# Patient Record
Sex: MALE | Race: BLACK OR AFRICAN AMERICAN | NOT HISPANIC OR LATINO | Employment: OTHER | URBAN - METROPOLITAN AREA
[De-identification: names, ages, dates, MRNs, and addresses within clinical notes are randomized per-mention and may not be internally consistent; named-entity substitution may affect disease eponyms.]

---

## 2017-01-11 ENCOUNTER — APPOINTMENT (OUTPATIENT)
Dept: GENERAL RADIOLOGY | Facility: CLINIC | Age: 82
DRG: 243 | End: 2017-01-11
Attending: EMERGENCY MEDICINE
Payer: MEDICARE

## 2017-01-11 ENCOUNTER — ANESTHESIA (OUTPATIENT)
Dept: EMERGENCY MEDICINE | Facility: CLINIC | Age: 82
DRG: 243 | End: 2017-01-11
Payer: MEDICARE

## 2017-01-11 ENCOUNTER — HOSPITAL ENCOUNTER (INPATIENT)
Facility: CLINIC | Age: 82
LOS: 3 days | Discharge: HOME | DRG: 243 | End: 2017-01-14
Attending: EMERGENCY MEDICINE | Admitting: INTERNAL MEDICINE
Payer: MEDICARE

## 2017-01-11 ENCOUNTER — ANESTHESIA EVENT (OUTPATIENT)
Dept: EMERGENCY MEDICINE | Facility: CLINIC | Age: 82
DRG: 243 | End: 2017-01-11
Payer: MEDICARE

## 2017-01-11 DIAGNOSIS — N40.0 BENIGN NODULAR PROSTATIC HYPERPLASIA WITHOUT LOWER URINARY TRACT SYMPTOMS: ICD-10-CM

## 2017-01-11 DIAGNOSIS — K21.9 GASTROESOPHAGEAL REFLUX DISEASE, ESOPHAGITIS PRESENCE NOT SPECIFIED: ICD-10-CM

## 2017-01-11 DIAGNOSIS — R00.1 BRADYCARDIA: ICD-10-CM

## 2017-01-11 DIAGNOSIS — I44.1 SECOND DEGREE AV BLOCK: ICD-10-CM

## 2017-01-11 DIAGNOSIS — I44.2 ATRIOVENTRICULAR BLOCK, COMPLETE (H): ICD-10-CM

## 2017-01-11 DIAGNOSIS — J44.1 COPD EXACERBATION (H): ICD-10-CM

## 2017-01-11 DIAGNOSIS — I10 ESSENTIAL HYPERTENSION: ICD-10-CM

## 2017-01-11 DIAGNOSIS — G89.18 ACUTE POST-OPERATIVE PAIN: Primary | ICD-10-CM

## 2017-01-11 PROBLEM — I16.0 HYPERTENSIVE URGENCY: Status: ACTIVE | Noted: 2017-01-11

## 2017-01-11 LAB
ALBUMIN SERPL-MCNC: 2.4 G/DL (ref 3.4–5)
ALBUMIN UR-MCNC: 30 MG/DL
ALP SERPL-CCNC: 86 U/L (ref 40–150)
ALT SERPL W P-5'-P-CCNC: 14 U/L (ref 0–70)
ANION GAP SERPL CALCULATED.3IONS-SCNC: 7 MMOL/L (ref 3–14)
APPEARANCE UR: CLEAR
AST SERPL W P-5'-P-CCNC: 29 U/L (ref 0–45)
BASOPHILS # BLD AUTO: 0 10E9/L (ref 0–0.2)
BASOPHILS NFR BLD AUTO: 0.6 %
BILIRUB SERPL-MCNC: 0.4 MG/DL (ref 0.2–1.3)
BILIRUB UR QL STRIP: NEGATIVE
BUN SERPL-MCNC: 11 MG/DL (ref 7–30)
CA-I BLD-SCNC: 4.7 MG/DL (ref 4.4–5.2)
CALCIUM SERPL-MCNC: 8.3 MG/DL (ref 8.5–10.1)
CHLORIDE SERPL-SCNC: 105 MMOL/L (ref 94–109)
CO2 BLDCOV-SCNC: 32 MMOL/L (ref 21–28)
CO2 SERPL-SCNC: 26 MMOL/L (ref 20–32)
COLOR UR AUTO: YELLOW
CREAT SERPL-MCNC: 0.96 MG/DL (ref 0.66–1.25)
DIFFERENTIAL METHOD BLD: NORMAL
EOSINOPHIL # BLD AUTO: 0.2 10E9/L (ref 0–0.7)
EOSINOPHIL NFR BLD AUTO: 2.8 %
ERYTHROCYTE [DISTWIDTH] IN BLOOD BY AUTOMATED COUNT: 14.1 % (ref 10–15)
GFR SERPL CREATININE-BSD FRML MDRD: 75 ML/MIN/1.7M2
GLUCOSE BLD-MCNC: 94 MG/DL (ref 70–99)
GLUCOSE SERPL-MCNC: 95 MG/DL (ref 70–99)
GLUCOSE UR STRIP-MCNC: NEGATIVE MG/DL
HCT VFR BLD AUTO: 45.3 % (ref 40–53)
HCT VFR BLD CALC: 46 %PCV (ref 40–53)
HGB BLD CALC-MCNC: 15.6 G/DL (ref 13.3–17.7)
HGB BLD-MCNC: 14.4 G/DL (ref 13.3–17.7)
HGB UR QL STRIP: NEGATIVE
IMM GRANULOCYTES # BLD: 0 10E9/L (ref 0–0.4)
IMM GRANULOCYTES NFR BLD: 0.2 %
INR PPP: 1.14 (ref 0.86–1.14)
INTERPRETATION ECG - MUSE: NORMAL
KETONES UR STRIP-MCNC: NEGATIVE MG/DL
LACTATE BLD-SCNC: 1.4 MMOL/L (ref 0.7–2.1)
LEUKOCYTE ESTERASE UR QL STRIP: ABNORMAL
LYMPHOCYTES # BLD AUTO: 1.9 10E9/L (ref 0.8–5.3)
LYMPHOCYTES NFR BLD AUTO: 35.5 %
MCH RBC QN AUTO: 28.7 PG (ref 26.5–33)
MCHC RBC AUTO-ENTMCNC: 31.8 G/DL (ref 31.5–36.5)
MCV RBC AUTO: 90 FL (ref 78–100)
MONOCYTES # BLD AUTO: 0.7 10E9/L (ref 0–1.3)
MONOCYTES NFR BLD AUTO: 12.9 %
NEUTROPHILS # BLD AUTO: 2.6 10E9/L (ref 1.6–8.3)
NEUTROPHILS NFR BLD AUTO: 48 %
NITRATE UR QL: NEGATIVE
NRBC # BLD AUTO: 0 10*3/UL
NRBC BLD AUTO-RTO: 0 /100
PCO2 BLDV: 57 MM HG (ref 40–50)
PH BLDV: 7.35 PH (ref 7.32–7.43)
PH UR STRIP: 7.5 PH (ref 5–7)
PLATELET # BLD AUTO: 258 10E9/L (ref 150–450)
PO2 BLDV: 26 MM HG (ref 25–47)
POTASSIUM BLD-SCNC: 4.5 MMOL/L (ref 3.4–5.3)
POTASSIUM SERPL-SCNC: 5.1 MMOL/L (ref 3.4–5.3)
PROT SERPL-MCNC: 7.9 G/DL (ref 6.8–8.8)
RBC # BLD AUTO: 5.02 10E12/L (ref 4.4–5.9)
SAO2 % BLDV FROM PO2: 44 %
SODIUM BLD-SCNC: 141 MMOL/L (ref 133–144)
SODIUM SERPL-SCNC: 138 MMOL/L (ref 133–144)
SP GR UR STRIP: 1.02 (ref 1–1.03)
TROPONIN I SERPL-MCNC: NORMAL UG/L (ref 0–0.04)
URN SPEC COLLECT METH UR: ABNORMAL
UROBILINOGEN UR STRIP-MCNC: 2 MG/DL (ref 0–2)
WBC # BLD AUTO: 5.4 10E9/L (ref 4–11)

## 2017-01-11 PROCEDURE — 40000671 ZZH STATISTIC ANESTHESIA CASE

## 2017-01-11 PROCEDURE — 71010 XR CHEST PORT 1 VW: CPT

## 2017-01-11 PROCEDURE — 85610 PROTHROMBIN TIME: CPT | Performed by: EMERGENCY MEDICINE

## 2017-01-11 PROCEDURE — 25000125 ZZHC RX 250

## 2017-01-11 PROCEDURE — 40000497 ZZHCL STATISTIC SODIUM ED POCT

## 2017-01-11 PROCEDURE — 81003 URINALYSIS AUTO W/O SCOPE: CPT | Performed by: EMERGENCY MEDICINE

## 2017-01-11 PROCEDURE — 93005 ELECTROCARDIOGRAM TRACING: CPT

## 2017-01-11 PROCEDURE — 96374 THER/PROPH/DIAG INJ IV PUSH: CPT | Performed by: EMERGENCY MEDICINE

## 2017-01-11 PROCEDURE — 93010 ELECTROCARDIOGRAM REPORT: CPT | Mod: Z6 | Performed by: EMERGENCY MEDICINE

## 2017-01-11 PROCEDURE — 25000128 H RX IP 250 OP 636

## 2017-01-11 PROCEDURE — 99291 CRITICAL CARE FIRST HOUR: CPT | Mod: 25 | Performed by: EMERGENCY MEDICINE

## 2017-01-11 PROCEDURE — 93005 ELECTROCARDIOGRAM TRACING: CPT | Mod: 76 | Performed by: EMERGENCY MEDICINE

## 2017-01-11 PROCEDURE — A9270 NON-COVERED ITEM OR SERVICE: HCPCS | Mod: GY | Performed by: INTERNAL MEDICINE

## 2017-01-11 PROCEDURE — 96375 TX/PRO/DX INJ NEW DRUG ADDON: CPT | Performed by: EMERGENCY MEDICINE

## 2017-01-11 PROCEDURE — 40000501 ZZHCL STATISTIC HEMATOCRIT ED POCT

## 2017-01-11 PROCEDURE — 25000125 ZZHC RX 250: Performed by: EMERGENCY MEDICINE

## 2017-01-11 PROCEDURE — 84484 ASSAY OF TROPONIN QUANT: CPT | Performed by: EMERGENCY MEDICINE

## 2017-01-11 PROCEDURE — 82803 BLOOD GASES ANY COMBINATION: CPT

## 2017-01-11 PROCEDURE — 80053 COMPREHEN METABOLIC PANEL: CPT | Performed by: EMERGENCY MEDICINE

## 2017-01-11 PROCEDURE — 21400006 ZZH R&B CCU INTERMEDIATE UMMC

## 2017-01-11 PROCEDURE — 40000502 ZZHCL STATISTIC GLUCOSE ED POCT

## 2017-01-11 PROCEDURE — 99291 CRITICAL CARE FIRST HOUR: CPT | Performed by: EMERGENCY MEDICINE

## 2017-01-11 PROCEDURE — 87086 URINE CULTURE/COLONY COUNT: CPT | Performed by: EMERGENCY MEDICINE

## 2017-01-11 PROCEDURE — 99292 CRITICAL CARE ADDL 30 MIN: CPT | Performed by: EMERGENCY MEDICINE

## 2017-01-11 PROCEDURE — 25000132 ZZH RX MED GY IP 250 OP 250 PS 637: Mod: GY | Performed by: INTERNAL MEDICINE

## 2017-01-11 PROCEDURE — 82330 ASSAY OF CALCIUM: CPT

## 2017-01-11 PROCEDURE — 83605 ASSAY OF LACTIC ACID: CPT | Performed by: EMERGENCY MEDICINE

## 2017-01-11 PROCEDURE — 85025 COMPLETE CBC W/AUTO DIFF WBC: CPT | Performed by: EMERGENCY MEDICINE

## 2017-01-11 PROCEDURE — 96361 HYDRATE IV INFUSION ADD-ON: CPT | Performed by: EMERGENCY MEDICINE

## 2017-01-11 PROCEDURE — 40000498 ZZHCL STATISTIC POTASSIUM ED POCT

## 2017-01-11 RX ORDER — SODIUM CHLORIDE 9 MG/ML
INJECTION, SOLUTION INTRAVENOUS
Status: COMPLETED
Start: 2017-01-11 | End: 2017-01-11

## 2017-01-11 RX ORDER — HYDRALAZINE HYDROCHLORIDE 25 MG/1
25 TABLET, FILM COATED ORAL ONCE
Status: COMPLETED | OUTPATIENT
Start: 2017-01-11 | End: 2017-01-11

## 2017-01-11 RX ORDER — SODIUM CHLORIDE 9 MG/ML
INJECTION, SOLUTION INTRAVENOUS
Status: DISCONTINUED
Start: 2017-01-11 | End: 2017-01-11 | Stop reason: HOSPADM

## 2017-01-11 RX ORDER — NALOXONE HYDROCHLORIDE 0.4 MG/ML
.1-.4 INJECTION, SOLUTION INTRAMUSCULAR; INTRAVENOUS; SUBCUTANEOUS
Status: DISCONTINUED | OUTPATIENT
Start: 2017-01-11 | End: 2017-01-14 | Stop reason: HOSPADM

## 2017-01-11 RX ORDER — HYDRALAZINE HYDROCHLORIDE 20 MG/ML
5 INJECTION INTRAMUSCULAR; INTRAVENOUS ONCE
Status: COMPLETED | OUTPATIENT
Start: 2017-01-11 | End: 2017-01-11

## 2017-01-11 RX ORDER — ENALAPRIL MALEATE 10 MG/1
10 TABLET ORAL DAILY
Status: DISCONTINUED | OUTPATIENT
Start: 2017-01-12 | End: 2017-01-13

## 2017-01-11 RX ADMIN — SODIUM CHLORIDE 1000 ML: 9 INJECTION, SOLUTION INTRAVENOUS at 18:17

## 2017-01-11 RX ADMIN — ATROPINE SULFATE 0.5 MG: 0.1 INJECTION, SOLUTION ENDOTRACHEAL; INTRAMUSCULAR; INTRAVENOUS; SUBCUTANEOUS at 18:04

## 2017-01-11 RX ADMIN — HYDRALAZINE HYDROCHLORIDE 5 MG: 20 INJECTION INTRAMUSCULAR; INTRAVENOUS at 18:14

## 2017-01-11 RX ADMIN — HYDRALAZINE HYDROCHLORIDE 25 MG: 25 TABLET ORAL at 22:56

## 2017-01-11 RX ADMIN — HYDRALAZINE HYDROCHLORIDE 5 MG: 20 INJECTION INTRAMUSCULAR; INTRAVENOUS at 18:04

## 2017-01-11 RX ADMIN — SODIUM CHLORIDE 1000 ML: 9 INJECTION, SOLUTION INTRAVENOUS at 17:30

## 2017-01-11 NOTE — IP AVS SNAPSHOT
[address] [address]    Phone:  [phone]                                       After Visit Summary   1/11/2017    Gallito Farley    MRN: [id number]           After Visit Summary Signature Page     I have received my discharge instructions, and my questions have been answered. I have discussed any challenges I see with this plan with the nurse or doctor.    ..........................................................................................................................................  Patient/Patient Representative Signature      ..........................................................................................................................................  Patient Representative Print Name and Relationship to Patient    ..................................................               ................................................  Date                                            Time    ..........................................................................................................................................  Reviewed by Signature/Title    ...................................................              ..............................................  Date                                                            Time

## 2017-01-11 NOTE — IP AVS SNAPSHOT
` `     UNIT 4C [facility] BANK: [phone]                 INTERAGENCY TRANSFER FORM - NOTES (H&P, Discharge Summary, Consults, Procedures, Therapies)   2017                    Hospital Admission Date: 2017  GALLITO FARLEY   : 1934  Sex: Male        Patient PCP Information     Provider PCP Type    Moiz Richard MD, MD General         History & Physicals      H&P by Klarissa Garsia MD at 2017  1:52 AM     Author:  Klarissa Garsia MD Service:  Cardiology Author Type:  Physician    Filed:  2017 10:05 PM Note Time:  2017  1:52 AM Status:  Signed    :  Klarissa Garsia MD (Physician)      Related Notes: Original Note by Haley Peterson MD (Resident) filed at 2017  5:31 AM                                                      CARDS 1 Admission History and Physical  Gallito Farley MRN: [id number]  1934  Date of Admission:2017  Primary care provider: Moiz Richard  ___________________________________          Assessment and Plan:   Gallito Farley is a 83 year old male with PMH of COPD, HTN, chronic leg wound, prior TB with right upper lobectomy who presented with cough after 6 month period in [place] off all medications and found to have bradycardia with complete heart block and hypertensive urgency.    # Complete Heart Block  Likely conduction disorder. Previously on metoprolol but minimal recent use of medications and unlikely accidental overdose.  - Monitor on telemetry overnight, have discussed with telemetry techs  - External pacing pads on overnight and pacer in room if needed  - TTE ordered for am  - Likely pacemaker placement in am, NPO at [facility] with check of CBC, INR in am    # Hypertensive Urgency  In setting of known hypertension with 6 month period off medications. Maximum blood pressure in ED of 257/101. Symptomatic with chronic chest pressure. No evidence of end organ damage, normal troponin.  - S/p two doses of 5 mg IV hydralazine in ED  -  Additional 25 mg PO hydralazine following arrival to floor  - Restart of home enalapril 10 mg daily in am  - Goal SBP overnight of 160-180  - Telemetry as above    # COPD  Reportedly on Flovent prior to time in [place] per PCP note. Presented with dry cough of admission but with stable respiratory status, clear chest on exam and XR.   - Will hold Flovent, also with Spiriva and Flonase listed in meds, unclear if using and will hold    Chronic Conditions  Leg Ulcer: Wound cares, pain relief with Tylenol, will also try menthol cream  GERD: Hold omeprazole  BPH: Hold tamsulosin    FEN: Cardiac, NPO at midnight  Prophylaxis: Mechanical overnight in anticipation of potential PPM placement in am  Code Status: FULL  Disposition: Inpatient    Patient to be staffed in the am    Haley Peterson MD PhD  Internal Medicine PGY-2  [phone]    I have seen, interviewed, and examined patient. I have reviewed the laboratory tests, imaging, and other investigations. I have reviewed the management plan with the patient. I discussed with the team and agree with the findings and plan in this resident/fellow/nurse practitioner's note. In addition, changes in the physical examination, assessment and plan have been incorporated into the note by myself, as to make it a single cohesive document.       Klarissa Garsia MD, MS  Cardiology/Cardiac EP Attending Staff              Chief Complaint:   Cough         History of Present Illness:   History obtained from patient and his two nephews one of whom served as .    Patient reports not feeling well for the past couple of months. States has a weight in his chest that never goes away. Does not do any form of exertional activity and cannot say if chest pressure ever gets worse. Denies dyspnea, orthopnea, dizziness or lightheadedness. Chronic leg ulcer of the right leg with edema of right leg greater than left. Has been in [place] for the past 6 months and returned yesterday. States took  some medications during that time but does not know the names.     Chart Review shows two admissions over the summer for COPD exacerbation and PNA.    ROS:  Complete 10 point ROS was negative unless noted in the HPI.         Past Cardiac History:   Recent Cardiac Admissions: None    Last ECHO: 4/16/2013  Interpretation Summary  Technically difficult study. Poor acoustic windows. Global and regional left   ventricular function is normal with an EF of 60-65%. Global right ventricular   function is normal. Pulmonary artery systolic pressure cannot be assessed.   The inferior vena cava  is normal. No pericardial effusion is present.    Last Angiogram: None          Past Medical History:     Past Medical History   Diagnosis Date     Osteoarthritis      Diastolic dysfunction, left ventricle 4/16/2013     Hypertension      Asthma      COPD (chronic obstructive pulmonary disease) (H)      Leg wound, right      chronic, s/p grafting     BPH (benign prostatic hyperplasia)      H/O tuberculosis      s/p partial pneumonectomy in [place] in the 1990[place]     LBBB (left bundle branch block)              Past Surgical History:      Past Surgical History   Procedure Laterality Date     Cholecystectomy       Ent surgery       Skin grafting - leg wound  6/2016     Irrigation and debridement hip, combined  4/18/2013     Procedure: COMBINED IRRIGATION AND DEBRIDEMENT HIP;  Irrigation and debridement of Right Hip with cultures;  Surgeon: Cristal Inman MD;  Location: [facility] OR     Partial pneumonectomy       done in [facility] in the 1990's             Social History:     Social History   Substance Use Topics     Smoking status: Former Smoker     Smokeless tobacco: Not on file     Alcohol Use: No             Family History:     Family History   Problem Relation Age of Onset     Family History Negative Mother              Allergies:   No Known Allergies          Medications:     No current facility-administered medications on file prior  "to encounter.  Current Outpatient Prescriptions on File Prior to Encounter:  albuterol (2.5 MG/3ML) 0.083% nebulizer solution Take 1 vial (2.5 mg) by nebulization 4 times daily (Patient taking differently: Take 2.5 mg by nebulization every 6 hours as needed )   omeprazole (PRILOSEC) 40 MG capsule Take 1 capsule (40 mg) by mouth every morning (before breakfast)   tamsulosin (FLOMAX) 0.4 MG 24 hr capsule Take 1 capsule (0.4 mg) by mouth every 24 hours   enalapril (VASOTEC) 10 MG tablet Take 1 tablet (10 mg) by mouth daily   metoprolol (LOPRESSOR) 25 MG tablet Take 2 tablets (50 mg) by mouth 2 times daily   fluticasone (FLONASE) 50 MCG/ACT nasal spray Spray 1-2 sprays into both nostrils daily   fluticasone (FLOVENT DISKUS) 250 MCG/BLIST AEPB Inhale 1 puff (250 mcg) into the lungs every 12 hours   tiotropium (SPIRIVA) 18 MCG inhalation capsule Inhale 1 capsule (18 mcg) into the lungs daily            Physical Exam:   Blood pressure 185/65, pulse 40, temperature 98.3  F (36.8  C), temperature source Oral, resp. rate 22, height 1.854 m (6' 1\"), weight 79.89 kg (176 lb 2 oz), SpO2 95 %.    General: NAD, pleasant  HEENT: NCAT, PERRL, EOMI, OP clear and non-erythematous  Neck: No LAD, no JVD  CV: Bradycardic, no murmurs or rubs  Resp: CTAB on anterior exam, no rales, rhonchi, wheezes  Abd: Mild tenderness LUQ, soft, normoactive bowel sounds  Extremities: WWP, trace pitting edema of left leg, 2+ pitting edema of right leg, radial and DP pulses intact  Skin: Large ulcer approximately 4 inches in height that wraps from lateral into posterior right leg above the ankle, no active drainage  Neuro/Psych: AAOx4, CN 2-12 grossly intact, moving all four extremities         Data:   I/O last 3 completed shifts:  In: -   Out: 190 [Urine:190]    CMP  Recent Labs  Lab 01/11/17  1735 01/11/17  1734    138   POTASSIUM 4.5 5.1   CHLORIDE  --  105   CO2  --  26   ANIONGAP  --  7   GLC 94 95   BUN  --  11   CR  --  0.96   GFRESTIMATED  -- "  75   PAMELA  --  8.3*   PROTTOTAL  --  7.9   ALBUMIN  --  2.4*   BILITOTAL  --  0.4   ALKPHOS  --  86   AST  --  29   ALT  --  14     CBC  Recent Labs  Lab 01/11/17  1735 01/11/17  1734   WBC  --  5.4   RBC  --  5.02   HGB 15.6 14.4   HCT  --  45.3   MCV  --  90   MCH  --  28.7   MCHC  --  31.8   RDW  --  14.1   PLT  --  258     INR  Recent Labs  Lab 01/11/17  1734   INR 1.14       Troponin Lab Results   Component Value Date    TROPI  01/11/2017     <0.015  The 99th percentile for upper reference range is 0.045 ug/L.  Troponin values in   the range of 0.045 - 0.120 ug/L may be associated with risks of adverse   clinical events.      TROPI 0.016 07/21/2016    TROPI  07/14/2016     <0.015  The 99th percentile for upper reference range is 0.045 ug/L.  Troponin values in   the range of 0.045 - 0.120 ug/L may be associated with risks of adverse   clinical events.      TROPONIN 0.02 04/15/2013    TROPONIN <0.04 11/04/2006    TROPONIN <0.04 09/08/2006     EKG: Complete Heart Block    IMAGING:   CXR: IMPRESSION: Interval placement of an external pacer which partially  obscures the left mid and lower lung zones. Postoperative volume loss  in the right upper hemithorax is again noted. Probable scarring in  right lung base is unchanged. Heart size is difficult to evaluate due  to rotation of the patient. No definite confluent infiltrates or  significant changes since the prior exam.                   Discharge Summaries     No notes of this type exist for this encounter.      Consult Notes     No notes of this type exist for this encounter.         Progress Notes - Physician (Notes from 01/11/17 through 01/14/17)      Progress Notes by Hai Huang MD at 1/13/2017  7:19 AM     Author:  Hai Huang MD Service:  Electrophysiology Author Type:  Physician    Filed:  1/14/2017 12:16 PM Note Time:  1/13/2017  7:19 AM Status:  Signed    :  Hai Huang MD (Physician)      Related Notes: Original Note by Trudy Bowman  "KIRSTEN Coker CNP (Nurse Practitioner) filed at 1/13/2017 11:37 AM           Electrophysiology Post Procedure Follow Up Note  Date of Procedure: 1/12/17  DOS: 1/13/2017   Pre-operative Diagnosis:  Complete Heart Block.  Device Indication: Complete Heart block  Post-operative diagnosis:   Successful implantation of PPM.  Hospital Course:  Mr. Farley is an 83 year old male who has a past medical history significant for COPD, HTN, chronic leg wound, prior TB with right upper lobectomy who presented with cough after 6 month period in [place] off all medications and found to have bradycardia with complete heart block and hypertensive urgency.He was referred for PPM implant.     Patient underwent a successful dual chamber implant yesterday. He was noted to be very hypertensive (240's/100's) when he arrived in [facility] for PPM implant. He was given 10mg IV hydralazine with little effect. He was subsequently started on nitro gtt which helped control blood pressures to 140-160's/60-80's throughout most of the case. Given need for ongoing nitro gtt for BP management, he was transferred to ICU. He had an uneventful overnight stay. He continues to be hypertensive. Device interrogation shows normal device function and stable lead parameters. Chest x-ray demonstrates no evidence of pneumothorax. Left subclavian site is CDI, no bleeding, and no hematoma. Patient is tolerating oral intake, ambulating at baseline, and voiding without difficulties. Patient remains hemodynamically stable.     ROS:   10 point ROS neg other than the symptoms noted above.   Exam:  /81 mmHg  Pulse 40  Temp(Src) 98.1  F (36.7  C) (Oral)  Resp 26  Ht 1.854 m (6' 1\")  Wt 79 kg (174 lb 2.6 oz)  BMI 22.98 kg/m2  SpO2 98%    Constitutional: alert and no distress  Head: Normocephalic. No masses, lesions, tenderness or abnormalities  Neck: Neck supple. No adenopathy. Thyroid symmetric, normal size,, Carotids without bruits.  ENT: ENT exam normal, no neck " nodes or sinus tenderness  Cardiovascular: negative, PMI normal. No lifts, heaves, or thrills. RRR. No murmurs, clicks gallops or rub  Respiratory: negative, Good diaphragmatic excursion. Lungs clear  Gastrointestinal: Abdomen soft, non-tender. BS normal. No masses, organomegaly  : Deferred  Skin: right leg ulcer dressed, no suspicious lesions or rashes  Neurologic: A&OX4.  Psychiatric: mentation appears normal and affect normal/bright  Discharge Medications:   Gallito Farley   Home Medication Instructions DIMAS:[id number]    Printed on:01/13/17 6564   Medication Information                      albuterol (2.5 MG/3ML) 0.083% nebulizer solution  Take 1 vial (2.5 mg) by nebulization 4 times daily             enalapril (VASOTEC) 10 MG tablet  Take 1 tablet (10 mg) by mouth daily             fluticasone (FLONASE) 50 MCG/ACT nasal spray  Spray 1-2 sprays into both nostrils daily             fluticasone (FLOVENT DISKUS) 250 MCG/BLIST AEPB  Inhale 1 puff (250 mcg) into the lungs every 12 hours             metoprolol (LOPRESSOR) 25 MG tablet  Take 2 tablets (50 mg) by mouth 2 times daily             omeprazole (PRILOSEC) 40 MG capsule  Take 1 capsule (40 mg) by mouth every morning (before breakfast)             tamsulosin (FLOMAX) 0.4 MG 24 hr capsule  Take 1 capsule (0.4 mg) by mouth every 24 hours             tiotropium (SPIRIVA) 18 MCG inhalation capsule  Inhale 1 capsule (18 mcg) into the lungs daily                 Plan & Follow up:    Continued cares for hypertensive urgency per primary team    Follow up with device clinic in 7-10 days    Oral antibiotics x 5 days    Keep incision clean and dry for 72 hours post procedure    No lifting > 10lbs or over shoulder level with left arm for 4 weeks    Notify device clinic/EP immediately for any redness, swelling, bleeding, discharge, fevers or chills.      This patient was seen and examined with Dr. Huang. The above note reflects our joint assessment and plan.   Trudy Roach  KIRSTEN Mistry CNP  Electrophysiology Consult Service  Pager: [phone]      EP STAFF NOTE  Patient seen and examined and management plan personally reviewed with the patient. I agree with the note above by Turdy Angeles, EP Nurse. Changes in the physical examination, assessment and plan have been incorporated into the note by myself, as to make it a single cohesive document. Patient seen 1/13/17.  Hai Huang MD [facility]  Cardiology - Electrophysiology             Progress Notes by Klarissa Garsia MD at 1/13/2017  6:46 AM     Author:  Klarissa Garsia MD Service:  Cardiology Author Type:  Physician    Filed:  1/13/2017 10:05 PM Note Time:  1/13/2017  6:46 AM Status:  Addendum    :  Klarissa Garsia MD (Physician)      Related Notes: Original Note by Renay Beltrán MD (Resident) filed at 1/13/2017 12:19 PM                 Cardiology Daily Note   Date of Service: 1/12/2017  Patient: Gallito Farley  MRN: [id number]  Admission Date: 1/11/2017  Hospital Day # 2      Assessment & Plan:   Gallito Farley is a 83 year old male with PMH of COPD, HTN, chronic leg wound, prior TB with right upper lobectomy who presented with cough after 6 month period in [place] off all medications and found to have bradycardia with complete heart block and hypertensive urgency.     Changes today:  -- PPM interrogation  -- Post-op CXR  -- Wean nitro gtt as able  -- start HCTz  -- Continue cephalexin     # Complete Heart Block  Likely conduction disorder. Previously on metoprolol but minimal recent use of medications and unlikely accidental overdose. Patient underwent a successful dual chamber implant on 01/13/17.   -- Device interrogation this AM  -- Post-procedure CXR  -- Cephalexin PO for prophylaxis for 5 days (end date 01/17/17)  -- No lifting > 10lbs or over shoulder level with left arm for 4 weeks  -- Follow up with device clinic in 7-10 days    # Hypertensive Urgency  In setting of known hypertension with 6 month period off medications.  Maximum blood pressure in ED of 257/101. Symptomatic with chronic chest pressure. No evidence of end organ damage, normal troponin. He was noted to be very hypertensive (240's/100's) when he arrived in [place] for PPM implant. He was given 10mg IV hydralazine with little effect. He was subsequently started on nitro gtt which helped control blood pressures to 140-160's/60-80's. Still requiring blood pressure management. Given need for ongoing nitro gtt for BP management, he was transferred to ICU.   -- Enalapril 10 mg daily   -- Start HCTz 25mg QD  -- Goal -160s  -- Wean nitro gtt as able  -- Consider work-up for secondary causes of HTN if no improvement     # COPD  Pt reported not taking any medication for at least the past 6 months. Presented with dry cough of admission but with stable respiratory status, clear chest on exam and XR.  No evidence of acute copd exacerbation  - Re-start flovent BID  and ipratropium daily    Chronic Conditions  Leg Ulcer: Wound cares, pain relief with Tylenol, will also try menthol cream   - Wound Nurse consulted   GERD: switched from omeprazole to ranitidin daily.    BPH: Continue pta tamsulosin    FEN: Cardiac  Prophylaxis: Mechanical   Code Status: FULL  Disposition: pending improvement in blood pressure    This patient was discussed with Dr. Garsia who agrees with the assessment and plan.    Renay Beltrán, PGY2  Internal Medicine/Pediatrics  P: [phone]    I have seen, interviewed, and examined patient. I have reviewed the laboratory tests, imaging, and other investigations. I have reviewed the management plan with the patient. I discussed with the team and agree with the findings and plan in this resident/fellow/nurse practitioner's note. In addition, changes in the physical examination, assessment and plan have been incorporated into the note by myself, as to make it a single cohesive document.       Klarissa Garsia MD, MS  Cardiology/Cardiac EP Attending  "Staff          Subjective & Interval Hx:    Nursing notes reviewed. He had an uneventful overnight stay. He continues to be hypertensive. This AM, c/o mild headache and some chest soreness over incision site. Otherwise denies sob, vision changes, dizziness.     Last 24 hr care team notes reviewed.   ROS: 4 point ROS including Respiratory, CV, GI and , other than that noted in the HPI, is negative    Telemetry reviewed: Yes     Physical Exam:  Blood pressure 170/86, pulse 40, temperature 98.8  F (37.1  C), temperature source Tympanic, resp. rate 24, height 1.854 m (6' 1\"), weight 79 kg (174 lb 2.6 oz), SpO2 99 %.  General: NAD, pleasant  HEENT: NCAT, PERRL, EOMI, OP clear and non-erythematous  Neck: No LAD, no JVD  CV: Bradycardic, no murmurs or rubs  Resp: CTAB on anterior exam, no rales, rhonchi, wheezes  Abd: LUQ, soft, normoactive bowel sounds  Extremities: WWP, trace pitting edema of left leg, 1+ pitting edema of right leg, radial and DP pulses intact  Skin: Large leg ulcer on right leg wrapped by wound nurse, no active drainage    Neuro/Psych: AAOx4      Labs & Studies of Note:  Labs reviewed in [facility].     EKG reviewed.   Meds reviewed.  Imaging reviewed.    Medications list for Reference  Current Facility-Administered Medications   Medication     lidocaine 1 % 1 mL     lidocaine (LMX4) kit     sodium chloride (PF) 0.9% PF flush 3 mL     sodium chloride (PF) 0.9% PF flush 3 mL     acetaminophen-codeine (TYLENOL #3) 300-30 MG per tablet 1-2 tablet     HOLD: metformin and metformin containing medications on day of procedure and 48 hours after IV contrast given     HOLD: enoxaparin (LOVENOX) Post Procedure     HOLD: heparin (IV or Subcutaneous) Post Procedure     ceFAZolin (ANCEF) 1 g vial to attach to  ml bag for ADULT or 50 ml bag for PEDS     [START ON 1/14/2017] cephalexin (KEFLEX) capsule 250 mg     fluticasone (FLOVENT DISKUS) 250 mcg/puff inhaler 1 puff     umeclidinium (INCRUSE ELLIPTA) oral inhaler " 1 puff     albuterol (PROAIR HFA/PROVENTIL HFA/VENTOLIN HFA) Inhaler 2 puff     furosemide (LASIX) injection 20 mg     hydrochlorothiazide (MICROZIDE) capsule 25 mg     acetaminophen (TYLENOL) tablet 650 mg     methyl salicylate-menthol (STEVEN-TRIMBLE) ointment     nitroglycerin 50 mg in D5W 250 mL (adult std) infusion     enalapril (VASOTEC) tablet 10 mg     naloxone (NARCAN) injection 0.1-0.4 mg                Progress Notes by Kierra Pabon RN at 2017 11:29 AM     Author:  Kierra Pabon RN Service:  (none) Author Type:  Registered Nurse    Filed:  2017 11:42 AM Note Time:  2017 11:29 AM Status:  Signed    :  Kierra Pabon RN (Registered Nurse)           Pt seen on 4C for evaluation and programming of a [employer] Dual lead pacemaker s/p implant on 2017, per MD orders. Today His intrinsic rhythm is 80 BPM @ 30 BPM (VS). Normal pacemaker function. 0 arrhythmias have been recorded. AP= 9.5% and = 99.9%. Pt reports he is feeling well.  Battery estimates Initalizing years to BE. Teaching provided in written and verbal forms with interpertor. Plan for pt to RTC on 2017 @ 10:30 AM  for a device and incision check. Changed upper rate from 100 to 120 BPM per Dr Huang.     RA lead:  Impedance: 572 ohms  Sensin.0 mV  Threshold: 0.50 V @ 0.40 ms    RV lead:  Impedance: 959 ohms  Sensin.0 mV  Threshold: 0.50 V @ 0.40 ms           Progress Notes by Lorraine Cline RN at 2017 10:18 AM     Author:  Lorraine Cline RN Service:  WO Nurse Author Type:  Registered Nurse    Filed:  2017 10:30 AM Note Time:  2017 10:18 AM Status:  Signed    :  Lorraine Cline RN (Registered Nurse)           [facility] Nurse Inpatient Wound Assessment     Initial Assessment of wound(s) on pt's:   Right posterior lower leg wound        Data:   Patient History:      per MD note(s): Gallito Farley is a 83 year old male with PMH of COPD, HTN,  chronic leg wound, prior TB with right upper lobectomy who presented with cough after 6 month period in [place] off all medications and found to have bradycardia with complete heart block and hypertensive urgency.    Moisture Management:  NA    Current Diet / Nutrition:           Active Diet Order  Advance Diet as Tolerated: Clear Liquid Diet                Dimitri Assessment and sub scores:   Dimitri Score  Av  Min: 15  Max: 17     Labs:         Recent Labs   Lab Test  17   0815   17   1734   16   0530   16   0700   ALBUMIN   --    --   2.4*   --   2.6*   < >   --    HGB  12.7*   < >  14.4   < >   --    < >  12.3*   RBC  4.55   --   5.02   < >   --    < >  4.14*   WBC  6.0   --   5.4   < >   --    < >  10.1   PLT  265   --   258   < >   --    < >  441   INR  1.24*   --   1.14   --    --    --    --    A1C   --    --    --    --   5.7   --    --    CRP   --    --    --    --    --    --   91.6*    < > = values in this interval not displayed.          Wound Assessment (location #1): Right posterior lower leg wound    Wound History: Pt not able to provide history, very limited english. Chart reviewed, per previous record, son reported the wound is from a lizard bite that was operated on then got infected, as a child.   He was seen by Dr. Davis in 2016 for these wound and apligraf was applied. Wound appears to be be healing compared to the pictures that's on the file from Dr. Davis but chronic and slow. Unsure if he is still following Dr. Davis. Venous insufficiency is also a contributing factor for this wound.             Wound Base: 100% pink smooth tissue    Specific Dimensions (length x width x depth, in cm) :   6x9x0.2cm    Tunneling:  N/A    Undermining: N/A    Palpation of the wound bed:  normal    Slough appearance:  none    Eschar appearance:  none    Periwound Skin: intact, surrounded by thick firm scabby type of tissue, dark exfoliating epithelium    Color: dry and  consistent with surrounding tissue    Temperature  normal     Drainage:  Amount: moderate . Color: cloudy and creamy drainage on old dressing    Odor: none    Pain:  absent ,           Intervention:     Patient's chart evaluated.      Wound(s) was assessed    Wound Care: was done:  Per POC mentioned below    Orders  Written    Supplies  Reviewed    Discussed plan of care with Patient and Nurse          Assessment:       Chronic full thickness venous stasis ulcer on right lower leg.          Plan:     Nursing to notify the Provider(s) and re-consult the [facility] Nurse if wound(s) deteriorate(s).  Plan of care for wound located on Right lower leg daily:   Cleanse entire lower leg with soap and water.  Apply sween moisturizing cream on intact skin around the wound with thick scab tissue.  Cleanse wound with NS and pat dry.  Cut Xerofoam gauze into half and apply on open wound. Followed by gauze and ABD pad.  Wrap with kerlix.  Apply  ace bandages on both leg. Use 2 bandages on each leg.   Small ace bandage, starting from foot to ankle and larger one from ankle to below knee.  Remove ace wraps at night and elevate BLLE while sleeping.    May remove ace bandages for skin inspection as needed.    [facility] Nurse will return: weekly     Face to face time: 25 mins            Progress Notes by Stefany Whiteside RN at 1/13/2017 12:30 AM     Author:  Stefany Whiteside RN Service:  Cardiology Author Type:  Registered Nurse    Filed:  1/13/2017 12:41 AM Note Time:  1/13/2017 12:30 AM Status:  Signed    :  Stefany Whiteside RN (Registered Nurse)             Pt transferred to  after PPM implantation in cath lab. Belongings sent with family down to  at 0030.        Progress Notes by Stefany Whiteside RN at 1/12/2017  8:30 PM     Author:  Stefany Whiteside RN Service:  Cardiology Author Type:  Registered Nurse    Filed:  1/13/2017 12:40 AM Note Time:  1/12/2017  8:30 PM Status:  Signed    :  Stefany Whiteside RN  (Registered Nurse)             Transfer:    Pt was transferred to Cath Lab at 2030 via cart. On RA, pacer pads on and connected to Zoll.   Belongings in room. Family accompanied pt down to cath lab.        Progress Notes by Klarissa Garsia MD at 1/12/2017  2:38 PM     Author:  Klarissa Garsia MD Service:  Cardiology Author Type:  Physician    Filed:  1/12/2017 10:05 PM Note Time:  1/12/2017  2:38 PM Status:  Signed    :  Klarissa Garsia MD (Physician)      Related Notes: Original Note by Renay Beltrán MD (Resident) filed at 1/12/2017  2:47 PM                 Cardiology Daily Note   Date of Service: 1/12/2017  Patient: Gallito Farley  MRN: [id number]  Admission Date: 1/11/2017  Hospital Day # 1      Assessment & Plan:   Gallito Farley is a 83 year old male with PMH of COPD, HTN, chronic leg wound, prior TB with right upper lobectomy who presented with cough after 6 month period in [place] off all medications and found to have bradycardia with complete heart block and hypertensive urgency.     Changes today:  - Hydralazine 5mg once  - PPM today    # Complete Heart Block  Likely conduction disorder. Previously on metoprolol but minimal recent use of medications and unlikely accidental overdose.  - TTE this AM  - Pacemaker placement today  - NPO since MN with check of CBC, INR in am    # Hypertensive Urgency  In setting of known hypertension with 6 month period off medications. Maximum blood pressure in ED of 257/101. Symptomatic with chronic chest pressure. No evidence of end organ damage, normal troponin. S/p two doses of 5 mg IV hydralazine in ED. Additional 25 mg PO hydralazine following arrival to floor.   - Enalapril 10 mg daily   - Goal SBP overnight of 160-180  - PRN hydralazine 5mg IV if SBP >180  - Telemetry as above    # COPD  Reportedly on Flovent prior to time in [place] per PCP note. Presented with dry cough of admission but with stable respiratory status, clear chest on exam and XR.    - Will re-start  "flovent at time of discharge    Chronic Conditions  Leg Ulcer: Wound cares, pain relief with Tylenol, will also try menthol cream  GERD: Hold omeprazole  BPH: Hold tamsulosin    FEN: NPO, resume diet post-procedure   Prophylaxis: Mechanical overnight in anticipation of potential PPM placement in am  Code Status: FULL  Disposition: Inpatient    This patient was discussed with Dr. Garsia who agrees with the assessment and plan.    Renay Beltrán, PGY2  Internal Medicine/Pediatrics  P: [phone]    I have seen, interviewed, and examined patient. I have reviewed the laboratory tests, imaging, and other investigations. I have reviewed the management plan with the patient. I discussed with the team and agree with the findings and plan in this resident/fellow/nurse practitioner's note. In addition, changes in the physical examination, assessment and plan have been incorporated into the note by myself, as to make it a single cohesive document.       Klarissa Garsia MD, MS  Cardiology/Cardiac EP Attending Staff      ___________________________________________________________________      Subjective & Interval Hx:    No acute events since admission. Remains Billy -- assymptomatic. Patient denies HA, sob, chest pain, dizziness. No complaints this morning. NO questions regarding PPM.     Last 24 hr care team notes reviewed.   ROS: 4 point ROS including Respiratory, CV, GI and , other than that noted in the HPI, is negative    Telemetry reviewed: Yes     Physical Exam:  Blood pressure 183/62, pulse 40, temperature 98.7  F (37.1  C), temperature source Oral, resp. rate 22, height 1.854 m (6' 1\"), weight 79.89 kg (176 lb 2 oz), SpO2 94 %.  General: NAD, pleasant  HEENT: NCAT, PERRL, EOMI, OP clear and non-erythematous  Neck: No LAD, no JVD  CV: Bradycardic, no murmurs or rubs  Resp: CTAB on anterior exam, no rales, rhonchi, wheezes  Abd: LUQ, soft, normoactive bowel sounds  Extremities: WWP, trace pitting edema of left leg, 1+ " pitting edema of right leg, radial and DP pulses intact  Skin: Large ulcer approximately 4 inches in height that wraps from lateral into posterior right leg above the ankle, no active drainage  Neuro/Psych: AAOx4    Lines/Tubes:   Peripheral IV 01/11/17 Right Hand (Active)   Site Assessment [facility] 1/12/2017 12:41 AM   Line Status Saline locked 1/12/2017 12:41 AM   Phlebitis Scale 0-->no symptoms 1/12/2017 12:41 AM   Infiltration Scale 0 1/12/2017 12:41 AM   Extravasation? No 1/12/2017 12:41 AM   Number of days:1       Peripheral IV 01/11/17 Left Lower forearm (Active)   Site Assessment [facility] 1/12/2017 12:41 AM   Line Status Saline locked 1/12/2017 12:41 AM   Phlebitis Scale 0-->no symptoms 1/12/2017 12:41 AM   Infiltration Scale 0 1/12/2017 12:41 AM   Extravasation? No 1/12/2017 12:41 AM   Number of days:1       Labs & Studies of Note:  Labs reviewed in EPIC.     EKG reviewed.   Meds reviewed.  Imaging reviewed.    Medications list for Reference  Current Facility-Administered Medications   Medication     acetaminophen (TYLENOL) tablet 650 mg     methyl salicylate-menthol (STEVEN-TRIMBLE) ointment     enalapril (VASOTEC) tablet 10 mg     naloxone (NARCAN) injection 0.1-0.4 mg                Progress Notes by Dimitri Alexis RN at 1/12/2017  5:27 PM     Author:  Dimitri Alexis RN Service:  Cardiology Author Type:  Registered Nurse    Filed:  1/12/2017  5:30 PM Note Time:  1/12/2017  5:27 PM Status:  Signed    :  Dimitri Alexis RN (Registered Nurse)           -Pt high blood pressure and low heart rate are being closely monitored prior to pacer placement early this evening.  -Pt has been attached to an external pacer throughout the day as a precaution to critical condition change.  -Pt had his chronic wound above his left heel dissinfected and redressed. A consult with wound care nursing has been ordered.       Progress Notes by Stefany Whiteside RN at 1/11/2017 10:47 PM     Author:  Stefany Whiteside  ABELARDO, RN Service:  Cardiology Author Type:  Registered Nurse    Filed:  1/12/2017  1:30 AM Note Time:  1/11/2017 10:47 PM Status:  Addendum    :  Stefany Whiteside RN (Registered Nurse)      Related Notes: Original Note by Stefany Whiteside RN (Registered Nurse) filed at 1/11/2017 10:53 PM         Admission:    Pt admitted to 6C via stretcher from [facility]. Pt is very hypertensive 200s/60s, bradycardic with rates stable in lower 40s, other VSS, on RA. Pacer pads on pt upon arrival, zoll in room. Pressure ulcer on R posterior ankle redressed. Pt's nephew is with pt and translating, waiver signed. Oriented to room, call light, etc. Admission profile complete, care plan updated.       ED Provider Notes by Kay Salazar MD at 1/11/2017  7:33 PM     Author:  Kay Salazar MD Service:  Emergency Medicine Author Type:  Physician    Filed:  1/12/2017  1:14 AM Note Time:  1/11/2017  7:33 PM Status:  Signed    :  Kay Salazar MD (Physician)             History     Chief Complaint   Patient presents with     Cough     Pastient had pnuemonia a couple months ago.  Family is concerned he may have this again.      Chest Pain     chest pain for a long time per family report.  Patient feels it is worse now.      The history is provided by a relative.     Gallito Farley is  a 83-year-old male with a history of hypertension and COPD who was brought to the ER by his nephew due to worsening shortness of breath.  Per family members who were functioning with a  patient was in [place] and has been off of his Metoprolol and Lisinopril for the past several months.  Patient returned from [place] yesterday and was saying he was feeling unwell and short of breath so they brought him into the ER.  He says he s been intermittently feeling short of breath but has had no chest pain.  Patient noted intermittent lower belly tenderness but none currently.    Per family he has been acting normally.  No  nausea no vomiting.  Mildly decreased appetite.  Patient has urinary frequency which has been a stable issue for him.  He denies any fevers.  Denies blood in his stools or urine.  He states he s been having regular bowel movements every day.  Denies any other complaints.  He states that he has not been taking his metoprolol or Lisinopril for the past several months.     I have reviewed the Medications, Allergies, Past Medical and Surgical History, and Social History in the Epic system.    Review of Systems   Constitutional: Negative for fever, chills and diaphoresis.   Respiratory: Positive for shortness of breath. Negative for cough and chest tightness.    Cardiovascular: Positive for chest pain.   Gastrointestinal: Positive for abdominal pain. Negative for nausea, vomiting and diarrhea.       Physical Exam   BP: (!) 229/68 mmHg  Pulse: (!) 40  Heart Rate: 56  Temp: 96.8  F (36  C)  Resp: 18  Weight: 79.89 kg (176 lb 2 oz)  SpO2: 94 %  Physical Exam   Constitutional: He is oriented to person, place, and time. He appears well-developed and well-nourished.   HENT:   Head: Normocephalic and atraumatic.   Eyes: EOM are normal. Pupils are equal, round, and reactive to light.   Neck: Normal range of motion. Neck supple.   Cardiovascular: Regular rhythm and normal heart sounds.  Bradycardia present.    Pulmonary/Chest: Effort normal. No respiratory distress. He has no wheezes.   Abdominal: Soft. He exhibits no distension. There is no tenderness. There is no rebound.   Musculoskeletal:   Right leg with swelling of lower extremity; large ulcer on to of ankle that circulates bottom of leg; mildly tender; no drainage; chronic   Neurological: He is alert and oriented to person, place, and time.   Skin: Skin is warm.   Psychiatric: He has a normal mood and affect. His behavior is normal. Thought content normal.       ED Course   Procedures             EKG Interpretation:      Interpreted by Kay Salazar  Time reviewed:  1705  Symptoms at time of EKG: None   Rhythm: 2nd degree AV block with 2:1 conducton  Rate: 30-40  Axis: Left Axis Deviation  Ectopy: none  Conduction: right bundle branch block (complete)  ST Segments/ T Waves: inverted T waves latearlly  Q Waves: nonspecific  Comparison to prior: changed from prior    Clinical Impression: 2nd degree AV block-mobitz type II           EKG Interpretation:      Interpreted by Kay Salazar  Time reviewed: 1756  Symptoms at time of EKG: None   Rhythm: 2nd degree AV block with 2:1 conducton  Rate: 30-40  Axis: Left Axis Deviation  Ectopy: none  Conduction: right bundle branch block (complete)  ST Segments/ T Waves: inverted T waves lateral leads  Q Waves: nonspecific  Comparison to prior: changed from prior    Clinical Impression: 2nd degree AV block-mobitz type I-Winkeboch         EKG Interpretation:      Interpreted by Kay Salazar  Time reviewed: 1903  Symptoms at time of EKG: None   Rhythm: 2nd degree AV block with 2:1 conducton  Rate: 30-40  Axis: Left Axis Deviation  Ectopy: none  Conduction: right bundle branch block (complete)  ST Segments/ T Waves: inverted T waves lateral leads  Q Waves: nonspecific  Comparison to prior: changed from prior    Clinical Impression: 2nd degree AV block-mobitz type I-Winkeboch      Critical Care time:  was 60 minutes for this patient excluding procedures.         Results for orders placed or performed during the hospital encounter of 01/11/17   XR Chest Port 1 View    Narrative    CHEST ONE VIEW PORTABLE  1/11/2017 6:12 PM     HISTORY: Cough. Shortness of breath.     COMPARISON: 7/21/2016.      Impression    IMPRESSION: Interval placement of an external pacer which partially  obscures the left mid and lower lung zones. Postoperative volume loss  in the right upper hemithorax is again noted. Probable scarring in  right lung base is unchanged. Heart size is difficult to evaluate due  to rotation of the patient. No definite confluent  infiltrates or  significant changes since the prior exam.    RUFUS JENSEN MD   CBC with platelets differential   Result Value Ref Range    WBC 5.4 4.0 - 11.0 10e9/L    RBC Count 5.02 4.4 - 5.9 10e12/L    Hemoglobin 14.4 13.3 - 17.7 g/dL    Hematocrit 45.3 40.0 - 53.0 %    MCV 90 78 - 100 fl    MCH 28.7 26.5 - 33.0 pg    MCHC 31.8 31.5 - 36.5 g/dL    RDW 14.1 10.0 - 15.0 %    Platelet Count 258 150 - 450 10e9/L    Diff Method Automated Method     % Neutrophils 48.0 %    % Lymphocytes 35.5 %    % Monocytes 12.9 %    % Eosinophils 2.8 %    % Basophils 0.6 %    % Immature Granulocytes 0.2 %    Nucleated RBCs 0 0 /100    Absolute Neutrophil 2.6 1.6 - 8.3 10e9/L    Absolute Lymphocytes 1.9 0.8 - 5.3 10e9/L    Absolute Monocytes 0.7 0.0 - 1.3 10e9/L    Absolute Eosinophils 0.2 0.0 - 0.7 10e9/L    Absolute Basophils 0.0 0.0 - 0.2 10e9/L    Abs Immature Granulocytes 0.0 0 - 0.4 10e9/L    Absolute Nucleated RBC 0.0    Comprehensive metabolic panel   Result Value Ref Range    Sodium 138 133 - 144 mmol/L    Potassium 5.1 3.4 - 5.3 mmol/L    Chloride 105 94 - 109 mmol/L    Carbon Dioxide 26 20 - 32 mmol/L    Anion Gap 7 3 - 14 mmol/L    Glucose 95 70 - 99 mg/dL    Urea Nitrogen 11 7 - 30 mg/dL    Creatinine 0.96 0.66 - 1.25 mg/dL    GFR Estimate 75 >60 mL/min/1.7m2    GFR Estimate If Black >90   GFR Calc   >60 mL/min/1.7m2    Calcium 8.3 (L) 8.5 - 10.1 mg/dL    Bilirubin Total 0.4 0.2 - 1.3 mg/dL    Albumin 2.4 (L) 3.4 - 5.0 g/dL    Protein Total 7.9 6.8 - 8.8 g/dL    Alkaline Phosphatase 86 40 - 150 U/L    ALT 14 0 - 70 U/L    AST 29 0 - 45 U/L   Troponin I   Result Value Ref Range    Troponin I ES  0.000 - 0.045 ug/L     <0.015  The 99th percentile for upper reference range is 0.045 ug/L.  Troponin values in   the range of 0.045 - 0.120 ug/L may be associated with risks of adverse   clinical events.     INR   Result Value Ref Range    INR 1.14 0.86 - 1.14   Lactic acid   Result Value Ref Range    Lactic Acid  1.4 0.7 - 2.1 mmol/L   UA without Microscopic   Result Value Ref Range    Color Urine Yellow     Appearance Urine Clear     Glucose Urine Negative NEG mg/dL    Bilirubin Urine Negative NEG    Ketones Urine Negative NEG mg/dL    Specific Gravity Urine 1.016 1.003 - 1.035    Blood Urine Negative NEG    pH Urine 7.5 (H) 5.0 - 7.0 pH    Protein Albumin Urine 30 (A) NEG mg/dL    Urobilinogen mg/dL 2.0 0.0 - 2.0 mg/dL    Nitrite Urine Negative NEG    Leukocyte Esterase Urine Trace (A) NEG    Source Unspecified Urine    EKG 12 lead   Result Value Ref Range    Interpretation ECG Click View Image link to view waveform and result    EKG 12 lead   Result Value Ref Range    Interpretation ECG Click View Image link to view waveform and result    EKG 12-lead, tracing only   Result Value Ref Range    Interpretation ECG Click View Image link to view waveform and result    EKG 12-lead, tracing only   Result Value Ref Range    Interpretation ECG Click View Image link to view waveform and result    ISTAT gases elec ica gluc delon POCT   Result Value Ref Range    Ph Venous 7.35 7.32 - 7.43 pH    PCO2 Venous 57 (H) 40 - 50 mm Hg    PO2 Venous 26 25 - 47 mm Hg    Bicarbonate Venous 32 (H) 21 - 28 mmol/L    O2 Sat Venous 44 %    Sodium 141 133 - 144 mmol/L    Potassium 4.5 3.4 - 5.3 mmol/L    Glucose 94 70 - 99 mg/dL    Calcium Ionized 4.7 4.4 - 5.2 mg/dL    Hemoglobin 15.6 13.3 - 17.7 g/dL    Hematocrit - POCT 46 40.0 - 53.0 %PCV   Urine Culture   Result Value Ref Range    Specimen Description Unspecified Urine     Special Requests Specimen received in preservative     Culture Micro Pending     Micro Report Status Pending      Medications   enalapril (VASOTEC) tablet 10 mg (not administered)   naloxone (NARCAN) injection 0.1-0.4 mg (not administered)   NaCl 0.9 % infusion (  Stopped 1/11/17 1817)   NaCl 0.9 % infusion (1,000 mLs  New Bag 1/11/17 1817)   hydrALAZINE (APRESOLINE) injection 5 mg (5 mg Intravenous Given 1/11/17 1804)    atropine injection 0.5 mg (0.5 mg Intravenous Given 1/11/17 1804)   hydrALAZINE (APRESOLINE) injection 5 mg (5 mg Intravenous Given 1/11/17 1814)   hydrALAZINE (APRESOLINE) tablet 25 mg (25 mg Oral Given 1/11/17 2256)              Labs Ordered and Resulted from Time of ED Arrival Up to the Time of Departure from the ED   COMPREHENSIVE METABOLIC PANEL - Abnormal; Notable for the following:     Calcium 8.3 (*)     Albumin 2.4 (*)     All other components within normal limits   ISTAT GASES ELEC ICA GLUC CLAUDIA POCT - Abnormal; Notable for the following:     PCO2 Venous 57 (*)     Bicarbonate Venous 32 (*)     All other components within normal limits   CBC WITH PLATELETS DIFFERENTIAL   TROPONIN I   INR   LACTIC ACID WHOLE BLOOD       Assessments & Plan (with Medical Decision Making)  83-year-old male with a history of COPD prior right upper lobe lobectomy due to a TB and hypertension who presented to the ER due to feeling unwell and having shortness of breath. Patient was found to have bradycardia with a heart rate in the 40s. Patient was complaining of some shortness of breath. Per family he has not taken any of his medication in 6 months due to being off of his medications while he was traveling in [place].  Patient had 2 IVs that were placed after some difficulty in getting access.  Patient s heart rate dropped to the high 30s so he was given 0.5 atropine.  Patient s heart rate increased from  the high 30 to the low 40s.  Patient s blood pressure was also elevated to 257 systolic so he was given 1 initial dose of  5mg of hydralazine .  His blood pressure improved from 257 to 200 systolic.  Patient had minimal improvement of his heart rate.  Patient was given a 2nd dose of 5 mg IV hydralazine at this time his heart rate decreased to the 80s.  Patient said that he was feeling better.  Throughout this time in the ER patient was mentating  well and had no signs of acute decompensation.  Patient did have pacer pads placed  on him in case his heart rate dropped further. Patient s initial EKG showed a 2-1 AV block.  Patient was conducting every other beat.  His second EKG showed patient was having a type 2 wenckebach.  On patient s repeat EKG again showed 2-1 AV block with intermittent missed beats that were consistent.  I discussed these findings with Dr. Yen.  Dr. Yen recommended a 6C admission. Throughout his stay in the ER patient has been mentating well.  Patient has had no acute symptoms.  Patient felt like his breathing was better. Patient s labs are stable.  Patient s chest x-ray showed right lobe lobectomy with no acute infiltrate visible.   Critical care for this patient was 45 minutes.  Patient will be transferred to cardiology service for further care. I did review patient s medical chart and he has no history of previous blocks.      I have reviewed the nursing notes.    I have reviewed the findings, diagnosis, plan and need for follow up with the patient.    New Prescriptions    No medications on file       Final diagnoses:   Second degree AV block   Bradycardia   Essential hypertension       1/11/2017   [facility], [place], EMERGENCY DEPARTMENT      Kay Salazar MD  01/12/17 0114       ED Notes by Aylin Romero RN at 1/11/2017  7:00 PM     Author:  Aylin Romero RN Service:  (none) Author Type:  Registered Nurse    Filed:  1/11/2017  9:03 PM Note Time:  1/11/2017  7:00 PM Status:  Signed    :  Aylin Romero RN (Registered Nurse)           Critical Care Time (RN) Mins: 120       ED Notes by Vivian Delgado RN at 1/11/2017  4:58 PM     Author:  Vivian Delgado RN Service:  (none) Author Type:  Registered Nurse    Filed:  1/11/2017  5:00 PM Note Time:  1/11/2017  4:58 PM Status:  Signed    :  Vivian Delgado RN (Registered Nurse)           Pt relative states that pt immunization and medication status is unknown. Pt states that sine he has been in [place]  with wife, did not take any medications! According to relatives, has been there for more than 6 months.       ED Notes by Vivian Delgado RN at 1/11/2017  4:49 PM     Author:  Vivian Delgado RN Service:  (none) Author Type:  Registered Nurse    Filed:  1/11/2017  4:55 PM Note Time:  1/11/2017  4:49 PM Status:  Signed    :  Vivian Delgado RN (Registered Nurse)           Cough X 2 weeks- dry cough. No treatment. Was in [place], return home yesterday. 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 Pt also has bilat LE edema with right leg worse than left. Has a pressure ulcer on back of right leg- has been there for a while per pt.              Procedure Notes     No notes of this type exist for this encounter.      Progress Notes - Therapies (Notes from 01/11/17 through 01/14/17)     No notes of this type exist for this encounter.

## 2017-01-11 NOTE — IP AVS SNAPSHOT
"` `           UNIT [address] [facility]: [phone]                                              INTERAGENCY TRANSFER FORM - NURSING   2017                    Hospital Admission Date: 2017  LUIS FARLEY   : 1934  Sex: Male        Attending Provider: Klarissa Garsia MD     Allergies:  No Known Allergies    Infection:  None   Service:  CARDIOLOGY    Ht:  1.854 m (6' 1\")   Wt:  79.6 kg (175 lb 7.8 oz)   Admission Wt:  79.89 kg (176 lb 2 oz)    BMI:  23.16 kg/m 2   BSA:  2.02 m 2            Patient PCP Information     Provider PCP Type    Moiz Richard MD, MD General      Current Code Status     Date Active Code Status Order ID Comments User Context       Prior      Code Status History     Date Active Date Inactive Code Status Order ID Comments User Context    2017  8:26 AM  Full Code [id number]  Renay Beltrán MD Outpatient    2017 10:35 PM 2017  8:26 AM Full Code [id number]  Haley Peterson MD Inpatient    2016 11:45 AM 2017 10:35 PM Full Code [id number]  Héctor Jean MD Outpatient    2016  3:01 AM 2016 11:45 AM Full Code [id number]  Talita Brunson MD Inpatient    2016 10:57 PM 2016  8:16 PM Full Code [id number]  Blue Gann MD Inpatient    2013  9:35 AM 2016 10:57 PM Full Code [id number]  Briana Hampton Outpatient    2013  3:40 PM 2013  9:35 AM Full Code [id number]  Lenin Farley MD Outpatient    2013  9:57 PM 2013  3:40 PM Full Code [id number]  Jessica Graves, SHWETA Inpatient    4/15/2013  6:52 PM 2013  9:57 PM Full Code [id number]  Pierre Leal MD Inpatient      Advance Directives        Does patient have a scanned Advance Directive/ACP document in EPIC?           No        Hospital Problems as of 2017              Priority Class Noted POA    Hypertensive urgency Medium  2017 Yes    Atrioventricular block, complete (H) Medium  2017 Unknown      Non-Hospital Problems as of " 1/14/2017              Priority Class Noted    Septic arthritis (H)   4/20/2013    Chronic constipation   4/20/2013    HTN (hypertension)   4/20/2013    BPH (benign prostatic hypertrophy)   4/20/2013    Insomnia   4/20/2013    Moderate persistent asthma   4/27/2013    GERD (gastroesophageal reflux disease)   4/27/2013    COPD exacerbation (H) Medium  7/23/2014    Pneumonia Medium  6/13/2016    Hypercapnia Medium  7/22/2016    Acute on chronic respiratory failure with hypoxia and hypercapnia (H) Medium  7/24/2016    Chronic cutaneous venous stasis ulcer (H) Medium  8/4/2016      Immunizations     Name Date      Influenza (High Dose) 3 valent vaccine 01/14/17     Pneumococcal 23 valent 08/03/16     TDAP (ADACEL AGES 11-64) 08/03/16          END      ASSESSMENT     Discharge Profile Flowsheet     DISCHARGE NEEDS ASSESSMENT     Patient's primary language  [place] 01/11/17 2123    Equipment Currently Used at Home  none 07/24/16 0948    services offered to the patient? (Install  services phone or TTY, if applicable)  yes 01/11/17 1628    Transportation Available  family or friend will provide 07/24/16 0948   Did the patient decline [facility]  and sign paper waiver form? (Place signed waiver form on chart)  no 01/11/17 2123    # of Referrals Placed by CTS  Financial Services 06/14/16 0801   SKIN      Equipment Used at Home  walker, standard 04/26/13 1224   Inspection  Full 01/14/17 0858    GASTROINTESTINAL (ADULT,PEDIATRIC,OB)     Skin areas NOT inspected  Spine;Coccyx;Sacrum;Buttock, right;Buttock, left;Hip, right;Hip, left;Scapula, left;Scapula, right 01/13/17 0340    GI WDL  WDL 01/14/17 0858   Skin WDL  ex 01/14/17 0858    All Quadrants Bowel Sounds  audible and normoactive 01/14/17 0403   Skin Moisture  dry 01/14/17 0858    All Quadrants Palpation  soft/nontender 01/14/17 0403   Skin Integrity  incision(s) 01/14/17 0858    Last Bowel Movement  01/09/17 01/14/17 0403   SAFETY       "COMMUNICATION ASSESSMENT     Safety WDL  WDL 01/14/17 0858    Patient's communication style  spoken language (non-English) 01/11/17 1613   Safety Equipment  suction regulator;suction equipment;oxygen flowmeter 01/14/17 0858                 Assessment WDL (Within Defined Limits) Definitions           Safety WDL     Effective: 09/28/15    Row Information: <b>WDL Definition:</b> Bed in low position, wheels locked; call light in reach; upper side rails up x 2; ID band on<br> <font color=\"gray\"><i>Item=AS safety wdl>>List=AS safety wdl>>Version=F14</i></font>      Skin WDL     Effective: 09/28/15    Row Information: <b>WDL Definition:</b> Warm; dry; intact; elastic; without discoloration; pressure points without redness<br> <font color=\"gray\"><i>Item=AS skin wdl>>List=AS skin wdl>>Version=F14</i></font>      Vitals     Vital Signs Flowsheet     VITAL SIGNS     Pain Control  partially effective 01/14/17 0836    Temp  97.8  F (36.6  C) 01/14/17 1205   Functioning  can do most things, but pain gets in the way of some 01/14/17 0836    Temp src  Oral 01/14/17 1205   Sleep  normal sleep 01/12/17 0906    Resp  15 01/14/17 1500   ANALGESIA SIDE EFFECTS MONITORING      Pulse  (!) 40 01/12/17 0902   Side Effects Monitoring: Respiratory Quality  R 01/14/17 0359    Heart Rate  76 01/14/17 1500   Side Effects Monitoring: Respiratory Depth  N 01/14/17 0359    BP  118/67 mmHg 01/14/17 1500   Side Effects Monitoring: Sedation Level  1 01/14/17 0359    BP Location  Right arm 01/14/17 0836   HEIGHT AND WEIGHT      OXYGEN THERAPY     Height  1.854 m (6' 1\") 01/11/17 2300    SpO2  94 % 01/14/17 1128   Weight  79.6 kg (175 lb 7.8 oz) 01/14/17 0015    O2 Device  None (Room air) 01/14/17 0836   POSITIONING      Oxygen Delivery  3 LPM 01/13/17 0132   Body Position  independently positioning 01/14/17 1205    PAIN/COMFORT     Head of Bed (HOB)  HOB at 30 degrees 01/14/17 0858    Patient Currently in Pain  yes 01/14/17 0836   Positioning/Transfer " Devices  pillows 01/14/17 0858    Preferred Pain Scale  CAPA (Clinically Aligned Pain Assessment) ([facility] Adults Only) 01/14/17 0836   DAILY CARE      Pain Location  Incision 01/14/17 0836   Activity Type  activity adjusted per tolerance 01/14/17 0858    Pain Orientation  Left;Right 01/12/17 0221   Activity Level of Assistance  assistance, 2 people 01/14/17 0858    Pain Descriptors  Aching 01/14/17 0836   Activity Assistive Device  gait belt 01/14/17 0858    Pain Management Interventions  analgesia administered 01/14/17 0836   ECG      Pain Intervention(s)  Medication (See eMAR) 01/14/17 0836   ECG Rhythm  Paced rhythm 01/14/17 0851    Response to Interventions  Decrease in pain 01/13/17 2045   Ectopy  None 01/14/17 0359    CLINICALLY ALIGNED PAIN ASSESSMENT (CAPA) ([facility] ADULTS ONLY)     Ectopy Frequency  Occasional 01/12/17 0902    Comfort  negligible pain 01/14/17 1205   Lead Monitored  Lead II;V 1 01/14/17 0359    Change in Pain  about the same 01/14/17 0836   Equipment  electrodes changed 01/12/17 0902            Patient Lines/Drains/Airways Status    Active LINES/DRAINS/AIRWAYS     Name: Placement date: Placement time: Site: Days: Last dressing change:    Pressure Injury Right Foot           Wound 06/13/16 Right Leg Ulcer to RLE: necrotic with slough/ eschar 06/13/16  2130  Leg  214     Incision/Surgical Site Right;Lateral Hip                   Patient Lines/Drains/Airways Status    Active PICC/CVC     **None**            Intake/Output Detail Report     Date Intake     Output Net    Shift P.O. I.V. IV Piggyback Total Urine Total       Regina 01/13/17 0700 - 01/13/17 1459 -- 194.48 -- 194.48 235 235 -40.52    Noc 01/13/17 1500 - 01/13/17 2359 100 325.84 -- 425.84 800 800 -374.16    Day 01/14/17 0000 - 01/14/17 0659 100 324.17 -- 424.17 625 625 -200.83    Regina 01/14/17 0700 - 01/14/17 1459 -- 0 -- -- 125 125 -125    Noc 01/14/17 1500 - 01/14/17 2359 -- -- -- -- -- -- 0       Last Void/BM       Most Recent Value    Urine Occurrence 1 at 01/13/2017 2100    Stool Occurrence       Case Management/Discharge Planning     Case Management/Discharge Planning Flowsheet     REFERRAL INFORMATION     FINAL RESOURCES      # of Referrals Placed by [facility]  Financial Services 06/14/16 0801   Equipment Currently Used at Home  none 07/24/16 0948    LIVING ENVIRONMENT     MH/[facility] CAREGIVER      Lives With  other (see comments) (Lives at assisted living) 01/11/17 2134   Filed Complexity Screen Score  7 01/12/17 0831    Living Arrangements  house 07/24/16 0948   ABUSE RISK SCREEN      COPING/STRESS     QUESTION TO PATIENT:  Has a member of your family or a partner(now or in the past) intimidated, hurt, manipulated, or controlled you in any way?  patient declined to answer or is unable to answer 01/11/17 2303    Major Change/Loss/Stressor  none 01/11/17 2134   QUESTION TO PATIENT: Do you feel safe going back to the place where you are living?  yes 01/11/17 2303    DISCHARGE PLANNING     OBSERVATION: Is there reason to believe there has been maltreatment of a vulnerable adult (ie. Physical/Sexual/Emotional abuse, self neglect, lack of adequate food, shelter, medical care, or financial exploitation)?  no 01/11/17 2303    Transportation Available  family or friend will provide 07/24/16 0948   (R) MENTAL HEALTH SUICIDE RISK      Equipment Used at Home  walker, standard 04/26/13 1224   Are you depressed or being treated for depression?  No 01/11/17 2303

## 2017-01-11 NOTE — IP AVS SNAPSHOT
"    [address] [facility]: [phone]                                              INTERAGENCY TRANSFER FORM - PHYSICIAN ORDERS   2017                    Hospital Admission Date: 2017  LUIS ALBRIGHT   : 1934  Sex: Male        Attending Provider: Klarissa Garsia MD     Allergies:  No Known Allergies    Infection:  None   Service:  CARDIOLOGY    Ht:  1.854 m (6' 1\")   Wt:  79.6 kg (175 lb 7.8 oz)   Admission Wt:  79.89 kg (176 lb 2 oz)    BMI:  23.16 kg/m 2   BSA:  2.02 m 2            Patient PCP Information     Provider PCP Type    Moiz Richard MD, MD General      ED Clinical Impression     Diagnosis Description Comment Added By Time Added    Second degree AV block [I44.1] Second degree AV block [I44.1]  Kay Salazar MD 2017  7:09 PM    Bradycardia [R00.1] Bradycardia [R00.1]  Kay Salazar MD 2017  7:10 PM    Essential hypertension [I10] Essential hypertension [I10]  Kay Salazar MD 2017  7:11 PM      Hospital Problems as of 2017              Priority Class Noted POA    Hypertensive urgency Medium  2017 Yes    Atrioventricular block, complete (H) Medium  2017 Unknown      Non-Hospital Problems as of 2017              Priority Class Noted    Septic arthritis (H)   2013    Chronic constipation   2013    HTN (hypertension)   2013    BPH (benign prostatic hypertrophy)   2013    Insomnia   2013    Moderate persistent asthma   2013    GERD (gastroesophageal reflux disease)   2013    COPD exacerbation (H) Medium  2014    Pneumonia Medium  2016    Hypercapnia Medium  2016    Acute on chronic respiratory failure with hypoxia and hypercapnia (H) Medium  2016    Chronic cutaneous venous stasis ulcer (H) Medium  2016      Code Status History     Date Active Date Inactive Code Status Order ID Comments User Context    2017  8:26 AM  Full Code [id number]  Renay Beltrán MD " Outpatient    1/11/2017 10:35 PM 1/14/2017  8:26 AM Full Code [id number]  Haley Peterson MD Inpatient    7/24/2016 11:45 AM 1/11/2017 10:35 PM Full Code [id number]  Héctor Jean MD Outpatient    7/22/2016  3:01 AM 7/24/2016 11:45 AM Full Code [id number]  Talita Brunson MD Inpatient    6/13/2016 10:57 PM 6/18/2016  8:16 PM Full Code [id number]  Blue Gann MD Inpatient    5/25/2013  9:35 AM 6/13/2016 10:57 PM Full Code [id number]  Briana Hampton Outpatient    4/25/2013  3:40 PM 5/25/2013  9:35 AM Full Code [id number]  Lenin Farley MD Outpatient    4/18/2013  9:57 PM 4/25/2013  3:40 PM Full Code [id number]  Jessica Graves RN Inpatient    4/15/2013  6:52 PM 4/18/2013  9:57 PM Full Code [id number]  Pierre Leal MD Inpatient         Medication Review      START taking        Dose / Directions Comments    acetaminophen 325 MG tablet   Commonly known as:  TYLENOL   Used for:  Acute post-operative pain        Dose:  650 mg   Take 2 tablets (650 mg) by mouth every 4 hours as needed for mild pain   Quantity:  100 tablet   Refills:  0        amLODIPine 10 MG tablet   Commonly known as:  NORVASC   Used for:  Essential hypertension        Dose:  10 mg   Take 1 tablet (10 mg) by mouth daily   Quantity:  30 tablet   Refills:  1        cephalexin 250 MG capsule   Commonly known as:  KEFLEX   Indication:  Surgical Prophylaxis   Used for:  Atrioventricular block, complete (H)        Dose:  250 mg   Take 1 capsule (250 mg) by mouth 4 times daily for 3 days   Quantity:  12 capsule   Refills:  0        hydrochlorothiazide 12.5 MG capsule   Commonly known as:  MICROZIDE   Used for:  Essential hypertension        Dose:  25 mg   Take 2 capsules (25 mg) by mouth daily   Quantity:  30 capsule   Refills:  1        ranitidine 150 MG tablet   Commonly known as:  ZANTAC   Used for:  Gastroesophageal reflux disease, esophagitis presence not specified        Dose:  150 mg   Start taking on:  1/15/2017   Take 1 tablet  (150 mg) by mouth 2 times daily   Quantity:  60 tablet   Refills:  0          CONTINUE these medications which may have CHANGED, or have new prescriptions. If we are uncertain of the size of tablets/capsules you have at home, strength may be listed as something that might have changed.        Dose / Directions Comments    fluticasone 250 MCG/BLIST Aepb Inhaler   Commonly known as:  FLOVENT DISKUS   This may have changed:  how much to take   Used for:  COPD exacerbation (H)        Dose:  1 puff   Inhale 1 puff into the lungs every 12 hours   Quantity:  1 Inhaler   Refills:  1          CONTINUE these medications which have NOT CHANGED        Dose / Directions Comments    albuterol (2.5 MG/3ML) 0.083% neb solution   Used for:  COPD exacerbation (H)        Dose:  2.5 mg   Take 1 vial (2.5 mg) by nebulization every 6 hours as needed   Quantity:  360 mL   Refills:  1        enalapril 10 MG tablet   Commonly known as:  VASOTEC   Used for:  Essential hypertension        Dose:  10 mg   Take 1 tablet (10 mg) by mouth daily   Quantity:  30 tablet   Refills:  1        tamsulosin 0.4 MG capsule   Commonly known as:  FLOMAX   Indication:  Enlarged Prostate with Urination Problems   Used for:  Benign nodular prostatic hyperplasia without lower urinary tract symptoms        Dose:  0.4 mg   Take 1 capsule (0.4 mg) by mouth every 24 hours   Quantity:  60 capsule   Refills:  1        tiotropium 18 MCG capsule   Commonly known as:  SPIRIVA   Used for:  COPD exacerbation (H)        Dose:  18 mcg   Inhale 1 capsule (18 mcg) into the lungs daily   Quantity:  30 capsule   Refills:  1          STOP taking     fluticasone 50 MCG/ACT spray   Commonly known as:  FLONASE           metoprolol 25 MG tablet   Commonly known as:  LOPRESSOR           omeprazole 40 MG capsule   Commonly known as:  priLOSEC                     Further instructions from your care team         Home Care after a Pacemaker Implant    Wound care:  Keep your incision  (surgery wound) dry for 3 days.  After 3 days, you may remove the outer bandage.  Keep the strips of tape on.  They will be removed at your clinic visit.  Check for signs of infection each day.  These include increased redness, swelling, drainage, bleeding or a fever over 101 F (38.3 C).  Call us immediately if you see any of these signs.  If there are no signs of infection, you may shower in 3 days.  Do not submerge the incision (in a bath tub, hot tub, or swimming pool) until fully healed.  If Dermabond has been applied to your incision.  Do not apply powder or lotion to the incision for 14 days. You may shower in the morning.    Pain:   You may have mild to moderate pain for 3 to 5 days.  Take acetaminophen (Tylenol) or ibuprofen (Advil) for the pain.  Call us if the pain is severe or lasts more than 5 days.    Activity:  After 24 hours, slowly return to your normal activities.   Healing will take 4 to 6 weeks.  No driving for 3 days  Avoid climbing a ladder alone.  It is best to stay within 4 feet of the ground.  Avoid anything that may cause rough contact or a hard hit to your chest.  This includes football, hockey, and other contact sports.  For at least 4 weeks:  Do not raise your affected arm above your shoulder.  Do not use your affected arm to push, pull, or lift anything over 10 pounds.  Avoid repetitive upper body activities for 6 weeks (ie: golf, swimming, and weight lifting)    Follow Up Visits:  Return to the clinic in 7 to 10 days to have your device and wound checked.      Telling others about your device:  Before you have any medical tests or treatments, tell the doctors, dentists, and other care providers about your device.  There are a few tests and treatments that may interfere with your device.  (These include MRI, radiation therapy, electrocautery, and others.)  Your care team may need to take special steps to keep you safe.  Before you leave the hospital, you will receive a temporary ID card.   A permanent card will be mailed to you about 6 to 8 weeks later.  Always carry the ID card with you.  It has important details about your device.  You should also get a MedicAlert ID.  Please ask us for a MedicAlert brochure, or go to [email or web address].    Safety near electrical equipment:  All of these are safe to use when in good repair:    Microwaves    Radios    Cordless phone    Remote controls    Small electrical tools  Cell phones: Keep cell phones at least 6 inches from your device.  Do not carry the phone in a pocket near your device.  Security valentine: It is okay to walk through security valentine at the airports and department stores.  Tell airport security that you have a pacemaker.  They should keep the screening wand at least 6 inches from your device.  Full-body scanners are safe.  Avoid the following:    MRI tests in the hospital unless you have a MRI safe pacemaker.           Arc welding, chain saws and high-powered industrial or commercial tools.    Power lines, power plants and large power generators.    Electric body fat scales.    Magnetic mattress pads or pillow.    Questions?  Please call [facility].    Device Nurse:          Business Hours:  [phone]                       After Hours:  [phone]   Choose option 4, then ask for the                                                  on-call device nurse at job code [id number].    Your next device clinic appointment is scheduled on:    Friday January 20th @ 10:30 AM            [facility]  [facility] - [facility] [address]  [address] [place] [address]  [id number]      Summary of Visit     Reason for your hospital stay       You were hospitalized for complete heart block requiring a pacemaker and hypertension.             After Care     Activity       Your activity upon discharge:       No lifting > 10lbs or over shoulder level with left arm for 4 weeks       Notify device clinic/EP immediately  for any redness, swelling, bleeding, discharge, fevers or chills.       Diet       Follow this diet upon discharge: 2 Gram Sodium Diet             Your next 10 appointments already scheduled     Jan 20, 2017 10:30 AM   (Arrive by 10:15 AM)   Pacemaker Check with [facility] Cv Device 1   [facility] ([facility])    [address]  3rd Floor  [facility] [address]   [phone]              Follow-Up Appointment Instructions     Future Labs/Procedures    Adult [facility] Follow-up and recommended labs and tests     Comments:    Follow up with primary care provider, Moiz Richard MD, within 3-5 days to evaluate medication change, to evaluate treatment change, for hospital follow- up and regarding new diagnosis.  The following labs/tests are recommended: BMP, magnesium, blood pressure monitoring.      Appointments on Center and/or [facility] (with [facility] or [facility] provider or service). Call [phone] if you haven't heard regarding these appointments within 7 days of discharge.    Follow Up and recommended labs and tests     Comments:    Follow up with the Device Clinic in 7-10 days      Follow-Up Appointment Instructions     Adult [facility] Follow-up and recommended labs and tests       Follow up with primary care provider, Moiz Ricahrd MD, within 3-5 days to evaluate medication change, to evaluate treatment change, for hospital follow- up and regarding new diagnosis.  The following labs/tests are recommended: BMP, magnesium, blood pressure monitoring.      Appointments on Center and/or [facility] (with [facility] or [facility] provider or service). Call [phone] if you haven't heard regarding these appointments within 7 days of discharge.       Follow Up and recommended labs and tests       Follow up with the Device Clinic in 7-10 days             Statement of Approval     Ordered          01/14/17 1136  I have reviewed and agree with all the recommendations and  orders detailed in this document.   EFFECTIVE NOW     Approved and electronically signed by:  Klarissa Garsia MD

## 2017-01-11 NOTE — IP AVS SNAPSHOT
` ` Patient Information     Patient Name Sex     Gallito Farley ([id number]) Male 1934       Room Bed    [facility]5 4415-01      Patient Demographics     Address Phone    [address] [id number] [phone] (Home)  None (Work)  [phone] (Mobile) *Preferred*      Patient Ethnicity & Race     Ethnic Group Patient Race    American       Emergency Contact(s)     Name Relation Home Work Mobile    JOSE RAMON SOLIS Relative [phone]      IRMA HOLLOWAY Son [phone]        Documents on File        Status Date Received Description       Documents for the Patient    Privacy Notice - [place] Received 04/15/13     External Medication Information Consent       Patient ID Received 04/15/13     Consent for Services - Hospital/Clinic Received 04/15/13     Consent for EHR Access Received 04/15/13     [facility] Specified Other       Consent for Services - [facility] Received 13     HIM CAS Authorization - File Only  06/10/13 AUTHORIZATION TO RELEASE INFORMATION - GENERAL [facility], 13    HIM CAS Authorization  13 [employer] 13    Business/Insurance/Care Coordination/Health Form - Patient  13 [facility] Administrative Paperwork    Consent for EHR Access Received 16     Insurance Card Received 17 [employer]    External Medication Information Consent Patient Refused 16     Patient ID Received 16     [facility] Specified Other       Consent for Services/Privacy Notice - Hospital/Clinic Received 16     Privacy Notice - [place] Received 16     Consent for Services/Privacy Notice - Hospital/Clinic-Esign       HIM CAS Authorization - File Only   Printed for patient 16    Patient ID Received 17 [employer]       Documents for the Encounter    CMS IM for Patient Signature Received 17     Photograph   RLE      Admission Information     Attending Provider Admitting Provider Admission Type Admission Date/Time    Klarissa Garsia MD Chen, Lin Yee, MD  Emergency 01/11/17  1636    Discharge Date Hospital Service Auth/Cert Status Service Area     Cardiology Incomplete [facility]    Unit Room/Bed Admission Status    [facility] 4415/4415-01 Admission (Confirmed)            Admission     Complaint    Hypertensive urgency, Hypertensive urgency      Hospital Account     Name Acct ID Class Status Primary Coverage    Gallito Farley [id number] Inpatient Open UCARE - [employer] FOR [employer]ORS            Guarantor Account (for Hospital Account #[id number])     Name Relation to Pt Service Area Active? Acct Type    Gallito Farley Self FCS Yes Personal/Family    Address Phone          [address] [place] [address] [phone](H)  None(O)              Coverage Information (for Hospital Account #[id number])     F/O Payor/Plan Precert #    UCARE/UCARE FOR SENIORS     Subscriber Subscriber #    Gallito Farley [id number]    Address Phone    [address]  [place] [address] [phone]

## 2017-01-11 NOTE — IP AVS SNAPSHOT
` `     [address] [facility] BANK: [phone]            Medication Administration Report for Gallito Farley as of 01/14/17 1518   Legend:    Given Hold Not Given Due Canceled Entry Other Actions    Time Time (Time) Time  Time-Action       Inactive    Active    Linked        Medications 01/08/17 01/09/17 01/10/17 01/11/17 01/12/17 01/13/17 01/14/17    acetaminophen (TYLENOL) tablet 650 mg  Dose: 650 mg Freq: EVERY 4 HOURS PRN Route: PO  PRN Reason: mild pain  Start: 01/12/17 0210   Admin Instructions: Maximum acetaminophen dose from all sources = 75 mg/kg/day not to exceed 4 grams/day.         0217 (650 mg)-Given        0603 (650 mg)-Given            acetaminophen-codeine (TYLENOL #3) 300-30 MG per tablet 1-2 tablet  Dose: 1-2 tablet Freq: EVERY 4 HOURS PRN Route: PO  PRN Reason: other  PRN Comment: mild or moderate pain  Start: 01/13/17 0837   Admin Instructions: Post-procedurally for CAR electrophysiology studies.  Maximum acetaminophen dose from all sources= 75 mg/kg/day not to exceed 4 grams          0924 (1 tablet)-Given       1004 (1 tablet)-Given       1941 (1 tablet)-Given        0831 (2 tablet)-Given           albuterol (PROAIR HFA/PROVENTIL HFA/VENTOLIN HFA) Inhaler 2 puff  Dose: 2 puff Freq: EVERY 6 HOURS PRN Route: IN  PRN Reason: other  PRN Comment: dyspnea  Start: 01/13/17 0905              cephalexin (KEFLEX) capsule 250 mg  Dose: 250 mg Freq: 4 TIMES DAILY Route: PO  Indications of Use: SURGICAL PROPHYLAXIS  Start: 01/14/17 0800   End: 01/19/17 0759          0831 (250 mg)-Given       1203 (250 mg)-Given       [ ] 1600       [ ] 2000           enalapril (VASOTEC) tablet 10 mg  Dose: 10 mg Freq: DAILY Route: PO  Start: 01/14/17 0800          0831 (10 mg)-Given           fluticasone (FLOVENT DISKUS) 250 mcg/puff inhaler 1 puff  Dose: 1 puff Freq: EVERY 12 HOURS Route: IN  Start: 01/13/17 2000   Admin Instructions: Rinse mouth after use.          1942 (1 puff)-Given        0833 (1 puff)-Given       [ ]  "2000           HOLD: enoxaparin (LOVENOX) Post Procedure  Freq: HOLD Route: XX  Start: 01/13/17 0837   Admin Instructions: HOLD enoxaparin (LOVENOX) Post Procedure until 3 days post procedure.               HOLD: heparin (IV or Subcutaneous) Post Procedure  Freq: HOLD Route: XX  Start: 01/13/17 0837   Admin Instructions: HOLD Heparin (IV or Subcutaneous) Post Procedure until  3 days post procedure.               HOLD: metformin and metformin containing medications on day of procedure and 48 hours after IV contrast given  Freq: HOLD Route: XX  Start: 01/13/17 0837   Admin Instructions: Hold metformin (GLUCOPHAGE, GLUMETZA, FORTAMET, RIOMET) and metformin containing medications: alogliptin/metformin (KAZANO), glipizide/metformin (METAGLIP), glyburide/metformin (GLUCOVANCE), rosiglitazone/metformin (AVANDAMET), dapagliflozin/metformin (XIGDUO XR), sitagliptin/metformin (JANUMET, JANUMET XR), linagliptin/metformin (JENTADUETO), repaglinide/metformin (PRANDIMET), saxagliption/metformin (KOMBIGLYZE XR), canagliflozin/metformin (INVOKAMET), pioglitazone/metformin (ACTOPLUS MET, ACTOPLUS MET XR).  If patient was on one of these medications pre-procedure, continue to HOLD for 48 hours post-procedure if patient received IV contrast.               hydrochlorothiazide (MICROZIDE) capsule 25 mg  Dose: 25 mg Freq: DAILY Route: PO  Start: 01/13/17 1000         1231 (25 mg)-Given        0833 (25 mg)-Given           lidocaine (LMX4) kit  Freq: EVERY 1 HOUR PRN Route: Top  PRN Reason: pain  PRN Comment: with VAD insertion or accessing implanted port.  Start: 01/13/17 0837   Admin Instructions: Do NOT give if patient has a history of allergy to any local anesthetic or any \"ellen\" product.   Apply 30 minutes prior to VAD insertion or port access. MAX Dose: 2.5 g (  of 5 g tube)               lidocaine 1 % 1 mL  Dose: 1 mL Freq: EVERY 1 HOUR PRN Route: OTHER  PRN Comment: mild pain with VAD insertion or accessing implanted port  Start: " "01/13/17 0837   Admin Instructions: Do NOT give if patient has a history of allergy to any local anesthetic or any \"ellen\" product. MAX dose 1 mL subcutaneous OR intradermal in divided doses.               methyl salicylate-menthol (STEVEN-TRIMBLE) ointment  Freq: EVERY 6 HOURS PRN Route: Top  PRN Comment: leg pain  Start: 01/12/17 0434   Admin Instructions: Apply to leg         0539 ( )-Given               Start: 01/14/17 0234   End: 01/14/17 1444   Admin Instructions: ASHLY CLAROS: cabinet override                  1444-Med Discontinued       naloxone (NARCAN) injection 0.1-0.4 mg  Dose: 0.1-0.4 mg Freq: EVERY 2 MIN PRN Route: IV  PRN Reason: opioid reversal  Start: 01/11/17 2227   Admin Instructions: For respiratory rate LESS than or EQUAL to 8.  Partial reversal dose:  0.1 mg titrated q 2 minutes for Analgesia Side Effects Monitoring Sedation Level of 3 (frequently drowsy, arousable, drifts to sleep during conversation).Full reversal dose:  0.4 mg bolus for Analgesia Side Effects Monitoring Sedation Level of 4 (somnolent, minimal or no response to stimulation).               sodium chloride (PF) 0.9% PF flush 3 mL  Dose: 3 mL Freq: EVERY 8 HOURS Route: IK  Start: 01/13/17 0837   Admin Instructions: And Q1H PRN, to lock peripheral IV dormant line.          (0918)-Not Given       (1641)-Not Given        (0021)-Not Given       0832 (3 mL)-Given       [ ] 1645           sodium chloride (PF) 0.9% PF flush 3 mL  Dose: 3 mL Freq: EVERY 1 HOUR PRN Route: IK  PRN Reason: line flush  Start: 01/13/17 0837   Admin Instructions: for peripheral IV flush post IV meds               traMADol (ULTRAM) tablet 50 mg  Dose: 50 mg Freq: EVERY 6 HOURS PRN Route: PO  PRN Reason: moderate to severe pain  Start: 01/14/17 1239          1310 (50 mg)-Given           umeclidinium (INCRUSE ELLIPTA) oral inhaler 1 puff  Dose: 1 puff Freq: EVERY 24 HOURS Route: IN  Start: 01/13/17 2000   Admin Instructions: Use only ONE capsule. To ensure the full " dose is administered, TWO inhalations should be performed at a rate sufficient to hear or feel the capsule vibrate.    Therapeutic interchange for Spiriva 18 mcg inhalation daily (pta med)          1943 (1 puff)-Given        [ ] 2000          Future Medications  Medications 01/08/17 01/09/17 01/10/17 01/11/17 01/12/17 01/13/17 01/14/17       ranitidine (ZANTAC) tablet 150 mg  Dose: 150 mg Freq: 2 TIMES DAILY Route: PO  Start: 01/15/17 0800             Completed Medications  Medications 01/08/17 01/09/17 01/10/17 01/11/17 01/12/17 01/13/17 01/14/17         Dose: 5 mg Freq: ONCE Route: PO  Start: 01/14/17 1000   End: 01/14/17 1007          1007 (5 mg)-Given             Dose: 0.5 mg Freq: ONCE Route: IV  Start: 01/11/17 1751   End: 01/11/17 1804       1804 (0.5 mg)-Given                Dose: 1 g Freq: EVERY 8 HOURS Route: IV  Indications of Use: SURGICAL PROPHYLAXIS  Last Dose: 1 g (01/14/17 0038)  Start: 01/13/17 0837   End: 01/14/17 0058   Admin Instructions: First dose 8 hrs after last dose in cath lab.  Stop within 24hrs after cath lab end-time.  Post-procedurally for CAR electrophysiology studies.          0917 (1 g)-New Bag       1639 (1 g)-New Bag        0038 (1 g)-New Bag             Dose: 2 g Freq: PRE-OP/PRE-PROCEDURE Route: IV  Indications of Use: SURGICAL PROPHYLAXIS  Start: 01/12/17 1802   End: 01/12/17 2116   Admin Instructions: Give dose within 1 hour PRIOR to procedure in pre-procedure area (Outpatients), or, on Patient Care Unit (Inpatients) PRIOR to arrival in EP lab/incision/procedure.  Call  EP lab to coordinate start time. If patient weight is greater than or equal to 120 kg change dose to 3 g.         2046 (2 g)-Given               Dose: 10 mg Freq: ONCE Route: IV  Start: 01/12/17 2130   End: 01/12/17 2120 2120 (10 mg)-Given               Dose: 5 mg Freq: ONCE Route: IV  Start: 01/12/17 1245   End: 01/12/17 1338        1338 (5 mg)-Given               Dose: 5 mg Freq: ONCE Route: IV  Start:  01/11/17 1812   End: 01/11/17 1814       1814 (5 mg)-Given                Dose: 5 mg Freq: ONCE Route: IV  Start: 01/11/17 1750   End: 01/11/17 1804       1804 (5 mg)-Given                Dose: 25 mg Freq: ONCE Route: PO  Start: 01/11/17 2245   End: 01/11/17 2256       2256 (25 mg)-Given                Dose: 0.5 mL Freq: PRIOR TO DISCHARGE Route: IM  Start: 01/15/17 1000   End: 01/14/17 1503   Admin Instructions: Administer when afebrile (less than 100.4F) x 24 hours, or Prior to Discharge.  If not administering when scheduled , change the due time by following the instructions in the reference link below.  If patient refuses vaccine, chart as Vaccine Refused.           1503 (0.5 mL)-Given             Dose: 10 mL Freq: ONCE Route: IV  Start: 01/12/17 2345   End: 01/12/17 2345        2345 (10 mL)-Given               Last Dose: 1,000 mL (01/11/17 1817)  Start: 01/11/17 1752   End: 01/11/17 1817   Admin Instructions: MARIAA MALAVE: cabinet override        1817 (1,000 mL)-New Bag                Last Dose: Stopped (01/11/17 1817)  Start: 01/11/17 1717   End: 01/11/17 1817   Admin Instructions: RICARDO MARTELL: cabinet override        1730 (1,000 mL)-New Bag       1817-Stopped                Dose: 40 mEq Freq: ONCE Route: PO  Start: 01/14/17 0645   End: 01/14/17 0644   Admin Instructions: DO NOT CRUSH.           0644 (40 mEq)-Given             Dose: 150 mg Freq: ONCE Route: PO  Start: 01/14/17 1300   End: 01/14/17 1310          1310 (150 mg)-Given          Discontinued Medications  Medications 01/08/17 01/09/17 01/10/17 01/11/17 01/12/17 01/13/17 01/14/17         Rate: 30 mL/hr Freq: CONTINUOUS Route: IV  Start: 01/12/17 1815   End: 01/12/17 1901   Admin Instructions: Start 2 hours pre-procedure and then per provider direction intra-procedure.  Pre-procedurally for CAR electrophysiology studies.                1901-Med Discontinued           Dose: 6-12 mg Freq: EVERY 5 MIN PRN Route: IV  PRN Comment: when verbally ordered by  provider during procedure.  Start: 01/12/17 2131   End: 01/13/17 0024   Admin Instructions: Rapid IVP.  Each dose should immediately be followed by a 10 mL NS flush rapid IVP.          0024-Med Discontinued          Dose: 6-12 mg Freq: EVERY 5 MIN PRN Route: IV  PRN Comment: when verbally ordered by provider during procedure.  Start: 01/12/17 2035   End: 01/13/17 0024   Admin Instructions: Rapid IVP.  Each dose should immediately be followed by a 10 mL NS flush rapid IVP.          0024-Med Discontinued          Dose: 6-12 mg Freq: EVERY 5 MIN PRN Route: IV  PRN Comment: when verbally ordered by provider during procedure.  Start: 01/12/17 1541   End: 01/12/17 1901   Admin Instructions: Rapid IVP.  Each dose should immediately be followed by a 10 mL NS flush rapid IVP.         1901-Med Discontinued           Dose: 10 mg Freq: DAILY Route: PO  Start: 01/15/17 0800   End: 01/14/17 1004          1004-Med Discontinued         Dose: 5 mg Freq: DAILY Route: PO  Start: 01/13/17 1330   End: 01/14/17 0900         1350 (5 mg)-Given        0831 (5 mg)-Given       0900-Med Discontinued         Dose: 5 mg Freq: DAILY Route: PO  Start: 01/13/17 0930   End: 01/13/17 0956         0956-Med Discontinued  (1042)-Not Given              Start: 01/11/17 1810   End: 01/11/17 2115   Admin Instructions: VESTA PORRAS: cabinet override               2115-Med Discontinued            Dose: 0.5-1 mg Freq: EVERY 1 MIN PRN Route: IV  PRN Reason: other  PRN Comment: when verbally ordered by provider during procedure  Start: 01/12/17 2131   End: 01/13/17 0024         0024-Med Discontinued          Dose: 0.5-1 mg Freq: EVERY 1 MIN PRN Route: IV  PRN Reason: other  PRN Comment: when verbally ordered by provider during procedure  Start: 01/12/17 2035   End: 01/13/17 0024         0024-Med Discontinued          Dose: 0.5-1 mg Freq: EVERY 1 MIN PRN Route: IV  PRN Reason: other  PRN Comment: when verbally ordered by provider during procedure  Start:  01/12/17 1541   End: 01/12/17 1901        1901-Med Discontinued           Dose: 25-50 mg Freq: ONCE PRN Route: IV  PRN Reason: itching  PRN Comment: sedation, when verbally ordered by the provider during the procedure  Start: 01/12/17 2131   End: 01/13/17 0024         0024-Med Discontinued          Dose: 25-50 mg Freq: ONCE PRN Route: IV  PRN Reason: itching  PRN Comment: sedation, when verbally ordered by the provider during the procedure  Start: 01/12/17 2035   End: 01/13/17 0024         0024-Med Discontinued          Dose: 25-50 mg Freq: ONCE PRN Route: IV  PRN Reason: itching  PRN Comment: sedation, when verbally ordered by the provider during the procedure  Start: 01/12/17 1541   End: 01/12/17 1901        1901-Med Discontinued           Rate: 12-95.9 mL/hr Freq: CONTINUOUS PRN Route: IV  PRN Comment: Titrate as verbally ordered by the provider during the procedure.  Start: 01/12/17 2131   End: 01/13/17 0024   Admin Instructions: For range orders: start at lowest dose ordered          0024-Med Discontinued          Rate: 12-95.9 mL/hr Freq: CONTINUOUS PRN Route: IV  PRN Comment: Titrate as verbally ordered by the provider during the procedure.  Start: 01/12/17 2035   End: 01/13/17 0024   Admin Instructions: For range orders: start at lowest dose ordered          0024-Med Discontinued          Rate: 12-95.9 mL/hr Freq: CONTINUOUS PRN Route: IV  PRN Comment: Titrate as verbally ordered by the provider during the procedure.  Start: 01/12/17 1541   End: 01/12/17 1901   Admin Instructions: For range orders: start at lowest dose ordered         1901-Med Discontinued           Dose: 10 mg Freq: DAILY Route: PO  Start: 01/13/17 1330   End: 01/13/17 1328         1328-Med Discontinued          Dose: 10 mg Freq: DAILY Route: PO  Start: 01/12/17 0800   End: 01/13/17 1239        0921 (10 mg)-Given        0917 (10 mg)-Given       1239-Med Discontinued          Dose: 20 mg Freq: DAILY Route: PO  Start: 01/14/17 0800   End:  01/13/17 1328         1328-Med Discontinued          Rate: 20-40 mL/hr Freq: CONTINUOUS PRN Route: IV  PRN Comment: severe pain, when verbally ordered by the provider during the procedure  Start: 01/12/17 2131   End: 01/13/17 0024         0024-Med Discontinued          Rate: 20-40 mL/hr Freq: CONTINUOUS PRN Route: IV  PRN Comment: severe pain, when verbally ordered by the provider during the procedure  Last Dose: Stopped (01/12/17 2343)  Start: 01/12/17 2035   End: 01/13/17 0024 2127 (25 mcg/hr)-New Bag       2343-Stopped        0024-Med Discontinued          Rate: 20-40 mL/hr Freq: CONTINUOUS PRN Route: IV  PRN Comment: severe pain, when verbally ordered by the provider during the procedure  Start: 01/12/17 1541   End: 01/12/17 1901        1901-Med Discontinued           Dose: 25-50 mcg Freq: EVERY 2 MIN PRN Route: IV  PRN Reason: severe pain  PRN Comment: when verbally ordered by the provider during the procedure  Start: 01/12/17 2131   End: 01/13/17 0024   Admin Instructions: Doses can be exceeded under direct oversight of patient by physician.          0024-Med Discontinued          Dose: 25-50 mcg Freq: EVERY 2 MIN PRN Route: IV  PRN Reason: severe pain  PRN Comment: when verbally ordered by the provider during the procedure  Start: 01/12/17 2035   End: 01/13/17 0024   Admin Instructions: Doses can be exceeded under direct oversight of patient by physician.         2127 (25 mcg)-Given [C]       2138 (25 mcg)-Given [C]       2153 (25 mcg)-Given [C]        0024-Med Discontinued          Dose: 25-50 mcg Freq: EVERY 2 MIN PRN Route: IV  PRN Reason: severe pain  PRN Comment: when verbally ordered by the provider during the procedure  Start: 01/12/17 1541   End: 01/12/17 1901   Admin Instructions: Doses can be exceeded under direct oversight of patient by physician.         1901-Med Discontinued           Dose: 0.2 mg Freq: EVERY 1 MIN PRN Route: IV  PRN Reason: other  PRN Comment: sedation reversal when  verbally ordered by provider during the procedure.  Start: 01/12/17 2131   End: 01/13/17 0024   Admin Instructions: MAX cumulative dose of 1 mg          0024-Med Discontinued          Dose: 0.2 mg Freq: EVERY 1 MIN PRN Route: IV  PRN Reason: other  PRN Comment: sedation reversal when verbally ordered by provider during the procedure.  Start: 01/12/17 2035   End: 01/13/17 0024   Admin Instructions: MAX cumulative dose of 1 mg          0024-Med Discontinued          Dose: 0.2 mg Freq: EVERY 1 MIN PRN Route: IV  PRN Reason: other  PRN Comment: sedation reversal when verbally ordered by provider during the procedure.  Start: 01/12/17 1541   End: 01/12/17 1901   Admin Instructions: MAX cumulative dose of 1 mg         1901-Med Discontinued           Dose: 20 mg Freq: ONCE Route: IV  Start: 01/13/17 0930   End: 01/13/17 1238         (1229)-Not Given       1238-Med Discontinued          Dose:  mg Freq: ONCE PRN Route: IV  PRN Comment: for diuresis, when verbally ordered by provider during the procedure.  Start: 01/12/17 2131   End: 01/13/17 0024         0024-Med Discontinued          Dose:  mg Freq: ONCE PRN Route: IV  PRN Comment: for diuresis, when verbally ordered by provider during the procedure.  Start: 01/12/17 2035   End: 01/13/17 0024         0024-Med Discontinued          Dose:  mg Freq: ONCE PRN Route: IV  PRN Comment: for diuresis, when verbally ordered by provider during the procedure.  Start: 01/12/17 1541   End: 01/12/17 1901        1901-Med Discontinued           Dose: 1,000-10,000 Units Freq: EVERY 5 MIN PRN Route: IV  PRN Reason: other  PRN Comment: bolus dose Up to a max of 25,000 units, when verbally order by provider during procedure.  Start: 01/12/17 2131   End: 01/13/17 0024   Admin Instructions: Provider may request an additional bolus.          0024-Med Discontinued          Dose: 1,000-10,000 Units Freq: EVERY 5 MIN PRN Route: IV  PRN Reason: other  PRN Comment: bolus dose Up to a  max of 25,000 units, when verbally order by provider during procedure.  Start: 01/12/17 2035   End: 01/13/17 0024   Admin Instructions: Provider may request an additional bolus.          0024-Med Discontinued          Dose: 1,000-10,000 Units Freq: EVERY 5 MIN PRN Route: IV  PRN Reason: other  PRN Comment: bolus dose Up to a max of 25,000 units, when verbally order by provider during procedure.  Start: 01/12/17 1541   End: 01/12/17 1901   Admin Instructions: Provider may request an additional bolus.         1901-Med Discontinued           Rate: 5-25 mL/hr Freq: CONTINUOUS Route: IV  Start: 01/12/17 2145   End: 01/13/17 0024   Admin Instructions: Titrate continuous for anticoagulation as verbally ordered by provider during the procedure.                 0024-Med Discontinued          Rate: 5-25 mL/hr Freq: CONTINUOUS Route: IV  Start: 01/12/17 2045   End: 01/13/17 0024   Admin Instructions: Titrate continuous for anticoagulation as verbally ordered by provider during the procedure.                 0024-Med Discontinued          Rate: 5-25 mL/hr Freq: CONTINUOUS Route: IV  Start: 01/12/17 1545   End: 01/12/17 1901   Admin Instructions: Titrate continuous for anticoagulation as verbally ordered by provider during the procedure.                1901-Med Discontinued           Rate: 7.5-300 mL/hr Freq: CONTINUOUS PRN Route: IV  PRN Comment: when verbally ordered by the provider during the procedure.  Start: 01/12/17 2131   End: 01/13/17 0024   Admin Instructions: Titrate up as directed by the provider.  Protect from light.          0024-Med Discontinued          Rate: 7.5-300 mL/hr Freq: CONTINUOUS PRN Route: IV  PRN Comment: when verbally ordered by the provider during the procedure.  Start: 01/12/17 2035   End: 01/13/17 0024   Admin Instructions: Titrate up as directed by the provider.  Protect from light.          0024-Med Discontinued          Rate: 7.5-300 mL/hr Freq: CONTINUOUS PRN Route: IV  PRN Comment: when  verbally ordered by the provider during the procedure.  Start: 01/12/17 1541   End: 01/12/17 1901   Admin Instructions: Titrate up as directed by the provider.  Protect from light.         1901-Med Discontinued           Dose: 15 mg Freq: ONCE PRN Route: IV  PRN Reason: moderate to severe pain  PRN Comment: when verbally ordered by prescriber during the procedure for patients greater or equal to 65 years in age.  Start: 01/12/17 2131   End: 01/13/17 0024   Admin Instructions: 15 mg dose recommended for patients with creatinine clearance greater than 30 mL/min  Contraindicated in advanced renal impairment, for patients with creatinine clearance CrCL less than  30 mL/min          0024-Med Discontinued          Dose: 15 mg Freq: ONCE PRN Route: IV  PRN Reason: moderate to severe pain  PRN Comment: when verbally ordered by prescriber during the procedure for patients greater or equal to 65 years in age.  Start: 01/12/17 2035   End: 01/13/17 0024   Admin Instructions: 15 mg dose recommended for patients with creatinine clearance greater than 30 mL/min  Contraindicated in advanced renal impairment, for patients with creatinine clearance CrCL less than  30 mL/min          0024-Med Discontinued          Dose: 15 mg Freq: ONCE PRN Route: IV  PRN Reason: moderate to severe pain  PRN Comment: when verbally ordered by prescriber during the procedure for patients greater or equal to 65 years in age.  Start: 01/12/17 1541   End: 01/12/17 1901   Admin Instructions: 15 mg dose recommended for patients with creatinine clearance greater than 30 mL/min  Contraindicated in advanced renal impairment, for patients with creatinine clearance CrCL less than  30 mL/min         1901-Med Discontinued           Freq: EVERY 1 HOUR PRN Route: Top  PRN Reason: pain  PRN Comment: with VAD insertion or accessing implanted port.  Start: 01/12/17 1802   End: 01/12/17 1901   Admin Instructions: Do NOT give if patient has a history of allergy to any  "local anesthetic or any \"ellen\" product.   Apply 30 minutes prior to VAD insertion or port access. MAX Dose: 2.5 g (  of 5 g tube)         1901-Med Discontinued           Dose: 1 mL Freq: EVERY 1 HOUR PRN Route: OTHER  PRN Comment: mild pain with VAD insertion or accessing implanted port  Start: 01/12/17 1802   End: 01/12/17 1901   Admin Instructions: Do NOT give if patient has a history of allergy to any local anesthetic or any \"ellen\" product. MAX dose 1 mL subcutaneous OR intradermal in divided doses.         1901-Med Discontinued           Dose: 0.5-2 mg Freq: EVERY 10 MIN PRN Route: IV  PRN Reason: anxiety  PRN Comment: when verbally ordered by the provider during the procedure  Start: 01/12/17 2131   End: 01/13/17 0024   Admin Instructions: For IV PUSH: Dilute with equal volume of NS.          0024-Med Discontinued          Dose: 0.5-2 mg Freq: EVERY 10 MIN PRN Route: IV  PRN Reason: anxiety  PRN Comment: when verbally ordered by the provider during the procedure  Start: 01/12/17 2035   End: 01/13/17 0024   Admin Instructions: For IV PUSH: Dilute with equal volume of NS.          0024-Med Discontinued          Dose: 0.5-2 mg Freq: EVERY 10 MIN PRN Route: IV  PRN Reason: anxiety  PRN Comment: when verbally ordered by the provider during the procedure  Start: 01/12/17 1541   End: 01/12/17 1901   Admin Instructions: For IV PUSH: Dilute with equal volume of NS.         1901-Med Discontinued           Rate: 2.5-30 mL/hr Freq: CONTINUOUS PRN Route: IV  PRN Comment: sedation, when verbally ordered by the provider during the procedure  Start: 01/12/17 2131   End: 01/13/17 0024         0024-Med Discontinued          Rate: 2.5-30 mL/hr Freq: CONTINUOUS PRN Route: IV  PRN Comment: sedation, when verbally ordered by the provider during the procedure  Last Dose: Stopped (01/12/17 2343)  Start: 01/12/17 2035   End: 01/13/17 0024        2128 (0.5 mg/hr)-New Bag       2208 (1 mg/hr)-Rate/Dose Change       2343-Stopped        " 0024-Med Discontinued          Rate: 2.5-30 mL/hr Freq: CONTINUOUS PRN Route: IV  PRN Comment: sedation, when verbally ordered by the provider during the procedure  Start: 01/12/17 1541   End: 01/12/17 1901        1901-Med Discontinued           Dose: 0.5-2 mg Freq: EVERY 2 MIN PRN Route: IV  PRN Reason: sedation  PRN Comment: when verbally ordered by the provider during the procedure  Start: 01/12/17 2131   End: 01/13/17 0024   Admin Instructions: Doses can be exceeded under direct oversight of patient by physician.          0024-Med Discontinued          Dose: 0.5-2 mg Freq: EVERY 2 MIN PRN Route: IV  PRN Reason: sedation  PRN Comment: when verbally ordered by the provider during the procedure  Start: 01/12/17 2035   End: 01/13/17 0024   Admin Instructions: Doses can be exceeded under direct oversight of patient by physician.         2127 (0.5 mg)-Given       2137 (0.5 mg)-Given [C]       2142 (0.5 mg)-Given [C]       2156 (0.5 mg)-Given [C]        0024-Med Discontinued          Dose: 0.5-2 mg Freq: EVERY 2 MIN PRN Route: IV  PRN Reason: sedation  PRN Comment: when verbally ordered by the provider during the procedure  Start: 01/12/17 1541   End: 01/12/17 1901   Admin Instructions: Doses can be exceeded under direct oversight of patient by physician.         1901-Med Discontinued           Start: 01/11/17 1759   End: 01/11/17 2046   Admin Instructions: VESTA PORRAS: jennifer override               2046-Med Discontinued            Dose: 0.1-0.4 mg Freq: EVERY 2 MIN PRN Route: IV  PRN Reason: opioid reversal  Start: 01/13/17 0837   End: 01/13/17 0841   Admin Instructions: For respiratory rate LESS than or EQUAL to 8.  Partial reversal dose:  0.1 mg titrated q 2 minutes for Analgesia Side Effects Monitoring Sedation Level of 3 (frequently drowsy, arousable, drifts to sleep during conversation).Full reversal dose:  0.4 mg bolus for Analgesia Side Effects Monitoring Sedation Level of 4 (somnolent, minimal or no  response to stimulation).          0841-Med Discontinued          Dose: 0.4 mg Freq: EVERY 5 MIN PRN Route: IV  PRN Reason: other  PRN Comment: when verbally ordered by provider during the procedure.  Start: 01/12/17 2131   End: 01/13/17 0024   Admin Instructions: For respiratory rate LESS than or EQUAL to 8.  Partial reversal dose:  0.1 mg titrated q 2 minutes for Analgesia Side Effects Monitoring Sedation Level of 3 (frequently drowsy, arousable, drifts to sleep during conversation).Full reversal dose:  0.4 mg bolus for Analgesia Side Effects Monitoring Sedation Level of 4 (somnolent, minimal or no response to stimulation).          0024-Med Discontinued          Dose: 0.4 mg Freq: EVERY 5 MIN PRN Route: IV  PRN Reason: other  PRN Comment: when verbally ordered by provider during the procedure.  Start: 01/12/17 2035   End: 01/13/17 0024   Admin Instructions: For respiratory rate LESS than or EQUAL to 8.  Partial reversal dose:  0.1 mg titrated q 2 minutes for Analgesia Side Effects Monitoring Sedation Level of 3 (frequently drowsy, arousable, drifts to sleep during conversation).Full reversal dose:  0.4 mg bolus for Analgesia Side Effects Monitoring Sedation Level of 4 (somnolent, minimal or no response to stimulation).          0024-Med Discontinued          Dose: 0.4 mg Freq: EVERY 5 MIN PRN Route: IV  PRN Reason: other  PRN Comment: when verbally ordered by provider during the procedure.  Start: 01/12/17 1541   End: 01/12/17 1901   Admin Instructions: For respiratory rate LESS than or EQUAL to 8.  Partial reversal dose:  0.1 mg titrated q 2 minutes for Analgesia Side Effects Monitoring Sedation Level of 3 (frequently drowsy, arousable, drifts to sleep during conversation).Full reversal dose:  0.4 mg bolus for Analgesia Side Effects Monitoring Sedation Level of 4 (somnolent, minimal or no response to stimulation).         1901-Med Discontinued           Rate: 6.3-37.5 mL/hr Freq: CONTINUOUS Route: IV  Start:  01/13/17 1600   End: 01/13/17 1610   Admin Instructions: Max dose= 15 mg/hr (notify MD if dose exceeds 15 mg/hr and SBP not within range) Titrate by 2.5mg/hr q5-15min to keep SBP< 160mmHg. Protect from light.  USE Caution with prolonged use through a peripheral line. To minimize the risk of peripheral venous irritation, it is recommended that the site of infusion be changed every 12 hours.                 1610-Med Discontinued          Rate: 12.5-75 mL/hr Freq: CONTINUOUS Route: IV  Last Dose: Stopped (01/14/17 0634)  Start: 01/13/17 1615   End: 01/14/17 0829   Admin Instructions: Max dose= 15 mg/hr (notify MD if dose exceeds 15 mg/hr and SBP not within range) Titrate by 2.5mg/hr q5-15min to keep SBP< 160mmHg. Protect from light.  To minimize risk of peripheral venous irritation, it is recommended that the site of infusion be changed every 12 hours.          1630 (2.5 mg/hr)-New Bag       1648 (5 mg/hr)-Rate/Dose Change       1700 (7.5 mg/hr)-Rate/Dose Change       1800 (7.5 mg/hr)-Rate/Dose Verify       1900 (7.5 mg/hr)-Rate/Dose Verify       1937 (5 mg/hr)-Rate/Dose Change       2000 (5 mg/hr)-Rate/Dose Verify       2015 (7.5 mg/hr)-Rate/Dose Change       2100 (7.5 mg/hr)-Rate/Dose Verify       2200 (7.5 mg/hr)-Rate/Dose Verify       2248 (7.5 mg/hr)-New Bag       2300 (7.5 mg/hr)-Rate/Dose Verify        0000 (7.5 mg/hr)-Rate/Dose Verify       0100 (5 mg/hr)-Rate/Dose Change       0200 (5 mg/hr)-Rate/Dose Verify       0300 (5 mg/hr)-Rate/Dose Verify       0400 (5 mg/hr)-Rate/Dose Verify       0500 (5 mg/hr)-Rate/Dose Verify       0600 (5 mg/hr)-Rate/Dose Verify       0606 (5 mg/hr)-New Bag       0634-Stopped              0829-Med Discontinued         Rate: 1.7-47.9 mL/hr Freq: CONTINUOUS Route: IV  Last Dose: Stopped (01/13/17 1536)  Start: 01/12/17 2145   End: 01/13/17 1709   Admin Instructions: For range orders: start at lowest dose ordered. Titrate by 0.1 mcg/kg/min every 5 minutes to keep -190. Notify  prescriber if higher doses are required to achieve blood pressure goals.         2136 (0.8 mcg/kg/min)-New Bag       2217 (0.3 mcg/kg/min)-Rate/Dose Change       2324 (0.4 mcg/kg/min)-Rate/Dose Change       2335 (0.5 mcg/kg/min)-Rate/Dose Change        0000 (0.5 mcg/kg/min)-Rate/Dose Verify       0100 (0.5 mcg/kg/min)-Rate/Dose Verify       0116 (0.4 mcg/kg/min)-Rate/Dose Change       0145 (0.3 mcg/kg/min)-Rate/Dose Change       0200 (0.2 mcg/kg/min)-Rate/Dose Change       0230-Stopped       0515 (0.2 mcg/kg/min)-Restarted       0530 (0.3 mcg/kg/min)-Rate/Dose Change       0600 (0.4 mcg/kg/min)-Rate/Dose Change       0615 (0.5 mcg/kg/min)-Rate/Dose Change       0719 (0.6 mcg/kg/min)-Rate/Dose Change       0800 (0.6 mcg/kg/min)-Rate/Dose Verify       0831 (0.7 mcg/kg/min)-Rate/Dose Change       0900 (0.7 mcg/kg/min)-Rate/Dose Verify       1033 (0.8 mcg/kg/min)-Rate/Dose Change       1100 (0.8 mcg/kg/min)-Rate/Dose Verify       1200 (0.8 mcg/kg/min)-Rate/Dose Verify       1300 (0.8 mcg/kg/min)-Rate/Dose Verify       1400 (0.8 mcg/kg/min)-Rate/Dose Verify       1536-Stopped       1709-Med Discontinued          Dose: 4 mg Freq: EVERY 4 HOURS PRN Route: IV  PRN Reason: nausea  PRN Comment: when verbally ordered by provider during procedure  Start: 01/12/17 2131   End: 01/13/17 0024         0024-Med Discontinued          Dose: 4 mg Freq: EVERY 4 HOURS PRN Route: IV  PRN Reason: nausea  PRN Comment: when verbally ordered by provider during procedure  Start: 01/12/17 2035   End: 01/13/17 0024         0024-Med Discontinued          Dose: 4 mg Freq: EVERY 4 HOURS PRN Route: IV  PRN Reason: nausea  PRN Comment: when verbally ordered by provider during procedure  Start: 01/12/17 1541   End: 01/12/17 1901        1901-Med Discontinued           Dose: 6.25-25 mg Freq: EVERY 4 HOURS PRN Route: IV  PRN Reason: nausea  PRN Comment: when verbally ordered by the provider during the procedure  Start: 01/12/17 2131   End: 01/13/17 0024    Admin Instructions: Dilute 25 mg with 10 mL of normal saline in a syringe. Resulting concentration = 2.5 mg/ mL. Administer over 3-5 minutes. High risk of tissue necrosis with extravasation.          0024-Med Discontinued          Dose: 6.25-25 mg Freq: EVERY 4 HOURS PRN Route: IV  PRN Reason: nausea  PRN Comment: when verbally ordered by the provider during the procedure  Start: 01/12/17 2035   End: 01/13/17 0024   Admin Instructions: Dilute 25 mg with 10 mL of normal saline in a syringe. Resulting concentration = 2.5 mg/ mL. Administer over 3-5 minutes. High risk of tissue necrosis with extravasation.          0024-Med Discontinued          Dose: 6.25-25 mg Freq: EVERY 4 HOURS PRN Route: IV  PRN Reason: nausea  PRN Comment: when verbally ordered by the provider during the procedure  Start: 01/12/17 1541   End: 01/12/17 1901   Admin Instructions: Dilute 25 mg with 10 mL of normal saline in a syringe. Resulting concentration = 2.5 mg/ mL. Administer over 3-5 minutes. High risk of tissue necrosis with extravasation.         1901-Med Discontinued           Dose: 1-5 mg Freq: ONCE PRN Route: IV  PRN Comment: when verbally ordered by provider during procedure.  Start: 01/12/17 2131   End: 01/13/17 0024   Admin Instructions: TEST DOSE  Give slow IV push          0024-Med Discontinued          Dose: 1-5 mg Freq: ONCE PRN Route: IV  PRN Comment: when verbally ordered by provider during procedure.  Start: 01/12/17 2035   End: 01/13/17 0024   Admin Instructions: TEST DOSE  Give slow IV push          0024-Med Discontinued          Dose: 1-5 mg Freq: ONCE PRN Route: IV  PRN Comment: when verbally ordered by provider during procedure.  Start: 01/12/17 1541   End: 01/12/17 1901   Admin Instructions: TEST DOSE  Give slow IV push         1901-Med Discontinued           Dose: 5-40 mg Freq: EVERY 10 MIN PRN Route: IV  PRN Comment: when verbally ordered by provider during procedure.  Start: 01/12/17 2131   End: 01/13/17 0024    Admin Instructions: (MAX: 100 mg) Can be started 5 minutes after test dose.  Give slow IV push          0024-Med Discontinued          Dose: 5-40 mg Freq: EVERY 10 MIN PRN Route: IV  PRN Comment: when verbally ordered by provider during procedure.  Start: 01/12/17 2035   End: 01/13/17 0024   Admin Instructions: (MAX: 100 mg) Can be started 5 minutes after test dose.  Give slow IV push          0024-Med Discontinued          Dose: 5-40 mg Freq: EVERY 10 MIN PRN Route: IV  PRN Comment: when verbally ordered by provider during procedure.  Start: 01/12/17 1541   End: 01/12/17 1901   Admin Instructions: (MAX: 100 mg) Can be started 5 minutes after test dose.  Give slow IV push         1901-Med Discontinued           Dose: 3 mL Freq: EVERY 8 HOURS Route: IK  Start: 01/12/17 1815   End: 01/12/17 1901   Admin Instructions: And Q1H PRN, to lock peripheral IV dormant line.                1901-Med Discontinued           Dose: 3 mL Freq: EVERY 1 HOUR PRN Route: IK  PRN Reason: line flush  Start: 01/12/17 1802   End: 01/12/17 1901   Admin Instructions: for peripheral IV flush post IV meds         1901-Med Discontinued           Dose: 1 puff Freq: DAILY Route: IN  Start: 01/13/17 1600   End: 01/13/17 0905   Admin Instructions: Use only ONE capsule. To ensure the full dose is administered, TWO inhalations should be performed at a rate sufficient to hear or feel the capsule vibrate.    Therapeutic interchange for Spiriva 18 mcg inhalation daily (pta med)            0905-Med Discontinued     Medications 01/08/17 01/09/17 01/10/17 01/11/17 01/12/17 01/13/17 01/14/17

## 2017-01-11 NOTE — IP AVS SNAPSHOT
Gallito Farley A #[id number] (CSN: [id number])  (83 year old M)  (Adm: 17)     [id number]               [address] [place] [address]: [phone]            Patient Demographics     Patient Name Sex          Age SSN Address Phone    Gallito Farley Male 1934 (83 year old) [id number] [facility] [address] [phone] (Home)  None (Work)  [phone] (Mobile) *Preferred*      Emergency Contact(s)     Name Relation Home Work Mobile    JOSE RAMON SOLIS Relative [phone]      IRMA HOLLOWAY Son [phone]        Admission Information     Attending Provider Admitting Provider Admission Type Admission Date/Time    Klarissa Garsia MD Chen, Lin Yee, MD Emergency 17  1636    Discharge Date Hospital Service Auth/Cert Status Service Area     Cardiology [facility]    Unit Room/Bed Admission Status    [facility] MEDICAL ICU /4415-01 Admission (Confirmed)            Admission     Complaint    Hypertensive urgency, Hypertensive urgency      Hospital Account     Name Acct ID Class Status Primary Coverage    Gallito Farley [id number] Inpatient Open UCARE - [employer]ARE FOR S[employer]IORS            Guarantor Account (for Hospital Account #[id number])     Name Relation to Pt Service Area Active? Acct Type    Gallito Farley Self FCS Yes Personal/Family    Address Phone          [address] [place] [address] [phone](H)  None(O)              Coverage Information (for Hospital Account #[id number])     F/O Payor/Plan Precert #    UCARE/UCARE FOR SENIORS     Subscriber Subscriber #    Gallito Farley [id number]    Address Phone    [address]  [place] [id number] [phone]                                                INTERAGENCY TRANSFER FORM - PHYSICIAN ORDERS   2017                       [address] [place] [address]: [phone]            Attending Provider: Klarissa Garsia MD     Allergies:  No Known Allergies    Infection:  None   Service:   "CARDIOLOGY    Ht:  1.854 m (6' 1\")   Wt:  79.6 kg (175 lb 7.8 oz)   Admission Wt:  79.89 kg (176 lb 2 oz)    BMI:  23.16 kg/m 2   BSA:  2.02 m 2            ED Clinical Impression     Diagnosis Description Comment Added By Time Added    Second degree AV block [I44.1] Second degree AV block [I44.1]  Kay Salazar MD 1/11/2017  7:09 PM    Bradycardia [R00.1] Bradycardia [R00.1]  Kay Salazar MD 1/11/2017  7:10 PM    Essential hypertension [I10] Essential hypertension [I10]  Kay Salazar MD 1/11/2017  7:11 PM      Hospital Problems as of 1/14/2017              Priority Class Noted POA    Hypertensive urgency Medium  1/11/2017 Yes    Atrioventricular block, complete (H) Medium  1/12/2017 Unknown      Non-Hospital Problems as of 1/14/2017              Priority Class Noted    Septic arthritis (H)   4/20/2013    Chronic constipation   4/20/2013    HTN (hypertension)   4/20/2013    BPH (benign prostatic hypertrophy)   4/20/2013    Insomnia   4/20/2013    Moderate persistent asthma   4/27/2013    GERD (gastroesophageal reflux disease)   4/27/2013    COPD exacerbation (H) Medium  7/23/2014    Pneumonia Medium  6/13/2016    Hypercapnia Medium  7/22/2016    Acute on chronic respiratory failure with hypoxia and hypercapnia (H) Medium  7/24/2016    Chronic cutaneous venous stasis ulcer (H) Medium  8/4/2016      Code Status History     Date Active Date Inactive Code Status Order ID Comments User Context    1/14/2017  8:26 AM  Full Code [id number]  Renay Beltrán MD Outpatient    1/11/2017 10:35 PM 1/14/2017  8:26 AM Full Code [id number]  Haley Peterson MD Inpatient    7/24/2016 11:45 AM 1/11/2017 10:35 PM Full Code [id number]  Héctor Jean MD Outpatient    7/22/2016  3:01 AM 7/24/2016 11:45 AM Full Code [id number]  Talita Brunson MD Inpatient    6/13/2016 10:57 PM 6/18/2016  8:16 PM Full Code [id number]  Blue Gann MD Inpatient    5/25/2013  9:35 AM 6/13/2016 10:57 PM Full Code " [id number]  Briana Hampton Outpatient    4/25/2013  3:40 PM 5/25/2013  9:35 AM Full Code [id number]  Lenin Farley MD Outpatient    4/18/2013  9:57 PM 4/25/2013  3:40 PM Full Code [id number]  Jessica Graves RN Inpatient    4/15/2013  6:52 PM 4/18/2013  9:57 PM Full Code [id number]  Pierre Leal MD Inpatient      Current Code Status     Date Active Code Status Order ID Comments User Context       Prior      Summary of Visit     Reason for your hospital stay       You were hospitalized for complete heart block requiring a pacemaker and hypertension.                Medication Review      START taking        Dose / Directions Comments    acetaminophen 325 MG tablet   Commonly known as:  TYLENOL   Used for:  Acute post-operative pain        Dose:  650 mg   Take 2 tablets (650 mg) by mouth every 4 hours as needed for mild pain   Quantity:  100 tablet   Refills:  0        amLODIPine 10 MG tablet   Commonly known as:  NORVASC   Used for:  Essential hypertension        Dose:  10 mg   Take 1 tablet (10 mg) by mouth daily   Quantity:  30 tablet   Refills:  1        cephalexin 250 MG capsule   Commonly known as:  KEFLEX   Indication:  Surgical Prophylaxis   Used for:  Atrioventricular block, complete (H)        Dose:  250 mg   Take 1 capsule (250 mg) by mouth 4 times daily for 3 days   Quantity:  12 capsule   Refills:  0        hydrochlorothiazide 12.5 MG capsule   Commonly known as:  MICROZIDE   Used for:  Essential hypertension        Dose:  25 mg   Take 2 capsules (25 mg) by mouth daily   Quantity:  30 capsule   Refills:  1        ranitidine 150 MG tablet   Commonly known as:  ZANTAC   Used for:  Gastroesophageal reflux disease, esophagitis presence not specified        Dose:  150 mg   Start taking on:  1/15/2017   Take 1 tablet (150 mg) by mouth 2 times daily   Quantity:  60 tablet   Refills:  0          CONTINUE these medications which may have CHANGED, or have new prescriptions. If we are uncertain of the size of  tablets/capsules you have at home, strength may be listed as something that might have changed.        Dose / Directions Comments    fluticasone 250 MCG/BLIST Aepb Inhaler   Commonly known as:  FLOVENT DISKUS   This may have changed:  how much to take   Used for:  COPD exacerbation (H)        Dose:  1 puff   Inhale 1 puff into the lungs every 12 hours   Quantity:  1 Inhaler   Refills:  1          CONTINUE these medications which have NOT CHANGED        Dose / Directions Comments    albuterol (2.5 MG/3ML) 0.083% neb solution   Used for:  COPD exacerbation (H)        Dose:  2.5 mg   Take 1 vial (2.5 mg) by nebulization every 6 hours as needed   Quantity:  360 mL   Refills:  1        enalapril 10 MG tablet   Commonly known as:  VASOTEC   Used for:  Essential hypertension        Dose:  10 mg   Take 1 tablet (10 mg) by mouth daily   Quantity:  30 tablet   Refills:  1        tamsulosin 0.4 MG capsule   Commonly known as:  FLOMAX   Indication:  Enlarged Prostate with Urination Problems   Used for:  Benign nodular prostatic hyperplasia without lower urinary tract symptoms        Dose:  0.4 mg   Take 1 capsule (0.4 mg) by mouth every 24 hours   Quantity:  60 capsule   Refills:  1        tiotropium 18 MCG capsule   Commonly known as:  SPIRIVA   Used for:  COPD exacerbation (H)        Dose:  18 mcg   Inhale 1 capsule (18 mcg) into the lungs daily   Quantity:  30 capsule   Refills:  1          STOP taking     fluticasone 50 MCG/ACT spray   Commonly known as:  FLONASE           metoprolol 25 MG tablet   Commonly known as:  LOPRESSOR           omeprazole 40 MG capsule   Commonly known as:  priLOSEC                   After Care     Activity       Your activity upon discharge:       No lifting > 10lbs or over shoulder level with left arm for 4 weeks       Notify device clinic/EP immediately for any redness, swelling, bleeding, discharge, fevers or chills.       Diet       Follow this diet upon discharge: 2 Gram Sodium Diet                Further instructions from your care team         Home Care after a Pacemaker Implant    Wound care:  Keep your incision (surgery wound) dry for 3 days.  After 3 days, you may remove the outer bandage.  Keep the strips of tape on.  They will be removed at your clinic visit.  Check for signs of infection each day.  These include increased redness, swelling, drainage, bleeding or a fever over 101 F (38.3 C).  Call us immediately if you see any of these signs.  If there are no signs of infection, you may shower in 3 days.  Do not submerge the incision (in a bath tub, hot tub, or swimming pool) until fully healed.  If Dermabond has been applied to your incision.  Do not apply powder or lotion to the incision for 14 days. You may shower in the morning.    Pain:   You may have mild to moderate pain for 3 to 5 days.  Take acetaminophen (Tylenol) or ibuprofen (Advil) for the pain.  Call us if the pain is severe or lasts more than 5 days.    Activity:  After 24 hours, slowly return to your normal activities.   Healing will take 4 to 6 weeks.  No driving for 3 days  Avoid climbing a ladder alone.  It is best to stay within 4 feet of the ground.  Avoid anything that may cause rough contact or a hard hit to your chest.  This includes football, hockey, and other contact sports.  For at least 4 weeks:  Do not raise your affected arm above your shoulder.  Do not use your affected arm to push, pull, or lift anything over 10 pounds.  Avoid repetitive upper body activities for 6 weeks (ie: golf, swimming, and weight lifting)    Follow Up Visits:  Return to the clinic in 7 to 10 days to have your device and wound checked.      Telling others about your device:  Before you have any medical tests or treatments, tell the doctors, dentists, and other care providers about your device.  There are a few tests and treatments that may interfere with your device.  (These include MRI, radiation therapy, electrocautery, and others.)  Your  care team may need to take special steps to keep you safe.  Before you leave the hospital, you will receive a temporary ID card.  A permanent card will be mailed to you about 6 to 8 weeks later.  Always carry the ID card with you.  It has important details about your device.  You should also get a MedicAlert ID.  Please ask us for a MedicAlert brochure, or go to [email or web address].    Safety near electrical equipment:  All of these are safe to use when in good repair:    Microwaves    Radios    Cordless phone    Remote controls    Small electrical tools  Cell phones: Keep cell phones at least 6 inches from your device.  Do not carry the phone in a pocket near your device.  Security valentine: It is okay to walk through security valentine at the airports and department stores.  Tell airport security that you have a pacemaker.  They should keep the screening wand at least 6 inches from your device.  Full-body scanners are safe.  Avoid the following:    MRI tests in the hospital unless you have a MRI safe pacemaker.           Arc welding, chain saws and high-powered industrial or commercial tools.    Power lines, power plants and large power generators.    Electric body fat scales.    Magnetic mattress pads or pillow.    Questions?  Please call [facility].    Device Nurse:          Business Hours:  [phone]                       After Hours:  [phone]   Choose option 4, then ask for the                                                  on-call device nurse at job code [id number].    Your next device clinic appointment is scheduled on:    Friday January 20th @ 10:30 AM            [facility]  [facility] - Clinic [address]  [address] [place] [address]  [id number]      Follow-Up Appointment Instructions     Adult [facility] Follow-up and recommended labs and tests       Follow up with primary care provider, Moiz Richard MD, within 3-5 days to evaluate medication  "change, to evaluate treatment change, for hospital follow- up and regarding new diagnosis.  The following labs/tests are recommended: BMP, magnesium, blood pressure monitoring.      Appointments on [place] and/or [facility] (with [facility] or [facility] provider or service). Call [phone] if you haven't heard regarding these appointments within 7 days of discharge.       Follow Up and recommended labs and tests       Follow up with the Device Clinic in 7-10 days             Your next 10 appointments already scheduled     Jan 20, 2017 10:30 AM   (Arrive by 10:15 AM)   Pacemaker Check with [facility] [address] [place] [address] ([facility] [place])    [address] [facility]  3rd Floor  [facility] [address] [phone]              Statement of Approval     Ordered          01/14/17 1136  I have reviewed and agree with all the recommendations and orders detailed in this document.   EFFECTIVE NOW     Approved and electronically signed by:  Klarissa Garsia MD                                                 INTERAGENCY TRANSFER FORM - NURSING   1/11/2017                       [address] [place] [address] [facility]: [phone]            Attending Provider: Klarissa Garsia MD     Allergies:  No Known Allergies    Infection:  None   Service:  CARDIOLOGY    Ht:  1.854 m (6' 1\")   Wt:  79.6 kg (175 lb 7.8 oz)   Admission Wt:  79.89 kg (176 lb 2 oz)    BMI:  23.16 kg/m 2   BSA:  2.02 m 2            Advance Directives        Does patient have a scanned Advance Directive/ACP document in EPIC?           No        Immunizations     Name Date      Influenza (High Dose) 3 valent vaccine 01/14/17     Pneumococcal 23 valent 08/03/16     TDAP (ADACEL AGES 11-64) 08/03/16       ASSESSMENT     Discharge Profile Flowsheet     DISCHARGE NEEDS ASSESSMENT     Patient's primary language  [place] 01/11/17 4650    Equipment Currently Used at Home  none 07/24/16 0948    services offered to the patient? (Install  " "services phone or TTY, if applicable)  yes 01/11/17 1628    Transportation Available  family or friend will provide 07/24/16 0948   Did the patient decline [place]  and sign paper waiver form? (Place signed waiver form on chart)  no 01/11/17 2123    # of Referrals Placed by CTS  Financial Services 06/14/16 0801   SKIN      Equipment Used at Home  walker, standard 04/26/13 1224   Inspection  Full 01/14/17 0858    GASTROINTESTINAL (ADULT,PEDIATRIC,OB)     Skin areas NOT inspected  Spine;Coccyx;Sacrum;Buttock, right;Buttock, left;Hip, right;Hip, left;Scapula, left;Scapula, right 01/13/17 0340    GI WDL  WDL 01/14/17 0858   Skin WDL  ex 01/14/17 0858    All Quadrants Bowel Sounds  audible and normoactive 01/14/17 0403   Skin Moisture  dry 01/14/17 0858    All Quadrants Palpation  soft/nontender 01/14/17 0403   Skin Integrity  incision(s) 01/14/17 0858    Last Bowel Movement  01/09/17 01/14/17 0403   SAFETY      COMMUNICATION ASSESSMENT     Safety WDL  WDL 01/14/17 0858    Patient's communication style  spoken language (non-English) 01/11/17 2123   Safety Equipment  suction regulator;suction equipment;oxygen flowmeter 01/14/17 0858                 Assessment WDL (Within Defined Limits) Definitions           Safety WDL     Effective: 09/28/15    Row Information: <b>WDL Definition:</b> Bed in low position, wheels locked; call light in reach; upper side rails up x 2; ID band on<br> <font color=\"gray\"><i>Item=AS safety wdl>>List=AS safety wdl>>Version=F14</i></font>      Skin WDL     Effective: 09/28/15    Row Information: <b>WDL Definition:</b> Warm; dry; intact; elastic; without discoloration; pressure points without redness<br> <font color=\"gray\"><i>Item=AS skin wdl>>List=AS skin wdl>>Version=F14</i></font>      Vitals     Vital Signs Flowsheet     VITAL SIGNS     Pain Control  partially effective 01/14/17 0836    Temp  97.8  F (36.6  C) 01/14/17 1205   Functioning  can do most things, but pain gets in the way " "of some 01/14/17 0836    Temp src  Oral 01/14/17 1205   Sleep  normal sleep 01/12/17 0906    Resp  15 01/14/17 1500   ANALGESIA SIDE EFFECTS MONITORING      Pulse  (!) 40 01/12/17 0902   Side Effects Monitoring: Respiratory Quality  R 01/14/17 0359    Heart Rate  76 01/14/17 1500   Side Effects Monitoring: Respiratory Depth  N 01/14/17 0359    BP  118/67 mmHg 01/14/17 1500   Side Effects Monitoring: Sedation Level  1 01/14/17 0359    BP Location  Right arm 01/14/17 0836   HEIGHT AND WEIGHT      OXYGEN THERAPY     Height  1.854 m (6' 1\") 01/11/17 2300    SpO2  94 % 01/14/17 1128   Weight  79.6 kg (175 lb 7.8 oz) 01/14/17 0015    O2 Device  None (Room air) 01/14/17 0836   POSITIONING      Oxygen Delivery  3 LPM 01/13/17 0132   Body Position  independently positioning 01/14/17 1205    PAIN/COMFORT     Head of Bed (HOB)  HOB at 30 degrees 01/14/17 0858    Patient Currently in Pain  yes 01/14/17 0836   Positioning/Transfer Devices  pillows 01/14/17 0858    Preferred Pain Scale  CAPA (Clinically Aligned Pain Assessment) ([facility] Adults Only) 01/14/17 0836   DAILY CARE      Pain Location  Incision 01/14/17 0836   Activity Type  activity adjusted per tolerance 01/14/17 0858    Pain Orientation  Left;Right 01/12/17 0221   Activity Level of Assistance  assistance, 2 people 01/14/17 0858    Pain Descriptors  Aching 01/14/17 0836   Activity Assistive Device  gait belt 01/14/17 0858    Pain Management Interventions  analgesia administered 01/14/17 0836   ECG      Pain Intervention(s)  Medication (See eMAR) 01/14/17 0836   ECG Rhythm  Paced rhythm 01/14/17 0851    Response to Interventions  Decrease in pain 01/13/17 2045   Ectopy  None 01/14/17 0359    CLINICALLY ALIGNED PAIN ASSESSMENT (CAPA) ([facility] ADULTS ONLY)     Ectopy Frequency  Occasional 01/12/17 0902    Comfort  negligible pain 01/14/17 1205   Lead Monitored  Lead II;V 1 01/14/17 0359    Change in Pain  about the same 01/14/17 0836   " Equipment  electrodes changed 01/12/17 0902            Patient Lines/Drains/Airways Status    Active LINES/DRAINS/AIRWAYS     Name: Placement date: Placement time: Site: Days: Last dressing change:    Pressure Injury Right Foot           Wound 06/13/16 Right Leg Ulcer to RLE: necrotic with slough/ eschar 06/13/16 2130  Leg  214     Incision/Surgical Site Right;Lateral Hip                   Patient Lines/Drains/Airways Status    Active PICC/CVC     **None**            Intake/Output Detail Report     Date Intake     Output Net    Shift P.O. I.V. IV Piggyback Total Urine Total       Regina 01/13/17 0700 - 01/13/17 1459 -- 194.48 -- 194.48 235 235 -40.52    Noc 01/13/17 1500 - 01/13/17 2359 100 325.84 -- 425.84 800 800 -374.16    Day 01/14/17 0000 - 01/14/17 0659 100 324.17 -- 424.17 625 625 -200.83    Regina 01/14/17 0700 - 01/14/17 1459 -- 0 -- -- 125 125 -125    Noc 01/14/17 1500 - 01/14/17 2359 -- -- -- -- -- -- 0      Last Void/BM       Most Recent Value    Urine Occurrence 1 at 01/13/2017 2100    Stool Occurrence       Case Management/Discharge Planning     Case Management/Discharge Planning Flowsheet     REFERRAL INFORMATION     FINAL RESOURCES      # of Referrals Placed by [facility]  Financial Services 06/14/16 0801   Equipment Currently Used at Home  none 07/24/16 0948    LIVING ENVIRONMENT     / CAREGIVER      Lives With  other (see comments) (Lives at assisted living) 01/11/17 2134   Filed Complexity Screen Score  7 01/12/17 0831    Living Arrangements  house 07/24/16 0948   ABUSE RISK SCREEN      COPING/STRESS     QUESTION TO PATIENT:  Has a member of your family or a partner(now or in the past) intimidated, hurt, manipulated, or controlled you in any way?  patient declined to answer or is unable to answer 01/11/17 2303    Major Change/Loss/Stressor  none 01/11/17 2134   QUESTION TO PATIENT: Do you feel safe going back to the place where you are living?  yes 01/11/17 2303    DISCHARGE PLANNING     OBSERVATION: Is  there reason to believe there has been maltreatment of a vulnerable adult (ie. Physical/Sexual/Emotional abuse, self neglect, lack of adequate food, shelter, medical care, or financial exploitation)?  no 01/11/17 2303    Transportation Available  family or friend will provide 07/24/16 0948   (R) MENTAL HEALTH SUICIDE RISK      Equipment Used at Home  walker, standard 04/26/13 1224   Are you depressed or being treated for depression?  No 01/11/17 2303                  [address] [facility] BANK: [phone]            Medication Administration Report for Gallito Farley as of 01/14/17 1518   Legend:    Given Hold Not Given Due Canceled Entry Other Actions    Time Time (Time) Time  Time-Action       Inactive    Active    Linked        Medications 01/08/17 01/09/17 01/10/17 01/11/17 01/12/17 01/13/17 01/14/17    acetaminophen (TYLENOL) tablet 650 mg  Dose: 650 mg Freq: EVERY 4 HOURS PRN Route: PO  PRN Reason: mild pain  Start: 01/12/17 0210   Admin Instructions: Maximum acetaminophen dose from all sources = 75 mg/kg/day not to exceed 4 grams/day.         0217 (650 mg)-Given        0603 (650 mg)-Given            acetaminophen-codeine (TYLENOL #3) 300-30 MG per tablet 1-2 tablet  Dose: 1-2 tablet Freq: EVERY 4 HOURS PRN Route: PO  PRN Reason: other  PRN Comment: mild or moderate pain  Start: 01/13/17 0837   Admin Instructions: Post-procedurally for CAR electrophysiology studies.  Maximum acetaminophen dose from all sources= 75 mg/kg/day not to exceed 4 grams          0924 (1 tablet)-Given       1004 (1 tablet)-Given       1941 (1 tablet)-Given        0831 (2 tablet)-Given           albuterol (PROAIR HFA/PROVENTIL HFA/VENTOLIN HFA) Inhaler 2 puff  Dose: 2 puff Freq: EVERY 6 HOURS PRN Route: IN  PRN Reason: other  PRN Comment: dyspnea  Start: 01/13/17 0905              cephalexin (KEFLEX) capsule 250 mg  Dose: 250 mg Freq: 4 TIMES DAILY Route: PO  Indications of Use: SURGICAL PROPHYLAXIS  Start: 01/14/17 0800   End: 01/19/17  0759          0831 (250 mg)-Given       1203 (250 mg)-Given       [ ] 1600       [ ] 2000           enalapril (VASOTEC) tablet 10 mg  Dose: 10 mg Freq: DAILY Route: PO  Start: 01/14/17 0800          0831 (10 mg)-Given           fluticasone (FLOVENT DISKUS) 250 mcg/puff inhaler 1 puff  Dose: 1 puff Freq: EVERY 12 HOURS Route: IN  Start: 01/13/17 2000   Admin Instructions: Rinse mouth after use.          1942 (1 puff)-Given        0833 (1 puff)-Given       [ ] 2000           HOLD: enoxaparin (LOVENOX) Post Procedure  Freq: HOLD Route: XX  Start: 01/13/17 0837   Admin Instructions: HOLD enoxaparin (LOVENOX) Post Procedure until 3 days post procedure.               HOLD: heparin (IV or Subcutaneous) Post Procedure  Freq: HOLD Route: XX  Start: 01/13/17 0837   Admin Instructions: HOLD Heparin (IV or Subcutaneous) Post Procedure until  3 days post procedure.               HOLD: metformin and metformin containing medications on day of procedure and 48 hours after IV contrast given  Freq: HOLD Route: XX  Start: 01/13/17 0837   Admin Instructions: Hold metformin (GLUCOPHAGE, GLUMETZA, FORTAMET, RIOMET) and metformin containing medications: alogliptin/metformin (KAZANO), glipizide/metformin (METAGLIP), glyburide/metformin (GLUCOVANCE), rosiglitazone/metformin (AVANDAMET), dapagliflozin/metformin (XIGDUO XR), sitagliptin/metformin (JANUMET, JANUMET XR), linagliptin/metformin (JENTADUETO), repaglinide/metformin (PRANDIMET), saxagliption/metformin (KOMBIGLYZE XR), canagliflozin/metformin (INVOKAMET), pioglitazone/metformin (ACTOPLUS MET, ACTOPLUS MET XR).  If patient was on one of these medications pre-procedure, continue to HOLD for 48 hours post-procedure if patient received IV contrast.               hydrochlorothiazide (MICROZIDE) capsule 25 mg  Dose: 25 mg Freq: DAILY Route: PO  Start: 01/13/17 1000         1231 (25 mg)-Given        0833 (25 mg)-Given           lidocaine (LMX4) kit  Freq: EVERY 1 HOUR PRN Route: Top  PRN  "Reason: pain  PRN Comment: with VAD insertion or accessing implanted port.  Start: 01/13/17 0837   Admin Instructions: Do NOT give if patient has a history of allergy to any local anesthetic or any \"ellen\" product.   Apply 30 minutes prior to VAD insertion or port access. MAX Dose: 2.5 g (  of 5 g tube)               lidocaine 1 % 1 mL  Dose: 1 mL Freq: EVERY 1 HOUR PRN Route: OTHER  PRN Comment: mild pain with VAD insertion or accessing implanted port  Start: 01/13/17 0837   Admin Instructions: Do NOT give if patient has a history of allergy to any local anesthetic or any \"ellen\" product. MAX dose 1 mL subcutaneous OR intradermal in divided doses.               methyl salicylate-menthol (STEVEN-TRIMBLE) ointment  Freq: EVERY 6 HOURS PRN Route: Top  PRN Comment: leg pain  Start: 01/12/17 0434   Admin Instructions: Apply to leg         0539 ( )-Given               Start: 01/14/17 0234   End: 01/14/17 1444   Admin Instructions: ASHLY CLAROS: cabinet override                  1444-Med Discontinued       naloxone (NARCAN) injection 0.1-0.4 mg  Dose: 0.1-0.4 mg Freq: EVERY 2 MIN PRN Route: IV  PRN Reason: opioid reversal  Start: 01/11/17 2227   Admin Instructions: For respiratory rate LESS than or EQUAL to 8.  Partial reversal dose:  0.1 mg titrated q 2 minutes for Analgesia Side Effects Monitoring Sedation Level of 3 (frequently drowsy, arousable, drifts to sleep during conversation).Full reversal dose:  0.4 mg bolus for Analgesia Side Effects Monitoring Sedation Level of 4 (somnolent, minimal or no response to stimulation).               sodium chloride (PF) 0.9% PF flush 3 mL  Dose: 3 mL Freq: EVERY 8 HOURS Route: IK  Start: 01/13/17 0837   Admin Instructions: And Q1H PRN, to lock peripheral IV dormant line.          (0918)-Not Given       (1641)-Not Given        (0021)-Not Given       0832 (3 mL)-Given       [ ] 1645           sodium chloride (PF) 0.9% PF flush 3 mL  Dose: 3 mL Freq: EVERY 1 HOUR PRN Route: IK  PRN " Reason: line flush  Start: 01/13/17 0837   Admin Instructions: for peripheral IV flush post IV meds               traMADol (ULTRAM) tablet 50 mg  Dose: 50 mg Freq: EVERY 6 HOURS PRN Route: PO  PRN Reason: moderate to severe pain  Start: 01/14/17 1239          1310 (50 mg)-Given           umeclidinium (INCRUSE ELLIPTA) oral inhaler 1 puff  Dose: 1 puff Freq: EVERY 24 HOURS Route: IN  Start: 01/13/17 2000   Admin Instructions: Use only ONE capsule. To ensure the full dose is administered, TWO inhalations should be performed at a rate sufficient to hear or feel the capsule vibrate.    Therapeutic interchange for Spiriva 18 mcg inhalation daily (pta med)          1943 (1 puff)-Given        [ ] 2000          Future Medications  Medications 01/08/17 01/09/17 01/10/17 01/11/17 01/12/17 01/13/17 01/14/17       ranitidine (ZANTAC) tablet 150 mg  Dose: 150 mg Freq: 2 TIMES DAILY Route: PO  Start: 01/15/17 0800             Completed Medications  Medications 01/08/17 01/09/17 01/10/17 01/11/17 01/12/17 01/13/17 01/14/17         Dose: 5 mg Freq: ONCE Route: PO  Start: 01/14/17 1000   End: 01/14/17 1007          1007 (5 mg)-Given             Dose: 0.5 mg Freq: ONCE Route: IV  Start: 01/11/17 1751   End: 01/11/17 1804       1804 (0.5 mg)-Given                Dose: 1 g Freq: EVERY 8 HOURS Route: IV  Indications of Use: SURGICAL PROPHYLAXIS  Last Dose: 1 g (01/14/17 0038)  Start: 01/13/17 0837   End: 01/14/17 0058   Admin Instructions: First dose 8 hrs after last dose in cath lab.  Stop within 24hrs after cath lab end-time.  Post-procedurally for CAR electrophysiology studies.          0917 (1 g)-New Bag       1639 (1 g)-New Bag        0038 (1 g)-New Bag             Dose: 2 g Freq: PRE-OP/PRE-PROCEDURE Route: IV  Indications of Use: SURGICAL PROPHYLAXIS  Start: 01/12/17 1802   End: 01/12/17 1561   Admin Instructions: Give dose within 1 hour PRIOR to procedure in pre-procedure area (Outpatients), or, on Patient Care Unit (Inpatients)  PRIOR to arrival in EP lab/incision/procedure.  Call  EP lab to coordinate start time. If patient weight is greater than or equal to 120 kg change dose to 3 g.         2046 (2 g)-Given               Dose: 10 mg Freq: ONCE Route: IV  Start: 01/12/17 2130   End: 01/12/17 2120        2120 (10 mg)-Given               Dose: 5 mg Freq: ONCE Route: IV  Start: 01/12/17 1245   End: 01/12/17 1338        1338 (5 mg)-Given               Dose: 5 mg Freq: ONCE Route: IV  Start: 01/11/17 1812   End: 01/11/17 1814       1814 (5 mg)-Given                Dose: 5 mg Freq: ONCE Route: IV  Start: 01/11/17 1750   End: 01/11/17 1804       1804 (5 mg)-Given                Dose: 25 mg Freq: ONCE Route: PO  Start: 01/11/17 2245   End: 01/11/17 2256       2256 (25 mg)-Given                Dose: 0.5 mL Freq: PRIOR TO DISCHARGE Route: IM  Start: 01/15/17 1000   End: 01/14/17 1503   Admin Instructions: Administer when afebrile (less than 100.4F) x 24 hours, or Prior to Discharge.  If not administering when scheduled , change the due time by following the instructions in the reference link below.  If patient refuses vaccine, chart as Vaccine Refused.           1503 (0.5 mL)-Given             Dose: 10 mL Freq: ONCE Route: IV  Start: 01/12/17 2345   End: 01/12/17 2345        2345 (10 mL)-Given               Last Dose: 1,000 mL (01/11/17 1817)  Start: 01/11/17 1752   End: 01/11/17 1817   Admin Instructions: MARIAA MALAVE: cabinet override        1817 (1,000 mL)-New Bag                Last Dose: Stopped (01/11/17 1817)  Start: 01/11/17 1717   End: 01/11/17 1817   Admin Instructions: RICARDO MARTELL: cabinet override        1730 (1,000 mL)-New Bag       1817-Stopped                Dose: 40 mEq Freq: ONCE Route: PO  Start: 01/14/17 0645   End: 01/14/17 0644   Admin Instructions: DO NOT CRUSH.           0644 (40 mEq)-Given             Dose: 150 mg Freq: ONCE Route: PO  Start: 01/14/17 1300   End: 01/14/17 1310          1310 (150 mg)-Given           Discontinued Medications  Medications 01/08/17 01/09/17 01/10/17 01/11/17 01/12/17 01/13/17 01/14/17         Rate: 30 mL/hr Freq: CONTINUOUS Route: IV  Start: 01/12/17 1815   End: 01/12/17 1901   Admin Instructions: Start 2 hours pre-procedure and then per provider direction intra-procedure.  Pre-procedurally for CAR electrophysiology studies.                1901-Med Discontinued           Dose: 6-12 mg Freq: EVERY 5 MIN PRN Route: IV  PRN Comment: when verbally ordered by provider during procedure.  Start: 01/12/17 2131   End: 01/13/17 0024   Admin Instructions: Rapid IVP.  Each dose should immediately be followed by a 10 mL NS flush rapid IVP.          0024-Med Discontinued          Dose: 6-12 mg Freq: EVERY 5 MIN PRN Route: IV  PRN Comment: when verbally ordered by provider during procedure.  Start: 01/12/17 2035   End: 01/13/17 0024   Admin Instructions: Rapid IVP.  Each dose should immediately be followed by a 10 mL NS flush rapid IVP.          0024-Med Discontinued          Dose: 6-12 mg Freq: EVERY 5 MIN PRN Route: IV  PRN Comment: when verbally ordered by provider during procedure.  Start: 01/12/17 1541   End: 01/12/17 1901   Admin Instructions: Rapid IVP.  Each dose should immediately be followed by a 10 mL NS flush rapid IVP.         1901-Med Discontinued           Dose: 10 mg Freq: DAILY Route: PO  Start: 01/15/17 0800   End: 01/14/17 1004          1004-Med Discontinued         Dose: 5 mg Freq: DAILY Route: PO  Start: 01/13/17 1330   End: 01/14/17 0900         1350 (5 mg)-Given        0831 (5 mg)-Given       0900-Med Discontinued         Dose: 5 mg Freq: DAILY Route: PO  Start: 01/13/17 0930   End: 01/13/17 0956         0956-Med Discontinued  (1042)-Not Given              Start: 01/11/17 1810   End: 01/11/17 2115   Admin Instructions: VESTA PORRAS: cynthiainet override               2115-Med Discontinued            Dose: 0.5-1 mg Freq: EVERY 1 MIN PRN Route: IV  PRN Reason: other  PRN Comment: when  verbally ordered by provider during procedure  Start: 01/12/17 2131   End: 01/13/17 0024         0024-Med Discontinued          Dose: 0.5-1 mg Freq: EVERY 1 MIN PRN Route: IV  PRN Reason: other  PRN Comment: when verbally ordered by provider during procedure  Start: 01/12/17 2035   End: 01/13/17 0024         0024-Med Discontinued          Dose: 0.5-1 mg Freq: EVERY 1 MIN PRN Route: IV  PRN Reason: other  PRN Comment: when verbally ordered by provider during procedure  Start: 01/12/17 1541   End: 01/12/17 1901        1901-Med Discontinued           Dose: 25-50 mg Freq: ONCE PRN Route: IV  PRN Reason: itching  PRN Comment: sedation, when verbally ordered by the provider during the procedure  Start: 01/12/17 2131   End: 01/13/17 0024         0024-Med Discontinued          Dose: 25-50 mg Freq: ONCE PRN Route: IV  PRN Reason: itching  PRN Comment: sedation, when verbally ordered by the provider during the procedure  Start: 01/12/17 2035   End: 01/13/17 0024         0024-Med Discontinued          Dose: 25-50 mg Freq: ONCE PRN Route: IV  PRN Reason: itching  PRN Comment: sedation, when verbally ordered by the provider during the procedure  Start: 01/12/17 1541   End: 01/12/17 1901        1901-Med Discontinued           Rate: 12-95.9 mL/hr Freq: CONTINUOUS PRN Route: IV  PRN Comment: Titrate as verbally ordered by the provider during the procedure.  Start: 01/12/17 2131   End: 01/13/17 0024   Admin Instructions: For range orders: start at lowest dose ordered          0024-Med Discontinued          Rate: 12-95.9 mL/hr Freq: CONTINUOUS PRN Route: IV  PRN Comment: Titrate as verbally ordered by the provider during the procedure.  Start: 01/12/17 2035   End: 01/13/17 0024   Admin Instructions: For range orders: start at lowest dose ordered          0024-Med Discontinued          Rate: 12-95.9 mL/hr Freq: CONTINUOUS PRN Route: IV  PRN Comment: Titrate as verbally ordered by the provider during the procedure.  Start: 01/12/17  1541   End: 01/12/17 1901   Admin Instructions: For range orders: start at lowest dose ordered         1901-Med Discontinued           Dose: 10 mg Freq: DAILY Route: PO  Start: 01/13/17 1330   End: 01/13/17 1328         1328-Med Discontinued          Dose: 10 mg Freq: DAILY Route: PO  Start: 01/12/17 0800   End: 01/13/17 1239        0921 (10 mg)-Given        0917 (10 mg)-Given       1239-Med Discontinued          Dose: 20 mg Freq: DAILY Route: PO  Start: 01/14/17 0800   End: 01/13/17 1328         1328-Med Discontinued          Rate: 20-40 mL/hr Freq: CONTINUOUS PRN Route: IV  PRN Comment: severe pain, when verbally ordered by the provider during the procedure  Start: 01/12/17 2131   End: 01/13/17 0024         0024-Med Discontinued          Rate: 20-40 mL/hr Freq: CONTINUOUS PRN Route: IV  PRN Comment: severe pain, when verbally ordered by the provider during the procedure  Last Dose: Stopped (01/12/17 2343)  Start: 01/12/17 2035   End: 01/13/17 0024        2127 (25 mcg/hr)-New Bag       2343-Stopped        0024-Med Discontinued          Rate: 20-40 mL/hr Freq: CONTINUOUS PRN Route: IV  PRN Comment: severe pain, when verbally ordered by the provider during the procedure  Start: 01/12/17 1541   End: 01/12/17 1901        1901-Med Discontinued           Dose: 25-50 mcg Freq: EVERY 2 MIN PRN Route: IV  PRN Reason: severe pain  PRN Comment: when verbally ordered by the provider during the procedure  Start: 01/12/17 2131   End: 01/13/17 0024   Admin Instructions: Doses can be exceeded under direct oversight of patient by physician.          0024-Med Discontinued          Dose: 25-50 mcg Freq: EVERY 2 MIN PRN Route: IV  PRN Reason: severe pain  PRN Comment: when verbally ordered by the provider during the procedure  Start: 01/12/17 2035   End: 01/13/17 0024   Admin Instructions: Doses can be exceeded under direct oversight of patient by physician.         2127 (25 mcg)-Given [C]       2138 (25 mcg)-Given [C]       4550 (25  mcg)-Given [C]        0024-Med Discontinued          Dose: 25-50 mcg Freq: EVERY 2 MIN PRN Route: IV  PRN Reason: severe pain  PRN Comment: when verbally ordered by the provider during the procedure  Start: 01/12/17 1541   End: 01/12/17 1901   Admin Instructions: Doses can be exceeded under direct oversight of patient by physician.         1901-Med Discontinued           Dose: 0.2 mg Freq: EVERY 1 MIN PRN Route: IV  PRN Reason: other  PRN Comment: sedation reversal when verbally ordered by provider during the procedure.  Start: 01/12/17 2131   End: 01/13/17 0024   Admin Instructions: MAX cumulative dose of 1 mg          0024-Med Discontinued          Dose: 0.2 mg Freq: EVERY 1 MIN PRN Route: IV  PRN Reason: other  PRN Comment: sedation reversal when verbally ordered by provider during the procedure.  Start: 01/12/17 2035   End: 01/13/17 0024   Admin Instructions: MAX cumulative dose of 1 mg          0024-Med Discontinued          Dose: 0.2 mg Freq: EVERY 1 MIN PRN Route: IV  PRN Reason: other  PRN Comment: sedation reversal when verbally ordered by provider during the procedure.  Start: 01/12/17 1541   End: 01/12/17 1901   Admin Instructions: MAX cumulative dose of 1 mg         1901-Med Discontinued           Dose: 20 mg Freq: ONCE Route: IV  Start: 01/13/17 0930   End: 01/13/17 1238         (1229)-Not Given       1238-Med Discontinued          Dose:  mg Freq: ONCE PRN Route: IV  PRN Comment: for diuresis, when verbally ordered by provider during the procedure.  Start: 01/12/17 2131   End: 01/13/17 0024         0024-Med Discontinued          Dose:  mg Freq: ONCE PRN Route: IV  PRN Comment: for diuresis, when verbally ordered by provider during the procedure.  Start: 01/12/17 2035   End: 01/13/17 0024         0024-Med Discontinued          Dose:  mg Freq: ONCE PRN Route: IV  PRN Comment: for diuresis, when verbally ordered by provider during the procedure.  Start: 01/12/17 1541   End: 01/12/17 1901         1901-Med Discontinued           Dose: 1,000-10,000 Units Freq: EVERY 5 MIN PRN Route: IV  PRN Reason: other  PRN Comment: bolus dose Up to a max of 25,000 units, when verbally order by provider during procedure.  Start: 01/12/17 2131   End: 01/13/17 0024   Admin Instructions: Provider may request an additional bolus.          0024-Med Discontinued          Dose: 1,000-10,000 Units Freq: EVERY 5 MIN PRN Route: IV  PRN Reason: other  PRN Comment: bolus dose Up to a max of 25,000 units, when verbally order by provider during procedure.  Start: 01/12/17 2035   End: 01/13/17 0024   Admin Instructions: Provider may request an additional bolus.          0024-Med Discontinued          Dose: 1,000-10,000 Units Freq: EVERY 5 MIN PRN Route: IV  PRN Reason: other  PRN Comment: bolus dose Up to a max of 25,000 units, when verbally order by provider during procedure.  Start: 01/12/17 1541   End: 01/12/17 1901   Admin Instructions: Provider may request an additional bolus.         1901-Med Discontinued           Rate: 5-25 mL/hr Freq: CONTINUOUS Route: IV  Start: 01/12/17 2145   End: 01/13/17 0024   Admin Instructions: Titrate continuous for anticoagulation as verbally ordered by provider during the procedure.                 0024-Med Discontinued          Rate: 5-25 mL/hr Freq: CONTINUOUS Route: IV  Start: 01/12/17 2045   End: 01/13/17 0024   Admin Instructions: Titrate continuous for anticoagulation as verbally ordered by provider during the procedure.                 0024-Med Discontinued          Rate: 5-25 mL/hr Freq: CONTINUOUS Route: IV  Start: 01/12/17 1545   End: 01/12/17 1901   Admin Instructions: Titrate continuous for anticoagulation as verbally ordered by provider during the procedure.                1901-Med Discontinued           Rate: 7.5-300 mL/hr Freq: CONTINUOUS PRN Route: IV  PRN Comment: when verbally ordered by the provider during the procedure.  Start: 01/12/17 2131   End: 01/13/17 0024   Admin  Instructions: Titrate up as directed by the provider.  Protect from light.          0024-Med Discontinued          Rate: 7.5-300 mL/hr Freq: CONTINUOUS PRN Route: IV  PRN Comment: when verbally ordered by the provider during the procedure.  Start: 01/12/17 2035   End: 01/13/17 0024   Admin Instructions: Titrate up as directed by the provider.  Protect from light.          0024-Med Discontinued          Rate: 7.5-300 mL/hr Freq: CONTINUOUS PRN Route: IV  PRN Comment: when verbally ordered by the provider during the procedure.  Start: 01/12/17 1541   End: 01/12/17 1901   Admin Instructions: Titrate up as directed by the provider.  Protect from light.         1901-Med Discontinued           Dose: 15 mg Freq: ONCE PRN Route: IV  PRN Reason: moderate to severe pain  PRN Comment: when verbally ordered by prescriber during the procedure for patients greater or equal to 65 years in age.  Start: 01/12/17 2131   End: 01/13/17 0024   Admin Instructions: 15 mg dose recommended for patients with creatinine clearance greater than 30 mL/min  Contraindicated in advanced renal impairment, for patients with creatinine clearance CrCL less than  30 mL/min          0024-Med Discontinued          Dose: 15 mg Freq: ONCE PRN Route: IV  PRN Reason: moderate to severe pain  PRN Comment: when verbally ordered by prescriber during the procedure for patients greater or equal to 65 years in age.  Start: 01/12/17 2035   End: 01/13/17 0024   Admin Instructions: 15 mg dose recommended for patients with creatinine clearance greater than 30 mL/min  Contraindicated in advanced renal impairment, for patients with creatinine clearance CrCL less than  30 mL/min          0024-Med Discontinued          Dose: 15 mg Freq: ONCE PRN Route: IV  PRN Reason: moderate to severe pain  PRN Comment: when verbally ordered by prescriber during the procedure for patients greater or equal to 65 years in age.  Start: 01/12/17 1541   End: 01/12/17 1901   Admin  "Instructions: 15 mg dose recommended for patients with creatinine clearance greater than 30 mL/min  Contraindicated in advanced renal impairment, for patients with creatinine clearance CrCL less than  30 mL/min         1901-Med Discontinued           Freq: EVERY 1 HOUR PRN Route: Top  PRN Reason: pain  PRN Comment: with VAD insertion or accessing implanted port.  Start: 01/12/17 1802   End: 01/12/17 1901   Admin Instructions: Do NOT give if patient has a history of allergy to any local anesthetic or any \"ellen\" product.   Apply 30 minutes prior to VAD insertion or port access. MAX Dose: 2.5 g (  of 5 g tube)         1901-Med Discontinued           Dose: 1 mL Freq: EVERY 1 HOUR PRN Route: OTHER  PRN Comment: mild pain with VAD insertion or accessing implanted port  Start: 01/12/17 1802   End: 01/12/17 1901   Admin Instructions: Do NOT give if patient has a history of allergy to any local anesthetic or any \"ellen\" product. MAX dose 1 mL subcutaneous OR intradermal in divided doses.         1901-Med Discontinued           Dose: 0.5-2 mg Freq: EVERY 10 MIN PRN Route: IV  PRN Reason: anxiety  PRN Comment: when verbally ordered by the provider during the procedure  Start: 01/12/17 2131   End: 01/13/17 0024   Admin Instructions: For IV PUSH: Dilute with equal volume of NS.          0024-Med Discontinued          Dose: 0.5-2 mg Freq: EVERY 10 MIN PRN Route: IV  PRN Reason: anxiety  PRN Comment: when verbally ordered by the provider during the procedure  Start: 01/12/17 2035   End: 01/13/17 0024   Admin Instructions: For IV PUSH: Dilute with equal volume of NS.          0024-Med Discontinued          Dose: 0.5-2 mg Freq: EVERY 10 MIN PRN Route: IV  PRN Reason: anxiety  PRN Comment: when verbally ordered by the provider during the procedure  Start: 01/12/17 1541   End: 01/12/17 1901   Admin Instructions: For IV PUSH: Dilute with equal volume of NS.         1901-Med Discontinued           Rate: 2.5-30 mL/hr Freq: CONTINUOUS " PRN Route: IV  PRN Comment: sedation, when verbally ordered by the provider during the procedure  Start: 01/12/17 2131   End: 01/13/17 0024         0024-Med Discontinued          Rate: 2.5-30 mL/hr Freq: CONTINUOUS PRN Route: IV  PRN Comment: sedation, when verbally ordered by the provider during the procedure  Last Dose: Stopped (01/12/17 2343)  Start: 01/12/17 2035   End: 01/13/17 0024        2128 (0.5 mg/hr)-New Bag       2208 (1 mg/hr)-Rate/Dose Change       2343-Stopped        0024-Med Discontinued          Rate: 2.5-30 mL/hr Freq: CONTINUOUS PRN Route: IV  PRN Comment: sedation, when verbally ordered by the provider during the procedure  Start: 01/12/17 1541   End: 01/12/17 1901        1901-Med Discontinued           Dose: 0.5-2 mg Freq: EVERY 2 MIN PRN Route: IV  PRN Reason: sedation  PRN Comment: when verbally ordered by the provider during the procedure  Start: 01/12/17 2131   End: 01/13/17 0024   Admin Instructions: Doses can be exceeded under direct oversight of patient by physician.          0024-Med Discontinued          Dose: 0.5-2 mg Freq: EVERY 2 MIN PRN Route: IV  PRN Reason: sedation  PRN Comment: when verbally ordered by the provider during the procedure  Start: 01/12/17 2035   End: 01/13/17 0024   Admin Instructions: Doses can be exceeded under direct oversight of patient by physician.         2127 (0.5 mg)-Given       2137 (0.5 mg)-Given [C]       2142 (0.5 mg)-Given [C]       2156 (0.5 mg)-Given [C]        0024-Med Discontinued          Dose: 0.5-2 mg Freq: EVERY 2 MIN PRN Route: IV  PRN Reason: sedation  PRN Comment: when verbally ordered by the provider during the procedure  Start: 01/12/17 1541   End: 01/12/17 1901   Admin Instructions: Doses can be exceeded under direct oversight of patient by physician.         1901-Med Discontinued           Start: 01/11/17 1759   End: 01/11/17 2046   Admin Instructions: VESTA PORRAS: cabinet override               2046-Med Discontinued             Dose: 0.1-0.4 mg Freq: EVERY 2 MIN PRN Route: IV  PRN Reason: opioid reversal  Start: 01/13/17 0837   End: 01/13/17 0841   Admin Instructions: For respiratory rate LESS than or EQUAL to 8.  Partial reversal dose:  0.1 mg titrated q 2 minutes for Analgesia Side Effects Monitoring Sedation Level of 3 (frequently drowsy, arousable, drifts to sleep during conversation).Full reversal dose:  0.4 mg bolus for Analgesia Side Effects Monitoring Sedation Level of 4 (somnolent, minimal or no response to stimulation).          0841-Med Discontinued          Dose: 0.4 mg Freq: EVERY 5 MIN PRN Route: IV  PRN Reason: other  PRN Comment: when verbally ordered by provider during the procedure.  Start: 01/12/17 2131   End: 01/13/17 0024   Admin Instructions: For respiratory rate LESS than or EQUAL to 8.  Partial reversal dose:  0.1 mg titrated q 2 minutes for Analgesia Side Effects Monitoring Sedation Level of 3 (frequently drowsy, arousable, drifts to sleep during conversation).Full reversal dose:  0.4 mg bolus for Analgesia Side Effects Monitoring Sedation Level of 4 (somnolent, minimal or no response to stimulation).          0024-Med Discontinued          Dose: 0.4 mg Freq: EVERY 5 MIN PRN Route: IV  PRN Reason: other  PRN Comment: when verbally ordered by provider during the procedure.  Start: 01/12/17 2035   End: 01/13/17 0024   Admin Instructions: For respiratory rate LESS than or EQUAL to 8.  Partial reversal dose:  0.1 mg titrated q 2 minutes for Analgesia Side Effects Monitoring Sedation Level of 3 (frequently drowsy, arousable, drifts to sleep during conversation).Full reversal dose:  0.4 mg bolus for Analgesia Side Effects Monitoring Sedation Level of 4 (somnolent, minimal or no response to stimulation).          0024-Med Discontinued          Dose: 0.4 mg Freq: EVERY 5 MIN PRN Route: IV  PRN Reason: other  PRN Comment: when verbally ordered by provider during the procedure.  Start: 01/12/17 1541   End: 01/12/17 1901    Admin Instructions: For respiratory rate LESS than or EQUAL to 8.  Partial reversal dose:  0.1 mg titrated q 2 minutes for Analgesia Side Effects Monitoring Sedation Level of 3 (frequently drowsy, arousable, drifts to sleep during conversation).Full reversal dose:  0.4 mg bolus for Analgesia Side Effects Monitoring Sedation Level of 4 (somnolent, minimal or no response to stimulation).         1901-Med Discontinued           Rate: 6.3-37.5 mL/hr Freq: CONTINUOUS Route: IV  Start: 01/13/17 1600   End: 01/13/17 1610   Admin Instructions: Max dose= 15 mg/hr (notify MD if dose exceeds 15 mg/hr and SBP not within range) Titrate by 2.5mg/hr q5-15min to keep SBP< 160mmHg. Protect from light.  USE Caution with prolonged use through a peripheral line. To minimize the risk of peripheral venous irritation, it is recommended that the site of infusion be changed every 12 hours.                 1610-Med Discontinued          Rate: 12.5-75 mL/hr Freq: CONTINUOUS Route: IV  Last Dose: Stopped (01/14/17 0634)  Start: 01/13/17 1615   End: 01/14/17 0829   Admin Instructions: Max dose= 15 mg/hr (notify MD if dose exceeds 15 mg/hr and SBP not within range) Titrate by 2.5mg/hr q5-15min to keep SBP< 160mmHg. Protect from light.  To minimize risk of peripheral venous irritation, it is recommended that the site of infusion be changed every 12 hours.          1630 (2.5 mg/hr)-New Bag       1648 (5 mg/hr)-Rate/Dose Change       1700 (7.5 mg/hr)-Rate/Dose Change       1800 (7.5 mg/hr)-Rate/Dose Verify       1900 (7.5 mg/hr)-Rate/Dose Verify       1937 (5 mg/hr)-Rate/Dose Change       2000 (5 mg/hr)-Rate/Dose Verify       2015 (7.5 mg/hr)-Rate/Dose Change       2100 (7.5 mg/hr)-Rate/Dose Verify       2200 (7.5 mg/hr)-Rate/Dose Verify       2248 (7.5 mg/hr)-New Bag       2300 (7.5 mg/hr)-Rate/Dose Verify        0000 (7.5 mg/hr)-Rate/Dose Verify       0100 (5 mg/hr)-Rate/Dose Change       0200 (5 mg/hr)-Rate/Dose Verify       0300 (5  mg/hr)-Rate/Dose Verify       0400 (5 mg/hr)-Rate/Dose Verify       0500 (5 mg/hr)-Rate/Dose Verify       0600 (5 mg/hr)-Rate/Dose Verify       0606 (5 mg/hr)-New Bag       0634-Stopped              0829-Med Discontinued         Rate: 1.7-47.9 mL/hr Freq: CONTINUOUS Route: IV  Last Dose: Stopped (01/13/17 1536)  Start: 01/12/17 2145   End: 01/13/17 1709   Admin Instructions: For range orders: start at lowest dose ordered. Titrate by 0.1 mcg/kg/min every 5 minutes to keep -190. Notify prescriber if higher doses are required to achieve blood pressure goals.         2136 (0.8 mcg/kg/min)-New Bag       2217 (0.3 mcg/kg/min)-Rate/Dose Change       2324 (0.4 mcg/kg/min)-Rate/Dose Change       2335 (0.5 mcg/kg/min)-Rate/Dose Change        0000 (0.5 mcg/kg/min)-Rate/Dose Verify       0100 (0.5 mcg/kg/min)-Rate/Dose Verify       0116 (0.4 mcg/kg/min)-Rate/Dose Change       0145 (0.3 mcg/kg/min)-Rate/Dose Change       0200 (0.2 mcg/kg/min)-Rate/Dose Change       0230-Stopped       0515 (0.2 mcg/kg/min)-Restarted       0530 (0.3 mcg/kg/min)-Rate/Dose Change       0600 (0.4 mcg/kg/min)-Rate/Dose Change       0615 (0.5 mcg/kg/min)-Rate/Dose Change       0719 (0.6 mcg/kg/min)-Rate/Dose Change       0800 (0.6 mcg/kg/min)-Rate/Dose Verify       0831 (0.7 mcg/kg/min)-Rate/Dose Change       0900 (0.7 mcg/kg/min)-Rate/Dose Verify       1033 (0.8 mcg/kg/min)-Rate/Dose Change       1100 (0.8 mcg/kg/min)-Rate/Dose Verify       1200 (0.8 mcg/kg/min)-Rate/Dose Verify       1300 (0.8 mcg/kg/min)-Rate/Dose Verify       1400 (0.8 mcg/kg/min)-Rate/Dose Verify       1536-Stopped       1709-Med Discontinued          Dose: 4 mg Freq: EVERY 4 HOURS PRN Route: IV  PRN Reason: nausea  PRN Comment: when verbally ordered by provider during procedure  Start: 01/12/17 2131   End: 01/13/17 0024         0024-Med Discontinued          Dose: 4 mg Freq: EVERY 4 HOURS PRN Route: IV  PRN Reason: nausea  PRN Comment: when verbally ordered by provider  during procedure  Start: 01/12/17 2035   End: 01/13/17 0024         0024-Med Discontinued          Dose: 4 mg Freq: EVERY 4 HOURS PRN Route: IV  PRN Reason: nausea  PRN Comment: when verbally ordered by provider during procedure  Start: 01/12/17 1541   End: 01/12/17 1901        1901-Med Discontinued           Dose: 6.25-25 mg Freq: EVERY 4 HOURS PRN Route: IV  PRN Reason: nausea  PRN Comment: when verbally ordered by the provider during the procedure  Start: 01/12/17 2131   End: 01/13/17 0024   Admin Instructions: Dilute 25 mg with 10 mL of normal saline in a syringe. Resulting concentration = 2.5 mg/ mL. Administer over 3-5 minutes. High risk of tissue necrosis with extravasation.          0024-Med Discontinued          Dose: 6.25-25 mg Freq: EVERY 4 HOURS PRN Route: IV  PRN Reason: nausea  PRN Comment: when verbally ordered by the provider during the procedure  Start: 01/12/17 2035   End: 01/13/17 0024   Admin Instructions: Dilute 25 mg with 10 mL of normal saline in a syringe. Resulting concentration = 2.5 mg/ mL. Administer over 3-5 minutes. High risk of tissue necrosis with extravasation.          0024-Med Discontinued          Dose: 6.25-25 mg Freq: EVERY 4 HOURS PRN Route: IV  PRN Reason: nausea  PRN Comment: when verbally ordered by the provider during the procedure  Start: 01/12/17 1541   End: 01/12/17 1901   Admin Instructions: Dilute 25 mg with 10 mL of normal saline in a syringe. Resulting concentration = 2.5 mg/ mL. Administer over 3-5 minutes. High risk of tissue necrosis with extravasation.         1901-Med Discontinued           Dose: 1-5 mg Freq: ONCE PRN Route: IV  PRN Comment: when verbally ordered by provider during procedure.  Start: 01/12/17 2131   End: 01/13/17 0024   Admin Instructions: TEST DOSE  Give slow IV push          0024-Med Discontinued          Dose: 1-5 mg Freq: ONCE PRN Route: IV  PRN Comment: when verbally ordered by provider during procedure.  Start: 01/12/17 2035   End:  01/13/17 0024   Admin Instructions: TEST DOSE  Give slow IV push          0024-Med Discontinued          Dose: 1-5 mg Freq: ONCE PRN Route: IV  PRN Comment: when verbally ordered by provider during procedure.  Start: 01/12/17 1541   End: 01/12/17 1901   Admin Instructions: TEST DOSE  Give slow IV push         1901-Med Discontinued           Dose: 5-40 mg Freq: EVERY 10 MIN PRN Route: IV  PRN Comment: when verbally ordered by provider during procedure.  Start: 01/12/17 2131   End: 01/13/17 0024   Admin Instructions: (MAX: 100 mg) Can be started 5 minutes after test dose.  Give slow IV push          0024-Med Discontinued          Dose: 5-40 mg Freq: EVERY 10 MIN PRN Route: IV  PRN Comment: when verbally ordered by provider during procedure.  Start: 01/12/17 2035   End: 01/13/17 0024   Admin Instructions: (MAX: 100 mg) Can be started 5 minutes after test dose.  Give slow IV push          0024-Med Discontinued          Dose: 5-40 mg Freq: EVERY 10 MIN PRN Route: IV  PRN Comment: when verbally ordered by provider during procedure.  Start: 01/12/17 1541   End: 01/12/17 1901   Admin Instructions: (MAX: 100 mg) Can be started 5 minutes after test dose.  Give slow IV push         1901-Med Discontinued           Dose: 3 mL Freq: EVERY 8 HOURS Route: IK  Start: 01/12/17 1815   End: 01/12/17 1901   Admin Instructions: And Q1H PRN, to lock peripheral IV dormant line.                1901-Med Discontinued           Dose: 3 mL Freq: EVERY 1 HOUR PRN Route: IK  PRN Reason: line flush  Start: 01/12/17 1802   End: 01/12/17 1901   Admin Instructions: for peripheral IV flush post IV meds         1901-Med Discontinued           Dose: 1 puff Freq: DAILY Route: IN  Start: 01/13/17 1600   End: 01/13/17 0905   Admin Instructions: Use only ONE capsule. To ensure the full dose is administered, TWO inhalations should be performed at a rate sufficient to hear or feel the capsule vibrate.    Therapeutic interchange for Spiriva 18 mcg inhalation  daily (pta med)            0905-Med Discontinued     Medications 01/08/17 01/09/17 01/10/17 01/11/17 01/12/17 01/13/17 01/14/17               INTERAGENCY TRANSFER FORM - NOTES (H&P, Discharge Summary, Consults, Procedures, Therapies)   1/11/2017                       [facility] [address] [facility] BANK: [phone]               History & Physicals      H&P by Klarissa aGrsia MD at 1/12/2017  1:52 AM     Author:  Klarissa Garsia MD Service:  Cardiology Author Type:  Physician    Filed:  1/12/2017 10:05 PM Note Time:  1/12/2017  1:52 AM Status:  Signed    :  Klarissa Garsia MD (Physician)      Related Notes: Original Note by Haley Peterson MD (Resident) filed at 1/12/2017  5:31 AM                                                      CARDS 1 Admission History and Physical  Gallito Farley MRN: [id number]  1/1/1934  Date of Admission:1/11/2017  Primary care provider: Moiz Richard  ___________________________________          Assessment and Plan:   Gallito Farley is a 83 year old male with PMH of COPD, HTN, chronic leg wound, prior TB with right upper lobectomy who presented with cough after 6 month period in [place] off all medications and found to have bradycardia with complete heart block and hypertensive urgency.    # Complete Heart Block  Likely conduction disorder. Previously on metoprolol but minimal recent use of medications and unlikely accidental overdose.  - Monitor on telemetry overnight, have discussed with telemetry techs  - External pacing pads on overnight and pacer in room if needed  - TTE ordered for am  - Likely pacemaker placement in am, NPO at MN with check of CBC, INR in am    # Hypertensive Urgency  In setting of known hypertension with 6 month period off medications. Maximum blood pressure in ED of 257/101. Symptomatic with chronic chest pressure. No evidence of end organ damage, normal troponin.  - S/p two doses of 5 mg IV hydralazine in ED  - Additional 25 mg PO hydralazine following  arrival to floor  - Restart of home enalapril 10 mg daily in am  - Goal SBP overnight of 160-180  - Telemetry as above    # COPD  Reportedly on Flovent prior to time in [place] per PCP note. Presented with dry cough of admission but with stable respiratory status, clear chest on exam and XR.   - Will hold Flovent, also with Spiriva and Flonase listed in meds, unclear if using and will hold    Chronic Conditions  Leg Ulcer: Wound cares, pain relief with Tylenol, will also try menthol cream  GERD: Hold omeprazole  BPH: Hold tamsulosin    FEN: Cardiac, NPO at midnight  Prophylaxis: Mechanical overnight in anticipation of potential PPM placement in am  Code Status: FULL  Disposition: Inpatient    Patient to be staffed in the am    Haley Peterson MD PhD  Internal Medicine PGY-2  [phone]    I have seen, interviewed, and examined patient. I have reviewed the laboratory tests, imaging, and other investigations. I have reviewed the management plan with the patient. I discussed with the team and agree with the findings and plan in this resident/fellow/nurse practitioner's note. In addition, changes in the physical examination, assessment and plan have been incorporated into the note by myself, as to make it a single cohesive document.       Klarissa Garsia MD, MS  Cardiology/Cardiac EP Attending Staff              Chief Complaint:   Cough         History of Present Illness:   History obtained from patient and his two nephews one of whom served as .    Patient reports not feeling well for the past couple of months. States has a weight in his chest that never goes away. Does not do any form of exertional activity and cannot say if chest pressure ever gets worse. Denies dyspnea, orthopnea, dizziness or lightheadedness. Chronic leg ulcer of the right leg with edema of right leg greater than left. Has been in [place] for the past 6 months and returned yesterday.  took some medications during that time but  does not know the names.     Chart Review shows two admissions over the summer for COPD exacerbation and PNA.    ROS:  Complete 10 point ROS was negative unless noted in the HPI.         Past Cardiac History:   Recent Cardiac Admissions: None    Last ECHO: 4/16/2013  Interpretation Summary  Technically difficult study. Poor acoustic windows. Global and regional left   ventricular function is normal with an EF of 60-65%. Global right ventricular   function is normal. Pulmonary artery systolic pressure cannot be assessed.   The inferior vena cava  is normal. No pericardial effusion is present.    Last Angiogram: None          Past Medical History:     Past Medical History   Diagnosis Date     Osteoarthritis      Diastolic dysfunction, left ventricle 4/16/2013     Hypertension      Asthma      COPD (chronic obstructive pulmonary disease) (H)      Leg wound, right      chronic, s/p grafting     BPH (benign prostatic hyperplasia)      H/O tuberculosis      s/p partial pneumonectomy in [place] in the 1990's     LBBB (left bundle branch block)              Past Surgical History:      Past Surgical History   Procedure Laterality Date     Cholecystectomy       Ent surgery       Skin grafting - leg wound  6/2016     Irrigation and debridement hip, combined  4/18/2013     Procedure: COMBINED IRRIGATION AND DEBRIDEMENT HIP;  Irrigation and debridement of Right Hip with cultures;  Surgeon: Cristal Inman MD;  Location: [facility] OR     Partial pneumonectomy       done in [facility] in the 1990's             Social History:     Social History   Substance Use Topics     Smoking status: Former Smoker     Smokeless tobacco: Not on file     Alcohol Use: No             Family History:     Family History   Problem Relation Age of Onset     Family History Negative Mother              Allergies:   No Known Allergies          Medications:     No current facility-administered medications on file prior to encounter.  Current Outpatient  "Prescriptions on File Prior to Encounter:  albuterol (2.5 MG/3ML) 0.083% nebulizer solution Take 1 vial (2.5 mg) by nebulization 4 times daily (Patient taking differently: Take 2.5 mg by nebulization every 6 hours as needed )   omeprazole (PRILOSEC) 40 MG capsule Take 1 capsule (40 mg) by mouth every morning (before breakfast)   tamsulosin (FLOMAX) 0.4 MG 24 hr capsule Take 1 capsule (0.4 mg) by mouth every 24 hours   enalapril (VASOTEC) 10 MG tablet Take 1 tablet (10 mg) by mouth daily   metoprolol (LOPRESSOR) 25 MG tablet Take 2 tablets (50 mg) by mouth 2 times daily   fluticasone (FLONASE) 50 MCG/ACT nasal spray Spray 1-2 sprays into both nostrils daily   fluticasone (FLOVENT DISKUS) 250 MCG/BLIST AEPB Inhale 1 puff (250 mcg) into the lungs every 12 hours   tiotropium (SPIRIVA) 18 MCG inhalation capsule Inhale 1 capsule (18 mcg) into the lungs daily            Physical Exam:   Blood pressure 185/65, pulse 40, temperature 98.3  F (36.8  C), temperature source Oral, resp. rate 22, height 1.854 m (6' 1\"), weight 79.89 kg (176 lb 2 oz), SpO2 95 %.    General: NAD, pleasant  HEENT: NCAT, PERRL, EOMI, OP clear and non-erythematous  Neck: No LAD, no JVD  CV: Bradycardic, no murmurs or rubs  Resp: CTAB on anterior exam, no rales, rhonchi, wheezes  Abd: Mild tenderness LUQ, soft, normoactive bowel sounds  Extremities: WWP, trace pitting edema of left leg, 2+ pitting edema of right leg, radial and DP pulses intact  Skin: Large ulcer approximately 4 inches in height that wraps from lateral into posterior right leg above the ankle, no active drainage  Neuro/Psych: AAOx4, CN 2-12 grossly intact, moving all four extremities         Data:   I/O last 3 completed shifts:  In: -   Out: 190 [Urine:190]    CMP  Recent Labs  Lab 01/11/17  1735 01/11/17  1734    138   POTASSIUM 4.5 5.1   CHLORIDE  --  105   CO2  --  26   ANIONGAP  --  7   GLC 94 95   BUN  --  11   CR  --  0.96   GFRESTIMATED  --  75   PAMELA  --  8.3*   PROTTOTAL  " --  7.9   ALBUMIN  --  2.4*   BILITOTAL  --  0.4   ALKPHOS  --  86   AST  --  29   ALT  --  14     CBC  Recent Labs  Lab 01/11/17  1735 01/11/17  1734   WBC  --  5.4   RBC  --  5.02   HGB 15.6 14.4   HCT  --  45.3   MCV  --  90   MCH  --  28.7   MCHC  --  31.8   RDW  --  14.1   PLT  --  258     INR  Recent Labs  Lab 01/11/17  1734   INR 1.14       Troponin Lab Results   Component Value Date    TROPI  01/11/2017     <0.015  The 99th percentile for upper reference range is 0.045 ug/L.  Troponin values in   the range of 0.045 - 0.120 ug/L may be associated with risks of adverse   clinical events.      TROPI 0.016 07/21/2016    TROPI  07/14/2016     <0.015  The 99th percentile for upper reference range is 0.045 ug/L.  Troponin values in   the range of 0.045 - 0.120 ug/L may be associated with risks of adverse   clinical events.      TROPONIN 0.02 04/15/2013    TROPONIN <0.04 11/04/2006    TROPONIN <0.04 09/08/2006     EKG: Complete Heart Block    IMAGING:   CXR: IMPRESSION: Interval placement of an external pacer which partially  obscures the left mid and lower lung zones. Postoperative volume loss  in the right upper hemithorax is again noted. Probable scarring in  right lung base is unchanged. Heart size is difficult to evaluate due  to rotation of the patient. No definite confluent infiltrates or  significant changes since the prior exam.                   Discharge Summaries     No notes of this type exist for this encounter.      Consult Notes     No notes of this type exist for this encounter.         Progress Notes - Physician (Notes for yesterday and today)      Progress Notes by Hai Huang MD at 1/13/2017  7:19 AM     Author:  Hai Huang MD Service:  Electrophysiology Author Type:  Physician    Filed:  1/14/2017 12:16 PM Note Time:  1/13/2017  7:19 AM Status:  Signed    :  Hai Huang MD (Physician)      Related Notes: Original Note by Trudy Bowman APRN CNP (Nurse Practitioner) filed  "at 1/13/2017 11:37 AM           Electrophysiology Post Procedure Follow Up Note  Date of Procedure: 1/12/17  DOS: 1/13/2017   Pre-operative Diagnosis:  Complete Heart Block.  Device Indication: Complete Heart block  Post-operative diagnosis:   Successful implantation of PPM.  Hospital Course:  Mr. Farley is an 83 year old male who has a past medical history significant for COPD, HTN, chronic leg wound, prior TB with right upper lobectomy who presented with cough after 6 month period in [place] off all medications and found to have bradycardia with complete heart block and hypertensive urgency.He was referred for PPM implant.     Patient underwent a successful dual chamber implant yesterday. He was noted to be very hypertensive (240's/100's) when he arrived in [facility] for PPM implant. He was given 10mg IV hydralazine with little effect. He was subsequently started on nitro gtt which helped control blood pressures to 140-160's/60-80's throughout most of the case. Given need for ongoing nitro gtt for BP management, he was transferred to ICU. He had an uneventful overnight stay. He continues to be hypertensive. Device interrogation shows normal device function and stable lead parameters. Chest x-ray demonstrates no evidence of pneumothorax. Left subclavian site is CDI, no bleeding, and no hematoma. Patient is tolerating oral intake, ambulating at baseline, and voiding without difficulties. Patient remains hemodynamically stable.     ROS:   10 point ROS neg other than the symptoms noted above.   Exam:  /81 mmHg  Pulse 40  Temp(Src) 98.1  F (36.7  C) (Oral)  Resp 26  Ht 1.854 m (6' 1\")  Wt 79 kg (174 lb 2.6 oz)  BMI 22.98 kg/m2  SpO2 98%    Constitutional: alert and no distress  Head: Normocephalic. No masses, lesions, tenderness or abnormalities  Neck: Neck supple. No adenopathy. Thyroid symmetric, normal size,, Carotids without bruits.  ENT: ENT exam normal, no neck nodes or sinus tenderness  Cardiovascular: " negative, PMI normal. No lifts, heaves, or thrills. RRR. No murmurs, clicks gallops or rub  Respiratory: negative, Good diaphragmatic excursion. Lungs clear  Gastrointestinal: Abdomen soft, non-tender. BS normal. No masses, organomegaly  : Deferred  Skin: right leg ulcer dressed, no suspicious lesions or rashes  Neurologic: A&OX4.  Psychiatric: mentation appears normal and affect normal/bright  Discharge Medications:   Gallito Farley   Home Medication Instructions DIMAS:[id number]    Printed on:01/13/17 9662   Medication Information                      albuterol (2.5 MG/3ML) 0.083% nebulizer solution  Take 1 vial (2.5 mg) by nebulization 4 times daily             enalapril (VASOTEC) 10 MG tablet  Take 1 tablet (10 mg) by mouth daily             fluticasone (FLONASE) 50 MCG/ACT nasal spray  Spray 1-2 sprays into both nostrils daily             fluticasone (FLOVENT DISKUS) 250 MCG/BLIST AEPB  Inhale 1 puff (250 mcg) into the lungs every 12 hours             metoprolol (LOPRESSOR) 25 MG tablet  Take 2 tablets (50 mg) by mouth 2 times daily             omeprazole (PRILOSEC) 40 MG capsule  Take 1 capsule (40 mg) by mouth every morning (before breakfast)             tamsulosin (FLOMAX) 0.4 MG 24 hr capsule  Take 1 capsule (0.4 mg) by mouth every 24 hours             tiotropium (SPIRIVA) 18 MCG inhalation capsule  Inhale 1 capsule (18 mcg) into the lungs daily                 Plan & Follow up:    Continued cares for hypertensive urgency per primary team    Follow up with device clinic in 7-10 days    Oral antibiotics x 5 days    Keep incision clean and dry for 72 hours post procedure    No lifting > 10lbs or over shoulder level with left arm for 4 weeks    Notify device clinic/EP immediately for any redness, swelling, bleeding, discharge, fevers or chills.      This patient was seen and examined with Dr. Huang. The above note reflects our joint assessment and plan.   KIRSTEN Sims CNP  Electrophysiology Consult  Service  Pager: [phone]      EP STAFF NOTE  Patient seen and examined and management plan personally reviewed with the patient. I agree with the note above by Trudy Angeles, EP Nurse. Changes in the physical examination, assessment and plan have been incorporated into the note by myself, as to make it a single cohesive document. Patient seen 1/13/17.  Hai Huang MD [facility]  Cardiology - Electrophysiology             Progress Notes by Klarissa Garsia MD at 1/13/2017  6:46 AM     Author:  Klarissa Garsia MD Service:  Cardiology Author Type:  Physician    Filed:  1/13/2017 10:05 PM Note Time:  1/13/2017  6:46 AM Status:  Addendum    :  Klarissa Garsia MD (Physician)      Related Notes: Original Note by Renay Beltrán MD (Resident) filed at 1/13/2017 12:19 PM                 Cardiology Daily Note   Date of Service: 1/12/2017  Patient: Gallito Farley  MRN: [id number]  Admission Date: 1/11/2017  Hospital Day # 2      Assessment & Plan:   Gallito Farley is a 83 year old male with PMH of COPD, HTN, chronic leg wound, prior TB with right upper lobectomy who presented with cough after 6 month period in [place] off all medications and found to have bradycardia with complete heart block and hypertensive urgency.     Changes today:  -- PPM interrogation  -- Post-op CXR  -- Wean nitro gtt as able  -- start HCTz  -- Continue cephalexin     # Complete Heart Block  Likely conduction disorder. Previously on metoprolol but minimal recent use of medications and unlikely accidental overdose. Patient underwent a successful dual chamber implant on 01/13/17.   -- Device interrogation this AM  -- Post-procedure CXR  -- Cephalexin PO for prophylaxis for 5 days (end date 01/17/17)  -- No lifting > 10lbs or over shoulder level with left arm for 4 weeks  -- Follow up with device clinic in 7-10 days    # Hypertensive Urgency  In setting of known hypertension with 6 month period off medications. Maximum blood pressure in ED of 257/101.  Symptomatic with chronic chest pressure. No evidence of end organ damage, normal troponin. He was noted to be very hypertensive (240's/100's) when he arrived in [place] for PPM implant. He was given 10mg IV hydralazine with little effect. He was subsequently started on nitro gtt which helped control blood pressures to 140-160's/60-80's. Still requiring blood pressure management. Given need for ongoing nitro gtt for BP management, he was transferred to ICU.   -- Enalapril 10 mg daily   -- Start HCTz 25mg QD  -- Goal -160s  -- Wean nitro gtt as able  -- Consider work-up for secondary causes of HTN if no improvement     # COPD  Pt reported not taking any medication for at least the past 6 months. Presented with dry cough of admission but with stable respiratory status, clear chest on exam and XR.  No evidence of acute copd exacerbation  - Re-start flovent BID  and ipratropium daily    Chronic Conditions  Leg Ulcer: Wound cares, pain relief with Tylenol, will also try menthol cream   - Wound Nurse consulted   GERD: switched from omeprazole to ranitidin daily.    BPH: Continue pta tamsulosin    FEN: Cardiac  Prophylaxis: Mechanical   Code Status: FULL  Disposition: pending improvement in blood pressure    This patient was discussed with Dr. Garsia who agrees with the assessment and plan.    Renay Beltrán, PGY2  Internal Medicine/Pediatrics  P: [phone]    I have seen, interviewed, and examined patient. I have reviewed the laboratory tests, imaging, and other investigations. I have reviewed the management plan with the patient. I discussed with the team and agree with the findings and plan in this resident/fellow/nurse practitioner's note. In addition, changes in the physical examination, assessment and plan have been incorporated into the note by myself, as to make it a single cohesive document.       Klarissa Garsia MD, MS  Cardiology/Cardiac EP Attending Staff          Subjective & Interval Hx:    Nursing notes  "reviewed. He had an uneventful overnight stay. He continues to be hypertensive. This AM, c/o mild headache and some chest soreness over incision site. Otherwise denies sob, vision changes, dizziness.     Last 24 hr care team notes reviewed.   ROS: 4 point ROS including Respiratory, CV, GI and , other than that noted in the HPI, is negative    Telemetry reviewed: Yes     Physical Exam:  Blood pressure 170/86, pulse 40, temperature 98.8  F (37.1  C), temperature source Tympanic, resp. rate 24, height 1.854 m (6' 1\"), weight 79 kg (174 lb 2.6 oz), SpO2 99 %.  General: NAD, pleasant  HEENT: NCAT, PERRL, EOMI, OP clear and non-erythematous  Neck: No LAD, no JVD  CV: Bradycardic, no murmurs or rubs  Resp: CTAB on anterior exam, no rales, rhonchi, wheezes  Abd: LUQ, soft, normoactive bowel sounds  Extremities: WWP, trace pitting edema of left leg, 1+ pitting edema of right leg, radial and DP pulses intact  Skin: Large leg ulcer on right leg wrapped by wound nurse, no active drainage    Neuro/Psych: AAOx4      Labs & Studies of Note:  Labs reviewed in EPIC.     EKG reviewed.   Meds reviewed.  Imaging reviewed.    Medications list for Reference  Current Facility-Administered Medications   Medication     lidocaine 1 % 1 mL     lidocaine (LMX4) kit     sodium chloride (PF) 0.9% PF flush 3 mL     sodium chloride (PF) 0.9% PF flush 3 mL     acetaminophen-codeine (TYLENOL #3) 300-30 MG per tablet 1-2 tablet     HOLD: metformin and metformin containing medications on day of procedure and 48 hours after IV contrast given     HOLD: enoxaparin (LOVENOX) Post Procedure     HOLD: heparin (IV or Subcutaneous) Post Procedure     ceFAZolin (ANCEF) 1 g vial to attach to  ml bag for ADULT or 50 ml bag for PEDS     [START ON 1/14/2017] cephalexin (KEFLEX) capsule 250 mg     fluticasone (FLOVENT DISKUS) 250 mcg/puff inhaler 1 puff     umeclidinium (INCRUSE ELLIPTA) oral inhaler 1 puff     albuterol (PROAIR HFA/PROVENTIL HFA/VENTOLIN " HFA) Inhaler 2 puff     furosemide (LASIX) injection 20 mg     hydrochlorothiazide (MICROZIDE) capsule 25 mg     acetaminophen (TYLENOL) tablet 650 mg     methyl salicylate-menthol (STEVEN-TRIMBLE) ointment     nitroglycerin 50 mg in D5W 250 mL (adult std) infusion     enalapril (VASOTEC) tablet 10 mg     naloxone (NARCAN) injection 0.1-0.4 mg                      Procedure Notes     No notes of this type exist for this encounter.      Progress Notes - Therapies (Notes from 01/11/17 through 01/14/17)     No notes of this type exist for this encounter.                                          INTERAGENCY TRANSFER FORM - LAB / IMAGING / EKG / EMG RESULTS   1/11/2017                       UNIT [address] [facility] BANK: [phone]            Unresulted Labs (24h ago through future)    Start       Ordered    01/15/17 0600  Creatinine -  (Resume Metformin and Creatinine - UU,UR)   ROUTINE,   Routine     Comments:  Last Lab Result: CREATININE (mg/dL)       Date                     Value                 01/12/2017               0.91             ----------    01/13/17 0837         Lab Results - 3 Days (01/14/17 - 01/11/17)      Basic metabolic panel [[id number]] (Abnormal)  Resulted: 01/14/17 0618, Result status: Final result    Ordering provider: Renay Beltrán MD  01/13/17 2200 Resulting lab: [facility]    Specimen Information    Type Source Collected On   Blood  01/14/17 0530          Components       Value Reference Range Flag Lab   Sodium 137 133 - 144 mmol/L  51   Potassium 3.4 3.4 - 5.3 mmol/L  51   Chloride 100 94 - 109 mmol/L  51   Carbon Dioxide 28 20 - 32 mmol/L  51   Anion Gap 8 3 - 14 mmol/L  51   Glucose 94 70 - 99 mg/dL  51   Urea Nitrogen 7 7 - 30 mg/dL  51   Creatinine 0.82 0.66 - 1.25 mg/dL  51   GFR Estimate -- >60 mL/min/1.7m2  51   Result:         >90  Non  GFR Calc     GFR Estimate If Black -- >60 mL/min/1.7m2  51   Result:         >90   GFR Calc      Calcium 8.1 8.5 - 10.1 mg/dL L 51   Result:              Magnesium [[id number]]  Resulted: 01/14/17 0618, Result status: Final result    Ordering provider: Renay Beltrán MD  01/13/17 2200 Resulting lab: [facility]    Specimen Information    Type Source Collected On   Blood  01/14/17 0530          Components       Value Reference Range Flag Lab   Magnesium 2.0 1.6 - 2.3 mg/dL  51            Basic metabolic panel [[id number]] (Abnormal)  Resulted: 01/13/17 1031, Result status: Final result    Ordering provider: Renay Beltrán MD  01/13/17 0000 Resulting lab: [facility]    Specimen Information    Type Source Collected On   Blood  01/13/17 0943          Components       Value Reference Range Flag Lab   Sodium 139 133 - 144 mmol/L  51   Potassium 4.1 3.4 - 5.3 mmol/L  51   Comment:  Specimen slightly hemolyzed, potassium may be falsely elevated   Chloride 106 94 - 109 mmol/L  51   Carbon Dioxide 26 20 - 32 mmol/L  51   Anion Gap 7 3 - 14 mmol/L  51   Glucose 100 70 - 99 mg/dL H 51   Urea Nitrogen 11 7 - 30 mg/dL  51   Creatinine 0.93 0.66 - 1.25 mg/dL  51   GFR Estimate 77 >60 mL/min/1.7m2  51   Comment:  Non  GFR Calc   GFR Estimate If Black -- >60 mL/min/1.7m2  51   Result:         >90   GFR Calc     Calcium 8.2 8.5 - 10.1 mg/dL L 51   Result:              Magnesium [[id number]]  Resulted: 01/13/17 1031, Result status: Final result    Ordering provider: Renay Beltrán MD  01/13/17 0000 Resulting lab: [facility]    Specimen Information    Type Source Collected On   Blood  01/13/17 0943          Components       Value Reference Range Flag Lab   Magnesium 2.2 1.6 - 2.3 mg/dL  51            Urine Culture [[id number]]  Resulted: 01/12/17 2130, Result status: Final result    Ordering provider: Kay Salazar MD  01/11/17 1934 Resulting lab: [facility]     Specimen Information    Type Source Collected On   Urine Urine clean catch 01/11/17 1919          Components       Value Reference Range Flag Lab   Specimen Description Unspecified Urine   13   Special Requests Specimen received in preservative   75   Culture Micro --   225   Result:         <10,000 colonies/mL mixed urogenital lawrence Susceptibility testing not routinely   done     Micro Report Status FINAL 01/12/2017   225   Result:              Basic metabolic panel [[id number]] (Abnormal)  Resulted: 01/12/17 0858, Result status: Final result    Ordering provider: Klarissa Garsia MD  01/12/17 0000 Resulting lab: [facility]    Specimen Information    Type Source Collected On   Blood  01/12/17 0815          Components       Value Reference Range Flag Lab   Sodium 138 133 - 144 mmol/L  51   Potassium 4.5 3.4 - 5.3 mmol/L  51   Comment:  Specimen slightly hemolyzed, potassium may be falsely elevated   Chloride 106 94 - 109 mmol/L  51   Carbon Dioxide 24 20 - 32 mmol/L  51   Anion Gap 7 3 - 14 mmol/L  51   Glucose 78 70 - 99 mg/dL  51   Urea Nitrogen 10 7 - 30 mg/dL  51   Creatinine 0.91 0.66 - 1.25 mg/dL  51   GFR Estimate 80 >60 mL/min/1.7m2  51   Comment:  Non  GFR Calc   GFR Estimate If Black -- >60 mL/min/1.7m2  51   Result:         >90   GFR Calc     Calcium 8.3 8.5 - 10.1 mg/dL L 51   Result:              INR [[id number]] (Abnormal)  Resulted: 01/12/17 0855, Result status: Final result    Ordering provider: Klarissa Garsia MD  01/12/17 0000 Resulting lab: [facility]    Specimen Information    Type Source Collected On   Blood  01/12/17 0815          Components       Value Reference Range Flag Lab   INR 1.24 0.86 - 1.14  H 51            CBC with platelets [[id number]] (Abnormal)  Resulted: 01/12/17 0843, Result status: Final result    Ordering provider: Klarissa Garsia MD  01/12/17 0000 Resulting lab: [facility]  [facility]    Specimen Information    Type Source Collected On   Blood  01/12/17 0815          Components       Value Reference Range Flag Lab   WBC 6.0 4.0 - 11.0 10e9/L  51   RBC Count 4.55 4.4 - 5.9 10e12/L  51   Hemoglobin 12.7 13.3 - 17.7 g/dL L 51   Hematocrit 40.2 40.0 - 53.0 %  51   MCV 88 78 - 100 fl  51   MCH 27.9 26.5 - 33.0 pg  51   MCHC 31.6 31.5 - 36.5 g/dL  51   RDW 14.7 10.0 - 15.0 %  51   Platelet Count 265 150 - 450 10e9/L  51            UA without Microscopic [[id number]] (Abnormal)  Resulted: 01/11/17 2025, Result status: Final result    Ordering provider: Kay Salazar MD  01/11/17 1934 Resulting lab: [facility]    Specimen Information    Type Source Collected On   Urine Urine clean catch 01/11/17 1919          Components       Value Reference Range Flag Lab   Color Urine Yellow   13   Appearance Urine Clear   13   Glucose Urine Negative NEG mg/dL  13   Bilirubin Urine Negative NEG   13   Ketones Urine Negative NEG mg/dL  13   Specific Gravity Urine 1.016 1.003 - 1.035   13   Blood Urine Negative NEG   13   pH Urine 7.5 5.0 - 7.0 pH H 13   Protein Albumin Urine 30 NEG mg/dL A 13   Urobilinogen mg/dL 2.0 0.0 - 2.0 mg/dL  13   Nitrite Urine Negative NEG   13   Leukocyte Esterase Urine Trace NEG  A 13   Source Unspecified Urine   13            Comprehensive metabolic panel [[id number]] (Abnormal)  Resulted: 01/11/17 1811, Result status: Final result    Ordering provider: Kay Salazar MD  01/11/17 1738 Resulting lab: [facility]    Specimen Information    Type Source Collected On   Blood  01/11/17 1734          Components       Value Reference Range Flag Lab   Sodium 138 133 - 144 mmol/L  13   Potassium 5.1 3.4 - 5.3 mmol/L  13   Comment:  Specimen slightly hemolyzed, potassium may be falsely elevated   Chloride 105 94 - 109 mmol/L  13   Carbon Dioxide 26 20 - 32 mmol/L  13   Anion Gap 7 3 - 14 mmol/L  13    Glucose 95 70 - 99 mg/dL  13   Urea Nitrogen 11 7 - 30 mg/dL  13   Creatinine 0.96 0.66 - 1.25 mg/dL  13   GFR Estimate 75 >60 mL/min/1.7m2  13   Comment:  Non  GFR Calc   GFR Estimate If Black -- >60 mL/min/1.7m2  13   Result:         >90   GFR Calc     Calcium 8.3 8.5 - 10.1 mg/dL L 13   Result:     Bilirubin Total 0.4 0.2 - 1.3 mg/dL  13   Albumin 2.4 3.4 - 5.0 g/dL L 13   Protein Total 7.9 6.8 - 8.8 g/dL  13   Alkaline Phosphatase 86 40 - 150 U/L  13   ALT 14 0 - 70 U/L  13   AST 29 0 - 45 U/L  13   Comment:         Specimen is hemolyzed which can falsely elevate AST. Analysis of a   non-hemolyzed   specimen may result in a lower value.              Troponin I [[id number]]  Resulted: 01/11/17 1811, Result status: Final result    Ordering provider: Kay Salazar MD  01/11/17 1738 Resulting lab: [facility]    Specimen Information    Type Source Collected On   Blood  01/11/17 1734          Components       Value Reference Range Flag Lab   Troponin I ES -- 0.000 - 0.045 ug/L  13   Result:         <0.015  The 99th percentile for upper reference range is 0.045 ug/L.  Troponin values in   the range of 0.045 - 0.120 ug/L may be associated with risks of adverse   clinical events.              INR [[id number]]  Resulted: 01/11/17 1757, Result status: Final result    Ordering provider: Kay Salazar MD  01/11/17 1738 Resulting lab: [facility]    Specimen Information    Type Source Collected On   Blood  01/11/17 1734          Components       Value Reference Range Flag Lab   INR 1.14 0.86 - 1.14   13            Lactic acid [[id number]]  Resulted: 01/11/17 1749, Result status: Final result    Ordering provider: Kay Salazar MD  01/11/17 1738 Resulting lab: [facility] BANK    Specimen Information    Type Source Collected On   Blood  01/11/17 1734          Components       Value  Reference Range Flag Lab   Lactic Acid 1.4 0.7 - 2.1 mmol/L  13            CBC with platelets differential [[id number]]  Resulted: 01/11/17 1748, Result status: Final result    Ordering provider: Kay Salazar MD  01/11/17 1738 Resulting lab: [facility]    Specimen Information    Type Source Collected On   Blood  01/11/17 1734          Components       Value Reference Range Flag Lab   WBC 5.4 4.0 - 11.0 10e9/L  13   RBC Count 5.02 4.4 - 5.9 10e12/L  13   Hemoglobin 14.4 13.3 - 17.7 g/dL  13   Hematocrit 45.3 40.0 - 53.0 %  13   MCV 90 78 - 100 fl  13   MCH 28.7 26.5 - 33.0 pg  13   MCHC 31.8 31.5 - 36.5 g/dL  13   RDW 14.1 10.0 - 15.0 %  13   Platelet Count 258 150 - 450 10e9/L  13   Diff Method Automated Method   13   % Neutrophils 48.0 %  13   % Lymphocytes 35.5 %  13   % Monocytes 12.9 %  13   % Eosinophils 2.8 %  13   % Basophils 0.6 %  13   % Immature Granulocytes 0.2 %  13   Nucleated RBCs 0 0 /100  13   Absolute Neutrophil 2.6 1.6 - 8.3 10e9/L  13   Absolute Lymphocytes 1.9 0.8 - 5.3 10e9/L  13   Absolute Monocytes 0.7 0.0 - 1.3 10e9/L  13   Absolute Eosinophils 0.2 0.0 - 0.7 10e9/L  13   Absolute Basophils 0.0 0.0 - 0.2 10e9/L  13   Abs Immature Granulocytes 0.0 0 - 0.4 10e9/L  13   Absolute Nucleated RBC 0.0   13            Testing Performed By     Lab - Abbreviation Name Director Address Valid Date Range    13 - Unknown [facility] Unknown [address] [address] [address] 01/15/15 0916 - Present    51 - Unknown [facility] Unknown [address] [facility] [address] 12/31/14 1010 - Present    75 - Unknown [facility] Unknown [address] [facility] [address] 01/15/15 1019 - Present    225 - Unknown [facility] Unknown [address] [facility] [address] 12/19/14 0954 - Present               Imaging Results - 3 Days (01/13/17 -  01/11/17)      X-ray Chest 1 vw port [[id number]]  Resulted: 01/13/17 2200, Result status: Final result    Ordering provider: Trudy Bowman APRN CNP  01/13/17 2042 Resulted by: Jarrell Thomson MD Martin, Douglas J, MD    Performed: 01/13/17 2101 - 01/13/17 2142 Resulting lab: RADIOLOGY RESULTS    Narrative:       EXAMINATION: XR CHEST PORT 1 VW, 1/13/2017 9:42 PM    COMPARISON: 1/13/2017    HISTORY: Check for Pneumothorax and Lead Position post Cardiac Implant    FINDINGS: Cardiac silhouette is within normal limits. Continued volume  loss in the right lung. Appearance of the upper mediastinum is  unchanged with tortuous aorta. Scattered basilar opacities are similar  to previous. No pneumothorax. Left chest wall pacemaker leads overlie  the right atrium and ventricle.      Impression:       IMPRESSION:   1. No pneumothorax.  2. Continued basilar opacities and not significantly changed. Favor  atelectasis.      I have personally reviewed the examination and initial interpretation  and I agree with the findings.    JARRELL THOMSON MD    Specimen Information    Type Source Collected On                  XR Chest Port 1 View [[id number]]  Resulted: 01/13/17 1435, Result status: Final result    Ordering provider: Gabe De Guzman MD  01/13/17 0957 Resulted by: Karina Causey MD Baca, Vanessa, MD    Performed: 01/13/17 1025 - 01/13/17 1049 Resulting lab: RADIOLOGY RESULTS    Narrative:       Exam:  XR CHEST PORT 1 VW, 1/13/2017 10:59 AM    History: s/p ppm    Comparison:  Chest radiograph 1/11/2017 and CT chest 7/14/2016    Findings:  Single AP view of the chest was obtained. Interval  placement of left chest wall cardiac device with leads projecting over  the right atrium and ventricle.     The cardiomediastinal silhouette is stable. Pulmonary vasculature is  obscured. Postsurgical changes of thoracoplasty including stable  volume loss and scarring in the right upper and mid lung as well  as  right chest wall deformity. Scarring in the right lung base is  unchanged. No significant pleural effusion or appreciable  pneumothorax. Patchy opacities in the left lung base.      Impression:       Impression:    1. Interval placement of left chest wall cardiac device with leads  projecting over the right atrium and ventricle.  2. Patchy opacities in the left lung base which could represent  atelectasis or infection.  3. Stable volume loss and scarring in the right lung with associated  chest wall deformity due to prior thoracoplasty.    I have personally reviewed the examination and initial interpretation  and I agree with the findings.    CATHIE SORIA MD    Specimen Information    Type Source Collected On                  XR Chest Port 1 View [[id number]]  Resulted: 01/11/17 2308, Result status: Final result    Ordering provider: Kay Salazar MD  01/11/17 1749 Resulted by: Colton Valentin MD    Performed: 01/11/17 1750 - 01/11/17 1812 Resulting lab: RADIOLOGY RESULTS    Narrative:       CHEST ONE VIEW PORTABLE  1/11/2017 6:12 PM     HISTORY: Cough. Shortness of breath.     COMPARISON: 7/21/2016.      Impression:       IMPRESSION: Interval placement of an external pacer which partially  obscures the left mid and lower lung zones. Postoperative volume loss  in the right upper hemithorax is again noted. Probable scarring in  right lung base is unchanged. Heart size is difficult to evaluate due  to rotation of the patient. No definite confluent infiltrates or  significant changes since the prior exam.    COLTON VALENTIN MD    Specimen Information    Type Source Collected On                  Testing Performed By     Lab - Abbreviation Name Director Address Valid Date Range    104 - Rad Rslts RADIOLOGY RESULTS Unknown Unknown 02/16/05 1553 - Present               ECG/EMG Results (01/12/17 - 01/12/17)      Echocardiogram Complete [[id number]]  Resulted: 01/12/17 1550, Result status: Final result     Ordering provider: Klarissa Das MD  17 0315 Resulted by: PABLO Walker MD    Performed: 17 1418 - 17 1419 Resulting lab: RADIOLOGY RESULTS    Narrative:       Interpretation Summary                       [facility]  Echocardiography Laboratory  [address] [place] [address]     Name: LUIS ALBRIGHT  MRN: [id number]  : 1934  Study Date: 2017 01:43 PM  Age: 83 yrs  Gender: Male  [place] [facility] [place]: Complete AV block  Ordering Physician: KLARISSA DAS  Performed By: Tejas Bright RDCS     BSA: 2.0 m2  Height: 73 in  Weight: 176 lb  BP: 212/69 mmHg  ______________________________________________________________________________        Procedure  Complete Portable Echo Adult.  ______________________________________________________________________________     Interpretation Summary  Global and regional left ventricular function is normal with an EF of 55-60%.  Mild diffuse hypokinesis is present.  Right ventricular function, chamber size, wall motion, and thickness are  normal.  Pulmonary artery systolic pressure is normal.  Dilation of the inferior vena cava is present with normal respiratory  variation in diameter. Estimated mean right atrial pressure is 3-8 mmHg.  No pericardial effusion is present.     ______________________________________________________________________________           Left Ventricle  Global and regional left ventricular function is normal with an EF of 55-60%.  Left ventricular wall thickness is normal. Left ventricular size is normal.  Diastolic function not assessed due to frequent ectopy. Mild diffuse  hypokinesis is present.     Right Ventricle  Right ventricular function, chamber size, wall motion, and thickness are  normal.  Atria  Both atria appear normal.     Mitral Valve  The mitral valve is normal.     Aortic Valve  AoV is seen in limited views but appears to be normal in  structure.     Tricuspid Valve  Trace tricuspid insufficiency is present. The right ventricular systolic  pressure is approximated at 28.1 mmHg plus the right atrial pressure.  Pulmonary artery systolic pressure is normal.     Vessels  Dilation of the inferior vena cava is present with normal respiratory  variation in diameter. Estimated mean right atrial pressure is 3-8 mmHg.     Pericardium  No pericardial effusion is present.  Compared to Previous Study  This study was compared with the study from 2013 . LVEF is slightly  worse. The patient is now in complete heart block.     ______________________________________________________________________________  MMode/2D Measurements & Calculations  LA Volume (BP): 64.3 ml           Doppler Measurements & Calculations  MV E max timur: 117.1 cm/sec  MV A max timur: 93.1 cm/sec  MV E/A: 1.3  MV dec slope: 447.2 cm/sec2  MV dec time: 0.26 sec  TR max timur: 265.0 cm/sec  TR max P.1 mmHg  Lateral E/e': 15.8  Medial E/e': 24.7              ______________________________________________________________________________        Report approved by: Bozena Bernardo 2017 03:50 PM       1    Type Source Collected On     17 [id number]                  Encounter-Level Documents:     There are no encounter-level documents.      Order-Level Documents:     There are no order-level documents.

## 2017-01-11 NOTE — IP AVS SNAPSHOT
"    UNIT 4C [facility]: [phone]                                              INTERAGENCY TRANSFER FORM - LAB / IMAGING / EKG / EMG RESULTS   2017                    Hospital Admission Date: 2017  LUIS ALBRIGHT   : 1934  Sex: Male        Attending Provider: Klarissa Garsia MD     Allergies:  No Known Allergies    Infection:  None   Service:  CARDIOLOGY    Ht:  1.854 m (6' 1\")   Wt:  79.6 kg (175 lb 7.8 oz)   Admission Wt:  79.89 kg (176 lb 2 oz)    BMI:  23.16 kg/m 2   BSA:  2.02 m 2            Patient PCP Information     Provider PCP Type    Moiz Richard MD, MD General         Lab Results - 3 Days (17 - 17)      Basic metabolic panel [[id number]] (Abnormal)  Resulted: 17, Result status: Final result    Ordering provider: Renay Beltrán MD  17 Resulting lab: [facility]    Specimen Information    Type Source Collected On   Blood  17 0530          Components       Value Reference Range Flag Lab   Sodium 137 133 - 144 mmol/L  51   Potassium 3.4 3.4 - 5.3 mmol/L  51   Chloride 100 94 - 109 mmol/L  51   Carbon Dioxide 28 20 - 32 mmol/L  51   Anion Gap 8 3 - 14 mmol/L  51   Glucose 94 70 - 99 mg/dL  51   Urea Nitrogen 7 7 - 30 mg/dL  51   Creatinine 0.82 0.66 - 1.25 mg/dL  51   GFR Estimate -- >60 mL/min/1.7m2  51   Result:         >90  Non  GFR Calc     GFR Estimate If Black -- >60 mL/min/1.7m2  51   Result:         >90   GFR Calc     Calcium 8.1 8.5 - 10.1 mg/dL L 51   Result:              Magnesium [[id number]]  Resulted: 17, Result status: Final result    Ordering provider: Renay Beltrán MD  17 Resulting lab: [facility]    Specimen Information    Type Source Collected On   Blood  17 0530          Components       Value Reference Range Flag Lab   Magnesium 2.0 1.6 - 2.3 mg/dL  51            Basic metabolic panel [[id number]] " (Abnormal)  Resulted: 01/13/17 1031, Result status: Final result    Ordering provider: Renay Beltrán MD  01/13/17 0000 Resulting lab: [facility]    Specimen Information    Type Source Collected On   Blood  01/13/17 0943          Components       Value Reference Range Flag Lab   Sodium 139 133 - 144 mmol/L  51   Potassium 4.1 3.4 - 5.3 mmol/L  51   Comment:  Specimen slightly hemolyzed, potassium may be falsely elevated   Chloride 106 94 - 109 mmol/L  51   Carbon Dioxide 26 20 - 32 mmol/L  51   Anion Gap 7 3 - 14 mmol/L  51   Glucose 100 70 - 99 mg/dL H 51   Urea Nitrogen 11 7 - 30 mg/dL  51   Creatinine 0.93 0.66 - 1.25 mg/dL  51   GFR Estimate 77 >60 mL/min/1.7m2  51   Comment:  Non  GFR Calc   GFR Estimate If Black -- >60 mL/min/1.7m2  51   Result:         >90   GFR Calc     Calcium 8.2 8.5 - 10.1 mg/dL L 51   Result:              Magnesium [[id number]]  Resulted: 01/13/17 1031, Result status: Final result    Ordering provider: Renay Beltrán MD  01/13/17 0000 Resulting lab: [facility]    Specimen Information    Type Source Collected On   Blood  01/13/17 0943          Components       Value Reference Range Flag Lab   Magnesium 2.2 1.6 - 2.3 mg/dL  51            Urine Culture [[id number]]  Resulted: 01/12/17 2130, Result status: Final result    Ordering provider: Kay Salazar MD  01/11/17 1934 Resulting lab: [facility]    Specimen Information    Type Source Collected On   Urine Urine clean catch 01/11/17 1919          Components       Value Reference Range Flag Lab   Specimen Description Unspecified Urine   13   Special Requests Specimen received in preservative   75   Culture Micro --   225   Result:         <10,000 colonies/mL mixed urogenital lawrence Susceptibility testing not routinely   done     Micro Report Status FINAL 01/12/2017   225   Result:              Basic metabolic  panel [[id number]] (Abnormal)  Resulted: 01/12/17 0858, Result status: Final result    Ordering provider: Klarissa Garsia MD  01/12/17 0000 Resulting lab: [facility]    Specimen Information    Type Source Collected On   Blood  01/12/17 0815          Components       Value Reference Range Flag Lab   Sodium 138 133 - 144 mmol/L  51   Potassium 4.5 3.4 - 5.3 mmol/L  51   Comment:  Specimen slightly hemolyzed, potassium may be falsely elevated   Chloride 106 94 - 109 mmol/L  51   Carbon Dioxide 24 20 - 32 mmol/L  51   Anion Gap 7 3 - 14 mmol/L  51   Glucose 78 70 - 99 mg/dL  51   Urea Nitrogen 10 7 - 30 mg/dL  51   Creatinine 0.91 0.66 - 1.25 mg/dL  51   GFR Estimate 80 >60 mL/min/1.7m2  51   Comment:  Non  GFR Calc   GFR Estimate If Black -- >60 mL/min/1.7m2  51   Result:         >90   GFR Calc     Calcium 8.3 8.5 - 10.1 mg/dL L 51   Result:              INR [[id number]] (Abnormal)  Resulted: 01/12/17 0855, Result status: Final result    Ordering provider: Klarissa Garsia MD  01/12/17 0000 Resulting lab: [facility]    Specimen Information    Type Source Collected On   Blood  01/12/17 0815          Components       Value Reference Range Flag Lab   INR 1.24 0.86 - 1.14  H 51            CBC with platelets [[id number]] (Abnormal)  Resulted: 01/12/17 0843, Result status: Final result    Ordering provider: Klarissa Garsia MD  01/12/17 0000 Resulting lab: [facility]    Specimen Information    Type Source Collected On   Blood  01/12/17 0815          Components       Value Reference Range Flag Lab   WBC 6.0 4.0 - 11.0 10e9/L  51   RBC Count 4.55 4.4 - 5.9 10e12/L  51   Hemoglobin 12.7 13.3 - 17.7 g/dL L 51   Hematocrit 40.2 40.0 - 53.0 %  51   MCV 88 78 - 100 fl  51   MCH 27.9 26.5 - 33.0 pg  51   MCHC 31.6 31.5 - 36.5 g/dL  51   RDW 14.7 10.0 - 15.0 %  51   Platelet Count 265 150 - 450 10e9/L  51             UA without Microscopic [[id number]] (Abnormal)  Resulted: 01/11/17 2025, Result status: Final result    Ordering provider: Kay Salazar MD  01/11/17 1934 Resulting lab: [facility]    Specimen Information    Type Source Collected On   Urine Urine clean catch 01/11/17 1919          Components       Value Reference Range Flag Lab   Color Urine Yellow   13   Appearance Urine Clear   13   Glucose Urine Negative NEG mg/dL  13   Bilirubin Urine Negative NEG   13   Ketones Urine Negative NEG mg/dL  13   Specific Gravity Urine 1.016 1.003 - 1.035   13   Blood Urine Negative NEG   13   pH Urine 7.5 5.0 - 7.0 pH H 13   Protein Albumin Urine 30 NEG mg/dL A 13   Urobilinogen mg/dL 2.0 0.0 - 2.0 mg/dL  13   Nitrite Urine Negative NEG   13   Leukocyte Esterase Urine Trace NEG  A 13   Source Unspecified Urine   13            Comprehensive metabolic panel [[id number]] (Abnormal)  Resulted: 01/11/17 1811, Result status: Final result    Ordering provider: Kay Salazar MD  01/11/17 1738 Resulting lab: [facility]    Specimen Information    Type Source Collected On   Blood  01/11/17 1734          Components       Value Reference Range Flag Lab   Sodium 138 133 - 144 mmol/L  13   Potassium 5.1 3.4 - 5.3 mmol/L  13   Comment:  Specimen slightly hemolyzed, potassium may be falsely elevated   Chloride 105 94 - 109 mmol/L  13   Carbon Dioxide 26 20 - 32 mmol/L  13   Anion Gap 7 3 - 14 mmol/L  13   Glucose 95 70 - 99 mg/dL  13   Urea Nitrogen 11 7 - 30 mg/dL  13   Creatinine 0.96 0.66 - 1.25 mg/dL  13   GFR Estimate 75 >60 mL/min/1.7m2  13   Comment:  Non  GFR Calc   GFR Estimate If Black -- >60 mL/min/1.7m2  13   Result:         >90   GFR Calc     Calcium 8.3 8.5 - 10.1 mg/dL L 13   Result:     Bilirubin Total 0.4 0.2 - 1.3 mg/dL  13   Albumin 2.4 3.4 - 5.0 g/dL L 13   Protein Total 7.9 6.8 - 8.8 g/dL  13   Alkaline  Phosphatase 86 40 - 150 U/L  13   ALT 14 0 - 70 U/L  13   AST 29 0 - 45 U/L  13   Comment:         Specimen is hemolyzed which can falsely elevate AST. Analysis of a   non-hemolyzed   specimen may result in a lower value.              Troponin I [[id number]]  Resulted: 01/11/17 1811, Result status: Final result    Ordering provider: Kay Salazar MD  01/11/17 1738 Resulting lab: [facility]    Specimen Information    Type Source Collected On   Blood  01/11/17 1734          Components       Value Reference Range Flag Lab   Troponin I ES -- 0.000 - 0.045 ug/L  13   Result:         <0.015  The 99th percentile for upper reference range is 0.045 ug/L.  Troponin values in   the range of 0.045 - 0.120 ug/L may be associated with risks of adverse   clinical events.              INR [[id number]]  Resulted: 01/11/17 1757, Result status: Final result    Ordering provider: Kay Salazar MD  01/11/17 1738 Resulting lab: [facility]    Specimen Information    Type Source Collected On   Blood  01/11/17 1734          Components       Value Reference Range Flag Lab   INR 1.14 0.86 - 1.14   13            Lactic acid [[id number]]  Resulted: 01/11/17 1749, Result status: Final result    Ordering provider: Kay Salazar MD  01/11/17 1738 Resulting lab: [facility]    Specimen Information    Type Source Collected On   Blood  01/11/17 1734          Components       Value Reference Range Flag Lab   Lactic Acid 1.4 0.7 - 2.1 mmol/L  13            CBC with platelets differential [[id number]]  Resulted: 01/11/17 1748, Result status: Final result    Ordering provider: Kay Salazar MD  01/11/17 1738 Resulting lab: [facility]    Specimen Information    Type Source Collected On   Blood  01/11/17 1734          Components       Value Reference Range Flag Lab   WBC 5.4 4.0 - 11.0 10e9/L  13    RBC Count 5.02 4.4 - 5.9 10e12/L  13   Hemoglobin 14.4 13.3 - 17.7 g/dL  13   Hematocrit 45.3 40.0 - 53.0 %  13   MCV 90 78 - 100 fl  13   MCH 28.7 26.5 - 33.0 pg  13   MCHC 31.8 31.5 - 36.5 g/dL  13   RDW 14.1 10.0 - 15.0 %  13   Platelet Count 258 150 - 450 10e9/L  13   Diff Method Automated Method   13   % Neutrophils 48.0 %  13   % Lymphocytes 35.5 %  13   % Monocytes 12.9 %  13   % Eosinophils 2.8 %  13   % Basophils 0.6 %  13   % Immature Granulocytes 0.2 %  13   Nucleated RBCs 0 0 /100  13   Absolute Neutrophil 2.6 1.6 - 8.3 10e9/L  13   Absolute Lymphocytes 1.9 0.8 - 5.3 10e9/L  13   Absolute Monocytes 0.7 0.0 - 1.3 10e9/L  13   Absolute Eosinophils 0.2 0.0 - 0.7 10e9/L  13   Absolute Basophils 0.0 0.0 - 0.2 10e9/L  13   Abs Immature Granulocytes 0.0 0 - 0.4 10e9/L  13   Absolute Nucleated RBC 0.0   13            Testing Performed By     Lab - Abbreviation Name Director Address Valid Date Range    13 - Unknown [facility] Unknown [address] [facility] [address] 01/15/15 0916 - Present    51 - Unknown [facility] Unknown [address] [facility] [address] 12/31/14 1010 - Present    75 - Unknown [facility] Unknown [address] [facility] [address] 01/15/15 1019 - Present    225 - Unknown [facility] Unknown [address] [facility] [address] 12/19/14 0954 - Present            Unresulted Labs (24h ago through future)    Start       Ordered    01/15/17 0600  Creatinine -  (Resume Metformin and Creatinine - UU,UR)   ROUTINE,   Routine     Comments:  Last Lab Result: CREATININE (mg/dL)       Date                     Value                 01/12/2017               0.91             ----------    01/13/17 0837         Imaging Results - 3 Days (01/13/17 - 01/11/17)      X-ray Chest 1 [facility] [[id number]]  Resulted: 01/13/17 2200, Result status: Final result    Ordering provider:  Trudy Bowman, KIRSTEN CNP  01/13/17 2042 Resulted by: Jarrell Thomson MD Martin, Douglas J, MD    Performed: 01/13/17 2101 - 01/13/17 2142 Resulting lab: RADIOLOGY RESULTS    Narrative:       EXAMINATION: XR CHEST PORT 1 VW, 1/13/2017 9:42 PM    COMPARISON: 1/13/2017    HISTORY: Check for Pneumothorax and Lead Position post Cardiac Implant    FINDINGS: Cardiac silhouette is within normal limits. Continued volume  loss in the right lung. Appearance of the upper mediastinum is  unchanged with tortuous aorta. Scattered basilar opacities are similar  to previous. No pneumothorax. Left chest wall pacemaker leads overlie  the right atrium and ventricle.      Impression:       IMPRESSION:   1. No pneumothorax.  2. Continued basilar opacities and not significantly changed. Favor  atelectasis.      I have personally reviewed the examination and initial interpretation  and I agree with the findings.    JARRELL THOMSON MD    Specimen Information    Type Source Collected On                  XR Chest Port 1 View [[id number]]  Resulted: 01/13/17 1435, Result status: Final result    Ordering provider: Gabe De Guzman MD  01/13/17 0957 Resulted by: Karina Causey MD Baca, Vanessa, MD    Performed: 01/13/17 1025 - 01/13/17 1049 Resulting lab: RADIOLOGY RESULTS    Narrative:       Exam:  XR CHEST PORT 1 VW, 1/13/2017 10:59 AM    History: s/p ppm    Comparison:  Chest radiograph 1/11/2017 and CT chest 7/14/2016    Findings:  Single AP view of the chest was obtained. Interval  placement of left chest wall cardiac device with leads projecting over  the right atrium and ventricle.     The cardiomediastinal silhouette is stable. Pulmonary vasculature is  obscured. Postsurgical changes of thoracoplasty including stable  volume loss and scarring in the right upper and mid lung as well as  right chest wall deformity. Scarring in the right lung base is  unchanged. No significant pleural effusion or  appreciable  pneumothorax. Patchy opacities in the left lung base.      Impression:       Impression:    1. Interval placement of left chest wall cardiac device with leads  projecting over the right atrium and ventricle.  2. Patchy opacities in the left lung base which could represent  atelectasis or infection.  3. Stable volume loss and scarring in the right lung with associated  chest wall deformity due to prior thoracoplasty.    I have personally reviewed the examination and initial interpretation  and I agree with the findings.    CATHIE SORIA MD    Specimen Information    Type Source Collected On                  XR Chest Port 1 View [[id number]]  Resulted: 01/11/17 2308, Result status: Final result    Ordering provider: Kay Salazar MD  01/11/17 1749 Resulted by: Colton Jensen MD    Performed: 01/11/17 1750 - 01/11/17 1812 Resulting lab: RADIOLOGY RESULTS    Narrative:       CHEST ONE VIEW PORTABLE  1/11/2017 6:12 PM     HISTORY: Cough. Shortness of breath.     COMPARISON: 7/21/2016.      Impression:       IMPRESSION: Interval placement of an external pacer which partially  obscures the left mid and lower lung zones. Postoperative volume loss  in the right upper hemithorax is again noted. Probable scarring in  right lung base is unchanged. Heart size is difficult to evaluate due  to rotation of the patient. No definite confluent infiltrates or  significant changes since the prior exam.    COLTON JENSEN MD    Specimen Information    Type Source Collected On                  Testing Performed By     Lab - Abbreviation Name Director Address Valid Date Range    104 - Rad Rslts RADIOLOGY RESULTS Unknown Unknown 02/16/05 1553 - Present               ECG/EMG Results (01/12/17 - 01/12/17)      Echocardiogram Complete [[id number]]  Resulted: 01/12/17 1550, Result status: Final result    Ordering provider: Klarissa Garsia MD  01/12/17 9062 Resulted by: PABLO Walker MD    Performed: 01/12/17  1418 - 17 1419 Resulting lab: RADIOLOGY RESULTS    Narrative:       Interpretation Summary                       [facility]  Echocardiography Laboratory  [address] [place] [address]     Name: LUIS ALBRIGHT  MRN: [id number]  : 1934  Study Date: 2017 01:43 PM  Age: 83 yrs  Gender: Male  [place] [facility] [place]  Reason For Study: Complete AV block  Ordering Physician: NETTA DAS  Performed By: Tejas Bright RDCS     BSA: 2.0 m2  Height: 73 in  Weight: 176 lb  BP: 212/69 mmHg  ______________________________________________________________________________        Procedure  Complete Portable Echo Adult.  ______________________________________________________________________________     Interpretation Summary  Global and regional left ventricular function is normal with an EF of 55-60%.  Mild diffuse hypokinesis is present.  Right ventricular function, chamber size, wall motion, and thickness are  normal.  Pulmonary artery systolic pressure is normal.  Dilation of the inferior vena cava is present with normal respiratory  variation in diameter. Estimated mean right atrial pressure is 3-8 mmHg.  No pericardial effusion is present.     ______________________________________________________________________________           Left Ventricle  Global and regional left ventricular function is normal with an EF of 55-60%.  Left ventricular wall thickness is normal. Left ventricular size is normal.  Diastolic function not assessed due to frequent ectopy. Mild diffuse  hypokinesis is present.     Right Ventricle  Right ventricular function, chamber size, wall motion, and thickness are  normal.  Atria  Both atria appear normal.     Mitral Valve  The mitral valve is normal.     Aortic Valve  AoV is seen in limited views but appears to be normal in structure.     Tricuspid Valve  Trace tricuspid insufficiency is present. The right ventricular systolic  pressure is  approximated at 28.1 mmHg plus the right atrial pressure.  Pulmonary artery systolic pressure is normal.     Vessels  Dilation of the inferior vena cava is present with normal respiratory  variation in diameter. Estimated mean right atrial pressure is 3-8 mmHg.     Pericardium  No pericardial effusion is present.  Compared to Previous Study  This study was compared with the study from 2013 . LVEF is slightly  worse. The patient is now in complete heart block.     ______________________________________________________________________________  MMode/2D Measurements & Calculations  LA Volume (BP): 64.3 ml           Doppler Measurements & Calculations  MV E max timur: 117.1 cm/sec  MV A max timur: 93.1 cm/sec  MV E/A: 1.3  MV dec slope: 447.2 cm/sec2  MV dec time: 0.26 sec  TR max timur: 265.0 cm/sec  TR max P.1 mmHg  Lateral E/e': 15.8  Medial E/e': 24.7              ______________________________________________________________________________        Report approved by: Bozena Bernardo 2017 03:50 PM       1    Type Source Collected On     17 1343                  Encounter-Level Documents:     There are no encounter-level documents.      Order-Level Documents:     There are no order-level documents.

## 2017-01-11 NOTE — ED NOTES
Cough X 2 weeks- dry cough. No treatment. Was in [place], return home yesterday. 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 Pt also has bilat LE edema with right leg worse than left. Has a pressure ulcer on back of right leg- has been there for a while per pt.

## 2017-01-11 NOTE — ED NOTES
Pt relative states that pt immunization and medication status is unknown. Pt states that sine he has been in [place] with wife, did not take any medications! According to relatives, has been there for more than 6 months.

## 2017-01-11 NOTE — IP AVS SNAPSHOT
MRN:[id number]                      After Visit Summary   1/11/2017    Gallito Farley    MRN: [id number]           Thank you!     Thank you for choosing [facility] for your care. Our goal is always to provide you with excellent care. Hearing back from our patients is one way we can continue to improve our services. Please take a few minutes to complete the written survey that you may receive in the mail after you visit with us. Thank you!        Patient Information     Date Of Birth          1/1/1934        About your hospital stay     You were admitted on:  January 11, 2017 You last received care in the:  [address] [place] [address]    You were discharged on:  January 14, 2017        Reason for your hospital stay       You were hospitalized for complete heart block requiring a pacemaker and hypertension.                  Who to Call     For medical emergencies, please call 911.  For non-urgent questions about your medical care, please call your primary care provider or clinic, [phone]          Attending Provider     Provider    Kay Salazar MD Pritzker, MD Sun Soni, Klarissa Haley MD       Primary Care Provider Office Phone # Fax #    Moiz Richard [phone]       [facility] [place] [address]        After Care Instructions     Activity       Your activity upon discharge:       No lifting > 10lbs or over shoulder level with left arm for 4 weeks       Notify device clinic/EP immediately for any redness, swelling, bleeding, discharge, fevers or chills.            Diet       Follow this diet upon discharge: 2 Gram Sodium Diet                  Follow-up Appointments     Adult [facility] Follow-up and recommended labs and tests       Follow up with primary care provider, Moiz Richard MD, within 3-5 days to evaluate medication change, to evaluate treatment change, for hospital follow- up and regarding new diagnosis.  The following  labs/tests are recommended: BMP, magnesium, blood pressure monitoring.      Appointments on [place] and/or [facility] (with [facility] or [facility] provider or service). Call [phone] if you haven't heard regarding these appointments within 7 days of discharge.            Follow Up and recommended labs and tests       Follow up with the Device Clinic in 7-10 days                  Your next 10 appointments already scheduled     Jan 20, 2017 10:30 AM   (Arrive by 10:15 AM)   Pacemaker Check with [facility] [address] [place] [address] ([facility])    [address] [place] [address]  3rd Floor  [facility] [address] [phone]              Further instructions from your care team         Home Care after a Pacemaker Implant    Wound care:  Keep your incision (surgery wound) dry for 3 days.  After 3 days, you may remove the outer bandage.  Keep the strips of tape on.  They will be removed at your clinic visit.  Check for signs of infection each day.  These include increased redness, swelling, drainage, bleeding or a fever over 101 F (38.3 C).  Call us immediately if you see any of these signs.  If there are no signs of infection, you may shower in 3 days.  Do not submerge the incision (in a bath tub, hot tub, or swimming pool) until fully healed.  If Dermabond has been applied to your incision.  Do not apply powder or lotion to the incision for 14 days. You may shower in the morning.    Pain:   You may have mild to moderate pain for 3 to 5 days.  Take acetaminophen (Tylenol) or ibuprofen (Advil) for the pain.  Call us if the pain is severe or lasts more than 5 days.    Activity:  After 24 hours, slowly return to your normal activities.   Healing will take 4 to 6 weeks.  No driving for 3 days  Avoid climbing a ladder alone.  It is best to stay within 4 feet of the ground.  Avoid anything that may cause rough contact or a hard hit to your chest.  This includes football, hockey, and other contact  sports.  For at least 4 weeks:  Do not raise your affected arm above your shoulder.  Do not use your affected arm to push, pull, or lift anything over 10 pounds.  Avoid repetitive upper body activities for 6 weeks (ie: golf, swimming, and weight lifting)    Follow Up Visits:  Return to the clinic in 7 to 10 days to have your device and wound checked.      Telling others about your device:  Before you have any medical tests or treatments, tell the doctors, dentists, and other care providers about your device.  There are a few tests and treatments that may interfere with your device.  (These include MRI, radiation therapy, electrocautery, and others.)  Your care team may need to take special steps to keep you safe.  Before you leave the hospital, you will receive a temporary ID card.  A permanent card will be mailed to you about 6 to 8 weeks later.  Always carry the ID card with you.  It has important details about your device.  You should also get a [employer] ID.  Please ask us for a [employer] brochure, or go to [email or web address].    Safety near electrical equipment:  All of these are safe to use when in good repair:    Microwaves    Radios    Cordless phone    Remote controls    Small electrical tools  Cell phones: Keep cell phones at least 6 inches from your device.  Do not carry the phone in a pocket near your device.  Security valentine: It is okay to walk through security valentine at the airports and department stores.  Tell airport security that you have a pacemaker.  They should keep the screening wand at least 6 inches from your device.  Full-body scanners are safe.  Avoid the following:    MRI tests in the hospital unless you have a MRI safe pacemaker.           Arc welding, chain saws and high-powered industrial or commercial tools.    Power lines, power plants and large power generators.    Electric body fat scales.    Magnetic mattress pads or pillow.    Questions?  Please call [facility]  "Care.    Device Nurse:          Business Hours:  [phone]                       After Hours:  [phone]   Choose option 4, then ask for the                                                  on-call device nurse at job code [id number].    Your next device clinic appointment is scheduled on:     @ 10:30 AM            [facility] - Clinic [address]  [address] [place] [address]  [id number]      Pending Results     Date and Time Order Name Status Description    2017 0837 EKG 12-lead, tracing only Preliminary             Statement of Approval     Ordered          17 1136  I have reviewed and agree with all the recommendations and orders detailed in this document.   EFFECTIVE NOW     Approved and electronically signed by:  Klarissa Garsia MD             Admission Information        Provider Department Dept Phone    2017 Klarissa Garsia MD Replaced by [facility] [phone]      Your Vitals Were     Blood Pressure Pulse Temperature    118/67 mmHg 40 97.8  F (36.6  C) (Oral)    Respirations Height Weight    15 1.854 m (6' 1\") 79.6 kg (175 lb 7.8 oz)    BMI (Body Mass Index) Pulse Oximetry       23.16 kg/m2 94%       MyChart Information     [facility] lets you send messages to your doctor, view your test results, renew your prescriptions, schedule appointments and more. To sign up, go to [email or web address] . Click on \"Log in\" on the left side of the screen, which will take you to the Welcome page. Then click on \"Sign up Now\" on the right side of the page.     You will be asked to enter the access code listed below, as well as some personal information. Please follow the directions to create your username and password.     Your access code is: [email or web address]  Expires: 2017  7:43 PM     Your access code will  in 90 days. If you need help or a new code, please call your [facility] or [phone].        Care EveryWhere ID     This is " your Care EveryWhere ID. This could be used by other organizations to access your [facility] medical records  [id number]           Review of your medicines      START taking        Dose / Directions    acetaminophen 325 MG tablet   Commonly known as:  TYLENOL   Used for:  Acute post-operative pain        Dose:  650 mg   Take 2 tablets (650 mg) by mouth every 4 hours as needed for mild pain   Quantity:  100 tablet   Refills:  0       amLODIPine 10 MG tablet   Commonly known as:  NORVASC   Used for:  Essential hypertension        Dose:  10 mg   Take 1 tablet (10 mg) by mouth daily   Quantity:  30 tablet   Refills:  1       cephalexin 250 MG capsule   Commonly known as:  KEFLEX   Indication:  Surgical Prophylaxis   Used for:  Atrioventricular block, complete (H)        Dose:  250 mg   Take 1 capsule (250 mg) by mouth 4 times daily for 3 days   Quantity:  12 capsule   Refills:  0       hydrochlorothiazide 12.5 MG capsule   Commonly known as:  MICROZIDE   Used for:  Essential hypertension        Dose:  25 mg   Take 2 capsules (25 mg) by mouth daily   Quantity:  30 capsule   Refills:  1       ranitidine 150 MG tablet   Commonly known as:  ZANTAC   Used for:  Gastroesophageal reflux disease, esophagitis presence not specified        Dose:  150 mg   Start taking on:  1/15/2017   Take 1 tablet (150 mg) by mouth 2 times daily   Quantity:  60 tablet   Refills:  0         CONTINUE these medicines which may have CHANGED, or have new prescriptions. If we are uncertain of the size of tablets/capsules you have at home, strength may be listed as something that might have changed.        Dose / Directions    fluticasone 250 MCG/BLIST Aepb Inhaler   Commonly known as:  FLOVENT DISKUS   This may have changed:  how much to take   Used for:  COPD exacerbation (H)        Dose:  1 puff   Inhale 1 puff into the lungs every 12 hours   Quantity:  1 Inhaler   Refills:  1         CONTINUE these medicines which have NOT CHANGED        Dose /  Directions    albuterol (2.5 MG/3ML) 0.083% neb solution   Used for:  COPD exacerbation (H)        Dose:  2.5 mg   Take 1 vial (2.5 mg) by nebulization every 6 hours as needed   Quantity:  360 mL   Refills:  1       enalapril 10 MG tablet   Commonly known as:  VASOTEC   Used for:  Essential hypertension        Dose:  10 mg   Take 1 tablet (10 mg) by mouth daily   Quantity:  30 tablet   Refills:  1       tamsulosin 0.4 MG capsule   Commonly known as:  FLOMAX   Indication:  Enlarged Prostate with Urination Problems   Used for:  Benign nodular prostatic hyperplasia without lower urinary tract symptoms        Dose:  0.4 mg   Take 1 capsule (0.4 mg) by mouth every 24 hours   Quantity:  60 capsule   Refills:  1       tiotropium 18 MCG capsule   Commonly known as:  SPIRIVA   Used for:  COPD exacerbation (H)        Dose:  18 mcg   Inhale 1 capsule (18 mcg) into the lungs daily   Quantity:  30 capsule   Refills:  1         STOP taking     fluticasone 50 MCG/ACT spray   Commonly known as:  FLONASE           metoprolol 25 MG tablet   Commonly known as:  LOPRESSOR           omeprazole 40 MG capsule   Commonly known as:  priLOSEC                Where to get your medicines      These medications were sent to [facility] [place] [place] - [address] [facility]  [address] [facility] [address]     Phone:  [phone]    - acetaminophen 325 MG tablet  - albuterol (2.5 MG/3ML) 0.083% neb solution  - amLODIPine 10 MG tablet  - cephalexin 250 MG capsule  - enalapril 10 MG tablet  - fluticasone 250 MCG/BLIST Aepb Inhaler  - hydrochlorothiazide 12.5 MG capsule  - ranitidine 150 MG tablet  - tamsulosin 0.4 MG capsule  - tiotropium 18 MCG capsule             Protect others around you: Learn how to safely use, store and throw away your medicines at www.disposemymeds.org.             Medication List: This is a list of all your medications and when to take them. Check marks below indicate your daily home schedule.  Keep this list as a reference.      Medications           Morning Afternoon Evening Bedtime As Needed    acetaminophen 325 MG tablet   Commonly known as:  TYLENOL   Take 2 tablets (650 mg) by mouth every 4 hours as needed for mild pain   Last time this was given:  650 mg on 1/13/2017  6:03 AM                                albuterol (2.5 MG/3ML) 0.083% neb solution   Take 1 vial (2.5 mg) by nebulization every 6 hours as needed                                amLODIPine 10 MG tablet   Commonly known as:  NORVASC   Take 1 tablet (10 mg) by mouth daily   Last time this was given:  5 mg on 1/14/2017 10:07 AM                                cephalexin 250 MG capsule   Commonly known as:  KEFLEX   Take 1 capsule (250 mg) by mouth 4 times daily for 3 days   Last time this was given:  250 mg on 1/14/2017 12:03 PM                                enalapril 10 MG tablet   Commonly known as:  VASOTEC   Take 1 tablet (10 mg) by mouth daily   Last time this was given:  10 mg on 1/14/2017  8:31 AM                                fluticasone 250 MCG/BLIST Aepb Inhaler   Commonly known as:  FLOVENT DISKUS   Inhale 1 puff into the lungs every 12 hours   Last time this was given:  1 puff on 1/14/2017  8:33 AM                                hydrochlorothiazide 12.5 MG capsule   Commonly known as:  MICROZIDE   Take 2 capsules (25 mg) by mouth daily   Last time this was given:  25 mg on 1/14/2017  8:33 AM                                ranitidine 150 MG tablet   Commonly known as:  ZANTAC   Take 1 tablet (150 mg) by mouth 2 times daily   Start taking on:  1/15/2017   Last time this was given:  150 mg on 1/14/2017  1:10 PM                                tamsulosin 0.4 MG capsule   Commonly known as:  FLOMAX   Take 1 capsule (0.4 mg) by mouth every 24 hours                                tiotropium 18 MCG capsule   Commonly known as:  SPIRIVA   Inhale 1 capsule (18 mcg) into the lungs daily

## 2017-01-12 ENCOUNTER — APPOINTMENT (OUTPATIENT)
Dept: CARDIOLOGY | Facility: CLINIC | Age: 82
DRG: 243 | End: 2017-01-12
Attending: INTERNAL MEDICINE
Payer: MEDICARE

## 2017-01-12 ENCOUNTER — TRANSFERRED RECORDS (OUTPATIENT)
Dept: HEALTH INFORMATION MANAGEMENT | Facility: CLINIC | Age: 82
End: 2017-01-12

## 2017-01-12 ENCOUNTER — APPOINTMENT (OUTPATIENT)
Dept: CARDIOLOGY | Facility: CLINIC | Age: 82
DRG: 243 | End: 2017-01-12
Attending: NURSE PRACTITIONER
Payer: MEDICARE

## 2017-01-12 PROBLEM — I44.2 ATRIOVENTRICULAR BLOCK, COMPLETE (H): Status: ACTIVE | Noted: 2017-01-12

## 2017-01-12 LAB
ANION GAP SERPL CALCULATED.3IONS-SCNC: 7 MMOL/L (ref 3–14)
BACTERIA SPEC CULT: NORMAL
BUN SERPL-MCNC: 10 MG/DL (ref 7–30)
CALCIUM SERPL-MCNC: 8.3 MG/DL (ref 8.5–10.1)
CHLORIDE SERPL-SCNC: 106 MMOL/L (ref 94–109)
CO2 SERPL-SCNC: 24 MMOL/L (ref 20–32)
CREAT SERPL-MCNC: 0.91 MG/DL (ref 0.66–1.25)
ERYTHROCYTE [DISTWIDTH] IN BLOOD BY AUTOMATED COUNT: 14.7 % (ref 10–15)
GFR SERPL CREATININE-BSD FRML MDRD: 80 ML/MIN/1.7M2
GLUCOSE SERPL-MCNC: 78 MG/DL (ref 70–99)
HCT VFR BLD AUTO: 40.2 % (ref 40–53)
HGB BLD-MCNC: 12.7 G/DL (ref 13.3–17.7)
INR PPP: 1.24 (ref 0.86–1.14)
INTERPRETATION ECG - MUSE: NORMAL
Lab: NORMAL
MCH RBC QN AUTO: 27.9 PG (ref 26.5–33)
MCHC RBC AUTO-ENTMCNC: 31.6 G/DL (ref 31.5–36.5)
MCV RBC AUTO: 88 FL (ref 78–100)
MICRO REPORT STATUS: NORMAL
PLATELET # BLD AUTO: 265 10E9/L (ref 150–450)
POTASSIUM SERPL-SCNC: 4.5 MMOL/L (ref 3.4–5.3)
RBC # BLD AUTO: 4.55 10E12/L (ref 4.4–5.9)
SODIUM SERPL-SCNC: 138 MMOL/L (ref 133–144)
SPECIMEN SOURCE: NORMAL
WBC # BLD AUTO: 6 10E9/L (ref 4–11)

## 2017-01-12 PROCEDURE — 21400006 ZZH R&B CCU INTERMEDIATE UMMC

## 2017-01-12 PROCEDURE — C1892 INTRO/SHEATH,FIXED,PEEL-AWAY: HCPCS

## 2017-01-12 PROCEDURE — 80048 BASIC METABOLIC PNL TOTAL CA: CPT | Performed by: INTERNAL MEDICINE

## 2017-01-12 PROCEDURE — 25000125 ZZHC RX 250: Performed by: NURSE PRACTITIONER

## 2017-01-12 PROCEDURE — A9270 NON-COVERED ITEM OR SERVICE: HCPCS | Mod: GY | Performed by: INTERNAL MEDICINE

## 2017-01-12 PROCEDURE — C1898 LEAD, PMKR, OTHER THAN TRANS: HCPCS

## 2017-01-12 PROCEDURE — 25000125 ZZHC RX 250: Performed by: INTERNAL MEDICINE

## 2017-01-12 PROCEDURE — 25000132 ZZH RX MED GY IP 250 OP 250 PS 637: Mod: GY | Performed by: INTERNAL MEDICINE

## 2017-01-12 PROCEDURE — 99153 MOD SED SAME PHYS/QHP EA: CPT

## 2017-01-12 PROCEDURE — 33208 INSRT HEART PM ATRIAL & VENT: CPT | Mod: KX | Performed by: INTERNAL MEDICINE

## 2017-01-12 PROCEDURE — 93306 TTE W/DOPPLER COMPLETE: CPT | Mod: 26 | Performed by: INTERNAL MEDICINE

## 2017-01-12 PROCEDURE — 99152 MOD SED SAME PHYS/QHP 5/>YRS: CPT

## 2017-01-12 PROCEDURE — 40000065 ZZH STATISTIC EKG NON-CHARGEABLE

## 2017-01-12 PROCEDURE — 27210795 ZZH PAD DEFIB QUICK CR4

## 2017-01-12 PROCEDURE — 99223 1ST HOSP IP/OBS HIGH 75: CPT | Mod: 25 | Performed by: INTERNAL MEDICINE

## 2017-01-12 PROCEDURE — 33208 INSRT HEART PM ATRIAL & VENT: CPT | Mod: KX

## 2017-01-12 PROCEDURE — 93306 TTE W/DOPPLER COMPLETE: CPT

## 2017-01-12 PROCEDURE — 85027 COMPLETE CBC AUTOMATED: CPT | Performed by: INTERNAL MEDICINE

## 2017-01-12 PROCEDURE — C1785 PMKR, DUAL, RATE-RESP: HCPCS

## 2017-01-12 PROCEDURE — 27210784 ZZH KIT PACEMAKER CR8

## 2017-01-12 PROCEDURE — 85610 PROTHROMBIN TIME: CPT | Performed by: INTERNAL MEDICINE

## 2017-01-12 PROCEDURE — 27210807 ZZH SHEATH CR6

## 2017-01-12 RX ORDER — LORAZEPAM 2 MG/ML
.5-2 INJECTION INTRAMUSCULAR EVERY 10 MIN PRN
Status: DISCONTINUED | OUTPATIENT
Start: 2017-01-12 | End: 2017-01-13 | Stop reason: HOSPADM

## 2017-01-12 RX ORDER — DIPHENHYDRAMINE HYDROCHLORIDE 50 MG/ML
25-50 INJECTION INTRAMUSCULAR; INTRAVENOUS
Status: DISCONTINUED | OUTPATIENT
Start: 2017-01-12 | End: 2017-01-13 | Stop reason: HOSPADM

## 2017-01-12 RX ORDER — FENTANYL CITRATE 50 UG/ML
25-50 INJECTION, SOLUTION INTRAMUSCULAR; INTRAVENOUS
Status: DISCONTINUED | OUTPATIENT
Start: 2017-01-12 | End: 2017-01-13 | Stop reason: HOSPADM

## 2017-01-12 RX ORDER — DOBUTAMINE HYDROCHLORIDE 200 MG/100ML
5-40 INJECTION INTRAVENOUS CONTINUOUS PRN
Status: DISCONTINUED | OUTPATIENT
Start: 2017-01-12 | End: 2017-01-12

## 2017-01-12 RX ORDER — IOPAMIDOL 755 MG/ML
10 INJECTION, SOLUTION INTRAVASCULAR ONCE
Status: COMPLETED | OUTPATIENT
Start: 2017-01-12 | End: 2017-01-12

## 2017-01-12 RX ORDER — ONDANSETRON 2 MG/ML
4 INJECTION INTRAMUSCULAR; INTRAVENOUS EVERY 4 HOURS PRN
Status: DISCONTINUED | OUTPATIENT
Start: 2017-01-12 | End: 2017-01-13 | Stop reason: HOSPADM

## 2017-01-12 RX ORDER — ACETAMINOPHEN 325 MG/1
650 TABLET ORAL EVERY 4 HOURS PRN
Status: DISCONTINUED | OUTPATIENT
Start: 2017-01-12 | End: 2017-01-14 | Stop reason: HOSPADM

## 2017-01-12 RX ORDER — DOBUTAMINE HYDROCHLORIDE 200 MG/100ML
5-40 INJECTION INTRAVENOUS CONTINUOUS PRN
Status: DISCONTINUED | OUTPATIENT
Start: 2017-01-12 | End: 2017-01-13 | Stop reason: HOSPADM

## 2017-01-12 RX ORDER — KETOROLAC TROMETHAMINE 15 MG/ML
15 INJECTION, SOLUTION INTRAMUSCULAR; INTRAVENOUS
Status: DISCONTINUED | OUTPATIENT
Start: 2017-01-12 | End: 2017-01-13 | Stop reason: HOSPADM

## 2017-01-12 RX ORDER — ADENOSINE 3 MG/ML
6-12 INJECTION, SOLUTION INTRAVENOUS EVERY 5 MIN PRN
Status: DISCONTINUED | OUTPATIENT
Start: 2017-01-12 | End: 2017-01-13 | Stop reason: HOSPADM

## 2017-01-12 RX ORDER — FENTANYL CITRATE 50 UG/ML
25-50 INJECTION, SOLUTION INTRAMUSCULAR; INTRAVENOUS
Status: DISCONTINUED | OUTPATIENT
Start: 2017-01-12 | End: 2017-01-12

## 2017-01-12 RX ORDER — ADENOSINE 3 MG/ML
6-12 INJECTION, SOLUTION INTRAVENOUS EVERY 5 MIN PRN
Status: DISCONTINUED | OUTPATIENT
Start: 2017-01-12 | End: 2017-01-12

## 2017-01-12 RX ORDER — PROMETHAZINE HYDROCHLORIDE 25 MG/ML
6.25-25 INJECTION, SOLUTION INTRAMUSCULAR; INTRAVENOUS EVERY 4 HOURS PRN
Status: DISCONTINUED | OUTPATIENT
Start: 2017-01-12 | End: 2017-01-13 | Stop reason: HOSPADM

## 2017-01-12 RX ORDER — NITROGLYCERIN 20 MG/100ML
0.07-2 INJECTION INTRAVENOUS CONTINUOUS
Status: DISCONTINUED | OUTPATIENT
Start: 2017-01-12 | End: 2017-01-13

## 2017-01-12 RX ORDER — FUROSEMIDE 10 MG/ML
20-100 INJECTION INTRAMUSCULAR; INTRAVENOUS
Status: DISCONTINUED | OUTPATIENT
Start: 2017-01-12 | End: 2017-01-13 | Stop reason: HOSPADM

## 2017-01-12 RX ORDER — PROTAMINE SULFATE 10 MG/ML
1-5 INJECTION, SOLUTION INTRAVENOUS
Status: DISCONTINUED | OUTPATIENT
Start: 2017-01-12 | End: 2017-01-13 | Stop reason: HOSPADM

## 2017-01-12 RX ORDER — FLUMAZENIL 0.1 MG/ML
0.2 INJECTION, SOLUTION INTRAVENOUS
Status: DISCONTINUED | OUTPATIENT
Start: 2017-01-12 | End: 2017-01-13 | Stop reason: HOSPADM

## 2017-01-12 RX ORDER — ANALGESIC BALM 1.74; 4.06 G/29G; G/29G
OINTMENT TOPICAL EVERY 6 HOURS PRN
Status: DISCONTINUED | OUTPATIENT
Start: 2017-01-12 | End: 2017-01-14 | Stop reason: HOSPADM

## 2017-01-12 RX ORDER — NALOXONE HYDROCHLORIDE 0.4 MG/ML
0.4 INJECTION, SOLUTION INTRAMUSCULAR; INTRAVENOUS; SUBCUTANEOUS EVERY 5 MIN PRN
Status: DISCONTINUED | OUTPATIENT
Start: 2017-01-12 | End: 2017-01-13 | Stop reason: HOSPADM

## 2017-01-12 RX ORDER — ONDANSETRON 2 MG/ML
4 INJECTION INTRAMUSCULAR; INTRAVENOUS EVERY 4 HOURS PRN
Status: DISCONTINUED | OUTPATIENT
Start: 2017-01-12 | End: 2017-01-12

## 2017-01-12 RX ORDER — HYDRALAZINE HYDROCHLORIDE 20 MG/ML
5 INJECTION INTRAMUSCULAR; INTRAVENOUS ONCE
Status: COMPLETED | OUTPATIENT
Start: 2017-01-12 | End: 2017-01-12

## 2017-01-12 RX ORDER — FUROSEMIDE 10 MG/ML
20-100 INJECTION INTRAMUSCULAR; INTRAVENOUS
Status: DISCONTINUED | OUTPATIENT
Start: 2017-01-12 | End: 2017-01-12

## 2017-01-12 RX ORDER — PROTAMINE SULFATE 10 MG/ML
5-40 INJECTION, SOLUTION INTRAVENOUS EVERY 10 MIN PRN
Status: DISCONTINUED | OUTPATIENT
Start: 2017-01-12 | End: 2017-01-13 | Stop reason: HOSPADM

## 2017-01-12 RX ORDER — LIDOCAINE 40 MG/G
CREAM TOPICAL
Status: DISCONTINUED | OUTPATIENT
Start: 2017-01-12 | End: 2017-01-12

## 2017-01-12 RX ORDER — HEPARIN SODIUM 1000 [USP'U]/ML
1000-10000 INJECTION, SOLUTION INTRAVENOUS; SUBCUTANEOUS EVERY 5 MIN PRN
Status: DISCONTINUED | OUTPATIENT
Start: 2017-01-12 | End: 2017-01-12

## 2017-01-12 RX ORDER — HEPARIN SODIUM 1000 [USP'U]/ML
1000-10000 INJECTION, SOLUTION INTRAVENOUS; SUBCUTANEOUS EVERY 5 MIN PRN
Status: DISCONTINUED | OUTPATIENT
Start: 2017-01-12 | End: 2017-01-13 | Stop reason: HOSPADM

## 2017-01-12 RX ORDER — NALOXONE HYDROCHLORIDE 0.4 MG/ML
0.4 INJECTION, SOLUTION INTRAMUSCULAR; INTRAVENOUS; SUBCUTANEOUS EVERY 5 MIN PRN
Status: DISCONTINUED | OUTPATIENT
Start: 2017-01-12 | End: 2017-01-12

## 2017-01-12 RX ORDER — LORAZEPAM 2 MG/ML
.5-2 INJECTION INTRAMUSCULAR EVERY 10 MIN PRN
Status: DISCONTINUED | OUTPATIENT
Start: 2017-01-12 | End: 2017-01-12

## 2017-01-12 RX ORDER — HYDRALAZINE HYDROCHLORIDE 20 MG/ML
10 INJECTION INTRAMUSCULAR; INTRAVENOUS ONCE
Status: COMPLETED | OUTPATIENT
Start: 2017-01-12 | End: 2017-01-12

## 2017-01-12 RX ORDER — SODIUM CHLORIDE 9 MG/ML
INJECTION, SOLUTION INTRAVENOUS CONTINUOUS
Status: DISCONTINUED | OUTPATIENT
Start: 2017-01-12 | End: 2017-01-12

## 2017-01-12 RX ORDER — CEFAZOLIN SODIUM 2 G/100ML
2 INJECTION, SOLUTION INTRAVENOUS
Status: DISCONTINUED | OUTPATIENT
Start: 2017-01-12 | End: 2017-01-12

## 2017-01-12 RX ORDER — DIPHENHYDRAMINE HYDROCHLORIDE 50 MG/ML
25-50 INJECTION INTRAMUSCULAR; INTRAVENOUS
Status: DISCONTINUED | OUTPATIENT
Start: 2017-01-12 | End: 2017-01-12

## 2017-01-12 RX ORDER — PROTAMINE SULFATE 10 MG/ML
1-5 INJECTION, SOLUTION INTRAVENOUS
Status: DISCONTINUED | OUTPATIENT
Start: 2017-01-12 | End: 2017-01-12

## 2017-01-12 RX ORDER — FLUMAZENIL 0.1 MG/ML
0.2 INJECTION, SOLUTION INTRAVENOUS
Status: DISCONTINUED | OUTPATIENT
Start: 2017-01-12 | End: 2017-01-12

## 2017-01-12 RX ORDER — PROMETHAZINE HYDROCHLORIDE 25 MG/ML
6.25-25 INJECTION, SOLUTION INTRAMUSCULAR; INTRAVENOUS EVERY 4 HOURS PRN
Status: DISCONTINUED | OUTPATIENT
Start: 2017-01-12 | End: 2017-01-12

## 2017-01-12 RX ORDER — PROTAMINE SULFATE 10 MG/ML
5-40 INJECTION, SOLUTION INTRAVENOUS EVERY 10 MIN PRN
Status: DISCONTINUED | OUTPATIENT
Start: 2017-01-12 | End: 2017-01-12

## 2017-01-12 RX ORDER — KETOROLAC TROMETHAMINE 15 MG/ML
15 INJECTION, SOLUTION INTRAMUSCULAR; INTRAVENOUS
Status: DISCONTINUED | OUTPATIENT
Start: 2017-01-12 | End: 2017-01-12

## 2017-01-12 RX ADMIN — ACETAMINOPHEN 650 MG: 325 TABLET, FILM COATED ORAL at 02:17

## 2017-01-12 RX ADMIN — NITROGLYCERIN 0.8 MCG/KG/MIN: 20 INJECTION INTRAVENOUS at 21:36

## 2017-01-12 RX ADMIN — HYDRALAZINE HYDROCHLORIDE 10 MG: 20 INJECTION INTRAMUSCULAR; INTRAVENOUS at 21:20

## 2017-01-12 RX ADMIN — FENTANYL CITRATE 25 MCG/HR: 50 INJECTION INTRAVENOUS at 21:27

## 2017-01-12 RX ADMIN — FENTANYL CITRATE 25 MCG: 50 INJECTION, SOLUTION INTRAMUSCULAR; INTRAVENOUS at 21:38

## 2017-01-12 RX ADMIN — MIDAZOLAM 0.5 MG/HR: 5 INJECTION INTRAMUSCULAR; INTRAVENOUS at 21:28

## 2017-01-12 RX ADMIN — ANALGESIC BALM: 1.74; 4.06 OINTMENT TOPICAL at 05:39

## 2017-01-12 RX ADMIN — MIDAZOLAM HYDROCHLORIDE 0.5 MG: 1 INJECTION, SOLUTION INTRAMUSCULAR; INTRAVENOUS at 21:56

## 2017-01-12 RX ADMIN — MIDAZOLAM HYDROCHLORIDE 0.5 MG: 1 INJECTION, SOLUTION INTRAMUSCULAR; INTRAVENOUS at 21:27

## 2017-01-12 RX ADMIN — MIDAZOLAM HYDROCHLORIDE 0.5 MG: 1 INJECTION, SOLUTION INTRAMUSCULAR; INTRAVENOUS at 21:37

## 2017-01-12 RX ADMIN — FENTANYL CITRATE 25 MCG: 50 INJECTION, SOLUTION INTRAMUSCULAR; INTRAVENOUS at 21:27

## 2017-01-12 RX ADMIN — CEFAZOLIN SODIUM 2 G: 2 INJECTION, SOLUTION INTRAVENOUS at 20:46

## 2017-01-12 RX ADMIN — ENALAPRIL MALEATE 10 MG: 10 TABLET ORAL at 09:21

## 2017-01-12 RX ADMIN — FENTANYL CITRATE 25 MCG: 50 INJECTION, SOLUTION INTRAMUSCULAR; INTRAVENOUS at 21:53

## 2017-01-12 RX ADMIN — IOPAMIDOL 10 ML: 755 INJECTION, SOLUTION INTRAVASCULAR at 23:45

## 2017-01-12 RX ADMIN — MIDAZOLAM HYDROCHLORIDE 0.5 MG: 1 INJECTION, SOLUTION INTRAMUSCULAR; INTRAVENOUS at 21:42

## 2017-01-12 RX ADMIN — HYDRALAZINE HYDROCHLORIDE 5 MG: 20 INJECTION INTRAMUSCULAR; INTRAVENOUS at 13:38

## 2017-01-12 ASSESSMENT — ENCOUNTER SYMPTOMS
CHILLS: 0
CHEST TIGHTNESS: 0
ABDOMINAL PAIN: 1
DIARRHEA: 0
COUGH: 0
NAUSEA: 0
VOMITING: 0
FEVER: 0
DIAPHORESIS: 0
SHORTNESS OF BREATH: 1

## 2017-01-12 ASSESSMENT — PAIN DESCRIPTION - DESCRIPTORS: DESCRIPTORS: ACHING

## 2017-01-12 NOTE — H&P
CARDS 1 Admission History and Physical  Gallito Farley MRN: [id number]  1/1/1934  Date of Admission:1/11/2017  Primary care provider: Moiz Richard  ___________________________________          Assessment and Plan:   Gallito Farley is a 83 year old male with PMH of COPD, HTN, chronic leg wound, prior TB with right upper lobectomy who presented with cough after 6 month period in [place] off all medications and found to have bradycardia with complete heart block and hypertensive urgency.    # Complete Heart Block  Likely conduction disorder. Previously on metoprolol but minimal recent use of medications and unlikely accidental overdose.  - Monitor on telemetry overnight, have discussed with telemetry techs  - External pacing pads on overnight and pacer in room if needed  - TTE ordered for am  - Likely pacemaker placement in am, [place]PO at [place] with check of CBC, INR in am    # Hypertensive Urgency  In setting of known hypertension with 6 month period off medications. Maximum blood pressure in ED of 257/101. Symptomatic with chronic chest pressure. No evidence of end organ damage, normal troponin.  - S/p two doses of 5 mg IV hydralazine in ED  - Additional 25 mg PO hydralazine following arrival to floor  - Restart of home enalapril 10 mg daily in am  - Goal SBP overnight of 160-180  - Telemetry as above    # COPD  Reportedly on Flovent prior to time in [place] per PCP note. Presented with dry cough of admission but with stable respiratory status, clear chest on exam and XR.   - Will hold Flovent, also with Spiriva and Flonase listed in meds, unclear if using and will hold    Chronic Conditions  Leg Ulcer: Wound cares, pain relief with Tylenol, will also try menthol cream  GERD: Hold omeprazole  BPH: Hold tamsulosin    FEN: Cardiac, NPO at midnight  Prophylaxis: Mechanical overnight in anticipation of potential PPM placement in am  Code Status: FULL  Disposition:  Inpatient    Patient to be staffed in the     Haley Peterson MD PhD  Internal Medicine PGY-2  [phone]    I have seen, interviewed, and examined patient. I have reviewed the laboratory tests, imaging, and other investigations. I have reviewed the management plan with the patient. I discussed with the team and agree with the findings and plan in this resident/fellow/nurse practitioner's note. In addition, changes in the physical examination, assessment and plan have been incorporated into the note by myself, as to make it a single cohesive document.       Klarissa Garsia MD, MS  Cardiology/Cardiac EP Attending Staff              Chief Complaint:   Cough         History of Present Illness:   History obtained from patient and his two nephews one of whom served as .    Patient reports not feeling well for the past couple of months. States has a weight in his chest that never goes away. Does not do any form of exertional activity and cannot say if chest pressure ever gets worse. Denies dyspnea, orthopnea, dizziness or lightheadedness. Chronic leg ulcer of the right leg with edema of right leg greater than left. Has been in [place] for the past 6 months and returned yesterday.  took some medications during that time but does not know the names.     Chart Review shows two admissions over the summer for COPD exacerbation and PNA.    ROS:  Complete 10 point ROS was negative unless noted in the HPI.         Past Cardiac History:   Recent Cardiac Admissions: None    Last ECHO: 4/16/2013  Interpretation Summary  Technically difficult study. Poor acoustic windows. Global and regional left   ventricular function is normal with an EF of 60-65%. Global right ventricular   function is normal. Pulmonary artery systolic pressure cannot be assessed.   The inferior vena cava  is normal. No pericardial effusion is present.    Last Angiogram: None          Past Medical History:     Past Medical History   Diagnosis  Date     Osteoarthritis      Diastolic dysfunction, left ventricle 4/16/2013     Hypertension      Asthma      COPD (chronic obstructive pulmonary disease) (H)      Leg wound, right      chronic, s/p grafting     BPH (benign prostatic hyperplasia)      H/O tuberculosis      s/p partial pneumonectomy in [place] in the 1990's     LBBB (left bundle branch block)              Past Surgical History:      Past Surgical History   Procedure Laterality Date     Cholecystectomy       Ent surgery       Skin grafting - leg wound  6/2016     Irrigation and debridement hip, combined  4/18/2013     Procedure: COMBINED IRRIGATION AND DEBRIDEMENT HIP;  Irrigation and debridement of Right Hip with cultures;  Surgeon: Cristal Inman MD;  Location: [facility] OR     Partial pneumonectomy       done in [facility] in the 1990's             Social History:     Social History   Substance Use Topics     Smoking status: Former Smoker     Smokeless tobacco: Not on file     Alcohol Use: No             Family History:     Family History   Problem Relation Age of Onset     Family History Negative Mother              Allergies:   No Known Allergies          Medications:     No current facility-administered medications on file prior to encounter.  Current Outpatient Prescriptions on File Prior to Encounter:  albuterol (2.5 MG/3ML) 0.083% nebulizer solution Take 1 vial (2.5 mg) by nebulization 4 times daily (Patient taking differently: Take 2.5 mg by nebulization every 6 hours as needed )   omeprazole (PRILOSEC) 40 MG capsule Take 1 capsule (40 mg) by mouth every morning (before breakfast)   tamsulosin (FLOMAX) 0.4 MG 24 hr capsule Take 1 capsule (0.4 mg) by mouth every 24 hours   enalapril (VASOTEC) 10 MG tablet Take 1 tablet (10 mg) by mouth daily   metoprolol (LOPRESSOR) 25 MG tablet Take 2 tablets (50 mg) by mouth 2 times daily   fluticasone (FLONASE) 50 MCG/ACT nasal spray Spray 1-2 sprays into both nostrils daily   fluticasone (FLOVENT DISKUS)  "250 MCG/BLIST AEPB Inhale 1 puff (250 mcg) into the lungs every 12 hours   tiotropium (SPIRIVA) 18 MCG inhalation capsule Inhale 1 capsule (18 mcg) into the lungs daily            Physical Exam:   Blood pressure 185/65, pulse 40, temperature 98.3  F (36.8  C), temperature source Oral, resp. rate 22, height 1.854 m (6' 1\"), weight 79.89 kg (176 lb 2 oz), SpO2 95 %.    General: NAD, pleasant  HEENT: NCAT, PERRL, EOMI, OP clear and non-erythematous  Neck: No LAD, no JVD  CV: Bradycardic, no murmurs or rubs  Resp: CTAB on anterior exam, no rales, rhonchi, wheezes  Abd: Mild tenderness LUQ, soft, normoactive bowel sounds  Extremities: WWP, trace pitting edema of left leg, 2+ pitting edema of right leg, radial and DP pulses intact  Skin: Large ulcer approximately 4 inches in height that wraps from lateral into posterior right leg above the ankle, no active drainage  Neuro/Psych: AAOx4, CN 2-12 grossly intact, moving all four extremities         Data:   I/O last 3 completed shifts:  In: -   Out: 190 [Urine:190]    CMP  Recent Labs  Lab 01/11/17  1735 01/11/17  1734    138   POTASSIUM 4.5 5.1   CHLORIDE  --  105   CO2  --  26   ANIONGAP  --  7   GLC 94 95   BUN  --  11   CR  --  0.96   GFRESTIMATED  --  75   PAMELA  --  8.3*   PROTTOTAL  --  7.9   ALBUMIN  --  2.4*   BILITOTAL  --  0.4   ALKPHOS  --  86   AST  --  29   ALT  --  14     CBC  Recent Labs  Lab 01/11/17  1735 01/11/17  1734   WBC  --  5.4   RBC  --  5.02   HGB 15.6 14.4   HCT  --  45.3   MCV  --  90   MCH  --  28.7   MCHC  --  31.8   RDW  --  14.1   PLT  --  258     INR  Recent Labs  Lab 01/11/17  1734   INR 1.14       Troponin Lab Results   Component Value Date    TROPI  01/11/2017     <0.015  The 99th percentile for upper reference range is 0.045 ug/L.  Troponin values in   the range of 0.045 - 0.120 ug/L may be associated with risks of adverse   clinical events.      TROPI 0.016 07/21/2016    TROPI  07/14/2016     <0.015  The 99th percentile for upper " reference range is 0.045 ug/L.  Troponin values in   the range of 0.045 - 0.120 ug/L may be associated with risks of adverse   clinical events.      TROPONIN 0.02 04/15/2013    TROPONIN <0.04 11/04/2006    TROPONIN <0.04 09/08/2006     EKG: Complete Heart Block    IMAGING:   CXR: IMPRESSION: Interval placement of an external pacer which partially  obscures the left mid and lower lung zones. Postoperative volume loss  in the right upper hemithorax is again noted. Probable scarring in  right lung base is unchanged. Heart size is difficult to evaluate due  to rotation of the patient. No definite confluent infiltrates or  significant changes since the prior exam.

## 2017-01-12 NOTE — PROGRESS NOTES
Admission:    Pt admitted to 6C via stretcher from [facility]. Pt is very hypertensive 200s/60s, bradycardic with rates stable in lower 40s, other VSS, on RA. Pacer pads on pt upon arrival, zoll in room. Pressure ulcer on R posterior ankle redressed. Pt's nephew is with pt and translating, waiver signed. Oriented to room, call light, etc. Admission profile complete, care plan updated.

## 2017-01-12 NOTE — PHARMACY-ADMISSION MEDICATION HISTORY
Admission Medication History status for the 1/11/2017 admission is complete.  See EPIC admission navigator for Prior to Admission medications.    Medication history sources:  Patient     Medication history source reliability: Poor. Patient reportedly stopped all medications for >6 months.  The medications below appear to be chronic medications that he should be taking, but it is unclear if they are all still necessary since he's been nonadherent for so long.    Medication adherence:  Poor. Patient reports not taking medications for the past 6 months.    Changes made to PTA medication list (reason)  Added: None  Deleted: Benzonatate (appears to be short term), Clonazepam (appears to be short term), Oxycodone (appears to be short term), Prednisone (appears to be short term), Tamsulosin (duplicate entry)  Changed: None    Additional medication history information (including reliability of information, actions taken by pharmacist):   -Patient reports not taking any medications in the last 6 months (he reportedly was in [place] for this amount of time)  -Flagged medications in PTA list for MD review    Time spent in this activity: 15min    Medication history completed by: Ramos Pate, Pharmacy Intern    Prior to Admission medications    Medication Sig Last Dose Taking? Auth Provider   albuterol (2.5 MG/3ML) 0.083% nebulizer solution Take 1 vial (2.5 mg) by nebulization 4 times daily  Patient taking differently: Take 2.5 mg by nebulization every 6 hours as needed  Unknown at Unknown time  Moiz Richard MD   omeprazole (PRILOSEC) 40 MG capsule Take 1 capsule (40 mg) by mouth every morning (before breakfast) Unknown at Unknown time  Moiz Richard MD   tamsulosin (FLOMAX) 0.4 MG 24 hr capsule Take 1 capsule (0.4 mg) by mouth every 24 hours Unknown at Unknown time  Moiz Richard MD   enalapril (VASOTEC) 10 MG tablet Take 1 tablet (10 mg) by mouth daily Unknown at Unknown time  Moiz Richard MD    metoprolol (LOPRESSOR) 25 MG tablet Take 2 tablets (50 mg) by mouth 2 times daily Unknown at Unknown time  Moiz Richard MD   fluticasone (FLONASE) 50 MCG/ACT nasal spray Spray 1-2 sprays into both nostrils daily Unknown at Unknown time  Moiz Richard MD   fluticasone (FLOVENT DISKUS) 250 MCG/BLIST AEPB Inhale 1 puff (250 mcg) into the lungs every 12 hours Unknown at Unknown time  Moiz Richard MD   tiotropium (SPIRIVA) 18 MCG inhalation capsule Inhale 1 capsule (18 mcg) into the lungs daily Unknown at Unknown time  Héctor Jean MD       I have reviewed the medication list with the student, and it is accurate and up to date to the best of my knowledge.    Jeff Zimmer, PharmD, BCPP

## 2017-01-12 NOTE — DISCHARGE INSTRUCTIONS
Home Care after a Pacemaker Implant    Wound care:  Keep your incision (surgery wound) dry for 3 days.  After 3 days, you may remove the outer bandage.  Keep the strips of tape on.  They will be removed at your clinic visit.  Check for signs of infection each day.  These include increased redness, swelling, drainage, bleeding or a fever over 101 F (38.3 C).  Call us immediately if you see any of these signs.  If there are no signs of infection, you may shower in 3 days.  Do not submerge the incision (in a bath tub, hot tub, or swimming pool) until fully healed.  If Dermabond has been applied to your incision.  Do not apply powder or lotion to the incision for 14 days. You may shower in the morning.    Pain:   You may have mild to moderate pain for 3 to 5 days.  Take acetaminophen (Tylenol) or ibuprofen (Advil) for the pain.  Call us if the pain is severe or lasts more than 5 days.    Activity:  After 24 hours, slowly return to your normal activities.   Healing will take 4 to 6 weeks.  No driving for 3 days  Avoid climbing a ladder alone.  It is best to stay within 4 feet of the ground.  Avoid anything that may cause rough contact or a hard hit to your chest.  This includes football, hockey, and other contact sports.  For at least 4 weeks:  Do not raise your affected arm above your shoulder.  Do not use your affected arm to push, pull, or lift anything over 10 pounds.  Avoid repetitive upper body activities for 6 weeks (ie: golf, swimming, and weight lifting)    Follow Up Visits:  Return to the clinic in 7 to 10 days to have your device and wound checked.      Telling others about your device:  Before you have any medical tests or treatments, tell the doctors, dentists, and other care providers about your device.  There are a few tests and treatments that may interfere with your device.  (These include MRI, radiation therapy, electrocautery, and others.)  Your care team may need to take special steps to keep you  safe.  Before you leave the hospital, you will receive a temporary ID card.  A permanent card will be mailed to you about 6 to 8 weeks later.  Always carry the ID card with you.  It has important details about your device.  You should also get a MedicAlert ID.  Please ask us for a MedicAlert brochure, or go to [email or web address].    Safety near electrical equipment:  All of these are safe to use when in good repair:    Microwaves    Radios    Cordless phone    Remote controls    Small electrical tools  Cell phones: Keep cell phones at least 6 inches from your device.  Do not carry the phone in a pocket near your device.  Security valentine: It is okay to walk through security valentine at the airports and department stores.  Tell airport security that you have a pacemaker.  They should keep the screening wand at least 6 inches from your device.  Full-body scanners are safe.  Avoid the following:    MRI tests in the hospital unless you have a MRI safe pacemaker.           Arc welding, chain saws and high-powered industrial or commercial tools.    Power lines, power plants and large power generators.    Electric body fat scales.    Magnetic mattress pads or pillow.    Questions?  Please call [facility].    Device Nurse:          Business Hours:  [phone]                       After Hours:  [phone]   Choose option 4, then ask for the                                                  on-call device nurse at job code [id number].    Your next device clinic appointment is scheduled on:    Friday January 20th @ 10:30 AM            [facility]  [facility] [address]  [address] [place] [address]  [id number]

## 2017-01-12 NOTE — PROGRESS NOTES
Cardiology Daily Note   Date of Service: 1/12/2017  Patient: Gallito Farley  MRN: [id number]  Admission Date: 1/11/2017  Hospital Day # 1      Assessment & Plan:   Gallito Farley is a 83 year old male with PMH of COPD, HTN, chronic leg wound, prior TB with right upper lobectomy who presented with cough after 6 month period in [place] off all medications and found to have bradycardia with complete heart block and hypertensive urgency.     Changes today:  - Hydralazine 5mg once  - PPM today    # Complete Heart Block  Likely conduction disorder. Previously on metoprolol but minimal recent use of medications and unlikely accidental overdose.  - TTE this AM  - Pacemaker placement today  - NPO since MN with check of CBC, INR in am    # Hypertensive Urgency  In setting of known hypertension with 6 month period off medications. Maximum blood pressure in ED of 257/101. Symptomatic with chronic chest pressure. No evidence of end organ damage, normal troponin. S/p two doses of 5 mg IV hydralazine in ED. Additional 25 mg PO hydralazine following arrival to floor.   - Enalapril 10 mg daily   - Goal SBP overnight of 160-180  - PRN hydralazine 5mg IV if SBP >180  - Telemetry as above    # COPD  Reportedly on Flovent prior to time in [place] per PCP note. Presented with dry cough of admission but with stable respiratory status, clear chest on exam and XR.    - Will re-start flovent at time of discharge    Chronic Conditions  Leg Ulcer: Wound cares, pain relief with Tylenol, will also try menthol cream  GERD: Hold omeprazole  BPH: Hold tamsulosin    FEN: NPO, resume diet post-procedure   Prophylaxis: Mechanical overnight in anticipation of potential PPM placement in am  Code Status: FULL  Disposition: Inpatient    This patient was discussed with Dr. Garsia who agrees with the assessment and plan.    Renay Beltrán, PGY2  Internal Medicine/Pediatrics  P: [phone]    I have seen, interviewed, and examined patient. I have  "reviewed the laboratory tests, imaging, and other investigations. I have reviewed the management plan with the patient. I discussed with the team and agree with the findings and plan in this resident/fellow/nurse practitioner's note. In addition, changes in the physical examination, assessment and plan have been incorporated into the note by myself, as to make it a single cohesive document.       Klarissa Garsia MD, MS  Cardiology/Cardiac EP Attending Staff      ___________________________________________________________________      Subjective & Interval Hx:    No acute events since admission. Remains Billy -- assymptomatic. Patient denies HA, sob, chest pain, dizziness. No complaints this morning. NO questions regarding PPM.     Last 24 hr care team notes reviewed.   ROS: 4 point ROS including Respiratory, CV, GI and , other than that noted in the HPI, is negative    Telemetry reviewed: Yes     Physical Exam:  Blood pressure 183/62, pulse 40, temperature 98.7  F (37.1  C), temperature source Oral, resp. rate 22, height 1.854 m (6' 1\"), weight 79.89 kg (176 lb 2 oz), SpO2 94 %.  General: NAD, pleasant  HEENT: NCAT, PERRL, EOMI, OP clear and non-erythematous  Neck: No LAD, no JVD  CV: Bradycardic, no murmurs or rubs  Resp: CTAB on anterior exam, no rales, rhonchi, wheezes  Abd: LUQ, soft, normoactive bowel sounds  Extremities: WWP, trace pitting edema of left leg, 1+ pitting edema of right leg, radial and DP pulses intact  Skin: Large ulcer approximately 4 inches in height that wraps from lateral into posterior right leg above the ankle, no active drainage  Neuro/Psych: AAOx4    Lines/Tubes:   Peripheral IV 01/11/17 Right Hand (Active)   Site Assessment WDL 1/12/2017 12:41 AM   Line Status Saline locked 1/12/2017 12:41 AM   Phlebitis Scale 0-->no symptoms 1/12/2017 12:41 AM   Infiltration Scale 0 1/12/2017 12:41 AM   Extravasation? No 1/12/2017 12:41 AM   Number of days:1       Peripheral IV 01/11/17 Left Lower " forearm (Active)   Site Assessment WDL 1/12/2017 12:41 AM   Line Status Saline locked 1/12/2017 12:41 AM   Phlebitis Scale 0-->no symptoms 1/12/2017 12:41 AM   Infiltration Scale 0 1/12/2017 12:41 AM   Extravasation? No 1/12/2017 12:41 AM   Number of days:1       Labs & Studies of Note:  Labs reviewed in EPIC.     EKG reviewed.   Meds reviewed.  Imaging reviewed.    Medications list for Reference  Current Facility-Administered Medications   Medication     acetaminophen (TYLENOL) tablet 650 mg     methyl salicylate-menthol (STEVEN-TRIMBLE) ointment     enalapril (VASOTEC) tablet 10 mg     naloxone (NARCAN) injection 0.1-0.4 mg

## 2017-01-12 NOTE — ED PROVIDER NOTES
History     Chief Complaint   Patient presents with     Cough     Pastient had pnuemonia a couple months ago.  Family is concerned he may have this again.      Chest Pain     chest pain for a long time per family report.  Patient feels it is worse now.      The history is provided by a relative.     Gallito Farley is  a 83-year-old male with a history of hypertension and COPD who was brought to the ER by his nephew due to worsening shortness of breath.  Per family members who were functioning with a  patient was in [place] and has been off of his Metoprolol and Lisinopril for the past several months.  Patient returned from [place] yesterday and was saying he was feeling unwell and short of breath so they brought him into the ER.  He says he s been intermittently feeling short of breath but has had no chest pain.  Patient noted intermittent lower belly tenderness but none currently.    Per family he has been acting normally.  No nausea no vomiting.  Mildly decreased appetite.  Patient has urinary frequency which has been a stable issue for him.  He denies any fevers.  Denies blood in his stools or urine.  He states he s been having regular bowel movements every day.  Denies any other complaints.  He states that he has not been taking his metoprolol or Lisinopril for the past several months.     I have reviewed the Medications, Allergies, Past Medical and Surgical History, and Social History in the Epic system.    Review of Systems   Constitutional: Negative for fever, chills and diaphoresis.   Respiratory: Positive for shortness of breath. Negative for cough and chest tightness.    Cardiovascular: Positive for chest pain.   Gastrointestinal: Positive for abdominal pain. Negative for nausea, vomiting and diarrhea.       Physical Exam   BP: (!) 229/68 mmHg  Pulse: (!) 40  Heart Rate: 56  Temp: 96.8  F (36  C)  Resp: 18  Weight: 79.89 kg (176 lb 2 oz)  SpO2: 94 %  Physical Exam   Constitutional: He is oriented  to person, place, and time. He appears well-developed and well-nourished.   HENT:   Head: Normocephalic and atraumatic.   Eyes: EOM are normal. Pupils are equal, round, and reactive to light.   Neck: Normal range of motion. Neck supple.   Cardiovascular: Regular rhythm and normal heart sounds.  Bradycardia present.    Pulmonary/Chest: Effort normal. No respiratory distress. He has no wheezes.   Abdominal: Soft. He exhibits no distension. There is no tenderness. There is no rebound.   Musculoskeletal:   Right leg with swelling of lower extremity; large ulcer on to of ankle that circulates bottom of leg; mildly tender; no drainage; chronic   Neurological: He is alert and oriented to person, place, and time.   Skin: Skin is warm.   Psychiatric: He has a normal mood and affect. His behavior is normal. Thought content normal.       ED Course   Procedures             EKG Interpretation:      Interpreted by Kay Salazar  Time reviewed: 1705  Symptoms at time of EKG: None   Rhythm: 2nd degree AV block with 2:1 conducton  Rate: 30-40  Axis: Left Axis Deviation  Ectopy: none  Conduction: right bundle branch block (complete)  ST Segments/ T Waves: inverted T waves latearlly  Q Waves: nonspecific  Comparison to prior: changed from prior    Clinical Impression: 2nd degree AV block-mobitz type II           EKG Interpretation:      Interpreted by Kay Salazar  Time reviewed: 1756  Symptoms at time of EKG: None   Rhythm: 2nd degree AV block with 2:1 conducton  Rate: 30-40  Axis: Left Axis Deviation  Ectopy: none  Conduction: right bundle branch block (complete)  ST Segments/ T Waves: inverted T waves lateral leads  Q Waves: nonspecific  Comparison to prior: changed from prior    Clinical Impression: 2nd degree AV block-mobitz type I-[facility]         EKG Interpretation:      Interpreted by Kay Salazar  Time reviewed: 1903  Symptoms at time of EKG: None   Rhythm: 2nd degree AV block with 2:1  conducton  Rate: 30-40  Axis: Left Axis Deviation  Ectopy: none  Conduction: right bundle branch block (complete)  ST Segments/ T Waves: inverted T waves lateral leads  Q Waves: nonspecific  Comparison to prior: changed from prior    Clinical Impression: 2nd degree AV block-mobitz type I-[facility]      Critical Care time:  was 60 minutes for this patient excluding procedures.         Results for orders placed or performed during the hospital encounter of 01/11/17   XR Chest Port 1 View    Narrative    CHEST ONE VIEW PORTABLE  1/11/2017 6:12 PM     HISTORY: Cough. Shortness of breath.     COMPARISON: 7/21/2016.      Impression    IMPRESSION: Interval placement of an external pacer which partially  obscures the left mid and lower lung zones. Postoperative volume loss  in the right upper hemithorax is again noted. Probable scarring in  right lung base is unchanged. Heart size is difficult to evaluate due  to rotation of the patient. No definite confluent infiltrates or  significant changes since the prior exam.    RUFUS JENSEN MD   CBC with platelets differential   Result Value Ref Range    WBC 5.4 4.0 - 11.0 10e9/L    RBC Count 5.02 4.4 - 5.9 10e12/L    Hemoglobin 14.4 13.3 - 17.7 g/dL    Hematocrit 45.3 40.0 - 53.0 %    MCV 90 78 - 100 fl    MCH 28.7 26.5 - 33.0 pg    MCHC 31.8 31.5 - 36.5 g/dL    RDW 14.1 10.0 - 15.0 %    Platelet Count 258 150 - 450 10e9/L    Diff Method Automated Method     % Neutrophils 48.0 %    % Lymphocytes 35.5 %    % Monocytes 12.9 %    % Eosinophils 2.8 %    % Basophils 0.6 %    % Immature Granulocytes 0.2 %    Nucleated RBCs 0 0 /100    Absolute Neutrophil 2.6 1.6 - 8.3 10e9/L    Absolute Lymphocytes 1.9 0.8 - 5.3 10e9/L    Absolute Monocytes 0.7 0.0 - 1.3 10e9/L    Absolute Eosinophils 0.2 0.0 - 0.7 10e9/L    Absolute Basophils 0.0 0.0 - 0.2 10e9/L    Abs Immature Granulocytes 0.0 0 - 0.4 10e9/L    Absolute Nucleated RBC 0.0    Comprehensive metabolic panel   Result Value Ref Range     Sodium 138 133 - 144 mmol/L    Potassium 5.1 3.4 - 5.3 mmol/L    Chloride 105 94 - 109 mmol/L    Carbon Dioxide 26 20 - 32 mmol/L    Anion Gap 7 3 - 14 mmol/L    Glucose 95 70 - 99 mg/dL    Urea Nitrogen 11 7 - 30 mg/dL    Creatinine 0.96 0.66 - 1.25 mg/dL    GFR Estimate 75 >60 mL/min/1.7m2    GFR Estimate If Black >90   GFR Calc   >60 mL/min/1.7m2    Calcium 8.3 (L) 8.5 - 10.1 mg/dL    Bilirubin Total 0.4 0.2 - 1.3 mg/dL    Albumin 2.4 (L) 3.4 - 5.0 g/dL    Protein Total 7.9 6.8 - 8.8 g/dL    Alkaline Phosphatase 86 40 - 150 U/L    ALT 14 0 - 70 U/L    AST 29 0 - 45 U/L   Troponin I   Result Value Ref Range    Troponin I ES  0.000 - 0.045 ug/L     <0.015  The 99th percentile for upper reference range is 0.045 ug/L.  Troponin values in   the range of 0.045 - 0.120 ug/L may be associated with risks of adverse   clinical events.     INR   Result Value Ref Range    INR 1.14 0.86 - 1.14   Lactic acid   Result Value Ref Range    Lactic Acid 1.4 0.7 - 2.1 mmol/L   UA without Microscopic   Result Value Ref Range    Color Urine Yellow     Appearance Urine Clear     Glucose Urine Negative NEG mg/dL    Bilirubin Urine Negative NEG    Ketones Urine Negative NEG mg/dL    Specific Gravity Urine 1.016 1.003 - 1.035    Blood Urine Negative NEG    pH Urine 7.5 (H) 5.0 - 7.0 pH    Protein Albumin Urine 30 (A) NEG mg/dL    Urobilinogen mg/dL 2.0 0.0 - 2.0 mg/dL    Nitrite Urine Negative NEG    Leukocyte Esterase Urine Trace (A) NEG    Source Unspecified Urine    EKG 12 lead   Result Value Ref Range    Interpretation ECG Click View Image link to view waveform and result    EKG 12 lead   Result Value Ref Range    Interpretation ECG Click View Image link to view waveform and result    EKG 12-lead, tracing only   Result Value Ref Range    Interpretation ECG Click View Image link to view waveform and result    EKG 12-lead, tracing only   Result Value Ref Range    Interpretation ECG Click View Image link to view waveform  and result    ISTAT gases elec ica gluc delon POCT   Result Value Ref Range    Ph Venous 7.35 7.32 - 7.43 pH    PCO2 Venous 57 (H) 40 - 50 mm Hg    PO2 Venous 26 25 - 47 mm Hg    Bicarbonate Venous 32 (H) 21 - 28 mmol/L    O2 Sat Venous 44 %    Sodium 141 133 - 144 mmol/L    Potassium 4.5 3.4 - 5.3 mmol/L    Glucose 94 70 - 99 mg/dL    Calcium Ionized 4.7 4.4 - 5.2 mg/dL    Hemoglobin 15.6 13.3 - 17.7 g/dL    Hematocrit - POCT 46 40.0 - 53.0 %PCV   Urine Culture   Result Value Ref Range    Specimen Description Unspecified Urine     Special Requests Specimen received in preservative     Culture Micro Pending     Micro Report Status Pending      Medications   enalapril (VASOTEC) tablet 10 mg (not administered)   naloxone (NARCAN) injection 0.1-0.4 mg (not administered)   NaCl 0.9 % infusion (  Stopped 1/11/17 1817)   NaCl 0.9 % infusion (1,000 mLs  New Bag 1/11/17 1817)   hydrALAZINE (APRESOLINE) injection 5 mg (5 mg Intravenous Given 1/11/17 1804)   atropine injection 0.5 mg (0.5 mg Intravenous Given 1/11/17 1804)   hydrALAZINE (APRESOLINE) injection 5 mg (5 mg Intravenous Given 1/11/17 1814)   hydrALAZINE (APRESOLINE) tablet 25 mg (25 mg Oral Given 1/11/17 2256)              Labs Ordered and Resulted from Time of ED Arrival Up to the Time of Departure from the ED   COMPREHENSIVE METABOLIC PANEL - Abnormal; Notable for the following:     Calcium 8.3 (*)     Albumin 2.4 (*)     All other components within normal limits   ISTAT GASES ELEC ICA GLUC DELON POCT - Abnormal; Notable for the following:     PCO2 Venous 57 (*)     Bicarbonate Venous 32 (*)     All other components within normal limits   CBC WITH PLATELETS DIFFERENTIAL   TROPONIN I   INR   LACTIC ACID WHOLE BLOOD       Assessments & Plan (with Medical Decision Making)  83-year-old male with a history of COPD prior right upper lobe lobectomy due to a TB and hypertension who presented to the ER due to feeling unwell and having shortness of breath. Patient was found  to have bradycardia with a heart rate in the 40s. Patient was complaining of some shortness of breath. Per family he has not taken any of his medication in 6 months due to being off of his medications while he was traveling in [place].  Patient had 2 IVs that were placed after some difficulty in getting access.  Patient s heart rate dropped to the high 30s so he was given 0.5 atropine.  Patient s heart rate increased from  the high 30 to the low 40s.  Patient s blood pressure was also elevated to 257 systolic so he was given 1 initial dose of  5mg of hydralazine .  His blood pressure improved from 257 to 200 systolic.  Patient had minimal improvement of his heart rate.  Patient was given a 2nd dose of 5 mg IV hydralazine at this time his heart rate decreased to the 80s.  Patient said that he was feeling better.  Throughout this time in the ER patient was mentating  well and had no signs of acute decompensation.  Patient did have pacer pads placed on him in case his heart rate dropped further. Patient s initial EKG showed a 2-1 AV block.  Patient was conducting every other beat.  His second EKG showed patient was having a type 2 wenckebach.  On patient s repeat EKG again showed 2-1 AV block with intermittent missed beats that were consistent.  I discussed these findings with Dr. Yen.  Dr. Yen recommended a 6C admission. Throughout his stay in the ER patient has been mentating well.  Patient has had no acute symptoms.  Patient felt like his breathing was better. Patient s labs are stable.  Patient s chest x-ray showed right lobe lobectomy with no acute infiltrate visible.   Critical care for this patient was 45 minutes.  Patient will be transferred to cardiology service for further care. I did review patient s medical chart and he has no history of previous blocks.      I have reviewed the nursing notes.    I have reviewed the findings, diagnosis, plan and need for follow up with the patient.    New  Prescriptions    No medications on file       Final diagnoses:   Second degree AV block   Bradycardia   Essential hypertension       1/11/2017   [facility], [place], EMERGENCY DEPARTMENT      Kay Salazar MD  01/12/17 0114

## 2017-01-12 NOTE — PLAN OF CARE
Problem: Goal Outcome Summary  Goal: Goal Outcome Summary  Outcome: No Change    Pt admitted for worsening SOB, found to be in 3rd degree heart block. HR stable in low 40s with occasional dips to upper 30s. Hypertensive, 180-200s/60-70s, other VSS, on RA. One time 25mg PO hydralazine given, BPs decreased slightly. Pt states he had a headache and leg pain. PRN tylenol given with little relief. Warm packs applied with little relief. MDs notified, PRN bengay cream ordered and applied. Pacer pads on pt and connected to [facility]. NPO since midnight. Pressure ulcer/injury on R posterior ankle, abd and kerlix wrapped. A&Ox4, [place] speaking. Nephew translated admission, interpretor phone at bedside. Plan is to insert PPM. Continue to monitor and notify Cards 1 with pertinent changes.

## 2017-01-12 NOTE — PROGRESS NOTES
-Pt high blood pressure and low heart rate are being closely monitored prior to pacer placement early this evening.  -Pt has been attached to an external pacer throughout the day as a precaution to critical condition change.  -Pt had his chronic wound above his left heel dissinfected and redressed. A consult with wound care nursing has been ordered.

## 2017-01-13 ENCOUNTER — ALLIED HEALTH/NURSE VISIT (OUTPATIENT)
Dept: CARDIOLOGY | Facility: CLINIC | Age: 82
DRG: 243 | End: 2017-01-13
Attending: INTERNAL MEDICINE
Payer: MEDICARE

## 2017-01-13 ENCOUNTER — APPOINTMENT (OUTPATIENT)
Dept: GENERAL RADIOLOGY | Facility: CLINIC | Age: 82
DRG: 243 | End: 2017-01-13
Attending: INTERNAL MEDICINE
Payer: MEDICARE

## 2017-01-13 ENCOUNTER — APPOINTMENT (OUTPATIENT)
Dept: GENERAL RADIOLOGY | Facility: CLINIC | Age: 82
DRG: 243 | End: 2017-01-13
Attending: NURSE PRACTITIONER
Payer: MEDICARE

## 2017-01-13 ENCOUNTER — OFFICE VISIT (OUTPATIENT)
Dept: INTERPRETER SERVICES | Facility: CLINIC | Age: 82
End: 2017-01-13

## 2017-01-13 DIAGNOSIS — I44.2 ATRIOVENTRICULAR BLOCK, COMPLETE (H): Primary | ICD-10-CM

## 2017-01-13 LAB
ANION GAP SERPL CALCULATED.3IONS-SCNC: 7 MMOL/L (ref 3–14)
BUN SERPL-MCNC: 11 MG/DL (ref 7–30)
CALCIUM SERPL-MCNC: 8.2 MG/DL (ref 8.5–10.1)
CHLORIDE SERPL-SCNC: 106 MMOL/L (ref 94–109)
CO2 SERPL-SCNC: 26 MMOL/L (ref 20–32)
CREAT SERPL-MCNC: 0.93 MG/DL (ref 0.66–1.25)
GFR SERPL CREATININE-BSD FRML MDRD: 77 ML/MIN/1.7M2
GLUCOSE SERPL-MCNC: 100 MG/DL (ref 70–99)
INTERPRETATION ECG - MUSE: NORMAL
MAGNESIUM SERPL-MCNC: 2.2 MG/DL (ref 1.6–2.3)
POTASSIUM SERPL-SCNC: 4.1 MMOL/L (ref 3.4–5.3)
SODIUM SERPL-SCNC: 139 MMOL/L (ref 133–144)

## 2017-01-13 PROCEDURE — 93288 INTERROG EVL PM/LDLS PM IP: CPT | Mod: 26 | Performed by: INTERNAL MEDICINE

## 2017-01-13 PROCEDURE — 99212 OFFICE O/P EST SF 10 MIN: CPT

## 2017-01-13 PROCEDURE — 93010 ELECTROCARDIOGRAM REPORT: CPT | Performed by: INTERNAL MEDICINE

## 2017-01-13 PROCEDURE — 40000141 ZZH STATISTIC PERIPHERAL IV START W/O US GUIDANCE

## 2017-01-13 PROCEDURE — 20000004 ZZH R&B ICU UMMC

## 2017-01-13 PROCEDURE — T1013 SIGN LANG/ORAL INTERPRETER: HCPCS | Mod: U3

## 2017-01-13 PROCEDURE — 83735 ASSAY OF MAGNESIUM: CPT | Performed by: INTERNAL MEDICINE

## 2017-01-13 PROCEDURE — 99024 POSTOP FOLLOW-UP VISIT: CPT | Mod: 24 | Performed by: INTERNAL MEDICINE

## 2017-01-13 PROCEDURE — 71010 XR CHEST PORT 1 VW: CPT

## 2017-01-13 PROCEDURE — 93005 ELECTROCARDIOGRAM TRACING: CPT

## 2017-01-13 PROCEDURE — 25000132 ZZH RX MED GY IP 250 OP 250 PS 637: Performed by: INTERNAL MEDICINE

## 2017-01-13 PROCEDURE — 25000132 ZZH RX MED GY IP 250 OP 250 PS 637: Performed by: NURSE PRACTITIONER

## 2017-01-13 PROCEDURE — A9270 NON-COVERED ITEM OR SERVICE: HCPCS | Mod: GY | Performed by: INTERNAL MEDICINE

## 2017-01-13 PROCEDURE — 25000125 ZZHC RX 250: Performed by: INTERNAL MEDICINE

## 2017-01-13 PROCEDURE — 25000132 ZZH RX MED GY IP 250 OP 250 PS 637: Mod: GY | Performed by: INTERNAL MEDICINE

## 2017-01-13 PROCEDURE — 71010 XR CHEST PORT 1 VW: CPT | Mod: 77

## 2017-01-13 PROCEDURE — 80048 BASIC METABOLIC PNL TOTAL CA: CPT | Performed by: INTERNAL MEDICINE

## 2017-01-13 PROCEDURE — 93280 PM DEVICE PROGR EVAL DUAL: CPT | Mod: ZF

## 2017-01-13 PROCEDURE — 25000125 ZZHC RX 250: Performed by: NURSE PRACTITIONER

## 2017-01-13 PROCEDURE — 99232 SBSQ HOSP IP/OBS MODERATE 35: CPT | Mod: 24 | Performed by: INTERNAL MEDICINE

## 2017-01-13 RX ORDER — HYDROCHLOROTHIAZIDE 12.5 MG/1
25 CAPSULE ORAL DAILY
Status: DISCONTINUED | OUTPATIENT
Start: 2017-01-13 | End: 2017-01-14 | Stop reason: HOSPADM

## 2017-01-13 RX ORDER — ENALAPRIL MALEATE 2.5 MG/1
10 TABLET ORAL DAILY
Status: DISCONTINUED | OUTPATIENT
Start: 2017-01-13 | End: 2017-01-13

## 2017-01-13 RX ORDER — CEFAZOLIN SODIUM 1 G/3ML
1 INJECTION, POWDER, FOR SOLUTION INTRAMUSCULAR; INTRAVENOUS EVERY 8 HOURS
Status: COMPLETED | OUTPATIENT
Start: 2017-01-13 | End: 2017-01-14

## 2017-01-13 RX ORDER — NALOXONE HYDROCHLORIDE 0.4 MG/ML
.1-.4 INJECTION, SOLUTION INTRAMUSCULAR; INTRAVENOUS; SUBCUTANEOUS
Status: DISCONTINUED | OUTPATIENT
Start: 2017-01-13 | End: 2017-01-13

## 2017-01-13 RX ORDER — ENALAPRIL MALEATE 20 MG/1
20 TABLET ORAL DAILY
Status: DISCONTINUED | OUTPATIENT
Start: 2017-01-14 | End: 2017-01-13

## 2017-01-13 RX ORDER — LIDOCAINE 40 MG/G
CREAM TOPICAL
Status: DISCONTINUED | OUTPATIENT
Start: 2017-01-13 | End: 2017-01-14 | Stop reason: HOSPADM

## 2017-01-13 RX ORDER — AMLODIPINE BESYLATE 5 MG/1
5 TABLET ORAL DAILY
Status: DISCONTINUED | OUTPATIENT
Start: 2017-01-13 | End: 2017-01-13

## 2017-01-13 RX ORDER — AMLODIPINE BESYLATE 5 MG/1
5 TABLET ORAL DAILY
Status: DISCONTINUED | OUTPATIENT
Start: 2017-01-13 | End: 2017-01-14

## 2017-01-13 RX ORDER — FUROSEMIDE 10 MG/ML
20 INJECTION INTRAMUSCULAR; INTRAVENOUS ONCE
Status: DISCONTINUED | OUTPATIENT
Start: 2017-01-13 | End: 2017-01-13

## 2017-01-13 RX ORDER — ALBUTEROL SULFATE 90 UG/1
2 AEROSOL, METERED RESPIRATORY (INHALATION) EVERY 6 HOURS PRN
Status: DISCONTINUED | OUTPATIENT
Start: 2017-01-13 | End: 2017-01-14 | Stop reason: HOSPADM

## 2017-01-13 RX ORDER — ENALAPRIL MALEATE 2.5 MG/1
10 TABLET ORAL DAILY
Status: DISCONTINUED | OUTPATIENT
Start: 2017-01-14 | End: 2017-01-14 | Stop reason: HOSPADM

## 2017-01-13 RX ADMIN — UMECLIDINIUM 1 PUFF: 62.5 AEROSOL, POWDER ORAL at 19:43

## 2017-01-13 RX ADMIN — ENALAPRIL MALEATE 10 MG: 10 TABLET ORAL at 09:17

## 2017-01-13 RX ADMIN — ACETAMINOPHEN AND CODEINE PHOSPHATE 1 TABLET: 300; 30 TABLET ORAL at 09:24

## 2017-01-13 RX ADMIN — ACETAMINOPHEN 650 MG: 325 TABLET, FILM COATED ORAL at 06:03

## 2017-01-13 RX ADMIN — CEFAZOLIN 1 G: 1 INJECTION, POWDER, FOR SOLUTION INTRAMUSCULAR; INTRAVENOUS at 16:39

## 2017-01-13 RX ADMIN — NICARDIPINE HYDROCHLORIDE 7.5 MG/HR: 0.2 INJECTION, SOLUTION INTRAVENOUS at 22:48

## 2017-01-13 RX ADMIN — ACETAMINOPHEN AND CODEINE PHOSPHATE 1 TABLET: 300; 30 TABLET ORAL at 10:04

## 2017-01-13 RX ADMIN — NICARDIPINE HYDROCHLORIDE 2.5 MG/HR: 0.2 INJECTION, SOLUTION INTRAVENOUS at 16:30

## 2017-01-13 RX ADMIN — HYDROCHLOROTHIAZIDE 25 MG: 12.5 CAPSULE ORAL at 12:31

## 2017-01-13 RX ADMIN — ACETAMINOPHEN AND CODEINE PHOSPHATE 1 TABLET: 300; 30 TABLET ORAL at 19:41

## 2017-01-13 RX ADMIN — AMLODIPINE BESYLATE 5 MG: 5 TABLET ORAL at 13:50

## 2017-01-13 RX ADMIN — CEFAZOLIN 1 G: 1 INJECTION, POWDER, FOR SOLUTION INTRAMUSCULAR; INTRAVENOUS at 09:17

## 2017-01-13 RX ADMIN — FLUTICASONE PROPIONATE 1 PUFF: 250 POWDER, METERED RESPIRATORY (INHALATION) at 19:42

## 2017-01-13 ASSESSMENT — PAIN DESCRIPTION - DESCRIPTORS: DESCRIPTORS: DISCOMFORT

## 2017-01-13 NOTE — PROGRESS NOTES
Pt transferred to 4C after PPM implantation in cath lab. Belongings sent with family down to 4C at 0030.

## 2017-01-13 NOTE — PROGRESS NOTES
Transfer:    Pt was transferred to Cath Lab at 2030 via cart. On RA, pacer pads on and connected to [employer].   Belongings in room. Family accompanied pt down to cath lab.

## 2017-01-13 NOTE — PROGRESS NOTES
Cardiology Daily Note   Date of Service: 1/12/2017  Patient: Gallito Farley  MRN: [id number]  Admission Date: 1/11/2017  Hospital Day # 2      Assessment & Plan:   Gallito Farley is a 83 year old male with PMH of COPD, HTN, chronic leg wound, prior TB with right upper lobectomy who presented with cough after 6 month period in [place] off all medications and found to have bradycardia with complete heart block and hypertensive urgency.     Changes today:  -- PPM interrogation  -- Post-op CXR  -- Wean nitro gtt as able  -- start HCTz  -- Continue cephalexin     # Complete Heart Block  Likely conduction disorder. Previously on metoprolol but minimal recent use of medications and unlikely accidental overdose. Patient underwent a successful dual chamber implant on 01/13/17.   -- Device interrogation this AM  -- Post-procedure CXR  -- Cephalexin PO for prophylaxis for 5 days (end date 01/17/17)  -- No lifting > 10lbs or over shoulder level with left arm for 4 weeks  -- Follow up with device clinic in 7-10 days    # Hypertensive Urgency  In setting of known hypertension with 6 month period off medications. Maximum blood pressure in ED of 257/101. Symptomatic with chronic chest pressure. No evidence of end organ damage, normal troponin. He was noted to be very hypertensive (240's/100's) when he arrived in [place] for PPM implant. He was given 10mg IV hydralazine with little effect. He was subsequently started on nitro gtt which helped control blood pressures to 140-160's/60-80's. Still requiring blood pressure management. Given need for ongoing nitro gtt for BP management, he was transferred to ICU.   -- Enalapril 10 mg daily   -- Start HCTz 25mg QD  -- Goal -160s  -- Wean nitro gtt as able  -- Consider work-up for secondary causes of HTN if no improvement     # COPD  Pt reported not taking any medication for at least the past 6 months. Presented with dry cough of admission but with stable respiratory status, clear  "chest on exam and XR.  No evidence of acute copd exacerbation  - Re-start flovent BID  and ipratropium daily    Chronic Conditions  Leg Ulcer: Wound cares, pain relief with Tylenol, will also try menthol cream   - Wound Nurse consulted   GERD: switched from omeprazole to ranitidin daily.    BPH: Continue pta tamsulosin    FEN: Cardiac  Prophylaxis: Mechanical   Code Status: FULL  Disposition: pending improvement in blood pressure    This patient was discussed with Dr. Garsia who agrees with the assessment and plan.    Renay Beltrán, PGY2  Internal Medicine/Pediatrics  P: [phone]    I have seen, interviewed, and examined patient. I have reviewed the laboratory tests, imaging, and other investigations. I have reviewed the management plan with the patient. I discussed with the team and agree with the findings and plan in this resident/fellow/nurse practitioner's note. In addition, changes in the physical examination, assessment and plan have been incorporated into the note by myself, as to make it a single cohesive document.       Klarissa Garsia MD, MS  Cardiology/Cardiac EP Attending Staff          Subjective & Interval Hx:    Nursing notes reviewed. He had an uneventful overnight stay. He continues to be hypertensive. This AM, c/o mild headache and some chest soreness over incision site. Otherwise denies sob, vision changes, dizziness.     Last 24 hr care team notes reviewed.   ROS: 4 point ROS including Respiratory, CV, GI and , other than that noted in the HPI, is negative    Telemetry reviewed: Yes     Physical Exam:  Blood pressure 170/86, pulse 40, temperature 98.8  F (37.1  C), temperature source Tympanic, resp. rate 24, height 1.854 m (6' 1\"), weight 79 kg (174 lb 2.6 oz), SpO2 99 %.  General: NAD, pleasant  HEENT: NCAT, PERRL, EOMI, OP clear and non-erythematous  Neck: No LAD, no JVD  CV: Bradycardic, no murmurs or rubs  Resp: CTAB on anterior exam, no rales, rhonchi, wheezes  Abd: LUQ, soft, normoactive " bowel sounds  Extremities: WWP, trace pitting edema of left leg, 1+ pitting edema of right leg, radial and DP pulses intact  Skin: Large leg ulcer on right leg wrapped by wound nurse, no active drainage    Neuro/Psych: AAOx4      Labs & Studies of Note:  Labs reviewed in EPIC.     EKG reviewed.   Meds reviewed.  Imaging reviewed.    Medications list for Reference  Current Facility-Administered Medications   Medication     lidocaine 1 % 1 mL     lidocaine (LMX4) kit     sodium chloride (PF) 0.9% PF flush 3 mL     sodium chloride (PF) 0.9% PF flush 3 mL     acetaminophen-codeine (TYLENOL #3) 300-30 MG per tablet 1-2 tablet     HOLD: metformin and metformin containing medications on day of procedure and 48 hours after IV contrast given     HOLD: enoxaparin (LOVENOX) Post Procedure     HOLD: heparin (IV or Subcutaneous) Post Procedure     ceFAZolin (ANCEF) 1 g vial to attach to  ml bag for ADULT or 50 ml bag for PEDS     [START ON 1/14/2017] cephalexin (KEFLEX) capsule 250 mg     fluticasone (FLOVENT DISKUS) 250 mcg/puff inhaler 1 puff     umeclidinium (INCRUSE ELLIPTA) oral inhaler 1 puff     albuterol (PROAIR HFA/PROVENTIL HFA/VENTOLIN HFA) Inhaler 2 puff     furosemide (LASIX) injection 20 mg     hydrochlorothiazide (MICROZIDE) capsule 25 mg     acetaminophen (TYLENOL) tablet 650 mg     methyl salicylate-menthol (STEVEN-TRIMBLE) ointment     nitroglycerin 50 mg in D5W 250 mL (adult std) infusion     enalapril (VASOTEC) tablet 10 mg     naloxone (NARCAN) injection 0.1-0.4 mg

## 2017-01-13 NOTE — OP NOTE
EP PROCEDURE NOTE    Procedures:  1. Dual chamber PPM device implantation.  2. Left subclavian venogram.    Attending: Dr. Huang  EP Fellow: Dr. Saldivar  Procedure Date: 1/12/2017    Pre-operative Diagnosis:  Complete Heart Block.  Device Indication: Complete Heart block  Post-operative diagnosis:   Successful implantation of PPM.  Complications: None.     Fluoroscopy time/dose: 12.8 minutes, 315 cGycm2.  Medications/Sedation: Patient received 75 mcg Fentanyl and 2 mg Versed; also received infusions of both Fentanyl and Versed. Total sedation time 133 minutes. Patient was monitored by the department nursing staff under the supervision of Dr. Huang.      Clinical Profile:  Gallito Farley is a 83 year old male with PMH of COPD, HTN, chronic leg wound, prior TB with right upper lobectomy who presented with cough after 6 month period in [place] off all medications and found to have bradycardia with complete heart block and hypertensive urgency.    PROCEDURE  The risks and benefits of the procedure were explained to the patient in full.  The risks of the procedure include, but are not limited to: pain, bleeding, blood transfusion reactions, infection, pneumothorax, perforation of vessels or heart, pericardial effusion, and death. Informed Consent was obtained. The patient was brought to the EP lab in a fasting and hemodynamically stable condition. The patient was prepped and draped in a sterile fashion. This procedure was done under moderate sedation.  The left chest wall was vigorously washed, prepped, and sterilely draped. The chest wall was anesthetized with 2% lidocaine. A subclavian venogram was performed.    A 5 cm incision was made approximately 2 fingerbreadths from the clavicle. The subcutaneous tissue was carefully dissected down to the pectoral fascia. The dissection was carried inferiorly to form a subcutaneous pocket with adequate hemostasis provided by electrocautery. The subclavian vein was accessed via the  pocket and over the first rib under fluoroscopic guidance, and two guidewires were inserted down into the level of the IVC.    A peel away sheath was inserted through the lateral guidewire. The guidewire and dilator was removed. A right ventricular lead was inserted through the sheath down to the RV septum, apical/septum and was screwed into the myocardium. There was very good sensing and pacing threshold at this position. Ten volt Pacing was performed without diaphragmatic stimulation. The sheath was then peeled away, and the sleeve adapter was advanced over the lead. The lead was then secured to the muscle using 0-Ethibond suture.     A peel away sheath was inserted through the medial guidewire. The guidewire and dilator was removed. A right atrial lead was inserted through the sheath and positioned the lead at the RA lateral wall. The lead was screwed into the myocardium. There was very good sensing and pacing threshold at this position. Ten volt pacing did not produce diaphragmatic stimulation. The sheath was then peeled away. The right atrial lead was sutured down into the underlying muscle using 0-ethibond.      The pocket was then vigorously washed with antibiotic solution. The right atrial and right ventricular leads were inserted into the pulse generator. The pulse generator and the lead were inserted into the subcutaneous pocket and secured with a 0-Ethibond suture.    The pocket was then closed in 3 layers using 2-0 Vicryl for the deep layers and 4-0 Vicryl for the subcuticular layer. Steri-Strips and a dressing were then applied over the incision site, and the patient was transported to a monitored bed.    Dr. Huang was present throughout the entire procedure.    EQUIPMENT  1.The Pulse Generator is a  [employer] A2DRO1, Serial [id number].   2.The RA Lead is a  5076-52, Serial [id number].   3.The RV Lead is a 5076-58, Serial [id number].    MEASUREMENTS  The RA is sensing P waves of 3.0 mV and a pacing  capture threshold of 0.75 V @ .04 msec with an impedance of 399 ohms.  The RV is sensing R waves of 11.3 mV and a pacing capture threshold of 0.5 V @ 0.4 msec with an impedance of 646 ohms.     FINAL PROGRAMMING  Billy Settings:  -Pacing mode: DDD.  -Lower Rate Limit: 60 ppm.  -Upper Rate Limit: 100 ppm. (since his sinus rate is above 100 beats per minute at this time, please expect some pacemaker wenckebach (patient hypertensive). Will increase to 120 tomorrow.    PLAN  1. Wound care.  2. Antibiotics: 5 Days of clindamycin.    3. CXR and Device interrogation in a.m.  4. Patient is on a nitro gtt for blood pressure control, BP of 240/100 upon arrival to cath lab.  Goal SBP of 190.    Sahil Saldivar M.D.  Cardiac Electrophysiology Fellow    EP STAFF NOTE  I was present throughout the above procedure. I agree with the not above by Dr Goldstein, EP fellow.  Hai Huang MD [facility]  Cardiology - Electrophysiology

## 2017-01-13 NOTE — PROGRESS NOTES
"  Electrophysiology Post Procedure Follow Up Note  Date of Procedure: 1/12/17  DOS: 1/13/2017   Pre-operative Diagnosis:  Complete Heart Block.  Device Indication: Complete Heart block  Post-operative diagnosis:   Successful implantation of PPM.  Hospital Course:  Mr. Farley is an 83 year old male who has a past medical history significant for COPD, HTN, chronic leg wound, prior TB with right upper lobectomy who presented with cough after 6 month period in [place] off all medications and found to have bradycardia with complete heart block and hypertensive urgency.He was referred for PPM implant.     Patient underwent a successful dual chamber implant yesterday. He was noted to be very hypertensive (240's/100's) when he arrived in [facility] for PPM implant. He was given 10mg IV hydralazine with little effect. He was subsequently started on nitro gtt which helped control blood pressures to 140-160's/60-80's throughout most of the case. Given need for ongoing nitro gtt for BP management, he was transferred to ICU. He had an uneventful overnight stay. He continues to be hypertensive. Device interrogation shows normal device function and stable lead parameters. Chest x-ray demonstrates no evidence of pneumothorax. Left subclavian site is CDI, no bleeding, and no hematoma. Patient is tolerating oral intake, ambulating at baseline, and voiding without difficulties. Patient remains hemodynamically stable.     ROS:   10 point ROS neg other than the symptoms noted above.   Exam:  /81 mmHg  Pulse 40  Temp(Src) 98.1  F (36.7  C) (Oral)  Resp 26  Ht 1.854 m (6' 1\")  Wt 79 kg (174 lb 2.6 oz)  BMI 22.98 kg/m2  SpO2 98%    Constitutional: alert and no distress  Head: Normocephalic. No masses, lesions, tenderness or abnormalities  Neck: Neck supple. No adenopathy. Thyroid symmetric, normal size,, Carotids without bruits.  ENT: ENT exam normal, no neck nodes or sinus tenderness  Cardiovascular: negative, PMI normal. No lifts, " heaves, or thrills. RRR. No murmurs, clicks gallops or rub  Respiratory: negative, Good diaphragmatic excursion. Lungs clear  Gastrointestinal: Abdomen soft, non-tender. BS normal. No masses, organomegaly  : Deferred  Skin: right leg ulcer dressed, no suspicious lesions or rashes  Neurologic: A&OX4.  Psychiatric: mentation appears normal and affect normal/bright  Discharge Medications:   Miguelito Gallito CARRILLO   Home Medication Instructions DIMAS:[id number]    Printed on:01/13/17 0308   Medication Information                      albuterol (2.5 MG/3ML) 0.083% nebulizer solution  Take 1 vial (2.5 mg) by nebulization 4 times daily             enalapril (VASOTEC) 10 MG tablet  Take 1 tablet (10 mg) by mouth daily             fluticasone (FLONASE) 50 MCG/ACT nasal spray  Spray 1-2 sprays into both nostrils daily             fluticasone (FLOVENT DISKUS) 250 MCG/BLIST AEPB  Inhale 1 puff (250 mcg) into the lungs every 12 hours             metoprolol (LOPRESSOR) 25 MG tablet  Take 2 tablets (50 mg) by mouth 2 times daily             omeprazole (PRILOSEC) 40 MG capsule  Take 1 capsule (40 mg) by mouth every morning (before breakfast)             tamsulosin (FLOMAX) 0.4 MG 24 hr capsule  Take 1 capsule (0.4 mg) by mouth every 24 hours             tiotropium (SPIRIVA) 18 MCG inhalation capsule  Inhale 1 capsule (18 mcg) into the lungs daily                 Plan & Follow up:    Continued cares for hypertensive urgency per primary team    Follow up with device clinic in 7-10 days    Oral antibiotics x 5 days    Keep incision clean and dry for 72 hours post procedure    No lifting > 10lbs or over shoulder level with left arm for 4 weeks    Notify device clinic/EP immediately for any redness, swelling, bleeding, discharge, fevers or chills.      This patient was seen and examined with Dr. Huang. The above note reflects our joint assessment and plan.   KIRSTEN Sims CNP  Electrophysiology Consult Service  Pager: [phone]      EP  STAFF NOTE  Patient seen and examined and management plan personally reviewed with the patient. I agree with the note above by Trudy Angeles, EP Nurse. Changes in the physical examination, assessment and plan have been incorporated into the note by myself, as to make it a single cohesive document. Patient seen 1/13/17.  Hai Huang MD [facility]  Cardiology - Electrophysiology

## 2017-01-13 NOTE — PROGRESS NOTES
Pt seen on 4C for evaluation and programming of a [employer] Dual lead pacemaker s/p implant on 2017, per MD orders. Today His intrinsic rhythm is 80 BPM @ 30 BPM (VS). Normal pacemaker function. 0 arrhythmias have been recorded. AP= 9.5% and = 99.9%. Pt reports he is feeling well.  Battery estimates Initalizing years to BE. Teaching provided in written and verbal forms with interpertor. Plan for pt to RTC on 2017 @ 10:30 AM  for a device and incision check. Changed upper rate from 100 to 120 BPM per Dr Huang.     RA lead:  Impedance: 572 ohms  Sensin.0 mV  Threshold: 0.50 V @ 0.40 ms    RV lead:  Impedance: 959 ohms  Sensin.0 mV  Threshold: 0.50 V @ 0.40 ms

## 2017-01-13 NOTE — PROGRESS NOTES
[facility] Nurse Inpatient Wound Assessment     Initial Assessment of wound(s) on pt's:   Right posterior lower leg wound        Data:   Patient History:      per MD note(s): Gallito Farley is a 83 year old male with PMH of COPD, HTN, chronic leg wound, prior TB with right upper lobectomy who presented with cough after 6 month period in [place] off all medications and found to have bradycardia with complete heart block and hypertensive urgency.    Moisture Management:  NA    Current Diet / Nutrition:           Active Diet Order  Advance Diet as Tolerated: Clear Liquid Diet                Dimitri Assessment and sub scores:   Dimitri Score  Av  Min: 15  Max: 17     Labs:         Recent Labs   Lab Test  17   0815   17   1734   16   0530   16   0700   ALBUMIN   --    --   2.4*   --   2.6*   < >   --    HGB  12.7*   < >  14.4   < >   --    < >  12.3*   RBC  4.55   --   5.02   < >   --    < >  4.14*   WBC  6.0   --   5.4   < >   --    < >  10.1   PLT  265   --   258   < >   --    < >  441   INR  1.24*   --   1.14   --    --    --    --    A1C   --    --    --    --   5.7   --    --    CRP   --    --    --    --    --    --   91.6*    < > = values in this interval not displayed.          Wound Assessment (location #1): Right posterior lower leg wound    Wound History: Pt not able to provide history, very limited english. Chart reviewed, per previous record, son reported the wound is from a lizard bite that was operated on then got infected, as a child.   He was seen by Dr. Davsi in 2016 for these wound and apligraf was applied. Wound appears to be be healing compared to the pictures that's on the file from Dr. Davis but chronic and slow. Unsure if he is still following Dr. Davis. Venous insufficiency is also a contributing factor for this wound.             Wound Base: 100% pink smooth tissue    Specific Dimensions (length x width x depth, in cm) :    6x9x0.2cm    Tunneling:  N/A    Undermining: N/A    Palpation of the wound bed:  normal    Slough appearance:  none    Eschar appearance:  none    Periwound Skin: intact, surrounded by thick firm scabby type of tissue, dark exfoliating epithelium    Color: dry and consistent with surrounding tissue    Temperature  normal     Drainage:  Amount: moderate . Color: cloudy and creamy drainage on old dressing    Odor: none    Pain:  absent ,           Intervention:     Patient's chart evaluated.      Wound(s) was assessed    Wound Care: was done:  Per POC mentioned below    Orders  Written    Supplies  Reviewed    Discussed plan of care with Patient and Nurse          Assessment:       Chronic full thickness venous stasis ulcer on right lower leg.          Plan:     Nursing to notify the Provider(s) and re-consult the WO Nurse if wound(s) deteriorate(s).  Plan of care for wound located on Right lower leg daily:   Cleanse entire lower leg with soap and water.  Apply sween moisturizing cream on intact skin around the wound with thick scab tissue.  Cleanse wound with NS and pat dry.  Cut Xerofoam gauze into half and apply on open wound. Followed by gauze and ABD pad.  Wrap with kerlix.  Apply  ace bandages on both leg. Use 2 bandages on each leg.   Small ace bandage, starting from foot to ankle and larger one from ankle to below knee.  Remove ace wraps at night and elevate BLLE while sleeping.    May remove ace bandages for skin inspection as needed.    WO Nurse will return: weekly     Face to face time: 25 mins

## 2017-01-13 NOTE — PLAN OF CARE
Problem: Goal Outcome Summary  Goal: Goal Outcome Summary  Pt came from cath lab around 0000, is paced HR 60-90's. Nitro gtt titrated to keep -190. Labs have not been resulted yet. Used urinal several times. Low sat fat and sodium diet placed. Son was here as when pt came up to room, pt is A&O, and aware of how to get ahold of nurse (call light) and makes his needs known. Continue to monitor and address for changes.

## 2017-01-13 NOTE — DISCHARGE SUMMARY
Medicine Discharge Summary  Gallito Farley MRN: [id number]  1/1/1934  Primary care provider: Moiz Richard  ___________________________________          Date of Admission:  1/11/2017  Date of Discharge:  01/14/2017  Admitting Physician:  Klarissa Garsia MD  Discharge Physician:  Klarissa Garsia MD   Discharging Service:  Cardiology 1     Primary Provider: Jose ManuelMoiz         Reason for Admission:   Gallito Farley is a 83 year old male with PMH of COPD, HTN, chronic leg wound, prior TB with right upper lobectomy who presented with cough after 6 month period in [place] off all medications and found to have bradycardia with complete heart block and hypertensive urgency.          Discharge Diagnosis:   Complete Heart Block s/p PPM placement  Essential Hypertension  COPD       Procedures & Significant Findings:   TTE (01/12/2017)  Interpretation Summary  Global and regional left ventricular function is normal with an EF of 55-60%. Mild diffuse hypokinesis is present.  Right ventricular function, chamber size, wall motion, and thickness are normal.  Pulmonary artery systolic pressure is normal.  Dilation of the inferior vena cava is present with normal respiratory variation in diameter. Estimated mean right atrial pressure is 3-8 mmHg.  No pericardial effusion is present.    CXR (01/11/2017)  IMPRESSION: Interval placement of an external pacer which partially obscures the left mid and lower lung zones. Postoperative volume loss in the right upper hemithorax is again noted. Probable scarring in right lung base is unchanged. Heart size is difficult to evaluate due to rotation of the patient. No definite confluent infiltrates or significant changes since the prior exam.    Dual chamber Pacemaker Placement (01/12/2017)  Equipment  1.The Pulse Generator is a  Medtronic A2DRO1, Serial [id number].   2.The RA Lead is a  5076-52, Serial [id number].   3.The RV  Lead is a 5076-58, Serial [id number].  Billy Settings:  Pacing mode: DDD.  Lower Rate Limit: 60 ppm.  Upper Rate Limit: 100 ppm. (since his sinus rate is above 100 beats per minute at this time, please expect some pacemaker wenckebach.   No complications noted in the procedure.        Consultations:   Cardiac Electrophysiology          Hospital Course by Problem:    # Complete Heart Block:  Likely conduction disorder. Previously on metoprolol but minimal recent use of medications and unlikely accidental overdose. Patient underwent a successful dual chamber implant on 01/13/17.  Device interrogation shows normal device function and stable lead parameters. Chest x-ray demonstrates no evidence of pneumothorax. Patient remained hemodynamically stable.    -- Follow up with device clinic in 7-10 days  -- Oral Cephalexin 250mg QID x 5 days (end date 01/17/17)  -- Keep incision clean and dry for 72 hours post procedure  -- No lifting > 10lbs or over shoulder level with left arm for 4 weeks    # Hypertensive Urgency   In setting of known hypertension with 6 month period off medications. Maximum blood pressure in ED of 257/101. Symptomatic with chronic chest pressure. No evidence of end organ damage, normal troponin. He was given multiple doses of IV hydralazine with little improvement in BP. He was noted to be very hypertensive (240's/100's) when he arrived for PPM implant on evening of 01/12/17. He was given 10mg IV hydralazine with little effect. He was subsequently started on nitro gtt which helped control blood pressures to 140-160's/60-80's throughout most of the case. Given need for ongoing nitro gtt for HTN management, he was transferred to ICU. Patient did not improve with armaan gtt (thought to be due to tachyphylaxis) so was subsequently switched to nicardipine gtt on 01/13/17 with slow improvement of SBP. On day of discharge his SBP ranged from 120s-150s. He was instructed to follow-up with PCP within 3-5 days for  "further management of BP. His medications at the time of discharge were:  - Amlodipine 10mg Qdaily (new)  - HCTz 25mg Qdaily (new)   -- Enalapril 10mg PO Qdaily (resumed)    # COPD  Pt reported not taking any medication for at least the past 6 months pta. Presented with dry cough of admission but with stable respiratory status, clear chest on exam and XR.  No evidence of acute copd exacerbation  - Re-started flovent BID  and ipratropium daily on 01/13/16; to be continued as outpatient    Chronic Conditions  GERD: Given side effect profile of PPI, switched from omeprazole to Ranitidine 150mg BID at discharge.   Leg Ulcer: Wound cares to continue at home.   BPH: Resume tamsulosin    Physical Exam on day of Discharge:  Blood pressure 120/65, pulse 40, temperature 97.8  F (36.6  C), temperature source Oral, resp. rate 20, height 1.854 m (6' 1\"), weight 79.6 kg (175 lb 7.8 oz), SpO2 94 %.  Constitutional: alert and no distress  Head: Normocephalic. Atraumatic.   Neck: Neck supple. No adenopathy.   ENT: ENT exam normal, no neck nodes or sinus tenderness  Cardiovascular: negative, PMI normal. No lifts, heaves, or thrills. RRR. No murmurs, clicks gallops or rub  Respiratory: negative, CTAB  Gastrointestinal: Abdomen soft, non-tender. BS normal. No masses, organomegaly  Skin: right leg ulcer dressed, no suspicious lesions or rashes  Neurologic: A&OX3.         Pending Results:   None         Discharge Medications:     Discharge Medication List as of 1/14/2017  3:24 PM      START taking these medications    Details   acetaminophen (TYLENOL) 325 MG tablet Take 2 tablets (650 mg) by mouth every 4 hours as needed for mild pain, Disp-100 tablet, R-0, E-Prescribe      hydrochlorothiazide (MICROZIDE) 12.5 MG capsule Take 2 capsules (25 mg) by mouth daily, Disp-30 capsule, R-1, E-Prescribe      cephalexin (KEFLEX) 250 MG capsule Take 1 capsule (250 mg) by mouth 4 times daily for 3 days, Disp-12 capsule, R-0, E-Prescribe    "   ranitidine (ZANTAC) 150 MG tablet Take 1 tablet (150 mg) by mouth 2 times daily, Disp-60 tablet, R-0, E-Prescribe         CONTINUE these medications which have CHANGED    Details   albuterol (2.5 MG/3ML) 0.083% neb solution Take 1 vial (2.5 mg) by nebulization every 6 hours as needed, Disp-360 mL, R-1, E-Prescribe      fluticasone (FLOVENT DISKUS) 250 MCG/BLIST AEPB Inhaler Inhale 1 puff into the lungs every 12 hours, Disp-1 Inhaler, R-1, E-Prescribe      tiotropium (SPIRIVA) 18 MCG capsule Inhale 1 capsule (18 mcg) into the lungs daily, Disp-30 capsule, R-1, E-Prescribe      tamsulosin (FLOMAX) 0.4 MG capsule Take 1 capsule (0.4 mg) by mouth every 24 hours, Disp-60 capsule, R-1, E-Prescribe      enalapril (VASOTEC) 10 MG tablet Take 1 tablet (10 mg) by mouth daily, Disp-30 tablet, R-1, E-Prescribe      amLODIPine (NORVASC) 10 MG tablet Take 1 tablet (10 mg) by mouth daily, Disp-30 tablet, R-1, E-Prescribe         STOP taking these medications       omeprazole (PRILOSEC) 40 MG capsule Comments:   Reason for Stopping:         metoprolol (LOPRESSOR) 25 MG tablet Comments:   Reason for Stopping:         fluticasone (FLONASE) 50 MCG/ACT nasal spray Comments:   Reason for Stopping:                    Discharge Instructions and Follow-Up:     Discharge Procedure Orders  Reason for your hospital stay   Order Comments: You were hospitalized for complete heart block requiring a pacemaker and hypertension.     Adult [facility] Follow-up and recommended labs and tests   Order Comments: Follow up with primary care provider, Moiz Richard MD, within 3-5 days to evaluate medication change, to evaluate treatment change, for hospital follow- up and regarding new diagnosis.  The following labs/tests are recommended: BMP, magnesium, blood pressure monitoring.      Appointments on [place] and/or [facility] (with [facility] or [facility] provider or service). Call [phone] if you haven't heard regarding these appointments within 7  days of discharge.     Activity   Order Comments: Your activity upon discharge:      No lifting > 10lbs or over shoulder level with left arm for 4 weeks      Notify device clinic/EP immediately for any redness, swelling, bleeding, discharge, fevers or chills.   Order Specific Question Answer Comments   Is discharge order? Yes      Follow Up and recommended labs and tests   Order Comments: Follow up with the Device Clinic in 7-10 days     Full Code     Diet   Order Comments: Follow this diet upon discharge: 2 Gram Sodium Diet   Order Specific Question Answer Comments   Is discharge order? Yes                Discharge Disposition:   Home         Condition on Discharge:   Discharge condition: Stable   Code status on discharge: Full Code        Date of service: 01/14/2017    The patient was discussed with Dr. Garsia.    Renay Beltrán, PGY2  Internal Medicine/Pediatrics  P: [phone]    Patient has been seen and evaluated by me. Discussed with the team and agree with the findings and plan in this resident/fellow/nurse practitioner's discharge note.    I have interviewed and examined the patient. I have reviewed the laboratory tests, imaging, and other investigations. I agree with the documentation and plan as outlined by the fellow/resident/nurse practitioner in this discharge note.    The discharge process took 15 minutes.    Klarissa Garsia MD, MS  Cardiology/Cardiac EP Attending Staff

## 2017-01-14 VITALS
HEIGHT: 73 IN | DIASTOLIC BLOOD PRESSURE: 67 MMHG | SYSTOLIC BLOOD PRESSURE: 118 MMHG | BODY MASS INDEX: 23.26 KG/M2 | RESPIRATION RATE: 15 BRPM | OXYGEN SATURATION: 94 % | TEMPERATURE: 97.8 F | WEIGHT: 175.49 LBS | HEART RATE: 40 BPM

## 2017-01-14 PROBLEM — Z95.0 S/P CARDIAC PACEMAKER PROCEDURE: Status: ACTIVE | Noted: 2017-01-14

## 2017-01-14 LAB
ANION GAP SERPL CALCULATED.3IONS-SCNC: 8 MMOL/L (ref 3–14)
BUN SERPL-MCNC: 7 MG/DL (ref 7–30)
CALCIUM SERPL-MCNC: 8.1 MG/DL (ref 8.5–10.1)
CHLORIDE SERPL-SCNC: 100 MMOL/L (ref 94–109)
CO2 SERPL-SCNC: 28 MMOL/L (ref 20–32)
CREAT SERPL-MCNC: 0.82 MG/DL (ref 0.66–1.25)
GFR SERPL CREATININE-BSD FRML MDRD: ABNORMAL ML/MIN/1.7M2
GLUCOSE SERPL-MCNC: 94 MG/DL (ref 70–99)
MAGNESIUM SERPL-MCNC: 2 MG/DL (ref 1.6–2.3)
POTASSIUM SERPL-SCNC: 3.4 MMOL/L (ref 3.4–5.3)
SODIUM SERPL-SCNC: 137 MMOL/L (ref 133–144)

## 2017-01-14 PROCEDURE — 25000132 ZZH RX MED GY IP 250 OP 250 PS 637: Performed by: INTERNAL MEDICINE

## 2017-01-14 PROCEDURE — 25000132 ZZH RX MED GY IP 250 OP 250 PS 637: Performed by: NURSE PRACTITIONER

## 2017-01-14 PROCEDURE — 25000125 ZZHC RX 250: Performed by: NURSE PRACTITIONER

## 2017-01-14 PROCEDURE — 99238 HOSP IP/OBS DSCHRG MGMT 30/<: CPT | Mod: 24 | Performed by: INTERNAL MEDICINE

## 2017-01-14 PROCEDURE — 83735 ASSAY OF MAGNESIUM: CPT | Performed by: INTERNAL MEDICINE

## 2017-01-14 PROCEDURE — 25000125 ZZHC RX 250: Performed by: INTERNAL MEDICINE

## 2017-01-14 PROCEDURE — 25000128 H RX IP 250 OP 636: Performed by: INTERNAL MEDICINE

## 2017-01-14 PROCEDURE — 0JH606Z INSERTION OF PACEMAKER, DUAL CHAMBER INTO CHEST SUBCUTANEOUS TISSUE AND FASCIA, OPEN APPROACH: ICD-10-PCS | Performed by: INTERNAL MEDICINE

## 2017-01-14 PROCEDURE — 90662 IIV NO PRSV INCREASED AG IM: CPT | Performed by: INTERNAL MEDICINE

## 2017-01-14 PROCEDURE — 02HK3JZ INSERTION OF PACEMAKER LEAD INTO RIGHT VENTRICLE, PERCUTANEOUS APPROACH: ICD-10-PCS | Performed by: INTERNAL MEDICINE

## 2017-01-14 PROCEDURE — 80048 BASIC METABOLIC PNL TOTAL CA: CPT | Performed by: INTERNAL MEDICINE

## 2017-01-14 PROCEDURE — 40000556 ZZH STATISTIC PERIPHERAL IV START W US GUIDANCE

## 2017-01-14 PROCEDURE — 02H63JZ INSERTION OF PACEMAKER LEAD INTO RIGHT ATRIUM, PERCUTANEOUS APPROACH: ICD-10-PCS | Performed by: INTERNAL MEDICINE

## 2017-01-14 RX ORDER — AMLODIPINE BESYLATE 10 MG/1
10 TABLET ORAL DAILY
Qty: 30 TABLET | Refills: 1 | Status: SHIPPED | OUTPATIENT
Start: 2017-01-14 | End: 2017-03-06

## 2017-01-14 RX ORDER — ACETAMINOPHEN 325 MG/1
650 TABLET ORAL EVERY 4 HOURS PRN
Qty: 100 TABLET | Refills: 0 | Status: SHIPPED | OUTPATIENT
Start: 2017-01-14 | End: 2017-03-06

## 2017-01-14 RX ORDER — ALBUTEROL SULFATE 0.83 MG/ML
2.5 SOLUTION RESPIRATORY (INHALATION) EVERY 6 HOURS PRN
Qty: 360 ML | Refills: 1 | Status: SHIPPED | OUTPATIENT
Start: 2017-01-14 | End: 2019-05-23

## 2017-01-14 RX ORDER — ENALAPRIL MALEATE 10 MG/1
10 TABLET ORAL DAILY
Qty: 30 TABLET | Refills: 1 | Status: SHIPPED | OUTPATIENT
Start: 2017-01-14 | End: 2017-01-18

## 2017-01-14 RX ORDER — AMLODIPINE BESYLATE 5 MG/1
5 TABLET ORAL DAILY
Qty: 30 TABLET | Refills: 1 | Status: SHIPPED | OUTPATIENT
Start: 2017-01-14 | End: 2017-01-14

## 2017-01-14 RX ORDER — AMLODIPINE BESYLATE 10 MG/1
10 TABLET ORAL DAILY
Status: DISCONTINUED | OUTPATIENT
Start: 2017-01-15 | End: 2017-01-14

## 2017-01-14 RX ORDER — POTASSIUM CHLORIDE 750 MG/1
40 TABLET, EXTENDED RELEASE ORAL ONCE
Status: COMPLETED | OUTPATIENT
Start: 2017-01-14 | End: 2017-01-14

## 2017-01-14 RX ORDER — SODIUM CHLORIDE 9 MG/ML
INJECTION, SOLUTION INTRAVENOUS
Status: DISPENSED
Start: 2017-01-14 | End: 2017-01-14

## 2017-01-14 RX ORDER — TAMSULOSIN HYDROCHLORIDE 0.4 MG/1
0.4 CAPSULE ORAL EVERY 24 HOURS
Qty: 60 CAPSULE | Refills: 1 | Status: SHIPPED | OUTPATIENT
Start: 2017-01-14 | End: 2017-01-18

## 2017-01-14 RX ORDER — AMLODIPINE BESYLATE 5 MG/1
5 TABLET ORAL ONCE
Status: COMPLETED | OUTPATIENT
Start: 2017-01-14 | End: 2017-01-14

## 2017-01-14 RX ORDER — TRAMADOL HYDROCHLORIDE 50 MG/1
50 TABLET ORAL EVERY 6 HOURS PRN
Status: DISCONTINUED | OUTPATIENT
Start: 2017-01-14 | End: 2017-01-14 | Stop reason: HOSPADM

## 2017-01-14 RX ORDER — TIOTROPIUM BROMIDE 18 UG/1
18 CAPSULE ORAL; RESPIRATORY (INHALATION) DAILY
Qty: 30 CAPSULE | Refills: 1 | Status: SHIPPED | OUTPATIENT
Start: 2017-01-14 | End: 2017-09-06

## 2017-01-14 RX ORDER — HYDROCHLOROTHIAZIDE 12.5 MG/1
25 CAPSULE ORAL DAILY
Qty: 30 CAPSULE | Refills: 1 | Status: SHIPPED | OUTPATIENT
Start: 2017-01-14 | End: 2017-03-06

## 2017-01-14 RX ADMIN — CEFAZOLIN 1 G: 1 INJECTION, POWDER, FOR SOLUTION INTRAMUSCULAR; INTRAVENOUS at 00:38

## 2017-01-14 RX ADMIN — ACETAMINOPHEN AND CODEINE PHOSPHATE 2 TABLET: 300; 30 TABLET ORAL at 08:31

## 2017-01-14 RX ADMIN — NICARDIPINE HYDROCHLORIDE 5 MG/HR: 0.2 INJECTION, SOLUTION INTRAVENOUS at 06:06

## 2017-01-14 RX ADMIN — AMLODIPINE BESYLATE 5 MG: 5 TABLET ORAL at 08:31

## 2017-01-14 RX ADMIN — FLUTICASONE PROPIONATE 1 PUFF: 250 POWDER, METERED RESPIRATORY (INHALATION) at 08:33

## 2017-01-14 RX ADMIN — HYDROCHLOROTHIAZIDE 25 MG: 12.5 CAPSULE ORAL at 08:33

## 2017-01-14 RX ADMIN — CEPHALEXIN 250 MG: 250 CAPSULE ORAL at 12:03

## 2017-01-14 RX ADMIN — ENALAPRIL MALEATE 10 MG: 2.5 TABLET ORAL at 08:31

## 2017-01-14 RX ADMIN — CEPHALEXIN 250 MG: 250 CAPSULE ORAL at 08:31

## 2017-01-14 RX ADMIN — INFLUENZA A VIRUS A/CALIFORNIA/7/2009 X-179A (H1N1) ANTIGEN (FORMALDEHYDE INACTIVATED), INFLUENZA A VIRUS A/HONG KONG/4801/2014 X-263B (H3N2) ANTIGEN (FORMALDEHYDE INACTIVATED), AND INFLUENZA B VIRUS B/BRISBANE/60/2008 ANTIGEN (FORMALDEHYDE INACTIVATED) 0.5 ML: 60; 60; 60 INJECTION, SUSPENSION INTRAMUSCULAR at 15:03

## 2017-01-14 RX ADMIN — TRAMADOL HYDROCHLORIDE 50 MG: 50 TABLET, COATED ORAL at 13:10

## 2017-01-14 RX ADMIN — RANITIDINE HYDROCHLORIDE 150 MG: 150 TABLET, FILM COATED ORAL at 13:10

## 2017-01-14 RX ADMIN — AMLODIPINE BESYLATE 5 MG: 5 TABLET ORAL at 10:07

## 2017-01-14 RX ADMIN — POTASSIUM CHLORIDE 40 MEQ: 750 TABLET, EXTENDED RELEASE ORAL at 06:44

## 2017-01-14 ASSESSMENT — PAIN DESCRIPTION - DESCRIPTORS: DESCRIPTORS: ACHING

## 2017-01-14 NOTE — PLAN OF CARE
Problem: Goal Outcome Summary  Goal: Goal Outcome Summary  Outcome: No Change  D/I: Requiring increased dose of nitroglycerin gtt to maintain SBP<190. PO amlodipine, enalapril and HTZ given with no effect. Gtt changed to nicardipine, titrating up for SBP<160, currently on 7.5 mg/hr. PRN tylenol #3 for HA.   A/P: Wean gtt as tolerated. Continue with plan of care.

## 2017-01-14 NOTE — PLAN OF CARE
Problem: Arrhythmia/Dysrhythmia (Symptomatic) (Adult)  Goal: Signs and Symptoms of Listed Potential Problems Will be Absent or Manageable (Arrhythmia/Dysrhythmia)  Signs and symptoms of listed potential problems will be absent or manageable by discharge/transition of care (reference Arrhythmia/Dysrhythmia (Symptomatic) (Adult) CPG).   Outcome: Adequate for Discharge Date Met:  01/14/17  VSS and has complained of pacemaker site pain. Refer to emar for prn analgesics given. Tolerating oral diet with no problems. Up in room with cane and assist of 1 with no problems. All d/c paperwork went over with patient and son. All personal belongings sent with son. All d/c meds to be picked up at d/c pharmacy.

## 2017-01-14 NOTE — PLAN OF CARE
Problem: Arrhythmia/Dysrhythmia (Symptomatic) (Adult)  Goal: Signs and Symptoms of Listed Potential Problems Will be Absent or Manageable (Arrhythmia/Dysrhythmia)  Signs and symptoms of listed potential problems will be absent or manageable by discharge/transition of care (reference Arrhythmia/Dysrhythmia (Symptomatic) (Adult) CPG).   Outcome: Improving  D: Hemodynamically stable. 2 gm Na diet.   I/A: HR: 80-90's. 25% A paced, 100% V paced. SBP goal < 160. Nicardipine titrated accordingly. Fair appetite. R PIV infiltrated, new PIV placed in L.   P: Titrated Nicardipine down as able. Continue POC.

## 2017-01-17 LAB — INTERPRETATION ECG - MUSE: NORMAL

## 2017-01-18 ENCOUNTER — OFFICE VISIT (OUTPATIENT)
Dept: FAMILY MEDICINE | Facility: CLINIC | Age: 82
End: 2017-01-18
Payer: MEDICARE

## 2017-01-18 VITALS
HEIGHT: 73 IN | HEART RATE: 84 BPM | TEMPERATURE: 97.8 F | BODY MASS INDEX: 23.06 KG/M2 | DIASTOLIC BLOOD PRESSURE: 77 MMHG | OXYGEN SATURATION: 97 % | SYSTOLIC BLOOD PRESSURE: 137 MMHG | WEIGHT: 174 LBS

## 2017-01-18 DIAGNOSIS — L97.909 CHRONIC CUTANEOUS VENOUS STASIS ULCER (H): ICD-10-CM

## 2017-01-18 DIAGNOSIS — I83.009 CHRONIC CUTANEOUS VENOUS STASIS ULCER (H): ICD-10-CM

## 2017-01-18 DIAGNOSIS — N40.0 BENIGN NODULAR PROSTATIC HYPERPLASIA WITHOUT LOWER URINARY TRACT SYMPTOMS: ICD-10-CM

## 2017-01-18 DIAGNOSIS — I10 ESSENTIAL HYPERTENSION: ICD-10-CM

## 2017-01-18 DIAGNOSIS — I80.8 PHLEBITIS OF LEFT ARM: ICD-10-CM

## 2017-01-18 DIAGNOSIS — Z95.0 STATUS POST PLACEMENT OF CARDIAC PACEMAKER: Primary | ICD-10-CM

## 2017-01-18 PROBLEM — Z71.89 ADVANCED DIRECTIVES, COUNSELING/DISCUSSION: Status: ACTIVE | Noted: 2017-01-18

## 2017-01-18 PROCEDURE — 99214 OFFICE O/P EST MOD 30 MIN: CPT | Performed by: INTERNAL MEDICINE

## 2017-01-18 RX ORDER — TAMSULOSIN HYDROCHLORIDE 0.4 MG/1
0.8 CAPSULE ORAL AT BEDTIME
Qty: 60 CAPSULE | Refills: 11 | Status: SHIPPED | OUTPATIENT
Start: 2017-01-18 | End: 2018-02-02

## 2017-01-18 RX ORDER — ENALAPRIL MALEATE 10 MG/1
10 TABLET ORAL DAILY
Qty: 90 TABLET | Refills: 3 | Status: SHIPPED | OUTPATIENT
Start: 2017-01-18 | End: 2018-03-18

## 2017-01-18 RX ORDER — MUPIROCIN 20 MG/G
OINTMENT TOPICAL
Qty: 30 G | Refills: 11 | Status: SHIPPED | OUTPATIENT
Start: 2017-01-18 | End: 2017-02-01

## 2017-01-18 NOTE — MR AVS SNAPSHOT
After Visit Summary   1/18/2017    Gallito Farley    MRN: [id number]           Patient Information     Date Of Birth          1/1/1934        Visit Information        Provider Department      1/18/2017 7:30 AM Hakeem Awad MD; [employer] [facility]        Today's Diagnoses     Cardiac pacemaker in situ    -  1     Chronic cutaneous venous stasis ulcer (H)         Phlebitis of left arm         Essential hypertension         Benign nodular prostatic hyperplasia without lower urinary tract symptoms           Care Instructions    This summary includes the important diagnoses, test, medications and other important parts of your medical history.  Below are a few good we sites you can use to learn more about these.     Www.LoveSurf.SIM Digital : Up to date and easily searchable information on multiple topics.  Www.LoveSurf.SIM Digital/Pharmacy/c_539084.asp : [facility] $4.99 medications  Www.IQMS.gov : medication info, interactive tutorials, watch real surgeries online  Www.familydoctor.org : good info from the Academy of Family Physicians  Www.Adspert | [employer].TixAlert : good info from the [facility]  Www.cdc.gov : public health info, travel advisories, epidemics (H1N1)  Www.aap.org : children's health info, normal development, vaccinations  Www.health.state.[place].us : [place] dept of heatlh, public health issues in [place], N1N1    Based on your medical history and these are the current health maintenance or preventive care services that you are due for (some may have been done at this visit:)  Health Maintenance Due   Topic Date Due     ADVANCE DIRECTIVE PLANNING Q5 YRS (NO INBASKET)  01/01/1952     ASTHMA CONTROL TEST Q6 MOS (NO INBASKET)  02/03/2017     =================================================================================  Normal Values   Blood pressure  <140/90 for most adults    <130/80 for some chronic diseases (ask your care team about yours)    BMI (body mass  index)  18.5-25 kg/m2 (based on height and weight)     Thank you for visiting [facility]    Normal or non-critical lab and imaging results will be communicated to you by MyChart, letter or phone within 7 days.  If you do not hear from us within 10 days, please call the clinic. If you have a critical or abnormal lab result, we will notify you by phone as soon as possible.     If you have any questions regarding your visit please contact:     Team Comfort:   Clinic Hours Telephone Number   Dr. David Huynh   7am-5pm  Monday - Friday [phone]  [facility]   Pharmacy 8am-8pm Monday-Thursday      8am-6pm Friday  9am-5pm Saturday-Sunday [phone]   Urgent Care 11am-8pm Monday-Friday        9am-5pm Saturday-Sunday [phone]     After hours, weekend or if you need to make an appointment with your primary provider please call [phone].   After Hours nurse advise: call [facility]: [phone]    Medication Refills:  Call your pharmacy and they will forward the refill to us. Please allow 3 business days for your refills to be completed.    Use Estecht (secure email communication and access to your chart) to send your primary care provider a message or make an appointment. Ask someone on your Team how to sign up for [employer]. To log on to Evane[employer] or for more information in J.G. ink please visit the website at [email or web address].  As of October 8, 2013, all password changes, disabled accounts, or ID changes in StyroPower/MyHealth will be done by our Access Services Department.   If you need help with your account or password, call: [phone]. Clinic staff no longer has the ability to change passwords.           Follow-ups after your visit        Your next 10 appointments already scheduled     Jan 20, 2017 10:30 AM   (Arrive by 10:15 AM)   Pacemaker Check with [facility] Device [address] [place] [address]  "([facility] [place])    [address]  [address] [facility] [address] [phone]              Who to contact     If you have questions or need follow up information about today's clinic visit or your schedule please contact [facility] directly at [phone].  Normal or non-critical lab and imaging results will be communicated to you by MyChart, letter or phone within 4 business days after the clinic has received the results. If you do not hear from us within 7 days, please contact the clinic through MyChart or phone. If you have a critical or abnormal lab result, we will notify you by phone as soon as possible.  Submit refill requests through [employer] or call your pharmacy and they will forward the refill request to us. Please allow 3 business days for your refill to be completed.          Additional Information About Your Visit        MyChart Information     [employer] lets you send messages to your doctor, view your test results, renew your prescriptions, schedule appointments and more. To sign up, go to [email or web address] . Click on \"Log in\" on the left side of the screen, which will take you to the Welcome page. Then click on \"Sign up Now\" on the right side of the page.     You will be asked to enter the access code listed below, as well as some personal information. Please follow the directions to create your username and password.     Your access code is: [email or web address]  Expires: 2017  7:43 PM     Your access code will  in 90 days. If you need help or a new code, please call your [facility] or [phone].        Care EveryWhere ID     This is your Care EveryWhere ID. This could be used by other organizations to access your [facility] medical records  [id number]        Your Vitals Were     Pulse Temperature Height BMI (Body Mass Index) Pulse Oximetry       84 97.8  F (36.6  C) (Oral) 6' 1\" (1.854 m) 22.96 kg/m2 97%        Blood Pressure " "from Last 3 Encounters:   01/18/17 137/77   01/14/17 118/67   08/03/16 138/62    Weight from Last 3 Encounters:   01/18/17 174 lb (78.926 kg)   01/14/17 175 lb 7.8 oz (79.6 kg)   08/03/16 174 lb (78.926 kg)              Today, you had the following     No orders found for display         Today's Medication Changes          These changes are accurate as of: 1/18/17  8:47 AM.  If you have any questions, ask your nurse or doctor.               Start taking these medicines.        Dose/Directions    cephalexin 250 MG capsule   Commonly known as:  KEFLEX   Used for:  Phlebitis of left arm   Started by:  Hakeem Awad MD        Dose:  250 mg   Take 1 capsule (250 mg) by mouth 4 times daily for 5 days   Quantity:  20 capsule   Refills:  1       mupirocin 2 % ointment   Commonly known as:  BACTROBAN   Used for:  Chronic cutaneous venous stasis ulcer (H)   Started by:  Hakeem Awad MD        Apply twice a day to right leg wound and cover with occlusive dressing.   Quantity:  30 g   Refills:  11       * order for DME   Used for:  Chronic cutaneous venous stasis ulcer (H)   Started by:  Hakeem Awad MD        Equipment being ordered: Dispense 4\" x 4\" sterile gauze. Dispense any brand name.   Quantity:  5 each   Refills:  11       * order for DME   Used for:  Chronic cutaneous venous stasis ulcer (H)   Started by:  Hakeem Awad MD        Equipment being ordered: Dispense Coban wrap.   Quantity:  5 each   Refills:  5       * Notice:  This list has 2 medication(s) that are the same as other medications prescribed for you. Read the directions carefully, and ask your doctor or other care provider to review them with you.      These medicines have changed or have updated prescriptions.        Dose/Directions    tamsulosin 0.4 MG capsule   Commonly known as:  FLOMAX   Indication:  Enlarged Prostate with Urination Problems   This may have changed:    - how much to take  - when to take this   Used " for:  Benign nodular prostatic hyperplasia without lower urinary tract symptoms   Changed by:  Hakeem Awad MD        Dose:  0.8 mg   Take 2 capsules (0.8 mg) by mouth At Bedtime   Quantity:  60 capsule   Refills:  11            Where to get your medicines      These medications were sent to [facility] [place] - [place], [place] - [address]  [address], [place] [address]     Phone:  [phone]    - cephalexin 250 MG capsule  - enalapril 10 MG tablet  - mupirocin 2 % ointment  - tamsulosin 0.4 MG capsule      Some of these will need a paper prescription and others can be bought over the counter.  Ask your nurse if you have questions.     Bring a paper prescription for each of these medications    - order for DME  - order for DME             Primary Care Provider Office Phone # Fax #    Moiz Richard [phone]       [facility] [place] [address]        Thank you!     Thank you for choosing [facility]  for your care. Our goal is always to provide you with excellent care. Hearing back from our patients is one way we can continue to improve our services. Please take a few minutes to complete the written survey that you may receive in the mail after your visit with us. Thank you!             Your Updated Medication List - Protect others around you: Learn how to safely use, store and throw away your medicines at www.disposemymeds.org.          This list is accurate as of: 1/18/17  8:47 AM.  Always use your most recent med list.                   Brand Name Dispense Instructions for use    acetaminophen 325 MG tablet    TYLENOL    100 tablet    Take 2 tablets (650 mg) by mouth every 4 hours as needed for mild pain       albuterol (2.5 MG/3ML) 0.083% neb solution     360 mL    Take 1 vial (2.5 mg) by nebulization every 6 hours as needed       amLODIPine 10 MG tablet    NORVASC    30 tablet    Take 1 tablet (10 mg)  "by mouth daily       cephalexin 250 MG capsule    KEFLEX    20 capsule    Take 1 capsule (250 mg) by mouth 4 times daily for 5 days       enalapril 10 MG tablet    VASOTEC    90 tablet    Take 1 tablet (10 mg) by mouth daily       fluticasone 250 MCG/BLIST Aepb Inhaler    FLOVENT DISKUS    1 Inhaler    Inhale 1 puff into the lungs every 12 hours       hydrochlorothiazide 12.5 MG capsule    MICROZIDE    30 capsule    Take 2 capsules (25 mg) by mouth daily       mupirocin 2 % ointment    BACTROBAN    30 g    Apply twice a day to right leg wound and cover with occlusive dressing.       * order for DME     5 each    Equipment being ordered: Dispense 4\" x 4\" sterile gauze. Dispense any brand name.       * order for DME     5 each    Equipment being ordered: Dispense Coban wrap.       ranitidine 150 MG tablet    ZANTAC    60 tablet    Take 1 tablet (150 mg) by mouth 2 times daily       tamsulosin 0.4 MG capsule    FLOMAX    60 capsule    Take 2 capsules (0.8 mg) by mouth At Bedtime       tiotropium 18 MCG capsule    SPIRIVA    30 capsule    Inhale 1 capsule (18 mcg) into the lungs daily       * Notice:  This list has 2 medication(s) that are the same as other medications prescribed for you. Read the directions carefully, and ask your doctor or other care provider to review them with you.      "

## 2017-01-18 NOTE — PROGRESS NOTES
SUBJECTIVE:                                                    Gallito Farley is a 83 year old male who presents to clinic today for the following health issues:        Hospital Follow-up Visit:    Hospital/Nursing Home/IP Rehab Facility: [facility]  Date of Admission: 1-11-17  Date of Discharge: 1-14-17  Reason(s) for Admission: low heart rate            Problems taking medications regularly:  None       Medication changes since discharge: None       Problems adhering to non-medication therapy:  None    Summary of hospitalization:  [facility] discharge summary reviewed  Diagnostic Tests/Treatments reviewed.  Follow up needed: yes  Other Healthcare Providers Involved in Patient s Care:         Homecare  Update since discharge: improved.     Post Discharge Medication Reconciliation: discharge medications reconciled and changed, per note/orders (see AVS).  Plan of care communicated with patient and family     Coding guidelines for this visit:  Type of Medical   Decision Making Face-to-Face Visit       within 7 Days of discharge Face-to-Face Visit        within 14 days of discharge   Moderate Complexity [id number]   High Complexity [id number]                Problem list and histories reviewed & adjusted, as indicated.  Additional history: as documented    Patient Active Problem List   Diagnosis     Septic arthritis (H)     Chronic constipation     HTN (hypertension)     BPH (benign prostatic hypertrophy)     Insomnia     Moderate persistent asthma     GERD (gastroesophageal reflux disease)     Pneumonia     Hypercapnia     COPD exacerbation (H)     Acute on chronic respiratory failure with hypoxia and hypercapnia (H)     Chronic cutaneous venous stasis ulcer (H)     Hypertensive urgency     Atrioventricular block, complete (H)     Advanced directives, counseling/discussion     Phlebitis of left arm     Cardiac pacemaker in situ- Medtronic, dual chamber- NOT dependent     Benign nodular prostatic  hyperplasia without lower urinary tract symptoms     Past Surgical History   Procedure Laterality Date     Cholecystectomy       Ent surgery       Skin grafting - leg wound  6/2016     Irrigation and debridement hip, combined  4/18/2013     Procedure: COMBINED IRRIGATION AND DEBRIDEMENT HIP;  Irrigation and debridement of Right Hip with cultures;  Surgeon: Cristal Inman MD;  Location: [facility] OR     Partial pneumonectomy       done in [facility] in the 1990's       Social History   Substance Use Topics     Smoking status: Former Smoker     Smokeless tobacco: Not on file     Alcohol Use: No     Family History   Problem Relation Age of Onset     Family History Negative Mother          No Known Allergies  Recent Labs   Lab Test  01/14/17   0530  01/13/17   0943   01/11/17   1734   07/24/16   0628  07/23/16   0530   06/16/16   0730   06/13/16   1612   04/24/13   0643  04/23/13   0751   A1C   --    --    --    --    --    --   5.7   --    --    --    --    --    --   6.0   LDL   --    --    --    --    --    --    --    --    --    --    --    --   93   --    HDL   --    --    --    --    --    --    --    --    --    --    --    --   20*   --    TRIG   --    --    --    --    --    --    --    --    --    --    --    --   148   --    ALT   --    --    --   14   --    --   15   --   45   --   57   < >   --    --    CR  0.82  0.93   < >  0.96   < >   --   0.88   < >  0.76   < >  0.90   < >  1.11  1.12   GFRESTIMATED  >90  Non  GFR Calc    77   < >  75   < >   --   83   < >  >90  Non  GFR Calc     < >  81   < >  64  63   GFRESTBLACK  >90   GFR Calc    >90   GFR Calc     < >  >90   GFR Calc     < >   --   >90   GFR Calc     < >  >90   GFR Calc     < >  >90   GFR Calc     < >  77  77   POTASSIUM  3.4  4.1   < >  5.1   < >   --   4.1   < >  3.9   < >  3.8   < >  3.7  3.7   TSH   --    --    --     "--    --   1.04   --    --    --    --   0.20*   < >   --    --     < > = values in this interval not displayed.      BP Readings from Last 3 Encounters:   01/18/17 137/77   01/14/17 118/67   08/03/16 138/62    Wt Readings from Last 3 Encounters:   01/18/17 174 lb (78.926 kg)   01/14/17 175 lb 7.8 oz (79.6 kg)   08/03/16 174 lb (78.926 kg)                  Labs reviewed in EPIC  Problem list, Medication list, Allergies, and Medical/Social/Surgical histories reviewed in [facility] and updated as appropriate.    ROS:  C: NEGATIVE for fever, chills, change in weight  I: NEGATIVE for worrisome rashes, moles or lesions  E: NEGATIVE for vision changes or irritation  E/M: NEGATIVE for ear, mouth and throat problems  R: NEGATIVE for significant cough or SOB  CV: NEGATIVE for chest pain, palpitations or peripheral edema  GI: NEGATIVE for nausea, abdominal pain, heartburn, or change in bowel habits  : NEGATIVE for frequency, dysuria, or hematuria  M: NEGATIVE for significant arthralgias or myalgia  N: NEGATIVE for weakness, dizziness or paresthesias  E: NEGATIVE for temperature intolerance, skin/hair changes  H: NEGATIVE for bleeding problems  P: NEGATIVE for changes in mood or affect    OBJECTIVE:                                                    /77 mmHg  Pulse 84  Temp(Src) 97.8  F (36.6  C) (Oral)  Ht 6' 1\" (1.854 m)  Wt 174 lb (78.926 kg)  BMI 22.96 kg/m2  SpO2 97%  Body mass index is 22.96 kg/(m^2).  GENERAL: healthy, alert and no distress  EYES: Eyes grossly normal to inspection, extraocular movements - intact, and PERRL  RESP: lungs clear to auscultation - no rales, no rhonchi, no wheezes  CV: regular rates and rhythm, normal S1 S2, no S3 or S4 and no murmur, no click or rub -  ABDOMEN: soft, no tenderness, no  hepatosplenomegaly, no masses, normal bowel sounds  MS: extremities- no gross deformities noted, no edema  SKIN: Venous statis ulcer at right posterior distal leg. Tenderness of left brachial " vein.  NEURO: strength and tone- normal, sensory exam- grossly normal, mentation- intact, speech- normal, reflexes- symmetric  PSYCH: Alert and oriented times 3; speech- coherent , normal rate and volume; able to articulate logical thoughts, able to abstract reason, no tangential thoughts, no hallucinations or delusions, affect- normal  LYMPHATICS: ant. cervical- normal, post. cervical- normal, axillary- normal, supraclavicular- normal, inguinal- normal    Diagnostic test results:  Results for orders placed or performed during the hospital encounter of 01/11/17   XR Chest Port 1 View    Narrative    CHEST ONE VIEW PORTABLE  1/11/2017 6:12 PM     HISTORY: Cough. Shortness of breath.     COMPARISON: 7/21/2016.      Impression    IMPRESSION: Interval placement of an external pacer which partially  obscures the left mid and lower lung zones. Postoperative volume loss  in the right upper hemithorax is again noted. Probable scarring in  right lung base is unchanged. Heart size is difficult to evaluate due  to rotation of the patient. No definite confluent infiltrates or  significant changes since the prior exam.    RUFUS JENSEN MD   XR Chest Port 1 View    Narrative    Exam:  XR CHEST PORT 1 VW, 1/13/2017 10:59 AM    History: s/p ppm    Comparison:  Chest radiograph 1/11/2017 and CT chest 7/14/2016    Findings:  Single AP view of the chest was obtained. Interval  placement of left chest wall cardiac device with leads projecting over  the right atrium and ventricle.     The cardiomediastinal silhouette is stable. Pulmonary vasculature is  obscured. Postsurgical changes of thoracoplasty including stable  volume loss and scarring in the right upper and mid lung as well as  right chest wall deformity. Scarring in the right lung base is  unchanged. No significant pleural effusion or appreciable  pneumothorax. Patchy opacities in the left lung base.      Impression    Impression:    1. Interval placement of left chest wall  cardiac device with leads  projecting over the right atrium and ventricle.  2. Patchy opacities in the left lung base which could represent  atelectasis or infection.  3. Stable volume loss and scarring in the right lung with associated  chest wall deformity due to prior thoracoplasty.    I have personally reviewed the examination and initial interpretation  and I agree with the findings.    CATHIE SORIA MD   X-ray Chest 1 vw port    Narrative    EXAMINATION: XR CHEST PORT 1 VW, 1/13/2017 9:42 PM    COMPARISON: 1/13/2017    HISTORY: Check for Pneumothorax and Lead Position post Cardiac Implant    FINDINGS: Cardiac silhouette is within normal limits. Continued volume  loss in the right lung. Appearance of the upper mediastinum is  unchanged with tortuous aorta. Scattered basilar opacities are similar  to previous. No pneumothorax. Left chest wall pacemaker leads overlie  the right atrium and ventricle.      Impression    IMPRESSION:   1. No pneumothorax.  2. Continued basilar opacities and not significantly changed. Favor  atelectasis.      I have personally reviewed the examination and initial interpretation  and I agree with the findings.    BETTY THOMSON MD   CBC with platelets differential   Result Value Ref Range    WBC 5.4 4.0 - 11.0 10e9/L    RBC Count 5.02 4.4 - 5.9 10e12/L    Hemoglobin 14.4 13.3 - 17.7 g/dL    Hematocrit 45.3 40.0 - 53.0 %    MCV 90 78 - 100 fl    MCH 28.7 26.5 - 33.0 pg    MCHC 31.8 31.5 - 36.5 g/dL    RDW 14.1 10.0 - 15.0 %    Platelet Count 258 150 - 450 10e9/L    Diff Method Automated Method     % Neutrophils 48.0 %    % Lymphocytes 35.5 %    % Monocytes 12.9 %    % Eosinophils 2.8 %    % Basophils 0.6 %    % Immature Granulocytes 0.2 %    Nucleated RBCs 0 0 /100    Absolute Neutrophil 2.6 1.6 - 8.3 10e9/L    Absolute Lymphocytes 1.9 0.8 - 5.3 10e9/L    Absolute Monocytes 0.7 0.0 - 1.3 10e9/L    Absolute Eosinophils 0.2 0.0 - 0.7 10e9/L    Absolute Basophils 0.0 0.0 - 0.2 10e9/L     Abs Immature Granulocytes 0.0 0 - 0.4 10e9/L    Absolute Nucleated RBC 0.0    Comprehensive metabolic panel   Result Value Ref Range    Sodium 138 133 - 144 mmol/L    Potassium 5.1 3.4 - 5.3 mmol/L    Chloride 105 94 - 109 mmol/L    Carbon Dioxide 26 20 - 32 mmol/L    Anion Gap 7 3 - 14 mmol/L    Glucose 95 70 - 99 mg/dL    Urea Nitrogen 11 7 - 30 mg/dL    Creatinine 0.96 0.66 - 1.25 mg/dL    GFR Estimate 75 >60 mL/min/1.7m2    GFR Estimate If Black >90   GFR Calc   >60 mL/min/1.7m2    Calcium 8.3 (L) 8.5 - 10.1 mg/dL    Bilirubin Total 0.4 0.2 - 1.3 mg/dL    Albumin 2.4 (L) 3.4 - 5.0 g/dL    Protein Total 7.9 6.8 - 8.8 g/dL    Alkaline Phosphatase 86 40 - 150 U/L    ALT 14 0 - 70 U/L    AST 29 0 - 45 U/L   Troponin I   Result Value Ref Range    Troponin I ES  0.000 - 0.045 ug/L     <0.015  The 99th percentile for upper reference range is 0.045 ug/L.  Troponin values in   the range of 0.045 - 0.120 ug/L may be associated with risks of adverse   clinical events.     INR   Result Value Ref Range    INR 1.14 0.86 - 1.14   Lactic acid   Result Value Ref Range    Lactic Acid 1.4 0.7 - 2.1 mmol/L   UA without Microscopic   Result Value Ref Range    Color Urine Yellow     Appearance Urine Clear     Glucose Urine Negative NEG mg/dL    Bilirubin Urine Negative NEG    Ketones Urine Negative NEG mg/dL    Specific Gravity Urine 1.016 1.003 - 1.035    Blood Urine Negative NEG    pH Urine 7.5 (H) 5.0 - 7.0 pH    Protein Albumin Urine 30 (A) NEG mg/dL    Urobilinogen mg/dL 2.0 0.0 - 2.0 mg/dL    Nitrite Urine Negative NEG    Leukocyte Esterase Urine Trace (A) NEG    Source Unspecified Urine    Basic metabolic panel   Result Value Ref Range    Sodium 138 133 - 144 mmol/L    Potassium 4.5 3.4 - 5.3 mmol/L    Chloride 106 94 - 109 mmol/L    Carbon Dioxide 24 20 - 32 mmol/L    Anion Gap 7 3 - 14 mmol/L    Glucose 78 70 - 99 mg/dL    Urea Nitrogen 10 7 - 30 mg/dL    Creatinine 0.91 0.66 - 1.25 mg/dL    GFR Estimate  80 >60 mL/min/1.7m2    GFR Estimate If Black >90   GFR Calc   >60 mL/min/1.7m2    Calcium 8.3 (L) 8.5 - 10.1 mg/dL   CBC with platelets   Result Value Ref Range    WBC 6.0 4.0 - 11.0 10e9/L    RBC Count 4.55 4.4 - 5.9 10e12/L    Hemoglobin 12.7 (L) 13.3 - 17.7 g/dL    Hematocrit 40.2 40.0 - 53.0 %    MCV 88 78 - 100 fl    MCH 27.9 26.5 - 33.0 pg    MCHC 31.6 31.5 - 36.5 g/dL    RDW 14.7 10.0 - 15.0 %    Platelet Count 265 150 - 450 10e9/L   INR   Result Value Ref Range    INR 1.24 (H) 0.86 - 1.14   Basic metabolic panel   Result Value Ref Range    Sodium 139 133 - 144 mmol/L    Potassium 4.1 3.4 - 5.3 mmol/L    Chloride 106 94 - 109 mmol/L    Carbon Dioxide 26 20 - 32 mmol/L    Anion Gap 7 3 - 14 mmol/L    Glucose 100 (H) 70 - 99 mg/dL    Urea Nitrogen 11 7 - 30 mg/dL    Creatinine 0.93 0.66 - 1.25 mg/dL    GFR Estimate 77 >60 mL/min/1.7m2    GFR Estimate If Black >90   GFR Calc   >60 mL/min/1.7m2    Calcium 8.2 (L) 8.5 - 10.1 mg/dL   Magnesium   Result Value Ref Range    Magnesium 2.2 1.6 - 2.3 mg/dL   Basic metabolic panel   Result Value Ref Range    Sodium 137 133 - 144 mmol/L    Potassium 3.4 3.4 - 5.3 mmol/L    Chloride 100 94 - 109 mmol/L    Carbon Dioxide 28 20 - 32 mmol/L    Anion Gap 8 3 - 14 mmol/L    Glucose 94 70 - 99 mg/dL    Urea Nitrogen 7 7 - 30 mg/dL    Creatinine 0.82 0.66 - 1.25 mg/dL    GFR Estimate >90  Non  GFR Calc   >60 mL/min/1.7m2    GFR Estimate If Black >90   GFR Calc   >60 mL/min/1.7m2    Calcium 8.1 (L) 8.5 - 10.1 mg/dL   Magnesium   Result Value Ref Range    Magnesium 2.0 1.6 - 2.3 mg/dL   EKG 12 lead   Result Value Ref Range    Interpretation ECG Click View Image link to view waveform and result    EKG 12 lead   Result Value Ref Range    Interpretation ECG Click View Image link to view waveform and result    EKG 12-lead, tracing only   Result Value Ref Range    Interpretation ECG Click View Image link to view waveform and  result    EKG 12-lead, tracing only   Result Value Ref Range    Interpretation ECG Click View Image link to view waveform and result    EKG 12-lead, tracing only   Result Value Ref Range    Interpretation ECG Click View Image link to view waveform and result    EKG 12-lead, tracing only   Result Value Ref Range    Interpretation ECG Click View Image link to view waveform and result    ISTAT gases elec ica gluc delon POCT   Result Value Ref Range    Ph Venous 7.35 7.32 - 7.43 pH    PCO2 Venous 57 (H) 40 - 50 mm Hg    PO2 Venous 26 25 - 47 mm Hg    Bicarbonate Venous 32 (H) 21 - 28 mmol/L    O2 Sat Venous 44 %    Sodium 141 133 - 144 mmol/L    Potassium 4.5 3.4 - 5.3 mmol/L    Glucose 94 70 - 99 mg/dL    Calcium Ionized 4.7 4.4 - 5.2 mg/dL    Hemoglobin 15.6 13.3 - 17.7 g/dL    Hematocrit - POCT 46 40.0 - 53.0 %PCV   Echocardiogram Complete    Narrative    Interpretation Summary                       [facility],[place]  Echocardiography Laboratory  [address] [place] [address]     Name: LUIS ALBRIGHT  MRN: [id number]  : 1934  Study Date: 2017 01:43 PM  Age: 83 yrs  Gender: Male  [place] [facility] [place] For Study: Complete AV block  Ordering Physician: NETTA DAS  Performed By: Tejas Bright RDCS     BSA: 2.0 m2  Height: 73 in  Weight: 176 lb  BP: 212/69 mmHg  ______________________________________________________________________________        Procedure  Complete Portable Echo Adult.  ______________________________________________________________________________     Interpretation Summary  Global and regional left ventricular function is normal with an EF of 55-60%.  Mild diffuse hypokinesis is present.  Right ventricular function, chamber size, wall motion, and thickness are  normal.  Pulmonary artery systolic pressure is normal.  Dilation of the inferior vena cava is present with normal respiratory  variation in diameter. Estimated mean right atrial  pressure is 3-8 mmHg.  No pericardial effusion is present.     ______________________________________________________________________________           Left Ventricle  Global and regional left ventricular function is normal with an EF of 55-60%.  Left ventricular wall thickness is normal. Left ventricular size is normal.  Diastolic function not assessed due to frequent ectopy. Mild diffuse  hypokinesis is present.     Right Ventricle  Right ventricular function, chamber size, wall motion, and thickness are  normal.  Atria  Both atria appear normal.     Mitral Valve  The mitral valve is normal.     Aortic Valve  AoV is seen in limited views but appears to be normal in structure.     Tricuspid Valve  Trace tricuspid insufficiency is present. The right ventricular systolic  pressure is approximated at 28.1 mmHg plus the right atrial pressure.  Pulmonary artery systolic pressure is normal.     Vessels  Dilation of the inferior vena cava is present with normal respiratory  variation in diameter. Estimated mean right atrial pressure is 3-8 mmHg.     Pericardium  No pericardial effusion is present.  Compared to Previous Study  This study was compared with the study from 2013 . LVEF is slightly  worse. The patient is now in complete heart block.     ______________________________________________________________________________  MMode/2D Measurements & Calculations  LA Volume (BP): 64.3 ml           Doppler Measurements & Calculations  MV E max timur: 117.1 cm/sec  MV A max timur: 93.1 cm/sec  MV E/A: 1.3  MV dec slope: 447.2 cm/sec2  MV dec time: 0.26 sec  TR max timur: 265.0 cm/sec  TR max P.1 mmHg  Lateral E/e': 15.8  Medial E/e': 24.7              ______________________________________________________________________________        Report approved by: Bozena Bernardo 2017 03:50 PM      Urine Culture   Result Value Ref Range    Specimen Description Unspecified Urine     Special Requests Specimen received in  preservative     Culture Micro       <10,000 colonies/mL mixed urogenital lawrence Susceptibility testing not routinely   done      Micro Report Status FINAL 01/12/2017         ASSESSMENT/PLAN:                                                        ICD-10-CM    1. Status post placement of cardiac pacemaker Z95.0     Indication: 2nd degree AV Block, Mobitz 2 (Wenkebach).     2. Chronic cutaneous venous stasis ulcer (H) I83.009 mupirocin (BACTROBAN) 2 % ointment     order for DME     order for DME     DISCONTINUED: order for DME   3. Phlebitis of left arm I80.8 cephalexin (KEFLEX) 250 MG capsule   4. Essential hypertension I10 enalapril (VASOTEC) 10 MG tablet   5. Benign nodular prostatic hyperplasia without lower urinary tract symptoms N40.0 tamsulosin (FLOMAX) 0.4 MG capsule       Follow up with Provider - one month.     Hakeem Awad MD  [facility]

## 2017-01-18 NOTE — NURSING NOTE
"Chief Complaint   Patient presents with     Hospital F/U     had heart pacer placed     Musculoskeletal Problem     pain all over body     WOUND CARE     Cough     Headache       Initial /77 mmHg  Pulse 84  Temp(Src) 97.8  F (36.6  C) (Oral)  Ht 6' 1\" (1.854 m)  Wt 174 lb (78.926 kg)  BMI 22.96 kg/m2  SpO2 97% Estimated body mass index is 22.96 kg/(m^2) as calculated from the following:    Height as of this encounter: 6' 1\" (1.854 m).    Weight as of this encounter: 174 lb (78.926 kg).  BP completed using cuff size: gabriel Betancourt MA      "

## 2017-01-18 NOTE — PATIENT INSTRUCTIONS
This summary includes the important diagnoses, test, medications and other important parts of your medical history.  Below are a few good we sites you can use to learn more about these.     Www.[employer].org : Up to date and easily searchable information on multiple topics.  Www.[employer].org/Pharmacy/c_539084.asp : [facility] $4.99 medications  Www.medlineplus.gov : medication info, interactive tutorials, watch real surgeries online  Www.familydoctor.org : good info from the Academy of Family Physicians  Www.[facility]inic.com : good info from the [facility]  Www.cdc.gov : public health info, travel advisories, epidemics (H1N1)  Www.aap.org : children's health info, normal development, vaccinations  Www.health.[facility].[place].us : [place] dept of heat, public health issues in [place], N1N1    Based on your medical history and these are the current health maintenance or preventive care services that you are due for (some may have been done at this visit:)  Health Maintenance Due   Topic Date Due     ADVANCE DIRECTIVE PLANNING Q5 YRS (NO INBASKET)  01/01/1952     ASTHMA CONTROL TEST Q6 MOS (NO INBASKET)  02/03/2017     =================================================================================  Normal Values   Blood pressure  <140/90 for most adults    <130/80 for some chronic diseases (ask your care team about yours)    BMI (body mass index)  18.5-25 kg/m2 (based on height and weight)     Thank you for visiting [facility]    Normal or non-critical lab and imaging results will be communicated to you by MyChart, letter or phone within 7 days.  If you do not hear from us within 10 days, please call the clinic. If you have a critical or abnormal lab result, we will notify you by phone as soon as possible.     If you have any questions regarding your visit please contact:     Team Comfort:   Clinic Hours Telephone Number   Dr. David Lindsay Ho  Maria G  Jake Huynh   7am-5pm  Monday - Friday [phone]  Johnnie ROCK   Pharmacy 8am-8pm Monday-Thursday      8am-6pm Friday  9am-5pm Saturday-Sunday [phone]   Urgent Care 11am-8pm Monday-Friday        9am-5pm Saturday-Sunday [phone]     After hours, weekend or if you need to make an appointment with your primary provider please call [phone].   After Hours nurse advise: call [facility]: [phone]    Medication Refills:  Call your pharmacy and they will forward the refill to us. Please allow 3 business days for your refills to be completed.    Use [employer] (secure email communication and access to your chart) to send your primary care provider a message or make an appointment. Ask someone on your Team how to sign up for [employer]ida. To log on to [employer] or for more information in [employer] please visit the website at [email or web address].  As of October 8, 2013, all password changes, disabled accounts, or ID changes in Kamida/MyHealth will be done by our Access Services Department.   If you need help with your account or password, call: [phone]. Clinic staff no longer has the ability to change passwords.

## 2017-01-20 ENCOUNTER — TELEPHONE (OUTPATIENT)
Dept: FAMILY MEDICINE | Facility: CLINIC | Age: 82
End: 2017-01-20

## 2017-01-20 ENCOUNTER — ALLIED HEALTH/NURSE VISIT (OUTPATIENT)
Dept: CARDIOLOGY | Facility: CLINIC | Age: 82
End: 2017-01-20
Attending: INTERNAL MEDICINE
Payer: MEDICARE

## 2017-01-20 DIAGNOSIS — I44.2 ATRIOVENTRICULAR BLOCK, COMPLETE (H): Primary | ICD-10-CM

## 2017-01-20 PROBLEM — Z95.0 CARDIAC PACEMAKER IN SITU: Status: ACTIVE | Noted: 2017-01-20

## 2017-01-20 PROBLEM — Z95.0 S/P CARDIAC PACEMAKER PROCEDURE: Status: RESOLVED | Noted: 2017-01-14 | Resolved: 2017-01-20

## 2017-01-20 PROCEDURE — 93288 INTERROG EVL PM/LDLS PM IP: CPT | Mod: 26 | Performed by: INTERNAL MEDICINE

## 2017-01-20 PROCEDURE — 93280 PM DEVICE PROGR EVAL DUAL: CPT | Mod: ZF

## 2017-01-20 NOTE — TELEPHONE ENCOUNTER
..Reason for Call: Request for an order or referral:    Order or referral being requested: skilled nursing to assess for home care admission, one time visit.     Date needed: as soon as possible    Has the patient been seen by the PCP for this problem? YES    Additional comments: Gonzálezmaaneta is there right now for the next hour; Is requesting this now.    Phone number Patient can be reached at:  Other phone number:  [phone]       Best Time:  anytime    Can we leave a detailed message on this number?  YES    Call taken on 1/20/2017 at 9:46 AM by Aimee Zafar

## 2017-01-20 NOTE — PATIENT INSTRUCTIONS
It was a pleasure to see you in clinic today. Please do not hesitate to call with any questions or concerns. We look forward to seeing you in clinic at your next device check in 3 months.    Abigail Wright RN  Electrophysiology Nurse Clinician  [facility]  During business hours call:  [phone]  After business hours please call: [phone]- select option #4 and ask for job code [phone].

## 2017-01-20 NOTE — PROGRESS NOTES
Pt seen in the [facility] for evaluation and iterative programming of a Medtronic dual lead pacemaker, per MD orders. He is s/p device implant 1/12/17. Today his intrinsic rhythm is Sinus 80 with CHB- ventricular rate <30 bpm. Normal device function. 9 AT/AF episodes recorded lasting a total of 59 minutes. AF burden= 0.6%. He is not anticoagulated. AP= 0.5% and = 100%. Pt reports he is feeling well, via . Steri-strips removed and incision cleaned. Edges are well approximated with no redness, drainage, or edema noted. Plan for pt to RTC in 3 months.

## 2017-01-20 NOTE — TELEPHONE ENCOUNTER
Last office visit: 01/18/17    Per standing orders request for skilled nursing has been approved.    RN left detailed message informing home care nurse of the approval.  Instructed to call the clinic if she has any questions.    Olga Garcia RN

## 2017-01-20 NOTE — MR AVS SNAPSHOT
After Visit Summary   1/20/2017    Gallito Farley    MRN: [id number]           Patient Information     Date Of Birth          1/1/1934        Visit Information        Provider Department      1/20/2017 10:15 AM 1, Uc Cv Device; [facility] [employer] [facility]        Care Instructions    It was a pleasure to see you in clinic today. Please do not hesitate to call with any questions or concerns. We look forward to seeing you in clinic at your next device check in 3 months.    Aibgail Wright RN  Electrophysiology Nurse Clinician  [facility]  During business hours call:  [phone]  After business hours please call: [phone]- select option #4 and ask for job code [id number].          Follow-ups after your visit        Follow-up notes from your care team     Return in about 3 months (around 4/20/2017) for Medtronic, Pacemaker check.      Your next 10 appointments already scheduled     Mar 13, 2017 10:00 AM   Office Visit with Hakeem Awad MD   [facility]    [address] [place] [address] [phone]           Bring a current list of meds and any records pertaining to this visit.  For Physicals, please bring immunization records and any forms needing to be filled out.  Please arrive 10 minutes early to complete paperwork.            Apr 17, 2017  2:00 PM   (Arrive by 1:45 PM)   Pacemaker Check with Uc Cv Device 1   [facility] ([facility])    [address] [address] [phone]              Who to contact     If you have questions or need follow up information about today's clinic visit or your schedule please contact [facility] directly at [phone].  Normal or non-critical lab and imaging results will be communicated to you by MyChart, letter or phone within 4 business days after the clinic  "has received the results. If you do not hear from us within 7 days, please contact the clinic through C-Note or phone. If you have a critical or abnormal lab result, we will notify you by phone as soon as possible.  Submit refill requests through C-Note or call your pharmacy and they will forward the refill request to us. Please allow 3 business days for your refill to be completed.          Additional Information About Your Visit        C-Note Information     C-Note lets you send messages to your doctor, view your test results, renew your prescriptions, schedule appointments and more. To sign up, go to [email or web address] . Click on \"Log in\" on the left side of the screen, which will take you to the Welcome page. Then click on \"Sign up Now\" on the right side of the page.     You will be asked to enter the access code listed below, as well as some personal information. Please follow the directions to create your username and password.     Your access code is: [email or web address]  Expires: 2017  7:43 PM     Your access code will  in 90 days. If you need help or a new code, please call your [facility] clinic or [phone].        Care EveryWhere ID     This is your Care EveryWhere ID. This could be used by other organizations to access your [facility] medical records  [id number]         Blood Pressure from Last 3 Encounters:   17 137/77   17 118/67   16 138/62    Weight from Last 3 Encounters:   17 78.926 kg (174 lb)   17 79.6 kg (175 lb 7.8 oz)   16 78.926 kg (174 lb)              Today, you had the following     No orders found for display       Primary Care Provider Office Phone # Fax #    Moiz Richard [phone]       [facility] [address] [address]        Thank you!     Thank you for choosing [facility]  for your care. Our goal is always to provide you with excellent care. Hearing back from our patients is one " "way we can continue to improve our services. Please take a few minutes to complete the written survey that you may receive in the mail after your visit with us. Thank you!             Your Updated Medication List - Protect others around you: Learn how to safely use, store and throw away your medicines at www.disposemymeds.org.          This list is accurate as of: 1/20/17 10:55 AM.  Always use your most recent med list.                   Brand Name Dispense Instructions for use    acetaminophen 325 MG tablet    TYLENOL    100 tablet    Take 2 tablets (650 mg) by mouth every 4 hours as needed for mild pain       albuterol (2.5 MG/3ML) 0.083% neb solution     360 mL    Take 1 vial (2.5 mg) by nebulization every 6 hours as needed       amLODIPine 10 MG tablet    NORVASC    30 tablet    Take 1 tablet (10 mg) by mouth daily       cephalexin 250 MG capsule    KEFLEX    20 capsule    Take 1 capsule (250 mg) by mouth 4 times daily for 5 days       enalapril 10 MG tablet    VASOTEC    90 tablet    Take 1 tablet (10 mg) by mouth daily       fluticasone 250 MCG/BLIST Aepb Inhaler    FLOVENT DISKUS    1 Inhaler    Inhale 1 puff into the lungs every 12 hours       hydrochlorothiazide 12.5 MG capsule    MICROZIDE    30 capsule    Take 2 capsules (25 mg) by mouth daily       mupirocin 2 % ointment    BACTROBAN    30 g    Apply twice a day to right leg wound and cover with occlusive dressing.       * order for DME     5 each    Equipment being ordered: Dispense 4\" x 4\" sterile gauze. Dispense any brand name.       * order for DME     5 each    Equipment being ordered: Dispense Coban wrap.       ranitidine 150 MG tablet    ZANTAC    60 tablet    Take 1 tablet (150 mg) by mouth 2 times daily       tamsulosin 0.4 MG capsule    FLOMAX    60 capsule    Take 2 capsules (0.8 mg) by mouth At Bedtime       tiotropium 18 MCG capsule    SPIRIVA    30 capsule    Inhale 1 capsule (18 mcg) into the lungs daily       * Notice:  This list has 2 " medication(s) that are the same as other medications prescribed for you. Read the directions carefully, and ask your doctor or other care provider to review them with you.

## 2017-01-23 ENCOUNTER — TELEPHONE (OUTPATIENT)
Dept: FAMILY MEDICINE | Facility: CLINIC | Age: 82
End: 2017-01-23

## 2017-01-23 NOTE — TELEPHONE ENCOUNTER
Dr. Awad, this was received and put on your desk to address.  [place],  [facility]

## 2017-01-23 NOTE — TELEPHONE ENCOUNTER
Reason for Call:  Other     Detailed comments: [facility] sent you fax with medication list needs you to sign off and return.    Phone Number [employer] can be reached at: [phone]    Best Time: Any    Can we leave a detailed message on this number? YES    Call taken on 1/23/2017 at 12:41 PM by Mary Miner

## 2017-01-24 ENCOUNTER — MEDICAL CORRESPONDENCE (OUTPATIENT)
Dept: HEALTH INFORMATION MANAGEMENT | Facility: CLINIC | Age: 82
End: 2017-01-24

## 2017-01-24 NOTE — TELEPHONE ENCOUNTER
Form is faxed back to [facility] at fax # [phone].  [place],  [facility]

## 2017-01-27 PROBLEM — N40.0 BENIGN NODULAR PROSTATIC HYPERPLASIA WITHOUT LOWER URINARY TRACT SYMPTOMS: Status: ACTIVE | Noted: 2017-01-27

## 2017-01-30 ENCOUNTER — TELEPHONE (OUTPATIENT)
Dept: FAMILY MEDICINE | Facility: CLINIC | Age: 82
End: 2017-01-30

## 2017-01-30 DIAGNOSIS — L97.909 CHRONIC CUTANEOUS VENOUS STASIS ULCER (H): Primary | ICD-10-CM

## 2017-01-30 DIAGNOSIS — I83.009 CHRONIC CUTANEOUS VENOUS STASIS ULCER (H): Primary | ICD-10-CM

## 2017-01-30 NOTE — TELEPHONE ENCOUNTER
Last office visit: 01/18/17    RN called home care nurse and was informed that the patient has an open wound on his left leg.  The wound has been there for years.  Requesting a referral to a specialist.    Routing to provider. Please advise.  Olga Garcia RN

## 2017-01-30 NOTE — TELEPHONE ENCOUNTER
First and Last name caller: JOE ALBRIGHT  Relationship to patient: CARE TAKER   Reason for call: stated that the pt.s home care nurse is requesting that the pt. See a wound care specialist .   Provider they see: Dr. Awad   Phone number they can be reached at: Joe can be reached at ( [phone]  Ok to leave a message: YES     Jorge Verdin, Patient Rep  [facility] [place]

## 2017-01-30 NOTE — TELEPHONE ENCOUNTER
This writer attempted to contact Joe on 01/30/2017.    Was call answered?  No.  Left message on voicemail with information to call me back.    If patient calls back, please inform home care nurse referral has been sent to [facility].  Patient can call [phone] to schedule an appointment.    Olga Garcia

## 2017-01-30 NOTE — TELEPHONE ENCOUNTER
Wound care referral sent to [facility].  Notify patient's caretaker to call [phone] to schedule wound care consultation.

## 2017-01-30 NOTE — TELEPHONE ENCOUNTER
Reason for Call:  Other returning call    Detailed comments: please try back     Phone Number Patient can be reached at: [phone]    Best Time: Any    Can we leave a detailed message on this number? YES    Call taken on 1/30/2017 at 5:13 PM by Mary Miner

## 2017-01-31 NOTE — TELEPHONE ENCOUNTER
Called and spoke to Joe, she is notified of the referral and phone number is given for her to call to set up an appt for pt.  [place],  [facility]

## 2017-02-01 ENCOUNTER — TELEPHONE (OUTPATIENT)
Dept: FAMILY MEDICINE | Facility: CLINIC | Age: 82
End: 2017-02-01

## 2017-02-01 DIAGNOSIS — I83.009 CHRONIC CUTANEOUS VENOUS STASIS ULCER (H): Primary | ICD-10-CM

## 2017-02-01 DIAGNOSIS — L97.909 CHRONIC CUTANEOUS VENOUS STASIS ULCER (H): Primary | ICD-10-CM

## 2017-02-01 NOTE — TELEPHONE ENCOUNTER
PATIENT IS REQUESTING MUPIROCIN 25% OINT MENT 22 GM (PACKAGE SIZE)  BE FOR 30 DAY SUPPLY INSTEAD OF 10 DAY SUPPLY AS IT IS AN INCONVENIENCE FOR   THE PATIENT TO MAKE SEVERAL TRIPS TO THE PHARMACY     ALSO BASED OFF HIS PREVIOUS 2 FILLS  EACH TUBE IS LASTING THE PATIENT 5 TO 7 DAYS PATIENT LAST FILLED 1/27/17   AND HAS USED ENTIRE TUBE PREVIOUS FILL WAS ON 1/18/16 PLEASE SEND NEW   SCRIPT FOR 30 DAY SUPPLY (4 TUBES BASED ON PREVIOUS FILLS SHOULD LAST 30 DAYS)  PATIENT IS EX[PECTING TO  2/02/17  THANK YOU  IF YOU HAVE ANY QUESTIONS PLEASE GIVE US A CALL      Cristal Thorne CPhT   [facility]  [phone]

## 2017-02-02 RX ORDER — MUPIROCIN 20 MG/G
OINTMENT TOPICAL
Qty: 30 G | Refills: 11 | Status: SHIPPED | OUTPATIENT
Start: 2017-02-02 | End: 2018-10-02

## 2017-02-06 ENCOUNTER — OFFICE VISIT (OUTPATIENT)
Dept: PODIATRY | Facility: CLINIC | Age: 82
End: 2017-02-06
Attending: INTERNAL MEDICINE
Payer: MEDICARE

## 2017-02-06 VITALS — HEART RATE: 94 BPM | OXYGEN SATURATION: 96 % | SYSTOLIC BLOOD PRESSURE: 108 MMHG | DIASTOLIC BLOOD PRESSURE: 67 MMHG

## 2017-02-06 DIAGNOSIS — G89.18 ACUTE POST-OPERATIVE PAIN: ICD-10-CM

## 2017-02-06 DIAGNOSIS — R60.0 PERIPHERAL EDEMA: Primary | ICD-10-CM

## 2017-02-06 DIAGNOSIS — I10 ESSENTIAL HYPERTENSION: ICD-10-CM

## 2017-02-06 DIAGNOSIS — I87.8 VENOUS STASIS: ICD-10-CM

## 2017-02-06 DIAGNOSIS — K21.9 GASTROESOPHAGEAL REFLUX DISEASE, ESOPHAGITIS PRESENCE NOT SPECIFIED: Primary | ICD-10-CM

## 2017-02-06 DIAGNOSIS — L97.912 ULCER OF RIGHT LOWER LEG, WITH FAT LAYER EXPOSED (H): ICD-10-CM

## 2017-02-06 PROCEDURE — 99213 OFFICE O/P EST LOW 20 MIN: CPT | Performed by: PODIATRIST

## 2017-02-06 NOTE — PATIENT INSTRUCTIONS
Thanks for coming today.  Ortho/Sports Medicine Clinic  [address] [place] [address]    To schedule future appointments in Ortho Clinic, you may call [phone].    To schedule ordered imaging by your Provider: Call [facility] at [phone]    FunCaptcha available online at:   [email or web address]    Please call if any further questions or concerns [phone] and ask for the Orthopedic Department. Clinic hours 8 am to 5 pm.    Return to clinic if symptoms worsen.

## 2017-02-06 NOTE — PROGRESS NOTES
Past Medical History   Diagnosis Date     Osteoarthritis      Diastolic dysfunction, left ventricle 4/16/2013     Hypertension      Asthma      COPD (chronic obstructive pulmonary disease) (H)      Leg wound, right      chronic, s/p grafting     BPH (benign prostatic hyperplasia)      H/O tuberculosis      s/p partial pneumonectomy [place] in the 1990[place]     LBBB (left bundle branch block)      Patient Active Problem List   Diagnosis     Septic arthritis (H)     Chronic constipation     HTN (hypertension)     BPH (benign prostatic hypertrophy)     Insomnia     Moderate persistent asthma     GERD (gastroesophageal reflux disease)     Pneumonia     Hypercapnia     COPD exacerbation (H)     Acute on chronic respiratory failure with hypoxia and hypercapnia (H)     Chronic cutaneous venous stasis ulcer (H)     Hypertensive urgency     Atrioventricular block, complete (H)     Advanced directives, counseling/discussion     Phlebitis of left arm     Cardiac pacemaker in situ- Medtronic, dual chamber- NOT dependent     Benign nodular prostatic hyperplasia without lower urinary tract symptoms     Past Surgical History   Procedure Laterality Date     Cholecystectomy       Ent surgery       Skin grafting - leg wound  6/2016     Irrigation and debridement hip, combined  4/18/2013     Procedure: COMBINED IRRIGATION AND DEBRIDEMENT HIP;  Irrigation and debridement of Right Hip with cultures;  Surgeon: Cristal Inman MD;  Location: [facility] OR     Partial pneumonectomy       done in [facility] in the 1990's     Social History     Social History     Marital Status:      Spouse Name: N/A     Number of Children: N/A     Years of Education: N/A     Occupational History     Not on file.     Social History Main Topics     Smoking status: Former Smoker     Smokeless tobacco: Not on file     Alcohol Use: No     Drug Use: No     Sexual Activity: Not on file     Other Topics Concern     Not on file     Social History Narrative      Family History   Problem Relation Age of Onset     Family History Negative Mother    WBC      6.0   1/12/2017  RBC     4.55   1/12/2017  HGB     12.7   1/12/2017  HCT     40.2   1/12/2017  No components found with this name: mct  MCV       88   1/12/2017  MCH     27.9   1/12/2017  MCHC     31.6   1/12/2017  RDW     14.7   1/12/2017  PLT      265   1/12/2017  Last Basic Metabolic Panel:  NA      137   1/14/2017   POTASSIUM      3.4   1/14/2017  CHLORIDE      100   1/14/2017  PAMELA      8.1   1/14/2017  CO2       28   1/14/2017  BUN        7   1/14/2017  CR     0.82   1/14/2017  GLC       94   1/14/2017  Inr     1.24      1/12/2017      SUBJECTIVE:  An 83-year-old male presents for right leg ulcer.  He presents with an .  He relates that the right leg hurts.  He has been doing okay.  He has some swelling.  He has had a pacemaker since I have seen him last.  They are using Bactroban on the left.  They just started that about 2 days ago.  No specific injury since I have seen him last.  No other specific relieving or aggravating factors.  Previous notes reviewed and noted in the EMR.  He relates he does have home nursing coming out twice a day.        OBJECTIVE:  He has peripheral edema bilaterally.  Right posterior leg ulcer that is posterior and lateral is about 7.5 x 4.5 cm through the dermis into the subcutaneous tissues.  There is good granular base.  There is some serosanguineous drainage.  No erythema, no odor, no calor.  The wound margins are terence.  He does have scar tissue present from the original ulcer site.        ASSESSMENT/PLAN:  Ulcer on right leg with venous stasis edema bilaterally.  Diagnosis and treatment options discussed with the patient.  Previous notes reviewed.  He did have 1 Apligraf back in June.  Local wound care done upon consent today.  I am going to do Ena twice weekly, clean with Wound Vashe, apply Bactroban, a light dressing and an Ace wrap daily.  I will see him  back in 1 week.     Also prescription for compression socks 15-20 mmHg given and use discussed with him.

## 2017-02-06 NOTE — NURSING NOTE
"Gallito Farley's goals for this visit include: Find a solution for right leg wound  He requests these members of his care team be copied on today's visit information: yes    PCP: Hakeem Awad    Referring Provider:  Hakeem Awad MD  [facility]  [address] [place] [address]    Chief Complaint   Patient presents with     Consult     Wound Check     Right leg       Initial /67 mmHg  Pulse 94  SpO2 96% Estimated body mass index is 22.96 kg/(m^2) as calculated from the following:    Height as of 1/18/17: 1.854 m (6' 1\").    Weight as of 1/18/17: 78.926 kg (174 lb).  Medication Reconciliation: complete    "

## 2017-02-06 NOTE — Clinical Note
February 6, 2017      RE: Gallito Farley  [address] [address]       To whom it may concern:    Gallito Farley, right leg ulcer daily, rinse with wound vashe, and pad dry. Apply Bactroban and sterile gauze dressing and light ace wrap for compression. Twice weekly, apply colin to the wound bed.     Sincerely,      Mal Davis DPM

## 2017-03-03 DIAGNOSIS — G89.18 ACUTE POST-OPERATIVE PAIN: ICD-10-CM

## 2017-03-03 DIAGNOSIS — I10 ESSENTIAL HYPERTENSION: ICD-10-CM

## 2017-03-03 RX ORDER — HYDROCHLOROTHIAZIDE 12.5 MG/1
25 CAPSULE ORAL DAILY
Qty: 30 CAPSULE | Refills: 1 | Status: CANCELLED | OUTPATIENT
Start: 2017-03-03

## 2017-03-03 RX ORDER — AMLODIPINE BESYLATE 10 MG/1
10 TABLET ORAL DAILY
Qty: 30 TABLET | Refills: 1 | Status: CANCELLED | OUTPATIENT
Start: 2017-03-03

## 2017-03-03 RX ORDER — ACETAMINOPHEN 325 MG/1
650 TABLET ORAL EVERY 4 HOURS PRN
Qty: 100 TABLET | Refills: 0 | Status: CANCELLED | OUTPATIENT
Start: 2017-03-03

## 2017-03-06 ENCOUNTER — TELEPHONE (OUTPATIENT)
Dept: PODIATRY | Facility: CLINIC | Age: 82
End: 2017-03-06

## 2017-03-06 RX ORDER — HYDROCHLOROTHIAZIDE 12.5 MG/1
25 CAPSULE ORAL DAILY
Qty: 30 CAPSULE | Refills: 0 | Status: SHIPPED | OUTPATIENT
Start: 2017-03-06 | End: 2017-03-27

## 2017-03-06 RX ORDER — ACETAMINOPHEN 325 MG/1
650 TABLET ORAL EVERY 4 HOURS PRN
Qty: 100 TABLET | Refills: 0 | Status: SHIPPED | OUTPATIENT
Start: 2017-03-06 | End: 2017-08-10

## 2017-03-06 RX ORDER — AMLODIPINE BESYLATE 10 MG/1
10 TABLET ORAL DAILY
Qty: 30 TABLET | Refills: 0 | Status: SHIPPED | OUTPATIENT
Start: 2017-03-06 | End: 2017-04-04

## 2017-03-06 NOTE — TELEPHONE ENCOUNTER
Akash, from Home Health Care  [phone]    Dr Davis patient,  Wants to update care team.  Dressing for the wound and compression stockings.  Please call care giver to discuss.    PT: na  OT: na  Skilled Nursing: na  Pt Provider?: Ryan  Phone Number Homecare Nurse can be reached at: above  Can we leave a detailed message on this number?: yes

## 2017-03-06 NOTE — TELEPHONE ENCOUNTER
Amlodipine 10mg      Last Written Prescription Date: 1/14/17  Last Fill Quantity: 30, # refills: 1    Last Office Visit with [facility], [facility] or [facility] prescribing provider:  8/3/16   Future Office Visit:    Next 5 appointments (look out 90 days)     Mar 13, 2017 10:00 AM CDT   Office Visit with Hakeem Awad MD   [facility]    [address] [address]   [phone]                    BP Readings from Last 3 Encounters:   02/06/17 108/67   01/18/17 137/77   01/14/17 118/67     Acetaminophen 325mg      Last Written Prescription Date: 1/14/17  Last Fill Quantity: 100,  # refills: 0   Last Office Visit with [facility], [facility] or [facility] prescribing provider: 8/3/16                                         Next 5 appointments (look out 90 days)     Mar 13, 2017 10:00 AM CDT   Office Visit with Hakeem Awad MD   [facility]    [address] [address]   [phone]                  Hydrochlorothiazide 12.5mg      Last Written Prescription Date: 1/14/17  Last Fill Quantity: 30, # refills: 1  Last Office Visit with [facility], [facility] or [facility] prescribing provider: 8/3/16  Next 5 appointments (look out 90 days)     Mar 13, 2017 10:00 AM CDT   Office Visit with Hakeem Awad MD   [facility]    [address] [address]   [phone]                   Potassium   Date Value Ref Range Status   01/14/2017 3.4 3.4 - 5.3 mmol/L Final     Creatinine   Date Value Ref Range Status   01/14/2017 0.82 0.66 - 1.25 mg/dL Final     BP Readings from Last 3 Encounters:   02/06/17 108/67   01/18/17 137/77   01/14/17 118/67       These were last prescribed by Dr. Klarissa Garsia when patient discharged from [facility]    Henrietta Clifford, PharmD  [facility]

## 2017-03-08 NOTE — TELEPHONE ENCOUNTER
"Spoke with Akash and let her know to take rx for compression stockings to a pharmacy for those. Also she said that his wound size has \"stalled\" and stayed the same size, so I let her know that Gallito missed his 1 week follow up appointment with Dr. Davis and to please make one with him asap for recheck. She said she could not schedule these but would have his  call asap for an appointment.   GILDARDO Hoover  "

## 2017-03-16 ENCOUNTER — RADIANT APPOINTMENT (OUTPATIENT)
Dept: GENERAL RADIOLOGY | Facility: CLINIC | Age: 82
End: 2017-03-16
Attending: INTERNAL MEDICINE
Payer: MEDICARE

## 2017-03-16 ENCOUNTER — OFFICE VISIT (OUTPATIENT)
Dept: FAMILY MEDICINE | Facility: CLINIC | Age: 82
End: 2017-03-16
Payer: MEDICARE

## 2017-03-16 VITALS
TEMPERATURE: 96.8 F | WEIGHT: 180 LBS | SYSTOLIC BLOOD PRESSURE: 110 MMHG | HEART RATE: 107 BPM | OXYGEN SATURATION: 97 % | HEIGHT: 73 IN | DIASTOLIC BLOOD PRESSURE: 64 MMHG | BODY MASS INDEX: 23.86 KG/M2

## 2017-03-16 DIAGNOSIS — J18.9 PNEUMONIA OF BOTH LOWER LOBES DUE TO INFECTIOUS ORGANISM: Primary | ICD-10-CM

## 2017-03-16 DIAGNOSIS — R68.89 FLU-LIKE SYMPTOMS: ICD-10-CM

## 2017-03-16 DIAGNOSIS — N40.0 BENIGN NODULAR PROSTATIC HYPERPLASIA WITHOUT LOWER URINARY TRACT SYMPTOMS: ICD-10-CM

## 2017-03-16 DIAGNOSIS — M79.18 CERVICAL MYOFASCIAL PAIN SYNDROME: ICD-10-CM

## 2017-03-16 LAB
FLUAV+FLUBV AG SPEC QL: NEGATIVE
FLUAV+FLUBV AG SPEC QL: NORMAL
SPECIMEN SOURCE: NORMAL

## 2017-03-16 PROCEDURE — 71020 XR CHEST 2 VW: CPT

## 2017-03-16 PROCEDURE — 87804 INFLUENZA ASSAY W/OPTIC: CPT | Performed by: INTERNAL MEDICINE

## 2017-03-16 PROCEDURE — 99214 OFFICE O/P EST MOD 30 MIN: CPT | Performed by: INTERNAL MEDICINE

## 2017-03-16 RX ORDER — FINASTERIDE 5 MG/1
5 TABLET, FILM COATED ORAL AT BEDTIME
Qty: 30 TABLET | Refills: 11 | Status: SHIPPED | OUTPATIENT
Start: 2017-03-16 | End: 2018-02-26

## 2017-03-16 RX ORDER — AZITHROMYCIN 250 MG/1
TABLET, FILM COATED ORAL
Qty: 6 TABLET | Refills: 1 | Status: SHIPPED | OUTPATIENT
Start: 2017-03-16 | End: 2017-03-30

## 2017-03-16 NOTE — PATIENT INSTRUCTIONS
This summary includes the important diagnoses, test, medications and other important parts of your medical history.  Below are a few good we sites you can use to learn more about these.     Www.eZ Systems.org : Up to date and easily searchable information on multiple topics.  Www.eZ Systems.org/Pharmacy/c_539084.asp : [facility] $4.99 medications  Www.medlineplus.gov : medication info, interactive tutorials, watch real surgeries online  Www.familydoctor.org : good info from the Academy of Family Physicians  Www.mayoMashMangoinic.com : good info from the [facility]  Www.cdc.gov : public health info, travel advisories, epidemics (H1N1)  Www.aap.org : children's health info, normal development, vaccinations  Www.health.[facility].[place].us : [place] dept of heat, public health issues in [place], N1N1    Based on your medical history and these are the current health maintenance or preventive care services that you are due for (some may have been done at this visit:)  Health Maintenance Due   Topic Date Due     ASTHMA CONTROL TEST Q6 MOS (NO INBASKET)  02/03/2017     =================================================================================  Normal Values   Blood pressure  <140/90 for most adults    <130/80 for some chronic diseases (ask your care team about yours)    BMI (body mass index)  18.5-25 kg/m2 (based on height and weight)     Thank you for visiting [facility]    Normal or non-critical lab and imaging results will be communicated to you by MyChart, letter or phone within 7 days.  If you do not hear from us within 10 days, please call the clinic. If you have a critical or abnormal lab result, we will notify you by phone as soon as possible.     If you have any questions regarding your visit please contact:     Team Comfort:   Clinic Hours Telephone Number   Dr. David Huynh   7am-5pm  Monday - Friday [phone]  [facility]    Pharmacy 8am-8pm Monday-Thursday      8am-6pm Friday  9am-5pm Saturday-Sunday [phone]   Urgent Care 11am-8pm Monday-Friday        9am-5pm Saturday-Sunday [phone]     After hours, weekend or if you need to make an appointment with your primary provider please call [phone].   After Hours nurse advise: call [facility]: [phone]    Medication Refills:  Call your pharmacy and they will forward the refill to us. Please allow 3 business days for your refills to be completed.    Use Bandtastic (secure email communication and access to your chart) to send your primary care provider a message or make an appointment. Ask someone on your Team how to sign up for Bandtastic. To log on to What They Like or for more information in [facility] please visit the website at [email or web address].  As of October 8, 2013, all password changes, disabled accounts, or ID changes in Bandtastic/MyHealth will be done by our Access Services Department.   If you need help with your account or password, call: [phone]. Clinic staff no longer has the ability to change passwords.

## 2017-03-16 NOTE — PROGRESS NOTES
SUBJECTIVE:                                                    Gallito Farley is a 83 year old male who presents to clinic today for the following health issues:      Acute Illness   Acute illness concerns: cough  Onset: 2 day    Fever: no    Chills/Sweats: YES    Headache (location?): no     Sinus Pressure:YES    Conjunctivitis:  no    Ear Pain: no    Rhinorrhea: no    Congestion: YES    Sore Throat: no a little tender     Cough: YES    Wheeze: YES    Decreased Appetite: no    Nausea: no    Vomiting: no    Diarrhea:  no    Dysuria/Freq.: YES- more frequently    Fatigue/Achiness: YES- neck pain    Sick/Strep Exposure: no      Therapies Tried and outcome: none      Genitourinary symptoms      Duration: chronic    Description:  frequency, nocturia x 5 and hesitancy    Intensity:  moderate    Accompanying signs and symptoms (fever/discharge/nausea/vomiting/back or abdominal pain):  Denies any urinary retention.    History (frequent UTI's/kidney stones/prostate problems): yes  Sexually active: no     Precipitating or alleviating factors: None    Therapies tried and outcome: Tamsulosin 0.8 mg/day   Outcome: not effective     Neck pain      Duration: chronic    Description (location/character/radiation): tightness at back of neck (right side)    Intensity:  moderate    Accompanying signs and symptoms: right-sided headaches.    History (similar episodes/previous evaluation): No recent x-ray of ervical spine    Precipitating or alleviating factors: degenerative arthritis    Therapies tried and outcome: tylenol (not effective)       Problem list and histories reviewed & adjusted, as indicated.  Additional history: as documented    Patient Active Problem List   Diagnosis     Septic arthritis (H)     Chronic constipation     HTN (hypertension)     BPH (benign prostatic hypertrophy)     Insomnia     Moderate persistent asthma     GERD (gastroesophageal reflux disease)     Pneumonia     Hypercapnia     COPD exacerbation (H)      Acute on chronic respiratory failure with hypoxia and hypercapnia (H)     Chronic cutaneous venous stasis ulcer (H)     Hypertensive urgency     Atrioventricular block, complete (H)     Advanced directives, counseling/discussion     Phlebitis of left arm     Cardiac pacemaker in situ- [employer]tronic, dual chamber- NOT dependent     Benign nodular prostatic hyperplasia without lower urinary tract symptoms     Past Surgical History   Procedure Laterality Date     Cholecystectomy       Ent surgery       Skin grafting - leg wound  6/2016     Irrigation and debridement hip, combined  4/18/2013     Procedure: COMBINED IRRIGATION AND DEBRIDEMENT HIP;  Irrigation and debridement of Right Hip with cultures;  Surgeon: Cristal Inman MD;  Location: [facility] OR     Partial pneumonectomy       done in [place] in the 1990's       Social History   Substance Use Topics     Smoking status: Former Smoker     Smokeless tobacco: Not on file     Alcohol use No     Family History   Problem Relation Age of Onset     Family History Negative Mother          No Known Allergies  Recent Labs   Lab Test  01/14/17   0530  01/13/17   0943   01/11/17   1734   07/24/16   0628  07/23/16   0530   06/16/16   0730   06/13/16   1612   04/24/13   0643  04/23/13   0751   A1C   --    --    --    --    --    --   5.7   --    --    --    --    --    --   6.0   LDL   --    --    --    --    --    --    --    --    --    --    --    --   93   --    HDL   --    --    --    --    --    --    --    --    --    --    --    --   20*   --    TRIG   --    --    --    --    --    --    --    --    --    --    --    --   148   --    ALT   --    --    --   14   --    --   15   --   45   --   57   < >   --    --    CR  0.82  0.93   < >  0.96   < >   --   0.88   < >  0.76   < >  0.90   < >  1.11  1.12   GFRESTIMATED  >90  Non  GFR Calc    77   < >  75   < >   --   83   < >  >90  Non  GFR Calc     < >  81   < >  64  63   GFRESTBLACK   ">90   GFR Calc    >90   GFR Calc     < >  >90   GFR Calc     < >   --   >90   GFR Calc     < >  >90   GFR Calc     < >  >90   GFR Calc     < >  77  77   POTASSIUM  3.4  4.1   < >  5.1   < >   --   4.1   < >  3.9   < >  3.8   < >  3.7  3.7   TSH   --    --    --    --    --   1.04   --    --    --    --   0.20*   < >   --    --     < > = values in this interval not displayed.      BP Readings from Last 3 Encounters:   03/16/17 110/64   02/06/17 108/67   01/18/17 137/77    Wt Readings from Last 3 Encounters:   03/16/17 180 lb (81.6 kg)   01/18/17 174 lb (78.9 kg)   01/14/17 175 lb 7.8 oz (79.6 kg)                  Labs reviewed in EPIC    Reviewed and updated as needed this visit by clinical staff       Reviewed and updated as needed this visit by Provider         ROS:  C: NEGATIVE for fever, chills, change in weight  I: NEGATIVE for worrisome rashes, moles or lesions  E: NEGATIVE for vision changes or irritation  ENT/MOUTH: As above.  RESP:As above.  CV: NEGATIVE for chest pain, palpitations or peripheral edema  GI: NEGATIVE for nausea, abdominal pain, heartburn, or change in bowel habits   male :As above.  MUSCULOSKELETAL:As above.  N: NEGATIVE for weakness, dizziness or paresthesias  E: NEGATIVE for temperature intolerance, skin/hair changes  H: NEGATIVE for bleeding problems  P: NEGATIVE for changes in mood or affect    OBJECTIVE:                                                    /64 (BP Location: Left arm, Patient Position: Chair, Cuff Size: Adult Regular)  Pulse 107  Temp 96.8  F (36  C) (Oral)  Ht 6' 1\" (1.854 m)  Wt 180 lb (81.6 kg)  SpO2 97%  BMI 23.75 kg/m2  Body mass index is 23.75 kg/(m^2).  GENERAL: alert, no distress, frail and fatigued  EYES: Eyes grossly normal to inspection  NECK: no adenopathy  RESP: lungs clear to auscultation - no rales, rhonchi or wheezes  CV: regular rate and rhythm, normal S1 " S2, no S3 or S4, no murmur, click or rub, no peripheral edema and peripheral pulses strong  ABDOMEN: soft, nontender, no hepatosplenomegaly, no masses and bowel sounds normal  MS: Tenderness on minimal palpation of right trapezius muscle.  NEURO: Normal strength and tone, mentation intact and speech normal  PSYCH: mentation appears normal, affect normal/bright    Diagnostic Test Results:  Influenza A/B antigen test is negative.     ASSESSMENT/PLAN:                                                            (J18.9) Pneumonia of both lower lobes due to infectious organism  (primary encounter diagnosis)  Comment: X-rays reviewed with the patient. Recommend empirical treatment. Consider CT of chest if condition worsens.  Plan: XR Chest 2 Views, azithromycin (ZITHROMAX) 250         MG tablet            (R68.89) Flu-like symptoms  Comment: Negative test.  Plan: Influenza A/B antigen            (M79.1) Cervical myofascial pain syndrome  Comment: Consider adding muscle relaxant such as Baclofen but it is still considered risky due to advanced age. Recommend trigger point injections, hence Ortho sports medicine consultation recommended.  Plan: diclofenac sodium 3 % GEL, ORTHOPEDICS ADULT         REFERRAL            (N40.0) Benign nodular prostatic hyperplasia without lower urinary tract symptoms  Comment: Not improved with Tamsulosin. Add Finasteride.  Plan: finasteride (PROSCAR) 5 MG tablet, UROLOGY         ADULT REFERRAL              Follow-up visit if condition worsens.    Hakeem Awad MD  [facility]

## 2017-03-16 NOTE — MR AVS SNAPSHOT
After Visit Summary   3/16/2017    Gallito Farley    MRN: [id number]           Patient Information     Date Of Birth          1/1/1934        Visit Information        Provider Department      3/16/2017 11:15 AM Ritesh, Hakeem Rich MD; [employer] [facility]        Today's Diagnoses     Pneumonia of both lower lobes due to infectious organism    -  1    Flu-like symptoms        Cough        Cervical myofascial pain syndrome        Benign nodular prostatic hyperplasia without lower urinary tract symptoms          Care Instructions    This summary includes the important diagnoses, test, medications and other important parts of your medical history.  Below are a few good we sites you can use to learn more about these.     Www.[employer].UXPin : Up to date and easily searchable information on multiple topics.  Www.[employer].UXPin/Pharmacy/c_539084.asp : Written Pharmacies $4.99 medications  Www.PEX Card.gov : medication info, interactive tutorials, watch real surgeries online  Www.familydoctor.org : good info from the Academy of Family Physicians  Www.GPX Softwareinic.TrackDuck : good info from the [facility]  Www.cdc.gov : public health info, travel advisories, epidemics (H1N1)  Www.aap.org : children's health info, normal development, vaccinations  Www.health.state.[place].us : [place] dept of heatlh, public health issues in [place], N1N1    Based on your medical history and these are the current health maintenance or preventive care services that you are due for (some may have been done at this visit:)  Health Maintenance Due   Topic Date Due     ASTHMA CONTROL TEST Q6 MOS (NO INBASKET)  02/03/2017     =================================================================================  Normal Values   Blood pressure  <140/90 for most adults    <130/80 for some chronic diseases (ask your care team about yours)    BMI (body mass index)  18.5-25 kg/m2 (based on height and weight)     Thank you for  visiting [facility]    Normal or non-critical lab and imaging results will be communicated to you by MyChart, letter or phone within 7 days.  If you do not hear from us within 10 days, please call the clinic. If you have a critical or abnormal lab result, we will notify you by phone as soon as possible.     If you have any questions regarding your visit please contact:     Team Comfort:   Clinic Hours Telephone Number   Dr. David Huynh   7am-5pm  Monday - Friday [phone]  Johnnie RN   Pharmacy 8am-8pm Monday-Thursday      8am-6pm Friday  9am-5pm Saturday-Sunday [phone]   Urgent Care 11am-8pm Monday-Friday        9am-5pm Saturday-Sunday [phone]     After hours, weekend or if you need to make an appointment with your primary provider please call [phone].   After Hours nurse advise: call [place] [facility]: [phone]    Medication Refills:  Call your pharmacy and they will forward the refill to us. Please allow 3 business days for your refills to be completed.    Use [employer] Podium (secure email communication and access to your chart) to send your primary care provider a message or make an appointment. Ask someone on your Team how to sign up for [employer]. To log on to [employer] or for more information in [employer] please visit the website at [email or web address].  As of October 8, 2013, all password changes, disabled accounts, or ID changes in Criers Podium/MyHealth will be done by our Access Services Department.   If you need help with your account or password, call: [phone]. Clinic staff no longer has the ability to change passwords.           Follow-ups after your visit        Additional Services     ORTHOPEDICS ADULT REFERRAL       Your provider has referred you to: [facility]: [facility] [phone]    [email or web address]    Please be aware that coverage of these services is subject to the terms and limitations of your health insurance plan.  Call member services at your health plan with any benefit or coverage questions.      Please bring the following to your appointment:    >>   Any x-rays, CTs or MRIs which have been performed.  Contact the facility where they were done to arrange for  prior to your scheduled appointment.    >>   List of current medications   >>   This referral request   >>   Any documents/labs given to you for this referral            UROLOGY ADULT REFERRAL       Your provider has referred you to: [facility]: [facility] [phone]   [email or web address]    Please be aware that coverage of these services is subject to the terms and limitations of your health insurance plan.  Call member services at your health plan with any benefit or coverage questions.      Please bring the following with you to your appointment:    (1) Any X-Rays, CTs or MRIs which have been performed.  Contact the facility where they were done to arrange for  prior to your scheduled appointment.    (2) List of current medications  (3) This referral request   (4) Any documents/labs given to you for this referral                  Your next 10 appointments already scheduled     Apr 17, 2017  2:00 PM CDT   (Arrive by 1:45 PM)   Pacemaker Check with Uc Cv Device [address] [place] [address] ([facility])    [address] [facility]  3rd Floor  [facility] [address] [phone]              Who to contact     If you have questions or need follow up information about today's clinic visit or your schedule please contact [facility] MARCELO MORGAN directly at [phone].  Normal or non-critical lab and imaging results will  "be communicated to you by MyChart, letter or phone within 4 business days after the clinic has received the results. If you do not hear from us within 7 days, please contact the clinic through Coghead or phone. If you have a critical or abnormal lab result, we will notify you by phone as soon as possible.  Submit refill requests through Coghead or call your pharmacy and they will forward the refill request to us. Please allow 3 business days for your refill to be completed.          Additional Information About Your Visit        Coghead Information     Coghead lets you send messages to your doctor, view your test results, renew your prescriptions, schedule appointments and more. To sign up, go to [email or web address] . Click on \"Log in\" on the left side of the screen, which will take you to the Welcome page. Then click on \"Sign up Now\" on the right side of the page.     You will be asked to enter the access code listed below, as well as some personal information. Please follow the directions to create your username and password.     Your access code is: [email or web address]  Expires: 2017  8:43 PM     Your access code will  in 90 days. If you need help or a new code, please call your [place] clinic or [phone].        Care EveryWhere ID     This is your Care EveryWhere ID. This could be used by other organizations to access your [place] medical records  [id number]        Your Vitals Were     Pulse Temperature Height Pulse Oximetry BMI (Body Mass Index)       107 96.8  F (36  C) (Oral) 6' 1\" (1.854 m) 97% 23.75 kg/m2        Blood Pressure from Last 3 Encounters:   17 110/64   17 108/67   17 137/77    Weight from Last 3 Encounters:   17 180 lb (81.6 kg)   17 174 lb (78.9 kg)   17 175 lb 7.8 oz (79.6 kg)              We Performed the Following     Influenza A/B antigen     ORTHOPEDICS ADULT REFERRAL     UROLOGY ADULT REFERRAL     XR Chest 2 Views          Today's " Medication Changes          These changes are accurate as of: 3/16/17 12:22 PM.  If you have any questions, ask your nurse or doctor.               Start taking these medicines.        Dose/Directions    azithromycin 250 MG tablet   Commonly known as:  ZITHROMAX   Used for:  Pneumonia of both lower lobes due to infectious organism   Started by:  Hakeem Awad MD        Two tablets first day, then one tablet daily for four days.   Quantity:  6 tablet   Refills:  1       diclofenac sodium 3 % Gel   Used for:  Cervical myofascial pain syndrome   Started by:  Hakeem Awad MD        Apply 4 gm four times a day as needed to right trapezius muscle.   Quantity:  100 g   Refills:  11       finasteride 5 MG tablet   Commonly known as:  PROSCAR   Used for:  Benign nodular prostatic hyperplasia without lower urinary tract symptoms   Started by:  Hakeem Awad MD        Dose:  5 mg   Take 1 tablet (5 mg) by mouth At Bedtime   Quantity:  30 tablet   Refills:  11            Where to get your medicines      These medications were sent to [facility] [address]  [address] [address]     Phone:  [phone]     azithromycin 250 MG tablet    diclofenac sodium 3 % Gel    finasteride 5 MG tablet                Primary Care Provider Office Phone # Fax #    Hakeem Awad [phone]       [facility] [address]  [facility] [address]        Thank you!     Thank you for choosing [facility]  for your care. Our goal is always to provide you with excellent care. Hearing back from our patients is one way we can continue to improve our services. Please take a few minutes to complete the written survey that you may receive in the mail after your visit with us. Thank you!             Your Updated Medication List - Protect others around you: Learn how to safely use, store and throw away  "your medicines at www.disposemymeds.org.          This list is accurate as of: 3/16/17 12:22 PM.  Always use your most recent med list.                   Brand Name Dispense Instructions for use    acetaminophen 325 MG tablet    TYLENOL    100 tablet    Take 2 tablets (650 mg) by mouth every 4 hours as needed for mild pain       albuterol (2.5 MG/3ML) 0.083% neb solution     360 mL    Take 1 vial (2.5 mg) by nebulization every 6 hours as needed       amLODIPine 10 MG tablet    NORVASC    30 tablet    Take 1 tablet (10 mg) by mouth daily       azithromycin 250 MG tablet    ZITHROMAX    6 tablet    Two tablets first day, then one tablet daily for four days.       diclofenac sodium 3 % Gel     100 g    Apply 4 gm four times a day as needed to right trapezius muscle.       enalapril 10 MG tablet    VASOTEC    90 tablet    Take 1 tablet (10 mg) by mouth daily       finasteride 5 MG tablet    PROSCAR    30 tablet    Take 1 tablet (5 mg) by mouth At Bedtime       fluticasone 250 MCG/BLIST Aepb Inhaler    FLOVENT DISKUS    1 Inhaler    Inhale 1 puff into the lungs every 12 hours       hydrochlorothiazide 12.5 MG capsule    MICROZIDE    30 capsule    Take 2 capsules (25 mg) by mouth daily       mupirocin 2 % ointment    BACTROBAN    30 g    Apply twice a day to right leg wound and cover with occlusive dressing.       * order for DME     5 each    Equipment being ordered: Dispense 4\" x 4\" sterile gauze. Dispense any brand name.       * order for DME     5 each    Equipment being ordered: Dispense Coban wrap.       ranitidine 150 MG tablet    ZANTAC    60 tablet    Take 1 tablet (150 mg) by mouth 2 times daily       tamsulosin 0.4 MG capsule    FLOMAX    60 capsule    Take 2 capsules (0.8 mg) by mouth At Bedtime       tiotropium 18 MCG capsule    SPIRIVA    30 capsule    Inhale 1 capsule (18 mcg) into the lungs daily       * Notice:  This list has 2 medication(s) that are the same as other medications prescribed for you. Read " the directions carefully, and ask your doctor or other care provider to review them with you.

## 2017-03-16 NOTE — NURSING NOTE
"Chief Complaint   Patient presents with     RECHECK     follow up, unknown reason     Cough       Initial /64 (BP Location: Left arm, Patient Position: Chair, Cuff Size: Adult Regular)  Pulse 107  Temp 96.8  F (36  C) (Oral)  Ht 6' 1\" (1.854 m)  Wt 180 lb (81.6 kg)  SpO2 97%  BMI 23.75 kg/m2 Estimated body mass index is 23.75 kg/(m^2) as calculated from the following:    Height as of this encounter: 6' 1\" (1.854 m).    Weight as of this encounter: 180 lb (81.6 kg).  Medication Reconciliation: complete     Kofi Betancourt MA      "

## 2017-03-27 DIAGNOSIS — I10 ESSENTIAL HYPERTENSION: ICD-10-CM

## 2017-03-27 NOTE — TELEPHONE ENCOUNTER
Hydrochlorothiazide 12.5mg caps      Last Written Prescription Date: 3/6/17   Last Fill Quantity: 30, # refills: 0  Last Office Visit with [facility], [facility] or [facility] prescribing provider: 3/16/17  Next 5 appointments (look out 90 days)     Mar 30, 2017 10:30 AM CDT   Return Visit with Mal Davis DPM   [facility]    [id number] [place] [address] [phone]                   Potassium   Date Value Ref Range Status   01/14/2017 3.4 3.4 - 5.3 mmol/L Final     Creatinine   Date Value Ref Range Status   01/14/2017 0.82 0.66 - 1.25 mg/dL Final     BP Readings from Last 3 Encounters:   03/16/17 110/64   02/06/17 108/67   01/18/17 137/77

## 2017-03-28 ENCOUNTER — TELEPHONE (OUTPATIENT)
Dept: FAMILY MEDICINE | Facility: CLINIC | Age: 82
End: 2017-03-28

## 2017-03-28 DIAGNOSIS — M79.18 CERVICAL MYOFASCIAL PAIN SYNDROME: Primary | ICD-10-CM

## 2017-03-28 NOTE — TELEPHONE ENCOUNTER
A prior authorization is needed for the following medication prescribed.  Please complete a prior authorization with the information included below.    Medication: Diclofenac 3% Gel  RX #: [id number]  Reason for Rejection: Transition N/A, [place] Required [phone], Non-Formulary Drug    Pharmacy Insurance plan: PSI Part D  BIN #: [id number]  ID #: [id number]  PCN #: [id number]  Phone #: [phone]    Pharmacy NPI:[id number]    Please advise the pharmacy when the prior authorization is approved or denied.     Thank you for your time.    Henrietta Clifford, [place]ia  [facility]  [phone]

## 2017-03-29 ENCOUNTER — PRE VISIT (OUTPATIENT)
Dept: UROLOGY | Facility: CLINIC | Age: 82
End: 2017-03-29

## 2017-03-29 NOTE — TELEPHONE ENCOUNTER
PREVISIT INFORMATION                                                    Gallito Farley scheduled for future visit at [facility] specialty clinics.    Patient is scheduled to see Maria G Friedman on 3/30/2017  Reason for visit: BPH and LUTS  Referring provider: Hakeem Awad  Has patient seen previous specialist? No  Medical Records:  Available in chart.  Patient was previously seen at a [facility] or [facility] facility.     REVIEW                                                      New patient packet mailed to patient: No  Medication reconciliation complete: No      PLAN/FOLLOW-UP NEEDED                                                      Patient Reminders Given:    Informed patient to bring an updated list of allergies, medications, pharmacy details and insurance information. Directed patient to come to the 2nd floor, check-in #4 for their appointment. Informed patient to call back if appointment needs to be cancelled or rescheduled at [phone].    Reminded patient to bring any outside records regarding this appointment or have them faxed to clinic at [phone].    Reminded patient to complete and bring in urology questionnaire. Also for patient to please come with a full bladder and to ask , if early to get staff member for sample.

## 2017-03-30 ENCOUNTER — OFFICE VISIT (OUTPATIENT)
Dept: UROLOGY | Facility: CLINIC | Age: 82
End: 2017-03-30
Attending: INTERNAL MEDICINE
Payer: MEDICARE

## 2017-03-30 ENCOUNTER — OFFICE VISIT (OUTPATIENT)
Dept: PODIATRY | Facility: CLINIC | Age: 82
End: 2017-03-30
Payer: MEDICARE

## 2017-03-30 ENCOUNTER — DOCUMENTATION ONLY (OUTPATIENT)
Dept: PODIATRY | Facility: CLINIC | Age: 82
End: 2017-03-30

## 2017-03-30 VITALS
HEART RATE: 100 BPM | OXYGEN SATURATION: 96 % | SYSTOLIC BLOOD PRESSURE: 101 MMHG | TEMPERATURE: 97 F | DIASTOLIC BLOOD PRESSURE: 63 MMHG

## 2017-03-30 VITALS
HEART RATE: 104 BPM | SYSTOLIC BLOOD PRESSURE: 134 MMHG | DIASTOLIC BLOOD PRESSURE: 79 MMHG | OXYGEN SATURATION: 97 % | TEMPERATURE: 98.3 F

## 2017-03-30 DIAGNOSIS — R35.0 URINARY FREQUENCY: ICD-10-CM

## 2017-03-30 DIAGNOSIS — R39.15 URINARY URGENCY: ICD-10-CM

## 2017-03-30 DIAGNOSIS — R30.0 DYSURIA: ICD-10-CM

## 2017-03-30 DIAGNOSIS — N40.1 BENIGN NON-NODULAR PROSTATIC HYPERPLASIA WITH LOWER URINARY TRACT SYMPTOMS: Primary | ICD-10-CM

## 2017-03-30 DIAGNOSIS — N41.0 ACUTE PROSTATITIS: ICD-10-CM

## 2017-03-30 LAB
ALBUMIN UR-MCNC: 10 MG/DL
APPEARANCE UR: CLEAR
BILIRUB UR QL STRIP: NEGATIVE
COLOR UR AUTO: YELLOW
GLUCOSE UR STRIP-MCNC: NEGATIVE MG/DL
HGB UR QL STRIP: ABNORMAL
HYALINE CASTS #/AREA URNS LPF: ABNORMAL /LPF (ref 0–2)
KETONES UR STRIP-MCNC: NEGATIVE MG/DL
LEUKOCYTE ESTERASE UR QL STRIP: ABNORMAL
MUCOUS THREADS #/AREA URNS LPF: PRESENT /LPF
NITRATE UR QL: NEGATIVE
NON-SQ EPI CELLS #/AREA URNS LPF: ABNORMAL /LPF
PH UR STRIP: 7 PH (ref 5–7)
RBC #/AREA URNS AUTO: ABNORMAL /HPF (ref 0–2)
SP GR UR STRIP: 1.01 (ref 1–1.03)
URN SPEC COLLECT METH UR: ABNORMAL
UROBILINOGEN UR STRIP-MCNC: NORMAL MG/DL (ref 0–2)
WBC #/AREA URNS AUTO: ABNORMAL /HPF (ref 0–2)

## 2017-03-30 PROCEDURE — 51798 US URINE CAPACITY MEASURE: CPT | Performed by: PHYSICIAN ASSISTANT

## 2017-03-30 PROCEDURE — 81001 URINALYSIS AUTO W/SCOPE: CPT | Performed by: PHYSICIAN ASSISTANT

## 2017-03-30 PROCEDURE — 99213 OFFICE O/P EST LOW 20 MIN: CPT | Performed by: PODIATRIST

## 2017-03-30 PROCEDURE — 99204 OFFICE O/P NEW MOD 45 MIN: CPT | Mod: 25 | Performed by: PHYSICIAN ASSISTANT

## 2017-03-30 RX ORDER — HYDROCHLOROTHIAZIDE 12.5 MG/1
25 CAPSULE ORAL DAILY
Qty: 60 CAPSULE | Refills: 4 | Status: SHIPPED | OUTPATIENT
Start: 2017-03-30 | End: 2017-09-10

## 2017-03-30 RX ORDER — CIPROFLOXACIN 500 MG/1
500 TABLET, FILM COATED ORAL 2 TIMES DAILY
Qty: 28 TABLET | Refills: 0 | Status: SHIPPED | OUTPATIENT
Start: 2017-03-30 | End: 2017-04-27

## 2017-03-30 ASSESSMENT — PAIN SCALES - GENERAL: PAINLEVEL: SEVERE PAIN (7)

## 2017-03-30 NOTE — NURSING NOTE
"Gallito Farley's goals for this visit include:   Chief Complaint   Patient presents with     Consult     frequency, nocturia, heistancy      He requests these members of his care team be copied on today's visit information: YES -     Referring Provider:  Hakeem Awad MD  [facility]  [address] [place] [address]    Initial /63 (BP Location: Right arm, Patient Position: Chair, Cuff Size: Adult Large)  Pulse 100  Temp 97  F (36.1  C) (Oral)  SpO2 96% Estimated body mass index is 23.75 kg/(m^2) as calculated from the following:    Height as of 3/16/17: 1.854 m (6' 1\").    Weight as of 3/16/17: 81.6 kg (180 lb).  BP completed using cuff size: large    post void residual - 38cc      "

## 2017-03-30 NOTE — PROGRESS NOTES
Past Medical History:   Diagnosis Date     Asthma      BPH (benign prostatic hyperplasia)      COPD (chronic obstructive pulmonary disease) (H)      Diastolic dysfunction, left ventricle 4/16/2013     H/O tuberculosis     [place]     Hypertension      LBBB (left bundle branch block)      Leg wound, right     chronic, s/p grafting     Osteoarthritis      Patient Active Problem List   Diagnosis     Septic arthritis (H)     Chronic constipation     HTN (hypertension)     BPH (benign prostatic hypertrophy)     Insomnia     Moderate persistent asthma     GERD (gastroesophageal reflux disease)     Pneumonia     Hypercapnia     COPD exacerbation (H)     Acute on chronic respiratory failure with hypoxia and hypercapnia (H)     Chronic cutaneous venous stasis ulcer (H)     Hypertensive urgency     Atrioventricular block, complete (H)     Advanced directives, counseling/discussion     Phlebitis of left arm     Cardiac pacemaker in situ- [employer]tr[employer], dual chamber- NOT dependent     Benign nodular prostatic hyperplasia without lower urinary tract symptoms     Past Surgical History:   Procedure Laterality Date     CHOLECYSTECTOMY       ENT SURGERY       IRRIGATION AND DEBRIDEMENT HIP, COMBINED  4/18/2013    Procedure: COMBINED IRRIGATION AND DEBRIDEMENT HIP;  Irrigation and debridement of Right Hip with cultures;  Surgeon: Cristal Inman MD;  Location: [facility] OR     Partial pneumonectomy      done in [place] in the 1990's     skin grafting - leg wound  6/2016     Social History     Social History     Marital status:      Spouse name: N/A     Number of children: N/A     Years of education: N/A     Occupational History     Not on file.     Social History Main Topics     Smoking status: Former Smoker     Smokeless tobacco: Not on file     Alcohol use No     Drug use: No     Sexual activity: Not on file     Other Topics Concern     Not on file     Social History Narrative      Family History   Problem Relation Age of Onset     Family History Negative Mother      SUBJECTIVE FINDINGS:  An 83-year-old male returns to clinic for a right leg ulcer with venous stasis and edema.  Relates he is using the compression socks.  Relates nursing is coming out twice a week and then his PCA is doing the dressing changes, so they are changing it daily.  Relates that they are using the Bactroban cream and it seems to be doing well.  He does present with an  today.  They relate they did not get the Ena because they could not find any place to get it.      OBJECTIVE FINDINGS:  He has peripheral edema on the right.  He has a right posterior leg ulcer.  It is about 5 x 5 cm.  It is terence.  It is through the dermis into the subcutaneous tissues.  There is a good granular base.  There is no erythema, some serosanguineous drainage, no odor, no calor.      ASSESSMENT AND PLAN:  Ulcer, right leg with venous stasis, venous hypertension, edema.  Diagnosis and treatment discussed with the patient.  Local wound care done upon consent today.  I applied Ena and a sterile dressing today.  I am going to have him continue the Bactroban daily with wound cares and do Ena twice weekly with a light dressing.  He will continue the compression socks.  We will recheck on Apligraf coverage and he will return to clinic and see me in 2 weeks.

## 2017-03-30 NOTE — NURSING NOTE
"Gallito Farley's goals for this visit include: recheck wound on right ankle  He requests these members of his care team be copied on today's visit information: Yes    PCP: Hakeem Awad    Referring Provider:  No referring provider defined for this encounter.    Chief Complaint   Patient presents with     RECHECK     recheck wound on the right ankle       Initial There were no vitals taken for this visit. Estimated body mass index is 23.75 kg/(m^2) as calculated from the following:    Height as of 3/16/17: 1.854 m (6' 1\").    Weight as of 3/16/17: 81.6 kg (180 lb).  Medication Reconciliation: complete    "

## 2017-03-30 NOTE — PROGRESS NOTES
CC: urinary frequency, urgency, dysuria    HPI: It is a pleasure to see Mr. Gallito Farley, a very pleasant 83 year old male with a history of BPH seen today in the urology clinic in consultation from Dr. Awad for evaluation of the above. Symptoms have been ongoing for several months, worsening in the past several weeks. He reports voiding q30 minutes during the day, nocturia x 4-5. Voids in small amounts each time. Also with significant urgency but very rare urge incontinence. Keeps a urinal at the bedside at night. He also complains of some dysuria and pain in the groin, worse when his bladder is full. Some hesitancy and needing to strain to void as well. Denies gross hematuria, pyuria, intermittency, history of UTI's or kidney stones.     He has been taking Flomax 0.8 mg daily since January without improvement. His primary physician also recently added finasteride 5 mg daily a few weeks ago - no improvement per patient.  No personal or family history of prostate cancer.    Past Medical History:   Diagnosis Date     Asthma      BPH (benign prostatic hyperplasia)      COPD (chronic obstructive pulmonary disease) (H)      Diastolic dysfunction, left ventricle 4/16/2013     H/O tuberculosis     [place]     Hypertension      LBBB (left bundle branch block)      Leg wound, right     chronic, s/p grafting     Osteoarthritis      Past Surgical History:   Procedure Laterality Date     CHOLECYSTECTOMY       ENT SURGERY       IRRIGATION AND DEBRIDEMENT HIP, COMBINED  4/18/2013    Procedure: COMBINED IRRIGATION AND DEBRIDEMENT HIP;  Irrigation and debridement of Right Hip with cultures;  Surgeon: Cristal Inman MD;  Location: [facility] OR     Partial pneumonectomy      done in [facility] in the 1990's     skin grafting - leg wound  6/2016     Current Outpatient Prescriptions   Medication Sig Dispense Refill     diclofenac sodium 3 % GEL Apply 4 gm four times a day as needed to right  "trapezius muscle. (Patient not taking: Reported on 3/30/2017) 100 g 11     finasteride (PROSCAR) 5 MG tablet Take 1 tablet (5 mg) by mouth At Bedtime 30 tablet 11     acetaminophen (TYLENOL) 325 MG tablet Take 2 tablets (650 mg) by mouth every 4 hours as needed for mild pain 100 tablet 0     amLODIPine (NORVASC) 10 MG tablet Take 1 tablet (10 mg) by mouth daily 30 tablet 0     hydrochlorothiazide (MICROZIDE) 12.5 MG capsule Take 2 capsules (25 mg) by mouth daily 30 capsule 0     ranitidine (ZANTAC) 150 MG tablet Take 1 tablet (150 mg) by mouth 2 times daily 60 tablet 11     mupirocin (BACTROBAN) 2 % ointment Apply twice a day to right leg wound and cover with occlusive dressing. 30 g 11     tamsulosin (FLOMAX) 0.4 MG capsule Take 2 capsules (0.8 mg) by mouth At Bedtime 60 capsule 11     enalapril (VASOTEC) 10 MG tablet Take 1 tablet (10 mg) by mouth daily 90 tablet 3     order for DME Equipment being ordered: Dispense 4\" x 4\" sterile gauze. Dispense any brand name. 5 each 11     order for DME Equipment being ordered: Dispense Coban wrap. 5 each 5     albuterol (2.5 MG/3ML) 0.083% neb solution Take 1 vial (2.5 mg) by nebulization every 6 hours as needed 360 mL 1     fluticasone (FLOVENT DISKUS) 250 MCG/BLIST AEPB Inhaler Inhale 1 puff into the lungs every 12 hours 1 Inhaler 1     tiotropium (SPIRIVA) 18 MCG capsule Inhale 1 capsule (18 mcg) into the lungs daily 30 capsule 1     No Known Allergies  FAMILY HISTORY: The patient denies history of genitourinary carcinoma.  There is no history of urolithiasis.    SOCIAL HISTORY: The patient prevoiusly smoked cigarettes, no EtOH and no illicit drug use.    ROS: A comprehensive 14 point ROS was obtained and was positive for AV block s/p pacemaker placement, chronic venous stasis ulcers, COPD, GERD, asthma, constipation, HTN and otherwise negative except for that outlined above in the HPI.    PHYSICAL EXAM:   Vitals:    03/30/17 1141   BP: 101/63   BP Location: Right arm "   Patient Position: Chair   Cuff Size: Adult Large   Pulse: 100   Temp: 97  F (36.1  C)   TempSrc: Oral   SpO2: 96%     GENERAL: Well groomed/well developed/well nourished male in NAD.  HEENT: EOMI, AT, NC.  SKIN: Warm to touch, dry.  No visible rashes or lesions.  RESP: No increased respiratory effort.  LYMPH: No LE edema.  ABD: Soft, NT, ND.  No CVAT.    MS: Full ROM in extremities.  : Deferred.  EUGENIO:     Somewhat decreased sphincter tone, smooth rectal mucosa, moderate amount of stool in the rectal vault.     Mild to moderately enlarged prostate that is smooth to palpation without nodules, mass, asymmetry. Moderate tenderness to palpation - no bogginess.  NEURO: Alert and oriented x 3.  PSYCH: Normal mood and affect, pleasant and agreeable during interview and exam.    Uroflowmetry: Patient voided prior to appointment today so unable to obtain.  Residual urine by ultrasound was 38 ml.      No results found for: PSA    IMAGING: None    ASSESSMENT/PLAN:  Mr. Gallito Farley is a very pleasant 83 year old male with a history of an enlarged prostate with obstructive lower urinary tract symptoms. Symptoms have worsened in recent weeks-months - no improvement with Flomax 0.8 mg daily or finasteride 5 mg daily. He may have component of OAB as well - could consider an anticholinergic medication given his low PVR today. However, exam actually feels most consistent with prostatitis - very tender to palpation. Will therefore plan for the following:  -He will attempt to leave a urine sample today to collect UA/UC  -Start Cipro 500 mg BID x 2 weeks. Side effects discussed.   -Continue tamsulosin 0.8 mg daily.  -Continue finasteride 5 mg daily.    Follow up in 4-6 weeks to recheck - obtain uroflow/PVR at this visit. If no improvement with antibiotics, then consider addition of an anticholinergic medication for OAB.    Cystoscopy +/- urodynamics would be the next appropriate diagnostic steps should Mr. Farley fail  conservative therapy.     I have enjoyed participating in the medical care of this very pleasant patient.  Please don't hesitate to contact me with any questions or concerns.     Maria G Friedman PA-C  Department of Urology

## 2017-03-30 NOTE — PROGRESS NOTES
Apligraf coverage forms faxed to [facility]. Copy sent to scan  Marialuisa Teixeira RN

## 2017-03-30 NOTE — LETTER
March 30, 2017      RE: Gallito Farley  [address] [facility] [address]       To whom it may concern:    Gallito Farley was seen in our clinic today. Right leg ulcer daily clean with wound cleanser, apply bactroban ointment and sterile compression dressing. Twice weekly, apply colin to the right leg ulcer.     Sincerely,      Mal Davis DPM

## 2017-03-30 NOTE — MR AVS SNAPSHOT
After Visit Summary   3/30/2017    Gallito Farley    MRN: [id number]           Patient Information     Date Of Birth          1/1/1934        Visit Information        Provider Department      3/30/2017 11:15 AM Maria G Friedman PA-C; [employer] [facility]        Today's Diagnoses     Benign nodular prostatic hyperplasia without lower urinary tract symptoms    -  1    Acute prostatitis           Follow-ups after your visit        Your next 10 appointments already scheduled     Apr 12, 2017  2:00 PM CDT   [facility] Mal Davis DPM   [facility]    [address] [address] [place] [id number] [phone]            Apr 17, 2017  2:00 PM CDT   (Arrive by 1:45 PM)   Pacemaker Check with Uc Cv Device [address] [place] [address] ([facility])    [address] [place] [address] [id number] [phone]            May 11, 2017 11:00 AM CDT   [facility] Visit with Maria G Friedman PA-C   [facility]    [address] [address] [place] [id number] [phone]              Who to contact     If you have questions or need follow up information about today's clinic visit or your schedule please contact [facility] directly at [phone].  Normal or non-critical lab and imaging results will be communicated to you by MyChart, letter or phone within 4 business days after the clinic has received the results. If you do not hear from us within 7 days, please contact the clinic through MyChart or phone. If you have a critical or abnormal lab result, we will notify you by phone as soon as possible.  Submit refill requests through [employer] or call your pharmacy and they will forward the refill request to us. Please allow 3 business days for your refill to be completed.          Additional Information About Your  Visit        Innovari Information     Innovari is an electronic gateway that provides easy, online access to your medical records. With Innovari, you can request a clinic appointment, read your test results, renew a prescription or communicate with your care team.     To sign up for Innovari visit the website at [email or web address]   You will be asked to enter the access code listed below, as well as some personal information. Please follow the directions to create your username and password.     Your access code is: [email or web address]  Expires: 2017  8:43 PM     Your access code will  in 90 days. If you need help or a new code, please contact your [facility] or call [phone] for assistance.        Care EveryWhere ID     This is your Care EveryWhere ID. This could be used by other organizations to access your [place] medical records  [id number]        Your Vitals Were     Pulse Temperature Pulse Oximetry             100 97  F (36.1  C) (Oral) 96%          Blood Pressure from Last 3 Encounters:   17 101/63   17 134/79   17 110/64    Weight from Last 3 Encounters:   17 81.6 kg (180 lb)   17 78.9 kg (174 lb)   17 79.6 kg (175 lb 7.8 oz)              We Performed the Following     MEASURE POST-VOID RESIDUAL URINE/BLADDER CAPACITY, US NON-IMAGING          Today's Medication Changes          These changes are accurate as of: 3/30/17 12:03 PM.  If you have any questions, ask your nurse or doctor.               Start taking these medicines.        Dose/Directions    ciprofloxacin 500 MG tablet   Commonly known as:  CIPRO   Used for:  Acute prostatitis   Started by:  Maria G Friedman PA-C        Dose:  500 mg   Take 1 tablet (500 mg) by mouth 2 times daily for 14 days   Quantity:  28 tablet   Refills:  0            Where to get your medicines      These medications were sent to [facility] [place], [place] - [address]   [address] [facility] [address] [address]     Phone:  [phone]     ciprofloxacin 500 MG tablet                Primary Care Provider Office Phone # Fax #    Hakeem Awad [phone]       [facility] [address]  [facility] [address]        Thank you!     Thank you for choosing [facility]  for your care. Our goal is always to provide you with excellent care. Hearing back from our patients is one way we can continue to improve our services. Please take a few minutes to complete the written survey that you may receive in the mail after your visit with us. Thank you!             Your Updated Medication List - Protect others around you: Learn how to safely use, store and throw away your medicines at www.disposemymeds.org.          This list is accurate as of: 3/30/17 12:03 PM.  Always use your most recent med list.                   Brand Name Dispense Instructions for use    acetaminophen 325 MG tablet    TYLENOL    100 tablet    Take 2 tablets (650 mg) by mouth every 4 hours as needed for mild pain       albuterol (2.5 MG/3ML) 0.083% neb solution     360 mL    Take 1 vial (2.5 mg) by nebulization every 6 hours as needed       amLODIPine 10 MG tablet    NORVASC    30 tablet    Take 1 tablet (10 mg) by mouth daily       ciprofloxacin 500 MG tablet    CIPRO    28 tablet    Take 1 tablet (500 mg) by mouth 2 times daily for 14 days       diclofenac sodium 3 % Gel     100 g    Apply 4 gm four times a day as needed to right trapezius muscle.       enalapril 10 MG tablet    VASOTEC    90 tablet    Take 1 tablet (10 mg) by mouth daily       finasteride 5 MG tablet    PROSCAR    30 tablet    Take 1 tablet (5 mg) by mouth At Bedtime       fluticasone 250 MCG/BLIST Aepb Inhaler    FLOVENT DISKUS    1 Inhaler    Inhale 1 puff into the lungs every 12 hours       hydrochlorothiazide 12.5 MG capsule    MICROZIDE    30 capsule    Take 2 capsules (25 mg) by  "mouth daily       mupirocin 2 % ointment    BACTROBAN    30 g    Apply twice a day to right leg wound and cover with occlusive dressing.       * order for DME     5 each    Equipment being ordered: Dispense 4\" x 4\" sterile gauze. Dispense any brand name.       * order for DME     5 each    Equipment being ordered: Dispense Coban wrap.       ranitidine 150 MG tablet    ZANTAC    60 tablet    Take 1 tablet (150 mg) by mouth 2 times daily       tamsulosin 0.4 MG capsule    FLOMAX    60 capsule    Take 2 capsules (0.8 mg) by mouth At Bedtime       tiotropium 18 MCG capsule    SPIRIVA    30 capsule    Inhale 1 capsule (18 mcg) into the lungs daily       * Notice:  This list has 2 medication(s) that are the same as other medications prescribed for you. Read the directions carefully, and ask your doctor or other care provider to review them with you.      "

## 2017-03-31 NOTE — TELEPHONE ENCOUNTER
Prescription approved per [facility] Refill Protocol.  Valeria Porras, RN, BSN

## 2017-04-04 ENCOUNTER — TELEPHONE (OUTPATIENT)
Dept: FAMILY MEDICINE | Facility: CLINIC | Age: 82
End: 2017-04-04

## 2017-04-04 DIAGNOSIS — I10 ESSENTIAL HYPERTENSION: ICD-10-CM

## 2017-04-04 RX ORDER — AMLODIPINE BESYLATE 10 MG/1
10 TABLET ORAL DAILY
Qty: 30 TABLET | Refills: 8 | Status: SHIPPED | OUTPATIENT
Start: 2017-04-04 | End: 2018-01-05

## 2017-04-05 RX ORDER — TROLAMINE SALICYLATE 10 G/100G
CREAM TOPICAL PRN
Qty: 85 G | Refills: 11 | Status: SHIPPED | OUTPATIENT
Start: 2017-04-05 | End: 2019-05-23

## 2017-04-05 NOTE — TELEPHONE ENCOUNTER
Pharmacy discovered patient can get  VOLTAREN instead of this with no prior auth needed  And a small co-pay    Can you send the request for that   Send it over PHILLIP 1    Thank you    devyn

## 2017-04-05 NOTE — TELEPHONE ENCOUNTER
Notify pharmacist, Henrietta, that Voltaren tablets, or any oral NSAIDs  are contraindicated due to the followin) uncontrolled hypertension  2) advanced age and risk of upper gastrointestinal bleeding, notwithstanding use of H2 receptor blockers such as Ranitidine  3) high risk of acute kidney injury due to advanced age and concomitant intake of ACE inhibitors such as Enalapril  4) high risk of atherosclerotic heart disease, calculated at 22.4%.    Therefore, I recommend no oral NSAIDs and will send Rx for Aspercreme as substitute for Diclofenac gel.

## 2017-04-12 ENCOUNTER — OFFICE VISIT (OUTPATIENT)
Dept: PODIATRY | Facility: CLINIC | Age: 82
End: 2017-04-12
Payer: MEDICARE

## 2017-04-12 VITALS — HEART RATE: 92 BPM | SYSTOLIC BLOOD PRESSURE: 104 MMHG | OXYGEN SATURATION: 97 % | DIASTOLIC BLOOD PRESSURE: 65 MMHG

## 2017-04-12 PROCEDURE — 15271 SKIN SUB GRAFT TRNK/ARM/LEG: CPT | Performed by: PODIATRIST

## 2017-04-12 NOTE — LETTER
April 12, 2017      RE: Gallito Farley  [address]  [place] [facility] [address]       To whom it may concern:    To right leg wound, keep dressing dry and in tact. Change outer dressing using wet to dry as needed for drainage. Leave wound veil and steri-strips in tact.     Sincerely,      [employer]ED

## 2017-04-12 NOTE — PATIENT INSTRUCTIONS
Thanks for coming today.  Ortho/[facility] Medicine Clinic  [address] [place] [address]    To schedule future appointments in Ortho Clinic, you may call [phone].    To schedule ordered imaging by your Provider: Call [facility] at [phone]    Storybyte available online at:   [email or web address]    Please call if any further questions or concerns [phone] and ask for the Orthopedic Department. Clinic hours 8 am to 5 pm.    Return to clinic if symptoms worsen.

## 2017-04-12 NOTE — NURSING NOTE
"Gallito Farley's goals for this visit include: Right leg apligraf  He requests these members of his care team be copied on today's visit information: yes    PCP: Hakeem Awad    Referring Provider:  ESTABLISHED PATIENT  No address on file    No chief complaint on file.      Initial /65  Pulse 92  SpO2 97% Estimated body mass index is 23.75 kg/(m^2) as calculated from the following:    Height as of 3/16/17: 1.854 m (6' 1\").    Weight as of 3/16/17: 81.6 kg (180 lb).  Medication Reconciliation: complete    "

## 2017-04-12 NOTE — MR AVS SNAPSHOT
After Visit Summary   4/12/2017    Gallito Farley    MRN: [id number]           Patient Information     Date Of Birth          1/1/1934        Visit Information        Provider Department      4/12/2017 1:45 PM Mal Davis DPM; RICARDO CHAVEZ [facility]        Today's Diagnoses     Chronic venous hypertension with ulcer involving right side (H)    -  1      Care Instructions    Thanks for coming today.  Ortho/Sports Medicine Clinic  [address] [place] [address]    To schedule future appointments in Ortho Clinic, you may call [phone].    To schedule ordered imaging by your Provider: Call [facility] at [phone]    ArcSight available online at:   [email or web address]    Please call if any further questions or concerns [phone] and ask for the Orthopedic Department. Clinic hours 8 am to 5 pm.    Return to clinic if symptoms worsen.          Follow-ups after your visit        Your next 10 appointments already scheduled     Apr 17, 2017  1:45 PM CDT   Pacemaker Check with  Cv Device 1, [facility]O [facility] [employer]   [facility] ([facility])    [address] [place] [id number] [phone]            Apr 19, 2017  1:00 PM CDT   Return Visit with Mal Davis DPM, RICARDO VICTORIA [facility]   Formerly named [facility])    [address] [place] [id number] [phone]            May 11, 2017 11:00 AM CDT   Return Visit with Maria G Friedman PA-C   Formerly named [facility])    [id number] [place] [address] [phone]              Who to contact     If you have questions or need follow up information about today's clinic visit or your schedule please contact [facility] directly at [phone].  Normal or non-critical lab and imaging results  will be communicated to you by MyChart, letter or phone within 4 business days after the clinic has received the results. If you do not hear from us within 7 days, please contact the clinic through Appboy or phone. If you have a critical or abnormal lab result, we will notify you by phone as soon as possible.  Submit refill requests through Appboy or call your pharmacy and they will forward the refill request to us. Please allow 3 business days for your refill to be completed.          Additional Information About Your Visit        Appboy Information     Appboy is an electronic gateway that provides easy, online access to your medical records. With Appboy, you can request a clinic appointment, read your test results, renew a prescription or communicate with your care team.     To sign up for Appboy visit the website at [email or web address]   You will be asked to enter the access code listed below, as well as some personal information. Please follow the directions to create your username and password.     Your access code is: [email or web address]  Expires: 2017  6:31 AM     Your access code will  in 90 days. If you need help or a new code, please contact your [facility] or call [phone] for assistance.        Care EveryWhere ID     This is your Care EveryWhere ID. This could be used by other organizations to access your [place] medical records  [id number]        Your Vitals Were     Pulse Pulse Oximetry                92 97%           Blood Pressure from Last 3 Encounters:   17 104/65   17 101/63   17 134/79    Weight from Last 3 Encounters:   17 81.6 kg (180 lb)   17 78.9 kg (174 lb)   17 79.6 kg (175 lb 7.8 oz)              We Performed the Following     HC SKIN SUB GRAFT T/A/L AREA/<100SCM /<1ST 25 SCM     TISSUE APLIGRAF SUPPLY, PER SQ CM        Primary Care Provider Office Phone # Fax #    Hakeem Awad MD  [phone]       [facility] [address]  [facility] [address]        Thank you!     Thank you for choosing [facility]  for your care. Our goal is always to provide you with excellent care. Hearing back from our patients is one way we can continue to improve our services. Please take a few minutes to complete the written survey that you may receive in the mail after your visit with us. Thank you!             Your Updated Medication List - Protect others around you: Learn how to safely use, store and throw away your medicines at www.disposemymeds.org.          This list is accurate as of: 4/12/17 11:59 PM.  Always use your most recent med list.                   Brand Name Dispense Instructions for use    acetaminophen 325 MG tablet    TYLENOL    100 tablet    Take 2 tablets (650 mg) by mouth every 4 hours as needed for mild pain       albuterol (2.5 MG/3ML) 0.083% neb solution     360 mL    Take 1 vial (2.5 mg) by nebulization every 6 hours as needed       amLODIPine 10 MG tablet    NORVASC    30 tablet    Take 1 tablet (10 mg) by mouth daily       ciprofloxacin 500 MG tablet    CIPRO    28 tablet    Take 1 tablet (500 mg) by mouth 2 times daily for 14 days       diclofenac sodium 3 % Gel     100 g    Apply 4 gm four times a day as needed to right trapezius muscle.       enalapril 10 MG tablet    VASOTEC    90 tablet    Take 1 tablet (10 mg) by mouth daily       finasteride 5 MG tablet    PROSCAR    30 tablet    Take 1 tablet (5 mg) by mouth At Bedtime       fluticasone 250 MCG/BLIST Aepb Inhaler    FLOVENT DISKUS    1 Inhaler    Inhale 1 puff into the lungs every 12 hours       hydrochlorothiazide 12.5 MG capsule    MICROZIDE    60 capsule    Take 2 capsules (25 mg) by mouth daily       mupirocin 2 % ointment    BACTROBAN    30 g    Apply twice a day to right leg wound and cover with occlusive dressing.       * order for DME     5 each    Equipment being  "ordered: Dispense 4\" x 4\" sterile gauze. Dispense any brand name.       * order for DME     5 each    Equipment being ordered: Dispense Coban wrap.       ranitidine 150 MG tablet    ZANTAC    60 tablet    Take 1 tablet (150 mg) by mouth 2 times daily       tamsulosin 0.4 MG capsule    FLOMAX    60 capsule    Take 2 capsules (0.8 mg) by mouth At Bedtime       tiotropium 18 MCG capsule    SPIRIVA    30 capsule    Inhale 1 capsule (18 mcg) into the lungs daily       trolamine salicylate 10 % cream    ASPERCREME    85 g    Apply topically as needed for moderate pain       * Notice:  This list has 2 medication(s) that are the same as other medications prescribed for you. Read the directions carefully, and ask your doctor or other care provider to review them with you.      "

## 2017-04-12 NOTE — PROGRESS NOTES
Past Medical History:   Diagnosis Date     Asthma      BPH (benign prostatic hyperplasia)      COPD (chronic obstructive pulmonary disease) (H)      Diastolic dysfunction, left ventricle 4/16/2013     H/O tuberculosis     [place]     Hypertension      LBBB (left bundle branch block)      Leg wound, right     chronic, s/p grafting     Osteoarthritis      Patient Active Problem List   Diagnosis     Septic arthritis (H)     Chronic constipation     HTN (hypertension)     BPH (benign prostatic hypertrophy)     Insomnia     Moderate persistent asthma     GERD (gastroesophageal reflux disease)     Pneumonia     Hypercapnia     COPD exacerbation (H)     Acute on chronic respiratory failure with hypoxia and hypercapnia (H)     Chronic cutaneous venous stasis ulcer (H)     Hypertensive urgency     Atrioventricular block, complete (H)     Advanced directives, counseling/discussion     Phlebitis of left arm     Cardiac pacemaker in situ- Medtronic, dual chamber- NOT dependent     Benign nodular prostatic hyperplasia without lower urinary tract symptoms     Past Surgical History:   Procedure Laterality Date     CHOLECYSTECTOMY       ENT SURGERY       IRRIGATION AND DEBRIDEMENT HIP, COMBINED  4/18/2013    Procedure: COMBINED IRRIGATION AND DEBRIDEMENT HIP;  Irrigation and debridement of Right Hip with cultures;  Surgeon: Cristal Inman MD;  Location: [facility] OR     Partial pneumonectomy      done [place] in the [place]     skin grafting - leg wound  6/2016     Social History     Social History     Marital status:      Spouse name: N/A     Number of children: N/A     Years of education: N/A     Occupational History     Not on file.     Social History Main Topics     Smoking status: Former Smoker     Smokeless tobacco: Not on file     Alcohol use No     Drug use: No     Sexual activity: Not on file     Other Topics Concern     Not on file     Social History Narrative      Family History   Problem Relation Age of Onset     Family History Negative Mother      SUBJECTIVE FINDINGS:  83-year-old male returns to clinic for ulcer right leg with venous hypertension and edema.  Relates he is doing well.  No new problems.  Presents with .  Using Bactroban on the wound.      OBJECTIVE FINDINGS:  He has a right posterior leg ulcer.  It is about 5 x 5 cm.  It has a good granular base.  Some serosanguineous drainage.  No erythema, no odor, no calor.  He has some peripheral edema present.      ASSESSMENT AND PLAN:  Ulcer right leg with venous stasis and venous hypertension.  Diagnosis and treatment options discussed with the patient.  Local wound care done upon consent today.  The ulcer was prepped for Apligraf.  Apligraf was applied and secured with Wound Veil and Steri-Strips.  The entire Apligraf was used to cover the wound and margins.  None was discarded.  This is the first Apligraf we have applied.  He will keep the dressing dry and intact.  Change the outer dressing with a saline wet-to-dry dressing as needed.  Saline wet-to-dry dressing applied today.  Return to clinic in 1 week.

## 2017-04-17 ENCOUNTER — ALLIED HEALTH/NURSE VISIT (OUTPATIENT)
Dept: CARDIOLOGY | Facility: CLINIC | Age: 82
End: 2017-04-17
Attending: INTERNAL MEDICINE
Payer: MEDICARE

## 2017-04-17 DIAGNOSIS — I44.2 ATRIOVENTRICULAR BLOCK, COMPLETE (H): Primary | ICD-10-CM

## 2017-04-17 PROCEDURE — 93280 PM DEVICE PROGR EVAL DUAL: CPT | Mod: 26 | Performed by: INTERNAL MEDICINE

## 2017-04-17 PROCEDURE — 93280 PM DEVICE PROGR EVAL DUAL: CPT | Mod: ZF

## 2017-04-17 NOTE — PROGRESS NOTES
Patient seen in clinic for evaluation and iterative programming of his Medtronic dual lead pacemaker per MD orders.  Normal pacemaker function.  No episodes recorded.  Intrinsic rhythm = NSR with CHB.  AP = 15.7%.   = 99.9%.  Estimated battery longevity to BE = 8 years.  Patient reports that he is feeling fine today.  Plan for patient to send a remote transmission in 3 months and return to clinic in 6 months.    Dual lead pacemaker

## 2017-04-17 NOTE — MR AVS SNAPSHOT
After Visit Summary   4/17/2017    Gallito Farley    MRN: [id number]           Patient Information     Date Of Birth          1/1/1934        Visit Information        Provider Department      4/17/2017 1:45 PM 1, Raza Cv Device; RICARDO CHINO [employer]SL[employer] [facility]        Today's Diagnoses     Atrioventricular block, complete (H)    -  1      Care Instructions    It was a pleasure to see you in clinic today.  Please do not hesitate to call with any questions or concerns.  You are scheduled for a remote transmission on 7/18/17.  We look forward to seeing you in clinic at your next device check in 6 months.    Delfina Casarez, RN, MS, CCRN  Electrophysiology Nurse Clinician  [facility]    During Business Hours Please Call:  [phone]  After Hours Please Call:  [phone] - select option #4 and ask for job code [phone]                      Follow-ups after your visit        Follow-up notes from your care team     Return in about 6 months (around 10/17/2017) for Pacemaker Check.      Your next 10 appointments already scheduled     Apr 19, 2017  1:00 PM CDT   Return Visit with Mal Davis DPM, RICARDO [facility]INO [employer]   [facility] ([facility])    [address] [place] [address]   [phone]            May 11, 2017 11:00 AM CDT   Return Visit with Maria G Friedman PA-C   [facility] ([facility])    [address] [place] [address]   [phone]            Oct 17, 2017  2:00 PM CDT   (Arrive by 1:45 PM)   Pacemaker Check with Uc Cv Device 1   [facility] ([facility])    [address] [facility]  [address] [facility] [id number] [phone]              Who to contact     If you have questions or need follow up information about today's clinic visit or your schedule please contact [facility] directly at  "[phone].  Normal or non-critical lab and imaging results will be communicated to you by MyChart, letter or phone within 4 business days after the clinic has received the results. If you do not hear from us within 7 days, please contact the clinic through Telinethart or phone. If you have a critical or abnormal lab result, we will notify you by phone as soon as possible.  Submit refill requests through [employer] or call your pharmacy and they will forward the refill request to us. Please allow 3 business days for your refill to be completed.          Additional Information About Your Visit        Telinethart Information     [employer] lets you send messages to your doctor, view your test results, renew your prescriptions, schedule appointments and more. To sign up, go to [email or web address] . Click on \"Log in\" on the left side of the screen, which will take you to the Welcome page. Then click on \"Sign up Now\" on the right side of the page.     You will be asked to enter the access code listed below, as well as some personal information. Please follow the directions to create your username and password.     Your access code is: [email or web address]  Expires: 2017  6:31 AM     Your access code will  in 90 days. If you need help or a new code, please call your [facility] clinic or [phone].        Care EveryWhere ID     This is your Care EveryWhere ID. This could be used by other organizations to access your [facility] medical records  [id number]         Blood Pressure from Last 3 Encounters:   17 104/65   17 101/63   17 134/79    Weight from Last 3 Encounters:   17 81.6 kg (180 lb)   17 78.9 kg (174 lb)   17 79.6 kg (175 lb 7.8 oz)              We Performed the Following     PM DEVICE PROGRAMMING EVAL, DUAL LEAD PACER        Primary Care Provider Office Phone # Fax #    Hakeem Awad [phone]       [place] [address]  [place] " "[place] [address]        Thank you!     Thank you for choosing [facility]  for your care. Our goal is always to provide you with excellent care. Hearing back from our patients is one way we can continue to improve our services. Please take a few minutes to complete the written survey that you may receive in the mail after your visit with us. Thank you!             Your Updated Medication List - Protect others around you: Learn how to safely use, store and throw away your medicines at www.disposemymeds.org.          This list is accurate as of: 4/17/17  2:12 PM.  Always use your most recent med list.                   Brand Name Dispense Instructions for use    acetaminophen 325 MG tablet    TYLENOL    100 tablet    Take 2 tablets (650 mg) by mouth every 4 hours as needed for mild pain       albuterol (2.5 MG/3ML) 0.083% neb solution     360 mL    Take 1 vial (2.5 mg) by nebulization every 6 hours as needed       amLODIPine 10 MG tablet    NORVASC    30 tablet    Take 1 tablet (10 mg) by mouth daily       diclofenac sodium 3 % Gel     100 g    Apply 4 gm four times a day as needed to right trapezius muscle.       enalapril 10 MG tablet    VASOTEC    90 tablet    Take 1 tablet (10 mg) by mouth daily       finasteride 5 MG tablet    PROSCAR    30 tablet    Take 1 tablet (5 mg) by mouth At Bedtime       fluticasone 250 MCG/BLIST Aepb Inhaler    FLOVENT DISKUS    1 Inhaler    Inhale 1 puff into the lungs every 12 hours       hydrochlorothiazide 12.5 MG capsule    MICROZIDE    60 capsule    Take 2 capsules (25 mg) by mouth daily       mupirocin 2 % ointment    BACTROBAN    30 g    Apply twice a day to right leg wound and cover with occlusive dressing.       * order for DME     5 each    Equipment being ordered: Dispense 4\" x 4\" sterile gauze. Dispense any brand name.       * order for DME     5 each    Equipment being ordered: Dispense Coban wrap.       ranitidine 150 MG tablet    ZANTAC    60 tablet    Take 1 tablet " (150 mg) by mouth 2 times daily       tamsulosin 0.4 MG capsule    FLOMAX    60 capsule    Take 2 capsules (0.8 mg) by mouth At Bedtime       tiotropium 18 MCG capsule    SPIRIVA    30 capsule    Inhale 1 capsule (18 mcg) into the lungs daily       trolamine salicylate 10 % cream    ASPERCREME    85 g    Apply topically as needed for moderate pain       * Notice:  This list has 2 medication(s) that are the same as other medications prescribed for you. Read the directions carefully, and ask your doctor or other care provider to review them with you.

## 2017-04-17 NOTE — PATIENT INSTRUCTIONS
It was a pleasure to see you in clinic today.  Please do not hesitate to call with any questions or concerns.  You are scheduled for a remote transmission on 7/18/17.  We look forward to seeing you in clinic at your next device check in 6 months.    Delfina Casarez, RN, MS, CCRN  Electrophysiology Nurse Clinician  [facility]    During Business Hours Please Call:  [phone]  After Hours Please Call:  [phone] - select option #4 and ask for job code [phone]

## 2017-04-19 ENCOUNTER — OFFICE VISIT (OUTPATIENT)
Dept: PODIATRY | Facility: CLINIC | Age: 82
End: 2017-04-19
Payer: MEDICARE

## 2017-04-19 VITALS — OXYGEN SATURATION: 97 % | SYSTOLIC BLOOD PRESSURE: 113 MMHG | DIASTOLIC BLOOD PRESSURE: 74 MMHG | HEART RATE: 96 BPM

## 2017-04-19 DIAGNOSIS — L97.912 ULCER OF RIGHT LOWER LEG, WITH FAT LAYER EXPOSED (H): Primary | ICD-10-CM

## 2017-04-19 DIAGNOSIS — I87.8 VENOUS STASIS: ICD-10-CM

## 2017-04-19 PROCEDURE — 99212 OFFICE O/P EST SF 10 MIN: CPT | Performed by: PODIATRIST

## 2017-04-19 NOTE — PROGRESS NOTES
Past Medical History:   Diagnosis Date     Asthma      BPH (benign prostatic hyperplasia)      COPD (chronic obstructive pulmonary disease) (H)      Diastolic dysfunction, left ventricle 4/16/2013     H/O tuberculosis     [place]     Hypertension      LBBB (left bundle branch block)      Leg wound, right     chronic, s/p grafting     Osteoarthritis      Patient Active Problem List   Diagnosis     Septic arthritis (H)     Chronic constipation     HTN (hypertension)     BPH (benign prostatic hypertrophy)     Insomnia     Moderate persistent asthma     GERD (gastroesophageal reflux disease)     Pneumonia     Hypercapnia     COPD exacerbation (H)     Acute on chronic respiratory failure with hypoxia and hypercapnia (H)     Chronic cutaneous venous stasis ulcer (H)     Hypertensive urgency     Atrioventricular block, complete (H)     Advanced directives, counseling/discussion     Phlebitis of left arm     Cardiac pacemaker in situ- Medtronic, dual chamber- NOT dependent     Benign nodular prostatic hyperplasia without lower urinary tract symptoms     Past Surgical History:   Procedure Laterality Date     CHOLECYSTECTOMY       ENT SURGERY       IRRIGATION AND DEBRIDEMENT HIP, COMBINED  4/18/2013    Procedure: COMBINED IRRIGATION AND DEBRIDEMENT HIP;  Irrigation and debridement of Right Hip with cultures;  Surgeon: Cristal Inman MD;  Location: [facility] OR     Partial pneumonectomy      done in [place] in the 1990's     skin grafting - leg wound  6/2016     Social History     Social History     Marital status:      Spouse name: N/A     Number of children: N/A     Years of education: N/A     Occupational History     Not on file.     Social History Main Topics     Smoking status: Former Smoker     Smokeless tobacco: Not on file     Alcohol use No     Drug use: No     Sexual activity: Not on file     Other Topics Concern     Not on file     Social History Narrative      Family History   Problem Relation Age of Onset     Family History Negative Mother      SUBJECTIVE:  An 83-year-old returns to clinic for right leg ulcer with venous stasis and venous hypertension.  He relates he has compression socks, but he cannot wear them; they hurt and are too tight.  He has not had to change the dressing.  He is status post Apligraf application.      OBJECTIVE:  Right posterior leg ulcer is terence.  There is some serosanguineous drainage.  No erythema, no odor, no calor.  There is new skin in the margins.  There is still some Apligraf left on the wound bed.      ASSESSMENT AND PLAN:  Ulcer, right leg, with venous stasis and venous hypertension.  Diagnosis and treatment options discussed with the patient.  Local wound care done upon consent today.  I am going to have him rinse this with saline, apply saline wet-to-dry dressing and Kerlix and Coban every other day and as needed for drainage.  I am going to add Ace wraps for compression.  He relates he would try these.  He will return to clinic and see me in 2 weeks.  If the ulcer is still open, we will reapply Apligraf.

## 2017-04-19 NOTE — PATIENT INSTRUCTIONS
Thanks for coming today.  Ortho/Sports Medicine Clinic  [address] [place] [address]    To schedule future appointments in Ortho Clinic, you may call [phone].    To schedule ordered imaging by your Provider: Call [facility] at [phone]    TapZen available online at:   [email or web address]    Please call if any further questions or concerns [phone] and ask for the Orthopedic Department. Clinic hours 8 am to 5 pm.    Return to clinic if symptoms worsen.

## 2017-04-19 NOTE — NURSING NOTE
"Gallito Farley's goals for this visit include: Recheck apligraf  He requests these members of his care team be copied on today's visit information: yes    PCP: Hakeem Awad    Referring Provider:  No referring provider defined for this encounter.    Chief Complaint   Patient presents with     RECHECK     Right leg apligraf check        Initial /74  Pulse 96  SpO2 97% Estimated body mass index is 23.75 kg/(m^2) as calculated from the following:    Height as of 3/16/17: 1.854 m (6' 1\").    Weight as of 3/16/17: 81.6 kg (180 lb).  Medication Reconciliation: complete    "

## 2017-04-27 ENCOUNTER — TELEPHONE (OUTPATIENT)
Dept: UROLOGY | Facility: CLINIC | Age: 82
End: 2017-04-27

## 2017-04-27 DIAGNOSIS — N41.0 ACUTE PROSTATITIS: ICD-10-CM

## 2017-04-27 DIAGNOSIS — N39.0 URINARY TRACT INFECTION: Primary | ICD-10-CM

## 2017-04-27 NOTE — TELEPHONE ENCOUNTER
[facility] Call Center    Phone Message    Name of Caller: Akash    Phone Number: Other phone number:  [phone]    Best time to return call: Any    May a detailed message be left on voicemail: yes    Relation to patient: Other Name: Ariela  Relationship: Home Care Nurse  Is there legal documentation in chart to discuss information with this person: NA    Reason for Call: Akash said Gallito has completed his course of antibiotics and still have pain while urinating.  Akash is requesting a call to discuss.  Thank you .      Action Taken: Message routed to:  Adult Clinics: Urology p [phone];

## 2017-04-27 NOTE — TELEPHONE ENCOUNTER
Discussed patient concerns with Maria G Friedman PA-C who recommends for patient to have another course of ciprofloxacin 500 mg PO BID for 14 days. Per Maria G Friedman PA-C, okay to offer pyridium 100 mg TID PRN if patient is interested. Plan will be for patient to follow up as scheduled on 5/11/17 at 11:00am.     Left message for [facility] nurse to return call to discuss questions further. When [facility] nurse returns call, will discuss the above information.    Jessica Odom  General Surgery - Urology RN

## 2017-05-03 NOTE — TELEPHONE ENCOUNTER
Left message with request for [facility] nurse to return call back to clinic. When C nurse returns call, will discuss recommendations from Maria G Friedman PA-C below.    Jessica Odom  General Surgery - Urology RN

## 2017-05-04 NOTE — TELEPHONE ENCOUNTER
Called and spoke to Akash who reports that he gave the information for the lead RN to call the clinic. Informed Akash that we have not received a call from the RN yet. Per Akash, the lead nurse would be contacted with request for them to call clinic. Will await return call.    Jessica Odom  General Surgery - Urology RN

## 2017-05-06 RX ORDER — PHENAZOPYRIDINE HYDROCHLORIDE 100 MG/1
100 TABLET, FILM COATED ORAL 3 TIMES DAILY PRN
Qty: 12 TABLET | Refills: 0 | Status: SHIPPED | OUTPATIENT
Start: 2017-05-06 | End: 2017-05-25

## 2017-05-06 RX ORDER — CIPROFLOXACIN 500 MG/1
500 TABLET, FILM COATED ORAL 2 TIMES DAILY
Qty: 28 TABLET | Refills: 0 | Status: SHIPPED | OUTPATIENT
Start: 2017-05-06 | End: 2017-05-20

## 2017-05-06 NOTE — TELEPHONE ENCOUNTER
Clinic Action Needed:None other than please be aware that pt just started abx for UTI on 5/6. Has follow-up appt scheduled for 5/11. Pt intends to keep this appt unless he hears otherwise.   FNA Triage Call  Presenting Problem:  Received call from  Home Care RN stating they have been waiting to hear from Urology with an abx order for UTI. See previous entries from 4/27, 5/3 and 5/4. On 4/27, Maria G Friedman PA-C ordered Cipro and Pyridium for UTI (see below). On 5/3 RN from Urology Dept left msg for home care nurse to call Urology clinic for these orders. Second msg left on 5/4. Now receiving call back from home care nurse.      Patient Recommendations/Teaching: Disc'd order w/home care RN for Cipro 500mg BID x14 days per Maria G Friedman PA-C. Also disc'd Pyridium and home care RN said she thought patient would want this. Both Rxs sent to [facility]. Has follow-up appt w/ Urology on 5/11. He will plan to keep this appt unless he hears otherwise.     Will route msg to Urology Dept so they are aware that pt is just starting abx today in case they want to postpone follow-up appt until he has been on abx longer. Elaine Whiteside RN/FNA    Routed to:  Urology Dept pool [id number]

## 2017-05-08 NOTE — TELEPHONE ENCOUNTER
Received message from Maria G Friedman who recommends for patient's appointment on 5/11/17 to be postponed to the end of May. Called and spoke with patient's Home Care who is aware of the above information. Appointment rescheduled to 5/25/17 at 2:00pm. Will close encounter at this time.    Jessica Odom  General Surgery - Urology RN

## 2017-05-15 ENCOUNTER — OFFICE VISIT (OUTPATIENT)
Dept: PODIATRY | Facility: CLINIC | Age: 82
End: 2017-05-15
Payer: MEDICARE

## 2017-05-15 VITALS — HEART RATE: 101 BPM | OXYGEN SATURATION: 93 % | DIASTOLIC BLOOD PRESSURE: 72 MMHG | SYSTOLIC BLOOD PRESSURE: 126 MMHG

## 2017-05-15 DIAGNOSIS — L97.912 ULCER OF RIGHT LEG, WITH FAT LAYER EXPOSED (H): ICD-10-CM

## 2017-05-15 PROCEDURE — 99212 OFFICE O/P EST SF 10 MIN: CPT | Performed by: PODIATRIST

## 2017-05-15 RX ORDER — GAUZE BANDAGE 4" X 4"
BANDAGE TOPICAL
Qty: 48 EACH | Refills: 3 | Status: SHIPPED | OUTPATIENT
Start: 2017-05-15 | End: 2018-10-02

## 2017-05-15 RX ORDER — IBUPROFEN 200 MG
CAPSULE ORAL
Qty: 5 EACH | Refills: 3 | Status: SHIPPED | OUTPATIENT
Start: 2017-05-15 | End: 2018-09-12

## 2017-05-15 NOTE — PATIENT INSTRUCTIONS
Thanks for coming today.  Ortho/Sports Medicine Clinic  [address] [place] [address]    To schedule future appointments in Ortho Clinic, you may call [phone].    To schedule ordered imaging by your provider:   Call [facility] [phone]    To schedule an injection ordered by your provider:  Call [facility] Injection scheduling line: [phone]  [email or web address] available online at:  [email or web address]    Please call if any further questions or concerns ([phone]).  Clinic hours 8 am to 5 pm.    Return to clinic (call) if symptoms worsen or fail to improve.

## 2017-05-15 NOTE — PROGRESS NOTES
Past Medical History:   Diagnosis Date     Asthma      BPH (benign prostatic hyperplasia)      COPD (chronic obstructive pulmonary disease) (H)      Diastolic dysfunction, left ventricle 4/16/2013     H/O tuberculosis     [place]     Hypertension      LBBB (left bundle branch block)      Leg wound, right     chronic, s/p grafting     Osteoarthritis      Patient Active Problem List   Diagnosis     Septic arthritis (H)     Chronic constipation     HTN (hypertension)     BPH (benign prostatic hypertrophy)     Insomnia     Moderate persistent asthma     GERD (gastroesophageal reflux disease)     Pneumonia     Hypercapnia     COPD exacerbation (H)     Acute on chronic respiratory failure with hypoxia and hypercapnia (H)     Chronic cutaneous venous stasis ulcer (H)     Hypertensive urgency     Atrioventricular block, complete (H)     Advanced directives, counseling/discussion     Phlebitis of left arm     Cardiac pacemaker in situ- Medtronic, dual chamber- NOT dependent     Benign nodular prostatic hyperplasia without lower urinary tract symptoms     Past Surgical History:   Procedure Laterality Date     CHOLECYSTECTOMY       ENT SURGERY       IRRIGATION AND DEBRIDEMENT HIP, COMBINED  4/18/2013    Procedure: COMBINED IRRIGATION AND DEBRIDEMENT HIP;  Irrigation and debridement of Right Hip with cultures;  Surgeon: Cristal Inman MD;  Location: [facility] OR     Partial pneumonectomy      done in [place] in the 1990's     skin grafting - leg wound  6/2016     Social History     Social History     Marital status:      Spouse name: N/A     Number of children: N/A     Years of education: N/A     Occupational History     Not on file.     Social History Main Topics     Smoking status: Former Smoker     Smokeless tobacco: Not on file     Alcohol use No     Drug use: No     Sexual activity: Not on file     Other Topics Concern     Not on file     Social History Narrative      Family History   Problem Relation Age of Onset     Family History Negative Mother      SUBJECTIVE FINDINGS:  An 83-year-old male returns to clinic for ulcer of right posterior leg with venous hypertension, venous stasis.  He relates he is doing okay.  They need some dressings.  He presents with an .  They want some prescriptions so that they can go to the medical supply place.        OBJECTIVE FINDINGS:  He has a very posterior leg ulcer that is about 4.5 x 3 cm.  It has got a good granular base.  There is some serosanguinous drainage, no erythema, no odor, no calor.       ASSESSMENT AND PLAN:  Ulcer of right leg with venous stasis and venous hypertension.  Diagnosis and treatment were discussed with him.  Local wound care done upon consent today.  Patient will continue saline wet-to-dry dressings.  I will see him back in 1 week for reapplication of Apligraf.  Prescription for dressings given and use discussed with him.

## 2017-05-15 NOTE — NURSING NOTE
"Gallito Farley's goals for this visit include: Recheck right leg apligraf  He requests these members of his care team be copied on today's visit information: yes    PCP: Hakeem Awad    Referring Provider:  ESTABLISHED PATIENT  No address on file    Chief Complaint   Patient presents with     RECHECK     Right leg check apligraf.        Initial /72  Pulse 101  SpO2 93% Estimated body mass index is 23.75 kg/(m^2) as calculated from the following:    Height as of 3/16/17: 1.854 m (6' 1\").    Weight as of 3/16/17: 81.6 kg (180 lb).  Medication Reconciliation: complete  "

## 2017-05-15 NOTE — MR AVS SNAPSHOT
After Visit Summary   5/15/2017    Gallito Farley    MRN: [id number]           Patient Information     Date Of Birth          1934        Visit Information        Provider Department      5/15/2017 12:15 PM Mal Davis DPM; [employer] [facility]        Today's Diagnoses     Chronic venous hypertension with ulcer involving right side (H)    -  1    Ulcer of right leg, with fat layer exposed (H)          Care Instructions    Thanks for coming today.  Ortho/Sports Medicine Clinic  [address] [place] [address]    To schedule future appointments in Ortho Clinic, you may call [phone].    To schedule ordered imaging by your provider:   Call [facility] [phone]    To schedule an injection ordered by your provider:  Call [facility] Injection scheduling line: [phone]  [email or web address] available online at:  [email or web address]    Please call if any further questions or concerns ([phone]).  Clinic hours 8 am to 5 pm.    Return to clinic (call) if symptoms worsen or fail to improve.        Follow-ups after your visit        Your next 10 appointments already scheduled     May 23, 2017  1:30 PM CDT   Return Visit with Mal Davis DPM   [facility] ([facility])    [address] [place] [address] [phone]            May 25, 2017  2:00 PM CDT   Return Visit with Maria G Friedman PA-C   [facility] ([facility])    [address] [place] [address] [phone]            Oct 17, 2017  2:00 PM CDT   (Arrive by 1:45 PM)   Pacemaker Check with Uc Cv Device 1   [facility] ([facility])    [address]  3rd Floor  [facility] [address] [phone]              Who to contact     If you have questions or need follow up information about today's clinic visit or your  schedule please contact [facility] directly at [phone].  Normal or non-critical lab and imaging results will be communicated to you by MyChart, letter or phone within 4 business days after the clinic has received the results. If you do not hear from us within 7 days, please contact the clinic through [employer] or phone. If you have a critical or abnormal lab result, we will notify you by phone as soon as possible.  Submit refill requests through [employer] or call your pharmacy and they will forward the refill request to us. Please allow 3 business days for your refill to be completed.          Additional Information About Your Visit        [employer] Information     [employer] is an electronic gateway that provides easy, online access to your medical records. With [employer], you can request a clinic appointment, read your test results, renew a prescription or communicate with your care team.     To sign up for [employer] visit the website at [email or web address]   You will be asked to enter the access code listed below, as well as some personal information. Please follow the directions to create your username and password.     Your access code is: [email or web address]  Expires: 2017  6:31 AM     Your access code will  in 90 days. If you need help or a new code, please contact your [facility] or call [phone] for assistance.        Care EveryWhere ID     This is your Care EveryWhere ID. This could be used by other organizations to access your [place] medical records  [id number]        Your Vitals Were     Pulse Pulse Oximetry                101 93%           Blood Pressure from Last 3 Encounters:   05/15/17 126/72   17 113/74   17 104/65    Weight from Last 3 Encounters:   17 81.6 kg (180 lb)   17 78.9 kg (174 lb)   17 79.6 kg (175 lb 7.8 oz)              Today, you had the following     No orders found for display         Today's  "Medication Changes          These changes are accurate as of: 5/15/17 11:59 PM.  If you have any questions, ask your nurse or doctor.               Start taking these medicines.        Dose/Directions    Adhesive Paper Tape   Used for:  Chronic venous hypertension with ulcer involving right side (H), Ulcer of right leg, with fat layer exposed (H)   Started by:  Mal Davis DPM        1inch paper tape for daily dressing changes.   Quantity:  5 each   Refills:  3       Gauze Dressing 4\"X4\" Pads   Used for:  Chronic venous hypertension with ulcer involving right side (H), Ulcer of right leg, with fat layer exposed (H)   Started by:  Mal Davis DPM        Sterile [id number] gauze for daily wound cares.   Quantity:  48 each   Refills:  3       CANDACE 4\"X75\" Misc   Used for:  Chronic venous hypertension with ulcer involving right side (H), Ulcer of right leg, with fat layer exposed (H)   Started by:  Mal Davis DPM        Sterile Candace wrap for daily dressing changes.   Quantity:  30 each   Refills:  2       sodium chloride 0.9 % Soln   Commonly known as:  SALINE WOUND WASH   Used for:  Chronic venous hypertension with ulcer involving right side (H), Ulcer of right leg, with fat layer exposed (H)   Started by:  Mal Davis DPM        Dose:  1 dose *   Externally apply 1 dose * topically daily for 7 days For daily wound cares.   Quantity:  500 mL   Refills:  4            Where to get your medicines      Some of these will need a paper prescription and others can be bought over the counter.  Ask your nurse if you have questions.     Bring a paper prescription for each of these medications     Adhesive Paper Tape    Gauze Dressing 4\"X4\" Pads    CANDACE 4\"X75\" Misc    sodium chloride 0.9 % Soln                Primary Care Provider Office Phone # Fax #    Hakeem Awad [phone]       [facility] [address] [facility] [address]        Thank you!     " "Thank you for choosing [facility]  for your care. Our goal is always to provide you with excellent care. Hearing back from our patients is one way we can continue to improve our services. Please take a few minutes to complete the written survey that you may receive in the mail after your visit with us. Thank you!             Your Updated Medication List - Protect others around you: Learn how to safely use, store and throw away your medicines at www.disposemymeds.org.          This list is accurate as of: 5/15/17 11:59 PM.  Always use your most recent med list.                   Brand Name Dispense Instructions for use    acetaminophen 325 MG tablet    TYLENOL    100 tablet    Take 2 tablets (650 mg) by mouth every 4 hours as needed for mild pain       Adhesive Paper Tape     5 each    1inch paper tape for daily dressing changes.       albuterol (2.5 MG/3ML) 0.083% neb solution     360 mL    Take 1 vial (2.5 mg) by nebulization every 6 hours as needed       amLODIPine 10 MG tablet    NORVASC    30 tablet    Take 1 tablet (10 mg) by mouth daily       ciprofloxacin 500 MG tablet    CIPRO    28 tablet    Take 1 tablet (500 mg) by mouth 2 times daily for 14 days       diclofenac sodium 3 % Gel     100 g    Apply 4 gm four times a day as needed to right trapezius muscle.       enalapril 10 MG tablet    VASOTEC    90 tablet    Take 1 tablet (10 mg) by mouth daily       finasteride 5 MG tablet    PROSCAR    30 tablet    Take 1 tablet (5 mg) by mouth At Bedtime       fluticasone 250 MCG/BLIST Aepb Inhaler    FLOVENT DISKUS    1 Inhaler    Inhale 1 puff into the lungs every 12 hours       Gauze Dressing 4\"X4\" Pads     48 each    Sterile 1w4oukl gauze for daily wound cares.       hydrochlorothiazide 12.5 MG capsule    MICROZIDE    60 capsule    Take 2 capsules (25 mg) by mouth daily       HEATHER 4\"X75\" Misc     30 each    Sterile Heather wrap for daily dressing changes.       mupirocin 2 % ointment    BACTROBAN " "   30 g    Apply twice a day to right leg wound and cover with occlusive dressing.       * order for DME     5 each    Equipment being ordered: Dispense 4\" x 4\" sterile gauze. Dispense any brand name.       * order for DME     5 each    Equipment being ordered: Dispense Coban wrap.       phenazopyridine 100 MG tablet    PYRIDIUM    12 tablet    Take 1 tablet (100 mg) by mouth 3 times daily as needed for urinary tract discomfort       ranitidine 150 MG tablet    ZANTAC    60 tablet    Take 1 tablet (150 mg) by mouth 2 times daily       sodium chloride 0.9 % Soln    SALINE WOUND WASH    500 mL    Externally apply 1 dose * topically daily for 7 days For daily wound cares.       tamsulosin 0.4 MG capsule    FLOMAX    60 capsule    Take 2 capsules (0.8 mg) by mouth At Bedtime       tiotropium 18 MCG capsule    SPIRIVA    30 capsule    Inhale 1 capsule (18 mcg) into the lungs daily       trolamine salicylate 10 % cream    ASPERCREME    85 g    Apply topically as needed for moderate pain       * Notice:  This list has 2 medication(s) that are the same as other medications prescribed for you. Read the directions carefully, and ask your doctor or other care provider to review them with you.      "

## 2017-05-23 ENCOUNTER — OFFICE VISIT (OUTPATIENT)
Dept: PODIATRY | Facility: CLINIC | Age: 82
End: 2017-05-23
Payer: MEDICARE

## 2017-05-23 VITALS — HEART RATE: 94 BPM | SYSTOLIC BLOOD PRESSURE: 106 MMHG | DIASTOLIC BLOOD PRESSURE: 68 MMHG | OXYGEN SATURATION: 94 %

## 2017-05-23 DIAGNOSIS — L97.912 ULCER OF RIGHT LOWER LEG, WITH FAT LAYER EXPOSED (H): Primary | ICD-10-CM

## 2017-05-23 PROCEDURE — 15271 SKIN SUB GRAFT TRNK/ARM/LEG: CPT | Performed by: PODIATRIST

## 2017-05-23 NOTE — MR AVS SNAPSHOT
After Visit Summary   5/23/2017    Gallito Farley    MRN: [id number]           Patient Information     Date Of Birth          1/1/1934        Visit Information        Provider Department      5/23/2017 1:15 PM Mal Davis DPM; [employer] [facility]        Today's Diagnoses     Ulcer of right lower leg, with fat layer exposed (H)    -  1    Chronic venous hypertension with ulcer involving right side (H)          Care Instructions    Thanks for coming today.  Ortho/Sports Medicine Clinic  [address] [place] [address]    To schedule future appointments in Ortho Clinic, you may call [phone].    To schedule ordered imaging by your Provider: Call [facility] at [phone]    [facility] [facility] available online at:   [email or web address]    Please call if any further questions or concerns [phone] and ask for the Orthopedic Department. Clinic hours 8 am to 5 pm.    Return to clinic if symptoms worsen.            Follow-ups after your visit        Your next 10 appointments already scheduled     May 25, 2017  1:45 PM CDT   Return Visit with Maria G Friedman PA-C   [facility]    [address] [place] [address] [phone]            May 30, 2017  1:30 PM CDT   Return Visit with Mal Davis DPM   [facility] ([facility])    [address] [place] [address] [phone]            Oct 17, 2017  2:00 PM CDT   (Arrive by 1:45 PM)   Pacemaker Check with Uc Cv Device 1   [facility] ([facility])    [address]  3rd Floor  [facility] [address] [phone]              Who to contact     If you have questions or need follow up information about today's clinic visit or your schedule please contact [facility] directly at [phone].  Normal or non-critical lab  and imaging results will be communicated to you by MyChart, letter or phone within 4 business days after the clinic has received the results. If you do not hear from us within 7 days, please contact the clinic through [employer] or phone. If you have a critical or abnormal lab result, we will notify you by phone as soon as possible.  Submit refill requests through [employer] or call your pharmacy and they will forward the refill request to us. Please allow 3 business days for your refill to be completed.          Additional Information About Your Visit        [facility] Information     [facility] is an electronic gateway that provides easy, online access to your medical records. With [facility] [facility], you can request a clinic appointment, read your test results, renew a prescription or communicate with your care team.     To sign up for [employer] visit the website at [email or web address]   You will be asked to enter the access code listed below, as well as some personal information. Please follow the directions to create your username and password.     Your access code is: [email or web address]  Expires: 2017  6:31 AM     Your access code will  in 90 days. If you need help or a new code, please contact your [facility] or call [phone] for assistance.        Care EveryWhere ID     This is your Care EveryWhere ID. This could be used by other organizations to access your [place] medical records  [id number]        Your Vitals Were     Pulse Pulse Oximetry                94 94%           Blood Pressure from Last 3 Encounters:   17 106/68   05/15/17 126/72   17 113/74    Weight from Last 3 Encounters:   17 81.6 kg (180 lb)   17 78.9 kg (174 lb)   17 79.6 kg (175 lb 7.8 oz)              We Performed the Following     HC SKIN SUB GRAFT F/S/N/H/F/G/M/D /<100SCM /<1ST 25 SCM     TISSUE APLIGRAF SUPPLY, PER SQ CM        Primary Care Provider Office Phone # Fax #    [place]  "Hilario Awad -[phone]       [facility] [address]  [facility] [address]        Thank you!     Thank you for choosing [facility]  for your care. Our goal is always to provide you with excellent care. Hearing back from our patients is one way we can continue to improve our services. Please take a few minutes to complete the written survey that you may receive in the mail after your visit with us. Thank you!             Your Updated Medication List - Protect others around you: Learn how to safely use, store and throw away your medicines at www.disposemymeds.org.          This list is accurate as of: 5/23/17 11:59 PM.  Always use your most recent med list.                   Brand Name Dispense Instructions for use    acetaminophen 325 MG tablet    TYLENOL    100 tablet    Take 2 tablets (650 mg) by mouth every 4 hours as needed for mild pain       Adhesive Paper Tape     5 each    1inch paper tape for daily dressing changes.       albuterol (2.5 MG/3ML) 0.083% neb solution     360 mL    Take 1 vial (2.5 mg) by nebulization every 6 hours as needed       amLODIPine 10 MG tablet    NORVASC    30 tablet    Take 1 tablet (10 mg) by mouth daily       diclofenac sodium 3 % Gel     100 g    Apply 4 gm four times a day as needed to right trapezius muscle.       enalapril 10 MG tablet    VASOTEC    90 tablet    Take 1 tablet (10 mg) by mouth daily       finasteride 5 MG tablet    PROSCAR    30 tablet    Take 1 tablet (5 mg) by mouth At Bedtime       fluticasone 250 MCG/BLIST Aepb Inhaler    FLOVENT DISKUS    1 Inhaler    Inhale 1 puff into the lungs every 12 hours       Gauze Dressing 4\"X4\" Pads     48 each    Sterile 4u1gdxo gauze for daily wound cares.       hydrochlorothiazide 12.5 MG capsule    MICROZIDE    60 capsule    Take 2 capsules (25 mg) by mouth daily       HEATHER 4\"X75\" Misc     30 each    Sterile Heather wrap for daily dressing changes.       mupirocin " "2 % ointment    BACTROBAN    30 g    Apply twice a day to right leg wound and cover with occlusive dressing.       * order for DME     5 each    Equipment being ordered: Dispense 4\" x 4\" sterile gauze. Dispense any brand name.       * order for DME     5 each    Equipment being ordered: Dispense Coban wrap.       phenazopyridine 100 MG tablet    PYRIDIUM    12 tablet    Take 1 tablet (100 mg) by mouth 3 times daily as needed for urinary tract discomfort       ranitidine 150 MG tablet    ZANTAC    60 tablet    Take 1 tablet (150 mg) by mouth 2 times daily       tamsulosin 0.4 MG capsule    FLOMAX    60 capsule    Take 2 capsules (0.8 mg) by mouth At Bedtime       tiotropium 18 MCG capsule    SPIRIVA    30 capsule    Inhale 1 capsule (18 mcg) into the lungs daily       trolamine salicylate 10 % cream    ASPERCREME    85 g    Apply topically as needed for moderate pain       * Notice:  This list has 2 medication(s) that are the same as other medications prescribed for you. Read the directions carefully, and ask your doctor or other care provider to review them with you.      "

## 2017-05-23 NOTE — NURSING NOTE
"Gallito Farley's goals for this visit include: Right foot apligraf  He requests these members of his care team be copied on today's visit information: yes    PCP: Hakeem Awad    Referring Provider:  No referring provider defined for this encounter.    Chief Complaint   Patient presents with     RECHECK     Apligraf right foot       Initial /68  Pulse 94  SpO2 94% Estimated body mass index is 23.75 kg/(m^2) as calculated from the following:    Height as of 3/16/17: 1.854 m (6' 1\").    Weight as of 3/16/17: 81.6 kg (180 lb).  Medication Reconciliation: complete    "

## 2017-05-23 NOTE — PROGRESS NOTES
Past Medical History:   Diagnosis Date     Asthma      BPH (benign prostatic hyperplasia)      COPD (chronic obstructive pulmonary disease) (H)      Diastolic dysfunction, left ventricle 4/16/2013     H/O tuberculosis     [place]     Hypertension      LBBB (left bundle branch block)      Leg wound, right     chronic, s/p grafting     Osteoarthritis      Patient Active Problem List   Diagnosis     Septic arthritis (H)     Chronic constipation     HTN (hypertension)     BPH (benign prostatic hypertrophy)     Insomnia     Moderate persistent asthma     GERD (gastroesophageal reflux disease)     Pneumonia     Hypercapnia     COPD exacerbation (H)     Acute on chronic respiratory failure with hypoxia and hypercapnia (H)     Chronic cutaneous venous stasis ulcer (H)     Hypertensive urgency     Atrioventricular block, complete (H)     Advanced directives, counseling/discussion     Phlebitis of left arm     Cardiac pacemaker in situ- [employer]tronic, dual chamber- NOT dependent     Benign nodular prostatic hyperplasia without lower urinary tract symptoms     Past Surgical History:   Procedure Laterality Date     CHOLECYSTECTOMY       ENT SURGERY       IRRIGATION AND DEBRIDEMENT HIP, COMBINED  4/18/2013    Procedure: COMBINED IRRIGATION AND DEBRIDEMENT HIP;  Irrigation and debridement of Right Hip with cultures;  Surgeon: Cristal Inman MD;  Location: [facility] OR     Partial pneumonectomy      done in [place] in the 1990's     skin grafting - leg wound  6/2016     Social History     Social History     Marital status:      Spouse name: N/A     Number of children: N/A     Years of education: N/A     Occupational History     Not on file.     Social History Main Topics     Smoking status: Former Smoker     Smokeless tobacco: Not on file     Alcohol use No     Drug use: No     Sexual activity: Not on file     Other Topics Concern     Not on file     Social History Narrative      Family History   Problem Relation Age of Onset     Family History Negative Mother      SUBJECTIVE:  An 83-year-old male returns to clinic with an  for ulcer on right leg with venous stasis and venous hypertension.  He relates this is doing well.        OBJECTIVE:  He has an ulcer on the right posterior leg that is about 4.5 x 3 cm that is deep through the subcutaneous tissue.  There is some serosanguineous drainage.  No odor and no calor.  He ha some peripheral edema although this is minimal around the ulcer site today due to the dressing wrap around it.  There is no erythema, no odor, no calor.       ASSESSMENT/PLAN:  Ulcer, right posterior leg with venous stasis and venous hypertension.  Local wound care done upon consent today.  The ulcer was prepped for Apligraf.  Apligraf was applied and secured with Wound Veil and Steri-Strips.  Kerlix, Coban and a sterile compression dressing were applied.  This is the second Apligraf we have applied to this wound.  The entire Apligraf was used to cover the wounds and margins; none was discarded.  He will keep the dressing dry and intact and change the outer dressing as needed.  He will return for drainage or if he has any problems.  He will return to clinic and see me in 1 week.

## 2017-05-23 NOTE — PATIENT INSTRUCTIONS
Thanks for coming today.  Ortho/Sports Medicine Clinic  [address] [place] [address]    To schedule future appointments in Ortho Clinic, you may call [phone].    To schedule ordered imaging by your Provider: Call [facility] at [phone]    Group 47 available online at:   [email or web address]    Please call if any further questions or concerns [phone] and ask for the Orthopedic Department. Clinic hours 8 am to 5 pm.    Return to clinic if symptoms worsen.

## 2017-05-25 ENCOUNTER — OFFICE VISIT (OUTPATIENT)
Dept: UROLOGY | Facility: CLINIC | Age: 82
End: 2017-05-25
Payer: MEDICARE

## 2017-05-25 VITALS
DIASTOLIC BLOOD PRESSURE: 66 MMHG | SYSTOLIC BLOOD PRESSURE: 97 MMHG | TEMPERATURE: 98.9 F | HEART RATE: 93 BPM | OXYGEN SATURATION: 93 %

## 2017-05-25 DIAGNOSIS — N41.0 PROSTATITIS, ACUTE: Primary | ICD-10-CM

## 2017-05-25 DIAGNOSIS — R82.90 NONSPECIFIC FINDING ON EXAMINATION OF URINE: ICD-10-CM

## 2017-05-25 DIAGNOSIS — R30.0 DYSURIA: ICD-10-CM

## 2017-05-25 LAB
ALBUMIN UR-MCNC: 30 MG/DL
APPEARANCE UR: ABNORMAL
BACTERIA #/AREA URNS HPF: ABNORMAL /HPF
BILIRUB UR QL STRIP: NEGATIVE
COLOR UR AUTO: YELLOW
GLUCOSE UR STRIP-MCNC: NEGATIVE MG/DL
HGB UR QL STRIP: NEGATIVE
HYALINE CASTS #/AREA URNS LPF: ABNORMAL /LPF (ref 0–2)
KETONES UR STRIP-MCNC: NEGATIVE MG/DL
LEUKOCYTE ESTERASE UR QL STRIP: ABNORMAL
MUCOUS THREADS #/AREA URNS LPF: PRESENT /LPF
NITRATE UR QL: NEGATIVE
NON-SQ EPI CELLS #/AREA URNS LPF: ABNORMAL /LPF
PH UR STRIP: 7 PH (ref 5–7)
RBC #/AREA URNS AUTO: ABNORMAL /HPF (ref 0–2)
SP GR UR STRIP: 1.01 (ref 1–1.03)
URN SPEC COLLECT METH UR: ABNORMAL
UROBILINOGEN UR STRIP-MCNC: NORMAL MG/DL (ref 0–2)
WBC #/AREA URNS AUTO: ABNORMAL /HPF (ref 0–2)

## 2017-05-25 PROCEDURE — 87086 URINE CULTURE/COLONY COUNT: CPT | Performed by: PHYSICIAN ASSISTANT

## 2017-05-25 PROCEDURE — 81001 URINALYSIS AUTO W/SCOPE: CPT | Performed by: PHYSICIAN ASSISTANT

## 2017-05-25 PROCEDURE — 99213 OFFICE O/P EST LOW 20 MIN: CPT | Performed by: PHYSICIAN ASSISTANT

## 2017-05-25 RX ORDER — PHENAZOPYRIDINE HYDROCHLORIDE 100 MG/1
100 TABLET, FILM COATED ORAL 3 TIMES DAILY PRN
Qty: 12 TABLET | Refills: 0 | Status: SHIPPED | OUTPATIENT
Start: 2017-05-25 | End: 2017-06-14

## 2017-05-25 RX ORDER — CIPROFLOXACIN 500 MG/1
500 TABLET, FILM COATED ORAL 2 TIMES DAILY
Qty: 60 TABLET | Refills: 0 | Status: SHIPPED | OUTPATIENT
Start: 2017-05-25 | End: 2017-06-24

## 2017-05-25 ASSESSMENT — PAIN SCALES - GENERAL: PAINLEVEL: SEVERE PAIN (6)

## 2017-05-25 NOTE — NURSING NOTE
"Gallito Farley's goals for this visit include:   Chief Complaint   Patient presents with     RECHECK     follow up -Benign nodular prostatic hyperplasia without lower urinary tract        He requests these members of his care team be copied on today's visit information: YES-PCP    Referring Provider:  No referring provider defined for this encounter.    Initial BP 97/66 (BP Location: Left arm, Patient Position: Chair, Cuff Size: Adult Large)  Pulse 93  Temp 98.9  F (37.2  C) (Oral)  SpO2 93% Estimated body mass index is 23.75 kg/(m^2) as calculated from the following:    Height as of 3/16/17: 1.854 m (6' 1\").    Weight as of 3/16/17: 81.6 kg (180 lb).  BP completed using cuff size: large        "

## 2017-05-25 NOTE — MR AVS SNAPSHOT
After Visit Summary   5/25/2017    Gallito Farley    MRN: [id number]           Patient Information     Date Of Birth          1/1/1934        Visit Information        Provider Department      5/25/2017 1:45 PM Maria G Friedman PA-C; [employer] [facility]        Today's Diagnoses     Prostatitis, acute    -  1    Dysuria        Nonspecific finding on examination of urine          Care Instructions    Two medications were sent to the pharmacy ([facility]):  1. Ciprofloxacin. Take this twice a day for 30 days.  2. Pyridium. Take this up to three times per day as needed to help with pain with urination.    Ibuprofen may be taken as needed.  Sit in a warm tub of water a few times a week to soothe the pain.    Follow up in 5 weeks. Call sooner with any issues.  [phone]            Follow-ups after your visit        Your next 10 appointments already scheduled     May 30, 2017  1:15 PM CDT   Return Visit with Mal Davis DPM   [facility]    [address] [place] [id number] [phone]            Jun 30, 2017 10:00 AM CDT   Return Visit with Maria G Friedman PA-C   [facility] ([facility])    [address] [place] [id number] [phone]            Oct 17, 2017  2:00 PM CDT   (Arrive by 1:45 PM)   Pacemaker Check with Uc Cv Device 1   [facility] ([facility])    [address] [place] [address] [phone]              Who to contact     If you have questions or need follow up information about today's clinic visit or your schedule please contact [facility] directly at [phone].  Normal or non-critical lab and imaging results will be communicated to you by MyChart, letter or phone within 4 business days after the clinic has received the results.  If you do not hear from us within 7 days, please contact the clinic through [employer] or phone. If you have a critical or abnormal lab result, we will notify you by phone as soon as possible.  Submit refill requests through [employer] or call your pharmacy and they will forward the refill request to us. Please allow 3 business days for your refill to be completed.          Additional Information About Your Visit        [employer] Information     [employer] is an electronic gateway that provides easy, online access to your medical records. With [employer], you can request a clinic appointment, read your test results, renew a prescription or communicate with your care team.     To sign up for [employer] visit the website at [email or web address]   You will be asked to enter the access code listed below, as well as some personal information. Please follow the directions to create your username and password.     Your access code is: [email or web address]  Expires: 2017  6:31 AM     Your access code will  in 90 days. If you need help or a new code, please contact your [facility] or call [phone] for assistance.        Care EveryWhere ID     This is your Care EveryWhere ID. This could be used by other organizations to access your [facility] medical records  [id number]        Your Vitals Were     Pulse Temperature Pulse Oximetry             93 98.9  F (37.2  C) (Oral) 93%          Blood Pressure from Last 3 Encounters:   17 97/66   17 106/68   05/15/17 126/72    Weight from Last 3 Encounters:   17 81.6 kg (180 lb)   17 78.9 kg (174 lb)   17 79.6 kg (175 lb 7.8 oz)              We Performed the Following     *UA reflex to Microscopic and Culture ([place] and [facility])     Urine Culture Aerobic Bacterial     Urine Microscopic          Today's Medication Changes          These changes are accurate as of: 17  2:41 PM.  If you have any questions, ask your nurse or  doctor.               Start taking these medicines.        Dose/Directions    ciprofloxacin 500 MG tablet   Commonly known as:  CIPRO   Used for:  Prostatitis, acute   Started by:  Maria G Friedman PA-C        Dose:  500 mg   Take 1 tablet (500 mg) by mouth 2 times daily   Quantity:  60 tablet   Refills:  0            Where to get your medicines      These medications were sent to [facility] [place], [place] - [address]  [address], [facility] [address]     Phone:  [phone]     ciprofloxacin 500 MG tablet    phenazopyridine 100 MG tablet                Primary Care Provider Office Phone # Fax #    Hakeem Awad [phone]       [facility] [address]  [facility] [address]        Thank you!     Thank you for choosing [facility]  for your care. Our goal is always to provide you with excellent care. Hearing back from our patients is one way we can continue to improve our services. Please take a few minutes to complete the written survey that you may receive in the mail after your visit with us. Thank you!             Your Updated Medication List - Protect others around you: Learn how to safely use, store and throw away your medicines at www.disposemymeds.org.          This list is accurate as of: 5/25/17  2:41 PM.  Always use your most recent med list.                   Brand Name Dispense Instructions for use    acetaminophen 325 MG tablet    TYLENOL    100 tablet    Take 2 tablets (650 mg) by mouth every 4 hours as needed for mild pain       Adhesive Paper Tape     5 each    1inch paper tape for daily dressing changes.       albuterol (2.5 MG/3ML) 0.083% neb solution     360 mL    Take 1 vial (2.5 mg) by nebulization every 6 hours as needed       amLODIPine 10 MG tablet    NORVASC    30 tablet    Take 1 tablet (10 mg) by mouth daily       ciprofloxacin 500 MG tablet    CIPRO    60 tablet    Take 1 tablet  "(500 mg) by mouth 2 times daily       diclofenac sodium 3 % Gel     100 g    Apply 4 gm four times a day as needed to right trapezius muscle.       enalapril 10 MG tablet    VASOTEC    90 tablet    Take 1 tablet (10 mg) by mouth daily       finasteride 5 MG tablet    PROSCAR    30 tablet    Take 1 tablet (5 mg) by mouth At Bedtime       fluticasone 250 MCG/BLIST Aepb Inhaler    FLOVENT DISKUS    1 Inhaler    Inhale 1 puff into the lungs every 12 hours       Gauze Dressing 4\"X4\" Pads     48 each    Sterile [id number] gauze for daily wound cares.       hydrochlorothiazide 12.5 MG capsule    MICROZIDE    60 capsule    Take 2 capsules (25 mg) by mouth daily       HEATHER 4\"X75\" Misc     30 each    Sterile Heather wrap for daily dressing changes.       mupirocin 2 % ointment    BACTROBAN    30 g    Apply twice a day to right leg wound and cover with occlusive dressing.       * order for DME     5 each    Equipment being ordered: Dispense 4\" x 4\" sterile gauze. Dispense any brand name.       * order for DME     5 each    Equipment being ordered: Dispense Coban wrap.       phenazopyridine 100 MG tablet    PYRIDIUM    12 tablet    Take 1 tablet (100 mg) by mouth 3 times daily as needed for urinary tract discomfort       ranitidine 150 MG tablet    ZANTAC    60 tablet    Take 1 tablet (150 mg) by mouth 2 times daily       tamsulosin 0.4 MG capsule    FLOMAX    60 capsule    Take 2 capsules (0.8 mg) by mouth At Bedtime       tiotropium 18 MCG capsule    SPIRIVA    30 capsule    Inhale 1 capsule (18 mcg) into the lungs daily       trolamine salicylate 10 % cream    ASPERCREME    85 g    Apply topically as needed for moderate pain       * Notice:  This list has 2 medication(s) that are the same as other medications prescribed for you. Read the directions carefully, and ask your doctor or other care provider to review them with you.      "

## 2017-05-25 NOTE — PROGRESS NOTES
REASON FOR VISIT: follow up prostatitis, BPH    CLINICAL DATA: Mr. Gallito Farley is a very pleasant 83 year old gentleman who returns for follow up of prostatitis and BPH. Seen in initial consultation on 3/30/17 - please see consult note from this date for full history. In brief, presented to urology for ongoing dysuria, frequency, urgency, nocturia, perineal pain, hesitancy, intermittency, and voiding in small volumes which was refractory to Flomax 0.8 mg daily. Had also been on finasteride 5 mg daily for a few weeks at that time. UA from last visit did reveal some hematuria (5-10 RBC) and pyuria (2-5 WBC). EUGENIO had also revealed a mild to moderately enlarged prostate that was tender to palpation. We therefore elected to treat for presumed prostatitis with 2 weeks of Cipro and NSAIDS. Called after completing the Cipro stating his symptoms had improved while on antibiotics but were then starting to come back. His Cipro was therefore extended for another 2 weeks and he completed this 5 days ago.     Returns to clinic today and reports symptoms are ongoing. His main complaints are dysuria, hesitancy, perineal pain, and voiding in small volumes. Frequency has improved from q15-20 minutes at last visit to now to q2-3 hours. He thinks his symptoms overall had improved while on Cipro but have once again recurred after completing antibiotics. The pyridium was also significantly helpful. Continues to deny gross hematuria, abdominal/flank pain, fevers, chills, N/V. No personal or family history of prostate cancer.    PEx  BP 97/66 (BP Location: Left arm, Patient Position: Chair, Cuff Size: Adult Large)  Pulse 93  Temp 98.9  F (37.2  C) (Oral)  SpO2 93%  GEN: well-appearing male in NAD  RESP: no increased respiratory effort  ABD: soft, NT, ND  : deferred  EUGENIO: deferred  EXT: No LE edema    UA reveals 30 protein, large LE, 2-5 WBC, 0-2 RBC, few bacteria, mucous, 2-5 hyaline casts, o/w wnl.     PVR at last office visit was  38 mL as measured by ultrasound    A/P  83 year old male with prostatitis - improved with 2 week courses of Cipro but symptoms recur after he stops medication. UA today with large leukocytes, 2-5 WBC, 0-2 RBC. Will plan to restart Cipro for now but will also order urine culture.   -Start Cipro 500 mg BID x 30 days. Side effects discussed.  -Send urine for UC. Await C/S and adjust antibiotic coverage pending results.   -NSAIDS PRN. Recommend prilosec for GI protection and probiotic to prevent disruption to normal intestinal lawrence.  -Encouraged warm sitz baths, avoidance of aggravating activities and known bladder irritants.    Follow up in 5-6 weeks for repeat UA/PVR, symptom recheck. Call or RTC sooner with any issues.    Next steps if no improvement may include CT urogram and cystoscopy for upper tract and intravesical examination.    20 minutes spent with the patient, >50% in counseling and coordination of care.    Maria G Friedman PA-C  Urology

## 2017-05-25 NOTE — PATIENT INSTRUCTIONS
Two medications were sent to the pharmacy ([facility]):  1. Ciprofloxacin. Take this twice a day for 30 days.  2. Pyridium. Take this up to three times per day as needed to help with pain with urination.    Ibuprofen may be taken as needed.  Sit in a warm tub of water a few times a week to soothe the pain.    Follow up in 5 weeks. Call sooner with any issues.  [phone]

## 2017-05-26 LAB
BACTERIA SPEC CULT: NORMAL
MICRO REPORT STATUS: NORMAL
SPECIMEN SOURCE: NORMAL

## 2017-05-30 ENCOUNTER — OFFICE VISIT (OUTPATIENT)
Dept: PODIATRY | Facility: CLINIC | Age: 82
End: 2017-05-30
Payer: MEDICARE

## 2017-05-30 VITALS — DIASTOLIC BLOOD PRESSURE: 62 MMHG | OXYGEN SATURATION: 98 % | SYSTOLIC BLOOD PRESSURE: 101 MMHG | HEART RATE: 65 BPM

## 2017-05-30 DIAGNOSIS — L97.912 ULCER OF RIGHT LOWER LEG, WITH FAT LAYER EXPOSED (H): Primary | ICD-10-CM

## 2017-05-30 PROCEDURE — 99212 OFFICE O/P EST SF 10 MIN: CPT | Performed by: PODIATRIST

## 2017-05-30 ASSESSMENT — PAIN SCALES - GENERAL: PAINLEVEL: SEVERE PAIN (7)

## 2017-05-30 NOTE — NURSING NOTE
"Gallito Farley's goals for this visit include: Check apilgraf.  He requests these members of his care team be copied on today's visit information: yes    PCP: Hakeem Awad    Referring Provider:  No referring provider defined for this encounter.    Chief Complaint   Patient presents with     RECHECK     Check Apilgraf        Initial /62  Pulse 65  SpO2 98% Estimated body mass index is 23.75 kg/(m^2) as calculated from the following:    Height as of 3/16/17: 1.854 m (6' 1\").    Weight as of 3/16/17: 81.6 kg (180 lb).  Medication Reconciliation: complete    "

## 2017-05-30 NOTE — PATIENT INSTRUCTIONS
Thanks for coming today.  Ortho/Sports Medicine Clinic  [address] [place] [address]    To schedule future appointments in Ortho Clinic, you may call [phone].    To schedule ordered imaging by your provider:   Call [phone]    To schedule an injection ordered by your provider:  Call [facility] Injection scheduling line: [phone]  [email or web address] available online at:  [email or web address]    Please call if any further questions or concerns ([phone]).  Clinic hours 8 am to 5 pm.    Return to clinic (call) if symptoms worsen or fail to improve.

## 2017-05-30 NOTE — MR AVS SNAPSHOT
After Visit Summary   2017    Gallito Farley    MRN: [id number]           Patient Information     Date Of Birth          1934        Visit Information        Provider Department      2017 1:15 PM Mal Davis DPM; [employer] [facility]        Today's Diagnoses     Ulcer of right lower leg, with fat layer exposed (H)    -  1    Chronic venous hypertension with ulcer involving right side (H)          Care Instructions    Thanks for coming today.  Ortho[facility]Sports Medicine Clinic  [address] [place] [address]    To schedule future appointments in Ortho Clinic, you may call [phone].    To schedule ordered imaging by your provider:   Call Central Imaging [phone]    To schedule an injection ordered by your provider:  Call Central Imaging Injection scheduling line: [phone]  Tailsterhart available online at:  [email or web address]    Please call if any further questions or concerns ([phone]).  Clinic hours 8 am to 5 pm.    Return to clinic (call) if symptoms worsen or fail to improve.            Follow-ups after your visit        Your next 10 appointments already scheduled     2017  1:30 PM CDT   [facility] Davis DPM   [facility]    [address] [place] [address] [phone]            2017 10:00 AM CDT   Return Visit with Maria G Friedman PA-C   [facility]    [address] [place] [address] [phone]            Oct 17, 2017  2:00 PM CDT   (Arrive by 1:45 PM)   Pacemaker Check with Uc Cv Device 1   [facility] ([facility])    [address]  3rd Floor  [facility] [address] [phone]              Who to contact     If you have questions or need follow up information about today's clinic visit or  your schedule please contact [facility] directly at [phone].  Normal or non-critical lab and imaging results will be communicated to you by MyChart, letter or phone within 4 business days after the clinic has received the results. If you do not hear from us within 7 days, please contact the clinic through TekLinkshart or phone. If you have a critical or abnormal lab result, we will notify you by phone as soon as possible.  Submit refill requests through [employer] or call your pharmacy and they will forward the refill request to us. Please allow 3 business days for your refill to be completed.          Additional Information About Your Visit        [employer] Information     [employer] is an electronic gateway that provides easy, online access to your medical records. With [employer], you can request a clinic appointment, read your test results, renew a prescription or communicate with your care team.     To sign up for [employer] visit the website at [email or web address]   You will be asked to enter the access code listed below, as well as some personal information. Please follow the directions to create your username and password.     Your access code is: [email or web address]  Expires: 2017  6:31 AM     Your access code will  in 90 days. If you need help or a new code, please contact your [facility] or call [phone] for assistance.        Care EveryWhere ID     This is your Care EveryWhere ID. This could be used by other organizations to access your [place] medical records  [id number]        Your Vitals Were     Pulse Pulse Oximetry                65 98%           Blood Pressure from Last 3 Encounters:   17 101/62   17 97/66   17 106/68    Weight from Last 3 Encounters:   17 81.6 kg (180 lb)   17 78.9 kg (174 lb)   17 79.6 kg (175 lb 7.8 oz)              Today, you had the following     No orders found for display       Primary  "Care Provider Office Phone # Fax #    Hakeem Awad [phone]       [facility] [address]  [facility] [address]        Thank you!     Thank you for choosing [facility]  for your care. Our goal is always to provide you with excellent care. Hearing back from our patients is one way we can continue to improve our services. Please take a few minutes to complete the written survey that you may receive in the mail after your visit with us. Thank you!             Your Updated Medication List - Protect others around you: Learn how to safely use, store and throw away your medicines at www.disposemymeds.org.          This list is accurate as of: 5/30/17  2:13 PM.  Always use your most recent med list.                   Brand Name Dispense Instructions for use    acetaminophen 325 MG tablet    TYLENOL    100 tablet    Take 2 tablets (650 mg) by mouth every 4 hours as needed for mild pain       Adhesive Paper Tape     5 each    1inch paper tape for daily dressing changes.       albuterol (2.5 MG/3ML) 0.083% neb solution     360 mL    Take 1 vial (2.5 mg) by nebulization every 6 hours as needed       amLODIPine 10 MG tablet    NORVASC    30 tablet    Take 1 tablet (10 mg) by mouth daily       ciprofloxacin 500 MG tablet    CIPRO    60 tablet    Take 1 tablet (500 mg) by mouth 2 times daily       diclofenac sodium 3 % Gel     100 g    Apply 4 gm four times a day as needed to right trapezius muscle.       enalapril 10 MG tablet    VASOTEC    90 tablet    Take 1 tablet (10 mg) by mouth daily       finasteride 5 MG tablet    PROSCAR    30 tablet    Take 1 tablet (5 mg) by mouth At Bedtime       fluticasone 250 MCG/BLIST Aepb Inhaler    FLOVENT DISKUS    1 Inhaler    Inhale 1 puff into the lungs every 12 hours       Gauze Dressing 4\"X4\" Pads     48 each    Sterile 9h4mraa gauze for daily wound cares.       hydrochlorothiazide 12.5 MG capsule    MICROZIDE    60 " "capsule    Take 2 capsules (25 mg) by mouth daily       HEATHER 4\"X75\" Misc     30 each    Sterile Heather wrap for daily dressing changes.       mupirocin 2 % ointment    BACTROBAN    30 g    Apply twice a day to right leg wound and cover with occlusive dressing.       * order for DME     5 each    Equipment being ordered: Dispense 4\" x 4\" sterile gauze. Dispense any brand name.       * order for DME     5 each    Equipment being ordered: Dispense Coban wrap.       phenazopyridine 100 MG tablet    PYRIDIUM    12 tablet    Take 1 tablet (100 mg) by mouth 3 times daily as needed for urinary tract discomfort       ranitidine 150 MG tablet    ZANTAC    60 tablet    Take 1 tablet (150 mg) by mouth 2 times daily       tamsulosin 0.4 MG capsule    FLOMAX    60 capsule    Take 2 capsules (0.8 mg) by mouth At Bedtime       tiotropium 18 MCG capsule    SPIRIVA    30 capsule    Inhale 1 capsule (18 mcg) into the lungs daily       trolamine salicylate 10 % cream    ASPERCREME    85 g    Apply topically as needed for moderate pain       * Notice:  This list has 2 medication(s) that are the same as other medications prescribed for you. Read the directions carefully, and ask your doctor or other care provider to review them with you.      "

## 2017-05-30 NOTE — PROGRESS NOTES
Past Medical History:   Diagnosis Date     Asthma      BPH (benign prostatic hyperplasia)      COPD (chronic obstructive pulmonary disease) (H)      Diastolic dysfunction, left ventricle 4/16/2013     H/O tuberculosis     [place]     Hypertension      LBBB (left bundle branch block)      Leg wound, right     chronic, s/p grafting     Osteoarthritis      Patient Active Problem List   Diagnosis     Septic arthritis (H)     Chronic constipation     HTN (hypertension)     BPH (benign prostatic hypertrophy)     Insomnia     Moderate persistent asthma     GERD (gastroesophageal reflux disease)     Pneumonia     Hypercapnia     COPD exacerbation (H)     Acute on chronic respiratory failure with hypoxia and hypercapnia (H)     Chronic cutaneous venous stasis ulcer (H)     Hypertensive urgency     Atrioventricular block, complete (H)     Advanced directives, counseling/discussion     Phlebitis of left arm     Cardiac pacemaker in situ- Medtronic, dual chamber- NOT dependent     Benign nodular prostatic hyperplasia without lower urinary tract symptoms     Past Surgical History:   Procedure Laterality Date     CHOLECYSTECTOMY       ENT SURGERY       IRRIGATION AND DEBRIDEMENT HIP, COMBINED  4/18/2013    Procedure: COMBINED IRRIGATION AND DEBRIDEMENT HIP;  Irrigation and debridement of Right Hip with cultures;  Surgeon: Cristal Inman MD;  Location: [facility] OR     Partial pneumonectomy      done in [place] in the 1990's     skin grafting - leg wound  6/2016     Social History     Social History     Marital status:      Spouse name: N/A     Number of children: N/A     Years of education: N/A     Occupational History     Not on file.     Social History Main Topics     Smoking status: Former Smoker     Smokeless tobacco: Not on file     Alcohol use No     Drug use: No     Sexual activity: Not on file     Other Topics Concern     Not on file     Social History Narrative      Family History   Problem Relation Age of Onset     Family History Negative Mother      SUBJECTIVE:  An 83-year-old male returns to clinic for ulcer on the right posterior leg.  Relates he is doing well.  They did not have to change the dressing.  He presents with an .        OBJECTIVE:  He has an ulcer on the right posterior leg.  Wound Veil and Steri Strips are intact as well as Apligraf still on the wound bed.  There is serosanguineous drainage on the dressing.  There is no erythema, no odor, no calor.        ASSESSMENT/PLAN:  Ulcer, right posterior leg with venous stasis and venous hypertension.  This appears to be healing.  He is status post Apligraf application.  Local wound care done upon consent today.  I am going to have him rinse this every other day with saline and apply a saline wet-to-dry dressing.  He will return to clinic and see me in 1 week.

## 2017-06-06 ENCOUNTER — OFFICE VISIT (OUTPATIENT)
Dept: PODIATRY | Facility: CLINIC | Age: 82
End: 2017-06-06
Payer: MEDICARE

## 2017-06-06 VITALS — SYSTOLIC BLOOD PRESSURE: 97 MMHG | DIASTOLIC BLOOD PRESSURE: 64 MMHG | OXYGEN SATURATION: 95 % | HEART RATE: 97 BPM

## 2017-06-06 DIAGNOSIS — L97.912 ULCER OF RIGHT LEG, WITH FAT LAYER EXPOSED (H): Primary | ICD-10-CM

## 2017-06-06 PROCEDURE — 99212 OFFICE O/P EST SF 10 MIN: CPT | Performed by: PODIATRIST

## 2017-06-06 NOTE — PROGRESS NOTES
Past Medical History:   Diagnosis Date     Asthma      BPH (benign prostatic hyperplasia)      COPD (chronic obstructive pulmonary disease) (H)      Diastolic dysfunction, left ventricle 4/16/2013     H/O tuberculosis     s/[place] partial pneumonectomy [place]     Hypertension      LBBB (left bundle branch block)      Leg wound, right     chronic, s/p grafting     Osteoarthritis      Patient Active Problem List   Diagnosis     Septic arthritis (H)     Chronic constipation     HTN (hypertension)     BPH (benign prostatic hypertrophy)     Insomnia     Moderate persistent asthma     GERD (gastroesophageal reflux disease)     Pneumonia     Hypercapnia     COPD exacerbation (H)     Acute on chronic respiratory failure with hypoxia and hypercapnia (H)     Chronic cutaneous venous stasis ulcer (H)     Hypertensive urgency     Atrioventricular block, complete (H)     Advanced directives, counseling/discussion     Phlebitis of left arm     Cardiac pacemaker in situ- Medtronic, dual chamber- NOT dependent     Benign nodular prostatic hyperplasia without lower urinary tract symptoms     Past Surgical History:   Procedure Laterality Date     CHOLECYSTECTOMY       ENT SURGERY       IRRIGATION AND DEBRIDEMENT HIP, COMBINED  4/18/2013    Procedure: COMBINED IRRIGATION AND DEBRIDEMENT HIP;  Irrigation and debridement of Right Hip with cultures;  Surgeon: Cristal Inman MD;  Location: [facility] OR     Partial pneumonectomy      done [place] in the 1990[place]     skin grafting - leg wound  6/2016     Social History     Social History     Marital status:      Spouse name: N/A     Number of children: N/A     Years of education: N/A     Occupational History     Not on file.     Social History Main Topics     Smoking status: Former Smoker     Smokeless tobacco: Not on file     Alcohol use No     Drug use: No     Sexual activity: Not on file     Other Topics Concern     Not on file     Social History Narrative      Family History   Problem Relation Age of Onset     Family History Negative Mother      SUBJECTIVE FINDINGS: An 83-year-old male returns to clinic for right posterior leg ulcer, status post Apligraf application.  Presents with .      OBJECTIVE FINDINGS:  Right posterior leg wound reveals Steri-Strips are intact.  He has an ulcer that is through the dermis.  Minimal drainage, no erythema, no odor, no calor.      ASSESSMENT AND PLAN:  Ulcer, right posterior leg.  He is venous stasis and venous hypertension present.  It is healing. I want him to rinse this slightly with saline daily.  Apply a dry sterile dressing and Coban wrap.  Return to clinic to see me in 2 weeks.

## 2017-06-06 NOTE — PATIENT INSTRUCTIONS
Thanks for coming today.  Ortho/Sports Medicine Clinic  [address] [place] [address]    To schedule future appointments in Ortho Clinic, you may call [phone].    To schedule ordered imaging by your provider:   Call Central Imaging [phone]    To schedule an injection ordered by your provider:  Call Central Imaging Injection scheduling line: [phone]  iVentures Asia Ltdhart available online at:  [email or web address]    Please call if any further questions or concerns ([phone]).  Clinic hours 8 am to 5 pm.    Return to clinic (call) if symptoms worsen or fail to improve.

## 2017-06-06 NOTE — MR AVS SNAPSHOT
After Visit Summary   2017    Gallito Farley    MRN: [id number]           Patient Information     Date Of Birth          1934        Visit Information        Provider Department      2017 1:15 PM Mal Davis DPM; [employer] [facility]        Today's Diagnoses     Ulcer of right leg, with fat layer exposed (H)    -  1    Chronic venous hypertension with ulcer involving right side (H)          Care Instructions    Thanks for coming today.  Ortho[facility]Sports Medicine Clinic  [address] [place] [address]    To schedule future appointments in Ortho Clinic, you may call [phone].    To schedule ordered imaging by your provider:   Call Central Imaging [phone]    To schedule an injection ordered by your provider:  Call Central Imaging Injection scheduling line: [phone]  [employer] Neuro[employer]hart available online at:  [email or web address]    Please call if any further questions or concerns ([phone]).  Clinic hours 8 am to 5 pm.    Return to clinic (call) if symptoms worsen or fail to improve.          Follow-ups after your visit        Your next 10 appointments already scheduled     2017  1:30 PM CDT   [facility] D[facility]    [address] [place] [address] [phone]            2017 10:00 AM CDT   Return Visit with Maria G Friedman PA-C   [facility]    [address] [place] [address] [phone]            Oct 17, 2017  2:00 PM CDT   (Arrive by 1:45 PM)   Pacemaker Check with Uc Cv Device 1   [facility] ([facility])    [address]  3rd Floor  [facility] [address] [phone]              Who to contact     If you have questions or need follow up information about today's clinic visit or your  schedule please contact [facility] directly at [phone].  Normal or non-critical lab and imaging results will be communicated to you by MyChart, letter or phone within 4 business days after the clinic has received the results. If you do not hear from us within 7 days, please contact the clinic through Moblicohart or phone. If you have a critical or abnormal lab result, we will notify you by phone as soon as possible.  Submit refill requests through PingTank or call your pharmacy and they will forward the refill request to us. Please allow 3 business days for your refill to be completed.          Additional Information About Your Visit        PingTank Information     PingTank is an electronic gateway that provides easy, online access to your medical records. With PingTank, you can request a clinic appointment, read your test results, renew a prescription or communicate with your care team.     To sign up for PingTank visit the website at [email or web address]   You will be asked to enter the access code listed below, as well as some personal information. Please follow the directions to create your username and password.     Your access code is: [email or web address]  Expires: 2017  6:31 AM     Your access code will  in 90 days. If you need help or a new code, please contact your [facility] or call [phone] for assistance.        Care EveryWhere ID     This is your Care EveryWhere ID. This could be used by other organizations to access your [place] medical records  [id number]        Your Vitals Were     Pulse Pulse Oximetry                97 95%           Blood Pressure from Last 3 Encounters:   17 97/64   17 101/62   17 97/66    Weight from Last 3 Encounters:   17 81.6 kg (180 lb)   17 78.9 kg (174 lb)   17 79.6 kg (175 lb 7.8 oz)              Today, you had the following     No orders found for display       Primary Care  "Provider Office Phone # Fax #    Hakeem Awad [phone]       [facility] [address]  [facility] [address]        Thank you!     Thank you for choosing [facility]  for your care. Our goal is always to provide you with excellent care. Hearing back from our patients is one way we can continue to improve our services. Please take a few minutes to complete the written survey that you may receive in the mail after your visit with us. Thank you!             Your Updated Medication List - Protect others around you: Learn how to safely use, store and throw away your medicines at www.disposemymeds.org.          This list is accurate as of: 6/6/17  1:45 PM.  Always use your most recent med list.                   Brand Name Dispense Instructions for use    acetaminophen 325 MG tablet    TYLENOL    100 tablet    Take 2 tablets (650 mg) by mouth every 4 hours as needed for mild pain       Adhesive Paper Tape     5 each    1inch paper tape for daily dressing changes.       albuterol (2.5 MG/3ML) 0.083% neb solution     360 mL    Take 1 vial (2.5 mg) by nebulization every 6 hours as needed       amLODIPine 10 MG tablet    NORVASC    30 tablet    Take 1 tablet (10 mg) by mouth daily       ciprofloxacin 500 MG tablet    CIPRO    60 tablet    Take 1 tablet (500 mg) by mouth 2 times daily       diclofenac sodium 3 % Gel     100 g    Apply 4 gm four times a day as needed to right trapezius muscle.       enalapril 10 MG tablet    VASOTEC    90 tablet    Take 1 tablet (10 mg) by mouth daily       finasteride 5 MG tablet    PROSCAR    30 tablet    Take 1 tablet (5 mg) by mouth At Bedtime       fluticasone 250 MCG/BLIST Aepb Inhaler    FLOVENT DISKUS    1 Inhaler    Inhale 1 puff into the lungs every 12 hours       Gauze Dressing 4\"X4\" Pads     48 each    Sterile 2m6jizm gauze for daily wound cares.       hydrochlorothiazide 12.5 MG capsule    MICROZIDE    60 capsule " "   Take 2 capsules (25 mg) by mouth daily       HEATHER 4\"X75\" Misc     30 each    Sterile Heather wrap for daily dressing changes.       mupirocin 2 % ointment    BACTROBAN    30 g    Apply twice a day to right leg wound and cover with occlusive dressing.       * order for DME     5 each    Equipment being ordered: Dispense 4\" x 4\" sterile gauze. Dispense any brand name.       * order for DME     5 each    Equipment being ordered: Dispense Coban wrap.       phenazopyridine 100 MG tablet    PYRIDIUM    12 tablet    Take 1 tablet (100 mg) by mouth 3 times daily as needed for urinary tract discomfort       ranitidine 150 MG tablet    ZANTAC    60 tablet    Take 1 tablet (150 mg) by mouth 2 times daily       tamsulosin 0.4 MG capsule    FLOMAX    60 capsule    Take 2 capsules (0.8 mg) by mouth At Bedtime       tiotropium 18 MCG capsule    SPIRIVA    30 capsule    Inhale 1 capsule (18 mcg) into the lungs daily       trolamine salicylate 10 % cream    ASPERCREME    85 g    Apply topically as needed for moderate pain       * Notice:  This list has 2 medication(s) that are the same as other medications prescribed for you. Read the directions carefully, and ask your doctor or other care provider to review them with you.      "

## 2017-06-06 NOTE — NURSING NOTE
"Gallito Farley's goals for this visit include: Recheck right leg apligraf  He requests these members of his care team be copied on today's visit information: yes    PCP: Hakeem Awad    Referring Provider:  ESTABLISHED PATIENT  No address on file    Chief Complaint   Patient presents with     RECHECK     Right leg check apligraf       Initial BP 97/64  Pulse 97  SpO2 95% Estimated body mass index is 23.75 kg/(m^2) as calculated from the following:    Height as of 3/16/17: 1.854 m (6' 1\").    Weight as of 3/16/17: 81.6 kg (180 lb).  Medication Reconciliation: complete  "

## 2017-06-13 ENCOUNTER — TELEPHONE (OUTPATIENT)
Dept: UROLOGY | Facility: CLINIC | Age: 82
End: 2017-06-13

## 2017-06-13 DIAGNOSIS — R30.0 DYSURIA: ICD-10-CM

## 2017-06-13 DIAGNOSIS — N41.0 PROSTATITIS, ACUTE: ICD-10-CM

## 2017-06-13 NOTE — TELEPHONE ENCOUNTER
Ok for 1 refill of Phenazopyridine HCL 100mg Qt. #12 one tablet three times daily as needed.     MERRILL Baez [facility]

## 2017-06-13 NOTE — TELEPHONE ENCOUNTER
Patient's pharmacy sent a refill request for patient's Phenazopyridine HCL 100mg Qt. #12 one tablet three times daily as needed. Will forward to MD to okay refill. Lala Delgado LPN

## 2017-06-14 RX ORDER — PHENAZOPYRIDINE HYDROCHLORIDE 100 MG/1
100 TABLET, FILM COATED ORAL 3 TIMES DAILY PRN
Qty: 12 TABLET | Refills: 1 | Status: SHIPPED | OUTPATIENT
Start: 2017-06-14 | End: 2017-08-25

## 2017-07-20 DIAGNOSIS — J44.1 COPD EXACERBATION (H): ICD-10-CM

## 2017-07-20 NOTE — TELEPHONE ENCOUNTER
Flovent Diskus 250mcg inhaler       Last Written Prescription Date: 1/14/17  Last Fill Quantity: 1 inhaler, # refills: 1    Last Office Visit with G, P or [facility] prescribing provider:  n/a   Future Office Visit:       Date of Last Asthma Action Plan Letter:   Asthma Action Plan Q1 Year    Topic Date Due     Asthma Action Plan - yearly  08/03/2017      Asthma Control Test:   ACT Total Scores 8/3/2016   ACT TOTAL SCORE (Goal Greater than or Equal to 20) 22   In the past 12 months, how many times did you visit the emergency room for your asthma without being admitted to the hospital? 1   In the past 12 months, how many times were you hospitalized overnight because of your asthma? 1       Date of Last Spirometry Test:   No results found for this or any previous visit.    Routed to MD instead of Pharmacy because:   Patient hasn't had an appointment with this provider.      Kierra Mitchell   II    [facility]  [address] [place] [address]  [email or web address]  [email or web address]  Phone: [phone]  Fax: [phone]

## 2017-07-30 ENCOUNTER — TELEPHONE (OUTPATIENT)
Dept: INTERNAL MEDICINE | Facility: CLINIC | Age: 82
End: 2017-07-30

## 2017-07-30 DIAGNOSIS — J45.40 MODERATE PERSISTENT ASTHMA WITHOUT COMPLICATION: Primary | ICD-10-CM

## 2017-07-30 NOTE — TELEPHONE ENCOUNTER
PA required on: FLOVENT 250 MCG/BLIST  Patient Insurance: PSI PART D  Insurance BIN: [id number]     Insurance PCN: PLAN ID  Insurance ID: [id number]  Insurance phone number: [phone]   Pharmacy NPI: [id number]    Please let us know if approved or denied, thank you!    Kami Ball    [facility]  [phone]

## 2017-07-31 NOTE — TELEPHONE ENCOUNTER
Prior Authorization Approval    Authorization Effective Date: 7/31/2017  Authorization Expiration Date: 12/31/2017  Medication: FLOVENT 250 MCG/BLIST- APPROVED  Approved Dose/Quantity:   Reference #: [id number]   Insurance Company: [employer] PART D - Phone [phone] Fax [phone]  Expected CoPay: $0.00     CoPay Card Available:      Foundation Assistance Needed:    Which Pharmacy is filling the prescription (Not needed for infusion/clinic administered): [facility] [place], [place] - [address]  Pharmacy Notified: Yes  Patient Notified: Yes

## 2017-07-31 NOTE — TELEPHONE ENCOUNTER
[facility] Prior Authorization Team   Phone: [phone]  Fax: [phone]    PA Initiation    Medication: FLOVENT 250 MCG/BLIST  Insurance Company: [employer] PART D - Phone [phone] Fax [phone]  Pharmacy Filling the Rx: [place], [place] - [address]  Filling Pharmacy Phone: [phone]  Filling Pharmacy Fax:    Start Date: 7/31/2017    Urgent PA submitted via phone, spoke to Jessica parry rep reference number: [id number]

## 2017-07-31 NOTE — TELEPHONE ENCOUNTER
Do not pursue prior authorization for Flovent.  Rx for Arnuity Ellipta, substitute for Flovent, sent to patient's pharmacy.

## 2017-08-01 NOTE — TELEPHONE ENCOUNTER
This is [facility]. The PA for Flovent has already been approved so we are able to fill this for Gallito. The Arnuity Ellipta also requires a PA. Pulmicort is the preferred product for this insurance. Would you like to pursue a PA for the Arnuity Ellipta or should we fill Flovent?     Luna Houston, PharmD  [facility]  [phone]

## 2017-08-10 DIAGNOSIS — G89.18 ACUTE POST-OPERATIVE PAIN: ICD-10-CM

## 2017-08-10 RX ORDER — ACETAMINOPHEN 325 MG/1
650 TABLET ORAL EVERY 4 HOURS PRN
Qty: 100 TABLET | Refills: 3 | Status: SHIPPED | OUTPATIENT
Start: 2017-08-10 | End: 2019-01-08

## 2017-08-10 NOTE — TELEPHONE ENCOUNTER
Acetaminophen 325 mg      Last Written Prescription Date: 03/06/2017  Last Fill Quantity: 100,  # refills: 1   Last Office Visit with [facility], [facility] or [facility] prescribing provider: 03/16/2017                                                                   Thank You,  Garyr Betancourt, [facility]  On behalf of [facility]

## 2017-08-14 ENCOUNTER — CARE COORDINATION (OUTPATIENT)
Dept: GERIATRIC MEDICINE | Facility: CLINIC | Age: 82
End: 2017-08-14

## 2017-08-14 DIAGNOSIS — Z71.89 ADVANCED DIRECTIVES, COUNSELING/DISCUSSION: ICD-10-CM

## 2017-08-14 DIAGNOSIS — Z76.89 HEALTH CARE HOME: ICD-10-CM

## 2017-08-17 NOTE — PROGRESS NOTES
8/14/17: Client is new enrollee to [facility] effective 8/1/17 with [facility] health plan. Client transferred from [facility] - clinic change.  Client resides in home based [place].    CMS reached out to previous CM, Johanna Meeks, with [facility] for UTF information.      Welcome letter sent.    Jorge David RN, N  [facility]

## 2017-08-17 NOTE — PROGRESS NOTES
8/15/17: Received UTF information from previous CM.  CM reviewed - all documentation received.  CM placed call to member's # - spoke with Mathew Sin, staff with [facility] at [phone].  He states that member resides at their home based Assisted Living in [place].  He asked member to speak with Joe as she is main staff at the home and would be able to assist CM.  Spoke with Joe at [phone].  CM spoke with member and Joe - she states that member has nephew listed as emergency contact but very difficult to get a hold of him.   Reviewed documentation with member and Joe - no changes.   Member and Joe have CM contact information.     No home visit required because this CM has received all required documentations from the previous CM. Next assessment will be in 1/2018.  Transitional HRA completed with Joe, staff at , and member on 8/15/17.  tool updated and sent to CMS to upload and auth.     CM reviewed with Ilham  - [facility] is [facility] also known as [facility] - same NPI.     Jorge David RN, PHN  [facility]

## 2017-08-25 ENCOUNTER — OFFICE VISIT (OUTPATIENT)
Dept: UROLOGY | Facility: CLINIC | Age: 82
End: 2017-08-25
Payer: MEDICARE

## 2017-08-25 VITALS
HEART RATE: 86 BPM | DIASTOLIC BLOOD PRESSURE: 79 MMHG | SYSTOLIC BLOOD PRESSURE: 148 MMHG | OXYGEN SATURATION: 97 % | TEMPERATURE: 98 F | WEIGHT: 180 LBS | BODY MASS INDEX: 23.75 KG/M2

## 2017-08-25 DIAGNOSIS — N40.1 BENIGN PROSTATIC HYPERPLASIA WITH URINARY HESITANCY: Primary | ICD-10-CM

## 2017-08-25 DIAGNOSIS — R30.0 DYSURIA: ICD-10-CM

## 2017-08-25 DIAGNOSIS — R39.11 BENIGN PROSTATIC HYPERPLASIA WITH URINARY HESITANCY: Primary | ICD-10-CM

## 2017-08-25 DIAGNOSIS — R31.29 MICROHEMATURIA: ICD-10-CM

## 2017-08-25 DIAGNOSIS — R82.90 ABNORMAL URINALYSIS: ICD-10-CM

## 2017-08-25 LAB
ALBUMIN UR-MCNC: 10 MG/DL
APPEARANCE UR: CLEAR
BILIRUB UR QL STRIP: NEGATIVE
COLOR UR AUTO: YELLOW
GLUCOSE UR STRIP-MCNC: NEGATIVE MG/DL
HGB UR QL STRIP: NEGATIVE
KETONES UR STRIP-MCNC: NEGATIVE MG/DL
LEUKOCYTE ESTERASE UR QL STRIP: ABNORMAL
NITRATE UR QL: NEGATIVE
PH UR STRIP: 6.5 PH (ref 5–7)
RBC #/AREA URNS AUTO: ABNORMAL /HPF
SOURCE: ABNORMAL
SP GR UR STRIP: 1.01 (ref 1–1.03)
UROBILINOGEN UR STRIP-MCNC: NORMAL MG/DL (ref 0–2)
WBC #/AREA URNS AUTO: ABNORMAL /HPF

## 2017-08-25 PROCEDURE — 99213 OFFICE O/P EST LOW 20 MIN: CPT | Mod: 25 | Performed by: PHYSICIAN ASSISTANT

## 2017-08-25 PROCEDURE — 81001 URINALYSIS AUTO W/SCOPE: CPT | Performed by: PHYSICIAN ASSISTANT

## 2017-08-25 PROCEDURE — 87086 URINE CULTURE/COLONY COUNT: CPT | Performed by: PHYSICIAN ASSISTANT

## 2017-08-25 PROCEDURE — 51798 US URINE CAPACITY MEASURE: CPT | Performed by: PHYSICIAN ASSISTANT

## 2017-08-25 PROCEDURE — 88112 CYTOPATH CELL ENHANCE TECH: CPT | Performed by: PHYSICIAN ASSISTANT

## 2017-08-25 RX ORDER — PHENAZOPYRIDINE HYDROCHLORIDE 100 MG/1
100 TABLET, FILM COATED ORAL 3 TIMES DAILY PRN
Qty: 24 TABLET | Refills: 1 | Status: SHIPPED | OUTPATIENT
Start: 2017-08-25 | End: 2018-04-04

## 2017-08-25 ASSESSMENT — PAIN SCALES - GENERAL: PAINLEVEL: EXTREME PAIN (8)

## 2017-08-25 NOTE — MR AVS SNAPSHOT
After Visit Summary   8/25/2017    Gallito Farley    MRN: [id number]           Patient Information     Date Of Birth          1/1/1934        Visit Information        Provider Department      8/25/2017 2:15 PM Maria G Friedman PA-C; LANGUAGE [facility]        Today's Diagnoses     Benign prostatic hyperplasia    -  1    Abnormal urinalysis        Gross hematuria        Prostatitis, acute        Dysuria          Care Instructions    Cystoscopy    What is a Cystoscopy?  This is a procedure done to check for problems inside the bladder. Problems may include polyps (growths), tumors, inflammation (swelling and redness) and other concerns.    The doctor inserts a thin tube (called a cystoscope) into the bladder. The tube is about the size of a pencil. We will clean the area with special soap to remove bacteria and prevent post-procedure infection. We will give you numbing medicine (Lidocaine jelly) to reduce the pain or discomfort you may feel.    The tube allows the doctor to:  The doctor will be able to see inside the bladder by filling the bladder with water. The water makes it easier to see any problems that may be present.    If needed, the doctor may use the tube to:  The doctor is able to take tissue samples (biopsies). Samples are sent to the lab for testing.  The doctor can also burn off any small growths or tumors that are found. This is call fulguration.    How should I get ready for the exam?  There is no special preparation you need to do for this exam. Staff may ask for a urine sample prior to rooming you. You may eat and drink a normal diet before and after the exam. Medications may be taken as usual unless otherwise directed by the physician.    Please tell your doctor if:  You have a history of urinary tract infections.  You know that you have a tumor in your bladder.  You have bleeding problems.  You have any allergies.  You are or may be pregnant.    What  happens after the exam?  You may go back to your normal diet and activity as you feel ready, unless your doctor tells you not to.    For the next two days, you may notice:  Some blood in your urine.  Some burning when you urinate (use the toilet).  An urge to urinate more often.  Bladder spasms.    These are normal after the procedure. They should go away on their own after a day or two.      You can help to relieve the above listed symptoms by:  Drinking 6 to 8 large glasses of water each day (includes drinks at meals).  This will help clear the urine.  Take warm baths to relieve pain and bladder spasms.  Do not add anything to the bath water.  Your doctor may prescribe pain medicine.  You may also take Tylenol (acetaminophen) for pain.    When should I call my doctor?  A fever over 100.0 F (38 C) for more than a day.  (Before you call the doctor, check your temperature under your tongue.)  Chills.  Failure to urinate: No urine comes out when you try to use the toilet.  (Try soaking in a bathtub full of warm water.  If still no urine, call your doctor.)  A lot of blood in the urine or blood clots larger than a nickel.  Pain in the back or abdomen (belly / stomach area).  Pain or spasms that are not relieved by warm tub baths and pain medicine.  Severe pain, burning or other problems while passing urine.  Pain that gets worse after two days.            Follow-ups after your visit        Your next 10 appointments already scheduled     Aug 28, 2017  2:45 PM CDT   CT ABDOMEN PELVIS HEMATURIA W/O & W CONTRAST with MGCT[facility]   [facility]    [address] [address] [place] [address] [phone]           Please bring any scans or X-rays taken at other hospitals, if similar tests were done. Also bring a list of your medicines, including vitamins, minerals and over-the-counter drugs. It is safest to leave personal items at home.  Be sure to tell your doctor:   If you have  any allergies.   If there s any chance you are pregnant.   If you are breastfeeding.   If you have any special needs.  You will have contrast for this exam. To prepare:   Do not eat or drink for 2 hours before your exam. If you need to take medicine, you may take it with small sips of water. (We may ask you to take liquid medicine as well.)   The day before your exam, drink extra fluids at least six 8-ounce glasses (unless your doctor tells you to restrict your fluids).  Patients over 70 or patients with diabetes or kidney problems:   If you haven t had a blood test (creatinine test) within the last 30 days, go to your clinic or Diagnostic Imaging Department for this test.  If you have diabetes:   If your kidney function is normal, continue taking your metformin (Avandamet, Glucophage, Glucovance, Metaglip) on the day of your exam.   If your kidney function is abnormal, wait 48 hours before restarting this medicine.  Please wear loose clothing, such as a sweat suit or jogging clothes. Avoid snaps, zippers and other metal. We may ask you to undress and put on a hospital gown.  If you have any questions, please call the Imaging Department where you will have your exam.            Aug 30, 2017  2:30 PM CDT   Return Visit with Mal Davis DPM   [facility])    [id number] [place] [id number] [phone]            Sep 13, 2017 10:30 AM CDT   CYSTO with Reginaldo Pineda MD   [facility]    [address] [place] [id number] [phone]            Oct 17, 2017  2:00 PM CDT   (Arrive by 1:45 PM)   Pacemaker Check with Uc Cv Device 1   [facility])    [address]  3rd Floor  [facility] [address] [phone]              Future tests that were ordered for you today     Open Future Orders        Priority Expected Expires Ordered     CT Abdomen Pelvis Hematuria w/wo IV Contrast Routine  2018            Who to contact     If you have questions or need follow up information about today's clinic visit or your schedule please contact [facility] directly at [phone].  Normal or non-critical lab and imaging results will be communicated to you by [email or web address], letter or phone within 4 business days after the clinic has received the results. If you do not hear from us within 7 days, please contact the clinic through MyChart or phone. If you have a critical or abnormal lab result, we will notify you by phone as soon as possible.  Submit refill requests through [employer] or call your pharmacy and they will forward the refill request to us. Please allow 3 business days for your refill to be completed.          Additional Information About Your Visit        [employer] Information     [employer] is an electronic gateway that provides easy, online access to your medical records. With [employer], you can request a clinic appointment, read your test results, renew a prescription or communicate with your care team.     To sign up for [employer] visit the website at [email or web address]   You will be asked to enter the access code listed below, as well as some personal information. Please follow the directions to create your username and password.     Your access code is: [email or web address]  Expires: 2017  3:19 PM     Your access code will  in 90 days. If you need help or a new code, please contact your [facility] or call [phone] for assistance.        Care EveryWhere ID     This is your Care EveryWhere ID. This could be used by other organizations to access your [facility] medical records  [id number]        Your Vitals Were     Pulse Temperature Pulse Oximetry BMI (Body Mass Index)          86 98  F (36.7  C) (Oral) 97% 23.75 kg/m2         Blood Pressure from Last 3 Encounters:   17 148/79    06/06/17 97/64   05/30/17 101/62    Weight from Last 3 Encounters:   08/25/17 81.6 kg (180 lb)   03/16/17 81.6 kg (180 lb)   01/18/17 78.9 kg (174 lb)              We Performed the Following     Cytology non gyn [BCE2608]     MEASURE POST-VOID RESIDUAL URINE/BLADDER CAPACITY, US NON-IMAGING     UA reflex to Microscopic and Culture     Urine Culture Aerobic Bacterial     Urine Microscopic          Where to get your medicines      These medications were sent to [facility] [place], [place] - [address]  [address] [facility] [address], [facility] [address]     Phone:  [phone]     phenazopyridine 100 MG tablet          Primary Care Provider Office Phone # Fax #    Hakeem Awad [phone]       [address]  [facility] [address]        Equal Access to Services     [facility] : Hadii marvin jang hadasho Soomaali, waaxda luqadaha, qaybta kaalmada adeegyada, ricki pizarro . So [facility] [phone].    ATENCIÓN: Si habla español, tiene a john disposición servicios gratuitos de asistencia lingüística. Llame al [phone].    We comply with applicable federal civil rights laws and [place] laws. We do not discriminate on the basis of race, color, national origin, age, disability sex, sexual orientation or gender identity.            Thank you!     Thank you for choosing [facility]  for your care. Our goal is always to provide you with excellent care. Hearing back from our patients is one way we can continue to improve our services. Please take a few minutes to complete the written survey that you may receive in the mail after your visit with us. Thank you!             Your Updated Medication List - Protect others around you: Learn how to safely use, store and throw away your medicines at www.disposemymeds.org.          This list is accurate as of: 8/25/17  3:19 PM.  Always use your most recent med list.                   Brand Name  "Dispense Instructions for use Diagnosis    acetaminophen 325 MG tablet    TYLENOL    100 tablet    Take 2 tablets (650 mg) by mouth every 4 hours as needed for mild pain    Acute post-operative pain       Adhesive Paper Tape     5 each    1inch paper tape for daily dressing changes.    Chronic venous hypertension with ulcer involving right side (H), Ulcer of right leg, with fat layer exposed (H)       albuterol (2.5 MG/3ML) 0.083% neb solution     360 mL    Take 1 vial (2.5 mg) by nebulization every 6 hours as needed    COPD exacerbation (H)       amLODIPine 10 MG tablet    NORVASC    30 tablet    Take 1 tablet (10 mg) by mouth daily    Essential hypertension       diclofenac sodium 3 % Gel     100 g    Apply 4 gm four times a day as needed to right trapezius muscle.    Cervical myofascial pain syndrome       enalapril 10 MG tablet    VASOTEC    90 tablet    Take 1 tablet (10 mg) by mouth daily    Essential hypertension       finasteride 5 MG tablet    PROSCAR    30 tablet    Take 1 tablet (5 mg) by mouth At Bedtime    Benign nodular prostatic hyperplasia without lower urinary tract symptoms       fluticasone 250 MCG/BLIST Aepb Inhaler    FLOVENT DISKUS    3 Inhaler    Inhale 1 puff into the lungs every 12 hours    COPD exacerbation (H)       Gauze Dressing 4\"X4\" Pads     48 each    Sterile 9s2lcba gauze for daily wound cares.    Chronic venous hypertension with ulcer involving right side (H), Ulcer of right leg, with fat layer exposed (H)       hydrochlorothiazide 12.5 MG capsule    MICROZIDE    60 capsule    Take 2 capsules (25 mg) by mouth daily    Essential hypertension       HEATHER 4\"X75\" Misc     30 each    Sterile Heather wrap for daily dressing changes.    Chronic venous hypertension with ulcer involving right side (H), Ulcer of right leg, with fat layer exposed (H)       mupirocin 2 % ointment    BACTROBAN    30 g    Apply twice a day to right leg wound and cover with occlusive dressing.    Chronic cutaneous " "venous stasis ulcer (H)       * order for DME     5 each    Equipment being ordered: Dispense 4\" x 4\" sterile gauze. Dispense any brand name.    Chronic cutaneous venous stasis ulcer (H)       * order for DME     5 each    Equipment being ordered: Dispense Coban wrap.    Chronic cutaneous venous stasis ulcer (H)       phenazopyridine 100 MG tablet    PYRIDIUM    24 tablet    Take 1 tablet (100 mg) by mouth 3 times daily as needed for urinary tract discomfort    Prostatitis, acute, Dysuria       ranitidine 150 MG tablet    ZANTAC    60 tablet    Take 1 tablet (150 mg) by mouth 2 times daily    Gastroesophageal reflux disease, esophagitis presence not specified       tamsulosin 0.4 MG capsule    FLOMAX    60 capsule    Take 2 capsules (0.8 mg) by mouth At Bedtime    Benign nodular prostatic hyperplasia without lower urinary tract symptoms       tiotropium 18 MCG capsule    SPIRIVA    30 capsule    Inhale 1 capsule (18 mcg) into the lungs daily    COPD exacerbation (H)       trolamine salicylate 10 % cream    ASPERCREME    85 g    Apply topically as needed for moderate pain    Cervical myofascial pain syndrome       * Notice:  This list has 2 medication(s) that are the same as other medications prescribed for you. Read the directions carefully, and ask your doctor or other care provider to review them with you.      "

## 2017-08-25 NOTE — PATIENT INSTRUCTIONS
Cystoscopy    What is a Cystoscopy?  This is a procedure done to check for problems inside the bladder. Problems may include polyps (growths), tumors, inflammation (swelling and redness) and other concerns.    The doctor inserts a thin tube (called a cystoscope) into the bladder. The tube is about the size of a pencil. We will clean the area with special soap to remove bacteria and prevent post-procedure infection. We will give you numbing medicine (Lidocaine jelly) to reduce the pain or discomfort you may feel.    The tube allows the doctor to:  The doctor will be able to see inside the bladder by filling the bladder with water. The water makes it easier to see any problems that may be present.    If needed, the doctor may use the tube to:  The doctor is able to take tissue samples (biopsies). Samples are sent to the lab for testing.  The doctor can also burn off any small growths or tumors that are found. This is call fulguration.    How should I get ready for the exam?  There is no special preparation you need to do for this exam. Staff may ask for a urine sample prior to rooming you. You may eat and drink a normal diet before and after the exam. Medications may be taken as usual unless otherwise directed by the physician.    Please tell your doctor if:  You have a history of urinary tract infections.  You know that you have a tumor in your bladder.  You have bleeding problems.  You have any allergies.  You are or may be pregnant.    What happens after the exam?  You may go back to your normal diet and activity as you feel ready, unless your doctor tells you not to.    For the next two days, you may notice:  Some blood in your urine.  Some burning when you urinate (use the toilet).  An urge to urinate more often.  Bladder spasms.    These are normal after the procedure. They should go away on their own after a day or two.      You can help to relieve the above listed symptoms by:  Drinking 6 to 8 large glasses of  water each day (includes drinks at meals).  This will help clear the urine.  Take warm baths to relieve pain and bladder spasms.  Do not add anything to the bath water.  Your doctor may prescribe pain medicine.  You may also take Tylenol (acetaminophen) for pain.    When should I call my doctor?  A fever over 100.0 F (38 C) for more than a day.  (Before you call the doctor, check your temperature under your tongue.)  Chills.  Failure to urinate: No urine comes out when you try to use the toilet.  (Try soaking in a bathtub full of warm water.  If still no urine, call your doctor.)  A lot of blood in the urine or blood clots larger than a nickel.  Pain in the back or abdomen (belly / stomach area).  Pain or spasms that are not relieved by warm tub baths and pain medicine.  Severe pain, burning or other problems while passing urine.  Pain that gets worse after two days.

## 2017-08-25 NOTE — NURSING NOTE
"Gallito Farley's goals for this visit include:   He requests these members of his care team be copied on today's visit information: no    PCP: Hakeem Awad    Referring Provider:  No referring provider defined for this encounter.    Chief Complaint   Patient presents with     Urinary Problem     recheck       Initial /79 (BP Location: Left arm, Patient Position: Chair, Cuff Size: Adult Regular)  Pulse 86  Temp 98  F (36.7  C) (Oral)  Wt 81.6 kg (180 lb)  SpO2 97%  BMI 23.75 kg/m2 Estimated body mass index is 23.75 kg/(m^2) as calculated from the following:    Height as of 3/16/17: 1.854 m (6' 1\").    Weight as of this encounter: 81.6 kg (180 lb).  Medication Reconciliation: complete    Do you need any medication refills at today's visit? Yes    Present with pt today is Interrupter \" Mohmad\" from [employer]  "

## 2017-08-25 NOTE — PROGRESS NOTES
REASON FOR VISIT: follow up LUTS     CLINICAL DATA: Mr. Gallito Farley is a pleasant 83 year old gentleman who returns for follow up of prostatitis and BPH. Seen in initial consultation on 3/30/17 - please see consult note from this date for full history. In brief, presented to urology for ongoing dysuria, frequency, urgency, nocturia, perineal pain, hesitancy, intermittency, and voiding in small volumes which was refractory to Flomax 0.8 mg daily. Had also been on finasteride 5 mg daily for a time without any improvement. Urinalyses from last two visits have revealed some hematuria and pyuria but urine cultures have returned no growth. EUGENIO did reveal a moderately enlarged and tender prostate so he was treated for prostatitis with a 2 week course of Cipro with partial improvement in symptoms. When symptoms recurred, he was given an extended course of Cipro for 1 month.     He now returns for follow up 3 months after completing antibiotics and reports continued pain with urination, hesitancy, and sense of incomplete emptying. Symptoms are quite severe. Previously, he was helped by pyridium but he is no longer sure if this is helpful. Continues to deny gross hematuria, abdominal/flank pain, fevers, chills, N/V. No personal or family history of prostate cancer.     PEx  /79 (BP Location: Left arm, Patient Position: Chair, Cuff Size: Adult Regular)  Pulse 86  Temp 98  F (36.7  C) (Oral)  Wt 81.6 kg (180 lb)  SpO2 97%  BMI 23.75 kg/m2  GEN: well-appearing male in NAD  RESP: no increased respiratory effort  ABD: soft, NT, ND  : deferred  EUGENIO: deferred  EXT: No LE edema     UA reveals 10 protein, moderate LE, 2-5 WBC, 0-2 RBC, o/w wnl.     Urine cytology negative for malignancy.    No results found for: PSA     PVR 48 mL as measured by ultrasound     A/P  83 year old male with persistent irritative and obstructive voiding symptoms including dysuria, hesitancy, intermittency, and urgency. Has had no improvement  with tamsulosin 0.4 mg BID, finasteride 5 mg daily, or multiple courses of antibiotics for presumed prostatitis. He continues to be extremely bothered by his symptoms. Does appear to be emptying well (PVR today 48 mL). Urinalyses show persistent low grade pyuria and intermittent microhematuria. Urine cytology is negative for malignancy.    Given ongoing symptoms despite conservative measures, will plan for the following to further evaluate:  -Schedule CT urogram  -Schedule cystoscopy with next available urologist to evaluate the degree of outlet obstruction and rule out other bladder pathology.      Additional recommendations pending results of above.      15 minutes spent with the patient, >50% in counseling and coordination of care.     Maria G Friedman PA-C  Urology

## 2017-08-26 LAB
BACTERIA SPEC CULT: NORMAL
SPECIMEN SOURCE: NORMAL

## 2017-08-28 DIAGNOSIS — J44.1 COPD EXACERBATION (H): ICD-10-CM

## 2017-08-28 LAB — COPATH REPORT: NORMAL

## 2017-08-28 RX ORDER — TIOTROPIUM BROMIDE 18 UG/1
18 CAPSULE ORAL; RESPIRATORY (INHALATION) DAILY
Qty: 30 CAPSULE | Refills: 1 | Status: CANCELLED | OUTPATIENT
Start: 2017-08-28

## 2017-08-28 NOTE — TELEPHONE ENCOUNTER
LAST VISIT    1/11/17  NEXT VISIT    10/17/17   Device   PLAN               Discharge Procedure Orders      Reason for your hospital stay   Order Comments: You were hospitalized for complete heart block requiring a pacemaker and hypertension.          Adult [facility] Follow-up and recommended labs and tests   Order Comments: Follow up with primary care provider, Moiz Richard MD, within 3-5 days to evaluate medication change, to evaluate treatment change, for hospital follow- up and regarding new diagnosis.  The following labs/tests are recommended: BMP, magnesium, blood pressure monitoring.      Appointments on [place] and/or [facility] (with [facility] or [facility] provider or service). Call [phone] if you haven't heard regarding these appointments within 7 days of discharge.           Activity        Order Comments: Your activity upon discharge:      No lifting > 10lbs or over shoulder level with left arm for 4 weeks      Notify device clinic/EP immediately for any redness, swelling, bleeding, discharge, fevers or chills.   Order Specific Question Answer Comments   Is discharge order? Yes            Follow Up and recommended labs and tests   Order Comments: Follow up with the Device Clinic in 7-10 days      Full Code      Diet        Order Comments: Follow this diet upon discharge: 2 Gram Sodium Diet   Order Specific Question Answer Comments   Is discharge order? Yes                  Discharge Disposition:   Home               Condition on Discharge:    Discharge condition: Stable   Code status on discharge: Full Code          Date of service: 01/14/2017     The patient was discussed with Dr. Garsia.     Renay Beltrán, PGY2  Internal Medicine/Pediatrics  P: [phone]     Patient has been seen and evaluated by me. Discussed with the team and agree with the findings and plan in this resident/fellow/nurse practitioner's discharge note.     I have interviewed and examined the patient. I have reviewed the  laboratory tests, imaging, and other investigations. I agree with the documentation and plan as outlined by the fellow/resident/nurse practitioner in this discharge note.

## 2017-08-31 ENCOUNTER — OFFICE VISIT (OUTPATIENT)
Dept: PODIATRY | Facility: CLINIC | Age: 82
End: 2017-08-31
Payer: MEDICARE

## 2017-08-31 ENCOUNTER — TELEPHONE (OUTPATIENT)
Dept: ORTHOPEDICS | Facility: CLINIC | Age: 82
End: 2017-08-31

## 2017-08-31 VITALS — OXYGEN SATURATION: 97 % | HEART RATE: 93 BPM | DIASTOLIC BLOOD PRESSURE: 76 MMHG | SYSTOLIC BLOOD PRESSURE: 115 MMHG

## 2017-08-31 DIAGNOSIS — L97.912 ULCER OF RIGHT LOWER LEG, WITH FAT LAYER EXPOSED (H): Primary | ICD-10-CM

## 2017-08-31 PROCEDURE — 99212 OFFICE O/P EST SF 10 MIN: CPT | Performed by: PODIATRIST

## 2017-08-31 ASSESSMENT — PAIN SCALES - GENERAL: PAINLEVEL: SEVERE PAIN (7)

## 2017-08-31 NOTE — PROGRESS NOTES
Past Medical History:   Diagnosis Date     Asthma      BPH (benign prostatic hyperplasia)      COPD (chronic obstructive pulmonary disease) (H)      Diastolic dysfunction, left ventricle 4/16/2013     H/O tuberculosis     [place] pneum[place]     Hypertension      LBBB (left bundle branch block)      Leg wound, right     chronic, s/p grafting     Osteoarthritis      Patient Active Problem List   Diagnosis     Septic arthritis (H)     Chronic constipation     HTN (hypertension)     Benign prostatic hyperplasia     Insomnia     Moderate persistent asthma     GERD (gastroesophageal reflux disease)     Pneumonia     Hypercapnia     COPD exacerbation (H)     Acute on chronic respiratory failure with hypoxia and hypercapnia (H)     Chronic cutaneous venous stasis ulcer (H)     Hypertensive urgency     Atrioventricular block, complete (H)     Advanced directives, counseling/discussion     Phlebitis of left arm     Cardiac pacemaker in situ- Medtronic, dual chamber- NOT dependent     Benign nodular prostatic hyperplasia without lower urinary tract symptoms     Health Care Home     Past Surgical History:   Procedure Laterality Date     CHOLECYSTECTOMY       ENT SURGERY       IRRIGATION AND DEBRIDEMENT HIP, COMBINED  4/18/2013    Procedure: COMBINED IRRIGATION AND DEBRIDEMENT HIP;  Irrigation and debridement of Right Hip with cultures;  Surgeon: Cristal Inman MD;  Location: [facility] OR     Partial pneumonectomy      done in [place] in the 1990's     skin grafting - leg wound  6/2016     Social History     Social History     Marital status:      Spouse name: N/A     Number of children: N/A     Years of education: N/A     Occupational History     Not on file.     Social History Main Topics     Smoking status: Former Smoker     Smokeless tobacco: Not on file     Alcohol use No     Drug use: No     Sexual activity: Not on file     Other Topics Concern     Not on file     Social History  Narrative     Family History   Problem Relation Age of Onset     Family History Negative Mother      SUBJECTIVE FINDINGS:  This 83-year-old male returns to clinic for ulcer right posterior leg.  He relates he is doing okay.     OBJECTIVE FINDINGS:  He has a right posterior leg ulcer that is deep into the subcutaneous tissues.  It is about 3 x 3 cm.  There is some serosanguineous drainage, some mild edema.  No erythema, no odor, no calor.      ASSESSMENT AND PLAN:  Ulcer, right posterior leg with venous stasis and venous hypertension.  Diagnosis and treatment options discussed with the patient.  Local wound care done upon consent today.  Ena every other day, will go back to wrapping it with Kerlix and Coban.  They have been using tape on the skin; he has been getting some irritation from that.  I will see him back in 1 week for Apligraf application.  Overall the wounds have improved.

## 2017-08-31 NOTE — MR AVS SNAPSHOT
After Visit Summary   2017    Gallito Farley    MRN: [id number]           Patient Information     Date Of Birth          1934        Visit Information        Provider Department      2017 8:45 AM Mal Davis DPM; [employer] [facility]        Today's Diagnoses     Ulcer of right lower leg, with fat layer exposed (H)    -  1    Chronic venous hypertension with ulcer involving right side (H)          Care Instructions    Thanks for coming today.  Ortho/Sports Medicine Clinic  [address] [place] [address]    To schedule future appointments in Ortho Clinic, you may call [phone].    To schedule ordered imaging by your provider:   Call Central Imaging [phone]    To schedule an injection ordered by your provider:  Call Central Imaging Injection scheduling line: [phone]  MyChart available online at:  [email or web address]    Please call if any further questions or concerns ([phone]).  Clinic hours 8 am to 5 pm.    Return to clinic (call) if symptoms worsen or fail to improve.            Follow-ups after your visit        Your next 10 appointments already scheduled     Aug 31, 2017  1:45 PM CDT   CT ABDOMEN PELVIS HEMATURIA W/O & W CONTRAST with UCCT2   [facility] ([facility])    [address] [address] [phone]           Please bring any scans or X-rays taken at other hospitals, if similar tests were done. Also bring a list of your medicines, including vitamins, minerals and over-the-counter drugs. It is safest to leave personal items at home.  Be sure to tell your doctor:   If you have any allergies.   If there s any chance you are pregnant.   If you are breastfeeding.   If you have any special needs.  You will have contrast for this exam. To prepare:   Do not eat or drink for 2 hours before your exam. If you need to take  medicine, you may take it with small sips of water. (We may ask you to take liquid medicine as well.)   The day before your exam, drink extra fluids at least six 8-ounce glasses (unless your doctor tells you to restrict your fluids).  Patients over 70 or patients with diabetes or kidney problems:   If you haven t had a blood test (creatinine test) within the last 30 days, go to your clinic or Diagnostic Imaging Department for this test.  If you have diabetes:   If your kidney function is normal, continue taking your metformin (Avandamet, Glucophage, Glucovance, Metaglip) on the day of your exam.   If your kidney function is abnormal, wait 48 hours before restarting this medicine.  Please wear loose clothing, such as a sweat suit or jogging clothes. Avoid snaps, zippers and other metal. We may ask you to undress and put on a hospital gown.  If you have any questions, please call the Imaging Department where you will have your exam.            Sep 06, 2017  2:15 PM CDT   [facility] with Mal Davis DPM   [facility]    [address] [place] [address] [phone]            Sep 13, 2017 10:30 AM CDT   CYSTO with Reginaldo Pineda MD   [facility]    [address] [place] [address] [phone]            Oct 17, 2017  2:00 PM CDT   (Arrive by 1:45 PM)   Pacemaker Check with Uc Cv Device 1   [facility] ([facility])    [address] [place] [address] [phone]              Who to contact     If you have questions or need follow up information about today's clinic visit or your schedule please contact [facility] directly at [phone].  Normal or non-critical lab and imaging results will be communicated to you by MyChart, letter or phone within 4 business days after the clinic has received the  results. If you do not hear from us within 7 days, please contact the clinic through Nephera or phone. If you have a critical or abnormal lab result, we will notify you by phone as soon as possible.  Submit refill requests through Nephera or call your pharmacy and they will forward the refill request to us. Please allow 3 business days for your refill to be completed.          Additional Information About Your Visit        Nephera Information     Nephera is an electronic gateway that provides easy, online access to your medical records. With Nephera, you can request a clinic appointment, read your test results, renew a prescription or communicate with your care team.     To sign up for Nephera visit the website at [email or web address]   You will be asked to enter the access code listed below, as well as some personal information. Please follow the directions to create your username and password.     Your access code is: [email or web address]  Expires: 2017  3:19 PM     Your access code will  in 90 days. If you need help or a new code, please contact your [facility] or call [phone] for assistance.        Care EveryWhere ID     This is your Care EveryWhere ID. This could be used by other organizations to access your [place] medical records  [id number]        Your Vitals Were     Pulse Pulse Oximetry                93 97%           Blood Pressure from Last 3 Encounters:   17 115/76   17 148/79   17 97/64    Weight from Last 3 Encounters:   17 81.6 kg (180 lb)   17 81.6 kg (180 lb)   17 78.9 kg (174 lb)              Today, you had the following     No orders found for display       Primary Care Provider Office Phone # Fax #    Hakeem Awad [phone]       [address]  [facility] [address]        Equal Access to Services     MILAGRO GOODSON AH: Juan Nieves, rena costello, krista clark  ricki herzogchapis forrester'aan ah. So [facility] [phone].    ATENCIÓN: Si isrealla agustín, tiene a john disposición servicios gratuitos de asistencia lingüística. Quoc al [phone].    We comply with applicable federal civil rights laws and [place] laws. We do not discriminate on the basis of race, color, national origin, age, disability sex, sexual orientation or gender identity.            Thank you!     Thank you for choosing [facility]  for your care. Our goal is always to provide you with excellent care. Hearing back from our patients is one way we can continue to improve our services. Please take a few minutes to complete the written survey that you may receive in the mail after your visit with us. Thank you!             Your Updated Medication List - Protect others around you: Learn how to safely use, store and throw away your medicines at www.disposemymeds.org.          This list is accurate as of: 8/31/17  9:23 AM.  Always use your most recent med list.                   Brand Name Dispense Instructions for use Diagnosis    acetaminophen 325 MG tablet    TYLENOL    100 tablet    Take 2 tablets (650 mg) by mouth every 4 hours as needed for mild pain    Acute post-operative pain       Adhesive Paper Tape     5 each    1inch paper tape for daily dressing changes.    Chronic venous hypertension with ulcer involving right side (H), Ulcer of right leg, with fat layer exposed (H)       albuterol (2.5 MG/3ML) 0.083% neb solution     360 mL    Take 1 vial (2.5 mg) by nebulization every 6 hours as needed    COPD exacerbation (H)       amLODIPine 10 MG tablet    NORVASC    30 tablet    Take 1 tablet (10 mg) by mouth daily    Essential hypertension       diclofenac sodium 3 % Gel     100 g    Apply 4 gm four times a day as needed to right trapezius muscle.    Cervical myofascial pain syndrome       enalapril 10 MG tablet    VASOTEC    90 tablet    Take 1 tablet (10 mg) by mouth  "daily    Essential hypertension       finasteride 5 MG tablet    PROSCAR    30 tablet    Take 1 tablet (5 mg) by mouth At Bedtime    Benign nodular prostatic hyperplasia without lower urinary tract symptoms       fluticasone 250 MCG/BLIST Aepb Inhaler    FLOVENT DISKUS    3 Inhaler    Inhale 1 puff into the lungs every 12 hours    COPD exacerbation (H)       Gauze Dressing 4\"X4\" Pads     48 each    Sterile 2k2cdge gauze for daily wound cares.    Chronic venous hypertension with ulcer involving right side (H), Ulcer of right leg, with fat layer exposed (H)       hydrochlorothiazide 12.5 MG capsule    MICROZIDE    60 capsule    Take 2 capsules (25 mg) by mouth daily    Essential hypertension       HEATHER 4\"X75\" Misc     30 each    Sterile Heather wrap for daily dressing changes.    Chronic venous hypertension with ulcer involving right side (H), Ulcer of right leg, with fat layer exposed (H)       mupirocin 2 % ointment    BACTROBAN    30 g    Apply twice a day to right leg wound and cover with occlusive dressing.    Chronic cutaneous venous stasis ulcer (H)       * order for DME     5 each    Equipment being ordered: Dispense 4\" x 4\" sterile gauze. Dispense any brand name.    Chronic cutaneous venous stasis ulcer (H)       * order for DME     5 each    Equipment being ordered: Dispense Coban wrap.    Chronic cutaneous venous stasis ulcer (H)       phenazopyridine 100 MG tablet    PYRIDIUM    24 tablet    Take 1 tablet (100 mg) by mouth 3 times daily as needed for urinary tract discomfort    Dysuria       ranitidine 150 MG tablet    ZANTAC    60 tablet    Take 1 tablet (150 mg) by mouth 2 times daily    Gastroesophageal reflux disease, esophagitis presence not specified       tamsulosin 0.4 MG capsule    FLOMAX    60 capsule    Take 2 capsules (0.8 mg) by mouth At Bedtime    Benign nodular prostatic hyperplasia without lower urinary tract symptoms       tiotropium 18 MCG capsule    SPIRIVA    30 capsule    Inhale 1 " capsule (18 mcg) into the lungs daily    COPD exacerbation (H)       trolamine salicylate 10 % cream    ASPERCREME    85 g    Apply topically as needed for moderate pain    Cervical myofascial pain syndrome       * Notice:  This list has 2 medication(s) that are the same as other medications prescribed for you. Read the directions carefully, and ask your doctor or other care provider to review them with you.

## 2017-08-31 NOTE — PATIENT INSTRUCTIONS
Thanks for coming today.  Ortho/Sports Medicine Clinic  [address] [place] [address]    To schedule future appointments in Ortho Clinic, you may call [phone].    To schedule ordered imaging by your provider:   Call Central Imaging [phone]    To schedule an injection ordered by your provider:  Call Central Imaging Injection scheduling line: [phone]  [email or web address] available online at:  [email or web address]    Please call if any further questions or concerns ([phone]).  Clinic hours 8 am to 5 pm.    Return to clinic (call) if symptoms worsen or fail to improve.

## 2017-08-31 NOTE — TELEPHONE ENCOUNTER
Financial Counselor Review for Apligraf, Dermagraft or Puraply AM:    Procedure to be performed (include CPT code):Yes Apilgraf    Diagnosis code (include ICD-10 code):I87.311     Coverage and patient financial responsibility information:YES    Does patient need to be contacted by Financial Counselor:NO    Has the patient tried Apligraf, Dermagraft or Puraply before    Note: Do not use abbreviations and route encounter to MG POD    Patient approve for 5 application on 3/31/17,  Had two applications:  1st: 4/12/17  2nd) 5/23/17

## 2017-08-31 NOTE — NURSING NOTE
"Gallito Farley's goals for this visit include: Recheck right ankle.  He requests these members of his care team be copied on today's visit information: yes    PCP: Hakeem Awad    Referring Provider:  ESTABLISHED PATIENT  No address on file    Chief Complaint   Patient presents with     RECHECK     Recheck right leg       Initial /76  Pulse 93  SpO2 97% Estimated body mass index is 23.75 kg/(m^2) as calculated from the following:    Height as of 3/16/17: 1.854 m (6' 1\").    Weight as of 8/25/17: 81.6 kg (180 lb).  Medication Reconciliation: complete    "

## 2017-09-01 ENCOUNTER — CARE COORDINATION (OUTPATIENT)
Dept: GERIATRIC MEDICINE | Facility: CLINIC | Age: 82
End: 2017-09-01

## 2017-09-01 ENCOUNTER — TELEPHONE (OUTPATIENT)
Dept: UROLOGY | Facility: CLINIC | Age: 82
End: 2017-09-01

## 2017-09-01 NOTE — PROGRESS NOTES
Per CM, refaxed copy of CL tool to JOLLY Burt  Case Management Specialist  [facility]  [phone]

## 2017-09-01 NOTE — TELEPHONE ENCOUNTER
PA approved for Apligraf CPT code: , 61721, [address]23 ICD10 code: I87.311. Patient has a 20% coinsurance.  Verified for the course of treatment up to 5 applications for the life of the wound

## 2017-09-01 NOTE — TELEPHONE ENCOUNTER
With use of [employer]  services and  #[id number], a generic message was left with request for patient to return to call back to clinic. When patient returns call, will inform him of the 8/31/17 CT results and recommendations from Maria G Friedman PA-C.    Jessica Odom RN, BSN

## 2017-09-01 NOTE — LETTER
Patient:  Gallito Farley  :   1934  MRN:     [id number]        Mr.Jama GERARDO Farley  [address]  [facility] [address]        2017    Dear ,    We attempted to reach you by phone. We are writing to inform you of your test results which are overall normal. Please follow up with the cystoscopy appointment as scheduled. Call [phone] with any questions or concerns.       EXAMINATION: CT ABDOMEN PELVIS W/O & W CONTRAST, 2017 2:48  PM     TECHNIQUE:  Helical CT images from the lung bases through the  symphysis pubis were obtained  without and with contrast using CT  urogram protocol. Contrast dose: Isovue 370 111cc     COMPARISON: CT abdomen pelvis 2013.     HISTORY: Gross hematuria     FINDINGS:     Urinary tract: 1 mm nonobstructing calyceal tip microlithiasis in the  right kidney (series 3 image 134). No additional renal calculi. The  kidneys enhance symmetrically without focal mass. No hydronephrosis or  hydroureter. Delayed images, contrast completely opacifies the  calyces, pelves, and ureters without filling defect to suggest mass or  stricture. There are a few pulsion diverticula in the posterolateral  aspects of the bladder. No filling defects in the bladder.     Remainder of the abdomen and pelvis: The liver, spleen, and adrenal  glands are unremarkable. Fatty replacement of the pancreatic head with  remainder of the pancreatic parenchyma within normal limits.  Cholecystectomy. No biliary ductal dilatation. Calcified uterine  fibroid (series 8 image 63).     Tiny hiatal hernia. The small large bowel are normal caliber. Mild  sigmoid diverticulosis without diverticulitis. The appendix is normal.  No free air or free fluid. No enlarged lymph nodes. Atherosclerotic  calcifications of the abdominal aorta without aneurysmal dilatation.  Portal vein is patent.     Lung bases:  The basilar atelectasis versus mild reticular changes.  Clustered nodular groundglass  opacities in lung bases for instance  (series 6 image 6), appear improved from chest CT 6/13/2016.     Bones and soft tissues: No acute fractures or suspicious bony lesions.  Sclerotic focus in the left iliac wing, presumably bone island.  Dystrophic calcification soft tissues overlying the right gluteus  musculature. Tiny fat-containing ventral hernia.         IMPRESSION:   1. No nephrolithiasis or mass lesion in the genitourinary system to  account for patient's gross hematuria. Bladder diverticua.  2. 1 mm, nonobstructing calyceal tip microlithiasis in the right  kidney.  3. Calcified uterine fibroid.   4. Clustered groundglass nodular opacities in the right lung base,  improved from chest CT in 2016. Likely infectious/inflammatory  etiology.     I have personally reviewed the examination and initial interpretation  and I agree with the findings.     MONTRELL NOGUERA MD          Sincerely,      The Office of Maria G Friedman PA-C

## 2017-09-05 ENCOUNTER — CARE COORDINATION (OUTPATIENT)
Dept: GERIATRIC MEDICINE | Facility: CLINIC | Age: 82
End: 2017-09-05

## 2017-09-05 NOTE — PROGRESS NOTES
9/5/17: Received call from Joe at [facility] - she states they changed their name to Ynag formally knows as ana - CL tool needs to be updated.  She also states their is an $0.08 difference - explained that as stated their were no changes and therefore tool was uploaded and daily rate is what was previous.   CL tool and auth sent to CMS to update with updated provider name.     Jorge David RN, PHN  [facility]

## 2017-09-06 ENCOUNTER — OFFICE VISIT (OUTPATIENT)
Dept: PODIATRY | Facility: CLINIC | Age: 82
End: 2017-09-06
Payer: MEDICARE

## 2017-09-06 VITALS — HEART RATE: 96 BPM | SYSTOLIC BLOOD PRESSURE: 110 MMHG | DIASTOLIC BLOOD PRESSURE: 68 MMHG | OXYGEN SATURATION: 100 %

## 2017-09-06 DIAGNOSIS — J44.1 COPD EXACERBATION (H): ICD-10-CM

## 2017-09-06 DIAGNOSIS — L97.912 ULCER OF RIGHT LOWER LEG, WITH FAT LAYER EXPOSED (H): Primary | ICD-10-CM

## 2017-09-06 PROCEDURE — 15271 SKIN SUB GRAFT TRNK/ARM/LEG: CPT | Performed by: PODIATRIST

## 2017-09-06 RX ORDER — TIOTROPIUM BROMIDE 18 UG/1
18 CAPSULE ORAL; RESPIRATORY (INHALATION) DAILY
Qty: 30 CAPSULE | Refills: 11 | Status: SHIPPED | OUTPATIENT
Start: 2017-09-06 | End: 2018-09-19

## 2017-09-06 NOTE — TELEPHONE ENCOUNTER
With use of [employer]  services ( #[id number]) a generic message was left with patient's family member requesting for patient to return call back to [facility].    Jessica Odom RN, BSN

## 2017-09-06 NOTE — MR AVS SNAPSHOT
After Visit Summary   9/6/2017    Gallito Farley    MRN: [id number]           Patient Information     Date Of Birth          1/1/1934        Visit Information        Provider Department      9/6/2017 2:00 PM Mal Davis DPM; [employer] [facility]        Today's Diagnoses     Ulcer of right lower leg, with fat layer exposed (H)    -  1    Chronic venous hypertension with ulcer involving right side (H)          Care Instructions    Thanks for coming today.  Ortho/Sports Medicine Clinic  [id number] [place] [address]    To schedule future appointments in Ortho Clinic, you may call [phone].    To schedule ordered imaging by your Provider: Call [facility] at [phone]    [employer] available online at:   [email or web address]    Please call if any further questions or concerns [phone] and ask for the Orthopedic Department. Clinic hours 8 am to 5 pm.    Return to clinic if symptoms worsen.            Follow-ups after your visit        Your next 10 appointments already scheduled     Sep 13, 2017 10:30 AM CDT   CYSTO with Reginaldo Pineda MD   [facility] ([facility])    [address] [place] [address] [phone]            Sep 13, 2017  1:00 PM CDT   Return Visit with Mal Davis DPM   [facility] ([facility])    [address] [place] [address] [phone]            Oct 17, 2017  2:00 PM CDT   (Arrive by 1:45 PM)   Pacemaker Check with Uc Cv Device 1   [facility] ([facility])    [address]  3rd Floor  [facility] [address] [phone]              Who to contact     If you have questions or need follow up information about today's clinic visit or your schedule please contact [facility] directly at [phone].  Normal or non-critical lab and imaging  results will be communicated to you by MyChart, letter or phone within 4 business days after the clinic has received the results. If you do not hear from us within 7 days, please contact the clinic through CorkCRM or phone. If you have a critical or abnormal lab result, we will notify you by phone as soon as possible.  Submit refill requests through CorkCRM or call your pharmacy and they will forward the refill request to us. Please allow 3 business days for your refill to be completed.          Additional Information About Your Visit        CorkCRM Information     Cork[employer]M is an electronic gateway that provides easy, online access to your medical records. With CorkCRM, you can request a clinic appointment, read your test results, renew a prescription or communicate with your care team.     To sign up for Cork[employer]M visit the website at [email or web address]   You will be asked to enter the access code listed below, as well as some personal information. Please follow the directions to create your username and password.     Your access code is: [email or web address]  Expires: 2017  3:19 PM     Your access code will  in 90 days. If you need help or a new code, please contact your [facility] or call [phone] for assistance.        Care EveryWhere ID     This is your Care EveryWhere ID. This could be used by other organizations to access your [facility] medical records  [id number]        Your Vitals Were     Pulse Pulse Oximetry                96 100%           Blood Pressure from Last 3 Encounters:   17 110/68   17 115/76   17 148/79    Weight from Last 3 Encounters:   17 81.6 kg (180 lb)   17 81.6 kg (180 lb)   17 78.9 kg (174 lb)              We Performed the Following     HC SKIN SUB GRAFT T/A/L AREA/<100SCM /<1ST 25 SCM     TISSUE APLIGRAF SUPPLY, PER SQ CM          Where to get your medicines      These medications were sent to [facility] [place], [place] - [address]  [address], [facility] [address]     Phone:  [phone]     tiotropium 18 MCG capsule          Primary Care Provider Office Phone # Fax #    Hakeem Awad [phone] [address]  [place] [facility] [address]        Equal Access to Services     RAMONE GOODSON : Hadii aad ku hadasho Soomaali, waaxda luqadaha, qaybta kaalmada adeegyada, waxay idiin hayaan adeeg kharash la'aan . So [facility] [phone].    ATENCIÓN: Si habla espcliff, tiene a john disposición servicios gratuitos de asistencia lingüística. Llame al [phone].    We comply with applicable federal civil rights laws and [place] laws. We do not discriminate on the basis of race, color, national origin, age, disability sex, sexual orientation or gender identity.            Thank you!     Thank you for choosing [facility]  for your care. Our goal is always to provide you with excellent care. Hearing back from our patients is one way we can continue to improve our services. Please take a few minutes to complete the written survey that you may receive in the mail after your visit with us. Thank you!             Your Updated Medication List - Protect others around you: Learn how to safely use, store and throw away your medicines at www.disposemymeds.org.          This list is accurate as of: 9/6/17 11:59 PM.  Always use your most recent med list.                   Brand Name Dispense Instructions for use Diagnosis    acetaminophen 325 MG tablet    TYLENOL    100 tablet    Take 2 tablets (650 mg) by mouth every 4 hours as needed for mild pain    Acute post-operative pain       Adhesive Paper Tape     5 each    1inch paper tape for daily dressing changes.    Chronic venous hypertension with ulcer involving right side (H), Ulcer of right leg, with fat layer exposed (H)       albuterol (2.5 MG/3ML) 0.083% neb solution     360 mL    Take 1 vial (2.5 mg)  "by nebulization every 6 hours as needed    COPD exacerbation (H)       amLODIPine 10 MG tablet    NORVASC    30 tablet    Take 1 tablet (10 mg) by mouth daily    Essential hypertension       diclofenac sodium 3 % Gel     100 g    Apply 4 gm four times a day as needed to right trapezius muscle.    Cervical myofascial pain syndrome       enalapril 10 MG tablet    VASOTEC    90 tablet    Take 1 tablet (10 mg) by mouth daily    Essential hypertension       finasteride 5 MG tablet    PROSCAR    30 tablet    Take 1 tablet (5 mg) by mouth At Bedtime    Benign nodular prostatic hyperplasia without lower urinary tract symptoms       fluticasone 250 MCG/BLIST Aepb Inhaler    FLOVENT DISKUS    3 Inhaler    Inhale 1 puff into the lungs every 12 hours    COPD exacerbation (H)       Gauze Dressing 4\"X4\" Pads     48 each    Sterile [id number] gauze for daily wound cares.    Chronic venous hypertension with ulcer involving right side (H), Ulcer of right leg, with fat layer exposed (H)       hydrochlorothiazide 12.5 MG capsule    MICROZIDE    60 capsule    Take 2 capsules (25 mg) by mouth daily    Essential hypertension       HEATHER 4\"X75\" Misc     30 each    Sterile Heather wrap for daily dressing changes.    Chronic venous hypertension with ulcer involving right side (H), Ulcer of right leg, with fat layer exposed (H)       mupirocin 2 % ointment    BACTROBAN    30 g    Apply twice a day to right leg wound and cover with occlusive dressing.    Chronic cutaneous venous stasis ulcer (H)       * order for DME     5 each    Equipment being ordered: Dispense 4\" x 4\" sterile gauze. Dispense any brand name.    Chronic cutaneous venous stasis ulcer (H)       * order for DME     5 each    Equipment being ordered: Dispense Coban wrap.    Chronic cutaneous venous stasis ulcer (H)       phenazopyridine 100 MG tablet    PYRIDIUM    24 tablet    Take 1 tablet (100 mg) by mouth 3 times daily as needed for urinary tract discomfort    Dysuria       " ranitidine 150 MG tablet    ZANTAC    60 tablet    Take 1 tablet (150 mg) by mouth 2 times daily    Gastroesophageal reflux disease, esophagitis presence not specified       tamsulosin 0.4 MG capsule    FLOMAX    60 capsule    Take 2 capsules (0.8 mg) by mouth At Bedtime    Benign nodular prostatic hyperplasia without lower urinary tract symptoms       tiotropium 18 MCG capsule    SPIRIVA    30 capsule    Inhale 1 capsule (18 mcg) into the lungs daily    COPD exacerbation (H)       trolamine salicylate 10 % cream    ASPERCREME    85 g    Apply topically as needed for moderate pain    Cervical myofascial pain syndrome       * Notice:  This list has 2 medication(s) that are the same as other medications prescribed for you. Read the directions carefully, and ask your doctor or other care provider to review them with you.

## 2017-09-06 NOTE — NURSING NOTE
"Gallito Farley's goals for this visit include: Right foot apligraf  He requests these members of his care team be copied on today's visit information: yes    PCP: Hakeem Awad    Referring Provider:  ESTABLISHED PATIENT  No address on file    Chief Complaint   Patient presents with     RECHECK     Right foot apligraf       Initial /68  Pulse 96  SpO2 100% Estimated body mass index is 23.75 kg/(m^2) as calculated from the following:    Height as of 3/16/17: 1.854 m (6' 1\").    Weight as of 8/25/17: 81.6 kg (180 lb).  Medication Reconciliation: complete    "

## 2017-09-06 NOTE — PATIENT INSTRUCTIONS
Thanks for coming today.  Ortho/Sports Medicine Clinic  [address] [place] [address]    To schedule future appointments in Ortho Clinic, you may call [phone].    To schedule ordered imaging by your Provider: Call [facility] at [phone]    [employer] available online at:   [email or web address]    Please call if any further questions or concerns [phone] and ask for the Orthopedic Department. Clinic hours 8 am to 5 pm.    Return to clinic if symptoms worsen.

## 2017-09-06 NOTE — PROGRESS NOTES
Past Medical History:   Diagnosis Date     Asthma      BPH (benign prostatic hyperplasia)      COPD (chronic obstructive pulmonary disease) (H)      Diastolic dysfunction, left ventricle 4/16/2013     H/O tuberculosis     [place]     Hypertension      LBBB (left bundle branch block)      Leg wound, right     chronic, s/p grafting     Osteoarthritis      Patient Active Problem List   Diagnosis     Septic arthritis (H)     Chronic constipation     HTN (hypertension)     Benign prostatic hyperplasia     Insomnia     Moderate persistent asthma     GERD (gastroesophageal reflux disease)     Pneumonia     Hypercapnia     COPD exacerbation (H)     Acute on chronic respiratory failure with hypoxia and hypercapnia (H)     Chronic cutaneous venous stasis ulcer (H)     Hypertensive urgency     Atrioventricular block, complete (H)     Advanced directives, counseling/discussion     Phlebitis of left arm     Cardiac pacemaker in situ- Medtronic, dual chamber- NOT dependent     Benign nodular prostatic hyperplasia without lower urinary tract symptoms     Health Care Home     Past Surgical History:   Procedure Laterality Date     CHOLECYSTECTOMY       ENT SURGERY       IRRIGATION AND DEBRIDEMENT HIP, COMBINED  4/18/2013    Procedure: COMBINED IRRIGATION AND DEBRIDEMENT HIP;  Irrigation and debridement of Right Hip with cultures;  Surgeon: Cristal Inman MD;  Location: [facility] OR     Partial pneumonectomy      done in [place] in the 1990's     skin grafting - leg wound  6/2016     Social History     Social History     Marital status:      Spouse name: N/A     Number of children: N/A     Years of education: N/A     Occupational History     Not on file.     Social History Main Topics     Smoking status: Former Smoker     Smokeless tobacco: Not on file     Alcohol use No     Drug use: No     Sexual activity: Not on file     Other Topics Concern     Not on file     Social History  Narrative     Family History   Problem Relation Age of Onset     Family History Negative Mother      SUBJECTIVE FINDINGS:  An 83-year-old male returns to clinic for ulcer, right posterior leg.  He relates it is doing okay.  There is minimal drainage.  He presents with .  He does have venous stasis and venous hypertension present.      OBJECTIVE FINDINGS:  He has an ulcer on the right posterior leg that is about 3 x 3 cm, deep through the subcutaneous tissues.  There is some serosanguineous drainage.  No erythema, no odor, no calor.  There is a good granular base.      ASSESSMENT AND PLAN:  Ulcer, right posterior leg with venous stasis and venous hypertension.  Local wound care done upon consent today.  The ulcer was prepped for Apligraf.  Apligraf was applied and secured with Wound Veil and Steri-Strips.  Saline wet-to-dry dressing and a Coban wrap were applied.  The Apligraf was secured with Wound Veil and Steri-Strips prior to the dressing application.  The entire Apligraf was used to cover wounds and margins; none was discarded.  This was the third Apligraf we have applied to this.  He will return to clinic and see me in 1 week.  Keep the dressing dry and intact.  Change the outer dressing as needed for drainage.

## 2017-09-08 NOTE — TELEPHONE ENCOUNTER
After 2 attempts, no call back received from patient. Discussed with Maria G Friedman PA-C and maxime to send patient a letter informing of the overall normal 8/31/17 CT abdomen/pelvis results. Per Maria G Friedman PA-C, patient is to see Dr. Pineda for the cystoscopy appointment as scheduled on 9/13/17. Letter written and mailed to patient's home address on file.    Jessica Odom RN, BSN

## 2017-09-13 ENCOUNTER — OFFICE VISIT (OUTPATIENT)
Dept: PODIATRY | Facility: CLINIC | Age: 82
End: 2017-09-13
Payer: MEDICARE

## 2017-09-13 ENCOUNTER — OFFICE VISIT (OUTPATIENT)
Dept: UROLOGY | Facility: CLINIC | Age: 82
End: 2017-09-13
Payer: MEDICARE

## 2017-09-13 ENCOUNTER — DOCUMENTATION ONLY (OUTPATIENT)
Dept: LAB | Facility: CLINIC | Age: 82
End: 2017-09-13

## 2017-09-13 VITALS — OXYGEN SATURATION: 95 % | SYSTOLIC BLOOD PRESSURE: 105 MMHG | HEART RATE: 95 BPM | DIASTOLIC BLOOD PRESSURE: 69 MMHG

## 2017-09-13 VITALS
DIASTOLIC BLOOD PRESSURE: 76 MMHG | OXYGEN SATURATION: 94 % | SYSTOLIC BLOOD PRESSURE: 139 MMHG | HEART RATE: 94 BPM | TEMPERATURE: 99.1 F

## 2017-09-13 DIAGNOSIS — R31.0 GROSS HEMATURIA: Primary | ICD-10-CM

## 2017-09-13 DIAGNOSIS — L97.912 ULCER OF RIGHT LOWER LEG, WITH FAT LAYER EXPOSED (H): Primary | ICD-10-CM

## 2017-09-13 DIAGNOSIS — R82.90 ABNORMAL URINALYSIS: ICD-10-CM

## 2017-09-13 LAB
ALBUMIN UR-MCNC: 10 MG/DL
APPEARANCE UR: CLEAR
BILIRUB UR QL STRIP: NEGATIVE
COLOR UR AUTO: YELLOW
GLUCOSE UR STRIP-MCNC: NEGATIVE MG/DL
HGB UR QL STRIP: NEGATIVE
KETONES UR STRIP-MCNC: NEGATIVE MG/DL
LEUKOCYTE ESTERASE UR QL STRIP: ABNORMAL
NITRATE UR QL: NEGATIVE
PH UR STRIP: 6 PH (ref 5–7)
RBC #/AREA URNS AUTO: ABNORMAL /HPF
SOURCE: ABNORMAL
SP GR UR STRIP: 1.02 (ref 1–1.03)
UROBILINOGEN UR STRIP-MCNC: NORMAL MG/DL (ref 0–2)
WBC #/AREA URNS AUTO: ABNORMAL /HPF

## 2017-09-13 PROCEDURE — 99207 ZZC NO CHARGE LOS: CPT | Performed by: UROLOGY

## 2017-09-13 PROCEDURE — 99212 OFFICE O/P EST SF 10 MIN: CPT | Performed by: PODIATRIST

## 2017-09-13 PROCEDURE — 52000 CYSTOURETHROSCOPY: CPT | Performed by: UROLOGY

## 2017-09-13 PROCEDURE — 81001 URINALYSIS AUTO W/SCOPE: CPT | Performed by: UROLOGY

## 2017-09-13 PROCEDURE — 87086 URINE CULTURE/COLONY COUNT: CPT | Performed by: UROLOGY

## 2017-09-13 ASSESSMENT — PAIN SCALES - GENERAL: PAINLEVEL: SEVERE PAIN (7)

## 2017-09-13 NOTE — PROGRESS NOTES
Per lab, today's urine sample was inadequate for AFB Culture non blood lab. Lab re-ordered as future order and patient scheduled for lab appointment 9/14/17. Will close encounter at this time.    Jessica Odom RN, BSN

## 2017-09-13 NOTE — MR AVS SNAPSHOT
After Visit Summary   9/13/2017    Gallito Farley    MRN: [id number]           Patient Information     Date Of Birth          1/1/1934        Visit Information        Provider Department      9/13/2017 12:45 PM Mal Davis DPM; [employer] [facility]        Today's Diagnoses     Ulcer of right lower leg, with fat layer exposed (H)    -  1    Chronic venous hypertension with ulcer involving right side (H)          Care Instructions    Thanks for coming today.  Ortho/Sports Medicine Clinic  [address] [place] [address]    To schedule future appointments in Ortho Clinic, you may call [phone].    To schedule ordered imaging by your Provider: Call [facility] at [phone]    [employer] available online at:   [employer]/Evgen    Please call if any further questions or concerns [phone] and ask for the Orthopedic Department. Clinic hours 8 am to 5 pm.    Return to clinic if symptoms worsen.            Follow-ups after your visit        Your next 10 appointments already scheduled     Sep 14, 2017  1:45 PM CDT   LAB with LAB FIRST FLOOR [facility] ([facility])    [address] [place] [address] [phone]           Patient must bring picture ID. Patient should be prepared to give a urine specimen  Please do not eat 10-12 hours before your appointment if you are coming in fasting for labs on lipids, cholesterol, or glucose (sugar). Pregnant women should follow their Care Team instructions. Water with medications is okay. Do not drink coffee or other fluids. If you have concerns about taking  your medications, please ask at office or if scheduling via [employer], send a message by clicking on Secure Messaging, Message Your Care Team.            Sep 27, 2017  2:00 PM CDT   Return Visit with Mal Davis DPM   [facility] ([facility])     [address] [place] [address]   [phone]            Oct 17, 2017  2:00 PM CDT   (Arrive by 1:45 PM)   Pacemaker Check with Uc Cv Device 1   [facility] ([facility])    [address] [place] [address]  3rd [facility] [address] [phone]            Oct 24, 2017  7:30 AM CDT   (Arrive by 7:15 AM)   New Patient Visit with Santosh Matthew MD   [facility] ([facility])    [address] [place] [address]  4th [facility] [address] [phone]              Who to contact     If you have questions or need follow up information about today's clinic visit or your schedule please contact [facility] directly at [phone].  Normal or non-critical lab and imaging results will be communicated to you by MyChart, letter or phone within 4 business days after the clinic has received the results. If you do not hear from us within 7 days, please contact the clinic through [employer] or phone. If you have a critical or abnormal lab result, we will notify you by phone as soon as possible.  Submit refill requests through [employer] or call your pharmacy and they will forward the refill request to us. Please allow 3 business days for your refill to be completed.          Additional Information About Your Visit        [employer] Information     [employer] is an electronic gateway that provides easy, online access to your medical records. With [employer], you can request a clinic appointment, read your test results, renew a prescription or communicate with your care team.     To sign up for [employer] visit the website at [email or web address]   You will be asked to enter the access code listed below, as well as some personal information. Please follow the directions to create your username and password.     Your access code is: [email or web address]  Expires: 11/23/2017  3:19 PM     Your access code will   in 90 days. If you need help or a new code, please contact your [facility] or call [phone] for assistance.        Care EveryWhere ID     This is your Care EveryWhere ID. This could be used by other organizations to access your [facility] medical records  [id number]        Your Vitals Were     Pulse Pulse Oximetry                95 95%           Blood Pressure from Last 3 Encounters:   17 105/69   17 139/76   17 110/68    Weight from Last 3 Encounters:   17 81.6 kg (180 lb)   17 81.6 kg (180 lb)   17 78.9 kg (174 lb)              Today, you had the following     No orders found for display       Primary Care Provider Office Phone # Fax #    Hakeem Awad [phone]       [address]  [facility] [address]        Equal Access to Services     RAMONE [facility] : Hadii aad ku hadasho Soomaali, waaxda luqadaha, qaybta kaalmada adeegyada, ricki pizarro . So [facility] [phone].    ATENCIÓN: Si habla español, tiene a john disposición servicios gratuitos de asistencia lingüística. Llame al [phone].    We comply with applicable federal civil rights laws and [place] laws. We do not discriminate on the basis of race, color, national origin, age, disability sex, sexual orientation or gender identity.            Thank you!     Thank you for choosing [facility]  for your care. Our goal is always to provide you with excellent care. Hearing back from our patients is one way we can continue to improve our services. Please take a few minutes to complete the written survey that you may receive in the mail after your visit with us. Thank you!             Your Updated Medication List - Protect others around you: Learn how to safely use, store and throw away your medicines at www.disposemymeds.org.          This list is accurate as of: 17  1:26 PM.  Always use your most recent med  "list.                   Brand Name Dispense Instructions for use Diagnosis    acetaminophen 325 MG tablet    TYLENOL    100 tablet    Take 2 tablets (650 mg) by mouth every 4 hours as needed for mild pain    Acute post-operative pain       Adhesive Paper Tape     5 each    1inch paper tape for daily dressing changes.    Chronic venous hypertension with ulcer involving right side (H), Ulcer of right leg, with fat layer exposed (H)       albuterol (2.5 MG/3ML) 0.083% neb solution     360 mL    Take 1 vial (2.5 mg) by nebulization every 6 hours as needed    COPD exacerbation (H)       amLODIPine 10 MG tablet    NORVASC    30 tablet    Take 1 tablet (10 mg) by mouth daily    Essential hypertension       diclofenac sodium 3 % Gel     100 g    Apply 4 gm four times a day as needed to right trapezius muscle.    Cervical myofascial pain syndrome       enalapril 10 MG tablet    VASOTEC    90 tablet    Take 1 tablet (10 mg) by mouth daily    Essential hypertension       finasteride 5 MG tablet    PROSCAR    30 tablet    Take 1 tablet (5 mg) by mouth At Bedtime    Benign nodular prostatic hyperplasia without lower urinary tract symptoms       fluticasone 250 MCG/BLIST Aepb Inhaler    FLOVENT DISKUS    3 Inhaler    Inhale 1 puff into the lungs every 12 hours    COPD exacerbation (H)       Gauze Dressing 4\"X4\" Pads     48 each    Sterile 3q2ucjt gauze for daily wound cares.    Chronic venous hypertension with ulcer involving right side (H), Ulcer of right leg, with fat layer exposed (H)       hydrochlorothiazide 12.5 MG capsule    MICROZIDE    60 capsule    Take 2 capsules (25 mg) by mouth daily    Essential hypertension       HEATHER 4\"X75\" Misc     30 each    Sterile Heather wrap for daily dressing changes.    Chronic venous hypertension with ulcer involving right side (H), Ulcer of right leg, with fat layer exposed (H)       mupirocin 2 % ointment    BACTROBAN    30 g    Apply twice a day to right leg wound and cover with " "occlusive dressing.    Chronic cutaneous venous stasis ulcer (H)       * order for DME     5 each    Equipment being ordered: Dispense 4\" x 4\" sterile gauze. Dispense any brand name.    Chronic cutaneous venous stasis ulcer (H)       * order for DME     5 each    Equipment being ordered: Dispense Coban wrap.    Chronic cutaneous venous stasis ulcer (H)       phenazopyridine 100 MG tablet    PYRIDIUM    24 tablet    Take 1 tablet (100 mg) by mouth 3 times daily as needed for urinary tract discomfort    Dysuria       ranitidine 150 MG tablet    ZANTAC    60 tablet    Take 1 tablet (150 mg) by mouth 2 times daily    Gastroesophageal reflux disease, esophagitis presence not specified       tamsulosin 0.4 MG capsule    FLOMAX    60 capsule    Take 2 capsules (0.8 mg) by mouth At Bedtime    Benign nodular prostatic hyperplasia without lower urinary tract symptoms       tiotropium 18 MCG capsule    SPIRIVA    30 capsule    Inhale 1 capsule (18 mcg) into the lungs daily    COPD exacerbation (H)       trolamine salicylate 10 % cream    ASPERCREME    85 g    Apply topically as needed for moderate pain    Cervical myofascial pain syndrome       * Notice:  This list has 2 medication(s) that are the same as other medications prescribed for you. Read the directions carefully, and ask your doctor or other care provider to review them with you.      "

## 2017-09-13 NOTE — NURSING NOTE
"Gallito Farley's goals for this visit include: Right foot recheck   He requests these members of his care team be copied on today's visit information: yes    PCP: Hakeem Awad    Referring Provider:  No referring provider defined for this encounter.    Chief Complaint   Patient presents with     RECHECK     recheck right foot       Initial /69  Pulse 95  SpO2 95% Estimated body mass index is 23.75 kg/(m^2) as calculated from the following:    Height as of 3/16/17: 1.854 m (6' 1\").    Weight as of 8/25/17: 81.6 kg (180 lb).  Medication Reconciliation: complete    "

## 2017-09-13 NOTE — PROGRESS NOTES
Past Medical History:   Diagnosis Date     Asthma      BPH (benign prostatic hyperplasia)      COPD (chronic obstructive pulmonary disease) (H)      Diastolic dysfunction, left ventricle 4/16/2013     H/O tuberculosis     [place] pneum[place]     Hypertension      LBBB (left bundle branch block)      Leg wound, right     chronic, s/p grafting     Osteoarthritis      Patient Active Problem List   Diagnosis     Septic arthritis (H)     Chronic constipation     HTN (hypertension)     Benign prostatic hyperplasia     Insomnia     Moderate persistent asthma     GERD (gastroesophageal reflux disease)     Pneumonia     Hypercapnia     COPD exacerbation (H)     Acute on chronic respiratory failure with hypoxia and hypercapnia (H)     Chronic cutaneous venous stasis ulcer (H)     Hypertensive urgency     Atrioventricular block, complete (H)     Advanced directives, counseling/discussion     Phlebitis of left arm     Cardiac pacemaker in situ- [employer], dual chamber- NOT dependent     Benign nodular prostatic hyperplasia without lower urinary tract symptoms     Health Care Home     Past Surgical History:   Procedure Laterality Date     CHOLECYSTECTOMY       ENT SURGERY       IRRIGATION AND DEBRIDEMENT HIP, COMBINED  4/18/2013    Procedure: COMBINED IRRIGATION AND DEBRIDEMENT HIP;  Irrigation and debridement of Right Hip with cultures;  Surgeon: Cristal Inman MD;  Location: [facility] OR     Partial pneumonectomy      done in [place] in the 1990's     skin grafting - leg wound  6/2016     Social History     Social History     Marital status:      Spouse name: N/A     Number of children: N/A     Years of education: N/A     Occupational History     Not on file.     Social History Main Topics     Smoking status: Former Smoker     Smokeless tobacco: Not on file     Alcohol use No     Drug use: No     Sexual activity: Not on file     Other Topics Concern     Not on file     Social History  Narrative     Family History   Problem Relation Age of Onset     Family History Negative Mother      SUBJECTIVE FINDINGS: An 83-year-old male who returns to clinic with  for right posterior leg ulcer.  Relates he is doing well.  Relates there has been no drainage.  He has been changing the dressing every other day.      OBJECTIVE FINDINGS: He has ulcer on the right posterior leg that is terence.  It is deep through the dermis.  There is minimal drainage.  No erythema, no odor, no calor.      ASSESSMENT AND PLAN:  Ulcer, right posterior leg with venous stasis and venous hypertension.  This is doing well.  I am going to have him do saline wet-to-dry dressings daily.  This is done today with Kerlix and Coban for compression.  He will return to clinic and see me in 2 weeks.  If this is still open, we will reapply Apligraf; otherwise, we will not.

## 2017-09-13 NOTE — PROGRESS NOTES
Pre-procedure diagnosis:  Lower urinary tract symptoms, micro-hematuria.   Post-procedure diagnosis: Very trabeculated bladder with cellules/ diverticula.  Procedure performed:  Cystourethroscopy  Surgeon:    Elizabeth CHAVARRIA   Anesthesia:    Local    Indications for procedure:   Gallito Farley is a 83 year old male with a history of lower urinary tract symptoms, 7 years of dysuria, pyruria.  Not respsonding to antibiotic Cipro x 1 month.  Low PVR but very trabeculated bladder means he has bladder outlet obstruction.  History of TB but tested 1 year ago with quantiferon gold test and he was negative.  PVR 48cc Aug 2017.    CT-urogram 8/31/17   IMPRESSION:   1. No nephrolithiasis or mass lesion in the genitourinary system to  account for patient's gross hematuria. Bladder diverticua.  2. 1 mm, nonobstructing calyceal tip microlithiasis in the right  kidney.  3. Calcified  ( I think he meant calcified bladder diverticulum - CRISTA)  4. Clustered groundglass nodular opacities in the right lung base,  improved from chest CT in 2016. Likely infectious/inflammatory  Etiology.    Component      Latest Ref Rng & Units 3/30/2017   Color Urine       Yellow   Appearance Urine       Clear   Glucose Urine      NEG:Negative mg/dL Negative   Bilirubin Urine      NEG:Negative Negative   Ketones Urine      NEG:Negative mg/dL Negative   Specific Gravity Urine      1.003 - 1.035 1.012   Blood Urine      NEG:Negative Small (A)   pH Urine      5.0 - 7.0 pH 7.0   Protein Albumin Urine      NEG:Negative mg/dL 10 (A)   Urobilinogen mg/dL      0.0 - 2.0 mg/dL Normal   Nitrite Urine      NEG:Negative Negative   Leukocyte Esterase Urine      NEG:Negative Small (A)   Source       Midstream Urine   WBC Urine      OTO2:O - 2 /HPF 2-5 (A)   RBC Urine      OTO2:O - 2 /HPF 5-10 (A)   Bacteria Urine      NEG /HPF    Squamous Epithelial /LPF Urine      FEW /LPF Few   Mucous Urine      NEG /LPF Present (A)   Hyaline Casts      0 - 2 /LPF O - 2   Specimen  Description          Culture Micro          Micro Report Status            Component      Latest Ref Rng & Units 5/25/2017   Color Urine       Yellow   Appearance Urine       Slightly Cloudy   Glucose Urine      NEG:Negative mg/dL Negative   Bilirubin Urine      NEG:Negative Negative   Ketones Urine      NEG:Negative mg/dL Negative   Specific Gravity Urine      1.003 - 1.035 1.015   Blood Urine      NEG:Negative Negative   pH Urine      5.0 - 7.0 pH 7.0   Protein Albumin Urine      NEG:Negative mg/dL 30 (A)   Urobilinogen mg/dL      0.0 - 2.0 mg/dL Normal   Nitrite Urine      NEG:Negative Negative   Leukocyte Esterase Urine      NEG:Negative Large (A)   Source       Midstream Urine   WBC Urine      OTO2:O - 2 /HPF 2-5 (A)   RBC Urine      OTO2:O - 2 /HPF O - 2   Bacteria Urine      NEG /HPF Few (A)   Squamous Epithelial /LPF Urine      FEW /LPF Few   Mucous Urine      NEG /LPF Present (A)   Hyaline Casts      0 - 2 /LPF 2-5 (A)   Specimen Description       Midstream Urine   Culture Micro       <10,000 colonies/mL urogenital lawrence Susceptibility testing not routinely done   Micro Report Status       FINAL 05/26/2017     Component      Latest Ref Rng & Units 8/25/2017 9/13/2017   Color Urine       Yellow Yellow   Appearance Urine       Clear Clear   Glucose Urine      NEG:Negative mg/dL Negative Negative   Bilirubin Urine      NEG:Negative Negative Negative   Ketones Urine      NEG:Negative mg/dL Negative Negative   Specific Gravity Urine      1.003 - 1.035 1.015 1.018   Blood Urine      NEG:Negative Negative Negative   pH Urine      5.0 - 7.0 pH 6.5 6.0   Protein Albumin Urine      NEG:Negative mg/dL 10 (A) 10 (A)   Urobilinogen mg/dL      0.0 - 2.0 mg/dL Normal Normal   Nitrite Urine      NEG:Negative Negative Negative   Leukocyte Esterase Urine      NEG:Negative Moderate (A) Moderate (A)   Source       Midstream Urine Midstream Urine   WBC Urine      OTO2:O - 2 /HPF 2-5 (A) 2-5 (A)   RBC Urine      OTO2:O - 2 /HPF  O - 2 O - 2   Bacteria Urine      NEG /HPF     Squamous Epithelial /LPF Urine      FEW /LPF     Mucous Urine      NEG /LPF     Hyaline Casts      0 - 2 /LPF     Specimen Description       Midstream Urine    Culture Micro       <10,000 colonies/mL . . .    Micro Report Status               Description of procedure:   After fully informed, voluntary consent was obtained, the patient was brought into the procedure room, identified and placed in a supine position on the cysto table.  The groin/scrotum were prepped and draped in a sterile fashion with betadine.  A 15F flexible cystoscope was inserted into the urethra and the bladder and urethra examined in a systematic manner.  The patient tolerated the procedure well and there were no complications.      Cystoscopic findings:  The urethra was normal without strictures.  The prostate was 3.5cm long and demonstrated bilobar hypertrophy.  Hard to say if obstructing or not.  There was no significant  median lobe.  The bladder was surveyed.  There was moderate  Trabeculation and 20 or so diverticula/cellules.  There were no tumors or stones identifed.  The ureteric orifices were not identified-  Pt tolerated the procedure very poorly and I had to stop shortly into the exam.  There were no bladder ulcers or signs of hemorrhagic cystitis.    Assessment/Plan:     Gallito Farley is a 83 year old male with a history of micro-hematuria, dysuria, bladder diverticula seen on cystoscopy today.  Remote history of TB with sputum testing in the last year that was negative.    Mild pyuria.    Bladder diverticula.    - send urine for AFB  - consult to Tiff for possible outlet reduction surgery/ advice on prostate pain.  Not sure this will help dysuria, however.  -Prostate pain not respond to 1 month of Cipro, so I am reluctant to try more antibiotics.    Elizabeth CHAVARRIA

## 2017-09-13 NOTE — PATIENT INSTRUCTIONS
Thanks for coming today.  Ortho/Sports Medicine Clinic  [address] [place] [address]    To schedule future appointments in Ortho Clinic, you may call [phone].    To schedule ordered imaging by your Provider: Call [facility] at [phone]    [employer] available online at:   [email or web address]    Please call if any further questions or concerns [phone] and ask for the Orthopedic Department. Clinic hours 8 am to 5 pm.    Return to clinic if symptoms worsen.

## 2017-09-13 NOTE — MR AVS SNAPSHOT
After Visit Summary   9/13/2017    Gallito Farley    MRN: [id number]           Patient Information     Date Of Birth          1/1/1934        Visit Information        Provider Department      9/13/2017 10:15 AM Reginaldo Pineda MD; [employer] [facility]        Today's Diagnoses     Gross hematuria    -  1    Abnormal urinalysis          Care Instructions    After Your Cystoscopy    What happens after the exam?    You may go back to your normal diet and activity as you feel ready, unless your doctor tells you not to.    For the next two days, you may notice:    Some blood in your urine  Some burning when you urinate (use the toilet)  An urge to urinate more often  Bladder spasms    These are normal after the procedure and should go away after a day or two.  To relieve these problems drink 6 to 8 large glasses of water each day (includes drinks at meals) as this will help clear the urine.  Take warm baths to relieve pain and bladder spasms.  Do not add anything to the bath water.  You may also take Tylenol (acetaminophen) for pain if needed.    When should I call my doctor?    A fever over 100F (38C) for more than a day. (Before you call the doctor, check your temperature under your tongue)  Chills  Failure to urinate: No urine comes out when you try to use the toilet. (Try soaking in a bathtub full of warm water. If still no urine, call your doctor)  A lot of blood in the urine, or blood clots larger than a nickel  Pain in the back or belly area (abdomen)  Pain or spasms that are not relieved by warm tub baths and pain medicine  Severe pain, burning or other problems while passing urine  Pain that gets worse after two days                      Follow-ups after your visit        Your next 10 appointments already scheduled     Sep 13, 2017 12:45 PM CDT   Return Visit with Mal Davis DPM   [facility] ([facility])    [id number]  [address] [id number] [phone]            Sep 14, 2017  1:45 PM CDT   LAB with LAB FIRST FLOOR [facility] ([facility])    [address] [place] [id number] [phone]           Patient must bring picture ID. Patient should be prepared to give a urine specimen  Please do not eat 10-12 hours before your appointment if you are coming in fasting for labs on lipids, cholesterol, or glucose (sugar). Pregnant women should follow their Care Team instructions. Water with medications is okay. Do not drink coffee or other fluids. If you have concerns about taking  your medications, please ask at office or if scheduling via Contentful, send a message by clicking on Secure Messaging, Message Your Care Team.            Oct 17, 2017  2:00 PM CDT   (Arrive by 1:45 PM)   Pacemaker Check with Uc Cv Device 1   [facility] ([facility])    [address] [place] [address]  3rd [facility] [id number] [phone]            Oct 24, 2017  7:30 AM CDT   (Arrive by 7:15 AM)   New Patient Visit with Santosh Matthew MD   [facility] ([facility])    [address] [place] [address] [id number] [phone]              Who to contact     If you have questions or need follow up information about today's clinic visit or your schedule please contact [facility] directly at [phone].  Normal or non-critical lab and imaging results will be communicated to you by MyChart, letter or phone within 4 business days after the clinic has received the results. If you do not hear from us within 7 days, please contact the clinic through MyChart or phone. If you have a critical or abnormal lab result, we will notify you by phone as soon as possible.  Submit refill requests through Contentful or call your pharmacy and they will forward the  refill request to us. Please allow 3 business days for your refill to be completed.          Additional Information About Your Visit        [employer]eva[employer]or[employer]irq Information     Yowza is an electronic gateway that provides easy, online access to your medical records. With Yowza, you can request a clinic appointment, read your test results, renew a prescription or communicate with your care team.     To sign up for Yowza visit the website at [email or web address]   You will be asked to enter the access code listed below, as well as some personal information. Please follow the directions to create your username and password.     Your access code is: [email or web address]  Expires: 2017  3:19 PM     Your access code will  in 90 days. If you need help or a new code, please contact your [facility] or call [phone] for assistance.        Care EveryWhere ID     This is your Care EveryWhere ID. This could be used by other organizations to access your [facility] medical records  [id number]        Your Vitals Were     Pulse Temperature Pulse Oximetry             94 99.1  F (37.3  C) (Oral) 94%          Blood Pressure from Last 3 Encounters:   17 139/76   17 110/68   17 115/76    Weight from Last 3 Encounters:   17 81.6 kg (180 lb)   17 81.6 kg (180 lb)   17 78.9 kg (174 lb)              We Performed the Following     AFB Culture Non Blood     UA reflex to Microscopic and Culture     Urine Culture Aerobic Bacterial     Urine Microscopic        Primary Care Provider Office Phone # Fax #    Hakeem Awad [phone]       [address]  [place] [facility] [address]        Equal Access to Services     [facility] : Hadii marvin dent Soviji, waaxda luqadaha, qaybta kaalmada adechapisyada, ricki alves. So [facility] [phone].    ATENCIÓN: Si habla español, tiene a john disposición servicios gratuitos de  asistencia lingüística. Quoc al [phone].    We comply with applicable federal civil rights laws and [place] laws. We do not discriminate on the basis of race, color, national origin, age, disability sex, sexual orientation or gender identity.            Thank you!     Thank you for choosing [facility]  for your care. Our goal is always to provide you with excellent care. Hearing back from our patients is one way we can continue to improve our services. Please take a few minutes to complete the written survey that you may receive in the mail after your visit with us. Thank you!             Your Updated Medication List - Protect others around you: Learn how to safely use, store and throw away your medicines at www.disposemymeds.org.          This list is accurate as of: 9/13/17 11:45 AM.  Always use your most recent med list.                   Brand Name Dispense Instructions for use Diagnosis    acetaminophen 325 MG tablet    TYLENOL    100 tablet    Take 2 tablets (650 mg) by mouth every 4 hours as needed for mild pain    Acute post-operative pain       Adhesive Paper Tape     5 each    1inch paper tape for daily dressing changes.    Chronic venous hypertension with ulcer involving right side (H), Ulcer of right leg, with fat layer exposed (H)       albuterol (2.5 MG/3ML) 0.083% neb solution     360 mL    Take 1 vial (2.5 mg) by nebulization every 6 hours as needed    COPD exacerbation (H)       amLODIPine 10 MG tablet    NORVASC    30 tablet    Take 1 tablet (10 mg) by mouth daily    Essential hypertension       diclofenac sodium 3 % Gel     100 g    Apply 4 gm four times a day as needed to right trapezius muscle.    Cervical myofascial pain syndrome       enalapril 10 MG tablet    VASOTEC    90 tablet    Take 1 tablet (10 mg) by mouth daily    Essential hypertension       finasteride 5 MG tablet    PROSCAR    30 tablet    Take 1 tablet (5 mg) by mouth At Bedtime    Benign nodular  "prostatic hyperplasia without lower urinary tract symptoms       fluticasone 250 MCG/BLIST Aepb Inhaler    FLOVENT DISKUS    3 Inhaler    Inhale 1 puff into the lungs every 12 hours    COPD exacerbation (H)       Gauze Dressing 4\"X4\" Pads     48 each    Sterile 0x9eziw gauze for daily wound cares.    Chronic venous hypertension with ulcer involving right side (H), Ulcer of right leg, with fat layer exposed (H)       hydrochlorothiazide 12.5 MG capsule    MICROZIDE    60 capsule    Take 2 capsules (25 mg) by mouth daily    Essential hypertension       HEATHER 4\"X75\" Misc     30 each    Sterile Heather wrap for daily dressing changes.    Chronic venous hypertension with ulcer involving right side (H), Ulcer of right leg, with fat layer exposed (H)       mupirocin 2 % ointment    BACTROBAN    30 g    Apply twice a day to right leg wound and cover with occlusive dressing.    Chronic cutaneous venous stasis ulcer (H)       * order for DME     5 each    Equipment being ordered: Dispense 4\" x 4\" sterile gauze. Dispense any brand name.    Chronic cutaneous venous stasis ulcer (H)       * order for DME     5 each    Equipment being ordered: Dispense Coban wrap.    Chronic cutaneous venous stasis ulcer (H)       phenazopyridine 100 MG tablet    PYRIDIUM    24 tablet    Take 1 tablet (100 mg) by mouth 3 times daily as needed for urinary tract discomfort    Dysuria       ranitidine 150 MG tablet    ZANTAC    60 tablet    Take 1 tablet (150 mg) by mouth 2 times daily    Gastroesophageal reflux disease, esophagitis presence not specified       tamsulosin 0.4 MG capsule    FLOMAX    60 capsule    Take 2 capsules (0.8 mg) by mouth At Bedtime    Benign nodular prostatic hyperplasia without lower urinary tract symptoms       tiotropium 18 MCG capsule    SPIRIVA    30 capsule    Inhale 1 capsule (18 mcg) into the lungs daily    COPD exacerbation (H)       trolamine salicylate 10 % cream    ASPERCREME    85 g    Apply topically as needed " for moderate pain    Cervical myofascial pain syndrome       * Notice:  This list has 2 medication(s) that are the same as other medications prescribed for you. Read the directions carefully, and ask your doctor or other care provider to review them with you.

## 2017-09-13 NOTE — NURSING NOTE
"Gallito Farley's goals for this visit include:   Chief Complaint   Patient presents with     Procedure     Cystoscopy      He requests these members of his care team be copied on today's visit information: pcp     Referring Provider:  No referring provider defined for this encounter.    Initial /76 (BP Location: Left arm, Patient Position: Chair, Cuff Size: Adult Regular)  Pulse 94  Temp 99.1  F (37.3  C) (Oral)  SpO2 94% Estimated body mass index is 23.75 kg/(m^2) as calculated from the following:    Height as of 3/16/17: 1.854 m (6' 1\").    Weight as of 8/25/17: 81.6 kg (180 lb).  BP completed using cuff size: regular        "

## 2017-09-14 ENCOUNTER — PRE VISIT (OUTPATIENT)
Dept: UROLOGY | Facility: CLINIC | Age: 82
End: 2017-09-14

## 2017-09-14 DIAGNOSIS — R31.0 GROSS HEMATURIA: ICD-10-CM

## 2017-09-14 DIAGNOSIS — R82.90 ABNORMAL URINALYSIS: ICD-10-CM

## 2017-09-14 LAB
BACTERIA SPEC CULT: NORMAL
BACTERIA SPEC CULT: NORMAL
SPECIMEN SOURCE: NORMAL

## 2017-09-14 PROCEDURE — 87116 MYCOBACTERIA CULTURE: CPT | Mod: 90 | Performed by: UROLOGY

## 2017-09-14 PROCEDURE — 99000 SPECIMEN HANDLING OFFICE-LAB: CPT | Performed by: UROLOGY

## 2017-09-14 NOTE — PROGRESS NOTES
9/6/17: Received CL tool back with error report - unable to use [facility].  Reviewed with Supervisor, Michela Pabon, re-entered [facility] with [facility].   Errors correct.   CL tool sent to CMS to reupload.       Received confirmation that tool was uploaded successfully.       Jorge David RN, PHN  [facility]

## 2017-10-12 ENCOUNTER — PRE VISIT (OUTPATIENT)
Dept: UROLOGY | Facility: CLINIC | Age: 82
End: 2017-10-12

## 2017-10-17 ENCOUNTER — TRANSFERRED RECORDS (OUTPATIENT)
Dept: HEALTH INFORMATION MANAGEMENT | Facility: CLINIC | Age: 82
End: 2017-10-17

## 2017-10-24 ENCOUNTER — OFFICE VISIT (OUTPATIENT)
Dept: UROLOGY | Facility: CLINIC | Age: 82
End: 2017-10-24

## 2017-10-24 VITALS
BODY MASS INDEX: 23.85 KG/M2 | SYSTOLIC BLOOD PRESSURE: 129 MMHG | HEART RATE: 87 BPM | WEIGHT: 180.8 LBS | DIASTOLIC BLOOD PRESSURE: 80 MMHG

## 2017-10-24 DIAGNOSIS — R31.29 MICROSCOPIC HEMATURIA: Primary | ICD-10-CM

## 2017-10-24 LAB
APPEARANCE UR: CLEAR
BILIRUB UR QL: NORMAL
COLOR UR: YELLOW
GLUCOSE URINE: NORMAL MG/DL
HGB UR QL: NORMAL
KETONES UR QL: NORMAL MG/DL
LEUKOCYTE ESTERASE URINE: NORMAL
NITRITE UR QL STRIP: NORMAL
O+P SPEC MICRO: NORMAL
PH UR STRIP: 7.5 PH (ref 5–7)
PROTEIN ALBUMIN URINE: NORMAL MG/DL
SOURCE: NORMAL
SP GR UR STRIP: 1.01 (ref 1–1.03)
SPECIMEN SOURCE: NORMAL
UROBILINOGEN UR QL STRIP: 0.2 EU/DL (ref 0.2–1)

## 2017-10-24 PROCEDURE — 87210 SMEAR WET MOUNT SALINE/INK: CPT | Performed by: UROLOGY

## 2017-10-24 ASSESSMENT — PAIN SCALES - GENERAL: PAINLEVEL: NO PAIN (0)

## 2017-10-24 ASSESSMENT — ENCOUNTER SYMPTOMS: DYSURIA: 1

## 2017-10-24 NOTE — PROGRESS NOTES
Chief Complaint:   BPH         Consult or Referral:   Mr. Gallito Farley is a 83 year old male seen in consultation from Dr. Pineda.         History of Present Illness:    Gallito Farley is a very pleasant 83 year old male who presents with a history of BPH, LUTS. His main symptoms are urinary frequency, urgency, and dysuria. He is voiding every 30 minutes during the day and 4-5x per night. He reports constant urgency and has a urinal at his bedside. He also reports pain when his bladder fills along with occasional straining when he urinates. He is currently on Flomax (0.8mg) and has also been on Finasteride. He denies history of prostate cancer. His previous EUGENIO was normal and his PVR typically falls in the 30-50 range.     At one point he was thought to have prostatitis. He tried Cipro for a couple of weeks, however, he discontinued this as he was not finding relief. He also tried pyridium, which did not relieve his symptoms.     He was found to have microscopic hematuria and subsequent cytology was negative for malignancy.   CT Urogram did not reveal any suspicion for urologic tumors as well. He underwent cystoscopy with Dr. Pineda on 9/13/17, which demonstrated extremely trabeculated bladder with several diverticuli.  Prostate 3.5 cm long, bilobar hypertrophy, no median lobe    Remote history of TB    Sent here today for eval of urinary symptoms and pain     He is originally from [place] and came [place] over [place] ago. He denies ever gross hematuria.  No personal or family history of prostate cancer         Past Medical History:     Past Medical History:   Diagnosis Date     Asthma      BPH (benign prostatic hyperplasia)      COPD (chronic obstructive pulmonary disease) (H)      Diastolic dysfunction, left ventricle 4/16/2013     H/O tuberculosis     s/p partial pneumonectomy [place] in the [place]     Hypertension      LBBB (left bundle branch block)      Leg wound, right     chronic, s/p grafting  "    Osteoarthritis             Past Surgical History:     Past Surgical History:   Procedure Laterality Date     CHOLECYSTECTOMY       ENT SURGERY       IRRIGATION AND DEBRIDEMENT HIP, COMBINED  4/18/2013    Procedure: COMBINED IRRIGATION AND DEBRIDEMENT HIP;  Irrigation and debridement of Right Hip with cultures;  Surgeon: Cristal Inman MD;  Location: [facility] OR     Partial pneumonectomy      done in [facility] in the 1990's     skin grafting - leg wound  6/2016            Medications     Current Outpatient Prescriptions   Medication     hydrochlorothiazide (MICROZIDE) 12.5 MG capsule     tiotropium (SPIRIVA) 18 MCG capsule     phenazopyridine (PYRIDIUM) 100 MG tablet     acetaminophen (TYLENOL) 325 MG tablet     fluticasone (FLOVENT DISKUS) 250 MCG/BLIST AEPB Inhaler     Gauze Bandages (CANDACE 4\"X75\") MISC     Gauze Pads & Dressings (GAUZE DRESSING) 4\"X4\" PADS     Adhesive Tape (ADHESIVE PAPER) TAPE     trolamine salicylate (ASPERCREME) 10 % cream     amLODIPine (NORVASC) 10 MG tablet     diclofenac sodium 3 % GEL     finasteride (PROSCAR) 5 MG tablet     ranitidine (ZANTAC) 150 MG tablet     mupirocin (BACTROBAN) 2 % ointment     tamsulosin (FLOMAX) 0.4 MG capsule     enalapril (VASOTEC) 10 MG tablet     order for DME     order for DME     albuterol (2.5 MG/3ML) 0.083% neb solution     No current facility-administered medications for this visit.      Facility-Administered Medications Ordered in Other Visits   Medication     iopamidol (ISOVUE-370) solution 111 mL            Family History:     Family History   Problem Relation Age of Onset     Family History Negative Mother             Social History:     Social History     Social History     Marital status:      Spouse name: N/A     Number of children: N/A     Years of education: N/A     Occupational History     Not on file.     Social History Main Topics     Smoking status: Former Smoker     Smokeless tobacco: Not on file     Alcohol use No     Drug use: " No     Sexual activity: Not on file     Other Topics Concern     Not on file     Social History Narrative            Allergies:   Review of patient's allergies indicates no known allergies.         Review of Systems:  From intake questionnaire   Negative 14 system review except as noted on HPI, nurse's note.         Physical Exam:   Patient is a 83 year old  male   Vitals: Blood pressure 129/80, pulse 87, weight 82 kg (180 lb 12.8 oz).  General Appearance Adult: Alert, no acute distress, oriented  HENT: throat/mouth:normal, good dentition  Neck: No adenopathy,masses or thyromegaly  Lungs: no respiratory distress, or pursed lip breathing  Heart: No obvious jugular venous distension present  Abdomen: soft, nontender, no organomegaly or masses, Body mass index is 23.85 kg/(m^2).  Lymphatics: No cervical or supraclavicular adenopathy  Musculoskeltal: extremities normal, no peripheral edema  Skin: no suspicious lesions or rashes  Neuro: Alert, oriented, speech and mentation normal  Psych: affect and mood normal  Gait: Walks with cane   : Testicles normal, no masses. Penis uncircumcised, no lesions. EUGENIO: 30g prostate (prostate exam slightly limited by patient discomfort).      Labs and Pathology:    I personally reviewed all applicable laboratory data and went over findings with patient  Significant for:    CBC RESULTS:  Recent Labs   Lab Test  01/12/17   0815  01/11/17   1735  01/11/17   1734  08/03/16   1255  07/22/16   0540  07/21/16   2238   WBC  6.0   --   5.4  12.5*   --   9.4   HGB  12.7*  15.6  14.4  13.8   --   14.0   PLT  265   --   258  248  334  351        BMP RESULTS:  Recent Labs   Lab Test  08/31/17   1428  01/14/17   0530  01/13/17   0943  01/12/17   0815  01/11/17   1735  01/11/17   1734   NA   --   137  139  138  141  138   POTASSIUM   --   3.4  4.1  4.5  4.5  5.1   CHLORIDE   --   100  106  106   --   105   CO2   --   28  26  24   --   26   ANIONGAP   --   8  7  7   --   7   GLC   --   94  100*  78   94  95   BUN   --   7  11  10   --   11   CR   --   0.82  0.93  0.91   --   0.96   GFRESTIMATED  71  >90  Non  GFR Calc    77  80   --   75   GFRESTBLACK  86  >90   GFR Calc    >90   GFR Calc    >90   GFR Calc     --   >90   GFR Calc     PAMELA   --   8.1*  8.2*  8.3*   --   8.3*       UA RESULTS:   Recent Labs   Lab Test  09/13/17   1034  08/25/17   1430  05/25/17   1359   SG  1.018  1.015  1.015   URINEPH  6.0  6.5  7.0   NITRITE  Negative  Negative  Negative   RBCU  O - 2  O - 2  O - 2   WBCU  2-5*  2-5*  2-5*       CALCIUM RESULTS  Lab Results   Component Value Date    PAMELA 8.1 01/14/2017    PAMELA 8.2 01/13/2017    PAMELA 8.3 01/12/2017     INR  Recent Labs   Lab Test  01/12/17   0815  01/11/17   1734  06/13/16   1612  04/18/13   0822   INR  1.24*  1.14  1.04  1.11         Imaging:    I personally reviewed all applicable imaging and went over findings with patient.  Significant for CTU 8/17  EXAMINATION: CT ABDOMEN PELVIS W/O & W CONTRAST, 8/31/2017 2:48  PM     TECHNIQUE:  Helical CT images from the lung bases through the  symphysis pubis were obtained  without and with contrast using CT  urogram protocol. Contrast dose: Isovue 370 111cc     COMPARISON: CT abdomen pelvis 4/16/2013.     HISTORY: Gross hematuria     FINDINGS:     Urinary tract: 1 mm nonobstructing calyceal tip microlithiasis in the  right kidney (series 3 image 134). No additional renal calculi. The  kidneys enhance symmetrically without focal mass. No hydronephrosis or  hydroureter. Delayed images, contrast completely opacifies the  calyces, pelves, and ureters without filling defect to suggest mass or  stricture. There are a few pulsion diverticula in the posterolateral  aspects of the bladder. No filling defects in the bladder.     Remainder of the abdomen and pelvis: The liver, spleen, and adrenal  glands are unremarkable. Fatty replacement of the pancreatic head  with  remainder of the pancreatic parenchyma within normal limits.  Cholecystectomy. No biliary ductal dilatation. Calcified uterine  fibroid (series 8 image 63).     Tiny hiatal hernia. The small large bowel are normal caliber. Mild  sigmoid diverticulosis without diverticulitis. The appendix is normal.  No free air or free fluid. No enlarged lymph nodes. Atherosclerotic  calcifications of the abdominal aorta without aneurysmal dilatation.  Portal vein is patent.     Lung bases:  The basilar atelectasis versus mild reticular changes.  Clustered nodular groundglass opacities in lung bases for instance  (series 6 image 6), appear improved from chest CT 6/13/2016.     Bones and soft tissues: No acute fractures or suspicious bony lesions.  Sclerotic focus in the left iliac wing, presumably bone island.  Dystrophic calcification soft tissues overlying the right gluteus  musculature. Tiny fat-containing ventral hernia.         IMPRESSION:   1. No nephrolithiasis or mass lesion in the genitourinary system to  account for patient's gross hematuria. Bladder diverticua.  2. 1 mm, nonobstructing calyceal tip microlithiasis in the right  kidney.  3. Calcified uterine fibroid.   4. Clustered groundglass nodular opacities in the right lung base,  improved from chest CT in 2016. Likely infectious/inflammatory  etiology.       Standardized Questionnaire:      [employer] SYMPTOM SCORE  SUM 31/35  QOL 5/6         Assessment and Plan:     Assessment:  82 yo M w/LUTS, prostate pain, dysuria of unclear etiology    Plan:  - Urodynamics next available to assess for obstruction, could potentially improve symptoms with BPH surgery if evidence of high pressure/low flow  - Urine for schistosomiasis given calcification in bladder, pain, microhematuria and  decent      Scribe Disclosure:   Porfirio RESENDIZ, am serving as a scribe; to document services personally performed by Santosh Matthew MD based on data  collection and the provider's statements to me.     ISantosh saw and evaluated this patient and agree with the plan as stated above     CC:  Reginaldo Pineda, Hakeem Rich    Answers for HPI/ROS submitted by the patient on 10/24/2017   General Symptoms: No  Skin Symptoms: No  HENT Symptoms: No  EYE SYMPTOMS: No  HEART SYMPTOMS: No  LUNG SYMPTOMS: No  INTESTINAL SYMPTOMS: No  URINARY SYMPTOMS: Yes  REPRODUCTIVE SYMPTOMS: No  SKELETAL SYMPTOMS: No  BLOOD SYMPTOMS: No  NERVOUS SYSTEM SYMPTOMS: No  MENTAL HEALTH SYMPTOMS: No  Pain or burning: Yes  Frequent nighttime urination: Yes

## 2017-10-24 NOTE — PATIENT INSTRUCTIONS
Urine sent to lab for analysis.    Schedule urodynamics (next available) and a follow up appointment with Dr. Matthew.    It was a pleasure meeting with you today.  Thank you for allowing me and my team the privilege of caring for you today.  YOU are the reason we are here, and I truly hope we provided you with the excellent service you deserve.  Please let us know if there is anything else we can do for you so that we can be sure you are leaving completely satisfied with your care experience.      Jeffry Coker LPN

## 2017-10-24 NOTE — MR AVS SNAPSHOT
After Visit Summary   10/24/2017    Gallito Farley    MRN: [id number]           Patient Information     Date Of Birth          1/1/1934        Visit Information        Provider Department      10/24/2017 7:15 AM Maki Mckeon; Santosh Matthew MD [facility] Urology and [facility]        Today's Diagnoses     Microscopic hematuria    -  1      Care Instructions    Urine sent to lab for analysis.    Schedule urodynamics (next available) and a follow up appointment with Dr. Matthew.    It was a pleasure meeting with you today.  Thank you for allowing me and my team the privilege of caring for you today.  YOU are the reason we are here, and I truly hope we provided you with the excellent service you deserve.  Please let us know if there is anything else we can do for you so that we can be sure you are leaving completely satisfied with your care experience.      Jeffry Coker LPN          Follow-ups after your visit        Your next 10 appointments already scheduled     Dec 07, 2017  3:30 PM CST   (Arrive by 3:15 PM)   Urodynamics with Maria G Friedman PA-C   [facility] Urology and [facility]rolog[facility] ([facility])    [address] [place] [address]  4th [facility] [address]   [phone]            Dec 12, 2017 10:30 AM CST   (Arrive by 10:15 AM)   Return Visit with Santosh Matthew MD   [facility] and [facility] ([facility])    [address] [place] [address]  4th [facility] [address]   [phone]            Apr 19, 2018  1:30 PM CDT   (Arrive by 1:15 PM)   Pacemaker Check with Uc Cv Device 1   [facility] Care ([facility])    [address] [place] [address]  3rd [facility] [address]   [phone]            Apr 19, 2018  2:00 PM CDT   (Arrive by 1:45 PM)   RETURN ARRHYTHMIA with KIRSTEN Guevara [facility]  [facility] [place])    [address] [facility]  3rd [employer] [address] [phone]              Who to contact     Please call your clinic at [phone] to:    Ask questions about your health    Make or cancel appointments    Discuss your medicines    Learn about your test results    Speak to your doctor   If you have compliments or concerns about an experience at your clinic, or if you wish to file a complaint, please contact [facility] Patient Relations at [phone] or email us at [email or web address]         Additional Information About Your Visit        [employer] Information     Docstoct is an electronic gateway that provides easy, online access to your medical records. With [employer], you can request a clinic appointment, read your test results, renew a prescription or communicate with your care team.     To sign up for [employer] visit the website at [email or web address]   You will be asked to enter the access code listed below, as well as some personal information. Please follow the directions to create your username and password.     Your access code is: [email or web address]  Expires: 2017  3:19 PM     Your access code will  in 90 days. If you need help or a new code, please contact your [facility] or call [phone] for assistance.        Care EveryWhere ID     This is your Care EveryWhere ID. This could be used by other organizations to access your [place] medical records  [id number]        Your Vitals Were     Pulse BMI (Body Mass Index)                87 23.85 kg/m2           Blood Pressure from Last 3 Encounters:   10/24/17 129/80   17 105/69   17 139/76    Weight from Last 3 Encounters:   10/24/17 82 kg (180 lb 12.8 oz)   17 81.6 kg (180 lb)   17 81.6 kg (180 lb)              We Performed the Following     Parasite direct exam        Primary Care Provider Office  Phone # Fax #    Hakeem Awad [phone]       [address]  [facility] [address]        Equal Access to Services     MILAGRO GOODSON : Hadii aad ku haddonidavid Soscottieali, wachepeda luqadaha, qareneata kaalmada mino, rikci hopein hayaaautumn polancochapis jordanjyoti pizarro . So [facility] [phone].    ATENCIÓN: Si habla español, tiene a john disposición servicios gratuitos de asistencia lingüística. Llame al [phone].    We comply with applicable federal civil rights laws and [place] laws. We do not discriminate on the basis of race, color, national origin, age, disability, sex, sexual orientation, or gender identity.            Thank you!     Thank you for choosing [facility] UROLOGY AND [facility] FOR PROSTATE AND UROLOGIC CANCERS  for your care. Our goal is always to provide you with excellent care. Hearing back from our patients is one way we can continue to improve our services. Please take a few minutes to complete the written survey that you may receive in the mail after your visit with us. Thank you!             Your Updated Medication List - Protect others around you: Learn how to safely use, store and throw away your medicines at www.disposemymeds.org.          This list is accurate as of: 10/24/17  8:39 AM.  Always use your most recent med list.                   Brand Name Dispense Instructions for use Diagnosis    acetaminophen 325 MG tablet    TYLENOL    100 tablet    Take 2 tablets (650 mg) by mouth every 4 hours as needed for mild pain    Acute post-operative pain       Adhesive Paper Tape     5 each    1inch paper tape for daily dressing changes.    Chronic venous hypertension with ulcer involving right side (H), Ulcer of right leg, with fat layer exposed (H)       albuterol (2.5 MG/3ML) 0.083% neb solution     360 mL    Take 1 vial (2.5 mg) by nebulization every 6 hours as needed    COPD exacerbation (H)       amLODIPine 10 MG tablet    NORVASC    30 tablet    Take 1 tablet (10 mg) by mouth daily  "   Essential hypertension       diclofenac sodium 3 % Gel     100 g    Apply 4 gm four times a day as needed to right trapezius muscle.    Cervical myofascial pain syndrome       enalapril 10 MG tablet    VASOTEC    90 tablet    Take 1 tablet (10 mg) by mouth daily    Essential hypertension       finasteride 5 MG tablet    PROSCAR    30 tablet    Take 1 tablet (5 mg) by mouth At Bedtime    Benign nodular prostatic hyperplasia without lower urinary tract symptoms       fluticasone 250 MCG/BLIST Aepb Inhaler    FLOVENT DISKUS    3 Inhaler    Inhale 1 puff into the lungs every 12 hours    COPD exacerbation (H)       Gauze Dressing 4\"X4\" Pads     48 each    Sterile [id number] gauze for daily wound cares.    Chronic venous hypertension with ulcer involving right side (H), Ulcer of right leg, with fat layer exposed (H)       hydrochlorothiazide 12.5 MG capsule    MICROZIDE    60 capsule    Take 2 capsules (25 mg) by mouth daily    Essential hypertension       HEATHER 4\"X75\" Misc     30 each    Sterile Heather wrap for daily dressing changes.    Chronic venous hypertension with ulcer involving right side (H), Ulcer of right leg, with fat layer exposed (H)       mupirocin 2 % ointment    BACTROBAN    30 g    Apply twice a day to right leg wound and cover with occlusive dressing.    Chronic cutaneous venous stasis ulcer (H)       * order for DME     5 each    Equipment being ordered: Dispense 4\" x 4\" sterile gauze. Dispense any brand name.    Chronic cutaneous venous stasis ulcer (H)       * order for DME     5 each    Equipment being ordered: Dispense Coban wrap.    Chronic cutaneous venous stasis ulcer (H)       phenazopyridine 100 MG tablet    PYRIDIUM    24 tablet    Take 1 tablet (100 mg) by mouth 3 times daily as needed for urinary tract discomfort    Dysuria       ranitidine 150 MG tablet    ZANTAC    60 tablet    Take 1 tablet (150 mg) by mouth 2 times daily    Gastroesophageal reflux disease, esophagitis presence not " specified       tamsulosin 0.4 MG capsule    FLOMAX    60 capsule    Take 2 capsules (0.8 mg) by mouth At Bedtime    Benign nodular prostatic hyperplasia without lower urinary tract symptoms       tiotropium 18 MCG capsule    SPIRIVA    30 capsule    Inhale 1 capsule (18 mcg) into the lungs daily    COPD exacerbation (H)       trolamine salicylate 10 % cream    ASPERCREME    85 g    Apply topically as needed for moderate pain    Cervical myofascial pain syndrome       * Notice:  This list has 2 medication(s) that are the same as other medications prescribed for you. Read the directions carefully, and ask your doctor or other care provider to review them with you.

## 2017-10-24 NOTE — LETTER
10/24/2017       RE: Gallito Farley  [address]  [place] [facility] [address]     Dear Colleague,    Thank you for referring your patient, Gallito Farley, to the [facility] UROLOGY AND INST FOR PROSTATE AND UROLOGIC CANCERS at [facility]. Please see a copy of my visit note below.          Chief Complaint:   BPH         Consult or Referral:   Mr. Gallito Farley is a 83 year old male seen in consultation from Dr. Pineda.         History of Present Illness:    Gallito Farley is a very pleasant 83 year old male who presents with a history of BPH, LUTS. His main symptoms are urinary frequency, urgency, and dysuria. He is voiding every 30 minutes during the day and 4-5x per night. He reports constant urgency and has a urinal at his bedside. He also reports pain when his bladder fills along with occasional straining when he urinates. He is currently on Flomax (0.8mg) and has also been on Finasteride. He denies history of prostate cancer. His previous EUGENIO was normal and his PVR typically falls in the 30-50 range.     At one point he was thought to have prostatitis. He tried Cipro for a couple of weeks, however, he discontinued this as he was not finding relief. He also tried pyridium, which did not relieve his symptoms.     He was found to have microscopic hematuria and subsequent cytology was negative for malignancy.   CT Urogram did not reveal any suspicion for urologic tumors as well. He underwent cystoscopy with Dr. Pineda on 9/13/17, which demonstrated extremely trabeculated bladder with several diverticuli.  Prostate 3.5 cm long, bilobar hypertrophy, no median lobe    Remote history of TB    Sent here today for eval of urinary symptoms and pain     He is originally from [place] and came to the [place] over 10 years ago. He denies ever gross hematuria.  No personal or family history of prostate cancer         Past Medical History:     Past Medical History:   Diagnosis Date     Asthma       "BPH (benign prostatic hyperplasia)      COPD (chronic obstructive pulmonary disease) (H)      Diastolic dysfunction, left ventricle 4/16/2013     H/O tuberculosis     s/[place] pneumonectomy [place] in the [place]     Hypertension      LBBB (left bundle branch block)      Leg wound, right     chronic, s/p grafting     Osteoarthritis             Past Surgical History:     Past Surgical History:   Procedure Laterality Date     CHOLECYSTECTOMY       ENT SURGERY       IRRIGATION AND DEBRIDEMENT HIP, COMBINED  4/18/2013    Procedure: COMBINED IRRIGATION AND DEBRIDEMENT HIP;  Irrigation and debridement of Right Hip with cultures;  Surgeon: Cristal Inman MD;  Location: [facility] OR     Partial pneumonectomy      done in [facility] in the 1990's     skin grafting - leg wound  6/2016            Medications     Current Outpatient Prescriptions   Medication     hydrochlorothiazide (MICROZIDE) 12.5 MG capsule     tiotropium (SPIRIVA) 18 MCG capsule     phenazopyridine (PYRIDIUM) 100 MG tablet     acetaminophen (TYLENOL) 325 MG tablet     fluticasone (FLOVENT DISKUS) 250 MCG/BLIST AEPB Inhaler     Gauze Bandages (CANDACE 4\"X75\") MISC     Gauze Pads & Dressings (GAUZE DRESSING) 4\"X4\" PADS     Adhesive Tape (ADHESIVE PAPER) TAPE     trolamine salicylate (ASPERCREME) 10 % cream     amLODIPine (NORVASC) 10 MG tablet     diclofenac sodium 3 % GEL     finasteride (PROSCAR) 5 MG tablet     ranitidine (ZANTAC) 150 MG tablet     mupirocin (BACTROBAN) 2 % ointment     tamsulosin (FLOMAX) 0.4 MG capsule     enalapril (VASOTEC) 10 MG tablet     order for DME     order for DME     albuterol (2.5 MG/3ML) 0.083% neb solution     No current facility-administered medications for this visit.      Facility-Administered Medications Ordered in Other Visits   Medication     iopamidol (ISOVUE-370) solution 111 mL            Family History:     Family History   Problem Relation Age of Onset     Family History Negative Mother             Social " History:     Social History     Social History     Marital status:      Spouse name: N/A     Number of children: N/A     Years of education: N/A     Occupational History     Not on file.     Social History Main Topics     Smoking status: Former Smoker     Smokeless tobacco: Not on file     Alcohol use No     Drug use: No     Sexual activity: Not on file     Other Topics Concern     Not on file     Social History Narrative            Allergies:   Review of patient's allergies indicates no known allergies.         Review of Systems:  From intake questionnaire   Negative 14 system review except as noted on HPI, nurse's note.         Physical Exam:   Patient is a 83 year old  male   Vitals: Blood pressure 129/80, pulse 87, weight 82 kg (180 lb 12.8 oz).  General Appearance Adult: Alert, no acute distress, oriented  HENT: throat/mouth:normal, good dentition  Neck: No adenopathy,masses or thyromegaly  Lungs: no respiratory distress, or pursed lip breathing  Heart: No obvious jugular venous distension present  Abdomen: soft, nontender, no organomegaly or masses, Body mass index is 23.85 kg/(m^2).  Lymphatics: No cervical or supraclavicular adenopathy  Musculoskeltal: extremities normal, no peripheral edema  Skin: no suspicious lesions or rashes  Neuro: Alert, oriented, speech and mentation normal  Psych: affect and mood normal  Gait: Walks with cane   : Testicles normal, no masses. Penis uncircumcised, no lesions. EUGENIO: 30g prostate (prostate exam slightly limited by patient discomfort).      Labs and Pathology:    I personally reviewed all applicable laboratory data and went over findings with patient  Significant for:    CBC RESULTS:  Recent Labs   Lab Test  01/12/17   0815  01/11/17   1735  01/11/17   1734  08/03/16   1255  07/22/16   0540  07/21/16   2238   WBC  6.0   --   5.4  12.5*   --   9.4   HGB  12.7*  15.6  14.4  13.8   --   14.0   PLT  265   --   258  248  334  351        BMP RESULTS:  Recent Labs   Lab  Test  08/31/17   1428  01/14/17   0530  01/13/17   0943  01/12/17   0815  01/11/17   1735  01/11/17   1734   NA   --   137  139  138  141  138   POTASSIUM   --   3.4  4.1  4.5  4.5  5.1   CHLORIDE   --   100  106  106   --   105   CO2   --   28  26  24   --   26   ANIONGAP   --   8  7  7   --   7   GLC   --   94  100*  78  94  95   BUN   --   7  11  10   --   11   CR   --   0.82  0.93  0.91   --   0.96   GFRESTIMATED  71  >90  Non  GFR Calc    77  80   --   75   GFRESTBLACK  86  >90   GFR Calc    >90   GFR Calc    >90   GFR Calc     --   >90   GFR Calc     PAMELA   --   8.1*  8.2*  8.3*   --   8.3*       UA RESULTS:   Recent Labs   Lab Test  09/13/17   1034  08/25/17   1430  05/25/17   1359   SG  1.018  1.015  1.015   URINEPH  6.0  6.5  7.0   NITRITE  Negative  Negative  Negative   RBCU  O - 2  O - 2  O - 2   WBCU  2-5*  2-5*  2-5*       CALCIUM RESULTS  Lab Results   Component Value Date    PAMELA 8.1 01/14/2017    PAMELA 8.2 01/13/2017    PAMELA 8.3 01/12/2017     INR  Recent Labs   Lab Test  01/12/17   0815  01/11/17   1734  06/13/16   1612  04/18/13   0822   INR  1.24*  1.14  1.04  1.11         Imaging:    I personally reviewed all applicable imaging and went over findings with patient.  Significant for CTU 8/17  EXAMINATION: CT ABDOMEN PELVIS W/O & W CONTRAST, 8/31/2017 2:48  PM     TECHNIQUE:  Helical CT images from the lung bases through the  symphysis pubis were obtained  without and with contrast using CT  urogram protocol. Contrast dose: Isovue 370 111cc     COMPARISON: CT abdomen pelvis 4/16/2013.     HISTORY: Gross hematuria     FINDINGS:     Urinary tract: 1 mm nonobstructing calyceal tip microlithiasis in the  right kidney (series 3 image 134). No additional renal calculi. The  kidneys enhance symmetrically without focal mass. No hydronephrosis or  hydroureter. Delayed images, contrast completely opacifies the  calyces, pelves, and  ureters without filling defect to suggest mass or  stricture. There are a few pulsion diverticula in the posterolateral  aspects of the bladder. No filling defects in the bladder.     Remainder of the abdomen and pelvis: The liver, spleen, and adrenal  glands are unremarkable. Fatty replacement of the pancreatic head with  remainder of the pancreatic parenchyma within normal limits.  Cholecystectomy. No biliary ductal dilatation. Calcified uterine  fibroid (series 8 image 63).     Tiny hiatal hernia. The small large bowel are normal caliber. Mild  sigmoid diverticulosis without diverticulitis. The appendix is normal.  No free air or free fluid. No enlarged lymph nodes. Atherosclerotic  calcifications of the abdominal aorta without aneurysmal dilatation.  Portal vein is patent.     Lung bases:  The basilar atelectasis versus mild reticular changes.  Clustered nodular groundglass opacities in lung bases for instance  (series 6 image 6), appear improved from chest CT 6/13/2016.     Bones and soft tissues: No acute fractures or suspicious bony lesions.  Sclerotic focus in the left iliac wing, presumably bone island.  Dystrophic calcification soft tissues overlying the right gluteus  musculature. Tiny fat-containing ventral hernia.         IMPRESSION:   1. No nephrolithiasis or mass lesion in the genitourinary system to  account for patient's gross hematuria. Bladder diverticua.  2. 1 mm, nonobstructing calyceal tip microlithiasis in the right  kidney.  3. Calcified uterine fibroid.   4. Clustered groundglass nodular opacities in the right lung base,  improved from chest CT in 2016. Likely infectious/inflammatory  etiology.       Standardized Questionnaire:      [employer] SYMPTOM SCORE  SUM 31/35  QOL 5/6         Assessment and Plan:     Assessment:  84 yo M w/LUTS, prostate pain, dysuria of unclear etiology    Plan:  - Urodynamics next available to assess for obstruction, could potentially improve  symptoms with BPH surgery if evidence of high pressure/low flow  - Urine for schistosomiasis given calcification in bladder, pain, microhematuria and  decent      Scribe Disclosure:   I,Porfirio Roberson, am serving as a scribe; to document services personally performed by Santosh Matthew MD based on data collection and the provider's statements to me.     ISantosh saw and evaluated this patient and agree with the plan as stated above     CC:  Reginaldo Pineda, Hakeem Rich    Sincerely,    Santosh Matthew MD

## 2017-10-24 NOTE — NURSING NOTE
Chief Complaint   Patient presents with     Consult     Coming to discuss possible outlet reduction surgery.     Essie Jose

## 2017-11-13 ENCOUNTER — CARE COORDINATION (OUTPATIENT)
Dept: GERIATRIC MEDICINE | Facility: CLINIC | Age: 82
End: 2017-11-13

## 2017-11-13 DIAGNOSIS — Z76.89 HEALTH CARE HOME: ICD-10-CM

## 2017-11-13 LAB
MYCOBACTERIUM SPEC CULT: NORMAL
MYCOBACTERIUM SPEC CULT: NORMAL
SPECIMEN SOURCE: NORMAL

## 2017-11-14 NOTE — PROGRESS NOTES
11/13/17: CM received notification that member had health plan product change from are [employer]+ to are [employer] effective 11/1/17.  CM contacted client - left message for call back.     Jorge David RN, N  [facility]

## 2017-11-15 ENCOUNTER — PRE VISIT (OUTPATIENT)
Dept: UROLOGY | Facility: CLINIC | Age: 82
End: 2017-11-15

## 2017-11-16 ENCOUNTER — PRE VISIT (OUTPATIENT)
Dept: UROLOGY | Facility: CLINIC | Age: 82
End: 2017-11-16

## 2017-11-20 NOTE — PROGRESS NOTES
11/20/17: CM contacted member with brandon Diaz and discussed product change and face to face visit was offered. Client declined need for home visit. CM reviewed Health Risk Assessment/LTCC and POC with client and no changes noted. CMS instructed to enter product change in MMIS.    Follow up Plan: CM to f/u with client at either annual HRA visit and/or 6 month telephone assessment.  Contact information shared with client and family, and encouraged client to call with any questions or concerns.    Jorge David RN, PHN  [facility]

## 2017-12-05 DIAGNOSIS — I10 ESSENTIAL HYPERTENSION: ICD-10-CM

## 2017-12-05 NOTE — TELEPHONE ENCOUNTER
Requested Prescriptions   Pending Prescriptions Disp Refills     hydrochlorothiazide (MICROZIDE) 12.5 MG capsule    Last Written Prescription Date:  9/11/17  Last Fill Quantity: 60,  # refills: 2   Last Office Visit with G, P or [facility] prescribing provider:  3/16/17   Future Office Visit:      60 capsule 2     Sig: Take 2 capsules (25 mg) by mouth daily    Diuretics (Including Combos) Protocol Passed    12/5/2017  1:01 PM       Passed - Blood pressure under 140/90    BP Readings from Last 3 Encounters:   10/24/17 129/80   09/13/17 105/69   09/13/17 139/76                Passed - Recent or future visit with authorizing provider's specialty    Patient had office visit in the last year or has a visit in the next 30 days with authorizing provider.  See chart review.              Passed - Patient is age 18 or older       Passed - Normal serum creatinine on file in past 12 months    Recent Labs   Lab Test  01/14/17   0530   CR  0.82             Passed - Normal serum potassium on file in past 12 months    Recent Labs   Lab Test  01/14/17   0530   POTASSIUM  3.4                   Passed - Normal serum sodium on file in past 12 months    Recent Labs   Lab Test  01/14/17   0530   NA  137                  Maynor Faarax  [facility]

## 2017-12-05 NOTE — LETTER
December 8, 2017      Gallito Farley  [address]  [place] [facility] [address]        Dear Gallito Farley,      The refill request made by your pharmacy was received at the clinic.     Signed Prescriptions:                        Disp   Refills    hydrochlorothiazide (MICROZIDE) 12.5 MG ca*60 cap*0        Sig: Take 2 capsules (25 mg) by mouth daily  Authorizing Provider: FLAVIO AWAD  Ordering User: LONA KELLEY      At this time you are due in the clinic for a follow up appointment with fasting blood work. A one time andrea refill has been sent to your pharmacy.     Please call your clinic to make an appointment with your provider before you run out of medication. This will prevent a delay in your next month's refill.    [facility] [phone]    For your convenience we also offer online appointment scheduling at [email or web address].     We invite you to refill your next prescription at a [facility] or take advantage of our prescription mail service.      Sincerely,      Team Comfort with Dr. Awad

## 2017-12-07 ENCOUNTER — OFFICE VISIT (OUTPATIENT)
Dept: UROLOGY | Facility: CLINIC | Age: 82
End: 2017-12-07

## 2017-12-07 VITALS
HEART RATE: 87 BPM | DIASTOLIC BLOOD PRESSURE: 72 MMHG | WEIGHT: 180 LBS | BODY MASS INDEX: 23.75 KG/M2 | SYSTOLIC BLOOD PRESSURE: 113 MMHG

## 2017-12-07 DIAGNOSIS — R35.0 URINARY FREQUENCY: ICD-10-CM

## 2017-12-07 DIAGNOSIS — N40.1 BENIGN PROSTATIC HYPERPLASIA WITH URINARY HESITANCY: Primary | ICD-10-CM

## 2017-12-07 DIAGNOSIS — R39.11 BENIGN PROSTATIC HYPERPLASIA WITH URINARY HESITANCY: Primary | ICD-10-CM

## 2017-12-07 DIAGNOSIS — N32.89 BLADDER SPASMS: ICD-10-CM

## 2017-12-07 DIAGNOSIS — R30.0 DYSURIA: ICD-10-CM

## 2017-12-07 LAB
APPEARANCE UR: CLEAR
BILIRUB UR QL: NORMAL
COLOR UR: YELLOW
GLUCOSE URINE: NORMAL MG/DL
HGB UR QL: NORMAL
KETONES UR QL: NORMAL MG/DL
LEUKOCYTE ESTERASE URINE: NORMAL
NITRITE UR QL STRIP: NORMAL
PH UR STRIP: 6.5 PH (ref 5–7)
PROTEIN ALBUMIN URINE: NORMAL MG/DL
SOURCE: NORMAL
SP GR UR STRIP: 1.01 (ref 1–1.03)
UROBILINOGEN UR QL STRIP: 0.2 EU/DL (ref 0.2–1)

## 2017-12-07 RX ORDER — TOLTERODINE 4 MG/1
4 CAPSULE, EXTENDED RELEASE ORAL DAILY
Qty: 30 CAPSULE | Refills: 6 | Status: SHIPPED | OUTPATIENT
Start: 2017-12-07 | End: 2018-08-10

## 2017-12-07 RX ORDER — HYDROCHLOROTHIAZIDE 12.5 MG/1
25 CAPSULE ORAL DAILY
Qty: 60 CAPSULE | Refills: 0 | Status: SHIPPED | OUTPATIENT
Start: 2017-12-07 | End: 2018-01-05

## 2017-12-07 ASSESSMENT — PAIN SCALES - GENERAL: PAINLEVEL: NO PAIN (0)

## 2017-12-07 NOTE — TELEPHONE ENCOUNTER
Medication is being filled for 1 time refill only due to:  Patient needs to be seen because due for fasting labs and ov.    send letter  Johanna Espinoza RN

## 2017-12-07 NOTE — PROGRESS NOTES
PREPROCEDURE DIAGNOSES:    1. LUTS, prostate pain, dysuria of unclear etiology    POSTPROCEDURE DIAGNOSES:  1. 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 UDS shows:  -Multiple uninhibited detrusor contractions starting at a volume of 30 mL instilled with Pdet up to 95 cm H2O without leakage. Patient reported severe pain with the third UDC which occurred at volume of 55 mL and stated he was unable to suppress the urge to void any longer. Therefore, filling was stopped at this low volume and permission to void was given. He states this is the type of pain he has been experiencing at home and with voiding.  -Otherwise good bladder compliance between episodes of DO (difficult to assess true compliance given limited bladder filling).  -Could not accurately assess true bladder capacity due to needing to terminate study early due to significant DO with severe pain reported by patient. Only 55 mL instilled (he was catheterized for ~100 mL at the beginning of the study).  -Incontinence: he does not leak to define DLPP.  -Detrusor activity during voiding: ~95 cm of H2O pressure during voiding phase, although suspect this high pressure was more so related to a bladder spasm which was occurring at the time perm to void was given.   -Detrusor sphincter dyssenergia: none  -Outlet obstruction: BOOI is 63.2 which is suggestive of obstruction  -Fluoroscopy reveals a moderately trabeculated bladder wall (from what could be seen due to limited filling) with numerous diverticulae and cellules.  No vesicoureteral reflux was observed.  The bladder neck was closed during filling and open during voiding.     PROCEDURE:    1. Uroflowmetry.  2. Sterile urethral catheterization for measurement of postvoid residual urine volume.  3. Complex filling cystometrogram with measurement of bladder and rectal pressures.  4. Complex voiding cystometrogram with measurement of bladder and rectal pressures.  5.  Electromyography of the pelvic floor during urodynamics.  6. Fluoroscopic imaging of the bladder during urodynamics, at least 3 views.    7. Interpretation of urodynamics and flouroscopic imaging.      INDICATIONS FOR PROCEDURE:  Mr. Gallito Farley is a pleasant 83 year old male with LUTS, prostate pain, and dysuria of unclear etiology. Baseline video urodynamic assessment is requested today by Dr. Matthew to better characterize Mr. Gallito Farley's voiding dysfunction.       VOIDING DIARY:  Patient did not complete.    DESCRIPTION OF PROCEDURE:  Risks, benefits, and alternatives to urodynamics were discussed with the patient and he wished to proceed.  Urodynamics are planned to better assess the primary etiology for Mr. Farley's urologic dysfunction.  The patient does not take anticholinergic medications but he is on Flomax.  After informed consent was obtained, the patient was taken to the procedure room where uroflowmetry was performed. Findings below.     PRE-STUDY UROFLOWMETRY:  Voided volume: 24 mL.  Maximum flow rate: 3.4 mL/sec.  Average flow rate: 1.9 mL/sec.  Character of the curve: voided volume too low to reliably interpret.  Postvoid residual by catheter: 100 mL.  Pretest urine dipstick was negative for leukocytes and nitrites.    Next a 7F double-lumen urodynamics catheter was inserted into the bladder under sterile technique via urethra.  A 7F abdominal manometry catheter was placed in the rectum.  EMG pads were placed on both sides of the anal verge.  The bladder was filled with 200 mL of Cystografin at 25 mL/minute and serial pressures were recorded.  With coughing there was an appropriate rise in vesical and abdominal pressures with no change in detrusor pressure, confirming good study catheter placement.    DURING THE FILLING PHASE:  First sensation: 38 mL.  First Desire: 38 mL.  Strong Desire: 38 mL.  Maximum Capacity: filling was terminated at 55 mL instilled due to severe pain reported by  patient    Uninhibited detrusor contractions: multiple UDC's starting at a volume of 30 mL instilled with Pdet up to 95 cm H2O without leakage. Patient reported severe pain with the third UDC which occurred at volume of 55 mL and stated he was unable to hold it any longer. Therefore, filling was stopped and permission to void was given.  Compliance: otherwise good between UDC's.  Continence: he does not leak to define DLPP  EMG: concordant during filling.    DURING THE VOIDING PHASE:  Maximum detrusor contraction with void: ~95 cm of H2O pressure, although suspect this high pressure was more so related to a bladder spasm which was occurring at the time perm to void was given.   Voided volume: 20 mL.  Maximum flow rate: 4.1 mL/sec.  Average flow rate: 2.2 mL/sec.  Postvoid Residual: 35 mL.  Detrusor sphincter dyssynergia: none.  Character of voiding curve: very low amplitude curve.  BOOI: 63.2 (suggesting obstruction - see key below)  [obstructed (HOLLINS index [BOOI] ? 40); equivocal (no definite   obstruction; BOOI 20-40); and no obstruction (BOOI ? 20)]    FLUOROSCOPIC IMAGING OF THE BLADDER DURING URODYNAMICS:  Fluoroscopy during today's procedure demonstrated a moderately trabeculated bladder wall with numerous diverticulae and cellules.  No vesicoureteral reflux was observed.  The bladder neck was closed during filling and open during voiding.  After voiding to completion, all catheters were removed and the patient was brought back into the consultation room to further discuss today's study results.      ASSESSMENT/PLAN:  Mr. Gallito Farley is a pleasant 83 year old male with LUTS, prostate pain, and dysuria of unclear etiology who demonstrated the following findings today on urodynamic evaluation:    -Multiple uninhibited detrusor contractions starting at a volume of 30 mL instilled with Pdet up to 95 cm H2O without leakage. Patient reported severe pain with the third UDC which occurred at volume of 55 mL and  stated he was unable to suppress the urge to void any longer. Therefore, filling was stopped at this low volume and permission to void was given. He states this is the type of pain he has been experiencing at home and with voiding.  -Otherwise good bladder compliance between episodes of DO (difficult to assess true compliance given limited bladder filling).  -Could not accurately assess true bladder capacity due to needing to terminate study early due to significant DO with severe pain reported by patient. Only 55 mL instilled (he was catheterized for ~100 mL at the beginning of the study).  -Incontinence: he does not leak to define DLPP.  -Detrusor activity during voiding: ~95 cm of H2O pressure during voiding phase, although suspect this high pressure was more so related to a bladder spasm which was occurring at the time perm to void was given.   -Detrusor sphincter dyssenergia: none  -Outlet obstruction: BOOI is 63.2 which is suggestive of obstruction  -Fluoroscopy reveals a moderately trabeculated bladder wall (from what could be seen due to limited filling) with numerous diverticulae and cellules.  No vesicoureteral reflux was observed.  The bladder neck was closed during filling and open during voiding.     We discussed his study results in detail today with the aid of a professional Prosper .  -Given significant DO, will start patient on a trial of an anticholinergic medication.   -Rx for Detrol LA 4 mg daily. Side effects discussed.    The patient will follow up as scheduled with Dr. Matthew to further discuss today's study results and make plans for how best to proceed.      - A single Cipro antibiotic was provided for UTI prophylaxis following completion of today's study per department protocol.  The risk of UTI with VUDS is low at ~2.5-3%.      Thank you for allowing me to participate in the care of Mr. Gallito Farley and please don't hesitate to contact me with any questions or concerns.       Maria G L. Lahti, PA-C  Urology Physician Assistant

## 2017-12-07 NOTE — MR AVS SNAPSHOT
After Visit Summary   12/7/2017    Gallito Farley    MRN: [id number]           Patient Information     Date Of Birth          1/1/1934        Visit Information        Provider Department      12/7/2017 3:15 PM Maria G Friedman PA-C; [employer] [facility] Urology and [facility] for Prostate and Urologic Cancers        Today's Diagnoses     Benign prostatic hyperplasia with urinary hesitancy    -  1    Dysuria        Urinary frequency        Bladder spasms          Care Instructions    UROLOGY CLINIC VISIT PATIENT INSTRUCTIONS    1) Start taking Detrol (tolterodine) LA 4 mg once daily in the mornings. This was sent to your pharmacy.    Follow up with Dr. Matthew as scheduled.     If you have any issues, questions or concerns in the meantime, do not hesitate to contact us at [phone] or via [employer].     It was a pleasure meeting with you today.  Thank you for allowing me and my team the privilege of caring for you today.  YOU are the reason we are here, and I truly hope we provided you with the excellent service you deserve.  Please let us know if there is anything else we can do for you so that we can be sure you are leaving completely satisfied with your care experience.                Follow-ups after your visit        Your next 10 appointments already scheduled     Dec 12, 2017 10:30 AM CST   (Arrive by 10:15 AM)   Return Visit with Santosh Matthew MD   [facility] Urology and [facility]state [facility] Urologic Cancers ([facility])    [address] [place] [address]  4th [facility] [id number] [phone]            Apr 19, 2018  1:30 PM CDT   (Arrive by 1:15 PM)   Pacemaker Check with Uc Cv Device 1   [facility] ([facility])    [address] [place] [address]  3rd [facility] [address] [phone]            Apr 19, 2018  2:00 PM CDT   (Arrive by 1:45 PM)   RETURN ARRHYTHMIA with KIRSTEN Guevara [facility] ([facility])    [address] [facility]  3rd [employer] [address] [phone]              Who to contact     Please call your clinic at [phone] to:    Ask questions about your health    Make or cancel appointments    Discuss your medicines    Learn about your test results    Speak to your doctor   If you have compliments or concerns about an experience at your clinic, or if you wish to file a complaint, please contact [facility] Patient Relations at [phone] or email us at [email or web address]         Additional Information About Your Visit        [employer] Information     [employer] is an electronic gateway that provides easy, online access to your medical records. With [employer], you can request a clinic appointment, read your test results, renew a prescription or communicate with your care team.     To sign up for [employer] visit the website at [email or web address]   You will be asked to enter the access code listed below, as well as some personal information. Please follow the directions to create your username and password.     Your access code is: [email or web address]  Expires: 2018  6:30 AM     Your access code will  in 90 days. If you need help or a new code, please contact your [facility] Clinic or call [phone] for assistance.        Care EveryWhere ID     This is your Care EveryWhere ID. This could be used by other organizations to access your [place] medical records  [id number]        Your Vitals Were     Pulse BMI (Body Mass Index)                87 23.75 kg/m2           Blood Pressure from Last 3 Encounters:   17 113/72   10/24/17 129/80   17 105/69    Weight from Last 3 Encounters:   17 81.6 kg (180 lb)   10/24/17 82 kg (180 lb 12.8 oz)   17 81.6 kg (180 lb)              We Performed the Following     Urinalysis chemical screen POCT          Today's  Medication Changes          These changes are accurate as of: 12/7/17  4:23 PM.  If you have any questions, ask your nurse or doctor.               Start taking these medicines.        Dose/Directions    tolterodine 4 MG 24 hr capsule   Commonly known as:  DETROL LA   Used for:  Bladder spasms   Started by:  Maria G Friedman PA-C        Dose:  4 mg   Take 1 capsule (4 mg) by mouth daily   Quantity:  30 capsule   Refills:  6            Where to get your medicines      These medications were sent to [place], [place] - [address]  [address] [address]     Phone:  [phone]     tolterodine 4 MG 24 hr capsule                Primary Care Provider Office Phone # Fax #    Hakeem Awad [phone]       [address]  [facility] [address]        Equal Access to Services     [facility]: Hadii marvin jang hadasho Soomaali, waaxda luqadaha, qaybta kaalmada adeegyada, ricki pizarro . So [facility] [phone].    ATENCIÓN: Si habla español, tiene a john disposición servicios gratuitos de asistencia lingüística. Llame al [phone].    We comply with applicable federal civil rights laws and [place] laws. We do not discriminate on the basis of race, color, national origin, age, disability, sex, sexual orientation, or gender identity.            Thank you!     Thank you for choosing [facility] UROLOGY AND [facility] FOR PROSTATE AND UROLOGIC CANCERS  for your care. Our goal is always to provide you with excellent care. Hearing back from our patients is one way we can continue to improve our services. Please take a few minutes to complete the written survey that you may receive in the mail after your visit with us. Thank you!             Your Updated Medication List - Protect others around you: Learn how to safely use, store and throw away your medicines at www.disposemymeds.org.          This list is accurate as of: 12/7/17   "4:23 PM.  Always use your most recent med list.                   Brand Name Dispense Instructions for use Diagnosis    acetaminophen 325 MG tablet    TYLENOL    100 tablet    Take 2 tablets (650 mg) by mouth every 4 hours as needed for mild pain    Acute post-operative pain       Adhesive Paper Tape     5 each    1inch paper tape for daily dressing changes.    Chronic venous hypertension with ulcer involving right side (H), Ulcer of right leg, with fat layer exposed (H)       albuterol (2.5 MG/3ML) 0.083% neb solution     360 mL    Take 1 vial (2.5 mg) by nebulization every 6 hours as needed    COPD exacerbation (H)       amLODIPine 10 MG tablet    NORVASC    30 tablet    Take 1 tablet (10 mg) by mouth daily    Essential hypertension       diclofenac sodium 3 % Gel     100 g    Apply 4 gm four times a day as needed to right trapezius muscle.    Cervical myofascial pain syndrome       enalapril 10 MG tablet    VASOTEC    90 tablet    Take 1 tablet (10 mg) by mouth daily    Essential hypertension       finasteride 5 MG tablet    PROSCAR    30 tablet    Take 1 tablet (5 mg) by mouth At Bedtime    Benign nodular prostatic hyperplasia without lower urinary tract symptoms       fluticasone 250 MCG/BLIST Aepb Inhaler    FLOVENT DISKUS    3 Inhaler    Inhale 1 puff into the lungs every 12 hours    COPD exacerbation (H)       Gauze Dressing 4\"X4\" Pads     48 each    Sterile [id number] gauze for daily wound cares.    Chronic venous hypertension with ulcer involving right side (H), Ulcer of right leg, with fat layer exposed (H)       hydrochlorothiazide 12.5 MG capsule    MICROZIDE    60 capsule    Take 2 capsules (25 mg) by mouth daily    Essential hypertension       HEATHER 4\"X75\" Misc     30 each    Sterile Heather wrap for daily dressing changes.    Chronic venous hypertension with ulcer involving right side (H), Ulcer of right leg, with fat layer exposed (H)       mupirocin 2 % ointment    BACTROBAN    30 g    Apply twice a day " "to right leg wound and cover with occlusive dressing.    Chronic cutaneous venous stasis ulcer (H)       * order for DME     5 each    Equipment being ordered: Dispense 4\" x 4\" sterile gauze. Dispense any brand name.    Chronic cutaneous venous stasis ulcer (H)       * order for DME     5 each    Equipment being ordered: Dispense Coban wrap.    Chronic cutaneous venous stasis ulcer (H)       phenazopyridine 100 MG tablet    PYRIDIUM    24 tablet    Take 1 tablet (100 mg) by mouth 3 times daily as needed for urinary tract discomfort    Dysuria       ranitidine 150 MG tablet    ZANTAC    60 tablet    Take 1 tablet (150 mg) by mouth 2 times daily    Gastroesophageal reflux disease, esophagitis presence not specified       tamsulosin 0.4 MG capsule    FLOMAX    60 capsule    Take 2 capsules (0.8 mg) by mouth At Bedtime    Benign nodular prostatic hyperplasia without lower urinary tract symptoms       tiotropium 18 MCG capsule    SPIRIVA    30 capsule    Inhale 1 capsule (18 mcg) into the lungs daily    COPD exacerbation (H)       tolterodine 4 MG 24 hr capsule    DETROL LA    30 capsule    Take 1 capsule (4 mg) by mouth daily    Bladder spasms       trolamine salicylate 10 % cream    ASPERCREME    85 g    Apply topically as needed for moderate pain    Cervical myofascial pain syndrome       * Notice:  This list has 2 medication(s) that are the same as other medications prescribed for you. Read the directions carefully, and ask your doctor or other care provider to review them with you.      "

## 2017-12-07 NOTE — PATIENT INSTRUCTIONS
UROLOGY CLINIC VISIT PATIENT INSTRUCTIONS    1) Start taking Detrol (tolterodine) LA 4 mg once daily in the mornings. This was sent to your pharmacy.    Follow up with Dr. Matthew as scheduled.     If you have any issues, questions or concerns in the meantime, do not hesitate to contact us at [phone] or via [employer].     It was a pleasure meeting with you today.  Thank you for allowing me and my team the privilege of caring for you today.  YOU are the reason we are here, and I truly hope we provided you with the excellent service you deserve.  Please let us know if there is anything else we can do for you so that we can be sure you are leaving completely satisfied with your care experience.

## 2017-12-12 ENCOUNTER — OFFICE VISIT (OUTPATIENT)
Dept: UROLOGY | Facility: CLINIC | Age: 82
End: 2017-12-12

## 2017-12-12 VITALS
BODY MASS INDEX: 23.75 KG/M2 | SYSTOLIC BLOOD PRESSURE: 113 MMHG | DIASTOLIC BLOOD PRESSURE: 76 MMHG | WEIGHT: 180 LBS | HEART RATE: 103 BPM

## 2017-12-12 DIAGNOSIS — N32.81 OVERACTIVE BLADDER: Primary | ICD-10-CM

## 2017-12-12 ASSESSMENT — PAIN SCALES - GENERAL: PAINLEVEL: NO PAIN (0)

## 2017-12-12 NOTE — NURSING NOTE
Chief Complaint   Patient presents with     RECHECK     Coming to discuss UDS results.     Essie Jose

## 2017-12-12 NOTE — LETTER
12/12/2017       RE: Gallito Farley  [address] [place] [address]     Dear Colleague,    Thank you for referring your patient, Gallito Farley, to the [facility] UROLOGY AND INST FOR PROSTATE AND UROLOGIC CANCERS at [facility]. Please see a copy of my visit note below.    UROLOGY FOLLOW-UP NOTE          Chief Complaint:   Today I had the pleasure of seeing . Gallito Farley in follow-up for a chief complaint of BPH/LUTS.          Interval Update    Gallito Farley is a very pleasant 83 year old male     Brief  History:  He has a history of BPH, LUTS, and microscopic hematuria. He recently underwent hematuria workup. Urine cytology was negative or malignancy and cystoscopy did not demonstrate any tumors.  Main issue is extremely bothersome dysuria and frequency, refractory to detrol.  Schistosomiasis of bladder was considered but urine ova and parasite was negative. He underwent Urodynamics on 12/7/17 and returns to clinic today to discuss the results.    Urodynamics demonstrated:    -Multiple uninhibited detrusor contractions starting at a volume of 30 mL instilled with Pdet up to 95 cm H2O without leakage. Patient reported severe pain with the third UDC which occurred at volume of 55 mL and stated he was unable to suppress the urge to void any longer. Therefore, filling was stopped at this low volume and permission to void was given. He states this is the type of pain he has been experiencing at home and with voiding.  -Otherwise good bladder compliance between episodes of DO (difficult to assess true compliance given limited bladder filling).  -Could not accurately assess true bladder capacity due to needing to terminate study early due to significant DO with severe pain reported by patient. Only 55 mL instilled (he was catheterized for ~100 mL at the beginning of the study).  -Incontinence: he does not leak to define DLPP.  -Detrusor activity during voiding: ~95  cm of H2O pressure during voiding phase, although suspect this high pressure was more so related to a bladder spasm which was occurring at the time perm to void was given.   -Detrusor sphincter dyssenergia: none  -Outlet obstruction: BOOI is 63.2 which is suggestive of obstruction  -Fluoroscopy reveals a moderately trabeculated bladder wall (from what could be seen due to limited filling) with numerous diverticulae and cellules.  No vesicoureteral reflux was observed.  The bladder neck was closed during filling and open during voiding.     Today notes:   He reports history of tuberculosis in the distant past. He is currently taking Detrol and states that it improves urinary symptoms.          Physical Exam:   Patient is a 83 year old  male   Vitals: Blood pressure 113/76, pulse 103, weight 81.6 kg (180 lb).  General Appearance Adult: Alert, no acute distress, oriented  HENT: throat/mouth:normal, good dentition  Neck: No adenopathy,masses or thyromegaly  Lungs: no respiratory distress, or pursed lip breathing  Heart: No obvious jugular venous distension present  Abdomen: soft, nontender, no organomegaly or masses, Body mass index is 23.75 kg/(m^2).  Lymphatics: No cervical or supraclavicular adenopathy  Musculoskeltal: extremities normal, no peripheral edema  Skin: no suspicious lesions or rashes  Neuro: Alert, oriented, speech and mentation normal  Psych: affect and mood normal  Gait: Normal  : Deferred       Labs and Pathology:    I personally reviewed all applicable laboratory data and went over findings with patient  Significant for:    CBC RESULTS:  Recent Labs   Lab Test  01/12/17   0815  01/11/17   1735  01/11/17   1734  08/03/16   1255  07/22/16   0540  07/21/16   2238   WBC  6.0   --   5.4  12.5*   --   9.4   HGB  12.7*  15.6  14.4  13.8   --   14.0   PLT  265   --   258  248  334  351        BMP RESULTS:  Recent Labs   Lab Test  08/31/17   1428  01/14/17   0530  01/13/17   0943  01/12/17   0815   01/11/17   1735  01/11/17   1734   NA   --   137  139  138  141  138   POTASSIUM   --   3.4  4.1  4.5  4.5  5.1   CHLORIDE   --   100  106  106   --   105   CO2   --   28  26  24   --   26   ANIONGAP   --   8  7  7   --   7   GLC   --   94  100*  78  94  95   BUN   --   7  11  10   --   11   CR   --   0.82  0.93  0.91   --   0.96   GFRESTIMATED  71  >90  Non  GFR Calc    77  80   --   75   GFRESTBLACK  86  >90   GFR Calc    >90   GFR Calc    >90   GFR Calc     --   >90   GFR Calc     APMELA   --   8.1*  8.2*  8.3*   --   8.3*       UA RESULTS:   Recent Labs   Lab Test 12/07/17 10/24/17  09/13/17   1034  08/25/17   1430  05/25/17   1359   SG  1.015  1.010  1.018  1.015  1.015   URINEPH  6.5  7.5  6.0  6.5  7.0   NITRITE  Neg  Neg  Negative  Negative  Negative   RBCU   --    --   O - 2  O - 2  O - 2   WBCU   --    --   2-5*  2-5*  2-5*              Past Medical History:     Past Medical History:   Diagnosis Date     Asthma      BPH (benign prostatic hyperplasia)      COPD (chronic obstructive pulmonary disease) (H)      Diastolic dysfunction, left ventricle 4/16/2013     H/O tuberculosis     [place]     Hypertension      LBBB (left bundle branch block)      Leg wound, right     chronic, s/p grafting     Osteoarthritis             Past Surgical History:     Past Surgical History:   Procedure Laterality Date     CHOLECYSTECTOMY       ENT SURGERY       IRRIGATION AND DEBRIDEMENT HIP, COMBINED  4/18/2013    Procedure: COMBINED IRRIGATION AND DEBRIDEMENT HIP;  Irrigation and debridement of Right Hip with cultures;  Surgeon: Cristal Inman MD;  Location: [facility] OR     Partial pneumonectomy      done in [place] in the 1990's     skin grafting - leg wound  6/2016            Medications     Current Outpatient Prescriptions   Medication     hydrochlorothiazide (MICROZIDE) 12.5 MG capsule     tolterodine  "(DETROL LA) 4 MG 24 hr capsule     tiotropium (SPIRIVA) 18 MCG capsule     phenazopyridine (PYRIDIUM) 100 MG tablet     acetaminophen (TYLENOL) 325 MG tablet     fluticasone (FLOVENT DISKUS) 250 MCG/BLIST AEPB Inhaler     Gauze Bandages (CANDACE 4\"X75\") MISC     Gauze Pads & Dressings (GAUZE DRESSING) 4\"X4\" PADS     Adhesive Tape (ADHESIVE PAPER) TAPE     trolamine salicylate (ASPERCREME) 10 % cream     amLODIPine (NORVASC) 10 MG tablet     diclofenac sodium 3 % GEL     finasteride (PROSCAR) 5 MG tablet     ranitidine (ZANTAC) 150 MG tablet     mupirocin (BACTROBAN) 2 % ointment     tamsulosin (FLOMAX) 0.4 MG capsule     enalapril (VASOTEC) 10 MG tablet     order for DME     order for DME     albuterol (2.5 MG/3ML) 0.083% neb solution     No current facility-administered medications for this visit.      Facility-Administered Medications Ordered in Other Visits   Medication     iopamidol (ISOVUE-370) solution 111 mL            Family History:     Family History   Problem Relation Age of Onset     Family History Negative Mother             Social History:     Social History     Social History     Marital status:      Spouse name: N/A     Number of children: N/A     Years of education: N/A     Occupational History     Not on file.     Social History Main Topics     Smoking status: Former Smoker     Smokeless tobacco: Not on file     Alcohol use No     Drug use: No     Sexual activity: Not on file     Other Topics Concern     Not on file     Social History Narrative            Allergies:   Review of patient's allergies indicates no known allergies.         Review of Systems:  From intake questionnaire   Negative 14 system review except as noted on HPI, nurse's note.           Assessment/Plan   82 yo M w/ BPH, LUTS   - Abnormally low bladder capacity raises suspicion for  TB.  Will send urine for AFB x 3  - Will refer to Dr. Gimenez for consideration of botox/intertim as alternative treatment for LUTS in the future if " TB workup is negative     Scribe Disclosure:   I,Porfirio Roberson, am serving as a scribe; to document services personally performed by Santosh Matthew MD based on data collection and the provider's statements to me.     ISantosh saw and evaluated this patient and agree with the plan as stated above    CC:  Vocal, Hakeem Mangasi      >15 minutes were spent face to face with patient over half of which was spent providing medical counseling regarding overactive bladder    Again, thank you for allowing me to participate in the care of your patient.      Sincerely,    Santosh Matthew MD

## 2017-12-12 NOTE — PATIENT INSTRUCTIONS
Schedule follow up appointment with Dr. Gimenez, next available--any location where she sees patients.    If you are able to leave a urine sample at the lab before leaving the clinic today, please do so.    Give 2 different urine samples at [facility].    It was a pleasure meeting with you today.  Thank you for allowing me and my team the privilege of caring for you today.  YOU are the reason we are here, and I truly hope we provided you with the excellent service you deserve.  Please let us know if there is anything else we can do for you so that we can be sure you are leaving completely satisfied with your care experience.       Jeffry Coker LPN

## 2017-12-12 NOTE — MR AVS SNAPSHOT
After Visit Summary   12/12/2017    Gallito Farley    MRN: [id number]           Patient Information     Date Of Birth          1/1/1934        Visit Information        Provider Department      12/12/2017 10:15 AM Santosh Matthew MD; [employer] [facility] and [facility] for Prostate and Urologic Cancers        Today's Diagnoses     Overactive bladder    -  1      Care Instructions    Schedule follow up appointment with Dr. Gimenez, next available--any location where she sees patients.    If you are able to leave a urine sample at the lab before leaving the clinic today, please do so.    Give 2 different urine samples at [facility].    It was a pleasure meeting with you today.  Thank you for allowing me and my team the privilege of caring for you today.  YOU are the reason we are here, and I truly hope we provided you with the excellent service you deserve.  Please let us know if there is anything else we can do for you so that we can be sure you are leaving completely satisfied with your care experience.       Jeffry Coker LPN          Follow-ups after your visit        Your next 10 appointments already scheduled     Dec 14, 2017  9:10 AM CST   LAB with LAB FIRST FLOOR [facility] ([facility])    [address] [place] [id number] [phone]           Please do not eat 10-12 hours before your appointment if you are coming in fasting for labs on lipids, cholesterol, or glucose (sugar). This does not apply to pregnant women. Water, hot tea and black coffee (with nothing added) are okay. Do not drink other fluids, diet soda or chew gum.            Jan 09, 2018  1:30 PM CST   [facility] Visit with Anabel Gimenez MD   [facility]    [id number] [place] [id number]   [phone]            Apr 19, 2018  1:30 PM CDT   (Arrive by 1:15 PM)   Pacemaker Check with Uc  Cv Device 1   [facility] ([facility])    [address]  3rd Floor  [address]               2018  2:00 PM CDT   (Arrive by 1:45 PM)   RETURN ARRHYTHMIA with KIRSTEN Guevara CNP   [facility] ([facility])    [address]  3rd Floor  [facility] [address] [phone]              Future tests that were ordered for you today     Open Future Orders        Priority Expected Expires Ordered    AFB Culture Non Blood Routine  2018    AFB Culture Non Blood Routine  2018    AFB Culture Non Blood Routine  2018            Who to contact     Please call your clinic at [phone] to:    Ask questions about your health    Make or cancel appointments    Discuss your medicines    Learn about your test results    Speak to your doctor   If you have compliments or concerns about an experience at your clinic, or if you wish to file a complaint, please contact [facility] Patient Relations at [phone] or email us at [email or web address]         Additional Information About Your Visit        [employer] Information     "[employer]t is an electronic gateway that provides easy, online access to your medical records. With [employer], you can request a clinic appointment, read your test results, renew a prescription or communicate with your care team.     To sign up for "[employer]t visit the website at [email or web address]   You will be asked to enter the access code listed below, as well as some personal information. Please follow the directions to create your username and password.     Your access code is: [email or web address]  Expires: 2018  6:30 AM     Your access code will  in 90 days. If you need help or a new code, please contact your [facility] or call [phone] for assistance.        Care EveryWhere ID      This is your Care EveryWhere ID. This could be used by other organizations to access your [place] medical records  [id number]        Your Vitals Were     Pulse BMI (Body Mass Index)                103 23.75 kg/m2           Blood Pressure from Last 3 Encounters:   12/12/17 113/76   12/07/17 113/72   10/24/17 129/80    Weight from Last 3 Encounters:   12/12/17 81.6 kg (180 lb)   12/07/17 81.6 kg (180 lb)   10/24/17 82 kg (180 lb 12.8 oz)               Primary Care Provider Office Phone # Fax #    Hakeem Awad [phone] [address] [place]  [facility] [address]        Equal Access to Services     RAMONE GOODSON : Hadii aad ku hadasho Soomaali, waaxda luqadaha, qaybta kaalmada adeegyada, waxdasha pizarro . So [facility] [phone].    ATENCIÓN: Si habla español, tiene a john disposición servicios gratuitos de asistencia lingüística. Ll[facility] [phone].    We comply with applicable federal civil rights laws and [place] laws. We do not discriminate on the basis of race, color, national origin, age, disability, sex, sexual orientation, or gender identity.            Thank you!     Thank you for choosing [facility] UROLOGY AND [facility] FOR PROSTATE AND UROLOGIC CANCERS  for your care. Our goal is always to provide you with excellent care. Hearing back from our patients is one way we can continue to improve our services. Please take a few minutes to complete the written survey that you may receive in the mail after your visit with us. Thank you!             Your Updated Medication List - Protect others around you: Learn how to safely use, store and throw away your medicines at www.disposemymeds.org.          This list is accurate as of: 12/12/17 11:30 AM.  Always use your most recent med list.                   Brand Name Dispense Instructions for use Diagnosis    acetaminophen 325 MG tablet    TYLENOL    100 tablet    Take 2 tablets (650 mg) by mouth every 4 hours as  "needed for mild pain    Acute post-operative pain       Adhesive Paper Tape     5 each    1inch paper tape for daily dressing changes.    Chronic venous hypertension with ulcer involving right side (H), Ulcer of right leg, with fat layer exposed (H)       albuterol (2.5 MG/3ML) 0.083% neb solution     360 mL    Take 1 vial (2.5 mg) by nebulization every 6 hours as needed    COPD exacerbation (H)       amLODIPine 10 MG tablet    NORVASC    30 tablet    Take 1 tablet (10 mg) by mouth daily    Essential hypertension       diclofenac sodium 3 % Gel     100 g    Apply 4 gm four times a day as needed to right trapezius muscle.    Cervical myofascial pain syndrome       enalapril 10 MG tablet    VASOTEC    90 tablet    Take 1 tablet (10 mg) by mouth daily    Essential hypertension       finasteride 5 MG tablet    PROSCAR    30 tablet    Take 1 tablet (5 mg) by mouth At Bedtime    Benign nodular prostatic hyperplasia without lower urinary tract symptoms       fluticasone 250 MCG/BLIST Aepb Inhaler    FLOVENT DISKUS    3 Inhaler    Inhale 1 puff into the lungs every 12 hours    COPD exacerbation (H)       Gauze Dressing 4\"X4\" Pads     48 each    Sterile 0m8ndaf gauze for daily wound cares.    Chronic venous hypertension with ulcer involving right side (H), Ulcer of right leg, with fat layer exposed (H)       hydrochlorothiazide 12.5 MG capsule    MICROZIDE    60 capsule    Take 2 capsules (25 mg) by mouth daily    Essential hypertension       HEATHER 4\"X75\" Misc     30 each    Sterile Heather wrap for daily dressing changes.    Chronic venous hypertension with ulcer involving right side (H), Ulcer of right leg, with fat layer exposed (H)       mupirocin 2 % ointment    BACTROBAN    30 g    Apply twice a day to right leg wound and cover with occlusive dressing.    Chronic cutaneous venous stasis ulcer (H)       * order for DME     5 each    Equipment being ordered: Dispense 4\" x 4\" sterile gauze. Dispense any brand name.    " Chronic cutaneous venous stasis ulcer (H)       * order for DME     5 each    Equipment being ordered: Dispense Coban wrap.    Chronic cutaneous venous stasis ulcer (H)       phenazopyridine 100 MG tablet    PYRIDIUM    24 tablet    Take 1 tablet (100 mg) by mouth 3 times daily as needed for urinary tract discomfort    Dysuria       ranitidine 150 MG tablet    ZANTAC    60 tablet    Take 1 tablet (150 mg) by mouth 2 times daily    Gastroesophageal reflux disease, esophagitis presence not specified       tamsulosin 0.4 MG capsule    FLOMAX    60 capsule    Take 2 capsules (0.8 mg) by mouth At Bedtime    Benign nodular prostatic hyperplasia without lower urinary tract symptoms       tiotropium 18 MCG capsule    SPIRIVA    30 capsule    Inhale 1 capsule (18 mcg) into the lungs daily    COPD exacerbation (H)       tolterodine 4 MG 24 hr capsule    DETROL LA    30 capsule    Take 1 capsule (4 mg) by mouth daily    Bladder spasms       trolamine salicylate 10 % cream    ASPERCREME    85 g    Apply topically as needed for moderate pain    Cervical myofascial pain syndrome       * Notice:  This list has 2 medication(s) that are the same as other medications prescribed for you. Read the directions carefully, and ask your doctor or other care provider to review them with you.

## 2017-12-12 NOTE — PROGRESS NOTES
UROLOGY FOLLOW-UP NOTE          Chief Complaint:   Today I had the pleasure of seeing MrSusana Farley in follow-up for a chief complaint of BPH/LUTS.          Interval Update    Gallito Farley is a very pleasant 83 year old male     Brief  History:  He has a history of BPH, LUTS, and microscopic hematuria. He recently underwent hematuria workup. Urine cytology was negative or malignancy and cystoscopy did not demonstrate any tumors.  Main issue is extremely bothersome dysuria and frequency, refractory to detrol.  Schistosomiasis of bladder was considered but urine ova and parasite was negative. He underwent Urodynamics on 12/7/17 and returns to clinic today to discuss the results.    Urodynamics demonstrated:    -Multiple uninhibited detrusor contractions starting at a volume of 30 mL instilled with Pdet up to 95 cm H2O without leakage. Patient reported severe pain with the third UDC which occurred at volume of 55 mL and stated he was unable to suppress the urge to void any longer. Therefore, filling was stopped at this low volume and permission to void was given. He states this is the type of pain he has been experiencing at home and with voiding.  -Otherwise good bladder compliance between episodes of DO (difficult to assess true compliance given limited bladder filling).  -Could not accurately assess true bladder capacity due to needing to terminate study early due to significant DO with severe pain reported by patient. Only 55 mL instilled (he was catheterized for ~100 mL at the beginning of the study).  -Incontinence: he does not leak to define DLPP.  -Detrusor activity during voiding: ~95 cm of H2O pressure during voiding phase, although suspect this high pressure was more so related to a bladder spasm which was occurring at the time perm to void was given.   -Detrusor sphincter dyssenergia: none  -Outlet obstruction: BOOI is 63.2 which is suggestive of obstruction  -Fluoroscopy reveals a moderately  trabeculated bladder wall (from what could be seen due to limited filling) with numerous diverticulae and cellules.  No vesicoureteral reflux was observed.  The bladder neck was closed during filling and open during voiding.     Today notes:   He reports history of tuberculosis in the distant past. He is currently taking Detrol and states that it improves urinary symptoms.          Physical Exam:   Patient is a 83 year old  male   Vitals: Blood pressure 113/76, pulse 103, weight 81.6 kg (180 lb).  General Appearance Adult: Alert, no acute distress, oriented  HENT: throat/mouth:normal, good dentition  Neck: No adenopathy,masses or thyromegaly  Lungs: no respiratory distress, or pursed lip breathing  Heart: No obvious jugular venous distension present  Abdomen: soft, nontender, no organomegaly or masses, Body mass index is 23.75 kg/(m^2).  Lymphatics: No cervical or supraclavicular adenopathy  Musculoskeltal: extremities normal, no peripheral edema  Skin: no suspicious lesions or rashes  Neuro: Alert, oriented, speech and mentation normal  Psych: affect and mood normal  Gait: Normal  : Deferred       Labs and Pathology:    I personally reviewed all applicable laboratory data and went over findings with patient  Significant for:    CBC RESULTS:  Recent Labs   Lab Test  01/12/17   0815  01/11/17   1735  01/11/17   1734  08/03/16   1255  07/22/16   0540  07/21/16   2238   WBC  6.0   --   5.4  12.5*   --   9.4   HGB  12.7*  15.6  14.4  13.8   --   14.0   PLT  265   --   258  248  334  351        BMP RESULTS:  Recent Labs   Lab Test  08/31/17   1428  01/14/17   0530  01/13/17   0943  01/12/17   0815  01/11/17   1735  01/11/17   1734   NA   --   137  139  138  141  138   POTASSIUM   --   3.4  4.1  4.5  4.5  5.1   CHLORIDE   --   100  106  106   --   105   CO2   --   28  26  24   --   26   ANIONGAP   --   8  7  7   --   7   GLC   --   94  100*  78  94  95   BUN   --   7  11  10   --   11   CR   --   0.82  0.93  0.91   --    "0.96   GFRESTIMATED  71  >90  Non  GFR Calc    77  80   --   75   GFRESTBLACK  86  >90   GFR Calc    >90   GFR Calc    >90   GFR Calc     --   >90   GFR Calc     PAMELA   --   8.1*  8.2*  8.3*   --   8.3*       UA RESULTS:   Recent Labs   Lab Test 12/07/17 10/24/17  09/13/17   1034  08/25/17   1430  05/25/17   1359   SG  1.015  1.010  1.018  1.015  1.015   URINEPH  6.5  7.5  6.0  6.5  7.0   NITRITE  Neg  Neg  Negative  Negative  Negative   RBCU   --    --   O - 2  O - 2  O - 2   WBCU   --    --   2-5*  2-5*  2-5*              Past Medical History:     Past Medical History:   Diagnosis Date     Asthma      BPH (benign prostatic hyperplasia)      COPD (chronic obstructive pulmonary disease) (H)      Diastolic dysfunction, left ventricle 4/16/2013     H/O tuberculosis     s/p partial pneumonectomy in [facility] in the 1990's     Hypertension      LBBB (left bundle branch block)      Leg wound, right     chronic, s/p grafting     Osteoarthritis             Past Surgical History:     Past Surgical History:   Procedure Laterality Date     CHOLECYSTECTOMY       ENT SURGERY       IRRIGATION AND DEBRIDEMENT HIP, COMBINED  4/18/2013    Procedure: COMBINED IRRIGATION AND DEBRIDEMENT HIP;  Irrigation and debridement of Right Hip with cultures;  Surgeon: Cristal Inman MD;  Location: [facility] OR     Partial pneumonectomy      done in [facility] in the 1990's     skin grafting - leg wound  6/2016            Medications     Current Outpatient Prescriptions   Medication     hydrochlorothiazide (MICROZIDE) 12.5 MG capsule     tolterodine (DETROL LA) 4 MG 24 hr capsule     tiotropium (SPIRIVA) 18 MCG capsule     phenazopyridine (PYRIDIUM) 100 MG tablet     acetaminophen (TYLENOL) 325 MG tablet     fluticasone (FLOVENT DISKUS) 250 MCG/BLIST AEPB Inhaler     Gauze Bandages (CANDACE 4\"X75\") MISC     Gauze Pads & Dressings (GAUZE DRESSING) 4\"X4\" PADS     Adhesive " Tape (ADHESIVE PAPER) TAPE     trolamine salicylate (ASPERCREME) 10 % cream     amLODIPine (NORVASC) 10 MG tablet     diclofenac sodium 3 % GEL     finasteride (PROSCAR) 5 MG tablet     ranitidine (ZANTAC) 150 MG tablet     mupirocin (BACTROBAN) 2 % ointment     tamsulosin (FLOMAX) 0.4 MG capsule     enalapril (VASOTEC) 10 MG tablet     order for DME     order for DME     albuterol (2.5 MG/3ML) 0.083% neb solution     No current facility-administered medications for this visit.      Facility-Administered Medications Ordered in Other Visits   Medication     iopamidol (ISOVUE-370) solution 111 mL            Family History:     Family History   Problem Relation Age of Onset     Family History Negative Mother             Social History:     Social History     Social History     Marital status:      Spouse name: N/A     Number of children: N/A     Years of education: N/A     Occupational History     Not on file.     Social History Main Topics     Smoking status: Former Smoker     Smokeless tobacco: Not on file     Alcohol use No     Drug use: No     Sexual activity: Not on file     Other Topics Concern     Not on file     Social History Narrative            Allergies:   Review of patient's allergies indicates no known allergies.         Review of Systems:  From intake questionnaire   Negative 14 system review except as noted on HPI, nurse's note.           Assessment/Plan   84 yo M w/ BPH, LUTS   - Abnormally low bladder capacity raises suspicion for  TB.  Will send urine for AFB x 3  - Will refer to Dr. Gimenez for consideration of botox/intertim as alternative treatment for LUTS in the future if TB workup is negative     Scribe Disclosure:   IPorfirio, am serving as a scribe; to document services personally performed by Santosh Matthew MD based on data collection and the provider's statements to me.     ISantosh saw and evaluated this patient and agree with the plan as stated  above    CC:  Hakeem Awad      >15 minutes were spent face to face with patient over half of which was spent providing medical counseling regarding overactive bladder

## 2017-12-14 DIAGNOSIS — N32.81 OVERACTIVE BLADDER: ICD-10-CM

## 2017-12-14 PROCEDURE — 99000 SPECIMEN HANDLING OFFICE-LAB: CPT | Performed by: UROLOGY

## 2017-12-14 PROCEDURE — 87116 MYCOBACTERIA CULTURE: CPT | Mod: 90 | Performed by: UROLOGY

## 2018-01-02 ENCOUNTER — CARE COORDINATION (OUTPATIENT)
Dept: GERIATRIC MEDICINE | Facility: CLINIC | Age: 83
End: 2018-01-02

## 2018-01-02 NOTE — PROGRESS NOTES
PITER spoke with Joe at [facility] member to schedule annual assessment.   Per member and Joe, scheduled for 1/31/17 at 11 a.m.      PITER left message for son, Miguelito.  Joe will also reach out to son - she states that he is very busy.    PITER placed call to Grazyna Bullock to schedule .     Jorge David RN, N  [employer]

## 2018-01-04 PROBLEM — Z76.89 HEALTH CARE HOME: Status: ACTIVE | Noted: 2017-08-07

## 2018-01-09 ENCOUNTER — OFFICE VISIT (OUTPATIENT)
Dept: UROLOGY | Facility: CLINIC | Age: 83
End: 2018-01-09
Payer: COMMERCIAL

## 2018-01-09 VITALS
HEART RATE: 89 BPM | OXYGEN SATURATION: 96 % | DIASTOLIC BLOOD PRESSURE: 64 MMHG | WEIGHT: 179 LBS | SYSTOLIC BLOOD PRESSURE: 101 MMHG | BODY MASS INDEX: 25.06 KG/M2 | HEIGHT: 71 IN | TEMPERATURE: 97.4 F

## 2018-01-09 DIAGNOSIS — K59.00 CONSTIPATION, UNSPECIFIED CONSTIPATION TYPE: ICD-10-CM

## 2018-01-09 DIAGNOSIS — R39.11 BENIGN PROSTATIC HYPERPLASIA WITH URINARY HESITANCY: ICD-10-CM

## 2018-01-09 DIAGNOSIS — R33.9 INCOMPLETE BLADDER EMPTYING: ICD-10-CM

## 2018-01-09 DIAGNOSIS — N32.81 OAB (OVERACTIVE BLADDER): Primary | ICD-10-CM

## 2018-01-09 DIAGNOSIS — N40.1 BENIGN PROSTATIC HYPERPLASIA WITH URINARY HESITANCY: ICD-10-CM

## 2018-01-09 PROCEDURE — 99214 OFFICE O/P EST MOD 30 MIN: CPT | Performed by: UROLOGY

## 2018-01-09 RX ORDER — POLYETHYLENE GLYCOL 3350 17 G/17G
1 POWDER, FOR SOLUTION ORAL DAILY
Qty: 510 G | Refills: 11 | Status: SHIPPED | OUTPATIENT
Start: 2018-01-09 | End: 2019-05-21

## 2018-01-09 ASSESSMENT — PAIN SCALES - GENERAL: PAINLEVEL: SEVERE PAIN (7)

## 2018-01-09 NOTE — MR AVS SNAPSHOT
After Visit Summary   1/9/2018    Gallito Farley    MRN: [id number]           Patient Information     Date Of Birth          1/1/1934        Visit Information        Provider Department      1/9/2018 1:15 PM Anabel Gimenez MD; [employer] [facility]        Today's Diagnoses     OAB (overactive bladder)    -  1    Benign prostatic hyperplasia with urinary hesitancy        Constipation, unspecified constipation type          Care Instructions    Please take the miralax daily for constipation    Stop the tolterodine    Please return to see Maria G Friedman in 1-2 weeks to recheck how well you are emptying your bladder and we can then consider the other medication mirabegron    It was a pleasure meeting with you today.  Thank you for allowing me and my team the privilege of caring for you today.  YOU are the reason we are here, and I truly hope we provided you with the excellent service you deserve.  Please let us know if there is anything else we can do for you so that we can be sure you are leaving completely satisfied with your care experience.              Follow-ups after your visit        Your next 10 appointments already scheduled     Jan 26, 2018  2:00 PM CST   New Visit with Maria G Friedman PA-C   [facility] ([facility])    [address] [place] [id number] [phone]            Apr 19, 2018  1:30 PM CDT   (Arrive by 1:15 PM)   Pacemaker Check with Uc Cv Device 1   [facility] ([facility])    [address] [facility]  [address] [place] [id number] [phone]            Apr 19, 2018  2:00 PM CDT   (Arrive by 1:45 PM)   RETURN ARRHYTHMIA with KIRSTEN Guevara [facility] ([facility] [place] [facility])    [address] [facility] [address]  [address]  [facility] [address] [phone]              Who to contact     If you have questions  "or need follow up information about today's clinic visit or your schedule please contact [facility] directly at [phone].  Normal or non-critical lab and imaging results will be communicated to you by [employer], letter or phone within 4 business days after the clinic has received the results. If you do not hear from us within 7 days, please contact the clinic through [employer] or phone. If you have a critical or abnormal lab result, we will notify you by phone as soon as possible.  Submit refill requests through [employer]Gate or call your pharmacy and they will forward the refill request to us. Please allow 3 business days for your refill to be completed.          Additional Information About Your Visit        IronGate Information     [employer]Gate is an electronic gateway that provides easy, online access to your medical records. With [employer]Gate, you can request a clinic appointment, read your test results, renew a prescription or communicate with your care team.     To sign up for [employer] visit the website at [email or web address]   You will be asked to enter the access code listed below, as well as some personal information. Please follow the directions to create your username and password.     Your access code is: [email or web address]  Expires: 2018  6:30 AM     Your access code will  in 90 days. If you need help or a new code, please contact your [facility] or call [phone] for assistance.        Care EveryWhere ID     This is your Care EveryWhere ID. This could be used by other organizations to access your [facility] medical records  [id number]        Your Vitals Were     Pulse Temperature Height Pulse Oximetry BMI (Body Mass Index)       89 97.4  F (36.3  C) (Oral) 1.791 m (5' 10.5\") 96% 25.32 kg/m2        Blood Pressure from Last 3 Encounters:   18 101/64   17 113/76   17 113/72    Weight from Last 3 Encounters:   18 81.2 kg (179 lb) "   12/12/17 81.6 kg (180 lb)   12/07/17 81.6 kg (180 lb)              Today, you had the following     No orders found for display         Today's Medication Changes          These changes are accurate as of: 1/9/18  3:17 PM.  If you have any questions, ask your nurse or doctor.               Start taking these medicines.        Dose/Directions    polyethylene glycol powder   Commonly known as:  MIRALAX   Used for:  Constipation, unspecified constipation type   Started by:  Anabel Gimenez MD        Dose:  1 capful   Take 17 g (1 capful) by mouth daily   Quantity:  510 g   Refills:  11            Where to get your medicines      These medications were sent to [facility] [address]  [address] [address]     Phone:  [phone]     polyethylene glycol powder                Primary Care Provider Office Phone # Fax #    Hakeem Awad [phone]       [address]  [facility] [address]        Equal Access to Services     [facility]: Hadii aad ku hadasho Soomaali, waaxda luqadaha, qaybta kaalmada adeegyada, waxay idiin hayadama pizarro . So [facility] [phone].    ATENCIÓN: Si habla español, tiene a john disposición servicios gratuitos de asistencia lingüística. Llame al [phone].    We comply with applicable federal civil rights laws and [place] laws. We do not discriminate on the basis of race, color, national origin, age, disability, sex, sexual orientation, or gender identity.            Thank you!     Thank you for choosing [facility]  for your care. Our goal is always to provide you with excellent care. Hearing back from our patients is one way we can continue to improve our services. Please take a few minutes to complete the written survey that you may receive in the mail after your visit with us. Thank you!             Your Updated Medication List - Protect others around you:  "Learn how to safely use, store and throw away your medicines at www.disposemymeds.org.          This list is accurate as of: 1/9/18  3:17 PM.  Always use your most recent med list.                   Brand Name Dispense Instructions for use Diagnosis    acetaminophen 325 MG tablet    TYLENOL    100 tablet    Take 2 tablets (650 mg) by mouth every 4 hours as needed for mild pain    Acute post-operative pain       Adhesive Paper Tape     5 each    1inch paper tape for daily dressing changes.    Chronic venous hypertension with ulcer involving right side (H), Ulcer of right leg, with fat layer exposed (H)       albuterol (2.5 MG/3ML) 0.083% neb solution     360 mL    Take 1 vial (2.5 mg) by nebulization every 6 hours as needed    COPD exacerbation (H)       amLODIPine 10 MG tablet    NORVASC    30 tablet    Take 1 tablet (10 mg) by mouth daily    Essential hypertension       diclofenac sodium 3 % Gel     100 g    Apply 4 gm four times a day as needed to right trapezius muscle.    Cervical myofascial pain syndrome       enalapril 10 MG tablet    VASOTEC    90 tablet    Take 1 tablet (10 mg) by mouth daily    Essential hypertension       finasteride 5 MG tablet    PROSCAR    30 tablet    Take 1 tablet (5 mg) by mouth At Bedtime    Benign nodular prostatic hyperplasia without lower urinary tract symptoms       fluticasone 250 MCG/BLIST Aepb Inhaler    FLOVENT DISKUS    3 Inhaler    Inhale 1 puff into the lungs every 12 hours    COPD exacerbation (H)       Gauze Dressing 4\"X4\" Pads     48 each    Sterile 7x4zzyh gauze for daily wound cares.    Chronic venous hypertension with ulcer involving right side (H), Ulcer of right leg, with fat layer exposed (H)       hydrochlorothiazide 12.5 MG capsule    MICROZIDE    60 capsule    Take 2 capsules (25 mg) by mouth daily    Essential hypertension       HEATHER 4\"X75\" Misc     30 each    Sterile Heather wrap for daily dressing changes.    Chronic venous hypertension with ulcer involving " "right side (H), Ulcer of right leg, with fat layer exposed (H)       mupirocin 2 % ointment    BACTROBAN    30 g    Apply twice a day to right leg wound and cover with occlusive dressing.    Chronic cutaneous venous stasis ulcer (H)       * order for DME     5 each    Equipment being ordered: Dispense 4\" x 4\" sterile gauze. Dispense any brand name.    Chronic cutaneous venous stasis ulcer (H)       * order for DME     5 each    Equipment being ordered: Dispense Coban wrap.    Chronic cutaneous venous stasis ulcer (H)       phenazopyridine 100 MG tablet    PYRIDIUM    24 tablet    Take 1 tablet (100 mg) by mouth 3 times daily as needed for urinary tract discomfort    Dysuria       polyethylene glycol powder    MIRALAX    510 g    Take 17 g (1 capful) by mouth daily    Constipation, unspecified constipation type       ranitidine 150 MG tablet    ZANTAC    60 tablet    Take 1 tablet (150 mg) by mouth 2 times daily    Gastroesophageal reflux disease, esophagitis presence not specified       tamsulosin 0.4 MG capsule    FLOMAX    60 capsule    Take 2 capsules (0.8 mg) by mouth At Bedtime    Benign nodular prostatic hyperplasia without lower urinary tract symptoms       tiotropium 18 MCG capsule    SPIRIVA    30 capsule    Inhale 1 capsule (18 mcg) into the lungs daily    COPD exacerbation (H)       tolterodine 4 MG 24 hr capsule    DETROL LA    30 capsule    Take 1 capsule (4 mg) by mouth daily    Bladder spasms       trolamine salicylate 10 % cream    ASPERCREME    85 g    Apply topically as needed for moderate pain    Cervical myofascial pain syndrome       * Notice:  This list has 2 medication(s) that are the same as other medications prescribed for you. Read the directions carefully, and ask your doctor or other care provider to review them with you.      "

## 2018-01-09 NOTE — PATIENT INSTRUCTIONS
Please take the miralax daily for constipation    Stop the tolterodine    Please return to see Maria G Friedman in 1-2 weeks to recheck how well you are emptying your bladder and we can then consider the other medication mirabegron    It was a pleasure meeting with you today.  Thank you for allowing me and my team the privilege of caring for you today.  YOU are the reason we are here, and I truly hope we provided you with the excellent service you deserve.  Please let us know if there is anything else we can do for you so that we can be sure you are leaving completely satisfied with your care experience.

## 2018-01-09 NOTE — NURSING NOTE
"Gallito Farley's goals for this visit include:   He requests these members of his care team be copied on today's visit information:     PCP: Hakeem Awad    Referring Provider:  Santosh Matthew MD  [address] [place] [id number]    Chief Complaint   Patient presents with     Urinary Problem     Overactive bladder. Per Dr. Matthew       Initial /64 (BP Location: Left arm, Patient Position: Chair, Cuff Size: Adult Regular)  Pulse 89  Temp 97.4  F (36.3  C) (Oral)  Ht 1.791 m (5' 10.5\")  Wt 81.2 kg (179 lb)  SpO2 96%  BMI 25.32 kg/m2 Estimated body mass index is 25.32 kg/(m^2) as calculated from the following:    Height as of this encounter: 1.791 m (5' 10.5\").    Weight as of this encounter: 81.2 kg (179 lb).  Medication Reconciliation: incomplete  Pt unsure what medications he is on.    Do you need any medication refills at today's visit?     Present with pt is Nephew \"Mathew\" and And Interrupter \" Moiz\" with Grazyna Martins LPN     "

## 2018-01-09 NOTE — NURSING NOTE
Bladder scan showed 111mL Patient attempted to void with use of urinal, but was unable to void. Results reported to Dr. Gimenez.    Jessica Odom RN, BSN

## 2018-01-09 NOTE — PROGRESS NOTES
"January 9, 2018    Return visit    Patient returns today for follow up with his nephew. He is Solomon Islander speaking and the entire visit conducted with the assistance of an .    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  On Detrol since December and it is unclear how much it is helping.  Also on BPH meds of finasteride and tamsulosin.  Currently first of three AFB urine cultures are pending.    He does endorse, however, that he is having pain and difficulty passing both urine and stool.    He denies any changes in his health since last visit.    Of note unable to reconcile medications today because patient did not bring a list and neither him or his nephew knew what medications he was taking.    /64 (BP Location: Left arm, Patient Position: Chair, Cuff Size: Adult Regular)  Pulse 89  Temp 97.4  F (36.3  C) (Oral)  Ht 1.791 m (5' 10.5\")  Wt 81.2 kg (179 lb)  SpO2 96%  BMI 25.32 kg/m2  He is comfortable, in no distress, non-labored breathing.      Random bladder scan 111 mL unable to go.  Did try to go but went minimal and then had intense lower abdominal pain that took several minutes to subside.  The repeat PVR was 134mL    A/P: 84 year old M with small capacity bladder, high pressure detrusor overactivity, constipation    Await TB evaluation    Discussed options are alternative medications, intravesical botulinum toxin, percutaneous tibial nerve stimulation and sacral neuromodulation.  Discussed in severe situations we may even divert the urine.  Patient is not interested in anything invasive and does not want any more catheters placed so wishes to do as much as we can with the medications first    Discussed that constipation is likely contributing.  Miralax daily    Given the elevated PVR in light of patient's pain with urination and known small bladder capacity I have asked him to stop the tolterodine today.    Will recheck a PVR in 1-2 " weeks and then at that point consider starting mirabegron.  If another anticholinergic is to be tried would consider trospium secondary to patient's age    35 minutes were spent with the patient today, > 50% in counseling and coordination of care    Anabel Gimenez MD MPH   of Urology    CC  Patient Care Team:  Hakeem Awad MD as PCP - General (Internal Medicine)  Lenin Farley MD Schuster, Joseph, DPM (Podiatry)  Jorge David RN as   Lakesha Cheek as Other (see comments)  Radhika Rothman as Other (see comments)  Santosh Matthew MD as MD (Urology)  Kristal Delgado, RN as Registered Nurse  SANTOSH MATTHEW

## 2018-01-09 NOTE — NURSING NOTE
Patient voided approximately 25cc's of clear yellow urine. PVR showed 133mL.    Jessica Odom RN, BSN

## 2018-01-30 ENCOUNTER — CARE COORDINATION (OUTPATIENT)
Dept: GERIATRIC MEDICINE | Facility: CLINIC | Age: 83
End: 2018-01-30

## 2018-01-31 ENCOUNTER — CARE COORDINATION (OUTPATIENT)
Dept: GERIATRIC MEDICINE | Facility: CLINIC | Age: 83
End: 2018-01-31

## 2018-01-31 NOTE — PROGRESS NOTES
1/30/18: CM received call from MARLON stating that  Miguelito is scheduled for visit for tomorrow 1/31 at 11 a.m.     CM also received call from Joe from [facility] confirming visit - she thought visit was at 1 p.m.   Verified visit at 11 a.m.    Jorge David RN, N  [facility]

## 2018-02-02 NOTE — PROGRESS NOTES
[facility] Visit Assessment     Home visit for Health Risk Assessment with Gallito Farley completed on January 31, 2018  Member resides in Assisted living  [facility]. - home based CL   Present at assessment: member, this care coordinator,  Mack with MARLON and Joe - CL staff    Current Care Plan  Member currently receiving the following services: Assisted Living      Medication Review  Medication reconciliation completed in Epic:Yes  Medication set-up & administration: RN sets up  weekly.  Assisting Living staff administers medications.  Medication understanding concerns (by member, family or CC): No  Medication adherence concerns (by member, family or CC): No    Mental/Behavioral Health   Depression Screening: See PHQ assessment flowsheet.   Mental Health Diagnosis: No  No current MH services-will place referral for N/A    Falls in last 12 months: No    ADL/IADL Dependencies: Ambulation-walker, Bathing, Dressing, Grooming, Eating, Positioning, Transfers, Toileting, Wheelchair, Cleaning, Cooking, Laundry, Shopping, Meal prep, Medication Management, Money Management and Transportation     [facility] sponsored benefits: Shared information re: Silver Sneakers/gym memberships, ASA, Calcium +D.    PCA Assessment completed at visit: Not applicable     Elderly Waiver Eligibility: Yes-will continue on EW    Care Plan & Recommendations: Continue with Customized Living.   CM to order monthly supplies through [facility] - Chux, gloves, wipes    See LTCC for detailed assessment information.    Follow-Up Plan: Member informed of future contact, plan to f/u with member with a 6 month telephone assessment.  Contact information shared with member and family, encouraged member to call with any questions or concerns at any time.  [place] care continuum providers: Please refer to Health Care Home on the [facility] Problem List to view this patient's [facility] Care Plan Summary.    Jorge  SHWETA David, PHN  [facility]

## 2018-02-08 ENCOUNTER — CARE COORDINATION (OUTPATIENT)
Dept: GERIATRIC MEDICINE | Facility: CLINIC | Age: 83
End: 2018-02-08

## 2018-02-08 NOTE — PROGRESS NOTES
Received after visit chart from care coordinator.  Completed following tasks: Mailed copy of care plan to client, Updated services in access, Submitted referrals/auths for [facility] and [facility] for wipes and Entered MMIS  Chart was returned to CC.     Mailed copy of CL tool to client, faxed copy to AL facility, uploaded into [employer] and submitted authorization to health plan.    Sri Pennington  Case Management Specialist   [facility]   [phone]

## 2018-02-09 LAB
MYCOBACTERIUM SPEC CULT: NORMAL
MYCOBACTERIUM SPEC CULT: NORMAL
SPECIMEN SOURCE: NORMAL

## 2018-02-15 ENCOUNTER — CARE COORDINATION (OUTPATIENT)
Dept: GERIATRIC MEDICINE | Facility: CLINIC | Age: 83
End: 2018-02-15

## 2018-02-15 NOTE — PROGRESS NOTES
PITER received call from Joe at [facility] stating that she would like to set member up for delivery of medications however the  Pharmacy told her that member's [facility] insurance does not allow.  Explained that [facility] does allow but will verify - PITER spoke with Kelsie at [facility] - verified that mail order is covered and  Pharmacy listed.  Relayed this to Joe.     Joe also inquired about supplies from [facility] - explained they are waiting for PCP rx.     Jorge David RN, PHN  [facility]

## 2018-02-22 ENCOUNTER — TELEPHONE (OUTPATIENT)
Dept: FAMILY MEDICINE | Facility: CLINIC | Age: 83
End: 2018-02-22

## 2018-02-22 NOTE — TELEPHONE ENCOUNTER
Reason for Call:  Other prescription    Detailed comments: [facility] [employer] has sent a fax on 2/2 & 2/15 and will resend to the fax # [phone] for incontinence and gloves.    Phone Number Patient can be reached at: Other phone number:    [facility] [phone]         Best Time: any    Can we leave a detailed message on this number? YES    Call taken on 2/22/2018 at 3:59 PM by Adalgisa Cisneros

## 2018-02-23 NOTE — TELEPHONE ENCOUNTER
Huddle with Dr. Awad, this order should go to patient's urologist as patient has not seen Dr. Awad in over a year.  [place],  [facility]    Order is faxed back to [facility] at fax # [phone] to re-fax to patient's urologist.  [place],  [facility]

## 2018-02-26 ENCOUNTER — CARE COORDINATION (OUTPATIENT)
Dept: GERIATRIC MEDICINE | Facility: CLINIC | Age: 83
End: 2018-02-26

## 2018-02-26 NOTE — PROGRESS NOTES
PITER received call from Joe at  stating [facility] supplies have not been delivered yet at this time.  CM placed call to [facility] - spoke with Alyssia. They received Rx back from PCP and states he will not sign as has not seen member in over a year.  He states Urologist needs to sign.  [facility] will send to Urology.    PITER notified Joe.     Jorge David RN, N  [facility]

## 2018-02-28 ENCOUNTER — TELEPHONE (OUTPATIENT)
Dept: FAMILY MEDICINE | Facility: CLINIC | Age: 83
End: 2018-02-28

## 2018-02-28 NOTE — TELEPHONE ENCOUNTER
PRIOR AUTH REQUEST    DRUG: FLOVENT DISKUS 250MG    INS: [facility] DUAL    PHONE: [phone]    ID: [id number]    GROUP: [id number]    PCN: [facility]  [address] [place] [address] Ave

## 2018-03-02 ENCOUNTER — OFFICE VISIT (OUTPATIENT)
Dept: UROLOGY | Facility: CLINIC | Age: 83
End: 2018-03-02
Payer: COMMERCIAL

## 2018-03-02 VITALS
BODY MASS INDEX: 26.31 KG/M2 | SYSTOLIC BLOOD PRESSURE: 148 MMHG | WEIGHT: 186 LBS | DIASTOLIC BLOOD PRESSURE: 85 MMHG | HEART RATE: 91 BPM | TEMPERATURE: 97.9 F | OXYGEN SATURATION: 97 %

## 2018-03-02 DIAGNOSIS — N32.81 OAB (OVERACTIVE BLADDER): Primary | ICD-10-CM

## 2018-03-02 PROCEDURE — 51798 US URINE CAPACITY MEASURE: CPT | Performed by: PHYSICIAN ASSISTANT

## 2018-03-02 PROCEDURE — 99213 OFFICE O/P EST LOW 20 MIN: CPT | Performed by: PHYSICIAN ASSISTANT

## 2018-03-02 RX ORDER — MIRABEGRON 50 MG/1
50 TABLET, EXTENDED RELEASE ORAL DAILY
Qty: 30 TABLET | Refills: 5 | Status: SHIPPED | OUTPATIENT
Start: 2018-03-02 | End: 2019-05-23

## 2018-03-02 ASSESSMENT — PAIN SCALES - GENERAL: PAINLEVEL: EXTREME PAIN (8)

## 2018-03-02 NOTE — PATIENT INSTRUCTIONS
UROLOGY CLINIC VISIT PATIENT INSTRUCTIONS    1) Start taking Myrbetriq (mirabegron) 50 mg once daily in the morning.     2) Take Miralax once daily.     Follow up in 6-8 weeks to recheck.     If you have any issues, questions or concerns in the meantime, do not hesitate to contact us at [phone] or via Graph[employer].     It was a pleasure meeting with you today.  Thank you for allowing me and my team the privilege of caring for you today.  YOU are the reason we are here, and I truly hope we provided you with the excellent service you deserve.  Please let us know if there is anything else we can do for you so that we can be sure you are leaving completely satisfied with your care experience.

## 2018-03-02 NOTE — MR AVS SNAPSHOT
After Visit Summary   3/2/2018    Gallito Farley    MRN: [id number]           Patient Information     Date Of Birth          1/1/1934        Visit Information        Provider Department      3/2/2018 3:45 PM Maria G Friedman PA-C; [employer] [facility]        Today's Diagnoses     OAB (overactive bladder)    -  1      Care Instructions    UROLOGY CLINIC VISIT PATIENT INSTRUCTIONS    1) Start taking Myrbetriq (mirabegron) 50 mg once daily in the morning.     2) Take Miralax once daily.     Follow up in 6-8 weeks to recheck.     If you have any issues, questions or concerns in the meantime, do not hesitate to contact us at [phone] or via Doist.     It was a pleasure meeting with you today.  Thank you for allowing me and my team the privilege of caring for you today.  YOU are the reason we are here, and I truly hope we provided you with the excellent service you deserve.  Please let us know if there is anything else we can do for you so that we can be sure you are leaving completely satisfied with your care experience.                Follow-ups after your visit        Your next 10 appointments already scheduled     Apr 19, 2018  1:30 PM CDT   (Arrive by 1:15 PM)   Pacemaker Check with Uc Cv Device 1   [facility] ([facility])    [address] [place] [address]  [address] [place] [id number] [phone]            Apr 19, 2018  2:00 PM CDT   (Arrive by 1:45 PM)   RETURN ARRHYTHMIA with KRISTEN [facility])    [address] [place] [address]  [address] [place] [id number] [phone]            Apr 27, 2018  4:00 PM CDT   Return Visit with Maria G Friedman PA-C   [facility] ([facility])    [address] [address] [place] [id number] [phone]              Who to contact     If you have questions or need follow up  information about today's clinic visit or your schedule please contact [facility] directly at [phone].  Normal or non-critical lab and imaging results will be communicated to you by IkerChemhart, letter or phone within 4 business days after the clinic has received the results. If you do not hear from us within 7 days, please contact the clinic through IkerChemhart or phone. If you have a critical or abnormal lab result, we will notify you by phone as soon as possible.  Submit refill requests through Skipjump or call your pharmacy and they will forward the refill request to us. Please allow 3 business days for your refill to be completed.          Additional Information About Your Visit        IkerChemharZartis Information     Skipjump is an electronic gateway that provides easy, online access to your medical records. With Skipjump, you can request a clinic appointment, read your test results, renew a prescription or communicate with your care team.     To sign up for Skipjump visit the website at [email or web address]   You will be asked to enter the access code listed below, as well as some personal information. Please follow the directions to create your username and password.     Your access code is: [email or web address]  Expires: 2018  4:39 PM     Your access code will  in 90 days. If you need help or a new code, please contact your [facility] or call [phone] for assistance.        Care EveryWhere ID     This is your Care EveryWhere ID. This could be used by other organizations to access your [facility] medical records  [id number]        Your Vitals Were     Pulse Temperature Pulse Oximetry BMI (Body Mass Index)          91 97.9  F (36.6  C) (Oral) 97% 26.31 kg/m2         Blood Pressure from Last 3 Encounters:   18 148/85   18 101/64   17 113/76    Weight from Last 3 Encounters:   18 84.4 kg (186 lb)   18 81.2 kg (179 lb)   17 81.6  kg (180 lb)              We Performed the Following     MEASURE POST-VOID RESIDUAL URINE/BLADDER CAPACITY, US NON-IMAGING          Today's Medication Changes          These changes are accurate as of 3/2/18  4:42 PM.  If you have any questions, ask your nurse or doctor.               Start taking these medicines.        Dose/Directions    mirabegron 50 MG 24 hr tablet   Commonly known as:  MYRBETRIQ   Used for:  OAB (overactive bladder)        Dose:  50 mg   Take 1 tablet (50 mg) by mouth daily   Quantity:  30 tablet   Refills:  5            Where to get your medicines      These medications were sent to [facility] - [place], [place] - [address] [facility]  [address] [facility] [address]    Hours:  Mon-Fri 8:30am-5:00pm Toll Free [phone] Phone:  [phone]     mirabegron 50 MG 24 hr tablet                Primary Care Provider Office Phone # Fax #    Hakeem Awad [phone] [address]  [facility] [address]        Equal Access to Services     [facility]: Hadii marvin ku hadasho Soomaali, waaxda luqadaha, qaybta kaalmada adeegyada, ricki pizarro . So [facility] [phone].    ATENCIÓN: Si habla español, tiene a john disposición servicios gratuitos de asistencia lingüística. Llame al [phone].    We comply with applicable federal civil rights laws and [place] laws. We do not discriminate on the basis of race, color, national origin, age, disability, sex, sexual orientation, or gender identity.            Thank you!     Thank you for choosing [facility]  for your care. Our goal is always to provide you with excellent care. Hearing back from our patients is one way we can continue to improve our services. Please take a few minutes to complete the written survey that you may receive in the mail after your visit with us. Thank you!             Your Updated Medication List - Protect others around  "you: Learn how to safely use, store and throw away your medicines at www.disposemymeds.org.          This list is accurate as of 3/2/18  4:42 PM.  Always use your most recent med list.                   Brand Name Dispense Instructions for use Diagnosis    acetaminophen 325 MG tablet    TYLENOL    100 tablet    Take 2 tablets (650 mg) by mouth every 4 hours as needed for mild pain    Acute post-operative pain       Adhesive Paper Tape     5 each    1inch paper tape for daily dressing changes.    Chronic venous hypertension with ulcer involving right side (H), Ulcer of right leg, with fat layer exposed (H)       albuterol (2.5 MG/3ML) 0.083% neb solution     360 mL    Take 1 vial (2.5 mg) by nebulization every 6 hours as needed    COPD exacerbation (H)       amLODIPine 10 MG tablet    NORVASC    30 tablet    Take 1 tablet (10 mg) by mouth daily    Essential hypertension       diclofenac sodium 3 % Gel     100 g    Apply 4 gm four times a day as needed to right trapezius muscle.    Cervical myofascial pain syndrome       enalapril 10 MG tablet    VASOTEC    90 tablet    Take 1 tablet (10 mg) by mouth daily    Essential hypertension       finasteride 5 MG tablet    PROSCAR    30 tablet    TAKE ONE TABLET BY MOUTH AT BEDTIME    Benign nodular prostatic hyperplasia without lower urinary tract symptoms       fluticasone 250 MCG/BLIST Aepb Inhaler    FLOVENT DISKUS    3 Inhaler    Inhale 1 puff into the lungs every 12 hours    COPD exacerbation (H)       Gauze Dressing 4\"X4\" Pads     48 each    Sterile 1l5xvya gauze for daily wound cares.    Chronic venous hypertension with ulcer involving right side (H), Ulcer of right leg, with fat layer exposed (H)       hydrochlorothiazide 12.5 MG capsule    MICROZIDE    60 capsule    Take 2 capsules (25 mg) by mouth daily    Essential hypertension       HEATHER 4\"X75\" Misc     30 each    Sterile Heather wrap for daily dressing changes.    Chronic venous hypertension with ulcer involving " "right side (H), Ulcer of right leg, with fat layer exposed (H)       mirabegron 50 MG 24 hr tablet    MYRBETRIQ    30 tablet    Take 1 tablet (50 mg) by mouth daily    OAB (overactive bladder)       mupirocin 2 % ointment    BACTROBAN    30 g    Apply twice a day to right leg wound and cover with occlusive dressing.    Chronic cutaneous venous stasis ulcer (H)       * order for DME     5 each    Equipment being ordered: Dispense 4\" x 4\" sterile gauze. Dispense any brand name.    Chronic cutaneous venous stasis ulcer (H)       * order for DME     5 each    Equipment being ordered: Dispense Coban wrap.    Chronic cutaneous venous stasis ulcer (H)       phenazopyridine 100 MG tablet    PYRIDIUM    24 tablet    Take 1 tablet (100 mg) by mouth 3 times daily as needed for urinary tract discomfort    Dysuria       polyethylene glycol powder    MIRALAX    510 g    Take 17 g (1 capful) by mouth daily    Constipation, unspecified constipation type       ranitidine 150 MG tablet    ZANTAC    180 tablet    Take 1 tablet (150 mg) by mouth 2 times daily    Gastroesophageal reflux disease, esophagitis presence not specified       tamsulosin 0.4 MG capsule    FLOMAX    60 capsule    Take 2 capsules (0.8 mg) by mouth At Bedtime    Benign nodular prostatic hyperplasia without lower urinary tract symptoms       tiotropium 18 MCG capsule    SPIRIVA    30 capsule    Inhale 1 capsule (18 mcg) into the lungs daily    COPD exacerbation (H)       tolterodine 4 MG 24 hr capsule    DETROL LA    30 capsule    Take 1 capsule (4 mg) by mouth daily    Bladder spasms       trolamine salicylate 10 % cream    ASPERCREME    85 g    Apply topically as needed for moderate pain    Cervical myofascial pain syndrome       * Notice:  This list has 2 medication(s) that are the same as other medications prescribed for you. Read the directions carefully, and ask your doctor or other care provider to review them with you.      "

## 2018-03-02 NOTE — PROGRESS NOTES
March 2, 2018    Return visit    Patient returns today for follow up with his caregiver. He is Serbian speaking and the entire visit conducted with the assistance of an .    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 On Detrol since December and it is unclear how much it is helping.  Also on BPH meds of finasteride and tamsulosin. First of three AFB urine cultures returned negative.     Last visit with Dr. Gimenez on 1/9/18. At that time, the patient endorsed pain and difficulty passing both urine and stool. His PVR was elevated to 134 mL. Recommendation was to stop Detrol, start Miralax, and await TB evaluation prior to initiating any other treatment.     He returns today and reports that symptoms are unchanged. Unsure if Miralax is helping. Continues to have pain with passing both urine and stool.     PEx  /85 (BP Location: Right arm, Patient Position: Sitting, Cuff Size: Adult Regular)  Pulse 91  Temp 97.9  F (36.6  C) (Oral)  Wt 84.4 kg (186 lb)  SpO2 97%  BMI 26.31 kg/m2  He is comfortable, in no distress, non-labored breathing.      PVR was 19 mL as measured by ultrasound    A/P: 84 year old M with small capacity bladder, high pressure detrusor overactivity, constipation. He appears to be emptying better today (PVR 19 mL).    TB evaluation negative thus far.    Discussed options are alternative medications, intravesical botulinum toxin, percutaneous tibial nerve stimulation and sacral neuromodulation.  Discussed in severe situations we may even divert the urine.  Patient is not interested in anything invasive and does not want any more catheters placed so wishes to do as much as we can with the medications first.  -Start Myrbetriq 50 mg daily. Side effects discussed. If medication not covered by insurance, would then switch to Sanctura XR 60 mg daily on an empty stomach.    Discussed that constipation is likely contributing. Continue  Miralax daily.    Follow up in 4-6 weeks for PVR and reassessment. If no improvement, will discuss bladder Botox.     15 minutes were spent with the patient today, > 50% in counseling and coordination of care    Maria G Friedman PA-C  Department of Urology

## 2018-03-02 NOTE — NURSING NOTE
"Gallito Farley's goals for this visit include:   He requests these members of his care team be copied on today's visit information:     PCP: Hakeem Awad    Referring Provider:  No referring provider defined for this encounter.    Chief Complaint   Patient presents with     Urinary Problem     overactive bladder       Initial /85 (BP Location: Right arm, Patient Position: Sitting, Cuff Size: Adult Regular)  Pulse 91  Temp 97.9  F (36.6  C) (Oral)  Wt 84.4 kg (186 lb)  SpO2 97%  BMI 26.31 kg/m2 Estimated body mass index is 26.31 kg/(m^2) as calculated from the following:    Height as of 1/9/18: 1.791 m (5' 10.5\").    Weight as of this encounter: 84.4 kg (186 lb).  Medication Reconciliation: complete    Do you need any medication refills at today's visit? No    Present with pt today is Caregiver \"Ilham\" from [facility] and Interrupter \"Safi\" from [facility].    post void residual: 19ml      Betsy Martins LPN     "

## 2018-03-05 ENCOUNTER — TELEPHONE (OUTPATIENT)
Dept: UROLOGY | Facility: CLINIC | Age: 83
End: 2018-03-05

## 2018-03-05 NOTE — TELEPHONE ENCOUNTER
Received fax incontinence supply order prescription from [facility] for patient. Form completed by Dr. Pineda and was successfully faxed to [phone].    Jessica Odom RN, BSN

## 2018-03-06 NOTE — TELEPHONE ENCOUNTER
PA Initiation    Medication: FLOVENT DISKUS 250MG  Insurance Company: [employer] - Phone [phone] Fax [phone]  Pharmacy Filling the Rx: [facility] [place], [place] - [address]  Filling Pharmacy Phone: [phone]  Filling Pharmacy Fax:    Start Date: 3/6/2018    Central Prior Authorization Team   Phone: [phone]      Manually faxed request to [facility] at fax# [phone]

## 2018-03-07 NOTE — TELEPHONE ENCOUNTER
PA approved for Orin Giordano CPhT, FRCT  [facility]   [address]

## 2018-03-07 NOTE — TELEPHONE ENCOUNTER
Prior Authorization Approval    Authorization Effective Date: 2/4/2018  Authorization Expiration Date: 3/6/2019  Medication: FLOVENT DISKUS 250MG Approved  Approved Dose/Quantity: 180/90 days  Reference #: [id number]   Insurance Company: [employer] - Phone [phone] Fax [phone]  Expected CoPay: $0     Which Pharmacy is filling the prescription (Not needed for infusion/clinic administered): [facility] [place], [place] - [address]

## 2018-03-09 NOTE — PROGRESS NOTES
3/9/18: Received call back from Sheela at [facility]. She states that rx for gloves and chux are good and will send out to member.  Still waiting on auth from [facility] for wipes - cannot send out until they receive auth.     Jorge David RN, N  [facility]

## 2018-03-09 NOTE — PROGRESS NOTES
3/5/18: Cm received email form Joe at [facility] stating that they left the rx and that Dr. Pineda was out of office but will sign and get back to [facility].     Jorge David RN, PHN  [facility]

## 2018-03-09 NOTE — PROGRESS NOTES
3/2/18: Member has appt with Urology today per Joe at [facility].  CM contacted [facility] - they have sent rx's to CM and CM sent to Joe to bring to appt to be signed and faxed back to [facility].     Jorge David RN, PHN  [facility]

## 2018-03-09 NOTE — PROGRESS NOTES
3/9/18: CM f/u with [facility] - they received rx back however the rx for gloves needed dx - they sent back to be completed.  Unsure if other items can be sent out - they will check and f/u back up with CM.  CM relayed to Joe.     Jorge David, RN, PHN  [facility]

## 2018-03-15 NOTE — PROGRESS NOTES
3/15/18: Received email from Joe at [facility] stating that wipes were delivered today and hoping that remaining items (gloves and chux) will be delivered soon.  PITER reached out to [facility] as gloves and chux were to be sent out last week on 3/9 and wipes needed to wait until auth was received from [facility].   PITER spoke with Alyssia - she states that gloves and chux show delivered in same box today at 1:45.  She states that wipes were not shipped out yet as they just received copy of SA form [facility] today.  She states there is not possible that the wipes could have been sent out.    PITER sent email back to [facility] clarifying items received today and relaying what [facility] states.     Jorge David RN, PHN  [facility]

## 2018-04-04 ENCOUNTER — OFFICE VISIT (OUTPATIENT)
Dept: FAMILY MEDICINE | Facility: CLINIC | Age: 83
End: 2018-04-04
Payer: COMMERCIAL

## 2018-04-04 VITALS
WEIGHT: 181 LBS | BODY MASS INDEX: 25.34 KG/M2 | HEART RATE: 101 BPM | TEMPERATURE: 98.3 F | DIASTOLIC BLOOD PRESSURE: 73 MMHG | HEIGHT: 71 IN | OXYGEN SATURATION: 96 % | SYSTOLIC BLOOD PRESSURE: 110 MMHG

## 2018-04-04 DIAGNOSIS — I10 HYPERTENSION, UNSPECIFIED TYPE: Primary | ICD-10-CM

## 2018-04-04 DIAGNOSIS — J45.40 MODERATE PERSISTENT ASTHMA WITHOUT COMPLICATION: ICD-10-CM

## 2018-04-04 DIAGNOSIS — N40.0 BENIGN NODULAR PROSTATIC HYPERPLASIA WITHOUT LOWER URINARY TRACT SYMPTOMS: ICD-10-CM

## 2018-04-04 DIAGNOSIS — I10 ESSENTIAL HYPERTENSION: ICD-10-CM

## 2018-04-04 DIAGNOSIS — R07.89 ATYPICAL CHEST PAIN: ICD-10-CM

## 2018-04-04 LAB
ALBUMIN SERPL-MCNC: 3.5 G/DL (ref 3.4–5)
ALP SERPL-CCNC: 95 U/L (ref 40–150)
ALT SERPL W P-5'-P-CCNC: 22 U/L (ref 0–70)
ANION GAP SERPL CALCULATED.3IONS-SCNC: 7 MMOL/L (ref 3–14)
AST SERPL W P-5'-P-CCNC: 22 U/L (ref 0–45)
BASOPHILS # BLD AUTO: 0 10E9/L (ref 0–0.2)
BASOPHILS NFR BLD AUTO: 0.1 %
BILIRUB SERPL-MCNC: 0.3 MG/DL (ref 0.2–1.3)
BUN SERPL-MCNC: 16 MG/DL (ref 7–30)
CALCIUM SERPL-MCNC: 9.4 MG/DL (ref 8.5–10.1)
CHLORIDE SERPL-SCNC: 99 MMOL/L (ref 94–109)
CO2 SERPL-SCNC: 32 MMOL/L (ref 20–32)
CREAT SERPL-MCNC: 1.06 MG/DL (ref 0.66–1.25)
DIFFERENTIAL METHOD BLD: NORMAL
EOSINOPHIL # BLD AUTO: 0.1 10E9/L (ref 0–0.7)
EOSINOPHIL NFR BLD AUTO: 1.6 %
ERYTHROCYTE [DISTWIDTH] IN BLOOD BY AUTOMATED COUNT: 15 % (ref 10–15)
GFR SERPL CREATININE-BSD FRML MDRD: 67 ML/MIN/1.7M2
GLUCOSE SERPL-MCNC: 102 MG/DL (ref 70–99)
HCT VFR BLD AUTO: 52.5 % (ref 40–53)
HGB BLD-MCNC: 17.4 G/DL (ref 13.3–17.7)
LYMPHOCYTES # BLD AUTO: 2.7 10E9/L (ref 0.8–5.3)
LYMPHOCYTES NFR BLD AUTO: 34.3 %
MCH RBC QN AUTO: 29.9 PG (ref 26.5–33)
MCHC RBC AUTO-ENTMCNC: 33.1 G/DL (ref 31.5–36.5)
MCV RBC AUTO: 90 FL (ref 78–100)
MONOCYTES # BLD AUTO: 0.8 10E9/L (ref 0–1.3)
MONOCYTES NFR BLD AUTO: 10 %
NEUTROPHILS # BLD AUTO: 4.3 10E9/L (ref 1.6–8.3)
NEUTROPHILS NFR BLD AUTO: 54 %
PLATELET # BLD AUTO: 198 10E9/L (ref 150–450)
POTASSIUM SERPL-SCNC: 3.3 MMOL/L (ref 3.4–5.3)
PROT SERPL-MCNC: 9 G/DL (ref 6.8–8.8)
RBC # BLD AUTO: 5.82 10E12/L (ref 4.4–5.9)
SODIUM SERPL-SCNC: 138 MMOL/L (ref 133–144)
TROPONIN I SERPL-MCNC: <0.015 UG/L (ref 0–0.04)
WBC # BLD AUTO: 7.9 10E9/L (ref 4–11)

## 2018-04-04 PROCEDURE — 80053 COMPREHEN METABOLIC PANEL: CPT | Performed by: INTERNAL MEDICINE

## 2018-04-04 PROCEDURE — 84484 ASSAY OF TROPONIN QUANT: CPT | Performed by: INTERNAL MEDICINE

## 2018-04-04 PROCEDURE — 85025 COMPLETE CBC W/AUTO DIFF WBC: CPT | Performed by: INTERNAL MEDICINE

## 2018-04-04 PROCEDURE — 86141 C-REACTIVE PROTEIN HS: CPT | Performed by: INTERNAL MEDICINE

## 2018-04-04 PROCEDURE — 99214 OFFICE O/P EST MOD 30 MIN: CPT | Performed by: INTERNAL MEDICINE

## 2018-04-04 PROCEDURE — 36415 COLL VENOUS BLD VENIPUNCTURE: CPT | Performed by: INTERNAL MEDICINE

## 2018-04-04 RX ORDER — HYDROCHLOROTHIAZIDE 12.5 MG/1
25 CAPSULE ORAL DAILY
Qty: 180 CAPSULE | Refills: 3 | Status: SHIPPED | OUTPATIENT
Start: 2018-04-04 | End: 2019-04-05

## 2018-04-04 RX ORDER — FINASTERIDE 5 MG/1
TABLET, FILM COATED ORAL
Qty: 90 TABLET | Refills: 3 | Status: SHIPPED | OUTPATIENT
Start: 2018-04-04 | End: 2019-04-05

## 2018-04-04 RX ORDER — AMLODIPINE BESYLATE 10 MG/1
10 TABLET ORAL DAILY
Qty: 90 TABLET | Refills: 3 | Status: SHIPPED | OUTPATIENT
Start: 2018-04-04 | End: 2019-04-05

## 2018-04-04 RX ORDER — TAMSULOSIN HYDROCHLORIDE 0.4 MG/1
0.8 CAPSULE ORAL AT BEDTIME
Qty: 180 CAPSULE | Refills: 3 | Status: SHIPPED | OUTPATIENT
Start: 2018-04-04 | End: 2019-04-05

## 2018-04-04 RX ORDER — ENALAPRIL MALEATE 10 MG/1
10 TABLET ORAL DAILY
Qty: 90 TABLET | Refills: 3 | Status: SHIPPED | OUTPATIENT
Start: 2018-04-04 | End: 2019-04-05

## 2018-04-04 ASSESSMENT — PAIN SCALES - GENERAL: PAINLEVEL: NO PAIN (0)

## 2018-04-04 NOTE — MR AVS SNAPSHOT
After Visit Summary   4/4/2018    Gallito Farley    MRN: [id number]           Patient Information     Date Of Birth          1/1/1934        Visit Information        Provider Department      4/4/2018 11:30 AM Hakeem Awad MD; [place] [employer]        Today's Diagnoses     Hypertension, unspecified type    -  1    Moderate persistent asthma without complication        Atypical chest pain          Care Instructions    At [facility], we strive to deliver an exceptional experience to you, every time we see you.  If you receive a survey in the mail, please send us back your thoughts. We really do value your feedback.    Based on your medical history, these are the current health maintenance/preventive care services that you are due for (some may have been done at this visit.)  Health Maintenance Due   Topic Date Due     ASTHMA CONTROL TEST Q6 MOS  02/03/2017     ASTHMA ACTION PLAN Q1 YR  08/03/2017     PNEUMOCOCCAL (2 of 2 - PCV13) 08/03/2017     FALL RISK ASSESSMENT  03/16/2018         Suggested websites for health information:  Www.[place].org : Up to date and easily searchable information on multiple topics.  Www.medlineplus.gov : medication info, interactive tutorials, watch real surgeries online  Www.familydoctor.org : good info from the Academy of Family Physicians  Www.cdc.gov : public health info, travel advisories, epidemics (H1N1)  Www.aap.org : children's health info, normal development, vaccinations  Www.health.[place]us : [place] dept of health, public health issues in [place], N1N1    Your care team:                            Family Medicine Internal Medicine   MD Hakeem Yuen MD Shantel Branch-Fleming, MD Katya Georgiev PA-C Nam Ho, MD Pediatrics   MERRILL Tse, CHRISTINE Chapman APRN CNP   MD Reyna Fuentes MD Deborah Mielke, MD Kim Thein, APRN CNP       Clinic hours: Monday - Thursday 7 am-7 pm; Fridays 7 am-5 pm.   Urgent care: Monday - Friday 11 am-9 pm; Saturday and Sunday 9 am-5 pm.  Pharmacy : Monday -Thursday 8 am-8 pm; Friday 8 am-6 pm; Saturday and Sunday 9 am-5 pm.     Clinic: [phone]   Pharmacy: [phone]            Follow-ups after your visit        Your next 10 appointments already scheduled     Apr 19, 2018  1:30 PM CDT   (Arrive by 1:15 PM)   Pacemaker Check with Uc Cv Device 1   [facility]    [address] [place] [address]  [address] [place] [id number] [phone]            Apr 19, 2018  2:00 PM CDT   (Arrive by 1:45 PM)   RETURN ARRHYTHMIA with KIRSTEN Yan CNP   [facility])    [address] [place] [address]  [address] [place] [id number] [phone]            Apr 27, 2018  4:00 PM CDT   Return Visit with Maria G Friedman PA-C   [facility]    [address] [place] [address] [phone]              Who to contact     If you have questions or need follow up information about today's clinic visit or your schedule please contact [facility] directly at [phone].  Normal or non-critical lab and imaging results will be communicated to you by [employer], letter or phone within 4 business days after the clinic has received the results. If you do not hear from us within 7 days, please contact the clinic through [employer] or phone. If you have a critical or abnormal lab result, we will notify you by phone as soon as possible.  Submit refill requests through [email or web address] or call your pharmacy and they will forward the refill request to us. Please allow 3 business days for your refill to be completed.          Additional Information About Your Visit        [employer] Information     [email or web address] lets you send messages to your doctor, view your test results, renew  "your prescriptions, schedule appointments and more. To sign up, go to [email or web address] . Click on \"Log in\" on the left side of the screen, which will take you to the Welcome page. Then click on \"Sign up Now\" on the right side of the page.     You will be asked to enter the access code listed below, as well as some personal information. Please follow the directions to create your username and password.     Your access code is: [email or web address]  Expires: 2018  5:39 PM     Your access code will  in 90 days. If you need help or a new code, please call your [facility] clinic or [phone].        Care EveryWhere ID     This is your Care EveryWhere ID. This could be used by other organizations to access your [facility] medical records  [id number]        Your Vitals Were     Pulse Temperature Height Pulse Oximetry BMI (Body Mass Index)       101 98.3  F (36.8  C) (Oral) 5' 10.5\" (1.791 m) 96% 25.6 kg/m2        Blood Pressure from Last 3 Encounters:   18 110/73   18 148/85   18 101/64    Weight from Last 3 Encounters:   18 181 lb (82.1 kg)   18 186 lb (84.4 kg)   18 179 lb (81.2 kg)              We Performed the Following     CBC with platelets and differential     Comprehensive metabolic panel     CRP cardiac risk     Troponin I          Today's Medication Changes          These changes are accurate as of 18 12:24 PM.  If you have any questions, ask your nurse or doctor.               Start taking these medicines.        Dose/Directions    fluticasone-vilanterol 200-25 MCG/INH oral inhaler   Commonly known as:  BREO ELLIPTA   Used for:  Moderate persistent asthma without complication   Started by:  Hakeem Awad MD        Dose:  1 puff   Inhale 1 puff into the lungs daily   Quantity:  1 Inhaler   Refills:  11            Where to get your medicines      These medications were sent to [facility] [place], [place] - [address]  [address], [facility] [address]     Phone:  [phone]     fluticasone-vilanterol 200-25 MCG/INH oral inhaler                Primary Care Provider Office Phone # Fax #    Hakeem Awad [phone]       [address]  [facility] [address]        Equal Access to Services     [facility]: Hadii aad ku hadasho Soomaali, waaxda luqadaha, qaybta kaalmada adeegyada, waxay idiin hayaan adeeg kharash la'aan . So [facility] [phone].    ATENCIÓN: Si habla español, tiene a john disposición servicios gratuitos de asistencia lingüística. Quoc al [phone].    We comply with applicable federal civil rights laws and [place] laws. We do not discriminate on the basis of race, color, national origin, age, disability, sex, sexual orientation, or gender identity.            Thank you!     Thank you for choosing [facility]  for your care. Our goal is always to provide you with excellent care. Hearing back from our patients is one way we can continue to improve our services. Please take a few minutes to complete the written survey that you may receive in the mail after your visit with us. Thank you!             Your Updated Medication List - Protect others around you: Learn how to safely use, store and throw away your medicines at www.disposemymeds.org.          This list is accurate as of 4/4/18 12:24 PM.  Always use your most recent med list.                   Brand Name Dispense Instructions for use Diagnosis    acetaminophen 325 MG tablet    TYLENOL    100 tablet    Take 2 tablets (650 mg) by mouth every 4 hours as needed for mild pain    Acute post-operative pain       Adhesive Paper Tape     5 each    1inch paper tape for daily dressing changes.    Chronic venous hypertension with ulcer involving right side (H), Ulcer of right leg, with fat layer exposed (H)       albuterol (2.5 MG/3ML) 0.083% neb solution     360 mL    Take 1 vial (2.5 mg) by nebulization every 6 hours as  "needed    COPD exacerbation (H)       amLODIPine 10 MG tablet    NORVASC    30 tablet    Take 1 tablet (10 mg) by mouth daily    Essential hypertension       diclofenac sodium 3 % Gel     100 g    Apply 4 gm four times a day as needed to right trapezius muscle.    Cervical myofascial pain syndrome       enalapril 10 MG tablet    VASOTEC    30 tablet    Take 1 tablet (10 mg) by mouth daily    Essential hypertension       finasteride 5 MG tablet    PROSCAR    30 tablet    TAKE ONE TABLET BY MOUTH AT BEDTIME    Benign nodular prostatic hyperplasia without lower urinary tract symptoms       fluticasone 250 MCG/BLIST Aepb Inhaler    FLOVENT DISKUS    3 Inhaler    Inhale 1 puff into the lungs every 12 hours    COPD exacerbation (H)       fluticasone-vilanterol 200-25 MCG/INH oral inhaler    BREO ELLIPTA    1 Inhaler    Inhale 1 puff into the lungs daily    Moderate persistent asthma without complication       Gauze Dressing 4\"X4\" Pads     48 each    Sterile 6l0ysda gauze for daily wound cares.    Chronic venous hypertension with ulcer involving right side (H), Ulcer of right leg, with fat layer exposed (H)       hydrochlorothiazide 12.5 MG capsule    MICROZIDE    60 capsule    Take 2 capsules (25 mg) by mouth daily    Essential hypertension       HEATHER 4\"X75\" Misc     30 each    Sterile Heather wrap for daily dressing changes.    Chronic venous hypertension with ulcer involving right side (H), Ulcer of right leg, with fat layer exposed (H)       mirabegron 50 MG 24 hr tablet    MYRBETRIQ    30 tablet    Take 1 tablet (50 mg) by mouth daily    OAB (overactive bladder)       mupirocin 2 % ointment    BACTROBAN    30 g    Apply twice a day to right leg wound and cover with occlusive dressing.    Chronic cutaneous venous stasis ulcer (H)       * order for DME     5 each    Equipment being ordered: Dispense 4\" x 4\" sterile gauze. Dispense any brand name.    Chronic cutaneous venous stasis ulcer (H)       * order for DME     5 " each    Equipment being ordered: Dispense Coban wrap.    Chronic cutaneous venous stasis ulcer (H)       phenazopyridine 100 MG tablet    PYRIDIUM    24 tablet    Take 1 tablet (100 mg) by mouth 3 times daily as needed for urinary tract discomfort    Dysuria       polyethylene glycol powder    MIRALAX    510 g    Take 17 g (1 capful) by mouth daily    Constipation, unspecified constipation type       ranitidine 150 MG tablet    ZANTAC    180 tablet    Take 1 tablet (150 mg) by mouth 2 times daily    Gastroesophageal reflux disease, esophagitis presence not specified       tamsulosin 0.4 MG capsule    FLOMAX    60 capsule    Take 2 capsules (0.8 mg) by mouth At Bedtime    Benign nodular prostatic hyperplasia without lower urinary tract symptoms       tiotropium 18 MCG capsule    SPIRIVA    30 capsule    Inhale 1 capsule (18 mcg) into the lungs daily    COPD exacerbation (H)       tolterodine 4 MG 24 hr capsule    DETROL LA    30 capsule    Take 1 capsule (4 mg) by mouth daily    Bladder spasms       trolamine salicylate 10 % cream    ASPERCREME    85 g    Apply topically as needed for moderate pain    Cervical myofascial pain syndrome       * Notice:  This list has 2 medication(s) that are the same as other medications prescribed for you. Read the directions carefully, and ask your doctor or other care provider to review them with you.

## 2018-04-04 NOTE — PATIENT INSTRUCTIONS
At [facility], we strive to deliver an exceptional experience to you, every time we see you.  If you receive a survey in the mail, please send us back your thoughts. We really do value your feedback.    Based on your medical history, these are the current health maintenance/preventive care services that you are due for (some may have been done at this visit.)  Health Maintenance Due   Topic Date Due     ASTHMA CONTROL TEST Q6 MOS  02/03/2017     ASTHMA ACTION PLAN Q1 YR  08/03/2017     PNEUMOCOCCAL (2 of 2 - PCV13) 08/03/2017     FALL RISK ASSESSMENT  03/16/2018         Suggested websites for health information:  Www.[place].org : Up to date and easily searchable information on multiple topics.  Www.medlineplus.gov : medication info, interactive tutorials, watch real surgeries online  Www.familydoctor.org : good info from the Academy of Family Physicians  Www.cdc.gov : public health info, travel advisories, epidemics (H1N1)  Www.aap.org : children's health info, normal development, vaccinations  Www.health.[facility].[place].us : [place] dept of health, public health issues in [place], N1N1    Your care team:                            Family Medicine Internal Medicine   MD Hakeem Yuen MD Shantel Branch-Fleming, MD Katya Georgiev PA-C Nam Ho, MD Pediatrics   MERRILL Tse, MD Reyna Alejandro CNP, MD Deborah Mielke, MD Kim Thein, APRN CNP      Clinic hours: Monday - Thursday 7 am-7 pm; Fridays 7 am-5 pm.   Urgent care: Monday - Friday 11 am-9 pm; Saturday and Sunday 9 am-5 pm.  Pharmacy : Monday -Thursday 8 am-8 pm; Friday 8 am-6 pm; Saturday and Sunday 9 am-5 pm.     Clinic: [phone]   Pharmacy: [phone]

## 2018-04-04 NOTE — PROGRESS NOTES
SUBJECTIVE:   Gallito Farley is a 84 year old male who presents to clinic today for the following health issues:    Concern - Fatigue  Onset: ongoing    Description:   Very fatigue    Intensity: moderate, severe    Progression of Symptoms:  improving    Accompanying Signs & Symptoms:  Very heavy breathing    Previous history of similar problem:   yes    Precipitating factors:   Worsened by: moving    Alleviating factors:  Improved by: none  Therapies Tried and outcome: resting, little relief    Problem list and histories reviewed & adjusted, as indicated.  Additional history: as documented    Patient Active Problem List   Diagnosis     Septic arthritis (H)     Chronic constipation     HTN (hypertension)     Benign prostatic hyperplasia     Insomnia     Moderate persistent asthma     GERD (gastroesophageal reflux disease)     Pneumonia     Hypercapnia     COPD exacerbation (H)     Acute on chronic respiratory failure with hypoxia and hypercapnia (H)     Chronic cutaneous venous stasis ulcer (H)     Hypertensive urgency     Atrioventricular block, complete (H)     Advanced directives, counseling/discussion     Phlebitis of left arm     Cardiac pacemaker in situ- Medtronic, dual chamber- NOT dependent     Benign nodular prostatic hyperplasia without lower urinary tract symptoms     Health Care Home     Past Surgical History:   Procedure Laterality Date     CHOLECYSTECTOMY       ENT SURGERY       IRRIGATION AND DEBRIDEMENT HIP, COMBINED  4/18/2013    Procedure: COMBINED IRRIGATION AND DEBRIDEMENT HIP;  Irrigation and debridement of Right Hip with cultures;  Surgeon: Cristal Inman MD;  Location: [facility] OR     Partial pneumonectomy      done in [facility] in the 1990's     skin grafting - leg wound  6/2016       Social History   Substance Use Topics     Smoking status: Former Smoker     Smokeless tobacco: Never Used     Alcohol use No     Family History   Problem Relation Age of Onset     Family History Negative  Mother          No Known Allergies  Recent Labs   Lab Test  04/04/18   1238  08/31/17   1428  01/14/17   0530   01/11/17   1734   07/24/16   0628  07/23/16   0530   06/13/16   1612   04/24/13   0643  04/23/13   0751   A1C   --    --    --    --    --    --    --   5.7   --    --    --    --   6.0   LDL   --    --    --    --    --    --    --    --    --    --    --   93   --    HDL   --    --    --    --    --    --    --    --    --    --    --   20*   --    TRIG   --    --    --    --    --    --    --    --    --    --    --   148   --    ALT  22   --    --    --   14   --    --   15   < >  57   < >   --    --    CR  1.06   --   0.82   < >  0.96   < >   --   0.88   < >  0.90   < >  1.11  1.12   GFRESTIMATED  67  71  >90  Non  GFR Calc     < >  75   < >   --   83   < >  81   < >  64  63   GFRESTBLACK  80  86  >90   GFR Calc     < >  >90   GFR Calc     < >   --   >90   GFR Calc     < >  >90   GFR Calc     < >  77  77   POTASSIUM  3.3*   --   3.4   < >  5.1   < >   --   4.1   < >  3.8   < >  3.7  3.7   TSH   --    --    --    --    --    --   1.04   --    --   0.20*   < >   --    --     < > = values in this interval not displayed.      BP Readings from Last 3 Encounters:   04/04/18 110/73   03/02/18 148/85   01/09/18 101/64    Wt Readings from Last 3 Encounters:   04/04/18 181 lb (82.1 kg)   03/02/18 186 lb (84.4 kg)   01/09/18 179 lb (81.2 kg)               ROS:  CONSTITUTIONAL:NEGATIVE  for malaise, myalgias and sweats  INTEGUMENTARY/SKIN: NEGATIVE for worrisome rashes, moles or lesions  EYES: NEGATIVE for vision changes or irritation  ENT/MOUTH: NEGATIVE for ear, mouth and throat problems  RESP: NEGATIVE for significant cough or SOB  CV: NEGATIVE for chest pain, palpitations or peripheral edema  GI: NEGATIVE for nausea, abdominal pain, heartburn, or change in bowel habits  : NEGATIVE for frequency, dysuria, or  "hematuria  MUSCULOSKELETAL: NEGATIVE for significant arthralgias or myalgia  NEURO: NEGATIVE for weakness, dizziness or paresthesias  ENDOCRINE: NEGATIVE for temperature intolerance, skin/hair changes  HEME: NEGATIVE for bleeding problems  PSYCHIATRIC: NEGATIVE for changes in mood or affect    OBJECTIVE:     /73 (BP Location: Left arm, Patient Position: Chair, Cuff Size: Adult Regular)  Pulse 101  Temp 98.3  F (36.8  C) (Oral)  Ht 5' 10.5\" (1.791 m)  Wt 181 lb (82.1 kg)  SpO2 96%  BMI 25.6 kg/m2  Body mass index is 25.6 kg/(m^2).  GENERAL: elderly and fatigued  EYES: Eyes grossly normal to inspection and conjunctivae and sclerae normal  HENT: normal cephalic/atraumatic and oral mucous membranes moist  NECK: no adenopathy, no asymmetry, masses, or scars and thyroid normal to palpation  RESP: lungs clear to auscultation - no rales, rhonchi or wheezes  CV: regular rate and rhythm, normal S1 S2, no S3 or S4, no murmur, click or rub, no peripheral edema and peripheral pulses strong  ABDOMEN: soft, nontender, no hepatosplenomegaly, no masses and bowel sounds normal  MS: no gross musculoskeletal defects noted, no edema    Diagnostic Test Results:  Results for orders placed or performed in visit on 04/04/18 (from the past 24 hour(s))   Troponin I   Result Value Ref Range    Troponin I ES <0.015 0.000 - 0.045 ug/L   CBC with platelets and differential   Result Value Ref Range    WBC 7.9 4.0 - 11.0 10e9/L    RBC Count 5.82 4.4 - 5.9 10e12/L    Hemoglobin 17.4 13.3 - 17.7 g/dL    Hematocrit 52.5 40.0 - 53.0 %    MCV 90 78 - 100 fl    MCH 29.9 26.5 - 33.0 pg    MCHC 33.1 31.5 - 36.5 g/dL    RDW 15.0 10.0 - 15.0 %    Platelet Count 198 150 - 450 10e9/L    Diff Method Automated Method     % Neutrophils 54.0 %    % Lymphocytes 34.3 %    % Monocytes 10.0 %    % Eosinophils 1.6 %    % Basophils 0.1 %    Absolute Neutrophil 4.3 1.6 - 8.3 10e9/L    Absolute Lymphocytes 2.7 0.8 - 5.3 10e9/L    Absolute Monocytes 0.8 0.0 - " 1.3 10e9/L    Absolute Eosinophils 0.1 0.0 - 0.7 10e9/L    Absolute Basophils 0.0 0.0 - 0.2 10e9/L   Comprehensive metabolic panel   Result Value Ref Range    Sodium 138 133 - 144 mmol/L    Potassium 3.3 (L) 3.4 - 5.3 mmol/L    Chloride 99 94 - 109 mmol/L    Carbon Dioxide 32 20 - 32 mmol/L    Anion Gap 7 3 - 14 mmol/L    Glucose 102 (H) 70 - 99 mg/dL    Urea Nitrogen 16 7 - 30 mg/dL    Creatinine 1.06 0.66 - 1.25 mg/dL    GFR Estimate 67 >60 mL/min/1.7m2    GFR Estimate If Black 80 >60 mL/min/1.7m2    Calcium 9.4 8.5 - 10.1 mg/dL    Bilirubin Total 0.3 0.2 - 1.3 mg/dL    Albumin 3.5 3.4 - 5.0 g/dL    Protein Total 9.0 (H) 6.8 - 8.8 g/dL    Alkaline Phosphatase 95 40 - 150 U/L    ALT 22 0 - 70 U/L    AST 22 0 - 45 U/L       ASSESSMENT/PLAN:       (I10) Hypertension, unspecified type  (primary encounter diagnosis)  Comment:   Plan: Comprehensive metabolic panel, Albumin Random         Urine Quantitative with Creat Ratio, CANCELED:         Albumin Random Urine Quantitative with Creat         Ratio            (J45.40) Moderate persistent asthma without complication  Comment:   Plan: fluticasone-vilanterol (BREO ELLIPTA) 200-25         MCG/INH oral inhaler, DISCONTINUED:         fluticasone-vilanterol (BREO ELLIPTA) 200-25         MCG/INH oral inhaler            (R07.89) Atypical chest pain  Comment:   Plan: Troponin I, CBC with platelets and         differential, CRP cardiac risk            (I10) Essential hypertension  Comment:   Plan: enalapril (VASOTEC) 10 MG tablet, amLODIPine         (NORVASC) 10 MG tablet, hydrochlorothiazide         (MICROZIDE) 12.5 MG capsule            (N40.0) Benign nodular prostatic hyperplasia without lower urinary tract symptoms  Comment:   Plan: tamsulosin (FLOMAX) 0.4 MG capsule, finasteride        (PROSCAR) 5 MG tablet          Follow-up visit if condition worsens.    Hakeem Awad MD  [facility]

## 2018-04-05 LAB — CRP SERPL HS-MCNC: 22.5 MG/L

## 2018-04-19 ENCOUNTER — OFFICE VISIT (OUTPATIENT)
Dept: CARDIOLOGY | Facility: CLINIC | Age: 83
End: 2018-04-19
Attending: NURSE PRACTITIONER
Payer: COMMERCIAL

## 2018-04-19 VITALS
WEIGHT: 184.5 LBS | HEIGHT: 73 IN | SYSTOLIC BLOOD PRESSURE: 117 MMHG | HEART RATE: 65 BPM | DIASTOLIC BLOOD PRESSURE: 74 MMHG | BODY MASS INDEX: 24.45 KG/M2 | OXYGEN SATURATION: 100 %

## 2018-04-19 DIAGNOSIS — Z95.0 CARDIAC PACEMAKER IN SITU: Primary | ICD-10-CM

## 2018-04-19 DIAGNOSIS — I44.2 ATRIOVENTRICULAR BLOCK, COMPLETE (H): Primary | ICD-10-CM

## 2018-04-19 DIAGNOSIS — I10 ESSENTIAL HYPERTENSION: ICD-10-CM

## 2018-04-19 DIAGNOSIS — I44.2 ATRIOVENTRICULAR BLOCK, COMPLETE (H): ICD-10-CM

## 2018-04-19 PROCEDURE — G0463 HOSPITAL OUTPT CLINIC VISIT: HCPCS | Mod: 25,ZF

## 2018-04-19 PROCEDURE — 93280 PM DEVICE PROGR EVAL DUAL: CPT

## 2018-04-19 PROCEDURE — 93280 PM DEVICE PROGR EVAL DUAL: CPT | Mod: 26 | Performed by: INTERNAL MEDICINE

## 2018-04-19 PROCEDURE — 99214 OFFICE O/P EST MOD 30 MIN: CPT | Mod: ZP | Performed by: NURSE PRACTITIONER

## 2018-04-19 ASSESSMENT — PAIN SCALES - GENERAL: PAINLEVEL: NO PAIN (0)

## 2018-04-19 NOTE — MR AVS SNAPSHOT
After Visit Summary   4/19/2018    Gallito aFrley    MRN: [id number]           Patient Information     Date Of Birth          1/1/1934        Visit Information        Provider Department      4/19/2018 1:15 PM 1, Uc Cv Device; [employer] [facility]        Today's Diagnoses     Atrioventricular block, complete (H)    -  1       Follow-ups after your visit        Additional Services     Follow-Up with Device Clinic-6 months                 Your next 10 appointments already scheduled     Apr 27, 2018  4:00 PM CDT   [facility] with Maria G Friedman PA-C   [facility]    [id number] [place] [id number] [phone]            Oct 16, 2018  1:30 PM CDT   (Arrive by 1:15 PM)   Pacemaker Check with Uc Cv Device 1   [facility] ([facility])    [address]  [address] [place] [id number] [phone]              Future tests that were ordered for you today     Open Future Orders        Priority Expected Expires Ordered    Follow-Up with EP Cardiac Advanced Practice Provider- 1 Year Routine 4/19/2019 4/20/2019 4/19/2018    Follow-Up with Device Clinic-6 months Routine 10/16/2018 1/14/2019 4/19/2018            Who to contact     If you have questions or need follow up information about today's clinic visit or your schedule please contact [facility] directly at [phone].  Normal or non-critical lab and imaging results will be communicated to you by MyChart, letter or phone within 4 business days after the clinic has received the results. If you do not hear from us within 7 days, please contact the clinic through MyChart or phone. If you have a critical or abnormal lab result, we will notify you by phone as soon as possible.  Submit refill requests through [employer] or call your pharmacy and they will forward the refill request to us. Please allow 3 business  "days for your refill to be completed.          Additional Information About Your Visit        MyChart Information     [employer] lets you send messages to your doctor, view your test results, renew your prescriptions, schedule appointments and more. To sign up, go to [email or web address] . Click on \"Log in\" on the left side of the screen, which will take you to the Welcome page. Then click on \"Sign up Now\" on the right side of the page.     You will be asked to enter the access code listed below, as well as some personal information. Please follow the directions to create your username and password.     Your access code is: [email or web address]  Expires: 2018  5:39 PM     Your access code will  in 90 days. If you need help or a new code, please call your [facility] clinic or [phone].        Care EveryWhere ID     This is your Care EveryWhere ID. This could be used by other organizations to access your [facility] medical records  [id number]         Blood Pressure from Last 3 Encounters:   18 117/74   18 110/73   18 148/85    Weight from Last 3 Encounters:   18 83.7 kg (184 lb 8 oz)   18 82.1 kg (181 lb)   18 84.4 kg (186 lb)               Primary Care Provider Office Phone # Fax #    Hakeem Awad [phone]       [address]  [facility] [address]        Equal Access to Services     [facility]: Hadii aad ku hadasho Soomaali, waaxda luqadaha, qaybta kaalmada adeegyada, waxay bruce pizarro . So [facility] [phone].    ATENCIÓN: Si habla español, tiene a john disposición servicios gratuitos de asistencia lingüística. Llame al [phone].    We comply with applicable federal civil rights laws and [place] laws. We do not discriminate on the basis of race, color, national origin, age, disability, sex, sexual orientation, or gender identity.            Thank you!     Thank you for choosing [facility]  for your care. " "Our goal is always to provide you with excellent care. Hearing back from our patients is one way we can continue to improve our services. Please take a few minutes to complete the written survey that you may receive in the mail after your visit with us. Thank you!             Your Updated Medication List - Protect others around you: Learn how to safely use, store and throw away your medicines at www.disposemymeds.org.          This list is accurate as of 4/19/18  2:08 PM.  Always use your most recent med list.                   Brand Name Dispense Instructions for use Diagnosis    acetaminophen 325 MG tablet    TYLENOL    100 tablet    Take 2 tablets (650 mg) by mouth every 4 hours as needed for mild pain    Acute post-operative pain       Adhesive Paper Tape     5 each    1inch paper tape for daily dressing changes.    Chronic venous hypertension with ulcer involving right side (H), Ulcer of right leg, with fat layer exposed (H)       albuterol (2.5 MG/3ML) 0.083% neb solution     360 mL    Take 1 vial (2.5 mg) by nebulization every 6 hours as needed    COPD exacerbation (H)       amLODIPine 10 MG tablet    NORVASC    90 tablet    Take 1 tablet (10 mg) by mouth daily    Essential hypertension       diclofenac sodium 3 % Gel     100 g    Apply 4 gm four times a day as needed to right trapezius muscle.    Cervical myofascial pain syndrome       enalapril 10 MG tablet    VASOTEC    90 tablet    Take 1 tablet (10 mg) by mouth daily    Essential hypertension       finasteride 5 MG tablet    PROSCAR    90 tablet    TAKE ONE TABLET BY MOUTH AT BEDTIME    Benign nodular prostatic hyperplasia without lower urinary tract symptoms       fluticasone-vilanterol 200-25 MCG/INH oral inhaler    BREO ELLIPTA    1 Inhaler    Inhale 1 puff into the lungs daily    Moderate persistent asthma without complication       Gauze Dressing 4\"X4\" Pads     48 each    Sterile 4i0tgdc gauze for daily wound cares.    Chronic venous hypertension " "with ulcer involving right side (H), Ulcer of right leg, with fat layer exposed (H)       hydrochlorothiazide 12.5 MG capsule    MICROZIDE    180 capsule    Take 2 capsules (25 mg) by mouth daily    Essential hypertension       HEATHER 4\"X75\" Misc     30 each    Sterile Heather wrap for daily dressing changes.    Chronic venous hypertension with ulcer involving right side (H), Ulcer of right leg, with fat layer exposed (H)       mirabegron 50 MG 24 hr tablet    MYRBETRIQ    30 tablet    Take 1 tablet (50 mg) by mouth daily    OAB (overactive bladder)       mupirocin 2 % ointment    BACTROBAN    30 g    Apply twice a day to right leg wound and cover with occlusive dressing.    Chronic cutaneous venous stasis ulcer (H)       * order for DME     5 each    Equipment being ordered: Dispense 4\" x 4\" sterile gauze. Dispense any brand name.    Chronic cutaneous venous stasis ulcer (H)       * order for DME     5 each    Equipment being ordered: Dispense Coban wrap.    Chronic cutaneous venous stasis ulcer (H)       polyethylene glycol powder    MIRALAX    510 g    Take 17 g (1 capful) by mouth daily    Constipation, unspecified constipation type       ranitidine 150 MG tablet    ZANTAC    180 tablet    Take 1 tablet (150 mg) by mouth 2 times daily    Gastroesophageal reflux disease, esophagitis presence not specified       tamsulosin 0.4 MG capsule    FLOMAX    180 capsule    Take 2 capsules (0.8 mg) by mouth At Bedtime    Benign nodular prostatic hyperplasia without lower urinary tract symptoms       tiotropium 18 MCG capsule    SPIRIVA    30 capsule    Inhale 1 capsule (18 mcg) into the lungs daily    COPD exacerbation (H)       tolterodine 4 MG 24 hr capsule    DETROL LA    30 capsule    Take 1 capsule (4 mg) by mouth daily    Bladder spasms       trolamine salicylate 10 % cream    ASPERCREME    85 g    Apply topically as needed for moderate pain    Cervical myofascial pain syndrome       * Notice:  This list has 2 " medication(s) that are the same as other medications prescribed for you. Read the directions carefully, and ask your doctor or other care provider to review them with you.

## 2018-04-19 NOTE — NURSING NOTE
Chief Complaint   Patient presents with     Follow Up For     6 month follow up device prior      Vitals were taken and medications were reconciled.     Vernell Harden RMA  1:40 PM

## 2018-04-19 NOTE — PROGRESS NOTES
Preliminary Device Interrogation Results.  Final physician signed paceart report to be scanned and attached.    Pt seen in the [facility] for evaluation and iterative programming of a Medtronic, dual lead pacemaker, per MD orders. He also has an appointment with Sarah Cooley NP. Today his intrinsic rhythm is sinus 88 with CHB- ventricular rate is <30 bpm. Normal pacemaker function. No arrhythmias have been recorded. No short v-v intervals recorded. Lead trends appear stable. AP= 28% and = 98%. Pt reports he is feeling well. Battery estimates 8 years to BE. Plan for pt to send a remote transmission on 7/23/18 and RTC in 6 months.  Dual lead pacemaker

## 2018-04-19 NOTE — MR AVS SNAPSHOT
After Visit Summary   4/19/2018    Gallito Farley    MRN: [id number]           Patient Information     Date Of Birth          1/1/1934        Visit Information        Provider Department      4/19/2018 1:45 PM Sarah Cooley APRN CNP; [employer] [facility]        Today's Diagnoses     Cardiac pacemaker in situ    -  1    Atrioventricular block, complete (H)        Essential hypertension          Care Instructions    Cardiology Provider you saw in clinic today: Sarah CURTIS, NP-C    Medication Changes: None today.    Labs/Tests needed: none     Follow-up Visit:  Follow up in one year      Further Instructions:      You will receive all normal lab and testing results via MyChart or mail if not reviewed in clinic today. Please contact our office if you need assistance with setting up MyChart.    If you need a medication refill please contact your pharmacy. Please allow 3 business days for your refill to be completed.     As always, thank you for trusting us with your health care needs!    If you have any questions regarding your visit please contact your care team:   Cardiology  Telephone Number    EP RN  Electrophysiology Nurse Coordinator [phone]     Call for EP procedure scheduling concerns  DEVEN Osorio  [phone]           Device Clinic (Pacemakers, ICDs, Loop)   RN's : Trudy Hayes Connie, Dawn During business hours: [phone]    After business hours:   [phone]- select option 4 and ask for job code [id number].                  Follow-ups after your visit        Additional Services     Follow-Up with EP Cardiac Advanced Practice Provider- 1 Year                 Your next 10 appointments already scheduled     Apr 27, 2018  4:00 PM CDT   Return Visit with Maria G Friedman PA-C   [facility] ([facility])    [address] [place] [id number] [phone]            Oct 16, 2018  1:30  "PM CDT   (Arrive by 1:15 PM)   Pacemaker Check with Uc Cv Device 1   [facility] ([facility])    [address] [place] [address]  [address] [place] [id number] [phone]              Future tests that were ordered for you today     Open Future Orders        Priority Expected Expires Ordered    Follow-Up with EP Cardiac Advanced Practice Provider- 1 Year Routine 2019    Follow-Up with Device Clinic-6 months Routine 10/16/2018 2019 2018            Who to contact     If you have questions or need follow up information about today's clinic visit or your schedule please contact [facility] directly at [phone].  Normal or non-critical lab and imaging results will be communicated to you by [employer], letter or phone within 4 business days after the clinic has received the results. If you do not hear from us within 7 days, please contact the clinic through [employer] or phone. If you have a critical or abnormal lab result, we will notify you by phone as soon as possible.  Submit refill requests through [employer] or call your pharmacy and they will forward the refill request to us. Please allow 3 business days for your refill to be completed.          Additional Information About Your Visit        [employer] Information     [employer] lets you send messages to your doctor, view your test results, renew your prescriptions, schedule appointments and more. To sign up, go to [email or web address] . Click on \"Log in\" on the left side of the screen, which will take you to the Welcome page. Then click on \"Sign up Now\" on the right side of the page.     You will be asked to enter the access code listed below, as well as some personal information. Please follow the directions to create your username and password.     Your access code is: [email or web address]  Expires: 2018  5:39 PM     Your access code will  in 90 days. If you need help or a new code, please " "call your [facility] clinic or [phone].        Care EveryWhere ID     This is your Care EveryWhere ID. This could be used by other organizations to access your [facility] medical records  [id number]        Your Vitals Were     Pulse Height Pulse Oximetry BMI (Body Mass Index)          65 1.854 m (6' 1\") 100% 24.34 kg/m2         Blood Pressure from Last 3 Encounters:   04/19/18 117/74   04/04/18 110/73   03/02/18 148/85    Weight from Last 3 Encounters:   04/19/18 83.7 kg (184 lb 8 oz)   04/04/18 82.1 kg (181 lb)   03/02/18 84.4 kg (186 lb)               Primary Care Provider Office Phone # Fax #    Hakeem Awad [phone] [address]  [facility] [address]        Equal Access to Services     [facility]: Hadii aad ku hadasho Soomaali, waaxda luqadaha, qaybta kaalmada adeegyada, waxay bruce pizarro . So [facility] [phone].    ATENCIÓN: Si habla español, tiene a john disposición servicios gratuitos de asistencia lingüística. Quoc al [phone].    We comply with applicable federal civil rights laws and [place] laws. We do not discriminate on the basis of race, color, national origin, age, disability, sex, sexual orientation, or gender identity.            Thank you!     Thank you for choosing [facility]  for your care. Our goal is always to provide you with excellent care. Hearing back from our patients is one way we can continue to improve our services. Please take a few minutes to complete the written survey that you may receive in the mail after your visit with us. Thank you!             Your Updated Medication List - Protect others around you: Learn how to safely use, store and throw away your medicines at www.disposemymeds.org.          This list is accurate as of 4/19/18  2:08 PM.  Always use your most recent med list.                   Brand Name Dispense Instructions for use Diagnosis    acetaminophen 325 MG tablet    TYLENOL    100 " "tablet    Take 2 tablets (650 mg) by mouth every 4 hours as needed for mild pain    Acute post-operative pain       Adhesive Paper Tape     5 each    1inch paper tape for daily dressing changes.    Chronic venous hypertension with ulcer involving right side (H), Ulcer of right leg, with fat layer exposed (H)       albuterol (2.5 MG/3ML) 0.083% neb solution     360 mL    Take 1 vial (2.5 mg) by nebulization every 6 hours as needed    COPD exacerbation (H)       amLODIPine 10 MG tablet    NORVASC    90 tablet    Take 1 tablet (10 mg) by mouth daily    Essential hypertension       diclofenac sodium 3 % Gel     100 g    Apply 4 gm four times a day as needed to right trapezius muscle.    Cervical myofascial pain syndrome       enalapril 10 MG tablet    VASOTEC    90 tablet    Take 1 tablet (10 mg) by mouth daily    Essential hypertension       finasteride 5 MG tablet    PROSCAR    90 tablet    TAKE ONE TABLET BY MOUTH AT BEDTIME    Benign nodular prostatic hyperplasia without lower urinary tract symptoms       fluticasone-vilanterol 200-25 MCG/INH oral inhaler    BREO ELLIPTA    1 Inhaler    Inhale 1 puff into the lungs daily    Moderate persistent asthma without complication       Gauze Dressing 4\"X4\" Pads     48 each    Sterile 9i0oftg gauze for daily wound cares.    Chronic venous hypertension with ulcer involving right side (H), Ulcer of right leg, with fat layer exposed (H)       hydrochlorothiazide 12.5 MG capsule    MICROZIDE    180 capsule    Take 2 capsules (25 mg) by mouth daily    Essential hypertension       HEATHER 4\"X75\" Misc     30 each    Sterile Heather wrap for daily dressing changes.    Chronic venous hypertension with ulcer involving right side (H), Ulcer of right leg, with fat layer exposed (H)       mirabegron 50 MG 24 hr tablet    MYRBETRIQ    30 tablet    Take 1 tablet (50 mg) by mouth daily    OAB (overactive bladder)       mupirocin 2 % ointment    BACTROBAN    30 g    Apply twice a day to right leg " "wound and cover with occlusive dressing.    Chronic cutaneous venous stasis ulcer (H)       * order for DME     5 each    Equipment being ordered: Dispense 4\" x 4\" sterile gauze. Dispense any brand name.    Chronic cutaneous venous stasis ulcer (H)       * order for DME     5 each    Equipment being ordered: Dispense Coban wrap.    Chronic cutaneous venous stasis ulcer (H)       polyethylene glycol powder    MIRALAX    510 g    Take 17 g (1 capful) by mouth daily    Constipation, unspecified constipation type       ranitidine 150 MG tablet    ZANTAC    180 tablet    Take 1 tablet (150 mg) by mouth 2 times daily    Gastroesophageal reflux disease, esophagitis presence not specified       tamsulosin 0.4 MG capsule    FLOMAX    180 capsule    Take 2 capsules (0.8 mg) by mouth At Bedtime    Benign nodular prostatic hyperplasia without lower urinary tract symptoms       tiotropium 18 MCG capsule    SPIRIVA    30 capsule    Inhale 1 capsule (18 mcg) into the lungs daily    COPD exacerbation (H)       tolterodine 4 MG 24 hr capsule    DETROL LA    30 capsule    Take 1 capsule (4 mg) by mouth daily    Bladder spasms       trolamine salicylate 10 % cream    ASPERCREME    85 g    Apply topically as needed for moderate pain    Cervical myofascial pain syndrome       * Notice:  This list has 2 medication(s) that are the same as other medications prescribed for you. Read the directions carefully, and ask your doctor or other care provider to review them with you.      "

## 2018-04-19 NOTE — LETTER
"4/19/2018      RE: Gallito Farley  [address] [facility] [address]       Dear Colleague,    Thank you for the opportunity to participate in the care of your patient, Gallito Farley, at the [facility]. Please see a copy of my visit note below.    Clinical Cardiac Electrophysiology  [facility] [place]    HPI: Mr. Farley is an 84 year-old male who presents for follow up with an  and his nephew for complete heart block 3 months s/p PPM implantation.  He has a past medical history significant for COPD, HTN, chronic leg wound, prior TB with right upper lobectomy who presented with cough after 6 month period in [place] off all medications and found to have bradycardia with complete heart block and hypertensive urgency. Patient underwent a successful dual chamber implant 1/12/2017. Of note, he was very hypertensive (240's/100's) when he arrived in [facility] for PPM implant.  He is currently on enalapril, amlodipine, and hydrochlorothiazide.     Patient states has resumed normal activity. States that he has had chest heaviness and mild swelling in his ankels for many years, no recent changes. Denies chest pain, headaches, dizziness, syncope, angina, dyspnea at rest or with exertion, dry cough, palpitations, orthopnea, PND, abdominal pain, abdominal edema, pedal edema, or claudication. Denies dysphagia, fever, chills, nausea, or vomiting. States that incision site is well healed without drainage, redness, warmth, or pain.    Today s Device check: \"Intrinsic rhythm is sinus 88 with CHB- ventricular rate is <30 bpm. Normal pacemaker function. No arrhythmias have been recorded. No short v-v intervals recorded. Lead trends appear stable. AP= 28% and = 98%. Pt reports he is feeling well. Battery estimates 8 years to BE. Plan for pt to send a remote transmission on 7/23/18 and RTC in 6 months. Dual lead pacemaker\"     Current Outpatient Prescriptions   Medication Sig " "Dispense Refill     acetaminophen (TYLENOL) 325 MG tablet Take 2 tablets (650 mg) by mouth every 4 hours as needed for mild pain 100 tablet 3     albuterol (2.5 MG/3ML) 0.083% neb solution Take 1 vial (2.5 mg) by nebulization every 6 hours as needed 360 mL 1     amLODIPine (NORVASC) 10 MG tablet Take 1 tablet (10 mg) by mouth daily 90 tablet 3     diclofenac sodium 3 % GEL Apply 4 gm four times a day as needed to right trapezius muscle. 100 g 11     enalapril (VASOTEC) 10 MG tablet Take 1 tablet (10 mg) by mouth daily 90 tablet 3     finasteride (PROSCAR) 5 MG tablet TAKE ONE TABLET BY MOUTH AT BEDTIME 90 tablet 3     fluticasone-vilanterol (BREO ELLIPTA) 200-25 MCG/INH oral inhaler Inhale 1 puff into the lungs daily 1 Inhaler 11     Gauze Bandages (HEATHER 4\"X75\") MISC Sterile Heather wrap for daily dressing changes. 30 each 2     Gauze Pads & Dressings (GAUZE DRESSING) 4\"X4\" PADS Sterile [id number] gauze for daily wound cares. 48 each 3     hydrochlorothiazide (MICROZIDE) 12.5 MG capsule Take 2 capsules (25 mg) by mouth daily 180 capsule 3     mirabegron (MYRBETRIQ) 50 MG 24 hr tablet Take 1 tablet (50 mg) by mouth daily 30 tablet 5     mupirocin (BACTROBAN) 2 % ointment Apply twice a day to right leg wound and cover with occlusive dressing. 30 g 11     order for DME Equipment being ordered: Dispense 4\" x 4\" sterile gauze. Dispense any brand name. 5 each 11     order for DME Equipment being ordered: Dispense Coban wrap. 5 each 5     polyethylene glycol (MIRALAX) powder Take 17 g (1 capful) by mouth daily 510 g 11     ranitidine (ZANTAC) 150 MG tablet Take 1 tablet (150 mg) by mouth 2 times daily 180 tablet 1     tamsulosin (FLOMAX) 0.4 MG capsule Take 2 capsules (0.8 mg) by mouth At Bedtime 180 capsule 3     tiotropium (SPIRIVA) 18 MCG capsule Inhale 1 capsule (18 mcg) into the lungs daily 30 capsule 11     tolterodine (DETROL LA) 4 MG 24 hr capsule Take 1 capsule (4 mg) by mouth daily 30 capsule 6     trolamine " "salicylate (ASPERCREME) 10 % cream Apply topically as needed for moderate pain 85 g 11     Adhesive Tape (ADHESIVE PAPER) TAPE 1inch paper tape for daily dressing changes. (Patient not taking: Reported on 4/19/2018) 5 each 3       Past Medical History:   Diagnosis Date     Asthma      BPH (benign prostatic hyperplasia)      COPD (chronic obstructive pulmonary disease) (H)      Diastolic dysfunction, left ventricle 4/16/2013     H/O tuberculosis     s/[place]     Hypertension      LBBB (left bundle branch block)      Leg wound, right     chronic, s/p grafting     Osteoarthritis        Past Surgical History:   Procedure Laterality Date     CHOLECYSTECTOMY       ENT SURGERY       IRRIGATION AND DEBRIDEMENT HIP, COMBINED  4/18/2013    Procedure: COMBINED IRRIGATION AND DEBRIDEMENT HIP;  Irrigation and debridement of Right Hip with cultures;  Surgeon: Cristal Inman MD;  Location: [facility] OR     Partial pneumonectomy      done in [facility] in the 1990's     skin grafting - leg wound  6/2016       Family History   Problem Relation Age of Onset     Family History Negative Mother        Social History   Substance Use Topics     Smoking status: Former Smoker     Smokeless tobacco: Never Used     Alcohol use No       No Known Allergies      ROS:   Negative except for as indicated in HPI.    Physical Examination:  Vitals: /74  Pulse 65  Ht 1.854 m (6' 1\")  Wt 83.7 kg (184 lb 8 oz)  SpO2 100%  BMI 24.34 kg/m2  BMI= Body mass index is 24.34 kg/(m^2).    GENERAL APPEARANCE: healthy, alert, and no acute distress  HEENT: no icterus, no xanthelasmas, normal pupil size and reaction, no cyanosis.  NECK: no asymmetry, no cervical bruits, no JVD   CHEST: lungs clear to auscultation - no rales, rhonchi or wheezes, no use of accessory muscles, no retractions, respirations are unlabored, normal respiratory rate  CARDIOVASCULAR: regular rhythm, normal S1 with physiologic split S2, no S3 " or S4 and no murmur, click or rub.  ABDOMEN: no abdominal bruits, soft, non-tender  EXTREMITIES: no clubbing, cyanosis, or edema  NEURO: alert and oriented to person/place/time, normal speech, gait and affect  VASC: Radial and posterior tibialis pulses +2 and symmetric bilaterally.   SKIN: no ecchymoses, no rashes. Well healed left subclavian device site is noted.    Laboratory:  Lab Results   Component Value Date    WBC 7.9 04/04/2018    RBC 5.82 04/04/2018    HGB 17.4 04/04/2018    HCT 52.5 04/04/2018    MCV 90 04/04/2018    MCH 29.9 04/04/2018    MCHC 33.1 04/04/2018    RDW 15.0 04/04/2018     04/04/2018     Lab Results   Component Value Date     04/04/2018    POTASSIUM 3.3 (L) 04/04/2018    CHLORIDE 99 04/04/2018    CO2 32 04/04/2018    ANIONGAP 7 04/04/2018     (H) 04/04/2018    BUN 16 04/04/2018    CR 1.06 04/04/2018    GFRESTIMATED 67 04/04/2018    GFRESTBLACK 80 04/04/2018    PAMELA 9.4 04/04/2018      Lab Results   Component Value Date    INR 1.24 (H) 01/12/2017    INR 1.14 01/11/2017    INR 1.04 06/13/2016    INR 1.11 04/18/2013     No results found for: CKTOTAL, CKMB, TROPN  Cholesterol (mg/dL)   Date Value   04/24/2013 143     Cholesterol/HDL Ratio (no units)   Date Value   04/24/2013 7.1 (H)     HDL Cholesterol (mg/dL)   Date Value   04/24/2013 20 (L)     LDL Cholesterol Calculated (mg/dL)   Date Value   04/24/2013 93       ECHO: Study Date: 01/12/2017 01:43 PM  Age: 83 yrs  Gender: Male  Patient Location: [facility] [place]  Reason For Study: Complete AV block  Ordering Physician: NETTA DAS  Performed By: Tejas Bright RDCS     BSA: 2.0 m2  Height: 73 in  Weight: 176 lb  BP: 212/69 mmHg    Procedure  Complete Portable Echo Adult.    Interpretation Summary  Global and regional left ventricular function is normal with an EF of 55-60%.  Mild diffuse hypokinesis is present.  Right ventricular function, chamber size, wall motion, and thickness are  normal.  Pulmonary artery systolic pressure is  normal.  Dilation of the inferior vena cava is present with normal respiratory  variation in diameter. Estimated mean right atrial pressure is 3-8 mmHg.  No pericardial effusion is present.     Left Ventricle  Global and regional left ventricular function is normal with an EF of 55-60%.  Left ventricular wall thickness is normal. Left ventricular size is normal.  Diastolic function not assessed due to frequent ectopy. Mild diffuse  hypokinesis is present.     Right Ventricle  Right ventricular function, chamber size, wall motion, and thickness are  normal.  Atria  Both atria appear normal.     Mitral Valve  The mitral valve is normal.     Aortic Valve  AoV is seen in limited views but appears to be normal in structure.     Tricuspid Valve  Trace tricuspid insufficiency is present. The right ventricular systolic  pressure is approximated at 28.1 mmHg plus the right atrial pressure.  Pulmonary artery systolic pressure is normal.     Vessels  Dilation of the inferior vena cava is present with normal respiratory  variation in diameter. Estimated mean right atrial pressure is 3-8 mmHg.     Pericardium  No pericardial effusion is present.  Compared to Previous Study  This study was compared with the study from 2013 . LVEF is slightly  worse. The patient is now in complete heart block.   ____________________________________________________________________________  MMode/2D Measurements & Calculations  LA Volume (BP): 64.3 ml    Doppler Measurements & Calculations  MV E max timur: 117.1 cm/sec  MV A max timur: 93.1 cm/sec  MV E/A: 1.3  MV dec slope: 447.2 cm/sec2  MV dec time: 0.26 sec  TR max timur: 265.0 cm/sec  TR max P.1 mmHg  Lateral E/e': 15.8  Medial E/e': 24.7     Assessment and recommendations:  Patient has resumed normal activities and incision sites are well healed.    #1. CHB s/p dual chamber PPM implantation 3 months ago   1. Normal device function.   2. Keep scheduled follow ups with Device Clinic.     #2.  HTN: Well controlled.   1. Continue enalapril, amlodipine, and hydrochlorothiazide.     Follow up: Follow up in one year or follow up sooner if needed for symptoms or problems.   Patient expresses understanding and agreement with the plan.  I appreciate the chance to help with Gallito mcintosh. Please let me know if you have any questions or concerns.  Sarah CURTIS, NP-C

## 2018-04-19 NOTE — PROGRESS NOTES
"    Clinical Cardiac Electrophysiology  [facility]    HPI: Mr. Farley is an 84 year-old male who presents for follow up with an  and his nephew for complete heart block 3 months s/p PPM implantation.  He has a past medical history significant for COPD, HTN, chronic leg wound, prior TB with right upper lobectomy who presented with cough after 6 month period in [place] off all medications and found to have bradycardia with complete heart block and hypertensive urgency. Patient underwent a successful dual chamber implant 1/12/2017. Of note, he was very hypertensive (240's/100's) when he arrived in [facility] for PPM implant.  He is currently on enalapril, amlodipine, and hydrochlorothiazide.     Patient states has resumed normal activity. States that he has had chest heaviness and mild swelling in his ankels for many years, no recent changes. Denies chest pain, headaches, dizziness, syncope, angina, dyspnea at rest or with exertion, dry cough, palpitations, orthopnea, PND, abdominal pain, abdominal edema, pedal edema, or claudication. Denies dysphagia, fever, chills, nausea, or vomiting. States that incision site is well healed without drainage, redness, warmth, or pain.    Today s Device check: \"Intrinsic rhythm is sinus 88 with CHB- ventricular rate is <30 bpm. Normal pacemaker function. No arrhythmias have been recorded. No short v-v intervals recorded. Lead trends appear stable. AP= 28% and = 98%. Pt reports he is feeling well. Battery estimates 8 years to BE. Plan for pt to send a remote transmission on 7/23/18 and RTC in 6 months. Dual lead pacemaker\"     Current Outpatient Prescriptions   Medication Sig Dispense Refill     acetaminophen (TYLENOL) 325 MG tablet Take 2 tablets (650 mg) by mouth every 4 hours as needed for mild pain 100 tablet 3     albuterol (2.5 MG/3ML) 0.083% neb solution Take 1 vial (2.5 mg) by nebulization every 6 hours as needed 360 mL 1     amLODIPine (NORVASC) 10 MG tablet Take 1 tablet (10 " "mg) by mouth daily 90 tablet 3     diclofenac sodium 3 % GEL Apply 4 gm four times a day as needed to right trapezius muscle. 100 g 11     enalapril (VASOTEC) 10 MG tablet Take 1 tablet (10 mg) by mouth daily 90 tablet 3     finasteride (PROSCAR) 5 MG tablet TAKE ONE TABLET BY MOUTH AT BEDTIME 90 tablet 3     fluticasone-vilanterol (BREO ELLIPTA) 200-25 MCG/INH oral inhaler Inhale 1 puff into the lungs daily 1 Inhaler 11     Gauze Bandages (HEATHER 4\"X75\") MISC Sterile Heather wrap for daily dressing changes. 30 each 2     Gauze Pads & Dressings (GAUZE DRESSING) 4\"X4\" PADS Sterile [id number] gauze for daily wound cares. 48 each 3     hydrochlorothiazide (MICROZIDE) 12.5 MG capsule Take 2 capsules (25 mg) by mouth daily 180 capsule 3     mirabegron (MYRBETRIQ) 50 MG 24 hr tablet Take 1 tablet (50 mg) by mouth daily 30 tablet 5     mupirocin (BACTROBAN) 2 % ointment Apply twice a day to right leg wound and cover with occlusive dressing. 30 g 11     order for DME Equipment being ordered: Dispense 4\" x 4\" sterile gauze. Dispense any brand name. 5 each 11     order for DME Equipment being ordered: Dispense Coban wrap. 5 each 5     polyethylene glycol (MIRALAX) powder Take 17 g (1 capful) by mouth daily 510 g 11     ranitidine (ZANTAC) 150 MG tablet Take 1 tablet (150 mg) by mouth 2 times daily 180 tablet 1     tamsulosin (FLOMAX) 0.4 MG capsule Take 2 capsules (0.8 mg) by mouth At Bedtime 180 capsule 3     tiotropium (SPIRIVA) 18 MCG capsule Inhale 1 capsule (18 mcg) into the lungs daily 30 capsule 11     tolterodine (DETROL LA) 4 MG 24 hr capsule Take 1 capsule (4 mg) by mouth daily 30 capsule 6     trolamine salicylate (ASPERCREME) 10 % cream Apply topically as needed for moderate pain 85 g 11     Adhesive Tape (ADHESIVE PAPER) TAPE 1inch paper tape for daily dressing changes. (Patient not taking: Reported on 4/19/2018) 5 each 3       Past Medical History:   Diagnosis Date     Asthma      BPH (benign prostatic hyperplasia)  " "    COPD (chronic obstructive pulmonary disease) (H)      Diastolic dysfunction, left ventricle 4/16/2013     H/O tuberculosis     s/[place]     Hypertension      LBBB (left bundle branch block)      Leg wound, right     chronic, s/p grafting     Osteoarthritis        Past Surgical History:   Procedure Laterality Date     CHOLECYSTECTOMY       ENT SURGERY       IRRIGATION AND DEBRIDEMENT HIP, COMBINED  4/18/2013    Procedure: COMBINED IRRIGATION AND DEBRIDEMENT HIP;  Irrigation and debridement of Right Hip with cultures;  Surgeon: Cristal Inman MD;  Location: [facility] OR     Partial pneumonectomy      done in [facility] in the 1990's     skin grafting - leg wound  6/2016       Family History   Problem Relation Age of Onset     Family History Negative Mother        Social History   Substance Use Topics     Smoking status: Former Smoker     Smokeless tobacco: Never Used     Alcohol use No       No Known Allergies      ROS:   Negative except for as indicated in HPI.    Physical Examination:  Vitals: /74  Pulse 65  Ht 1.854 m (6' 1\")  Wt 83.7 kg (184 lb 8 oz)  SpO2 100%  BMI 24.34 kg/m2  BMI= Body mass index is 24.34 kg/(m^2).    GENERAL APPEARANCE: healthy, alert, and no acute distress  HEENT: no icterus, no xanthelasmas, normal pupil size and reaction, no cyanosis.  NECK: no asymmetry, no cervical bruits, no JVD   CHEST: lungs clear to auscultation - no rales, rhonchi or wheezes, no use of accessory muscles, no retractions, respirations are unlabored, normal respiratory rate  CARDIOVASCULAR: regular rhythm, normal S1 with physiologic split S2, no S3 or S4 and no murmur, click or rub.  ABDOMEN: no abdominal bruits, soft, non-tender  EXTREMITIES: no clubbing, cyanosis, or edema  NEURO: alert and oriented to person/place/time, normal speech, gait and affect  VASC: Radial and posterior tibialis pulses +2 and symmetric bilaterally.   SKIN: no ecchymoses, no rashes. Well " healed left subclavian device site is noted.    Laboratory:  Lab Results   Component Value Date    WBC 7.9 04/04/2018    RBC 5.82 04/04/2018    HGB 17.4 04/04/2018    HCT 52.5 04/04/2018    MCV 90 04/04/2018    MCH 29.9 04/04/2018    MCHC 33.1 04/04/2018    RDW 15.0 04/04/2018     04/04/2018     Lab Results   Component Value Date     04/04/2018    POTASSIUM 3.3 (L) 04/04/2018    CHLORIDE 99 04/04/2018    CO2 32 04/04/2018    ANIONGAP 7 04/04/2018     (H) 04/04/2018    BUN 16 04/04/2018    CR 1.06 04/04/2018    GFRESTIMATED 67 04/04/2018    GFRESTBLACK 80 04/04/2018    PAMELA 9.4 04/04/2018      Lab Results   Component Value Date    INR 1.24 (H) 01/12/2017    INR 1.14 01/11/2017    INR 1.04 06/13/2016    INR 1.11 04/18/2013     No results found for: CKTOTAL, CKMB, TROPN  Cholesterol (mg/dL)   Date Value   04/24/2013 143     Cholesterol/HDL Ratio (no units)   Date Value   04/24/2013 7.1 (H)     HDL Cholesterol (mg/dL)   Date Value   04/24/2013 20 (L)     LDL Cholesterol Calculated (mg/dL)   Date Value   04/24/2013 93       ECHO: Study Date: 01/12/2017 01:43 PM  Age: 83 yrs  Gender: Male  Patient Location: [facility] [place]  Reason For Study: Complete AV block  Ordering Physician: NETTA DAS  Performed By: Tejas Bright RDCS     BSA: 2.0 m2  Height: 73 in  Weight: 176 lb  BP: 212/69 mmHg    Procedure  Complete Portable Echo Adult.    Interpretation Summary  Global and regional left ventricular function is normal with an EF of 55-60%.  Mild diffuse hypokinesis is present.  Right ventricular function, chamber size, wall motion, and thickness are  normal.  Pulmonary artery systolic pressure is normal.  Dilation of the inferior vena cava is present with normal respiratory  variation in diameter. Estimated mean right atrial pressure is 3-8 mmHg.  No pericardial effusion is present.     Left Ventricle  Global and regional left ventricular function is normal with an EF of 55-60%.  Left ventricular wall thickness is  normal. Left ventricular size is normal.  Diastolic function not assessed due to frequent ectopy. Mild diffuse  hypokinesis is present.     Right Ventricle  Right ventricular function, chamber size, wall motion, and thickness are  normal.  Atria  Both atria appear normal.     Mitral Valve  The mitral valve is normal.     Aortic Valve  AoV is seen in limited views but appears to be normal in structure.     Tricuspid Valve  Trace tricuspid insufficiency is present. The right ventricular systolic  pressure is approximated at 28.1 mmHg plus the right atrial pressure.  Pulmonary artery systolic pressure is normal.     Vessels  Dilation of the inferior vena cava is present with normal respiratory  variation in diameter. Estimated mean right atrial pressure is 3-8 mmHg.     Pericardium  No pericardial effusion is present.  Compared to Previous Study  This study was compared with the study from 2013 . LVEF is slightly  worse. The patient is now in complete heart block.   ____________________________________________________________________________  MMode/2D Measurements & Calculations  LA Volume (BP): 64.3 ml    Doppler Measurements & Calculations  MV E max timur: 117.1 cm/sec  MV A max timur: 93.1 cm/sec  MV E/A: 1.3  MV dec slope: 447.2 cm/sec2  MV dec time: 0.26 sec  TR max timur: 265.0 cm/sec  TR max P.1 mmHg  Lateral E/e': 15.8  Medial E/e': 24.7     Assessment and recommendations:  Patient has resumed normal activities and incision sites are well healed.    #1. CHB s/p dual chamber PPM implantation 3 months ago   1. Normal device function.   2. Keep scheduled follow ups with Device Clinic.     #2. HTN: Well controlled.   1. Continue enalapril, amlodipine, and hydrochlorothiazide.     Follow up: Follow up in one year or follow up sooner if needed for symptoms or problems.     Patient expresses understanding and agreement with the plan.    I appreciate the chance to help with Gallito mcintosh. Please let me know  if you have any questions or concerns.    Sarah CURTIS, NP-C

## 2018-04-19 NOTE — PATIENT INSTRUCTIONS
Cardiology Provider you saw in clinic today: Sarah CURTIS NP-C    Medication Changes: None today.    Labs/Tests needed: none     Follow-up Visit:  Follow up in one year      Further Instructions:      You will receive all normal lab and testing results via [facility] or mail if not reviewed in clinic today. Please contact our office if you need assistance with setting up MyChart.    If you need a medication refill please contact your pharmacy. Please allow 3 business days for your refill to be completed.     As always, thank you for trusting us with your health care needs!    If you have any questions regarding your visit please contact your care team:   Cardiology  Telephone Number    EP RN  Electrophysiology Nurse Coordinator [phone]     Call for EP procedure scheduling concerns  DEVEN Osorio  [phone]           Device Clinic (Pacemakers, ICDs, Loop)   RN's : Trudy Hayes Connie, Dawn During business hours: [phone]    After business hours:   [phone]- select option 4 and ask for job code [phone].

## 2018-04-30 ENCOUNTER — PATIENT OUTREACH (OUTPATIENT)
Dept: GERIATRIC MEDICINE | Facility: CLINIC | Age: 83
End: 2018-04-30

## 2018-04-30 NOTE — PROGRESS NOTES
Piter received email from Joe at [employer] inquiring on incontinence supplies as member is almost out.  She states that they did not receive last month she believes.   Piter placed call [facility] - spoke with Alyssia - she states that items were delivered on 4/5.  She states May shipment will go out between 3-7th.  Relayed this to Joe.   [facility] are not [facility] in May.     Jorge David RN, PHN  [employer]

## 2018-05-24 ENCOUNTER — PATIENT OUTREACH (OUTPATIENT)
Dept: GERIATRIC MEDICINE | Facility: CLINIC | Age: 83
End: 2018-05-24

## 2018-05-24 NOTE — PROGRESS NOTES
Received email and call from [facility] - member received letter from [facility] stating that member's renewal is due and medical will term 5/31.  [facility] states that member never received renewal paperwork.   Placed call to [facility] - spoke with Mj - he states member needs [employer] 7135 form completed and any statement from bank accts. If he does not have bank acct then just statement saying this.       CM obtain edoc - spoke with [facility] - emailed to her.  They will complete and fax to [facility] when complete by 5/31 at [phone].    Jorge David RN, PHN  [facility]

## 2018-07-19 DIAGNOSIS — R30.0 DYSURIA: Primary | ICD-10-CM

## 2018-07-30 ENCOUNTER — ALLIED HEALTH/NURSE VISIT (OUTPATIENT)
Dept: CARDIOLOGY | Facility: CLINIC | Age: 83
End: 2018-07-30
Attending: INTERNAL MEDICINE
Payer: COMMERCIAL

## 2018-07-30 DIAGNOSIS — I44.2 ATRIOVENTRICULAR BLOCK, COMPLETE (H): Primary | ICD-10-CM

## 2018-07-30 PROCEDURE — 93296 REM INTERROG EVL PM/IDS: CPT | Mod: ZF

## 2018-07-30 PROCEDURE — 93294 REM INTERROG EVL PM/LDLS PM: CPT | Performed by: INTERNAL MEDICINE

## 2018-07-30 RX ORDER — PHENAZOPYRIDINE HYDROCHLORIDE 100 MG/1
100 TABLET, FILM COATED ORAL 3 TIMES DAILY PRN
Qty: 6 TABLET | Refills: 0 | Status: SHIPPED | OUTPATIENT
Start: 2018-07-30 | End: 2019-05-23

## 2018-07-30 NOTE — MR AVS SNAPSHOT
"              After Visit Summary   7/30/2018    Gallito Farley    MRN: [id number]           Patient Information     Date Of Birth          1/1/1934        Visit Information        Provider Department      7/30/2018 6:00 AM JESUS ICD REMOTE [facility]        Today's Diagnoses     Atrioventricular block, complete (H)    -  1       Follow-ups after your visit        Your next 10 appointments already scheduled     Oct 16, 2018  1:30 PM CDT   (Arrive by 1:15 PM)   Pacemaker Check with  Cv Device 1   [facility] ([facility])    [address] [place] [address]  [address] [place] [id number] [phone]              Who to contact     If you have questions or need follow up information about today's clinic visit or your schedule please contact [facility] directly at [phone].  Normal or non-critical lab and imaging results will be communicated to you by ZMPhart, letter or phone within 4 business days after the clinic has received the results. If you do not hear from us within 7 days, please contact the clinic through ZMPhart or phone. If you have a critical or abnormal lab result, we will notify you by phone as soon as possible.  Submit refill requests through Life800 or call your pharmacy and they will forward the refill request to us. Please allow 3 business days for your refill to be completed.          Additional Information About Your Visit        ZMPhart Information     Life800 lets you send messages to your doctor, view your test results, renew your prescriptions, schedule appointments and more. To sign up, go to [email or web address] . Click on \"Log in\" on the left side of the screen, which will take you to the Welcome page. Then click on \"Sign up Now\" on the right side of the page.     You will be asked to enter the access code listed below, as well as some personal information. Please follow the directions to create your username and password.     Your " access code is: [email or web address]  Expires: 2018  9:49 AM     Your access code will  in 90 days. If you need help or a new code, please call your [place] clinic or [phone].        Care EveryWhere ID     This is your Care EveryWhere ID. This could be used by other organizations to access your [place] medical records  [id number]         Blood Pressure from Last 3 Encounters:   18 117/74   18 110/73   18 148/85    Weight from Last 3 Encounters:   18 83.7 kg (184 lb 8 oz)   18 82.1 kg (181 lb)   18 84.4 kg (186 lb)              We Performed the Following     INTERROGATION DEVICE EVAL REMOTE, PACER/ICD        Primary Care Provider Office Phone # Fax #    Hakeem Awad [phone]       [address]  [facility] [address]        Equal Access to Services     [facility]: Hadii aad ku hadasho Soomaali, waaxda luqadaha, qaybta kaalmada adeegyada, waxay idiin hayadama ng khmariana pizarro . So [facility] [phone].    ATENCIÓN: Si habla español, tiene a john disposición servicios gratuitos de asistencia lingüística. Llame al [phone].    We comply with applicable federal civil rights laws and [place] laws. We do not discriminate on the basis of race, color, national origin, age, disability, sex, sexual orientation, or gender identity.            Thank you!     Thank you for choosing [facility]  for your care. Our goal is always to provide you with excellent care. Hearing back from our patients is one way we can continue to improve our services. Please take a few minutes to complete the written survey that you may receive in the mail after your visit with us. Thank you!             Your Updated Medication List - Protect others around you: Learn how to safely use, store and throw away your medicines at www.disposemymeds.org.          This list is accurate as of 18 11:59 PM.  Always use your most recent med list.                    "Brand Name Dispense Instructions for use Diagnosis    acetaminophen 325 MG tablet    TYLENOL    100 tablet    Take 2 tablets (650 mg) by mouth every 4 hours as needed for mild pain    Acute post-operative pain       Adhesive Paper Tape     5 each    1inch paper tape for daily dressing changes.    Chronic venous hypertension with ulcer involving right side (H), Ulcer of right leg, with fat layer exposed (H)       albuterol (2.5 MG/3ML) 0.083% neb solution     360 mL    Take 1 vial (2.5 mg) by nebulization every 6 hours as needed    COPD exacerbation (H)       amLODIPine 10 MG tablet    NORVASC    90 tablet    Take 1 tablet (10 mg) by mouth daily    Essential hypertension       diclofenac sodium 3 % Gel     100 g    Apply 4 gm four times a day as needed to right trapezius muscle.    Cervical myofascial pain syndrome       enalapril 10 MG tablet    VASOTEC    90 tablet    Take 1 tablet (10 mg) by mouth daily    Essential hypertension       finasteride 5 MG tablet    PROSCAR    90 tablet    TAKE ONE TABLET BY MOUTH AT BEDTIME    Benign nodular prostatic hyperplasia without lower urinary tract symptoms       fluticasone-vilanterol 200-25 MCG/INH oral inhaler    BREO ELLIPTA    1 Inhaler    Inhale 1 puff into the lungs daily    Moderate persistent asthma without complication       Gauze Dressing 4\"X4\" Pads     48 each    Sterile [id number] gauze for daily wound cares.    Chronic venous hypertension with ulcer involving right side (H), Ulcer of right leg, with fat layer exposed (H)       hydrochlorothiazide 12.5 MG capsule    MICROZIDE    180 capsule    Take 2 capsules (25 mg) by mouth daily    Essential hypertension       HEATHER 4\"X75\" Misc     30 each    Sterile Heather wrap for daily dressing changes.    Chronic venous hypertension with ulcer involving right side (H), Ulcer of right leg, with fat layer exposed (H)       mirabegron 50 MG 24 hr tablet    MYRBETRIQ    30 tablet    Take 1 tablet (50 mg) by mouth daily    OAB " "(overactive bladder)       mupirocin 2 % ointment    BACTROBAN    30 g    Apply twice a day to right leg wound and cover with occlusive dressing.    Chronic cutaneous venous stasis ulcer (H)       * order for DME     5 each    Equipment being ordered: Dispense 4\" x 4\" sterile gauze. Dispense any brand name.    Chronic cutaneous venous stasis ulcer (H)       * order for DME     5 each    Equipment being ordered: Dispense Coban wrap.    Chronic cutaneous venous stasis ulcer (H)       phenazopyridine 100 MG tablet    PYRIDIUM    6 tablet    Take 1 tablet (100 mg) by mouth 3 times daily as needed for urinary tract discomfort    Dysuria       polyethylene glycol powder    MIRALAX    510 g    Take 17 g (1 capful) by mouth daily    Constipation, unspecified constipation type       ranitidine 150 MG tablet    ZANTAC    180 tablet    Take 1 tablet (150 mg) by mouth 2 times daily    Gastroesophageal reflux disease, esophagitis presence not specified       tamsulosin 0.4 MG capsule    FLOMAX    180 capsule    Take 2 capsules (0.8 mg) by mouth At Bedtime    Benign nodular prostatic hyperplasia without lower urinary tract symptoms       tiotropium 18 MCG capsule    SPIRIVA    30 capsule    Inhale 1 capsule (18 mcg) into the lungs daily    COPD exacerbation (H)       tolterodine 4 MG 24 hr capsule    DETROL LA    30 capsule    Take 1 capsule (4 mg) by mouth daily    Bladder spasms       trolamine salicylate 10 % cream    ASPERCREME    85 g    Apply topically as needed for moderate pain    Cervical myofascial pain syndrome       * Notice:  This list has 2 medication(s) that are the same as other medications prescribed for you. Read the directions carefully, and ask your doctor or other care provider to review them with you.      "

## 2018-07-30 NOTE — TELEPHONE ENCOUNTER
Received message from Maria G Friedman PA-C with reports that okay to refill pyridium 100mg for a short refill. Per refill encounter, medication to be sent to [facility]. Will order medication per Maria G Friedman PA-C.     Jessica Odom RN, BSN

## 2018-08-02 ENCOUNTER — PATIENT OUTREACH (OUTPATIENT)
Dept: GERIATRIC MEDICINE | Facility: CLINIC | Age: 83
End: 2018-08-02

## 2018-08-02 NOTE — PROGRESS NOTES
[facility] Care Coordination Contact    [facility] Six-Month Telephone Assessment    6 month telephone assessment completed on 8/2/18 with  and Joe.    ER visits: No  Hospitalizations: No  TCU stays: No  Significant health status changes: none  Falls/Injuries: No  ADL/IADL changes: No  Changes in services: No    Caregiver Assessment follow up:  N/A    Goals: See POC in chart for goal progress documentation.  Per  and Joe member is doing very well - in good health.    Inquired about [facility] monthly supplies.  CM f/u with [facility] - spoke with Nayeli - member is on auto ship - last received 7/9.   Will received between 5-10 of each month. Scheduled to be released tomorrow.  Joe and member aware.     Will see member in 6 months for an annual health risk assessment.   Encouraged member to call CC with any questions or concerns in the meantime.     Jorge David RN, PHN  [facility]

## 2018-08-08 NOTE — PROGRESS NOTES
Preliminary Device Interrogation Results.  Final physician signed paceart report to be scanned and attached.    Remote pacemaker transmission received and reviewed.  Device transmission sent per MD orders.  Patient has a Medtronic dual lead pacemaker.  Normal pacemaker function.  2 AT/AF episodes recorded - 8 seconds in duration.  No ventricular arrhythmias recorded.  Presenting EGM = as- @ 90 bpm.  AP = 20.1%.   = 99%.  Estimated battery longevity to BE = 7.5 years.  Patient's nephew notified of interrogation results.  He reports that the patient is feeling well and denies specific complaints.  Plan for patient to return to clinic in 3 months as scheduled.    Remote pacemaker transmission

## 2018-08-10 DIAGNOSIS — N32.89 BLADDER SPASMS: ICD-10-CM

## 2018-08-10 RX ORDER — TOLTERODINE 4 MG/1
4 CAPSULE, EXTENDED RELEASE ORAL DAILY
Qty: 30 CAPSULE | Refills: 6 | Status: SHIPPED | OUTPATIENT
Start: 2018-08-10 | End: 2019-05-21

## 2018-08-15 ENCOUNTER — TELEPHONE (OUTPATIENT)
Dept: FAMILY MEDICINE | Facility: CLINIC | Age: 83
End: 2018-08-15

## 2018-08-15 ENCOUNTER — OFFICE VISIT (OUTPATIENT)
Dept: URGENT CARE | Facility: URGENT CARE | Age: 83
End: 2018-08-15
Payer: COMMERCIAL

## 2018-08-15 VITALS
DIASTOLIC BLOOD PRESSURE: 76 MMHG | OXYGEN SATURATION: 100 % | HEART RATE: 110 BPM | SYSTOLIC BLOOD PRESSURE: 112 MMHG | TEMPERATURE: 98.5 F | RESPIRATION RATE: 24 BRPM

## 2018-08-15 DIAGNOSIS — J18.9 PNEUMONIA OF RIGHT LOWER LOBE DUE TO INFECTIOUS ORGANISM: Primary | ICD-10-CM

## 2018-08-15 PROCEDURE — 94640 AIRWAY INHALATION TREATMENT: CPT | Performed by: NURSE PRACTITIONER

## 2018-08-15 PROCEDURE — 99214 OFFICE O/P EST MOD 30 MIN: CPT | Mod: 25 | Performed by: NURSE PRACTITIONER

## 2018-08-15 RX ORDER — AZITHROMYCIN 250 MG/1
TABLET, FILM COATED ORAL
Qty: 6 TABLET | Refills: 0 | Status: SHIPPED | OUTPATIENT
Start: 2018-08-15 | End: 2018-09-12

## 2018-08-15 RX ORDER — ALBUTEROL SULFATE 0.83 MG/ML
2.5 SOLUTION RESPIRATORY (INHALATION) EVERY 6 HOURS PRN
Qty: 1 BOX | Refills: 0 | Status: SHIPPED | OUTPATIENT
Start: 2018-08-15 | End: 2018-09-12

## 2018-08-15 RX ORDER — ALBUTEROL SULFATE 0.83 MG/ML
2.5 SOLUTION RESPIRATORY (INHALATION) ONCE
Qty: 3 ML | Refills: 0 | Status: SHIPPED
Start: 2018-08-15 | End: 2019-05-21

## 2018-08-15 RX ORDER — PREDNISONE 20 MG/1
20 TABLET ORAL 2 TIMES DAILY
Qty: 10 TABLET | Refills: 0 | Status: SHIPPED | OUTPATIENT
Start: 2018-08-15 | End: 2018-08-20

## 2018-08-15 ASSESSMENT — ENCOUNTER SYMPTOMS
FREQUENCY: 0
MYALGIAS: 1
DYSURIA: 0
ACTIVITY CHANGE: 1
VOMITING: 0
COUGH: 1
FEVER: 1
APPETITE CHANGE: 1
SHORTNESS OF BREATH: 1
NAUSEA: 0
CHILLS: 1
FATIGUE: 1
DIARRHEA: 0
WHEEZING: 1

## 2018-08-15 NOTE — PROGRESS NOTES
SUBJECTIVE:   Gallito Farley is a 84 year old male presenting with a chief complaint of   Chief Complaint   Patient presents with     Cough     Cough with production and sore throat x2 days       He is an established patient of [facility].    URI Adult    Onset of symptoms was 1 month, worse for the past 2 days.  Course of illness is worsening.    Current and Associated symptoms: fever to 101.1F, chills, cough - productive, wheezing, shortness of breath, sore throat, headache, body aches and fatigue  Treatment measures tried include Tylenol OTC with some relief  Predisposing factors include HX of COPD, asthma and pneumonia. Reports inhalers not working as well per patient.       Review of Systems   Constitutional: Positive for activity change, appetite change, chills, fatigue and fever.   HENT: Negative.    Respiratory: Positive for cough, shortness of breath and wheezing.    Gastrointestinal: Negative for diarrhea, nausea and vomiting.   Genitourinary: Negative for dysuria and frequency.   Musculoskeletal: Positive for myalgias.   Skin: Negative.        Past Medical History:   Diagnosis Date     Asthma      BPH (benign prostatic hyperplasia)      COPD (chronic obstructive pulmonary disease) (H)      Diastolic dysfunction, left ventricle 4/16/2013     H/O tuberculosis     s/p partial pneumonectomy in [place] in the 1990's     Hypertension      LBBB (left bundle branch block)      Leg wound, right     chronic, s/p grafting     Osteoarthritis      Family History   Problem Relation Age of Onset     Family History Negative Mother      Current Outpatient Prescriptions   Medication Sig Dispense Refill     acetaminophen (TYLENOL) 325 MG tablet Take 2 tablets (650 mg) by mouth every 4 hours as needed for mild pain 100 tablet 3     albuterol (2.5 MG/3ML) 0.083% neb solution Take 1 vial (2.5 mg) by nebulization every 6 hours as needed for shortness of breath / dyspnea or wheezing 1 Box 0     albuterol (2.5 MG/3ML) 0.083% neb  "solution Take 1 vial (2.5 mg) by nebulization once for 1 dose 3 mL 0     albuterol (2.5 MG/3ML) 0.083% neb solution Take 1 vial (2.5 mg) by nebulization every 6 hours as needed 360 mL 1     amLODIPine (NORVASC) 10 MG tablet Take 1 tablet (10 mg) by mouth daily 90 tablet 3     azithromycin (ZITHROMAX) 250 MG tablet Two tablets first day, then one tablet daily for four days. 6 tablet 0     diclofenac sodium 3 % GEL Apply 4 gm four times a day as needed to right trapezius muscle. 100 g 11     enalapril (VASOTEC) 10 MG tablet Take 1 tablet (10 mg) by mouth daily 90 tablet 3     finasteride (PROSCAR) 5 MG tablet TAKE ONE TABLET BY MOUTH AT BEDTIME 90 tablet 3     fluticasone-vilanterol (BREO ELLIPTA) 200-25 MCG/INH oral inhaler Inhale 1 puff into the lungs daily 1 Inhaler 11     Gauze Bandages (HEATHER 4\"X75\") MISC Sterile Heather wrap for daily dressing changes. 30 each 2     Gauze Pads & Dressings (GAUZE DRESSING) 4\"X4\" PADS Sterile [id number] gauze for daily wound cares. 48 each 3     hydrochlorothiazide (MICROZIDE) 12.5 MG capsule Take 2 capsules (25 mg) by mouth daily 180 capsule 3     mirabegron (MYRBETRIQ) 50 MG 24 hr tablet Take 1 tablet (50 mg) by mouth daily 30 tablet 5     mupirocin (BACTROBAN) 2 % ointment Apply twice a day to right leg wound and cover with occlusive dressing. 30 g 11     order for DME Equipment being ordered: Dispense 4\" x 4\" sterile gauze. Dispense any brand name. 5 each 11     order for DME Equipment being ordered: Dispense Coban wrap. 5 each 5     phenazopyridine (PYRIDIUM) 100 MG tablet Take 1 tablet (100 mg) by mouth 3 times daily as needed for urinary tract discomfort 6 tablet 0     polyethylene glycol (MIRALAX) powder Take 17 g (1 capful) by mouth daily 510 g 11     predniSONE (DELTASONE) 20 MG tablet Take 1 tablet (20 mg) by mouth 2 times daily for 5 days 10 tablet 0     ranitidine (ZANTAC) 150 MG tablet Take 1 tablet (150 mg) by mouth 2 times daily 180 tablet 1     tamsulosin (FLOMAX) 0.4 " MG capsule Take 2 capsules (0.8 mg) by mouth At Bedtime 180 capsule 3     tiotropium (SPIRIVA) 18 MCG capsule Inhale 1 capsule (18 mcg) into the lungs daily 30 capsule 11     tolterodine (DETROL LA) 4 MG 24 hr capsule Take 1 capsule (4 mg) by mouth daily 30 capsule 6     trolamine salicylate (ASPERCREME) 10 % cream Apply topically as needed for moderate pain 85 g 11     Adhesive Tape (ADHESIVE PAPER) TAPE 1inch paper tape for daily dressing changes. (Patient not taking: Reported on 4/19/2018) 5 each 3     Social History   Substance Use Topics     Smoking status: Former Smoker     Smokeless tobacco: Never Used     Alcohol use No       OBJECTIVE  /76  Pulse 110  Temp 98.5  F (36.9  C) (Oral)  Resp 24  SpO2 100%    Physical Exam   Constitutional: He is oriented to person, place, and time. No distress.   HENT:   Mouth/Throat: No oropharyngeal exudate.   Nasal: bilateral congestion with erythema and thick mucus  Ears:Bilateral TMs clear with good light reflex, without fluid or erythema, canals clear without erythema  Oropharynx: erythremic with mild hypertrophy noted, no exudate     Eyes: Conjunctivae are normal. Pupils are equal, round, and reactive to light. Right eye exhibits no discharge. Left eye exhibits no discharge.   Neck: Neck supple.   Cardiovascular: Normal rate, regular rhythm, normal heart sounds and intact distal pulses.    No murmur heard.  Pulmonary/Chest: Effort normal. No respiratory distress. He has wheezes.   Bilateral wheezes diffusely with diminished breath sounds on the left lower lobe.    Lymphadenopathy:     He has no cervical adenopathy.   Neurological: He is alert and oriented to person, place, and time.   Skin: Skin is warm and dry. No rash noted. No pallor.   Psychiatric: He has a normal mood and affect.   POST NEB: Improved aeration, LLL with continued diminished breath sounds noted.    Labs:  No results found for this or any previous visit (from the past 24  hour(s)).      ASSESSMENT:    ICD-10-CM    1. Pneumonia of right lower lobe due to infectious organism (H) J18.1 azithromycin (ZITHROMAX) 250 MG tablet     predniSONE (DELTASONE) 20 MG tablet     albuterol (2.5 MG/3ML) 0.083% neb solution     albuterol (2.5 MG/3ML) 0.083% neb solution        Medical Decision Making:    Differential Diagnosis:  URI Adult/Peds:  Asthma exacerbation, Bronchiolitis, Pneumonia, Viral pharyngitis and Viral upper respiratory illness    Serious Comorbid Conditions:  Adult:  Asthma, COPD and Cardiac; multiple comorbidities - followed by PCP    PLAN:  Discussed with daughter and nurse at length the needs to complete course of antibiotics, steroid burst and albuterol nebulizer every 4 hours as needed for wheezing. Reviewed continued symptomatic measures encouraged, humidified air, plenty of fluids, tylenol for fever.     Monitor breathing, advised red flag symptoms and when to present for re-evaluation. Patient agreed to the plan of care with no further questions or concerns.     Followup: If not improving or if condition worsens, follow up with his Primary Care Provider in 1 week or sooner if symptoms do not improve as discussed.    Demetri Casanova APRN, CNP      Patient Instructions     Pneumonia (Adult)  Pneumonia is an infection deep within the lungs. It is in the small air sacs (alveoli). Pneumonia may be caused by a virus or bacteria. Pneumonia caused by bacteria is usually treated with an antibiotic. Severe cases may need to be treated in the hospital. Milder cases can be treated at home. Symptoms usually start to get better during the first 2 days of treatment.    Home care  Follow these guidelines when caring for yourself at home:    Rest at home for the first 2 to 3 days, or until you feel stronger. Don t let yourself get overly tired when you go back to your activities.    Stay away from cigarette smoke - yours or other people s.    You may use acetaminophen or ibuprofen to  control fever or pain, unless another medicine was prescribed. If you have chronic liver or kidney disease, talk with your healthcare provider before using these medicines. Also talk with your provider if you ve had a stomach ulcer or gastrointestinal bleeding. Don t give aspirin to anyone younger than 18 years of age who is ill with a fever. It may cause severe liver damage.    Your appetite may be poor, so a light diet is fine.    Drink 6 to 8 glasses of fluids every day to make sure you are getting enough fluids. Beverages can include water, sport drinks, sodas without caffeine, juices, tea, or soup. Fluids will help loosen secretions in the lung. This will make it easier for you to cough up the phlegm (sputum). If you also have heart or kidney disease, check with your healthcare provider before you drink extra fluids.    Take antibiotic medicine prescribed until it is all gone, even if you are feeling better after a few days.  Follow-up care  Follow up with your healthcare provider in the next 2 to 3 days, or as advised. This is to be sure the medicine is helping you get better.  If you are 65 or older, you should get a pneumococcal vaccine and a yearly flu (influenza) shot. You should also get these vaccines if you have chronic lung disease like asthma, emphysema, or COPD. Recently, a second type of pneumonia vaccine has become available for everyone over 65 years old. This is in addition to the previous vaccine. Ask your provider about this.  When to seek medical advice  Call your healthcare provider right away if any of these occur:    You don t get better within the first 48 hours of treatment    Shortness of breath gets worse    Rapid breathing (more than 25 breaths per minute)    Coughing up blood    Chest pain gets worse with breathing    Fever of 100.4 F (38 C) or higher that doesn t get better with fever medicine    Weakness, dizziness, or fainting that gets worse    Thirst or dry mouth that gets  worse    Sinus pain, headache, or a stiff neck    Chest pain not caused by coughing  Date Last Reviewed: 1/1/2017 2000-2017 [employer]. [address] [place] [address], [place] [address]. All rights reserved. This information is not intended as a substitute for professional medical care. Always follow your healthcare professional's instructions.

## 2018-08-15 NOTE — TELEPHONE ENCOUNTER
Spoke with pts RN, she wants p to be seen at 4, informed her the 4pm spot in no longer available.  Informed her urgent care is open until 9pm ant the [place] location.    Alison JACKSON, Patient Care

## 2018-08-15 NOTE — TELEPHONE ENCOUNTER
Reason for Call:  Other call back    Detailed comments: Pt's RN returning phone call and would like a phone call back for she was told that there would be an appointment avialable for Pt at 4 pm but RN could not recall when and for what provider and would like a call back to verify so that she can schedule an apt for Pt.    Phone Number Patient can be reached at: Other phone number:  [phone]    Best Time: anytime    Can we leave a detailed message on this number? YES    Call taken on 8/15/2018 at 1:35 PM by Mal Cat

## 2018-08-15 NOTE — MR AVS SNAPSHOT
After Visit Summary   8/15/2018    Gallito Farley    MRN: [id number]           Patient Information     Date Of Birth          1/1/1934        Visit Information        Provider Department      8/15/2018 4:15 PM Elena Casanova APRN [facility]        Today's Diagnoses     Pneumonia of right lower lobe due to infectious organism (H)    -  1      Care Instructions      Pneumonia (Adult)  Pneumonia is an infection deep within the lungs. It is in the small air sacs (alveoli). Pneumonia may be caused by a virus or bacteria. Pneumonia caused by bacteria is usually treated with an antibiotic. Severe cases may need to be treated in the hospital. Milder cases can be treated at home. Symptoms usually start to get better during the first 2 days of treatment.    Home care  Follow these guidelines when caring for yourself at home:    Rest at home for the first 2 to 3 days, or until you feel stronger. Don t let yourself get overly tired when you go back to your activities.    Stay away from cigarette smoke - yours or other people s.    You may use acetaminophen or ibuprofen to control fever or pain, unless another medicine was prescribed. If you have chronic liver or kidney disease, talk with your healthcare provider before using these medicines. Also talk with your provider if you ve had a stomach ulcer or gastrointestinal bleeding. Don t give aspirin to anyone younger than 18 years of age who is ill with a fever. It may cause severe liver damage.    Your appetite may be poor, so a light diet is fine.    Drink 6 to 8 glasses of fluids every day to make sure you are getting enough fluids. Beverages can include water, sport drinks, sodas without caffeine, juices, tea, or soup. Fluids will help loosen secretions in the lung. This will make it easier for you to cough up the phlegm (sputum). If you also have heart or kidney disease, check with your healthcare provider before you drink  extra fluids.    Take antibiotic medicine prescribed until it is all gone, even if you are feeling better after a few days.  Follow-up care  Follow up with your healthcare provider in the next 2 to 3 days, or as advised. This is to be sure the medicine is helping you get better.  If you are 65 or older, you should get a pneumococcal vaccine and a yearly flu (influenza) shot. You should also get these vaccines if you have chronic lung disease like asthma, emphysema, or COPD. Recently, a second type of pneumonia vaccine has become available for everyone over 65 years old. This is in addition to the previous vaccine. Ask your provider about this.  When to seek medical advice  Call your healthcare provider right away if any of these occur:    You don t get better within the first 48 hours of treatment    Shortness of breath gets worse    Rapid breathing (more than 25 breaths per minute)    Coughing up blood    Chest pain gets worse with breathing    Fever of 100.4 F (38 C) or higher that doesn t get better with fever medicine    Weakness, dizziness, or fainting that gets worse    Thirst or dry mouth that gets worse    Sinus pain, headache, or a stiff neck    Chest pain not caused by coughing  Date Last Reviewed: 1/1/2017 2000-2017 [employer]. [address] [place] [address]. All rights reserved. This information is not intended as a substitute for professional medical care. Always follow your healthcare professional's instructions.                Follow-ups after your visit        Follow-up notes from your care team     Return in about 1 week (around 8/22/2018), or if symptoms worsen or fail to improve, for Follow up sooner as needed.      Your next 10 appointments already scheduled     Oct 16, 2018  1:30 PM CDT   (Arrive by 1:15 PM)   Pacemaker Check with Uc Cv Device [address] [place] [address] ([facility])    [address] [facility]  [address] [place] [id number]  "  [phone]              Who to contact     If you have questions or need follow up information about today's clinic visit or your schedule please contact [facility] MARCELO MORGAN directly at [phone].  Normal or non-critical lab and imaging results will be communicated to you by MyChart, letter or phone within 4 business days after the clinic has received the results. If you do not hear from us within 7 days, please contact the clinic through MyChart or phone. If you have a critical or abnormal lab result, we will notify you by phone as soon as possible.  Submit refill requests through [employer] or call your pharmacy and they will forward the refill request to us. Please allow 3 business days for your refill to be completed.          Additional Information About Your Visit        Internet[employer]orph[employer] Information     [employer] lets you send messages to your doctor, view your test results, renew your prescriptions, schedule appointments and more. To sign up, go to [email or web address] . Click on \"Log in\" on the left side of the screen, which will take you to the Welcome page. Then click on \"Sign up Now\" on the right side of the page.     You will be asked to enter the access code listed below, as well as some personal information. Please follow the directions to create your username and password.     Your access code is: [email or web address]  Expires: 2018  9:49 AM     Your access code will  in 90 days. If you need help or a new code, please call your [facility] clinic or [phone].        Care EveryWhere ID     This is your Care EveryWhere ID. This could be used by other organizations to access your [facility] medical records  [id number]        Your Vitals Were     Pulse Temperature Respirations Pulse Oximetry          110 98.5  F (36.9  C) (Oral) 24 100%         Blood Pressure from Last 3 Encounters:   08/15/18 112/76   18 117/74   18 110/73    Weight from Last 3 Encounters:   18 184 lb 8 oz " (83.7 kg)   04/04/18 181 lb (82.1 kg)   03/02/18 186 lb (84.4 kg)              We Performed the Following     ALBUTEROL UNIT DOSE, 1 MG          Today's Medication Changes          These changes are accurate as of 8/15/18  6:08 PM.  If you have any questions, ask your nurse or doctor.               Start taking these medicines.        Dose/Directions    azithromycin 250 MG tablet   Commonly known as:  ZITHROMAX   Used for:  Pneumonia of right lower lobe due to infectious organism (H)   Started by:  Elena Casanova APRN CNP        Two tablets first day, then one tablet daily for four days.   Quantity:  6 tablet   Refills:  0       order for DME   Used for:  Pneumonia of right lower lobe due to infectious organism (H)   Started by:  Elena Casanova APRN CNP        Equipment being ordered: Nebulizer machine and adult tubing   Quantity:  1 each   Refills:  0       predniSONE 20 MG tablet   Commonly known as:  DELTASONE   Used for:  Pneumonia of right lower lobe due to infectious organism (H)   Started by:  Elena Casanova APRN CNP        Dose:  20 mg   Take 1 tablet (20 mg) by mouth 2 times daily for 5 days   Quantity:  10 tablet   Refills:  0         These medicines have changed or have updated prescriptions.        Dose/Directions    * albuterol (2.5 MG/3ML) 0.083% neb solution   This may have changed:  Another medication with the same name was added. Make sure you understand how and when to take each.   Used for:  COPD exacerbation (H)   Changed by:  Elena Casanova APRN CNP        Dose:  2.5 mg   Take 1 vial (2.5 mg) by nebulization every 6 hours as needed   Quantity:  360 mL   Refills:  1       * albuterol (2.5 MG/3ML) 0.083% neb solution   This may have changed:  You were already taking a medication with the same name, and this prescription was added. Make sure you understand how and when to take each.   Used for:  Pneumonia of right lower lobe due to infectious organism (H)    Changed by:  Elena Casanova APRN CNP        Dose:  2.5 mg   Take 1 vial (2.5 mg) by nebulization every 6 hours as needed for shortness of breath / dyspnea or wheezing   Quantity:  1 Box   Refills:  0       * albuterol (2.5 MG/3ML) 0.083% neb solution   This may have changed:  You were already taking a medication with the same name, and this prescription was added. Make sure you understand how and when to take each.   Used for:  Pneumonia of right lower lobe due to infectious organism (H)   Changed by:  Elena Casanova APRN CNP        Dose:  2.5 mg   Take 1 vial (2.5 mg) by nebulization once for 1 dose   Quantity:  3 mL   Refills:  0       * Notice:  This list has 3 medication(s) that are the same as other medications prescribed for you. Read the directions carefully, and ask your doctor or other care provider to review them with you.         Where to get your medicines      These medications were sent to [facility] - [place], [place] - [address]  [address], [facility] [address]     Phone:  [phone]     albuterol (2.5 MG/3ML) 0.083% neb solution    azithromycin 250 MG tablet    predniSONE 20 MG tablet         Some of these will need a paper prescription and others can be bought over the counter.  Ask your nurse if you have questions.     Bring a paper prescription for each of these medications     order for DME       You don't need a prescription for these medications     albuterol (2.5 MG/3ML) 0.083% neb solution                Primary Care Provider Office Phone # Fax #    Hakeem Awad [phone]       [address]  [facility] [address]        Equal Access to Services     RAMONE GOODSON : Juan Nieves, rena costello, krista kaalmada mino, ricki alves. So [facility] [phone].    ATENCIÓN: Si habla español, tiene a john disposición servicios gratuitos de asistencia lingüística.  Quoc michelle [phone].    We comply with applicable federal civil rights laws and [place] laws. We do not discriminate on the basis of race, color, national origin, age, disability, sex, sexual orientation, or gender identity.            Thank you!     Thank you for choosing [facility]  for your care. Our goal is always to provide you with excellent care. Hearing back from our patients is one way we can continue to improve our services. Please take a few minutes to complete the written survey that you may receive in the mail after your visit with us. Thank you!             Your Updated Medication List - Protect others around you: Learn how to safely use, store and throw away your medicines at www.disposemymeds.org.          This list is accurate as of 8/15/18  6:09 PM.  Always use your most recent med list.                   Brand Name Dispense Instructions for use Diagnosis    acetaminophen 325 MG tablet    TYLENOL    100 tablet    Take 2 tablets (650 mg) by mouth every 4 hours as needed for mild pain    Acute post-operative pain       Adhesive Paper Tape     5 each    1inch paper tape for daily dressing changes.    Chronic venous hypertension with ulcer involving right side (H), Ulcer of right leg, with fat layer exposed (H)       * albuterol (2.5 MG/3ML) 0.083% neb solution     360 mL    Take 1 vial (2.5 mg) by nebulization every 6 hours as needed    COPD exacerbation (H)       * albuterol (2.5 MG/3ML) 0.083% neb solution     1 Box    Take 1 vial (2.5 mg) by nebulization every 6 hours as needed for shortness of breath / dyspnea or wheezing    Pneumonia of right lower lobe due to infectious organism (H)       * albuterol (2.5 MG/3ML) 0.083% neb solution     3 mL    Take 1 vial (2.5 mg) by nebulization once for 1 dose    Pneumonia of right lower lobe due to infectious organism (H)       amLODIPine 10 MG tablet    NORVASC    90 tablet    Take 1 tablet (10 mg) by mouth daily    Essential  "hypertension       azithromycin 250 MG tablet    ZITHROMAX    6 tablet    Two tablets first day, then one tablet daily for four days.    Pneumonia of right lower lobe due to infectious organism (H)       diclofenac sodium 3 % Gel     100 g    Apply 4 gm four times a day as needed to right trapezius muscle.    Cervical myofascial pain syndrome       enalapril 10 MG tablet    VASOTEC    90 tablet    Take 1 tablet (10 mg) by mouth daily    Essential hypertension       finasteride 5 MG tablet    PROSCAR    90 tablet    TAKE ONE TABLET BY MOUTH AT BEDTIME    Benign nodular prostatic hyperplasia without lower urinary tract symptoms       fluticasone-vilanterol 200-25 MCG/INH inhaler    BREO ELLIPTA    1 Inhaler    Inhale 1 puff into the lungs daily    Moderate persistent asthma without complication       Gauze Dressing 4\"X4\" Pads     48 each    Sterile [id number] gauze for daily wound cares.    Chronic venous hypertension with ulcer involving right side (H), Ulcer of right leg, with fat layer exposed (H)       hydrochlorothiazide 12.5 MG capsule    MICROZIDE    180 capsule    Take 2 capsules (25 mg) by mouth daily    Essential hypertension       HEATHER 4\"X75\" Misc     30 each    Sterile Heather wrap for daily dressing changes.    Chronic venous hypertension with ulcer involving right side (H), Ulcer of right leg, with fat layer exposed (H)       mirabegron 50 MG 24 hr tablet    MYRBETRIQ    30 tablet    Take 1 tablet (50 mg) by mouth daily    OAB (overactive bladder)       mupirocin 2 % ointment    BACTROBAN    30 g    Apply twice a day to right leg wound and cover with occlusive dressing.    Chronic cutaneous venous stasis ulcer (H)       * order for DME     5 each    Equipment being ordered: Dispense 4\" x 4\" sterile gauze. Dispense any brand name.    Chronic cutaneous venous stasis ulcer (H)       * order for DME     5 each    Equipment being ordered: Dispense Coban wrap.    Chronic cutaneous venous stasis ulcer (H)       " order for DME     1 each    Equipment being ordered: Nebulizer machine and adult tubing    Pneumonia of right lower lobe due to infectious organism (H)       phenazopyridine 100 MG tablet    PYRIDIUM    6 tablet    Take 1 tablet (100 mg) by mouth 3 times daily as needed for urinary tract discomfort    Dysuria       polyethylene glycol powder    MIRALAX    510 g    Take 17 g (1 capful) by mouth daily    Constipation, unspecified constipation type       predniSONE 20 MG tablet    DELTASONE    10 tablet    Take 1 tablet (20 mg) by mouth 2 times daily for 5 days    Pneumonia of right lower lobe due to infectious organism (H)       ranitidine 150 MG tablet    ZANTAC    180 tablet    Take 1 tablet (150 mg) by mouth 2 times daily    Gastroesophageal reflux disease, esophagitis presence not specified       tamsulosin 0.4 MG capsule    FLOMAX    180 capsule    Take 2 capsules (0.8 mg) by mouth At Bedtime    Benign nodular prostatic hyperplasia without lower urinary tract symptoms       tiotropium 18 MCG capsule    SPIRIVA    30 capsule    Inhale 1 capsule (18 mcg) into the lungs daily    COPD exacerbation (H)       tolterodine 4 MG 24 hr capsule    DETROL LA    30 capsule    Take 1 capsule (4 mg) by mouth daily    Bladder spasms       trolamine salicylate 10 % cream    ASPERCREME    85 g    Apply topically as needed for moderate pain    Cervical myofascial pain syndrome       * Notice:  This list has 5 medication(s) that are the same as other medications prescribed for you. Read the directions carefully, and ask your doctor or other care provider to review them with you.

## 2018-08-15 NOTE — NURSING NOTE
The following nebulizer treatment was given:     MEDICATION: Albuterol Sulfate 2.5 mg  : [employer]  LOT #: [id number]  EXPIRATION DATE:  12/19  NDC # 3525-9944-14     Nebulizer Start Time:  06:04pm  Nebulizer Stop Time:  06:09 pm  See Vital Signs Flowsheet  Mari Philippe CMA

## 2018-08-15 NOTE — TELEPHONE ENCOUNTER
Reason for call:  Patient reporting a symptom    Symptom or request: Symptoms    Duration (how long have symptoms been present): on going    Have you been treated for this before? No    Additional comments: Pt has been running a fever for the last few days and would like a call back to advise as Pt has not improved. He was taking tylenol for fever and got it down to 101 but after awhile his temperature spiked back up to 98.  He is also complaining of sore throat.      Phone Number RN can be reached at:  Other phone number:  [phone]    Best Time:  anytime    Can we leave a detailed message on this number:  YES    Call taken on 8/15/2018 at 1:17 PM by Mal Cat

## 2018-08-15 NOTE — TELEPHONE ENCOUNTER
Please call back. This RN has talked to these people 2 times today, not sure what the confusion is regarding. This RN personally scheduled them as they requested, please see below. Assist them with re-scheduling if necessary.   Kari Martines RN

## 2018-08-15 NOTE — TELEPHONE ENCOUNTER
Spoke with home care Nurse she reports that today patient has chills, sore throat, is coughing up sputum and she heard crackles and wheezes in his chest. His temp today was 100.0 and yesterday his temp was 101.0 and then went down to 99.0 after tylenol. This RN advised that patient come into clinic today. Home care RN will call back to schedule appointment after they can confirm arrangements for transportation for patient. RN told home care nurse to schedule appointment with call center when she calls back.  Kari Martines RN

## 2018-08-15 NOTE — PATIENT INSTRUCTIONS

## 2018-09-06 DIAGNOSIS — K21.9 GASTROESOPHAGEAL REFLUX DISEASE, ESOPHAGITIS PRESENCE NOT SPECIFIED: ICD-10-CM

## 2018-09-12 ENCOUNTER — RADIANT APPOINTMENT (OUTPATIENT)
Dept: GENERAL RADIOLOGY | Facility: CLINIC | Age: 83
End: 2018-09-12
Attending: PREVENTIVE MEDICINE
Payer: COMMERCIAL

## 2018-09-12 ENCOUNTER — OFFICE VISIT (OUTPATIENT)
Dept: FAMILY MEDICINE | Facility: CLINIC | Age: 83
End: 2018-09-12
Payer: COMMERCIAL

## 2018-09-12 VITALS
WEIGHT: 174 LBS | TEMPERATURE: 98.2 F | HEIGHT: 73 IN | SYSTOLIC BLOOD PRESSURE: 92 MMHG | BODY MASS INDEX: 23.06 KG/M2 | OXYGEN SATURATION: 97 % | DIASTOLIC BLOOD PRESSURE: 63 MMHG | HEART RATE: 103 BPM

## 2018-09-12 DIAGNOSIS — J96.22 ACUTE ON CHRONIC RESPIRATORY FAILURE WITH HYPOXIA AND HYPERCAPNIA (H): ICD-10-CM

## 2018-09-12 DIAGNOSIS — I44.2 ATRIOVENTRICULAR BLOCK, COMPLETE (H): ICD-10-CM

## 2018-09-12 DIAGNOSIS — J44.1 COPD EXACERBATION (H): Primary | ICD-10-CM

## 2018-09-12 DIAGNOSIS — Z95.0 CARDIAC PACEMAKER IN SITU: ICD-10-CM

## 2018-09-12 DIAGNOSIS — J96.21 ACUTE ON CHRONIC RESPIRATORY FAILURE WITH HYPOXIA AND HYPERCAPNIA (H): ICD-10-CM

## 2018-09-12 DIAGNOSIS — J44.1 COPD EXACERBATION (H): ICD-10-CM

## 2018-09-12 PROCEDURE — 99214 OFFICE O/P EST MOD 30 MIN: CPT | Performed by: PREVENTIVE MEDICINE

## 2018-09-12 PROCEDURE — 71046 X-RAY EXAM CHEST 2 VIEWS: CPT | Mod: FY

## 2018-09-12 RX ORDER — PREDNISONE 20 MG/1
20 TABLET ORAL 2 TIMES DAILY
Qty: 10 TABLET | Refills: 0 | Status: SHIPPED | OUTPATIENT
Start: 2018-09-12 | End: 2018-10-02

## 2018-09-12 RX ORDER — AMOXICILLIN AND CLAVULANATE POTASSIUM 500; 125 MG/1; MG/1
1 TABLET, FILM COATED ORAL 3 TIMES DAILY
Qty: 30 TABLET | Refills: 0 | Status: SHIPPED | OUTPATIENT
Start: 2018-09-12 | End: 2018-10-02

## 2018-09-12 ASSESSMENT — PAIN SCALES - GENERAL: PAINLEVEL: NO PAIN (0)

## 2018-09-12 NOTE — PROGRESS NOTES
SUBJECTIVE:   Gallito Farley is a 84 year old male who presents to clinic today for the following health issues:    Here with a group home attendant who partially helped with interpreting.     RESPIRATORY SYMPTOMS      Duration: x 1 week    Description  cough    Severity: moderate    Accompanying signs and symptoms: None    History (predisposing factors):  asthma    Precipitating or alleviating factors: None    Therapies tried and outcome:  none  Seen for a pneumonia in Urgent Care on 8/15/18, started on Azithromycin and Prednisone.  Has completed these   Lives in a group home  [place] better after taking that.  Main concern is that he has a sore throat  Unclear historian  Says it is due to his tonsils  Cough+  Cough is productive of sputum, no blood, yellow+  Cough has been same in severity  Cough keeping awake at night+  No emesis from the coughing  No diarrhea or abdominal pain  Some shortness of breath  No wheezing  Had gotten better with last course of antibiotics  No sick contacts  No fever  No rash     COPD:  -per patient has been using his inhalers consistently     Problem list and histories reviewed & adjusted, as indicated.  Additional history: as documented    Patient Active Problem List   Diagnosis     Septic arthritis (H)     Chronic constipation     HTN (hypertension)     Benign prostatic hyperplasia     Insomnia     Moderate persistent asthma     GERD (gastroesophageal reflux disease)     Pneumonia     Hypercapnia     COPD exacerbation (H)     Acute on chronic respiratory failure with hypoxia and hypercapnia (H)     Chronic cutaneous venous stasis ulcer (H)     Hypertensive urgency     Atrioventricular block, complete (H)     Advanced directives, counseling/discussion     Phlebitis of left arm     Cardiac pacemaker in situ- Medtronic, dual chamber- NOT dependent     Benign nodular prostatic hyperplasia without lower urinary tract symptoms     Health Care Home     Past Surgical History:   Procedure  "Laterality Date     CHOLECYSTECTOMY       ENT SURGERY       IRRIGATION AND DEBRIDEMENT HIP, COMBINED  4/18/2013    Procedure: COMBINED IRRIGATION AND DEBRIDEMENT HIP;  Irrigation and debridement of Right Hip with cultures;  Surgeon: Cristal Inman MD;  Location: [facility] OR     Partial pneumonectomy      done in [place] in the 1990's     skin grafting - leg wound  6/2016       Social History   Substance Use Topics     Smoking status: Former Smoker     Smokeless tobacco: Never Used     Alcohol use No     Family History   Problem Relation Age of Onset     Family History Negative Mother          Current Outpatient Prescriptions   Medication Sig Dispense Refill     acetaminophen (TYLENOL) 325 MG tablet Take 2 tablets (650 mg) by mouth every 4 hours as needed for mild pain 100 tablet 3     albuterol (2.5 MG/3ML) 0.083% neb solution Take 1 vial (2.5 mg) by nebulization every 6 hours as needed 360 mL 1     amLODIPine (NORVASC) 10 MG tablet Take 1 tablet (10 mg) by mouth daily 90 tablet 3     amoxicillin-clavulanate (AUGMENTIN) 500-125 MG per tablet Take 1 tablet by mouth 3 times daily 30 tablet 0     diclofenac sodium 3 % GEL Apply 4 gm four times a day as needed to right trapezius muscle. 100 g 11     enalapril (VASOTEC) 10 MG tablet Take 1 tablet (10 mg) by mouth daily 90 tablet 3     finasteride (PROSCAR) 5 MG tablet TAKE ONE TABLET BY MOUTH AT BEDTIME 90 tablet 3     fluticasone-vilanterol (BREO ELLIPTA) 200-25 MCG/INH oral inhaler Inhale 1 puff into the lungs daily 1 Inhaler 11     Gauze Bandages (HEAHTER 4\"X75\") MISC Sterile Heather wrap for daily dressing changes. 30 each 2     Gauze Pads & Dressings (GAUZE DRESSING) 4\"X4\" PADS Sterile [id number] gauze for daily wound cares. 48 each 3     hydrochlorothiazide (MICROZIDE) 12.5 MG capsule Take 2 capsules (25 mg) by mouth daily 180 capsule 3     mirabegron (MYRBETRIQ) 50 MG 24 hr tablet Take 1 tablet (50 mg) by mouth daily 30 tablet 5     mupirocin (BACTROBAN) 2 % ointment " "Apply twice a day to right leg wound and cover with occlusive dressing. 30 g 11     order for DME Equipment being ordered: Nebulizer machine and adult tubing 1 each 0     order for DME Equipment being ordered: Dispense 4\" x 4\" sterile gauze. Dispense any brand name. 5 each 11     order for DME Equipment being ordered: Dispense Coban wrap. 5 each 5     phenazopyridine (PYRIDIUM) 100 MG tablet Take 1 tablet (100 mg) by mouth 3 times daily as needed for urinary tract discomfort 6 tablet 0     polyethylene glycol (MIRALAX) powder Take 17 g (1 capful) by mouth daily 510 g 11     predniSONE (DELTASONE) 20 MG tablet Take 1 tablet (20 mg) by mouth 2 times daily 10 tablet 0     ranitidine (ZANTAC) 150 MG tablet Take 1 tablet (150 mg) by mouth 2 times daily 180 tablet 1     tamsulosin (FLOMAX) 0.4 MG capsule Take 2 capsules (0.8 mg) by mouth At Bedtime 180 capsule 3     tiotropium (SPIRIVA) 18 MCG capsule Inhale 1 capsule (18 mcg) into the lungs daily 30 capsule 11     tolterodine (DETROL LA) 4 MG 24 hr capsule Take 1 capsule (4 mg) by mouth daily 30 capsule 6     trolamine salicylate (ASPERCREME) 10 % cream Apply topically as needed for moderate pain 85 g 11     [DISCONTINUED] albuterol (2.5 MG/3ML) 0.083% neb solution Take 1 vial (2.5 mg) by nebulization every 6 hours as needed for shortness of breath / dyspnea or wheezing 1 Box 0     No Known Allergies  BP Readings from Last 3 Encounters:   09/12/18 92/63   08/15/18 112/76   04/19/18 117/74    Wt Readings from Last 3 Encounters:   09/12/18 174 lb (78.9 kg)   04/19/18 184 lb 8 oz (83.7 kg)   04/04/18 181 lb (82.1 kg)                  Labs reviewed in EPIC    Reviewed and updated as needed this visit by clinical staff  Tobacco  Allergies  Meds       Reviewed and updated as needed this visit by Provider         ROS:  Constitutional, HEENT, cardiovascular, pulmonary, gi and gu systems are negative, except as otherwise noted.    OBJECTIVE:                                     " "               BP 92/63  Pulse 103  Temp 98.2  F (36.8  C) (Oral)  Ht 6' 1\" (1.854 m)  Wt 174 lb (78.9 kg)  SpO2 97%  BMI 22.96 kg/m2  Body mass index is 22.96 kg/(m^2).  GENERAL APPEARANCE: alert, no distress and sitting in a wheelchair   EYES: Eyes grossly normal to inspection  HENT: ear canals and TM's normal, nose and mouth without ulcers or lesions and very poor dental hygiene   NECK: trachea midline and normal to palpation  RESP: decreased breath sounds bilaterally   CV: pacemaker+  ABDOMEN: Distended, no rebound or guarding   MS: no edema   NEURO: mentation intact and speech normal  PSYCH: mentation appears normal    Diagnostic test results:  Diagnostic Test Results:  No results found for this or any previous visit (from the past 24 hour(s)).     ASSESSMENT/PLAN:                                                    1. COPD exacerbation (H)  -Await final results of X rays   - amoxicillin-clavulanate (AUGMENTIN) 500-125 MG per tablet; Take 1 tablet by mouth 3 times daily  Dispense: 30 tablet; Refill: 0  - predniSONE (DELTASONE) 20 MG tablet; Take 1 tablet (20 mg) by mouth 2 times daily  Dispense: 10 tablet; Refill: 0  - XR Chest 2 Views; Future    2. Atrioventricular block, complete (H)  -Cardiology follow up as scheduled  -Pacemaker+     3. Acute on chronic respiratory failure with hypoxia and hypercapnia (H)  -Oxygen sats are normal at room air   - XR Chest 2 Views; Future    4. Cardiac pacemaker in situ- Medtronic, dual chamber- NOT dependent  -Per Cardiology       Follow up with Provider - if not improving in 5 days, otherwise as scheduled with PCP      Lizzy Elise MD MPH    [facility]  "

## 2018-09-12 NOTE — PATIENT INSTRUCTIONS
At [facility], we strive to deliver an exceptional experience to you, every time we see you.  If you receive a survey in the mail, please send us back your thoughts. We really do value your feedback.    Based on your medical history, these are the current health maintenance/preventive care services that you are due for (some may have been done at this visit.)  Health Maintenance Due   Topic Date Due     COPD ACTION PLAN Q1 YR  02/01/1934     ASTHMA CONTROL TEST Q6 MOS  02/03/2017     ASTHMA ACTION PLAN Q1 YR  08/03/2017     PNEUMOCOCCAL (2 of 2 - PCV13) 08/03/2017     INFLUENZA VACCINE (1) 09/01/2018       Suggested websites for health information:  Www.[facility].org : Up to date and easily searchable information on multiple topics.  Www.medlineplus.gov : medication info, interactive tutorials, watch real surgeries online  Www.familydoctor.org : good info from the Academy of Family Physicians  Www.cdc.gov : public health info, travel advisories, epidemics (H1N1)  Www.aap.org : children's health info, normal development, vaccinations  Www.health.[facility].[place].us : [place] dept of health, public health issues in [place], N1N1    Your care team:                            Family Medicine Internal Medicine   MD Hakeem Yuen MD Shantel Branch-Fleming, MD Katya Georgiev PA-C Megan Hill, APRNORMA Meyers MD Pediatrics   Colton Hector, MERRILL Coon, MD Devi Cloud APRN CNP   MD Reyna Fuentes MD Deborah Mielke, MD Kim Thein, APRN [facility]      Clinic hours: Monday - Thursday 7 am-7 pm; Fridays 7 am-5 pm.   Urgent care: Monday - Friday 11 am-9 pm; Saturday and Sunday 9 am-5 pm.  Pharmacy : Monday -Thursday 8 am-8 pm; Friday 8 am-6 pm; Saturday and Sunday 9 am-5 pm.     Clinic: [phone]   Pharmacy: [phone]

## 2018-09-12 NOTE — MR AVS SNAPSHOT
After Visit Summary   9/12/2018    Gallito Farley    MRN: [id number]           Patient Information     Date Of Birth          1/1/1934        Visit Information        Provider Department      9/12/2018 1:30 PM Open, Assignments; Lizzy Elise MD [facility]        Today's Diagnoses     COPD exacerbation (H)    -  1    Atrioventricular block, complete (H)        Acute on chronic respiratory failure with hypoxia and hypercapnia (H)        Chronic cutaneous venous stasis ulcer (H)        Cardiac pacemaker in situ- Medtronic, dual chamber- NOT dependent          Care Instructions    At [facility], we strive to deliver an exceptional experience to you, every time we see you.  If you receive a survey in the mail, please send us back your thoughts. We really do value your feedback.    Based on your medical history, these are the current health maintenance/preventive care services that you are due for (some may have been done at this visit.)  Health Maintenance Due   Topic Date Due     COPD ACTION PLAN Q1 YR  02/01/1934     ASTHMA CONTROL TEST Q6 MOS  02/03/2017     ASTHMA ACTION PLAN Q1 YR  08/03/2017     PNEUMOCOCCAL (2 of 2 - PCV13) 08/03/2017     INFLUENZA VACCINE (1) 09/01/2018       Suggested websites for health information:  Www.MD On-Line : Up to date and easily searchable information on multiple topics.  Www.medlineplus.gov : medication info, interactive tutorials, watch real surgeries online  Www.familydoctor.org : good info from the Academy of Family Physicians  Www.cdc.gov : public health info, travel advisories, epidemics (H1N1)  Www.aap.org : children's health info, normal development, vaccinations  Www.health.state.[place]us : [place] dept of health, public health issues in [place], N1N1    Your care team:                            Family Medicine Internal Medicine   MD Hakeem Yuen MD Shantel Branch-Fleming, MD Katya Georgiev PA-C Megan  Missael, KIRSTEN Meyers MD Pediatrics   MERRILL Tse, MD Devi Cloud APRN CNP   MD Reyna Fuentes MD Deborah Mielke, MD Kim Thein, APRN [facility]      Clinic hours: Monday - Thursday 7 am-7 pm; Fridays 7 am-5 pm.   Urgent care: Monday - Friday 11 am-9 pm; Saturday and Sunday 9 am-5 pm.  Pharmacy : Monday -Thursday 8 am-8 pm; Friday 8 am-6 pm; Saturday and Sunday 9 am-5 pm.     Clinic: [phone]   Pharmacy: [phone]              Follow-ups after your visit        Your next 10 appointments already scheduled     Oct 16, 2018  1:30 PM CDT   (Arrive by 1:15 PM)   Pacemaker Check with [facility] Cv Device [address] [place] [address])    [address] [place] [address]  [id number]                 Future tests that were ordered for you today     Open Future Orders        Priority Expected Expires Ordered    XR Chest 2 Views Routine 9/12/2018 9/12/2019 9/12/2018            Who to contact     If you have questions or need follow up information about today's clinic visit or your schedule please contact [facility] directly at [phone].  Normal or non-critical lab and imaging results will be communicated to you by MyChart, letter or phone within 4 business days after the clinic has received the results. If you do not hear from us within 7 days, please contact the clinic through [employer]hart or phone. If you have a critical or abnormal lab result, we will notify you by phone as soon as possible.  Submit refill requests through [employer] or call your pharmacy and they will forward the refill request to us. Please allow 3 business days for your refill to be completed.          Additional Information About Your Visit        [employer] Information     [employer] lets you send messages to your doctor, view your test results, renew your prescriptions, schedule appointments and more. To sign up, go to [email or web address] .  "Click on \"Log in\" on the left side of the screen, which will take you to the Welcome page. Then click on \"Sign up Now\" on the right side of the page.     You will be asked to enter the access code listed below, as well as some personal information. Please follow the directions to create your username and password.     Your access code is: [email or web address]  Expires: 2018  9:49 AM     Your access code will  in 90 days. If you need help or a new code, please call your [place] clinic or [phone].        Care EveryWhere ID     This is your Care EveryWhere ID. This could be used by other organizations to access your [place] medical records  [id number]        Your Vitals Were     Pulse Temperature Height Pulse Oximetry BMI (Body Mass Index)       103 98.2  F (36.8  C) (Oral) 6' 1\" (1.854 m) 97% 22.96 kg/m2        Blood Pressure from Last 3 Encounters:   18 92/63   08/15/18 112/76   18 117/74    Weight from Last 3 Encounters:   18 174 lb (78.9 kg)   18 184 lb 8 oz (83.7 kg)   18 181 lb (82.1 kg)              We Performed the Following     PAF COMPLETED          Today's Medication Changes          These changes are accurate as of 18  1:58 PM.  If you have any questions, ask your nurse or doctor.               Start taking these medicines.        Dose/Directions    amoxicillin-clavulanate 500-125 MG per tablet   Commonly known as:  AUGMENTIN   Used for:  COPD exacerbation (H)   Started by:  Lizzy Elise MD        Dose:  1 tablet   Take 1 tablet by mouth 3 times daily   Quantity:  30 tablet   Refills:  0       predniSONE 20 MG tablet   Commonly known as:  DELTASONE   Used for:  COPD exacerbation (H)   Started by:  Lizzy Elise MD        Dose:  20 mg   Take 1 tablet (20 mg) by mouth 2 times daily   Quantity:  10 tablet   Refills:  0         These medicines have changed or have updated prescriptions.        Dose/Directions    albuterol (2.5 MG/3ML) 0.083% neb solution   This " may have changed:  Another medication with the same name was removed. Continue taking this medication, and follow the directions you see here.   Used for:  COPD exacerbation (H)   Changed by:  Lizzy Elise MD        Dose:  2.5 mg   Take 1 vial (2.5 mg) by nebulization every 6 hours as needed   Quantity:  360 mL   Refills:  1         Stop taking these medicines if you haven't already. Please contact your care team if you have questions.     Adhesive Paper Tape   Stopped by:  Lizzy Elise MD           azithromycin 250 MG tablet   Commonly known as:  ZITHROMAX   Stopped by:  Lizzy Elise MD                Where to get your medicines      These [place] [facility] [place] [address]  [address], [facility] [address]     Phone:  [phone]     amoxicillin-clavulanate 500-125 MG per tablet    predniSONE 20 MG tablet                Primary Care Provider Office Phone # Fax #    Hakeem Awad [phone]       [address]  [facility] [address]        Equal Access to Services     [facility]: Hadii aad ku hadasho Soomaali, waaxda luqadaha, qaybta kaalmada adeegyada, ricki pizarro . So [facility] [phone].    ATENCIÓN: Si habla español, tiene a john disposición servicios gratuitos de asistencia lingüística. [facility] [phone].    We comply with applicable federal civil rights laws and [place] laws. We do not discriminate on the basis of race, color, national origin, age, disability, sex, sexual orientation, or gender identity.            Thank you!     Thank you for choosing [facility]  for your care. Our goal is always to provide you with excellent care. Hearing back from our patients is one way we can continue to improve our services. Please take a few minutes to complete the written survey that you may receive in the mail after your visit with us. Thank you!            "  Your Updated Medication List - Protect others around you: Learn how to safely use, store and throw away your medicines at www.disposemymeds.org.          This list is accurate as of 9/12/18  1:58 PM.  Always use your most recent med list.                   Brand Name Dispense Instructions for use Diagnosis    acetaminophen 325 MG tablet    TYLENOL    100 tablet    Take 2 tablets (650 mg) by mouth every 4 hours as needed for mild pain    Acute post-operative pain       albuterol (2.5 MG/3ML) 0.083% neb solution     360 mL    Take 1 vial (2.5 mg) by nebulization every 6 hours as needed    COPD exacerbation (H)       amLODIPine 10 MG tablet    NORVASC    90 tablet    Take 1 tablet (10 mg) by mouth daily    Essential hypertension       amoxicillin-clavulanate 500-125 MG per tablet    AUGMENTIN    30 tablet    Take 1 tablet by mouth 3 times daily    COPD exacerbation (H)       diclofenac sodium 3 % Gel     100 g    Apply 4 gm four times a day as needed to right trapezius muscle.    Cervical myofascial pain syndrome       enalapril 10 MG tablet    VASOTEC    90 tablet    Take 1 tablet (10 mg) by mouth daily    Essential hypertension       finasteride 5 MG tablet    PROSCAR    90 tablet    TAKE ONE TABLET BY MOUTH AT BEDTIME    Benign nodular prostatic hyperplasia without lower urinary tract symptoms       fluticasone-vilanterol 200-25 MCG/INH inhaler    BREO ELLIPTA    1 Inhaler    Inhale 1 puff into the lungs daily    Moderate persistent asthma without complication       Gauze Dressing 4\"X4\" Pads     48 each    Sterile 7b8uden gauze for daily wound cares.    Chronic venous hypertension with ulcer involving right side (H), Ulcer of right leg, with fat layer exposed (H)       hydrochlorothiazide 12.5 MG capsule    MICROZIDE    180 capsule    Take 2 capsules (25 mg) by mouth daily    Essential hypertension       HEATHER 4\"X75\" Misc     30 each    Sterile Heather wrap for daily dressing changes.    Chronic venous hypertension " "with ulcer involving right side (H), Ulcer of right leg, with fat layer exposed (H)       mirabegron 50 MG 24 hr tablet    MYRBETRIQ    30 tablet    Take 1 tablet (50 mg) by mouth daily    OAB (overactive bladder)       mupirocin 2 % ointment    BACTROBAN    30 g    Apply twice a day to right leg wound and cover with occlusive dressing.    Chronic cutaneous venous stasis ulcer (H)       * order for DME     5 each    Equipment being ordered: Dispense 4\" x 4\" sterile gauze. Dispense any brand name.    Chronic cutaneous venous stasis ulcer (H)       * order for DME     5 each    Equipment being ordered: Dispense Coban wrap.    Chronic cutaneous venous stasis ulcer (H)       order for DME     1 each    Equipment being ordered: Nebulizer machine and adult tubing    Pneumonia of right lower lobe due to infectious organism (H)       phenazopyridine 100 MG tablet    PYRIDIUM    6 tablet    Take 1 tablet (100 mg) by mouth 3 times daily as needed for urinary tract discomfort    Dysuria       polyethylene glycol powder    MIRALAX    510 g    Take 17 g (1 capful) by mouth daily    Constipation, unspecified constipation type       predniSONE 20 MG tablet    DELTASONE    10 tablet    Take 1 tablet (20 mg) by mouth 2 times daily    COPD exacerbation (H)       ranitidine 150 MG tablet    ZANTAC    180 tablet    Take 1 tablet (150 mg) by mouth 2 times daily    Gastroesophageal reflux disease, esophagitis presence not specified       tamsulosin 0.4 MG capsule    FLOMAX    180 capsule    Take 2 capsules (0.8 mg) by mouth At Bedtime    Benign nodular prostatic hyperplasia without lower urinary tract symptoms       tiotropium 18 MCG capsule    SPIRIVA    30 capsule    Inhale 1 capsule (18 mcg) into the lungs daily    COPD exacerbation (H)       tolterodine 4 MG 24 hr capsule    DETROL LA    30 capsule    Take 1 capsule (4 mg) by mouth daily    Bladder spasms       trolamine salicylate 10 % cream    ASPERCREME    85 g    Apply topically " as needed for moderate pain    Cervical myofascial pain syndrome       * Notice:  This list has 2 medication(s) that are the same as other medications prescribed for you. Read the directions carefully, and ask your doctor or other care provider to review them with you.

## 2018-09-13 ENCOUNTER — TELEPHONE (OUTPATIENT)
Dept: FAMILY MEDICINE | Facility: CLINIC | Age: 83
End: 2018-09-13

## 2018-09-13 NOTE — PROGRESS NOTES
Please CALL patient:    Dear Gallito Farley,    Chest X ray did not show an definite pneumonias.  Changes on the X rays are suggestive of heart failure (when the heart does not pump as well as it should). If the cough is not better in 1 week then please follow up with Dr. Awad for further evaluation of this. In the meantime, continue with the medication that was prescribed in clinic.     Regards,    Lizzy Elise MD MPH

## 2018-09-13 NOTE — TELEPHONE ENCOUNTER
This writer attempted to contact Gallito on 09/13/18 via [employer]       Reason for call results and left message.      Notes Recorded by Lizzy Elise MD on 9/12/2018 at 8:10 PM  Please CALL patient:    Dear Gallito Farley,    Chest X ray did not show an definite pneumonias.  Changes on the X rays are suggestive of heart failure (when the heart does not pump as well as it should). If the cough is not better in 1 week then please follow up with Dr. Awad for further evaluation of this. In the meantime, continue with the medication that was prescribed in clinic.     Regards,    Lizzy Elise MD MPH    If patient calls back:   Patient contacted by a Registered Nurse. Inform patient that someone from the RN group will contact them, document that pt called and route to P DYAD 3 RN POOL [[phone]]        Kari Martines RN

## 2018-09-14 NOTE — TELEPHONE ENCOUNTER
Spoke with Gallito via [employer]  to relay provider message. He states understanding and has no further questions. Patient is taking Rx as prescribed and would prefer to have an appointment with Dr. Awad to follow-up regardless, patient is scheduled next Thursday 9/20/18 for OV.     Jaleesa Guidry RN

## 2018-09-19 DIAGNOSIS — J44.1 COPD EXACERBATION (H): ICD-10-CM

## 2018-09-19 NOTE — TELEPHONE ENCOUNTER
"Requested Prescriptions   Pending Prescriptions Disp Refills     SPIRIVA HANDIHALER 18 MCG capsule [Pharmacy Med Name: SPIRIVA HANDIHLR CAP]  Last Written Prescription Date:  09/06/17  Last Fill Quantity: 30,  # refills: 11   Last Office Visit with G, P or [facility] prescribing provider:  09/12/18-Arik   Future Office Visit:    Next 5 appointments (look out 90 days)     Sep 20, 2018  2:45 PM CDT   Office Visit with Hakeem Awad MD, LANGUAGE BANC   [facility]    [address] [place] [address] [id number] [phone]                30 capsule      Sig: INHALE CONTENTS OF ONE CAPSULE DAILY USING HANDIHALER DEVICE    Asthma Maintenance Inhalers - Anticholinergics Failed    9/19/2018  1:27 PM       Failed - Asthma control assessment score within normal limits in last 6 months    Please review ACT score.          Passed - Patient is age 12 years or older       Passed - Recent (6 mo) or future (30 days) visit within the authorizing provider's specialty    Patient had office visit in the last 6 months or has a visit in the next 30 days with authorizing provider or within the authorizing provider's specialty.  See \"Patient Info\" tab in inbasket, or \"Choose Columns\" in Meds & Orders section of the refill encounter.              "

## 2018-09-20 RX ORDER — TIOTROPIUM BROMIDE 18 UG/1
CAPSULE ORAL; RESPIRATORY (INHALATION)
Qty: 30 CAPSULE | Refills: 11 | Status: SHIPPED | OUTPATIENT
Start: 2018-09-20 | End: 2019-08-19

## 2018-09-20 NOTE — TELEPHONE ENCOUNTER
Prescription approved per [facility] Refill Protocol.    Stefany Browne RN  [facility]

## 2018-10-02 ENCOUNTER — RADIANT APPOINTMENT (OUTPATIENT)
Dept: GENERAL RADIOLOGY | Facility: CLINIC | Age: 83
End: 2018-10-02
Attending: INTERNAL MEDICINE
Payer: COMMERCIAL

## 2018-10-02 ENCOUNTER — OFFICE VISIT (OUTPATIENT)
Dept: FAMILY MEDICINE | Facility: CLINIC | Age: 83
End: 2018-10-02
Payer: COMMERCIAL

## 2018-10-02 VITALS
OXYGEN SATURATION: 96 % | SYSTOLIC BLOOD PRESSURE: 101 MMHG | DIASTOLIC BLOOD PRESSURE: 68 MMHG | TEMPERATURE: 98.4 F | HEART RATE: 92 BPM

## 2018-10-02 DIAGNOSIS — J32.0 RIGHT MAXILLARY SINUSITIS: Primary | ICD-10-CM

## 2018-10-02 DIAGNOSIS — Z78.9 IMPAIRED MOBILITY AND ADLS: ICD-10-CM

## 2018-10-02 DIAGNOSIS — R09.89 PULMONARY VASCULAR CONGESTION: ICD-10-CM

## 2018-10-02 DIAGNOSIS — Z74.09 IMPAIRED MOBILITY AND ADLS: ICD-10-CM

## 2018-10-02 DIAGNOSIS — J44.9 CHRONIC OBSTRUCTIVE PULMONARY DISEASE, UNSPECIFIED COPD TYPE (H): ICD-10-CM

## 2018-10-02 LAB
BASOPHILS # BLD AUTO: 0 10E9/L (ref 0–0.2)
BASOPHILS NFR BLD AUTO: 0.3 %
DIFFERENTIAL METHOD BLD: NORMAL
EOSINOPHIL # BLD AUTO: 0.2 10E9/L (ref 0–0.7)
EOSINOPHIL NFR BLD AUTO: 2.6 %
ERYTHROCYTE [DISTWIDTH] IN BLOOD BY AUTOMATED COUNT: 14.2 % (ref 10–15)
HCT VFR BLD AUTO: 48 % (ref 40–53)
HGB BLD-MCNC: 16.4 G/DL (ref 13.3–17.7)
LYMPHOCYTES # BLD AUTO: 2.7 10E9/L (ref 0.8–5.3)
LYMPHOCYTES NFR BLD AUTO: 34.7 %
MCH RBC QN AUTO: 30.3 PG (ref 26.5–33)
MCHC RBC AUTO-ENTMCNC: 34.2 G/DL (ref 31.5–36.5)
MCV RBC AUTO: 89 FL (ref 78–100)
MONOCYTES # BLD AUTO: 0.8 10E9/L (ref 0–1.3)
MONOCYTES NFR BLD AUTO: 10.1 %
NEUTROPHILS # BLD AUTO: 4 10E9/L (ref 1.6–8.3)
NEUTROPHILS NFR BLD AUTO: 52.3 %
PLATELET # BLD AUTO: 218 10E9/L (ref 150–450)
RBC # BLD AUTO: 5.41 10E12/L (ref 4.4–5.9)
WBC # BLD AUTO: 7.6 10E9/L (ref 4–11)

## 2018-10-02 PROCEDURE — 83880 ASSAY OF NATRIURETIC PEPTIDE: CPT | Performed by: INTERNAL MEDICINE

## 2018-10-02 PROCEDURE — 70220 X-RAY EXAM OF SINUSES: CPT | Mod: FY

## 2018-10-02 PROCEDURE — 80053 COMPREHEN METABOLIC PANEL: CPT | Performed by: INTERNAL MEDICINE

## 2018-10-02 PROCEDURE — 85025 COMPLETE CBC W/AUTO DIFF WBC: CPT | Performed by: INTERNAL MEDICINE

## 2018-10-02 PROCEDURE — 36415 COLL VENOUS BLD VENIPUNCTURE: CPT | Performed by: INTERNAL MEDICINE

## 2018-10-02 PROCEDURE — 99214 OFFICE O/P EST MOD 30 MIN: CPT | Performed by: INTERNAL MEDICINE

## 2018-10-02 NOTE — MR AVS SNAPSHOT
After Visit Summary   10/2/2018    Gallito Farley    MRN: [id number]           Patient Information     Date Of Birth          1/1/1934        Visit Information        Provider Department      10/2/2018 4:05 PM Hakeem Awad MD; LANGUAGE BANC [facility]        Today's Diagnoses     Right maxillary sinusitis    -  1    Chronic obstructive pulmonary disease, unspecified COPD type (H)        Pulmonary vascular congestion        Impaired mobility and ADLs          Care Instructions    At [facility], we strive to deliver an exceptional experience to you, every time we see you.  If you receive a survey in the mail, please send us back your thoughts. We really do value your feedback.    Your care team:                            Family Medicine Internal Medicine   MD Hakeem Yuen MD Shantel Branch-Fleming, MD Katya Georgiev PA-C Megan Hill, APRN CNP    Neftali Meyers MD Pediatrics   MERRILL Tse, CNP MD Devi Smiley APRN CNP   MD Reyna Fuentes MD Deborah Mielke, MD Kim Thein, APRN [facility]      Clinic hours: Monday - Thursday 7 am-7 pm; Fridays 7 am-5 pm.   Urgent care: Monday - Friday 11 am-9 pm; Saturday and Sunday 9 am-5 pm.  Pharmacy : Monday -Thursday 8 am-8 pm; Friday 8 am-6 pm; Saturday and Sunday 9 am-5 pm.     Clinic: [phone]   Pharmacy: [phone]                Follow-ups after your visit        Additional Services     HOME CARE NURSING REFERRAL       **Order classes of: [place] [facility], [facility] and [facility] will route to the Home Care and Hospice Referral Pool.  Home Care or Hospice will then contact the patient to schedule their appointment.**    If you do not hear from Home Care and Hospice, or you would like to call to schedule, please call the referring place of service that your provider has listed  below.  ______________________________________________________________________    Your provider has referred you to: [facility]: [facility] H[facility] [phone]   [email or web address]    Extended Emergency Contact Information  Primary Emergency Contact: JOSE RAMON SOLIS  Address: [address]           [place] [address] [address] [place]  Home Phone: [phone]  Work Phone: none  Mobile Phone: none  Relation: Relative  Hearing or visual needs: None  Other needs: None  Preferred language: English   needed? No  Secondary Emergency Contact: IRMA HOLLOWAY   [facility]  Home Phone: [phone]  Work Phone: none  Mobile Phone: none  Relation: Son  Hearing or visual needs: None  Other needs: None  Preferred language: English   needed? No    Patient Anticipated Discharge Date: Cannot be determined yet.   RN, PT, HHA to begin 24 - 48 hours after discharge.  PLEASE EVALUATE AND TREAT (Evaluation timeline is 24 - 48 hrs. Please call if there is need for a variance to this timeline).    REASON FOR REFERRAL: Assessment & Treatment: PT    ADDITIONAL SERVICES NEEDED: None    OTHER PERTINENT INFORMATION: Patient was last seen by provider on 10/2/18 for COPD.    Current Outpatient Prescriptions:  acetaminophen (TYLENOL) 325 MG tablet, Take 2 tablets (650 mg) by mouth every 4 hours as needed for mild pain, Disp: 100 tablet, Rfl: 3  albuterol (2.5 MG/3ML) 0.083% neb solution, Take 1 vial (2.5 mg) by nebulization every 6 hours as needed, Disp: 360 mL, Rfl: 1  amLODIPine (NORVASC) 10 MG tablet, Take 1 tablet (10 mg) by mouth daily, Disp: 90 tablet, Rfl: 3  diclofenac sodium 3 % GEL, Apply 4 gm four times a day as needed to right trapezius muscle., Disp: 100 g, Rfl: 11  enalapril (VASOTEC) 10 MG tablet, Take 1 tablet (10 mg) by mouth daily, Disp: 90 tablet, Rfl: 3  fluticasone-vilanterol (BREO ELLIPTA) 200-25 MCG/INH oral inhaler, Inhale 1 puff into the lungs daily,  Disp: 1 Inhaler, Rfl: 11  hydrochlorothiazide (MICROZIDE) 12.5 MG capsule, Take 2 capsules (25 mg) by mouth daily, Disp: 180 capsule, Rfl: 3  order for DME, Equipment being ordered: Nebulizer machine and adult tubing, Disp: 1 each, Rfl: 0  order for DME, Equipment being ordered: Dispense Coban wrap., Disp: 5 each, Rfl: 5  polyethylene glycol (MIRALAX) powder, Take 17 g (1 capful) by mouth daily, Disp: 510 g, Rfl: 11  ranitidine (ZANTAC) 150 MG tablet, Take 1 tablet (150 mg) by mouth 2 times daily, Disp: 180 tablet, Rfl: 1  SPIRIVA HANDIHALER 18 MCG capsule, INHALE CONTENTS OF ONE CAPSULE DAILY USING HANDIHALER DEVICE, Disp: 30 capsule, Rfl: 11  tamsulosin (FLOMAX) 0.4 MG capsule, Take 2 capsules (0.8 mg) by mouth At Bedtime, Disp: 180 capsule, Rfl: 3  tolterodine (DETROL LA) 4 MG 24 hr capsule, Take 1 capsule (4 mg) by mouth daily, Disp: 30 capsule, Rfl: 6  finasteride (PROSCAR) 5 MG tablet, TAKE ONE TABLET BY MOUTH AT BEDTIME, Disp: 90 tablet, Rfl: 3  mirabegron (MYRBETRIQ) 50 MG 24 hr tablet, Take 1 tablet (50 mg) by mouth daily (Patient not taking: Reported on 10/2/2018), Disp: 30 tablet, Rfl: 5  phenazopyridine (PYRIDIUM) 100 MG tablet, Take 1 tablet (100 mg) by mouth 3 times daily as needed for urinary tract discomfort (Patient not taking: Reported on 10/2/2018), Disp: 6 tablet, Rfl: 0  trolamine salicylate (ASPERCREME) 10 % cream, Apply topically as needed for moderate pain (Patient not taking: Reported on 10/2/2018), Disp: 85 g, Rfl: 11      Patient Active Problem List:     Chronic constipation     HTN (hypertension)     Benign prostatic hyperplasia     Insomnia     GERD (gastroesophageal reflux disease)     Atrioventricular block, complete (H)     Advanced directives, counseling/discussion     Cardiac pacemaker in situ- Medtronic, dual chamber- NOT dependent     Health Care Home     Chronic obstructive pulmonary disease, unspecified COPD type (H)      Documentation of Face to Face and Certification for Home  Health Services    I certify that patientGallito is under my care and that I, or a Nurse Practitioner or Physician's Assistant working with me, had a face-to-face encounter that meets the physician face-to-face encounter requirements with this patient on: 10/2/2018.    This encounter with the patient was in whole, or in part, for the following medical condition, which is the primary reason for Home Health Care: COPD.    I certify that, based on my findings, the following services are medically necessary Home Health Services: Physical Therapy    My clinical findings support the need for the above services because: Physical Therapy Services are needed to assess and treat the following functional impairments: Impaired mobility and ADLs.    Further, I certify that my clinical findings support that this patient is homebound (i.e. absences from home require considerable and taxing effort and are for medical reasons or [employer] services or infrequently or of short duration when for other reasons) because: Leaving home is medically contraindicated for the following reason(s): Dyspnea on exertion that makes it so they cannot leave their home for needed services without clinical deterioration..    Based on the above findings, I certify that this patient is confined to the home and needs intermittent skilled nursing care, physical therapy and/or speech therapy.  The patient is under my care, and I have initiated the establishment of the plan of care.  This patient will be followed by a physician who will periodically review the plan of care.    Physician/Provider to provide follow up care: Hakeem Awad    [facility] certified Physician at time of discharge: Hakeem Awad MD    Please be aware that coverage of these services is subject to the terms and limitations of your health insurance plan.  Call member services at your health plan with any benefit or coverage questions.                  Your next  "10 appointments already scheduled     Oct 16, 2018  1:30 PM CDT   (Arrive by 1:15 PM)   Pacemaker Check with Uc Cv Device [address] [place] [address] ([facility] [place])    [address] [place] [address]  3rd Floor  [id number]                 Who to contact     If you have questions or need follow up information about today's clinic visit or your schedule please contact [facility] MARCELO MORGAN directly at [phone].  Normal or non-critical lab and imaging results will be communicated to you by [employer], letter or phone within 4 business days after the clinic has received the results. If you do not hear from us within 7 days, please contact the clinic through [employer] or phone. If you have a critical or abnormal lab result, we will notify you by phone as soon as possible.  Submit refill requests through [employer] or call your pharmacy and they will forward the refill request to us. Please allow 3 business days for your refill to be completed.          Additional Information About Your Visit        [facility] Information     [employer] lets you send messages to your doctor, view your test results, renew your prescriptions, schedule appointments and more. To sign up, go to [email or web address] . Click on \"Log in\" on the left side of the screen, which will take you to the Welcome page. Then click on \"Sign up Now\" on the right side of the page.     You will be asked to enter the access code listed below, as well as some personal information. Please follow the directions to create your username and password.     Your access code is: [email or web address]  Expires: 2018  9:49 AM     Your access code will  in 90 days. If you need help or a new code, please call your [place] clinic or [phone].        Care EveryWhere ID     This is your Care EveryWhere ID. This could be used by other organizations to access your [place] medical records  [id number]        Your Vitals Were     Pulse Temperature Pulse Oximetry "             92 98.4  F (36.9  C) (Oral) 96%          Blood Pressure from Last 3 Encounters:   10/02/18 101/68   09/12/18 92/63   08/15/18 112/76    Weight from Last 3 Encounters:   09/12/18 174 lb (78.9 kg)   04/19/18 184 lb 8 oz (83.7 kg)   04/04/18 181 lb (82.1 kg)              We Performed the Following     BNP-N terminal pro     CBC with platelets and differential     Comprehensive metabolic panel (BMP + Alb, Alk Phos, ALT, AST, Total. Bili, TP)     HOME CARE NURSING REFERRAL     XR Sinus Complete G/E 3 Views          Today's Medication Changes          These changes are accurate as of 10/2/18  5:50 PM.  If you have any questions, ask your nurse or doctor.               Start taking these medicines.        Dose/Directions    amoxicillin-clavulanate 875-125 MG per tablet   Commonly known as:  AUGMENTIN   Used for:  Right maxillary sinusitis   Started by:  Hakeem Awad MD        Dose:  1 tablet   Take 1 tablet by mouth 2 times daily for 14 days   Quantity:  28 tablet   Refills:  1            Where to get your medicines      These medications were sent to [facility] [place], [place] - [address]  [address] [address]     Phone:  [phone]     amoxicillin-clavulanate 875-125 MG per tablet                Primary Care Provider Office Phone # Fax #    Hakeem Awad [phone]       [address]  [facility] [address]        Equal Access to Services     [facility]: Hadii aad ku hadasho Soomaali, waaxda luqadaha, qaybta kaalmada adeegyada, waxay bruce hayadama pizarro . So [facility] [phone].    ATENCIÓN: Si habla español, tiene a john disposición servicios gratuitos de asistencia lingüística. Llame al [phone].    We comply with applicable federal civil rights laws and [place] laws. We do not discriminate on the basis of race, color, national origin, age, disability, sex, sexual orientation, or gender  identity.            Thank you!     Thank you for choosing [facility]  for your care. Our goal is always to provide you with excellent care. Hearing back from our patients is one way we can continue to improve our services. Please take a few minutes to complete the written survey that you may receive in the mail after your visit with us. Thank you!             Your Updated Medication List - Protect others around you: Learn how to safely use, store and throw away your medicines at www.disposemymeds.org.          This list is accurate as of 10/2/18  5:50 PM.  Always use your most recent med list.                   Brand Name Dispense Instructions for use Diagnosis    acetaminophen 325 MG tablet    TYLENOL    100 tablet    Take 2 tablets (650 mg) by mouth every 4 hours as needed for mild pain    Acute post-operative pain       albuterol (2.5 MG/3ML) 0.083% neb solution     360 mL    Take 1 vial (2.5 mg) by nebulization every 6 hours as needed    COPD exacerbation (H)       amLODIPine 10 MG tablet    NORVASC    90 tablet    Take 1 tablet (10 mg) by mouth daily    Essential hypertension       amoxicillin-clavulanate 875-125 MG per tablet    AUGMENTIN    28 tablet    Take 1 tablet by mouth 2 times daily for 14 days    Right maxillary sinusitis       diclofenac sodium 3 % Gel     100 g    Apply 4 gm four times a day as needed to right trapezius muscle.    Cervical myofascial pain syndrome       enalapril 10 MG tablet    VASOTEC    90 tablet    Take 1 tablet (10 mg) by mouth daily    Essential hypertension       finasteride 5 MG tablet    PROSCAR    90 tablet    TAKE ONE TABLET BY MOUTH AT BEDTIME    Benign nodular prostatic hyperplasia without lower urinary tract symptoms       fluticasone-vilanterol 200-25 MCG/INH inhaler    BREO ELLIPTA    1 Inhaler    Inhale 1 puff into the lungs daily    Moderate persistent asthma without complication       hydrochlorothiazide 12.5 MG capsule    MICROZIDE    180  capsule    Take 2 capsules (25 mg) by mouth daily    Essential hypertension       mirabegron 50 MG 24 hr tablet    MYRBETRIQ    30 tablet    Take 1 tablet (50 mg) by mouth daily    OAB (overactive bladder)       order for DME     5 each    Equipment being ordered: Dispense Coban wrap.    Chronic cutaneous venous stasis ulcer (H)       order for DME     1 each    Equipment being ordered: Nebulizer machine and adult tubing    Pneumonia of right lower lobe due to infectious organism (H)       phenazopyridine 100 MG tablet    PYRIDIUM    6 tablet    Take 1 tablet (100 mg) by mouth 3 times daily as needed for urinary tract discomfort    Dysuria       polyethylene glycol powder    MIRALAX    510 g    Take 17 g (1 capful) by mouth daily    Constipation, unspecified constipation type       ranitidine 150 MG tablet    ZANTAC    180 tablet    Take 1 tablet (150 mg) by mouth 2 times daily    Gastroesophageal reflux disease, esophagitis presence not specified       SPIRIVA HANDIHALER 18 MCG capsule   Generic drug:  tiotropium     30 capsule    INHALE CONTENTS OF ONE CAPSULE DAILY USING HANDIHALER DEVICE    COPD exacerbation (H)       tamsulosin 0.4 MG capsule    FLOMAX    180 capsule    Take 2 capsules (0.8 mg) by mouth At Bedtime    Benign nodular prostatic hyperplasia without lower urinary tract symptoms       tolterodine 4 MG 24 hr capsule    DETROL LA    30 capsule    Take 1 capsule (4 mg) by mouth daily    Bladder spasms       trolamine salicylate 10 % cream    ASPERCREME    85 g    Apply topically as needed for moderate pain    Cervical myofascial pain syndrome

## 2018-10-02 NOTE — PATIENT INSTRUCTIONS
At [facility], we strive to deliver an exceptional experience to you, every time we see you.  If you receive a survey in the mail, please send us back your thoughts. We really do value your feedback.    Your care team:                            Family Medicine Internal Medicine   MD Hakeem Yuen MD Shantel Branch-Fleming, MD Katya Georgiev PA-C Megan Hill, APRN CHRISTINE Meyers MD Pediatrics   Colton Hector, MERRILL Coon, MD Devi Cloud APRN CNP   MD Reyna Fuentes MD Deborah Mielke, MD Grazyna Rees, APRN [facility]      Clinic hours: Monday - Thursday 7 am-7 pm; Fridays 7 am-5 pm.   Urgent care: Monday - Friday 11 am-9 pm; Saturday and Sunday 9 am-5 pm.  Pharmacy : Monday -Thursday 8 am-8 pm; Friday 8 am-6 pm; Saturday and Sunday 9 am-5 pm.     Clinic: [phone]   Pharmacy: [phone]

## 2018-10-02 NOTE — PROGRESS NOTES
[facility] Progress Note           Assessment and Plan:     (J32.0) Right maxillary sinusitis  (primary encounter diagnosis)  Comment:   Plan: XR Sinus Complete G/E 3 Views, CBC with         platelets and differential,         amoxicillin-clavulanate (AUGMENTIN) 875-125 MG         per tablet            (J44.9) Chronic obstructive pulmonary disease, unspecified COPD type (H)  Comment:   Plan: Comprehensive metabolic panel (BMP + Alb, Alk         Phos, ALT, AST, Total. Bili, TP), HOME CARE         NURSING REFERRAL            (R09.89) Pulmonary vascular congestion  Comment:   Plan: BNP-N terminal pro          (Z74.09) Impaired mobility and ADLs  Comment:   Plan: HOME CARE NURSING REFERRAL                 Interval History:   Reason for visit: Cough  Onset: 8/15/18  Description: Patient initially complained of sore throat and chills associated with productive cough. When he was examined by a home care nurse, he was noted to have expiratory wheezes and inspiratory crackles bilaterally. Temperature was documented to 101 degrees Fahrenheit. The patient was then seen at the Urgent Clinic where he was diagnosed to have pneumonia (no chest x-rays). Patient was treated empirically with Azithromycin, Prednisone and Albuterol nebulizers. However, his symptoms recurred three weeks later, which prompted another visit on September 12, 2018. Chest x-ray shows no airspace consolidation, with increased vascularity and mild interstitial thickening. Due to his underlying COPD, the patient was again treated with Prednisone and Augmentin. Currently, Mr. Farley stated (via ) that he still has productive cough, but less severe. There are no reports of fever/chills or poor appetite.              Significant Problems:   Patient Active Problem List   Diagnosis     Chronic constipation     HTN (hypertension)     Benign prostatic hyperplasia     Insomnia     GERD (gastroesophageal reflux disease)      Atrioventricular block, complete (H)     Advanced directives, counseling/discussion     Cardiac pacemaker in situ- Medtronic, dual chamber- NOT dependent     Health Care Home     Chronic obstructive pulmonary disease, unspecified COPD type (H)              Review of Systems:   CONSTITUTIONAL: NEGATIVE for fever, chills, change in weight  INTEGUMENTARY/SKIN: NEGATIVE for worrisome rashes, moles or lesions  EYES: NEGATIVE for vision changes or irritation  ENT/MOUTH: NEGATIVE for ear, mouth and throat problems  RESP:NEGATIVE for hemoptysis, pleurisy and wheezing  CV: NEGATIVE for chest pain, palpitations or peripheral edema  GI: NEGATIVE for nausea, abdominal pain, heartburn, or change in bowel habits  : NEGATIVE for frequency, dysuria, or hematuria  MUSCULOSKELETAL: NEGATIVE for significant arthralgias or myalgia  NEURO: NEGATIVE for weakness, dizziness or paresthesias  ENDOCRINE: NEGATIVE for temperature intolerance, skin/hair changes  HEME: NEGATIVE for bleeding problems  PSYCHIATRIC: NEGATIVE for changes in mood or affect            Medications:     Current Outpatient Prescriptions   Medication Sig     acetaminophen (TYLENOL) 325 MG tablet Take 2 tablets (650 mg) by mouth every 4 hours as needed for mild pain     albuterol (2.5 MG/3ML) 0.083% neb solution Take 1 vial (2.5 mg) by nebulization every 6 hours as needed     amLODIPine (NORVASC) 10 MG tablet Take 1 tablet (10 mg) by mouth daily     diclofenac sodium 3 % GEL Apply 4 gm four times a day as needed to right trapezius muscle.     enalapril (VASOTEC) 10 MG tablet Take 1 tablet (10 mg) by mouth daily     fluticasone-vilanterol (BREO ELLIPTA) 200-25 MCG/INH oral inhaler Inhale 1 puff into the lungs daily     hydrochlorothiazide (MICROZIDE) 12.5 MG capsule Take 2 capsules (25 mg) by mouth daily     order for DME Equipment being ordered: Nebulizer machine and adult tubing     order for DME Equipment being ordered: Dispense Coban wrap.     polyethylene glycol  (MIRALAX) powder Take 17 g (1 capful) by mouth daily     ranitidine (ZANTAC) 150 MG tablet Take 1 tablet (150 mg) by mouth 2 times daily     SPIRIVA HANDIHALER 18 MCG capsule INHALE CONTENTS OF ONE CAPSULE DAILY USING HANDIHALER DEVICE     tamsulosin (FLOMAX) 0.4 MG capsule Take 2 capsules (0.8 mg) by mouth At Bedtime     tolterodine (DETROL LA) 4 MG 24 hr capsule Take 1 capsule (4 mg) by mouth daily     finasteride (PROSCAR) 5 MG tablet TAKE ONE TABLET BY MOUTH AT BEDTIME     mirabegron (MYRBETRIQ) 50 MG 24 hr tablet Take 1 tablet (50 mg) by mouth daily (Patient not taking: Reported on 10/2/2018)     phenazopyridine (PYRIDIUM) 100 MG tablet Take 1 tablet (100 mg) by mouth 3 times daily as needed for urinary tract discomfort (Patient not taking: Reported on 10/2/2018)     trolamine salicylate (ASPERCREME) 10 % cream Apply topically as needed for moderate pain (Patient not taking: Reported on 10/2/2018)     No current facility-administered medications for this visit.      Facility-Administered Medications Ordered in Other Visits   Medication     iopamidol (ISOVUE-370) solution 111 mL             Physical Exam:   /68 (BP Location: Left arm, Patient Position: Sitting, Cuff Size: Adult Regular)  Pulse 92  Temp 98.4  F (36.9  C) (Oral)  SpO2 96%  Constitutional: Awake, alert, cooperative, no apparent distress, and appears stated age.  Eyes: sclera clear and conjunctiva normal  ENT: normocepalic, without obvious abnormality  Lungs: No increased work of breathing, good air exchange, clear to auscultation bilaterally, no crackles or wheezing.  Cardiovascular: Regular rate and rhythm, normal S1 and S2, no S3 or S4, and no murmur noted.  Neurologic: Cranial Nerves:  cranial nerves II-XII are grossly intact  Motor Exam:  moves all extremities well and symmetrically  Neuropsychiatric: Normal affect, mood, orientation, memory and insight.          Data:   CHEST TWO VIEWS September 12, 2018 2:12 PM      HISTORY: Chronic  obstructive pulmonary disease exacerbation (H). Acute  on chronic respiratory failure with hypoxia and hypercapnia (H). Acute  on chronic respiratory failure with hypoxia and hypercapnia (H).     COMPARISON: 3/16/2017.     FINDINGS: Chest wall deformity with volume loss in the right  hemithorax is similar to the prior study. Heart size upper normal.  There is increased vascularity and mild interstitial thickening. No  pneumothorax or pleural effusion. No airspace consolidation.  Left-sided pacer device noted. Heart size and pulmonary vascularity  are within normal limits. The lungs are clear. No pneumothorax or  pleural effusion.          IMPRESSION: Radiographic findings suggestive of congestive heart  failure with mild edema or new interstitial lung disease.      HARPREET ROBERTS MD         Disposition: Follow-up visit if condition worsens.      Hakeem Awad MD  Internal Medicine  [facility] Team

## 2018-10-03 ENCOUNTER — TELEPHONE (OUTPATIENT)
Dept: FAMILY MEDICINE | Facility: CLINIC | Age: 83
End: 2018-10-03

## 2018-10-03 LAB
ALBUMIN SERPL-MCNC: 2.8 G/DL (ref 3.4–5)
ALP SERPL-CCNC: 87 U/L (ref 40–150)
ALT SERPL W P-5'-P-CCNC: 25 U/L (ref 0–70)
ANION GAP SERPL CALCULATED.3IONS-SCNC: 11 MMOL/L (ref 3–14)
AST SERPL W P-5'-P-CCNC: 24 U/L (ref 0–45)
BILIRUB SERPL-MCNC: 0.4 MG/DL (ref 0.2–1.3)
BUN SERPL-MCNC: 16 MG/DL (ref 7–30)
CALCIUM SERPL-MCNC: 9 MG/DL (ref 8.5–10.1)
CHLORIDE SERPL-SCNC: 98 MMOL/L (ref 94–109)
CO2 SERPL-SCNC: 26 MMOL/L (ref 20–32)
CREAT SERPL-MCNC: 1.03 MG/DL (ref 0.66–1.25)
GFR SERPL CREATININE-BSD FRML MDRD: 69 ML/MIN/1.7M2
GLUCOSE SERPL-MCNC: 101 MG/DL (ref 70–99)
NT-PROBNP SERPL-MCNC: 192 PG/ML (ref 0–450)
POTASSIUM SERPL-SCNC: 3.7 MMOL/L (ref 3.4–5.3)
PROT SERPL-MCNC: 8.1 G/DL (ref 6.8–8.8)
SODIUM SERPL-SCNC: 135 MMOL/L (ref 133–144)

## 2018-10-03 ASSESSMENT — ASTHMA QUESTIONNAIRES: ACT_TOTALSCORE: 21

## 2018-10-03 NOTE — TELEPHONE ENCOUNTER
What type of form? Medication/Treatment form  What day did you drop off your forms? 10/03/2018  Is there a due date? Tomorrow 10/04/55073   (7-10 business days to compete forms)   How would you like to receive these forms?Please call Joe Ballesteros when completed  What is the best number to contact you? [phone]  What time works best to contact you with in 4 hrs?anytime  Is it okay to leave a message? Yes    Amrik Robbins (Auto signs name of person logged into Epic)

## 2018-10-04 NOTE — TELEPHONE ENCOUNTER
Called, spoke to Willa, notified her of this writer's note below.  [place],  For [facility]

## 2018-10-04 NOTE — TELEPHONE ENCOUNTER
Patient's form will be deliver to the  this afternoon for pickup after 1pm.  [place],  For [facility]    Please call patient  to let them know the above info.  And remind the person who is picking up to bring their photo ID.  [place],  For [facility]     NOTE: If patient/gaurdian calls the clinic before the care teams gets a chance to call them, please let pt know the above information regarding pickup times, remind them to bring a photo ID and close the encounter.  [place],  For [facility]    FYI:  Anything completed after 2:00p will not be delivered until the next business day after 11a.   [place],  for Team's Comfort and Heart.

## 2018-10-04 NOTE — TELEPHONE ENCOUNTER
Form is received from the  and forward to Dr. Awad to address.  [place],  For [facility]

## 2018-10-05 ENCOUNTER — DOCUMENTATION ONLY (OUTPATIENT)
Dept: CARE COORDINATION | Facility: CLINIC | Age: 83
End: 2018-10-05

## 2018-10-05 NOTE — PROGRESS NOTES
[facility] utilizes an encounter to take the place of a direct phone call to your office. Please take a moment to review the below request. Please reply or route message to author of this encounter.  Message will act as a verbal OK of orders requested below. Thank you.    ORDER/Physical Therapy 1x1 then 2x3 for there ex/gait and transfer training and HEP.

## 2018-10-18 DIAGNOSIS — Z53.9 DIAGNOSIS NOT YET DEFINED: Primary | ICD-10-CM

## 2018-10-18 PROCEDURE — G0180 MD CERTIFICATION HHA PATIENT: HCPCS | Performed by: FAMILY MEDICINE

## 2018-10-26 ENCOUNTER — TELEPHONE (OUTPATIENT)
Dept: FAMILY MEDICINE | Facility: CLINIC | Age: 83
End: 2018-10-26

## 2018-10-26 NOTE — TELEPHONE ENCOUNTER
RN CC received a direct phone call from patient's home care nurse Kate [phone] (RN CC is not currently working with this patient).    Kate states patient has on his medication list ranitidine 150mg 2 times, however, patient does not take this regularly and would like changed to prn.    Please advise.    Melissa Behl BSN, RN, [facility] Care Coordinator  [phone]

## 2018-10-29 NOTE — TELEPHONE ENCOUNTER
RN CC informed Kate of Dr. Awad's message below who provided verbal read back.    Melissa Behl BSN, RN, N  [facility] Care Coordinator  [phone]

## 2018-11-13 ENCOUNTER — ALLIED HEALTH/NURSE VISIT (OUTPATIENT)
Dept: CARDIOLOGY | Facility: CLINIC | Age: 83
End: 2018-11-13
Payer: COMMERCIAL

## 2018-11-13 DIAGNOSIS — I44.2 ATRIOVENTRICULAR BLOCK, COMPLETE (H): ICD-10-CM

## 2018-11-13 NOTE — MR AVS SNAPSHOT
MRN:[id number]                      After Visit Summary   2018    Gallito Farley    MRN: [id number]           Visit Information        Provider Department      2018 2:15 PM 1, Uc Cv Device; LANGUAGE [facility]        Your next 10 appointments already scheduled     2018 11:25 AM CST   [facility]    [address] [place] [address] [phone]           Bring a current list of meds and any records pertaining to this visit. For Physicals, please bring immunization records and any forms needing to be filled out. Please arrive 10 minutes early to complete paperwork.              Care Instructions    It was a pleasure to see you in clinic today.  Please do not hesitate to call with any questions or concerns.  You are scheduled for a remote transmission on 19.  We look forward to seeing you in clinic at your next device check in 6 months.    Delfina Casarez, RN, MS, CCRN  Electrophysiology Nurse Clinician  [facility]    During Business Hours Please Call:  [phone]  After Hours Please Call:  [phone] - select option #4 and ask for job code [phone]                       Fyusion Information     Fyusion is an electronic gateway that provides easy, online access to your medical records. With Fyusion, you can request a clinic appointment, read your test results, renew a prescription or communicate with your care team.     To sign up for [place] visit the website at [email or web address]   You will be asked to enter the access code listed below, as well as some personal information. Please follow the directions to create your username and password.     Your access code is: [email or web address]  Expires: 2019  6:31 AM     Your access code will  in 90 days. If you need help or a new code, please contact your [facility]  [place] [facility] or call [phone] for assistance.        Care EveryWhere ID     This is your Care EveryWhere ID. This could be used by other organizations to access your [place] medical records  [id number]        Equal Access to Services     MILAGRO GOODSON : Juan Nieves, wakirsten costello, qavalarie kaalmaasher herzog, ricki alves. So [facility] [phone].    ATENCIÓN: Si habla español, tiene a john disposición servicios gratuitos de asistencia lingüística. Llame al [phone].    We comply with applicable federal civil rights laws and [place] laws. We do not discriminate on the basis of race, color, national origin, age, disability, sex, sexual orientation, or gender identity.

## 2018-11-13 NOTE — MR AVS SNAPSHOT
After Visit Summary   11/13/2018    Gallito Farley    MRN: [id number]           Patient Information     Date Of Birth          1/1/1934        Visit Information        Provider Department      11/13/2018 2:15 PM 1, Uc Cv Device; LANGUAGE [facility]        Today's Diagnoses     Atrioventricular block, complete (H)          Care Instructions    It was a pleasure to see you in clinic today.  Please do not hesitate to call with any questions or concerns.  You are scheduled for a remote transmission on 2/18/19.  We look forward to seeing you in clinic at your next device check in 6 months.    Delfina Casarez, RN, MS, CCRN  Electrophysiology Nurse Clinician  [facility]    During Business Hours Please Call:  [phone]  After Hours Please Call:  [phone] - select option #4 and ask for job code [phone]                        Follow-ups after your visit        Additional Services     Follow-Up with Device Clinic-6 months                 Your next 10 appointments already scheduled     Nov 14, 2018 11:25 AM CST   Office Visit with Hakeem Awad MD   [facility]    [address] [place] [address] [phone]           Bring a current list of meds and any records pertaining to this visit. For Physicals, please bring immunization records and any forms needing to be filled out. Please arrive 10 minutes early to complete paperwork.            May 07, 2019  9:00 AM CDT   (Arrive by 8:45 AM)   Pacemaker Check with Uc Cv Device 1   [facility])    [address] [place] [address]  3rd Floor  [address]               May 07, 2019  9:30 AM CDT   (Arrive by 9:15 AM)   RETURN ARRHYTHMIA with Lisandro Garcia MD   [facility])    [address] [place] [address]  [address] [place] [address] [phone]              Future tests  that were ordered for you today     Open Future Orders        Priority Expected Expires Ordered    Follow-Up with Device Clinic-6 months Routine 2019 8/10/2019 2018            Who to contact     Please call your clinic at No information on file. to:    Ask questions about your health    Make or cancel appointments    Discuss your medicines    Learn about your test results    Speak to your doctor            Additional Information About Your Visit        MyChart Information     MindQuiltt is an electronic gateway that provides easy, online access to your medical records. With [employer], you can request a clinic appointment, read your test results, renew a prescription or communicate with your care team.     To sign up for [employer] visit the website at [email or web address]   You will be asked to enter the access code listed below, as well as some personal information. Please follow the directions to create your username and password.     Your access code is: [email or web address]  Expires: 2019  6:31 AM     Your access code will  in 90 days. If you need help or a new code, please contact your [facility] or call [phone] for assistance.        Care EveryWhere ID     This is your Care EveryWhere ID. This could be used by other organizations to access your [facility] medical records  [id number]         Blood Pressure from Last 3 Encounters:   10/02/18 101/68   18 92/63   08/15/18 112/76    Weight from Last 3 Encounters:   18 78.9 kg (174 lb)   18 83.7 kg (184 lb 8 oz)   18 82.1 kg (181 lb)              We Performed the Following     Follow-Up with Device Clinic-6 months     PM DEVICE PROGRAMMING EVAL, DUAL LEAD PACER        Primary Care Provider Office Phone # Fax #    Hakeem Awad [phone]       [address]  [facility] [address]        Equal Access to Services     MILAGRO GOODSON : rena Patel  krista costellosurjit collinricki pires. So [facility] [phone].    ATENCIÓN: Si ayden randolph, tiene a john disposición servicios gratuitos de asistencia lingüística. Quoc al [phone].    We comply with applicable federal civil rights laws and [place] laws. We do not discriminate on the basis of race, color, national origin, age, disability, sex, sexual orientation, or gender identity.            Thank you!     Thank you for choosing [facility]  for your care. Our goal is always to provide you with excellent care. Hearing back from our patients is one way we can continue to improve our services. Please take a few minutes to complete the written survey that you may receive in the mail after your visit with us. Thank you!             Your Updated Medication List - Protect others around you: Learn how to safely use, store and throw away your medicines at www.disposemymeds.org.          This list is accurate as of 11/13/18  2:59 PM.  Always use your most recent med list.                   Brand Name Dispense Instructions for use Diagnosis    acetaminophen 325 MG tablet    TYLENOL    100 tablet    Take 2 tablets (650 mg) by mouth every 4 hours as needed for mild pain    Acute post-operative pain       albuterol (2.5 MG/3ML) 0.083% neb solution     360 mL    Take 1 vial (2.5 mg) by nebulization every 6 hours as needed    COPD exacerbation (H)       amLODIPine 10 MG tablet    NORVASC    90 tablet    Take 1 tablet (10 mg) by mouth daily    Essential hypertension       diclofenac sodium 3 % Gel     100 g    Apply 4 gm four times a day as needed to right trapezius muscle.    Cervical myofascial pain syndrome       enalapril 10 MG tablet    VASOTEC    90 tablet    Take 1 tablet (10 mg) by mouth daily    Essential hypertension       finasteride 5 MG tablet    PROSCAR    90 tablet    TAKE ONE TABLET BY MOUTH AT BEDTIME    Benign nodular prostatic hyperplasia without lower  urinary tract symptoms       fluticasone-vilanterol 200-25 MCG/INH inhaler    BREO ELLIPTA    1 Inhaler    Inhale 1 puff into the lungs daily    Moderate persistent asthma without complication       hydrochlorothiazide 12.5 MG capsule    MICROZIDE    180 capsule    Take 2 capsules (25 mg) by mouth daily    Essential hypertension       mirabegron 50 MG 24 hr tablet    MYRBETRIQ    30 tablet    Take 1 tablet (50 mg) by mouth daily    OAB (overactive bladder)       order for DME     5 each    Equipment being ordered: Dispense Coban wrap.    Chronic cutaneous venous stasis ulcer (H)       order for DME     1 each    Equipment being ordered: Nebulizer machine and adult tubing    Pneumonia of right lower lobe due to infectious organism (H)       phenazopyridine 100 MG tablet    PYRIDIUM    6 tablet    Take 1 tablet (100 mg) by mouth 3 times daily as needed for urinary tract discomfort    Dysuria       polyethylene glycol powder    MIRALAX    510 g    Take 17 g (1 capful) by mouth daily    Constipation, unspecified constipation type       ranitidine 150 MG tablet    ZANTAC    180 tablet    Take 1 tablet (150 mg) by mouth 2 times daily    Gastroesophageal reflux disease, esophagitis presence not specified       SPIRIVA HANDIHALER 18 MCG capsule   Generic drug:  tiotropium     30 capsule    INHALE CONTENTS OF ONE CAPSULE DAILY USING HANDIHALER DEVICE    COPD exacerbation (H)       tamsulosin 0.4 MG capsule    FLOMAX    180 capsule    Take 2 capsules (0.8 mg) by mouth At Bedtime    Benign nodular prostatic hyperplasia without lower urinary tract symptoms       tolterodine 4 MG 24 hr capsule    DETROL LA    30 capsule    Take 1 capsule (4 mg) by mouth daily    Bladder spasms       trolamine salicylate 10 % cream    ASPERCREME    85 g    Apply topically as needed for moderate pain    Cervical myofascial pain syndrome

## 2018-11-13 NOTE — PROGRESS NOTES
Preliminary Device Interrogation Results.  Final physician signed paceart report to be scanned and attached.    Patient seen in clinic for evaluation and iterative programming of his Medtronic dual lead pacemaker per MD orders.  Normal pacemaker function.  3 AT/AF episodes recorded - 12 seconds in duration.  Intrinsic rhythm = NSR with  @ 30 bpm.  AP = 16.6%.   = 99.3%.  Estimated battery longevity to BE = 7.5 years.  Patient reports that he is feeling well and denies specific complaints.  Plan for patient to send a remote transmission in 3 months and return to clinic in 6 months.   present for the appointment.    Dual lead pacemaker

## 2018-11-13 NOTE — PATIENT INSTRUCTIONS
It was a pleasure to see you in clinic today.  Please do not hesitate to call with any questions or concerns.  You are scheduled for a remote transmission on 2/18/19.  We look forward to seeing you in clinic at your next device check in 6 months.    Delfina Casarez, RN, MS, CCRN  Electrophysiology Nurse Clinician  [facility]    During Business Hours Please Call:  [phone]  After Hours Please Call:  [phone] - select option #4 and ask for job code [phone]

## 2018-11-14 ENCOUNTER — OFFICE VISIT (OUTPATIENT)
Dept: FAMILY MEDICINE | Facility: CLINIC | Age: 83
End: 2018-11-14
Payer: COMMERCIAL

## 2018-11-14 ENCOUNTER — RADIANT APPOINTMENT (OUTPATIENT)
Dept: GENERAL RADIOLOGY | Facility: CLINIC | Age: 83
End: 2018-11-14
Attending: INTERNAL MEDICINE
Payer: COMMERCIAL

## 2018-11-14 VITALS
BODY MASS INDEX: 23.33 KG/M2 | OXYGEN SATURATION: 95 % | HEART RATE: 87 BPM | TEMPERATURE: 98.5 F | HEIGHT: 73 IN | SYSTOLIC BLOOD PRESSURE: 90 MMHG | WEIGHT: 176 LBS | RESPIRATION RATE: 14 BRPM | DIASTOLIC BLOOD PRESSURE: 59 MMHG

## 2018-11-14 DIAGNOSIS — M54.12 RADICULOPATHY, CERVICAL: Primary | ICD-10-CM

## 2018-11-14 DIAGNOSIS — J44.9 CHRONIC OBSTRUCTIVE PULMONARY DISEASE, UNSPECIFIED COPD TYPE (H): ICD-10-CM

## 2018-11-14 DIAGNOSIS — N32.81 OVERACTIVE BLADDER: ICD-10-CM

## 2018-11-14 PROCEDURE — 99214 OFFICE O/P EST MOD 30 MIN: CPT | Performed by: INTERNAL MEDICINE

## 2018-11-14 PROCEDURE — 72040 X-RAY EXAM NECK SPINE 2-3 VW: CPT | Mod: FY

## 2018-11-14 RX ORDER — DARIFENACIN 15 MG/1
15 TABLET, EXTENDED RELEASE ORAL DAILY
Qty: 30 TABLET | Refills: 11 | Status: SHIPPED | OUTPATIENT
Start: 2018-11-14 | End: 2019-05-23

## 2018-11-14 RX ORDER — GUAIFENESIN 600 MG/1
600 TABLET, EXTENDED RELEASE ORAL 2 TIMES DAILY
Qty: 60 TABLET | Refills: 5 | Status: SHIPPED | OUTPATIENT
Start: 2018-11-14 | End: 2019-04-05

## 2018-11-14 NOTE — PROGRESS NOTES
SUBJECTIVE:   Gallito Farley is a 84 year old male who presents to clinic today for the following health issues:      Concern - hand and feet pain  Onset: fir a while    Description:   Tingle, numbenes and weakness    Intensity: moderate    Progression of Symptoms:  same    Accompanying Signs & Symptoms:  painful    Previous history of similar problem:   yes    Precipitating factors:   Worsened by: laying and keep he up    Alleviating factors:  Improved by: none  Therapies Tried and outcome: pain medication        Problem list and histories reviewed & adjusted, as indicated.  Additional history: as documented    Patient Active Problem List   Diagnosis     Chronic constipation     HTN (hypertension)     Benign prostatic hyperplasia     Insomnia     GERD (gastroesophageal reflux disease)     Atrioventricular block, complete (H)     Advanced directives, counseling/discussion     Cardiac pacemaker in situ- Medtronic, dual chamber- NOT dependent     Health Care Home     Chronic obstructive pulmonary disease, unspecified COPD type (H)     Past Surgical History:   Procedure Laterality Date     CHOLECYSTECTOMY       ENT SURGERY       IRRIGATION AND DEBRIDEMENT HIP, COMBINED  4/18/2013    Procedure: COMBINED IRRIGATION AND DEBRIDEMENT HIP;  Irrigation and debridement of Right Hip with cultures;  Surgeon: Cristal Inman MD;  Location: [facility] OR     Partial pneumonectomy      done in [facility] in the 1990's     skin grafting - leg wound  6/2016       Social History   Substance Use Topics     Smoking status: Former Smoker     Smokeless tobacco: Never Used     Alcohol use No     Family History   Problem Relation Age of Onset     Family History Negative Mother          No Known Allergies  Recent Labs   Lab Test  10/02/18   1709  04/04/18   1238   01/11/17   1734   07/24/16   0628  07/23/16   0530   06/13/16   1612   04/24/13   0643  04/23/13   0751   A1C   --    --    --    --    --    --   5.7   --    --    --    --   6.0    LDL   --    --    --    --    --    --    --    --    --    --   93   --    HDL   --    --    --    --    --    --    --    --    --    --   20*   --    TRIG   --    --    --    --    --    --    --    --    --    --   148   --    ALT  25  22   --   14   --    --   15   < >  57   < >   --    --    CR  1.03  1.06   < >  0.96   < >   --   0.88   < >  0.90   < >  1.11  1.12   GFRESTIMATED  69  67   < >  75   < >   --   83   < >  81   < >  64  63   GFRESTBLACK  83  80   < >  >90   GFR Calc     < >   --   >90   GFR Calc     < >  >90   GFR Calc     < >  77  77   POTASSIUM  3.7  3.3*   < >  5.1   < >   --   4.1   < >  3.8   < >  3.7  3.7   TSH   --    --    --    --    --   1.04   --    --   0.20*   < >   --    --     < > = values in this interval not displayed.      BP Readings from Last 3 Encounters:   11/14/18 90/59   10/02/18 101/68   09/12/18 92/63    Wt Readings from Last 3 Encounters:   11/14/18 176 lb (79.8 kg)   09/12/18 174 lb (78.9 kg)   04/19/18 184 lb 8 oz (83.7 kg)                  Labs reviewed in EPIC    Reviewed and updated as needed this visit by clinical staff  Tobacco       Reviewed and updated as needed this visit by Provider         ROS:  CONSTITUTIONAL: NEGATIVE for fever, chills, change in weight  INTEGUMENTARY/SKIN: NEGATIVE for worrisome rashes, moles or lesions  EYES: NEGATIVE for vision changes or irritation  ENT/MOUTH: NEGATIVE for ear, mouth and throat problems  RESP: NEGATIVE for significant cough or SOB  CV: NEGATIVE for chest pain, palpitations or peripheral edema  GI: NEGATIVE for nausea, abdominal pain, heartburn, or change in bowel habits  : NEGATIVE for frequency, dysuria, or hematuria  MUSCULOSKELETAL: NEGATIVE for significant arthralgias or myalgia  NEURO: NEGATIVE for weakness, dizziness or paresthesias  ENDOCRINE: NEGATIVE for temperature intolerance, skin/hair changes  HEME: NEGATIVE for bleeding problems  PSYCHIATRIC: NEGATIVE  "for changes in mood or affect    OBJECTIVE:     BP 90/59  Pulse 87  Temp 98.5  F (36.9  C)  Resp 14  Ht 6' 1\" (1.854 m)  Wt 176 lb (79.8 kg)  SpO2 95%  BMI 23.22 kg/m2  Body mass index is 23.22 kg/(m^2).  GENERAL: healthy, alert and no distress  EYES: Eyes grossly normal to inspection, PERRL and conjunctivae and sclerae normal  HENT: ear canals and TM's normal, nose and mouth without ulcers or lesions  NECK: no adenopathy, no asymmetry, masses, or scars and thyroid normal to palpation  RESP: lungs clear to auscultation - no rales, rhonchi or wheezes  CV: regular rate and rhythm, normal S1 S2, no S3 or S4, no murmur, click or rub, no peripheral edema and peripheral pulses strong  ABDOMEN: soft, nontender, no hepatosplenomegaly, no masses and bowel sounds normal  MS: no gross musculoskeletal defects noted, no edema  SKIN: no suspicious lesions or rashes  NEURO: Normal strength and tone, mentation intact and speech normal  PSYCH: mentation appears normal, affect normal/bright    Diagnostic Test Results:  Results for orders placed or performed in visit on 11/14/18   XR Cervical Spine 2/3 Views    Narrative    CERVICAL SPINE TWO TO THREE VIEWS  11/14/2018 1:11 PM     HISTORY:  Radiculopathy, cervical.    COMPARISON: None.      Impression    IMPRESSION: There is poor alignment of the cervical spine on the  frontal view which could be positional. There is also poor alignment  of the cervical spine on the lateral view with the vertebral bodies  being angled. Possibility of fracture or perched facet is not  excluded, and repeat imaging should be considered. There are severe  degenerative changes throughout the cervical spine which are  incompletely characterized.    KERRI BYERS MD       ASSESSMENT/PLAN:     1. Radiculopathy, cervical    - XR Cervical Spine 2/3 Views    2. Chronic obstructive pulmonary disease, unspecified COPD type (H)    - guaiFENesin (MUCINEX) 600 MG 12 hr tablet; Take 1 tablet (600 mg) by mouth 2 " times daily  Dispense: 60 tablet; Refill: 5    3. Overactive bladder    - darifenacin (ENABLEX) 15 MG 24 hr tablet; Take 1 tablet (15 mg) by mouth daily  Dispense: 30 tablet; Refill: 11      Follow-up visit if condition worsens.    Hakeem Awad MD  [facility]

## 2018-11-14 NOTE — MR AVS SNAPSHOT
After Visit Summary   11/14/2018    Gallito Farley    MRN: [id number]           Patient Information     Date Of Birth          1/1/1934        Visit Information        Provider Department      11/14/2018 11:25 AM Hakeem Awad MD; [facility]        Today's Diagnoses     Radiculopathy, cervical    -  1    Chronic obstructive pulmonary disease, unspecified COPD type (H)        Overactive bladder          Care Instructions    At [facility], we strive to deliver an exceptional experience to you, every time we see you.  If you receive a survey in the mail, please send us back your thoughts. We really do value your feedback.    Your care team:                            Family Medicine Internal Medicine   MD Hakeem Yuen MD Shantel Branch-Fleming, MD Katya Georgiev PA-C Megan Hill, APRNORMA Meyers MD Pediatrics   MERRILL Tse, MD Devi Cloud APRN CNP   MD Reyna Fuentes MD Deborah Mielke, MD Kim Thein, APRN [facility]      Clinic hours: Monday - Thursday 7 am-7 pm; Fridays 7 am-5 pm.   Urgent care: Monday - Friday 11 am-9 pm; Saturday and Sunday 9 am-5 pm.  Pharmacy : Monday -Thursday 8 am-8 pm; Friday 8 am-6 pm; Saturday and Sunday 9 am-5 pm.     Clinic: [phone]   Pharmacy: [phone]        Understanding Cervical Radiculopathy    Cervical radiculopathy is irritation or inflammation of a nerve root in the neck. It causes neck pain and other symptoms that may spread into the chest or down the arm. To understand this condition, it helps to understand the parts of the spine:    Vertebrae. These are bones that stack to form the spine. The cervical spine contains the 7 vertebrae in the neck.    Disks. These are soft pads of tissue between the vertebrae. They act as shock absorbers for the spine.    The spinal canal. This is a tunnel formed within the  stacked vertebrae. The spinal cord runs through this canal.    Nerves. These branch off the spinal cord. As they leave the spinal canal, nerves pass through openings between the vertebrae. The nerve root is the part of the nerve that is closest to the spinal cord.   With cervical radiculopathy, nerve roots in the neck become irritated. This leads to pain and symptoms that can travel to the nerves that go from the spinal cord down the arms and into the torso.  What causes cervical radiculopathy?  Aging, injury, poor posture, and other issues can lead to problems in the neck. These problems may then irritate nerve roots. These include:    Damage to a disk in the cervical spine. The damaged disk may then press on nearby nerve roots.    Degeneration from wear and tear, and aging. This can lead to narrowing (stenosis) of the openings between the vertebrae. The narrowed openings press on nerve roots as they leave the spinal canal.    An unstable spine. This is when a vertebra slips forward. It can then press on a nerve root.  There are other, less common causes of pressure on nerves in the neck. These include infection, cysts, and tumors.  Symptoms of cervical radiculopathy  These include:    Neck pain    Pain, numbness, tingling, or weakness that travels down the arm    Loss of neck movement    Muscle spasms  Treatment for cervical radiculopathy  In most cases, your healthcare provider will first try treatments that help relieve symptoms. These may include:    Prescription or over-the-counter pain medicines. These help relieve pain and swelling.    Cold packs. These help reduce pain.    Resting. This involves avoiding positions and activities that increase pain.    Neck brace (cervical collar). This can help relieve inflammation and pain.    Physical therapy, including exercises and stretches. This can help decrease pain and increase movement and function.    Shots of medicinesaround the nerve roots. This is done to help  relieve symptoms for a time.  In some cases, your healthcare provider may advise surgery to fix the underlying problem. This depends on the cause, the symptoms, and how long the pain has lasted.  Possible complications  Over time, an irritated and inflamed nerve may become damaged. This may lead to long-lasting (permanent) numbness or weakness. If symptoms change suddenly or get worse, be sure to let your healthcare provider know.     When to call your healthcare provider  Call your healthcare provider right away if you have any of these:    New pain or pain that gets worse    New or increasing weakness, numbness, or tingling in your arm or hand    Bowel or bladder changes   Date Last Reviewed: 3/10/2016    7193-5260 [employer]. [address] [place] [address], [place] [id number]. All rights reserved. This information is not intended as a substitute for professional medical care. Always follow your healthcare professional's instructions.        Pinched Nerve in the Neck  A pinched nerve in the neck (cervical radiculopathy) is caused when the nerve that goes from the spinal cord to the neck or arm is irritated or has pressure on it. This may be caused by a bulging spinal disk. A spinal disk is the cushion between each spinal bone. Or it may be caused by a narrowing of the spinal joint because of osteoarthritis and wear and tear from repeated injuries.  A pinched nerve can cause numbness, tingling, deep aching, or electrical shooting pain from the side of the neck all the way down to the fingers on one side.  A pinched nerve may start after a sudden turning or bending force (such as in a car accident) or after a simple awkward movement. In either case, muscle spasm is commonly present and adds to the pain.  Home care  Follow these guidelines when caring for yourself at home:    Rest and relax the muscles. Use a comfortable pillow that supports your head and keeps your spine in a natural (neutral) position. Your  head shouldn t be tilted forward or backward. A rolled-up towel may help for a custom fit. When standing or sitting, keep your neck in line with your body. Keep your head up and shoulders down. Stay away from activities that require you to move your neck a lot.    You can use heat and massage to help ease the pain. Take a hot shower or bath, or use a heating pad. You can also use a cold pack for relief. You can make a cold pack by wrapping a plastic bag of crushed or cubed ice in a thin towel. Try both heat and cold, and use the method that feels best. Do this for 20 minutes several times a day.    You may use acetaminophen or ibuprofen to control pain, unless another pain medicine was prescribed. If you have chronic liver or kidney disease, talk with your healthcare provider before using these medicines. Also talk with your provider if you ve had a stomach ulcer or gastrointestinal bleeding.    Reduce stress. Stress can make it longer for your pain to go away.    Do any exercises or stretches that were given to you as part of your discharge plan.    Wear a soft collar, if prescribed.    Physical therapy and massages are known to help.    You may need surgery for a more serious injury.  Follow-up care  Follow up with your healthcare provider, or as advised, if you don t start to get better after 1 week. You may need more tests. Tell your provider about any fever, chills, or weight loss.  If X-rays were taken, a radiologist may look at them. You will be told of any new findings that may affect your care.  When to seek medical advice  Call your healthcare provider right away if any of these occur:    Pain becomes worse even after taking prescribed pain medicine    Weakness in the arm or legs    Numbness in the arm gets worse    Trouble breathing or swallowing  Date Last Reviewed: 5/1/2017 2000-2018 [employer]. [address] [facility], [place] [address]. All rights reserved. This information is not  "intended as a substitute for professional medical care. Always follow your healthcare professional's instructions.                Follow-ups after your visit        Follow-up notes from your care team     Return in about 4 weeks (around 12/12/2018).      Your next 10 appointments already scheduled     May 07, 2019  9:00 AM CDT   (Arrive by 8:45 AM)   Pacemaker Check with Uc Cv Device 1   [facility] ([facility])    [address]  3[address] Floor  [address]               May 07, 2019  9:30 AM CDT   (Arrive by 9:15 AM)   RETURN ARRHYTHMIA with Lisandro Garcia MD   [facility] ([facility])    [address]  [address]  [facility] [address] [phone]              Future tests that were ordered for you today     Open Future Orders        Priority Expected Expires Ordered    Follow-Up with Device Clinic-6 months Routine 5/12/2019 8/10/2019 11/13/2018            Who to contact     If you have questions or need follow up information about today's clinic visit or your schedule please contact [facility] directly at [phone].  Normal or non-critical lab and imaging results will be communicated to you by MyChart, letter or phone within 4 business days after the clinic has received the results. If you do not hear from us within 7 days, please contact the clinic through [employer] or phone. If you have a critical or abnormal lab result, we will notify you by phone as soon as possible.  Submit refill requests through [employer] or call your pharmacy and they will forward the refill request to us. Please allow 3 business days for your refill to be completed.          Additional Information About Your Visit        [employer]em[employer] Information     [employer] lets you send messages to your doctor, view your test results, renew your prescriptions, schedule appointments and more. To sign up, go to [email or web address] . Click on \"Log in\" on " "the left side of the screen, which will take you to the Welcome page. Then click on \"Sign up Now\" on the right side of the page.     You will be asked to enter the access code listed below, as well as some personal information. Please follow the directions to create your username and password.     Your access code is: [email or web address]  Expires: 2019  6:31 AM     Your access code will  in 90 days. If you need help or a new code, please call your [facility] or [phone].        Care EveryWhere ID     This is your Care EveryWhere ID. This could be used by other organizations to access your [place] medical records  [id number]        Your Vitals Were     Pulse Temperature Respirations Height Pulse Oximetry BMI (Body Mass Index)    87 98.5  F (36.9  C) 14 1.854 m (6' 1\") 95% 23.22 kg/m2       Blood Pressure from Last 3 Encounters:   18 90/59   10/02/18 101/68   18 92/63    Weight from Last 3 Encounters:   18 79.8 kg (176 lb)   18 78.9 kg (174 lb)   18 83.7 kg (184 lb 8 oz)              We Performed the Following     XR Cervical Spine 2/3 Views          Today's Medication Changes          These changes are accurate as of 18 12:52 PM.  If you have any questions, ask your nurse or doctor.               Start taking these medicines.        Dose/Directions    darifenacin 15 MG 24 hr tablet   Commonly known as:  ENABLEX   Used for:  Overactive bladder   Started by:  Hakeem Awad MD        Dose:  15 mg   Take 1 tablet (15 mg) by mouth daily   Quantity:  30 tablet   Refills:  11       guaiFENesin 600 MG 12 hr tablet   Commonly known as:  MUCINEX   Used for:  Chronic obstructive pulmonary disease, unspecified COPD type (H)   Started by:  Hakeem Awad MD        Dose:  600 mg   Take 1 tablet (600 mg) by mouth 2 times daily   Quantity:  60 tablet   Refills:  5            Where to get your medicines      These medications were sent to [facility] " - [place], [place] - [address]  [address], [place] [address]     Phone:  [phone]     darifenacin 15 MG 24 hr tablet    guaiFENesin 600 MG 12 hr tablet                Primary Care Provider Office Phone # Fax #    Hakeem Hilario Awad [phone] [address]  [facility] [address]        Equal Access to Services     RAMONE GOODSON : Hadii aad ku hadasho Soomaali, waaxda luqadaha, qaybta kaalmada adeegyada, waxay idiin hayaan adeeg kharash la'aan . So [facility] [phone].    ATENCIÓN: Si habla español, tiene a john disposición servicios gratuitos de asistencia lingüística. [facility] [phone].    We comply with applicable federal civil rights laws and [place] laws. We do not discriminate on the basis of race, color, national origin, age, disability, sex, sexual orientation, or gender identity.            Thank you!     Thank you for choosing [facility]  for your care. Our goal is always to provide you with excellent care. Hearing back from our patients is one way we can continue to improve our services. Please take a few minutes to complete the written survey that you may receive in the mail after your visit with us. Thank you!             Your Updated Medication List - Protect others around you: Learn how to safely use, store and throw away your medicines at www.disposemymeds.org.          This list is accurate as of 11/14/18 12:52 PM.  Always use your most recent med list.                   Brand Name Dispense Instructions for use Diagnosis    acetaminophen 325 MG tablet    TYLENOL    100 tablet    Take 2 tablets (650 mg) by mouth every 4 hours as needed for mild pain    Acute post-operative pain       albuterol (2.5 MG/3ML) 0.083% neb solution     360 mL    Take 1 vial (2.5 mg) by nebulization every 6 hours as needed    COPD exacerbation (H)       amLODIPine 10 MG tablet    NORVASC    90 tablet    Take 1 tablet (10 mg) by  mouth daily    Essential hypertension       darifenacin 15 MG 24 hr tablet    ENABLEX    30 tablet    Take 1 tablet (15 mg) by mouth daily    Overactive bladder       diclofenac sodium 3 % Gel     100 g    Apply 4 gm four times a day as needed to right trapezius muscle.    Cervical myofascial pain syndrome       enalapril 10 MG tablet    VASOTEC    90 tablet    Take 1 tablet (10 mg) by mouth daily    Essential hypertension       finasteride 5 MG tablet    PROSCAR    90 tablet    TAKE ONE TABLET BY MOUTH AT BEDTIME    Benign nodular prostatic hyperplasia without lower urinary tract symptoms       fluticasone-vilanterol 200-25 MCG/INH inhaler    BREO ELLIPTA    1 Inhaler    Inhale 1 puff into the lungs daily    Moderate persistent asthma without complication       guaiFENesin 600 MG 12 hr tablet    MUCINEX    60 tablet    Take 1 tablet (600 mg) by mouth 2 times daily    Chronic obstructive pulmonary disease, unspecified COPD type (H)       hydrochlorothiazide 12.5 MG capsule    MICROZIDE    180 capsule    Take 2 capsules (25 mg) by mouth daily    Essential hypertension       mirabegron 50 MG 24 hr tablet    MYRBETRIQ    30 tablet    Take 1 tablet (50 mg) by mouth daily    OAB (overactive bladder)       order for DME     5 each    Equipment being ordered: Dispense Coban wrap.    Chronic cutaneous venous stasis ulcer (H)       order for DME     1 each    Equipment being ordered: Nebulizer machine and adult tubing    Pneumonia of right lower lobe due to infectious organism (H)       phenazopyridine 100 MG tablet    PYRIDIUM    6 tablet    Take 1 tablet (100 mg) by mouth 3 times daily as needed for urinary tract discomfort    Dysuria       polyethylene glycol powder    MIRALAX    510 g    Take 17 g (1 capful) by mouth daily    Constipation, unspecified constipation type       ranitidine 150 MG tablet    ZANTAC    180 tablet    Take 1 tablet (150 mg) by mouth 2 times daily    Gastroesophageal reflux disease, esophagitis  presence not specified       SPIRIVA HANDIHALER 18 MCG capsule   Generic drug:  tiotropium     30 capsule    INHALE CONTENTS OF ONE CAPSULE DAILY USING HANDIHALER DEVICE    COPD exacerbation (H)       tamsulosin 0.4 MG capsule    FLOMAX    180 capsule    Take 2 capsules (0.8 mg) by mouth At Bedtime    Benign nodular prostatic hyperplasia without lower urinary tract symptoms       tolterodine 4 MG 24 hr capsule    DETROL LA    30 capsule    Take 1 capsule (4 mg) by mouth daily    Bladder spasms       trolamine salicylate 10 % cream    ASPERCREME    85 g    Apply topically as needed for moderate pain    Cervical myofascial pain syndrome

## 2018-11-14 NOTE — PATIENT INSTRUCTIONS
At [facility], we strive to deliver an exceptional experience to you, every time we see you.  If you receive a survey in the mail, please send us back your thoughts. We really do value your feedback.    Your care team:                            Family Medicine Internal Medicine   MD Hakeem Yuen MD Shantel Branch-Fleming, MD Katya Georgiev PA-C Megan Hill, APRN CNP    Neftali Meyers, MD Pediatrics   Colton Hector, MERRILL Coon, MD Devi Cloud APRN CNP   MD Reyna Fuentes MD Deborah Mielke, MD Grazyna Rees, APRN CNP      Clinic hours: Monday - Thursday 7 am-7 pm; Fridays 7 am-5 pm.   Urgent care: Monday - Friday 11 am-9 pm; Saturday and Sunday 9 am-5 pm.  Pharmacy : Monday -Thursday 8 am-8 pm; Friday 8 am-6 pm; Saturday and Sunday 9 am-5 pm.     Clinic: [phone]   Pharmacy: [phone]        Understanding Cervical Radiculopathy    Cervical radiculopathy is irritation or inflammation of a nerve root in the neck. It causes neck pain and other symptoms that may spread into the chest or down the arm. To understand this condition, it helps to understand the parts of the spine:    Vertebrae. These are bones that stack to form the spine. The cervical spine contains the 7 vertebrae in the neck.    Disks. These are soft pads of tissue between the vertebrae. They act as shock absorbers for the spine.    The spinal canal. This is a tunnel formed within the stacked vertebrae. The spinal cord runs through this canal.    Nerves. These branch off the spinal cord. As they leave the spinal canal, nerves pass through openings between the vertebrae. The nerve root is the part of the nerve that is closest to the spinal cord.   With cervical radiculopathy, nerve roots in the neck become irritated. This leads to pain and symptoms that can travel to the nerves that go from the spinal cord down the arms and into the torso.  What causes cervical  radiculopathy?  Aging, injury, poor posture, and other issues can lead to problems in the neck. These problems may then irritate nerve roots. These include:    Damage to a disk in the cervical spine. The damaged disk may then press on nearby nerve roots.    Degeneration from wear and tear, and aging. This can lead to narrowing (stenosis) of the openings between the vertebrae. The narrowed openings press on nerve roots as they leave the spinal canal.    An unstable spine. This is when a vertebra slips forward. It can then press on a nerve root.  There are other, less common causes of pressure on nerves in the neck. These include infection, cysts, and tumors.  Symptoms of cervical radiculopathy  These include:    Neck pain    Pain, numbness, tingling, or weakness that travels down the arm    Loss of neck movement    Muscle spasms  Treatment for cervical radiculopathy  In most cases, your healthcare provider will first try treatments that help relieve symptoms. These may include:    Prescription or over-the-counter pain medicines. These help relieve pain and swelling.    Cold packs. These help reduce pain.    Resting. This involves avoiding positions and activities that increase pain.    Neck brace (cervical collar). This can help relieve inflammation and pain.    Physical therapy, including exercises and stretches. This can help decrease pain and increase movement and function.    Shots of medicinesaround the nerve roots. This is done to help relieve symptoms for a time.  In some cases, your healthcare provider may advise surgery to fix the underlying problem. This depends on the cause, the symptoms, and how long the pain has lasted.  Possible complications  Over time, an irritated and inflamed nerve may become damaged. This may lead to long-lasting (permanent) numbness or weakness. If symptoms change suddenly or get worse, be sure to let your healthcare provider know.     When to call your healthcare provider  Call  your healthcare provider right away if you have any of these:    New pain or pain that gets worse    New or increasing weakness, numbness, or tingling in your arm or hand    Bowel or bladder changes   Date Last Reviewed: 3/10/2016    9535-3841 [employer]. [address] [place] [address]. All rights reserved. This information is not intended as a substitute for professional medical care. Always follow your healthcare professional's instructions.        Pinched Nerve in the Neck  A pinched nerve in the neck (cervical radiculopathy) is caused when the nerve that goes from the spinal cord to the neck or arm is irritated or has pressure on it. This may be caused by a bulging spinal disk. A spinal disk is the cushion between each spinal bone. Or it may be caused by a narrowing of the spinal joint because of osteoarthritis and wear and tear from repeated injuries.  A pinched nerve can cause numbness, tingling, deep aching, or electrical shooting pain from the side of the neck all the way down to the fingers on one side.  A pinched nerve may start after a sudden turning or bending force (such as in a car accident) or after a simple awkward movement. In either case, muscle spasm is commonly present and adds to the pain.  Home care  Follow these guidelines when caring for yourself at home:    Rest and relax the muscles. Use a comfortable pillow that supports your head and keeps your spine in a natural (neutral) position. Your head shouldn t be tilted forward or backward. A rolled-up towel may help for a custom fit. When standing or sitting, keep your neck in line with your body. Keep your head up and shoulders down. Stay away from activities that require you to move your neck a lot.    You can use heat and massage to help ease the pain. Take a hot shower or bath, or use a heating pad. You can also use a cold pack for relief. You can make a cold pack by wrapping a plastic bag of crushed or cubed ice in  a thin towel. Try both heat and cold, and use the method that feels best. Do this for 20 minutes several times a day.    You may use acetaminophen or ibuprofen to control pain, unless another pain medicine was prescribed. If you have chronic liver or kidney disease, talk with your healthcare provider before using these medicines. Also talk with your provider if you ve had a stomach ulcer or gastrointestinal bleeding.    Reduce stress. Stress can make it longer for your pain to go away.    Do any exercises or stretches that were given to you as part of your discharge plan.    Wear a soft collar, if prescribed.    Physical therapy and massages are known to help.    You may need surgery for a more serious injury.  Follow-up care  Follow up with your healthcare provider, or as advised, if you don t start to get better after 1 week. You may need more tests. Tell your provider about any fever, chills, or weight loss.  If X-rays were taken, a radiologist may look at them. You will be told of any new findings that may affect your care.  When to seek medical advice  Call your healthcare provider right away if any of these occur:    Pain becomes worse even after taking prescribed pain medicine    Weakness in the arm or legs    Numbness in the arm gets worse    Trouble breathing or swallowing  Date Last Reviewed: 5/1/2017 2000-2018 [employer]. [address] [place] [address], [place] [address]. All rights reserved. This information is not intended as a substitute for professional medical care. Always follow your healthcare professional's instructions.

## 2018-11-26 ENCOUNTER — TELEPHONE (OUTPATIENT)
Dept: FAMILY MEDICINE | Facility: CLINIC | Age: 83
End: 2018-11-26

## 2018-11-26 DIAGNOSIS — M54.12 CERVICAL RADICULOPATHY: ICD-10-CM

## 2018-11-26 DIAGNOSIS — K59.04 CHRONIC IDIOPATHIC CONSTIPATION: Primary | ICD-10-CM

## 2018-11-26 RX ORDER — GABAPENTIN 100 MG/1
100 CAPSULE ORAL 3 TIMES DAILY
Qty: 90 CAPSULE | Refills: 5 | Status: SHIPPED | OUTPATIENT
Start: 2018-11-26 | End: 2019-05-03

## 2019-01-07 ENCOUNTER — PATIENT OUTREACH (OUTPATIENT)
Dept: GERIATRIC MEDICINE | Facility: CLINIC | Age: 84
End: 2019-01-07

## 2019-01-07 ENCOUNTER — TELEPHONE (OUTPATIENT)
Dept: INTERNAL MEDICINE | Facility: CLINIC | Age: 84
End: 2019-01-07

## 2019-01-07 DIAGNOSIS — K21.9 GASTROESOPHAGEAL REFLUX DISEASE, ESOPHAGITIS PRESENCE NOT SPECIFIED: ICD-10-CM

## 2019-01-07 DIAGNOSIS — M54.12 CERVICAL RADICULOPATHY: Primary | ICD-10-CM

## 2019-01-07 DIAGNOSIS — G89.18 ACUTE POST-OPERATIVE PAIN: ICD-10-CM

## 2019-01-07 NOTE — PROGRESS NOTES
[facility] Care Coordination Contact    Called member and AL staff, Joe, to schedule annual HRA home visit. HRA has been scheduled for 1/28/18 at 10:30 a.m. .   This is date between Care Coordinator and Joe schedule that would work.   Care Coordinator called [facility] to schedule .     Jorge David RN, PHN  [facility]

## 2019-01-08 RX ORDER — ACETAMINOPHEN 325 MG/1
650 TABLET ORAL 3 TIMES DAILY PRN
Qty: 180 TABLET | Refills: 5 | Status: SHIPPED | OUTPATIENT
Start: 2019-01-08 | End: 2023-08-23

## 2019-01-24 ENCOUNTER — TELEPHONE (OUTPATIENT)
Dept: INTERNAL MEDICINE | Facility: CLINIC | Age: 84
End: 2019-01-24

## 2019-01-24 NOTE — TELEPHONE ENCOUNTER
Huddled with Dr. Awad patient should be taking medication 2 times as scheduled. Called informed Oneyda Cardenas MA

## 2019-01-24 NOTE — TELEPHONE ENCOUNTER
Reason for Call:  Other prescription    Detailed comments: Needs clarification on directions for Zantac. Originally taking 2 times a day as scheduled, 11/2018 direction was changed 2 times a day as needed. New prescription sent to pharmacy 04/08 with directions of take 2 times a day as scheduled. Christy is just looking for clarification on the directions.  Please call to advise.  Thank you     Phone Number Patient can be reached at: [phone]    Best Time: Any    Can we leave a detailed message on this number? Yes    Call taken on 1/24/2019 at 12:59 PM by Jojo Guzman

## 2019-01-28 ENCOUNTER — PATIENT OUTREACH (OUTPATIENT)
Dept: GERIATRIC MEDICINE | Facility: CLINIC | Age: 84
End: 2019-01-28

## 2019-01-28 DIAGNOSIS — Z76.89 HEALTH CARE HOME: Chronic | ICD-10-CM

## 2019-01-28 ASSESSMENT — ACTIVITIES OF DAILY LIVING (ADL)
DEPENDENT_IADLS:: CLEANING;COOKING;LAUNDRY;SHOPPING;MEAL PREPARATION;MEDICATION MANAGEMENT;MONEY MANAGEMENT;TRANSPORTATION;INCONTINENCE

## 2019-01-30 NOTE — PROGRESS NOTES
[facility] Care Coordination Contact    [facility] Home Visit Assessment     Home visit for Health Risk Assessment with Gallito Farley completed on January 30, 2019    Type of residence:: Assisted living  Current living arrangement:: I live in assisted living     Assessment completed with:: Patient, Caregiver - Joe,  from [facility]    Current Care Plan  Member currently receiving the following home care services:     Member currently receiving the following community resources: Other (see comment)(Customized Living)      Medication Review  Medication reconciliation completed in Epic: Yes  Medication set-up & administration: RN set up weekly.  Assisting Living staff administers medications.  Medication Risk Assessment Medication (1 or more, place referral to MTM): N/A: No risk factors identified  MTM Referral Placed: No: No risk factors idenified    Mental/Behavioral Health   Depression Screening: See PHQ assessment flowsheet.          Mental Health Diagnosis: No  Mental Health Services: None: No further intervention needed at this time.    Falls Assessment:   Fallen 2 or more times in the past year?: No   Any fall with injury in the past year?: No    ADL/IADL Dependencies:   Dependent ADLs:: Ambulation-walker, Bathing, Dressing, Grooming, Incontinence, Positioning, Transfers, Toileting  Dependent IADLs:: Cleaning, Cooking, Laundry, Shopping, Meal Preparation, Medication Management, Money Management, Transportation, Incontinence    [facility] sponsored benefits: Shared information re: Silver Sneakers/gym memberships, ASA, Calcium +D.    PCA Assessment completed at visit: Not applicable     Elderly Waiver Eligibility: Yes-will continue on EW    Care Plan & Recommendations: Customized Living tool, monthly incontinence supplies.     See LTCC for detailed assessment information.    Follow-Up Plan: Member informed of future contact, plan to f/u with member with a 6 month telephone  assessment.  Contact information shared with member and family, encouraged member to call with any questions or concerns at any time.    [place] care continuum providers: Please refer to Health Care Home on the Epic Problem List to view this patient's [facility] Care Plan Summary.    Jorge David RN, PHN  [facility]

## 2019-02-04 ENCOUNTER — TELEPHONE (OUTPATIENT)
Dept: INTERNAL MEDICINE | Facility: CLINIC | Age: 84
End: 2019-02-04

## 2019-02-04 ENCOUNTER — PATIENT OUTREACH (OUTPATIENT)
Dept: GERIATRIC MEDICINE | Facility: CLINIC | Age: 84
End: 2019-02-04

## 2019-02-04 DIAGNOSIS — K59.04 CHRONIC IDIOPATHIC CONSTIPATION: Primary | ICD-10-CM

## 2019-02-04 NOTE — LETTER
February 4, 2019      GALLITO ALBRIGHT  [address] [place] [address]      Dear Gallito:    At [facility], we are dedicated to improving your health and well-being. Enclosed is the Comprehensive Care Plan that we developed with you on 1/28/19. Please review the Care Plan carefully.    As a reminder, some of the things we discussed at your visit include:    Your physical and mental health    Ways to reduce falls    Health care needs you may have    Don t forget to contact your care coordinator if you:    Have been hospitalized or plan to be hospitalized     Have had a fall     Have experienced a change in physical health    Are experiencing emotional problems     If you do not agree with your Care Plan, have questions about it, or have experienced a change in your needs, please call me at [phone]. If you are hearing impaired, please call the [place] Relay at 713 or [phone] (cstqzx-tn-plzzsa relay service).    Sincerely,    Jorge David RN, PHN    E-mail: [email or web address]  Phone: [phone]      [facility]) is a health plan that contracts with both Medicare and the [place] Medical Assistance (Medicaid) program to provide benefits of both programs to enrollees. Enrollment in [facility] depends on contract renewal.    [id number])     [id number] (11/18)

## 2019-02-04 NOTE — PROGRESS NOTES
[facility] Care Coordination Contact    Received after visit chart from care coordinator.  Completed following tasks: Mailed copy of care plan to client, Updated services in access, Submitted referrals/auths for [facility] and Entered MMIS  Chart was returned to CC.    and Mailed copy of CL tool to member, faxed copy to AL facility, uploaded into [employer] and submitted authorization to health plan.      Sri Pennington  Case Management Specialist   [facility]   [phone]

## 2019-02-05 RX ORDER — POLYETHYLENE GLYCOL 3350 17 G/17G
1 POWDER, FOR SOLUTION ORAL DAILY
Qty: 30 PACKET | Refills: 11 | Status: SHIPPED | OUTPATIENT
Start: 2019-02-05 | End: 2020-03-03

## 2019-02-05 NOTE — TELEPHONE ENCOUNTER
Kate Home care nurse from [facility] is recommending the use of Miralax for constipation x 2 days. Home care Nurse does not have any other concerns or symptoms to report on patient.      If miralax prescription is appropriate can a prescription please be sent to [facility] pharmacy where his other prescriptions are sent and home care nurse would like to know dosing instructions.       Home care nurse states ok to leave message her voicemail is confidential.      Kate  home care Nurse : [phone]          Kari Martines RN

## 2019-02-05 NOTE — TELEPHONE ENCOUNTER
This writer attempted to contact AG Love on 02/05/19      Reason for call Rx ok and sent to [facility] and left detailed message.      If patient calls back:   Relay message (read verbatim), document that pt called and close encounter        Cynthia Monahan MA

## 2019-02-05 NOTE — TELEPHONE ENCOUNTER
Notify Kate, patient's home care nurse, that I agree to Miralax use and Rx was sent to [facility].

## 2019-02-06 ENCOUNTER — TELEPHONE (OUTPATIENT)
Dept: INTERNAL MEDICINE | Facility: CLINIC | Age: 84
End: 2019-02-06

## 2019-02-06 DIAGNOSIS — N40.0 BENIGN NODULAR PROSTATIC HYPERPLASIA WITHOUT LOWER URINARY TRACT SYMPTOMS: ICD-10-CM

## 2019-02-06 DIAGNOSIS — N39.46 MIXED INCONTINENCE URGE AND STRESS (MALE)(FEMALE): Primary | ICD-10-CM

## 2019-02-06 DIAGNOSIS — I10 ESSENTIAL HYPERTENSION: ICD-10-CM

## 2019-02-06 RX ORDER — TOLTERODINE 4 MG/1
4 CAPSULE, EXTENDED RELEASE ORAL DAILY
Qty: 30 CAPSULE | Refills: 11 | Status: SHIPPED | OUTPATIENT
Start: 2019-02-06 | End: 2019-05-23

## 2019-02-06 NOTE — TELEPHONE ENCOUNTER
Plan does not cover darifenacin (ENABLEX) 15 MG 24 hr tablet.  Please call [employer] to initiate Prior Auth or change med.    Insurance type and ID number: Key [id number]      Additional Information:     Gabriella Lane

## 2019-02-06 NOTE — TELEPHONE ENCOUNTER
"Requested Prescriptions   Pending Prescriptions Disp Refills     hydrochlorothiazide (MICROZIDE) 12.5 MG capsule [Pharmacy Med Name: HYDROCHLOROT 12.5MG CAP] 56 capsule       Last Written Prescription Date:  04/04/18  Last Fill Quantity: 180,  # refills: 3   Last Office Visit with [facility], [facility] or [facility] prescribing provider:  11/14/18-Ritesh   Future Office Visit:    Next 5 appointments (look out 90 days)    Feb 12, 2019  4:20 PM CST  Office Visit with Hakeem Awad MD  [facility] [address] [place] [id number] [phone]        Sig: TAKE 2 CAPSULES (25MG) BY MOUTH DAILY    Diuretics (Including Combos) Protocol Passed - 2/6/2019  9:54 AM       Passed - Blood pressure under 140/90 in past 12 months    BP Readings from Last 3 Encounters:   11/14/18 90/59   10/02/18 101/68   09/12/18 92/63                Passed - Recent (12 mo) or future (30 days) visit within the authorizing provider's specialty    Patient had office visit in the last 12 months or has a visit in the next 30 days with authorizing provider or within the authorizing provider's specialty.  See \"Patient Info\" tab in inbasket, or \"Choose Columns\" in Meds & Orders section of the refill encounter.             Passed - Medication is active on med list       Passed - Patient is age 18 or older       Passed - Normal serum creatinine on file in past 12 months    Recent Labs   Lab Test 10/02/18  1709   CR 1.03             Passed - Normal serum potassium on file in past 12 months    Recent Labs   Lab Test 10/02/18  1709   POTASSIUM 3.7                   Passed - Normal serum sodium on file in past 12 months    Recent Labs   Lab Test 10/02/18  1709                 amLODIPine (NORVASC) 10 MG tablet [Pharmacy Med Name: AMLODIPINE 10MG TAB] 28 tablet       Last Written Prescription Date:  04/04/18  Last Fill Quantity: 90,  # refills: 3   Last Office Visit with [facility], [facility] or [facility] " "prescribing provider:  11/14/18-Ritesh   Future Office Visit:    Next 5 appointments (look out 90 days)    Feb 12, 2019  4:20 PM CST  Office Visit with Hakeem [facility]ad MD  [facility] [address] [place] [id number]  [phone]          Sig: TAKE 1 TABLET BY MOUTH DAILY    Calcium Channel Blockers Protocol  Passed - 2/6/2019  9:54 AM       Passed - Blood pressure under 140/90 in past 12 months    BP Readings from Last 3 Encounters:   11/14/18 90/59   10/02/18 101/68   09/12/18 92/63                Passed - Recent (12 mo) or future (30 days) visit within the authorizing provider's specialty    Patient had office visit in the last 12 months or has a visit in the next 30 days with authorizing provider or within the authorizing provider's specialty.  See \"Patient Info\" tab in inbasket, or \"Choose Columns\" in Meds & Orders section of the refill encounter.             Passed - Medication is active on med list       Passed - Patient is age 18 or older       Passed - Normal serum creatinine on file in past 12 months    Recent Labs   Lab Test 10/02/18  1709  08/31/17  1428   CR 1.03   < >  --    CREAT  --   --  1.0    < > = values in this interval not displayed.             finasteride (PROSCAR) 5 MG tablet [Pharmacy Med Name: FINASTERIDE 5MG TAB] 28 tablet       Last Written Prescription Date:  04/04/18  Last Fill Quantity: 90,  # refills: 3   Last Office Visit with G, P or  Health prescribing provider:  11/14/18-Ritesh   Future Office Visit:    Next 5 appointments (look out 90 days)    Feb 12, 2019  4:20 PM CST  Office Visit with Hakeem [facility]ad MD  [facility]) [address] [address]  [phone]        Sig: TAKE 1 TABLET BY MOUTH EVERY NIGHT AT BEDTIME    Alpha Blockers Passed - 2/6/2019  9:54 AM       Passed - Blood pressure under 140/90 in past 12 " "months    BP Readings from Last 3 Encounters:   11/14/18 90/59   10/02/18 101/68   09/12/18 92/63                Passed - Recent (12 mo) or future (30 days) visit within the authorizing provider's specialty    Patient had office visit in the last 12 months or has a visit in the next 30 days with authorizing provider or within the authorizing provider's specialty.  See \"Patient Info\" tab in inbasket, or \"Choose Columns\" in Meds & Orders section of the refill encounter.             Passed - Patient does not have Tadalafil, Vardenafil, or Sildenafil on their medication list       Passed - Medication is active on med list       Passed - Patient is 18 years of age or older        enalapril (VASOTEC) 10 MG tablet [Pharmacy Med Name: ENALAPRIL 10MG TAB] 28 tablet       Last Written Prescription Date:  04/04/18  Last Fill Quantity: 90,  # refills: 3   Last [facility] Visit with [facility] or [facility] prescribing provider:  11/14/18-Ritesh   Future Office Visit:    Next 5 appointments (look out 90 days)    Feb 12, 2019  4:20 PM CST  [facility] [address] [place] [address] [phone]        Sig: TAKE 1 TABLET BY MOUTH DAILY    ACE Inhibitors (Including Combos) Protocol Passed - 2/6/2019  9:54 AM       Passed - Blood pressure under 140/90 in past 12 months    BP Readings from Last 3 Encounters:   11/14/18 90/59   10/02/18 101/68   09/12/18 92/63                Passed - Recent (12 mo) or future (30 days) visit within the authorizing provider's specialty    Patient had office visit in the last 12 months or has a visit in the next 30 days with authorizing provider or within the authorizing provider's specialty.  See \"Patient Info\" tab in inbasket, or \"Choose Columns\" in Meds & Orders section of the refill encounter.             Passed - Medication is active on med list       Passed - Patient is age 18 or older       " "Passed - Normal serum creatinine on file in past 12 months    Recent Labs   Lab Test 10/02/18  1709  08/31/17  1428   CR 1.03   < >  --    CREAT  --   --  1.0    < > = values in this interval not displayed.            Passed - Normal serum potassium on file in past 12 months    Recent Labs   Lab Test 10/02/18  1709   POTASSIUM 3.7             tamsulosin (FLOMAX) 0.4 MG capsule [Pharmacy Med Name: TAMSULOSIN 0.4MG CAP] 56 capsule       Last Written Prescription Date:  04/04/18  Last Fill Quantity: 180,  # refills: 3   Last Office Visit with [facility], [facility] or [facility] prescribing provider:  11/14/18-Ritesh   Future Office Visit:    Next 5 appointments (look out 90 days)    Feb 12, 2019  4:20 PM CST  Office Visit with Hakeem [facility]ad MD  [facility] [address] [place] [id number] [phone]        Sig: TAKE 2 CAPSULES (0.8MG) BY MOUTH EVERY NIGHT AT BEDTIME    Alpha Blockers Passed - 2/6/2019  9:54 AM       Passed - Blood pressure under 140/90 in past 12 months    BP Readings from Last 3 Encounters:   11/14/18 90/59   10/02/18 101/68   09/12/18 92/63                Passed - Recent (12 mo) or future (30 days) visit within the authorizing provider's specialty    Patient had office visit in the last 12 months or has a visit in the next 30 days with authorizing provider or within the authorizing provider's specialty.  See \"Patient Info\" tab in inbasket, or \"Choose Columns\" in Meds & Orders section of the refill encounter.             Passed - Patient does not have Tadalafil, Vardenafil, or Sildenafil on their medication list       Passed - Medication is active on med list       Passed - Patient is 18 years of age or older          "

## 2019-02-08 RX ORDER — FINASTERIDE 5 MG/1
TABLET, FILM COATED ORAL
Qty: 28 TABLET | OUTPATIENT
Start: 2019-02-08

## 2019-02-08 RX ORDER — ENALAPRIL MALEATE 10 MG/1
TABLET ORAL
Qty: 28 TABLET | OUTPATIENT
Start: 2019-02-08

## 2019-02-08 RX ORDER — AMLODIPINE BESYLATE 10 MG/1
TABLET ORAL
Qty: 28 TABLET | OUTPATIENT
Start: 2019-02-08

## 2019-02-08 RX ORDER — TAMSULOSIN HYDROCHLORIDE 0.4 MG/1
CAPSULE ORAL
Qty: 56 CAPSULE | OUTPATIENT
Start: 2019-02-08

## 2019-02-08 RX ORDER — HYDROCHLOROTHIAZIDE 12.5 MG/1
CAPSULE ORAL
Qty: 56 CAPSULE | OUTPATIENT
Start: 2019-02-08

## 2019-02-08 NOTE — TELEPHONE ENCOUNTER
Medication denied patient has requested medication to soon-Hydrochlorothiazide, AMLODIPINE 10MG TAB, FINASTERIDE 5MG TAB, ENALAPRIL 10MG TAB, TAMSULOSIN 0.4MG KARINA Martines RN

## 2019-03-19 ENCOUNTER — TELEPHONE (OUTPATIENT)
Dept: UROLOGY | Facility: CLINIC | Age: 84
End: 2019-03-19

## 2019-03-19 NOTE — TELEPHONE ENCOUNTER
Attempted to reach Stefany at [facility], but no answer. Left message for Stefany stating that we have not received the fax and requested for the information to be faxed to [phone]. Informed Stefany to return call with any questions or concerns.    Jessica Odom RN, BSN

## 2019-03-19 NOTE — TELEPHONE ENCOUNTER
[facility] Call Center    Phone Message    May a detailed message be left on voicemail: yes    Reason for Call: Other: [facility] patients incontinence supplies. Fax was sent on 03/02/19 to [phone] and following up on the status of request. Please advise.    Action Taken: Message routed to:  Adult Clinics: Urology p [phone]

## 2019-03-21 NOTE — TELEPHONE ENCOUNTER
No call back or forms received in clinic. Will close encounter at this time.    Jessica Odom RN, BSN

## 2019-04-05 DIAGNOSIS — J44.9 CHRONIC OBSTRUCTIVE PULMONARY DISEASE, UNSPECIFIED COPD TYPE (H): ICD-10-CM

## 2019-04-05 DIAGNOSIS — I10 ESSENTIAL HYPERTENSION: ICD-10-CM

## 2019-04-05 DIAGNOSIS — J45.40 MODERATE PERSISTENT ASTHMA WITHOUT COMPLICATION: ICD-10-CM

## 2019-04-05 DIAGNOSIS — N40.0 BENIGN NODULAR PROSTATIC HYPERPLASIA WITHOUT LOWER URINARY TRACT SYMPTOMS: ICD-10-CM

## 2019-04-09 RX ORDER — AMLODIPINE BESYLATE 10 MG/1
TABLET ORAL
Qty: 28 TABLET | Refills: 0 | Status: SHIPPED | OUTPATIENT
Start: 2019-04-09 | End: 2019-04-30

## 2019-04-09 RX ORDER — FINASTERIDE 5 MG/1
TABLET, FILM COATED ORAL
Qty: 28 TABLET | Refills: 0 | Status: SHIPPED | OUTPATIENT
Start: 2019-04-09 | End: 2019-04-30

## 2019-04-09 RX ORDER — HYDROCHLOROTHIAZIDE 12.5 MG/1
CAPSULE ORAL
Qty: 56 CAPSULE | Refills: 0 | Status: SHIPPED | OUTPATIENT
Start: 2019-04-09 | End: 2019-04-30

## 2019-04-09 RX ORDER — ENALAPRIL MALEATE 10 MG/1
TABLET ORAL
Qty: 28 TABLET | Refills: 0 | Status: SHIPPED | OUTPATIENT
Start: 2019-04-09 | End: 2019-04-30

## 2019-04-09 RX ORDER — TAMSULOSIN HYDROCHLORIDE 0.4 MG/1
CAPSULE ORAL
Qty: 56 CAPSULE | Refills: 0 | Status: SHIPPED | OUTPATIENT
Start: 2019-04-09 | End: 2019-04-30

## 2019-04-09 NOTE — TELEPHONE ENCOUNTER
Please schedule patient. Roxanne refill given - Tamsulosin, Vasotec, Proscar, Amlodipine, hydrochlorothiazide, and Breo Ellipta - pharmacy requesting 28 pills due to packaging.   Needs appointment for (chronic medical conditions or for annual physical and ACT score) for further refills.     Amber Santiago RN

## 2019-04-09 NOTE — TELEPHONE ENCOUNTER
Requested Prescriptions   Pending Prescriptions Disp Refills     MUCINEX 600 MG 12 hr tablet [Pharmacy Med Name: MUCINEX 600MG ER TAB]          Last Written Prescription Date:  11/14/18  Last Fill Quantity: 60,   # refills: 5  Last Office Visit: 11/14/18-Vocal  Future Office visit:       Routing refill request to provider for review/approval because:  Drug not on the G, P or [facility] refill protocol or controlled substance 56 tablet      Sig: TAKE 1 TABLET BY MOUTH TWICE A DAY    There is no refill protocol information for this order          
rx refilled.    Neftali Meyers MD    
Former smoker

## 2019-04-11 ENCOUNTER — PATIENT OUTREACH (OUTPATIENT)
Dept: GERIATRIC MEDICINE | Facility: CLINIC | Age: 84
End: 2019-04-11

## 2019-04-11 DIAGNOSIS — Z76.89 HEALTH CARE HOME: Chronic | ICD-10-CM

## 2019-04-11 NOTE — PROGRESS NOTES
Care Coordinator received call from Noelle at [facility] stating that gloves are no longer covered w/ dx  - they received new rx from PCP.    Care Coordinator will cover under EW. Noelle to send quote.      Received quote - will update CPS and send to CMS for authorization.     Jorge David RN, PHN  [facility]

## 2019-04-23 ENCOUNTER — TELEPHONE (OUTPATIENT)
Dept: FAMILY MEDICINE | Facility: CLINIC | Age: 84
End: 2019-04-23

## 2019-04-23 NOTE — TELEPHONE ENCOUNTER
.Reason for Call:  Form, our goal is to have forms completed with 72 hours, however, some forms may require a visit or additional information.    Type of letter, form or note:  medical    Who is the form from?: Patient    Where did the form come from: Patient or family brought in       What clinic location was the form placed at?: [place]    Where the form was placed: [facility]    What number is listed as a contact on the form?: [phone]       Additional comments:     Call taken on 4/23/2019 at 12:27 PM by Hilaria Cerrato

## 2019-04-24 NOTE — TELEPHONE ENCOUNTER
Form is received and will forward to Dr. Meyers to help address for Dr. Awad.  [place],  For [facility]

## 2019-04-25 ENCOUNTER — TELEPHONE (OUTPATIENT)
Dept: FAMILY MEDICINE | Facility: CLINIC | Age: 84
End: 2019-04-25

## 2019-04-25 NOTE — TELEPHONE ENCOUNTER
Reason for Call:  Other     Detailed comments: Needs call back has pacemaker questions.    Phone Number can be reached at: Kate ROCK - [facility] [phone]    Best Time: any    Can we leave a detailed message on this number? YES    Call taken on 4/25/2019 at 10:26 AM by Mary Miner

## 2019-04-25 NOTE — TELEPHONE ENCOUNTER
Called and spoke with Kate ROCK.     She is asking for contact information for [facility]. Gave phone number and address to facility. Per chart review, patient is scheduled for a cardiac device check and arrhythmia follow-up with Cardiology on 5/7/19 and relayed appointment information. She states understanding.     Jaleesa Guidry RN

## 2019-04-26 NOTE — TELEPHONE ENCOUNTER
Bringing form to the  by 5:00 pm today, 4/26/19. Copy to TC and abstracting. Called and spoke to Joe and explained form .  Bianka Her MA/  For Teams [facility] and Roxanne

## 2019-04-30 DIAGNOSIS — N40.0 BENIGN NODULAR PROSTATIC HYPERPLASIA WITHOUT LOWER URINARY TRACT SYMPTOMS: ICD-10-CM

## 2019-04-30 DIAGNOSIS — J45.40 MODERATE PERSISTENT ASTHMA WITHOUT COMPLICATION: ICD-10-CM

## 2019-04-30 DIAGNOSIS — I10 ESSENTIAL HYPERTENSION: ICD-10-CM

## 2019-04-30 NOTE — TELEPHONE ENCOUNTER
"Requested Prescriptions   Pending Prescriptions Disp Refills     enalapril (VASOTEC) 10 MG tablet [Pharmacy Med Name: ENALAPRIL 10MG TAB] 28 tablet 0     Sig: TAKE 1 TABLET BY MOUTH DAILY      Last Written Prescription Date:  4/9/19  Last Fill Quantity: 28,  # refills: 0   Last Office Visit with [facility] or [facility] prescribing provider:  11/14/18   Future Office Visit:            ACE Inhibitors (Including Combos) Protocol Passed - 4/30/2019  3:20 PM        Passed - Blood pressure under 140/90 in past 12 months     BP Readings from Last 3 Encounters:   11/14/18 90/59   10/02/18 101/68   09/12/18 92/63                 Passed - Recent (12 mo) or future (30 days) visit within the authorizing provider's specialty     Patient had office visit in the last 12 months or has a visit in the next 30 days with authorizing provider or within the authorizing provider's specialty.  See \"Patient Info\" tab in inbasket, or \"Choose Columns\" in Meds & Orders section of the refill encounter.              Passed - Medication is active on med list        Passed - Patient is age 18 or older        Passed - Normal serum creatinine on file in past 12 months     Recent Labs   Lab Test 10/02/18  1709  08/31/17  1428   CR 1.03   < >  --    CREAT  --   --  1.0    < > = values in this interval not displayed.             Passed - Normal serum potassium on file in past 12 months     Recent Labs   Lab Test 10/02/18  1709   POTASSIUM 3.7             tamsulosin (FLOMAX) 0.4 MG capsule [Pharmacy Med Name: TAMSULOSIN 0.4MG CAP]      Last Written Prescription Date:  4/9/19  Last Fill Quantity: 56,  # refills: 0   Last Office Visit with [facility] or [facility] prescribing provider:  11/14/18   Future Office Visit:      56 capsule 0     Sig: TAKE 2 CAPSULES (0.8MG) BY MOUTH EVERY NIGHT AT BEDTIME       Alpha Blockers Passed - 4/30/2019  3:20 PM        Passed - Blood pressure under 140/90 in past 12 months     BP Readings from Last 3 Encounters:   11/14/18 " "90/59   10/02/18 101/68   09/12/18 92/63                 Passed - Recent (12 mo) or future (30 days) visit within the authorizing provider's specialty     Patient had office visit in the last 12 months or has a visit in the next 30 days with authorizing provider or within the authorizing provider's specialty.  See \"Patient Info\" tab in inbasket, or \"Choose Columns\" in Meds & Orders section of the refill encounter.              Passed - Patient does not have Tadalafil, Vardenafil, or Sildenafil on their medication list        Passed - Medication is active on med list        Passed - Patient is 18 years of age or older        finasteride (PROSCAR) 5 MG tablet [Pharmacy Med Name: FINASTERIDE 5MG TAB]      Last Written Prescription Date:  4/9/19  Last Fill Quantity: 28,  # refills: 0   Last Office Visit with [facility], [facility] or [facility] prescribing provider:  11/14/18   Future Office Visit:      28 tablet 0     Sig: TAKE 1 TABLET BY MOUTH EVERY NIGHT AT BEDTIME       Alpha Blockers Passed - 4/30/2019  3:20 PM        Passed - Blood pressure under 140/90 in past 12 months     BP Readings from Last 3 Encounters:   11/14/18 90/59   10/02/18 101/68   09/12/18 92/63                 Passed - Recent (12 mo) or future (30 days) visit within the authorizing provider's specialty     Patient had office visit in the last 12 months or has a visit in the next 30 days with authorizing provider or within the authorizing provider's specialty.  See \"Patient Info\" tab in inbasket, or \"Choose Columns\" in Meds & Orders section of the refill encounter.              Passed - Patient does not have Tadalafil, Vardenafil, or Sildenafil on their medication list        Passed - Medication is active on med list        Passed - Patient is 18 years of age or older        amLODIPine (NORVASC) 10 MG tablet [Pharmacy Med Name: AMLODIPINE 10MG TAB]        Last Written Prescription Date:  4/9/19  Last Fill Quantity: 28,  # refills: 0   Last Office Visit with [facility], " "[facility] or [facility] prescribing provider:  11/14/18   Future Office Visit:      28 tablet 0     Sig: TAKE 1 TABLET BY MOUTH DAILY       Calcium Channel Blockers Protocol  Passed - 4/30/2019  3:20 PM        Passed - Blood pressure under 140/90 in past 12 months     BP Readings from Last 3 Encounters:   11/14/18 90/59   10/02/18 101/68   09/12/18 92/63                 Passed - Recent (12 mo) or future (30 days) visit within the authorizing provider's specialty     Patient had office visit in the last 12 months or has a visit in the next 30 days with authorizing provider or within the authorizing provider's specialty.  See \"Patient Info\" tab in [employer]ube [employer], or \"Choose Columns\" in Meds & Orders section of the refill encounter.              Passed - Medication is active on med list        Passed - Patient is age 18 or older        Passed - Normal serum creatinine on file in past 12 months     Recent Labs   Lab Test 10/02/18  1709  08/31/17  1428   CR 1.03   < >  --    CREAT  --   --  1.0    < > = values in this interval not displayed.             hydrochlorothiazide (MICROZIDE) 12.5 MG capsule [Pharmacy Med Name: HYDROCHLOROT 12.5MG CAP]      Last Written Prescription Date:  4/9/19  Last Fill Quantity: 56,  # refills: 0   [facility] Office Visit with [facility] or  [facility] prescribing provider:  11/14/18   Future Office Visit:      56 capsule 0     Sig: TAKE 2 CAPSULES (25MG) BY MOUTH DAILY       Diuretics (Including Combos) Protocol Passed - 4/30/2019  3:20 PM        Passed - Blood pressure under 140/90 in past 12 months     BP Readings from Last 3 Encounters:   11/14/18 90/59   10/02/18 101/68   09/12/18 92/63                 Passed - Recent (12 mo) or future (30 days) visit within the authorizing provider's specialty     Patient had office visit in the last 12 months or has a visit in the next 30 days with authorizing provider or within the authorizing provider's specialty.  See \"Patient Info\" tab in [employer], or \"Choose Columns\" in " "Meds & Orders section of the refill encounter.              Passed - Medication is active on med list        Passed - Patient is age 18 or older        Passed - Normal serum creatinine on file in past 12 months     Recent Labs   Lab Test 10/02/18  1709   CR 1.03              Passed - Normal serum potassium on file in past 12 months     Recent Labs   Lab Test 10/02/18  1709   POTASSIUM 3.7                    Passed - Normal serum sodium on file in past 12 months     Recent Labs   Lab Test 10/02/18  1709                 BREO ELLIPTA 200-25 MCG/INH Inhaler [Pharmacy Med Name: BREO ELLIPTA 200-25 INH]      Last Written Prescription Date:  4/9/19  Last Fill Quantity: 1,  # refills: 0   Last Office Visit with [facility], [facility] or [facility] prescribing provider:  11/14/18   Future Office Visit:            Sig: INHALE 1 PUFF BY MOUTH DAILY       Inhaled Steroids Protocol Failed - 4/30/2019  3:20 PM        Failed - Asthma control assessment score within normal limits in last 6 months     Please review ACT score.           Passed - Patient is age 12 or older        Passed - Medication is active on med list        Passed - Recent (6 mo) or future (30 days) visit within the authorizing provider's specialty     Patient had office visit in the last 6 months or has a visit in the next 30 days with authorizing provider or within the authorizing provider's specialty.  See \"Patient Info\" tab in inbasket, or \"Choose Columns\" in Meds & Orders section of the refill encounter.                  Maynor Faarax  [facility]  "

## 2019-05-02 RX ORDER — ENALAPRIL MALEATE 10 MG/1
TABLET ORAL
Qty: 90 TABLET | Refills: 1 | Status: SHIPPED | OUTPATIENT
Start: 2019-05-02 | End: 2019-08-19

## 2019-05-02 RX ORDER — TAMSULOSIN HYDROCHLORIDE 0.4 MG/1
CAPSULE ORAL
Qty: 180 CAPSULE | Refills: 1 | Status: SHIPPED | OUTPATIENT
Start: 2019-05-02 | End: 2019-08-19

## 2019-05-02 RX ORDER — AMLODIPINE BESYLATE 10 MG/1
TABLET ORAL
Qty: 90 TABLET | Refills: 1 | Status: SHIPPED | OUTPATIENT
Start: 2019-05-02 | End: 2019-08-19

## 2019-05-02 RX ORDER — HYDROCHLOROTHIAZIDE 12.5 MG/1
CAPSULE ORAL
Qty: 180 CAPSULE | Refills: 1 | Status: SHIPPED | OUTPATIENT
Start: 2019-05-02 | End: 2019-08-19

## 2019-05-02 RX ORDER — FINASTERIDE 5 MG/1
TABLET, FILM COATED ORAL
Qty: 90 TABLET | Refills: 1 | Status: SHIPPED | OUTPATIENT
Start: 2019-05-02 | End: 2019-08-19

## 2019-05-02 NOTE — TELEPHONE ENCOUNTER
This writer attempted to contact [facility] on 05/02/19    Reason for call refill request-appointment due and left message to return call on home phone using Mauritian . Left a message for care taker for patient to have patient return call. She states patient is not in the house at this time, she will have patient's nurse call our office back.    When patient calls back, please contact 1st floor Sandra Stout. routine priority.        Serafin Davison

## 2019-05-02 NOTE — TELEPHONE ENCOUNTER
Team,     Could we try contacting patient to get him scheduled? He was never contacted when andrea was given.    Needs appointment for (chronic medical conditions or for annual physical and ACT score) for further refills.      Once scheduled, please route to provider as RNs have already given patient a andrea.     Amber Santiago RN

## 2019-05-02 NOTE — TELEPHONE ENCOUNTER
Kate  returned call    Best number to reach caller:  [phone]    Is it ok to leave a detailed message: YES   Appointment has been scheduled with Dr Meyers on 05/08

## 2019-05-07 ENCOUNTER — ANCILLARY PROCEDURE (OUTPATIENT)
Dept: CARDIOLOGY | Facility: CLINIC | Age: 84
End: 2019-05-07
Attending: INTERNAL MEDICINE
Payer: COMMERCIAL

## 2019-05-07 VITALS
BODY MASS INDEX: 24.69 KG/M2 | DIASTOLIC BLOOD PRESSURE: 74 MMHG | SYSTOLIC BLOOD PRESSURE: 108 MMHG | WEIGHT: 176.37 LBS | HEIGHT: 71 IN | HEART RATE: 94 BPM | OXYGEN SATURATION: 94 %

## 2019-05-07 DIAGNOSIS — R07.9 CHEST PAIN, UNSPECIFIED TYPE: ICD-10-CM

## 2019-05-07 DIAGNOSIS — I10 ESSENTIAL HYPERTENSION: ICD-10-CM

## 2019-05-07 DIAGNOSIS — I44.2 ATRIOVENTRICULAR BLOCK, COMPLETE (H): ICD-10-CM

## 2019-05-07 DIAGNOSIS — Z95.0 CARDIAC PACEMAKER IN SITU: Primary | ICD-10-CM

## 2019-05-07 DIAGNOSIS — I44.1 ATRIOVENTRICULAR BLOCK, SECOND DEGREE: ICD-10-CM

## 2019-05-07 LAB
MDC_IDC_LEAD_IMPLANT_DT: NORMAL
MDC_IDC_LEAD_IMPLANT_DT: NORMAL
MDC_IDC_LEAD_LOCATION: NORMAL
MDC_IDC_LEAD_LOCATION: NORMAL
MDC_IDC_LEAD_LOCATION_DETAIL_1: NORMAL
MDC_IDC_LEAD_LOCATION_DETAIL_1: NORMAL
MDC_IDC_LEAD_MFG: NORMAL
MDC_IDC_LEAD_MFG: NORMAL
MDC_IDC_LEAD_MODEL: NORMAL
MDC_IDC_LEAD_MODEL: NORMAL
MDC_IDC_LEAD_POLARITY_TYPE: NORMAL
MDC_IDC_LEAD_POLARITY_TYPE: NORMAL
MDC_IDC_LEAD_SERIAL: NORMAL
MDC_IDC_LEAD_SERIAL: NORMAL
MDC_IDC_MSMT_BATTERY_DTM: NORMAL
MDC_IDC_MSMT_BATTERY_REMAINING_LONGEVITY: 89 MO
MDC_IDC_MSMT_BATTERY_RRT_TRIGGER: 2.83
MDC_IDC_MSMT_BATTERY_STATUS: NORMAL
MDC_IDC_MSMT_BATTERY_VOLTAGE: 3.01 V
MDC_IDC_MSMT_LEADCHNL_RA_IMPEDANCE_VALUE: 418 OHM
MDC_IDC_MSMT_LEADCHNL_RA_IMPEDANCE_VALUE: 551 OHM
MDC_IDC_MSMT_LEADCHNL_RA_PACING_THRESHOLD_AMPLITUDE: 1 V
MDC_IDC_MSMT_LEADCHNL_RA_PACING_THRESHOLD_PULSEWIDTH: 0.4 MS
MDC_IDC_MSMT_LEADCHNL_RA_SENSING_INTR_AMPL: 2.38 MV
MDC_IDC_MSMT_LEADCHNL_RA_SENSING_INTR_AMPL: 2.5 MV
MDC_IDC_MSMT_LEADCHNL_RV_IMPEDANCE_VALUE: 627 OHM
MDC_IDC_MSMT_LEADCHNL_RV_IMPEDANCE_VALUE: 665 OHM
MDC_IDC_MSMT_LEADCHNL_RV_PACING_THRESHOLD_AMPLITUDE: 1 V
MDC_IDC_MSMT_LEADCHNL_RV_PACING_THRESHOLD_PULSEWIDTH: 0.4 MS
MDC_IDC_PG_IMPLANT_DTM: NORMAL
MDC_IDC_PG_MFG: NORMAL
MDC_IDC_PG_MODEL: NORMAL
MDC_IDC_PG_SERIAL: NORMAL
MDC_IDC_PG_TYPE: NORMAL
MDC_IDC_SESS_CLINIC_NAME: NORMAL
MDC_IDC_SESS_DTM: NORMAL
MDC_IDC_SESS_TYPE: NORMAL
MDC_IDC_SET_BRADY_AT_MODE_SWITCH_RATE: 171 {BEATS}/MIN
MDC_IDC_SET_BRADY_HYSTRATE: NORMAL
MDC_IDC_SET_BRADY_LOWRATE: 60 {BEATS}/MIN
MDC_IDC_SET_BRADY_MAX_SENSOR_RATE: 120 {BEATS}/MIN
MDC_IDC_SET_BRADY_MAX_TRACKING_RATE: 120 {BEATS}/MIN
MDC_IDC_SET_BRADY_MODE: NORMAL
MDC_IDC_SET_BRADY_PAV_DELAY_LOW: 180 MS
MDC_IDC_SET_BRADY_SAV_DELAY_LOW: 150 MS
MDC_IDC_SET_LEADCHNL_RA_PACING_AMPLITUDE: 2 V
MDC_IDC_SET_LEADCHNL_RA_PACING_ANODE_ELECTRODE_1: NORMAL
MDC_IDC_SET_LEADCHNL_RA_PACING_ANODE_LOCATION_1: NORMAL
MDC_IDC_SET_LEADCHNL_RA_PACING_CAPTURE_MODE: NORMAL
MDC_IDC_SET_LEADCHNL_RA_PACING_CATHODE_ELECTRODE_1: NORMAL
MDC_IDC_SET_LEADCHNL_RA_PACING_CATHODE_LOCATION_1: NORMAL
MDC_IDC_SET_LEADCHNL_RA_PACING_POLARITY: NORMAL
MDC_IDC_SET_LEADCHNL_RA_PACING_PULSEWIDTH: 0.4 MS
MDC_IDC_SET_LEADCHNL_RA_SENSING_ANODE_ELECTRODE_1: NORMAL
MDC_IDC_SET_LEADCHNL_RA_SENSING_ANODE_LOCATION_1: NORMAL
MDC_IDC_SET_LEADCHNL_RA_SENSING_CATHODE_ELECTRODE_1: NORMAL
MDC_IDC_SET_LEADCHNL_RA_SENSING_CATHODE_LOCATION_1: NORMAL
MDC_IDC_SET_LEADCHNL_RA_SENSING_POLARITY: NORMAL
MDC_IDC_SET_LEADCHNL_RA_SENSING_SENSITIVITY: 0.6 MV
MDC_IDC_SET_LEADCHNL_RV_PACING_AMPLITUDE: 2 V
MDC_IDC_SET_LEADCHNL_RV_PACING_ANODE_ELECTRODE_1: NORMAL
MDC_IDC_SET_LEADCHNL_RV_PACING_ANODE_LOCATION_1: NORMAL
MDC_IDC_SET_LEADCHNL_RV_PACING_CAPTURE_MODE: NORMAL
MDC_IDC_SET_LEADCHNL_RV_PACING_CATHODE_ELECTRODE_1: NORMAL
MDC_IDC_SET_LEADCHNL_RV_PACING_CATHODE_LOCATION_1: NORMAL
MDC_IDC_SET_LEADCHNL_RV_PACING_POLARITY: NORMAL
MDC_IDC_SET_LEADCHNL_RV_PACING_PULSEWIDTH: 0.4 MS
MDC_IDC_SET_LEADCHNL_RV_SENSING_ANODE_ELECTRODE_1: NORMAL
MDC_IDC_SET_LEADCHNL_RV_SENSING_ANODE_LOCATION_1: NORMAL
MDC_IDC_SET_LEADCHNL_RV_SENSING_CATHODE_ELECTRODE_1: NORMAL
MDC_IDC_SET_LEADCHNL_RV_SENSING_CATHODE_LOCATION_1: NORMAL
MDC_IDC_SET_LEADCHNL_RV_SENSING_POLARITY: NORMAL
MDC_IDC_SET_LEADCHNL_RV_SENSING_SENSITIVITY: 0.9 MV
MDC_IDC_SET_ZONE_DETECTION_INTERVAL: 350 MS
MDC_IDC_SET_ZONE_DETECTION_INTERVAL: 400 MS
MDC_IDC_SET_ZONE_TYPE: NORMAL
MDC_IDC_STAT_AT_BURDEN_PERCENT: 0 %
MDC_IDC_STAT_AT_DTM_END: NORMAL
MDC_IDC_STAT_AT_DTM_START: NORMAL
MDC_IDC_STAT_BRADY_AP_VP_PERCENT: 13.79 %
MDC_IDC_STAT_BRADY_AP_VS_PERCENT: 0 %
MDC_IDC_STAT_BRADY_AS_VP_PERCENT: 86.16 %
MDC_IDC_STAT_BRADY_AS_VS_PERCENT: 0.04 %
MDC_IDC_STAT_BRADY_DTM_END: NORMAL
MDC_IDC_STAT_BRADY_DTM_START: NORMAL
MDC_IDC_STAT_BRADY_RA_PERCENT_PACED: 13.78 %
MDC_IDC_STAT_BRADY_RV_PERCENT_PACED: 99.86 %
MDC_IDC_STAT_EPISODE_RECENT_COUNT: 0
MDC_IDC_STAT_EPISODE_RECENT_COUNT_DTM_END: NORMAL
MDC_IDC_STAT_EPISODE_RECENT_COUNT_DTM_START: NORMAL
MDC_IDC_STAT_EPISODE_TOTAL_COUNT: 0
MDC_IDC_STAT_EPISODE_TOTAL_COUNT: 9
MDC_IDC_STAT_EPISODE_TOTAL_COUNT_DTM_END: NORMAL
MDC_IDC_STAT_EPISODE_TOTAL_COUNT_DTM_START: NORMAL
MDC_IDC_STAT_EPISODE_TYPE: NORMAL

## 2019-05-07 PROCEDURE — 99214 OFFICE O/P EST MOD 30 MIN: CPT | Mod: GC | Performed by: INTERNAL MEDICINE

## 2019-05-07 PROCEDURE — 93010 ELECTROCARDIOGRAM REPORT: CPT | Mod: ZP | Performed by: INTERNAL MEDICINE

## 2019-05-07 PROCEDURE — 93005 ELECTROCARDIOGRAM TRACING: CPT | Mod: ZF

## 2019-05-07 PROCEDURE — G0463 HOSPITAL OUTPT CLINIC VISIT: HCPCS | Mod: 25,ZF

## 2019-05-07 ASSESSMENT — MIFFLIN-ST. JEOR: SCORE: 1505

## 2019-05-07 ASSESSMENT — PAIN SCALES - GENERAL: PAINLEVEL: NO PAIN (0)

## 2019-05-07 NOTE — NURSING NOTE
Chief Complaint   Patient presents with     Follow Up     1 year follow up after Yasir     Vitals were taken and medications were reconciled. EKG was performed    Vernell EWING  9:30 AM

## 2019-05-07 NOTE — LETTER
5/7/2019      RE: Gallito Farley  [address] [address]       Dear Colleague,    Thank you for the opportunity to participate in the care of your patient, Gallito Farley, at the [facility] HEART [facility]. Please see a copy of my visit note below.         Cardiac Electrophysiology Clinic       HPI:   Gallito Farley is a 85 year old Ivorian male with a history of complete heart block s/p dual chamber PPM implantation in 1/2017, hypertension, COPD, and prior TB right upper lobectomy who presents for follow-up. He was last seen in our clinic by Sarah Cooley on 4/19/18. History obtained with the assistance of a Ivorian .    Patient notes that there have been no significant changes in his health over the last year.  Endorses chronic shortness of breath and substernal chest discomfort that is always present.  He notes that he has had shortness of breath and chest discomfort for years.  The chest pain and shortness of breath can occur all the time and does not worsen with activity.  Denies palpitations and loss of consciousness.  Denies lower extremity edema.  At baseline, his normal activity is some ambulation around the house with a cane/walker.  Denies fevers, chills, abdominal pain, hematochezia, melena,.  Endorses having dysuria.  Notes having frequent constipation.      Past Medical History:   Diagnosis Date     Asthma      BPH (benign prostatic hyperplasia)      COPD (chronic obstructive pulmonary disease) (H)      Diastolic dysfunction, left ventricle 4/16/2013     H/O tuberculosis     s/p partial pneumonectomy in [place] in the 1990's     Hypertension      LBBB (left bundle branch block)      Leg wound, right     chronic, s/p grafting     Osteoarthritis        Past Surgical History:   Procedure Laterality Date     CHOLECYSTECTOMY       ENT SURGERY       IRRIGATION AND DEBRIDEMENT HIP, COMBINED  4/18/2013    Procedure: COMBINED  IRRIGATION AND DEBRIDEMENT HIP;  Irrigation and debridement of Right Hip with cultures;  Surgeon: Cristal Inman MD;  Location: [facility] OR     Partial pneumonectomy      done in [place] in the 1990's     skin grafting - leg wound  6/2016       Family History   Problem Relation Age of Onset     Family History Negative Mother        Social History     Tobacco Use     Smoking status: Former Smoker     Smokeless tobacco: Never Used   Substance Use Topics     Alcohol use: No         Current Outpatient Medications   Medication Sig     acetaminophen (TYLENOL) 325 MG tablet Take 2 tablets (650 mg) by mouth 3 times daily as needed for pain     albuterol (2.5 MG/3ML) 0.083% neb solution Take 1 vial (2.5 mg) by nebulization once for 1 dose     albuterol (2.5 MG/3ML) 0.083% neb solution Take 1 vial (2.5 mg) by nebulization every 6 hours as needed     amLODIPine (NORVASC) 10 MG tablet TAKE 1 TABLET BY MOUTH DAILY     BREO ELLIPTA 200-25 MCG/INH Inhaler INHALE 1 PUFF BY MOUTH DAILY     darifenacin (ENABLEX) 15 MG 24 hr tablet Take 1 tablet (15 mg) by mouth daily     diclofenac sodium 3 % GEL Apply 4 gm four times a day as needed to right trapezius muscle.     enalapril (VASOTEC) 10 MG tablet TAKE 1 TABLET BY MOUTH DAILY     finasteride (PROSCAR) 5 MG tablet TAKE 1 TABLET BY MOUTH EVERY NIGHT AT BEDTIME     hydrochlorothiazide (MICROZIDE) 12.5 MG capsule TAKE 2 CAPSULES (25MG) BY MOUTH DAILY     magnesium hydroxide (MILK OF MAGNESIA) 400 MG/5ML suspension Take 30 mLs by mouth daily as needed for constipation     mirabegron (MYRBETRIQ) 50 MG 24 hr tablet Take 1 tablet (50 mg) by mouth daily     MUCINEX 600 MG 12 hr tablet TAKE 1 TABLET BY MOUTH TWICE A DAY     order for DME Equipment being ordered: Nebulizer machine and adult tubing     order for DME Equipment being ordered: Dispense Coban wrap.     phenazopyridine (PYRIDIUM) 100 MG tablet Take 1 tablet (100 mg) by mouth 3 times daily as needed for urinary tract discomfort      "polyethylene glycol (MIRALAX) powder Take 17 g (1 capful) by mouth daily     polyethylene glycol (MIRALAX/GLYCOLAX) packet Take 17 g by mouth daily     ranitidine (ZANTAC) 150 MG tablet Take 1 tablet (150 mg) by mouth 2 times daily     SPIRIVA HANDIHALER 18 MCG capsule INHALE CONTENTS OF ONE CAPSULE DAILY USING HANDIHALER DEVICE     tamsulosin (FLOMAX) 0.4 MG capsule TAKE 2 CAPSULES (0.8MG) BY MOUTH EVERY NIGHT AT BEDTIME     tolterodine (DETROL LA) 4 MG 24 hr capsule Take 1 capsule (4 mg) by mouth daily     tolterodine ER (DETROL LA) 4 MG 24 hr capsule Take 1 capsule (4 mg) by mouth daily     trolamine salicylate (ASPERCREME) 10 % cream Apply topically as needed for moderate pain     gabapentin (NEURONTIN) 100 MG capsule TAKE 1 CAPSULE BY MOUTH THREE TIMES A DAY (Patient not taking: Reported on 5/7/2019)     No current facility-administered medications for this visit.      Facility-Administered Medications Ordered in Other Visits   Medication     iopamidol (ISOVUE-370) solution 111 mL         ROS:   10 point ROS negative other than what is discussed above    EXAM:   /74 (BP Location: Right arm, Patient Position: Chair, Cuff Size: Adult Regular)   Pulse 94   Ht 1.8 m (5' 10.87\")   Wt 80 kg (176 lb 5.9 oz)   SpO2 94%   BMI 24.69 kg/m      GENERAL: Alert, oriented, NAD  HEENT:  NC/AT.  Sclerae white.  MMM, no oral lesions  NECK: JVP ~5 cm  HEART: Distant heart sounds, RRR, normal S1 and S2, no murmurs   LUNGS:  Clear to auscultation bilaterally, no wheezes, rales, or rhonchi  ABDOMEN: Soft, nontender, nondistended, bowel sounds present, no ascites.  EXTREMITIES: Trace nonpitting bilateral lower extremity edema.  NEURO: Unsteady in feet      Electrocardiogram: A-sensed, V-paced    Device interrogation 5/7/2019:  Pt seen in the [facility] for evaluation and iterative programming of a Medtronic, dual lead pacemaker, per MD orders. He also has an appointment with Dr. Garcia. Today his intrinsic rhythm is Sinus 82 " with CHB- ventricular rate is <30 bpm. Normal pacemaker function. No arrhythmias have been recorded. No short v-v intervals recorded. Lead trends appear stable. AP= 14% and = 100%. Pt reports he is feeling well. Battery estimates 7 years to BE. Plan for pt to send a remote transmission in 3 months and RTC in 6 months. JAI Wright, RNDual lead pacemakerI have reviewed and interpreted the device interrogation, settings, programming and nurseâ  s summary. The device is functioning within normal device parameters. I agree with the current findings, assessment and plan.    Echocardiogram 1/12/17:  Interpretation Summary  Global and regional left ventricular function is normal with an EF of 55-60%.  Mild diffuse hypokinesis is present.  Right ventricular function, chamber size, wall motion, and thickness are  normal.  Pulmonary artery systolic pressure is normal.  Dilation of the inferior vena cava is present with normal respiratory  variation in diameter. Estimated mean right atrial pressure is 3-8 mmHg.  No pericardial effusion is present.      Assessment and Plan:   Gallito Farley is a 85 year old Malawian male with a history of complete heart block s/p dual chamber PPM implantation in 1/2017, hypertension, COPD, and prior TB right upper lobectomy who presents for follow-up.    Atypical chest discomfort  Endorses chronic shortness of breath and substernal chest discomfort that is always present. Does not sounds like typical angina. TTE in 2017 showed EF 55-60% and mild diffuse hypokinesis, normal RV. Will obtain noninvasive ischemic evaluation for risk stratification.  - Will obtain Lexiscan    CHB s/p dual chamber PPM in 2017  Has normal device function. CO=454%. BE 7 years. Continue regular device interrogations.    HTN  BP currently well controlled. Continue amlodipine 10 mg, enalapril 10 mg, and hydrochlorothiazide 25 mg.      RTC in 1 year to see Sarah Cooley or Dr. Huang.    Patient seen and discussed with  "Dr. Garcia.      Svitlana Fernandez MD, PhD  Cardiology Fellow  [phone]    ELECTROPHYSIOLOGY STAFF  Patient seen and examined by me.  History and physical examination discussed with Dr. Fernandez whose note reflects our joint assessment and recommendation/plans.  Gallito Farley is an 85-year old gentleman scheduled as a return patient although we have never seen each other before. He has seen Dr. Garsia and Dr. Huang on the inpatient Cardiology service. He was hospitalized January 2017 for complete heart block and hypertensive urgency. He had been off his usual medications for 6 months. He had a dual chamber PPM (MRI conditional) placed by Dr. Huang on 1/12/2017. His last check in November 2018 showed normal function with 3 episodes of AT/AF each < 1 minute in duration. Medications include amlodipine 10 mg daily, enalapril 10 mg daily, HCTZ 12.5 mg daily.  Other medical problems include COPD, prior TB with right upper lobectomy.  We are seeing him with his niece and an .  He gives a history of chronic chest pain and dyspnea that is \"always\" present and has been for years.  He denies palpitations, syncope, presyncope or peripheral edema.  Echo in 2017 showed normal LV systolic function, EF 55-60% with mild diffuse hypokinesis. ECG today shows sinus rhythm with ventricular pacing. Pacemaker evaluation showed normal function and no detected arrhythmias.  We will arrange a Lexiscan to evaluate his chest pain and to assess LV function with pacing.  He will continue standard follow-up in the device clinic.  Unless he has problems sooner or the Lexiscan indicates otherwise, we will arrange follow-up in one year with either Dr. Huang or Sarah Cooley (it is not clear why Mr. Farley was scheduled into our clinic, but I am retiring at the end of next month).  It was a pleasure meeting Gallito Farley today.  Lisandro SHEPHERD  Patient Care Team:  Hakeem Awad MD as PCP - General (Internal " Medicine)  Lenin Farley MD Schuster, Joseph, DPM (Podiatry)  Hakeem Awad MD as Assigned PCP  Jorge David, RN as Lead Care Coordinator  Lakesha Cheek as Other (see comments)  Radhika Rothman as Other (see comments)  Santosh Matthew MD as MD (Urology)  Kristal Delgado RN as Registered Nurse  HAFSA CARNEY

## 2019-05-07 NOTE — PATIENT INSTRUCTIONS
Patient Instructions:  It was a pleasure to see you in the cardiology clinic today.      If you have any questions, you can reach my nurse at [phone].  Press Option #1 for the [facility], and then press Option #3 for nursing.    We are encouraging the use of [email or web address] to communicate with your HealthCare Provider    Medication Changes: None.    Studies Ordered: Nuclear Medicine Lexiscan Stress Test.    Prep for david-nuc stress test: Report to the  [facility] Waiting Room at the [facility]. The hospital is located at [address] [place] [address] on the [place] of the [place].    This test can take up to 3 hours.    NPO 3 hours prior, Water is ok and encouraged  NO alcohol, smoking, caffeine, or decaf products 12 hours prior  TAKE ALL medications as prescribed, hold the following medications if they pertain to you:   If you take Aggrenox or dipyridamole (Persantine, Permole), stop taking it 48 hours before your test.   If you take Viagra, Cialis or Levitra, stop taking it 48 hours before your test.   If you take theophylline or aminophylline, stop taking it 12 hours before your test.   For patients with diabetes:If you take insulin, call your diabetes care team. Ask if you should take a 1/2 dose the morning of your test.   If you take diabetes medicine by mouth, don t take it on the morning of your test. Bring it with you to take after the test. (If you have questions, call your diabetes care team.)      Do not take nitrates on the day of your test. Do not wear your Nitro-Patch    If you have questions regarding your appointment date and time, please contact scheduling at [phone].  Any other questions regarding testing can be referred to Stefany.    The results from today include: EKG.    Please follow up: With Sarah Cooley NP in one year with a device check prior.    Sincerely,    Lisandro Garcia MD     If you have an urgent need after hours (8:00 am to 4:30 pm) please call [phone]  and ask for the cardiology fellow on call.

## 2019-05-07 NOTE — NURSING NOTE
Cardiac Testing: Patient given instructions regarding  nuclear pharmacologic thallium . Discussed purpose, preparation, procedure and when to expect results reported back to the patient. Patient demonstrated understanding of this information and agreed to call with further questions or concerns.  Med Reconcile: Reviewed and verified all current medications with the patient. The updated medication list was printed and given to the patient.  Return Appointment: Patient given instructions regarding scheduling next clinic visit. Patient demonstrated understanding of this information and agreed to call with further questions or concerns.  Patient stated he understood all health information given and agreed to call with further questions or concerns.    Stefany Gomez LPN

## 2019-05-08 LAB — INTERPRETATION ECG - MUSE: NORMAL

## 2019-05-14 ENCOUNTER — TELEPHONE (OUTPATIENT)
Dept: FAMILY MEDICINE | Facility: CLINIC | Age: 84
End: 2019-05-14

## 2019-05-14 NOTE — TELEPHONE ENCOUNTER
Reason for Call:  Other      Detailed comments: Has a general medication question.    Phone Number can be reached at: Kate ROCK [facility] [phone]    Best Time: any    Can we leave a detailed message on this number? YES    Call taken on 5/14/2019 at 9:48 AM by Mary Miner

## 2019-05-15 NOTE — TELEPHONE ENCOUNTER
Called and spoke with Kate ROCK. She states she does not have patient's medication list with her at this time and she will call back tomorrow morning.    She said patient received after visit summary and there was medications listed that patient no longer takes and would like to go over the medication list and remove what he doesn't take.    Postpone message to tomorrow.    Natasha Mas MA

## 2019-05-17 NOTE — TELEPHONE ENCOUNTER
This writer attempted to contact [facility] nurse Kate on 05/16/19     Reason for call medcation and left message to return call.     When patient calls back, please contact 1st floor Sandra Stout. routine priority.       Multiple attempts. Closing encounter. Nurse can call back if still has questions.  Serafin Davison

## 2019-05-21 ENCOUNTER — OFFICE VISIT (OUTPATIENT)
Dept: FAMILY MEDICINE | Facility: CLINIC | Age: 84
End: 2019-05-21
Payer: COMMERCIAL

## 2019-05-21 VITALS
DIASTOLIC BLOOD PRESSURE: 54 MMHG | TEMPERATURE: 97.9 F | SYSTOLIC BLOOD PRESSURE: 90 MMHG | RESPIRATION RATE: 18 BRPM | HEIGHT: 71 IN | OXYGEN SATURATION: 100 % | HEART RATE: 105 BPM | BODY MASS INDEX: 26.88 KG/M2 | WEIGHT: 192 LBS

## 2019-05-21 DIAGNOSIS — R30.0 DYSURIA: Primary | ICD-10-CM

## 2019-05-21 LAB
ALBUMIN UR-MCNC: NEGATIVE MG/DL
APPEARANCE UR: CLEAR
BACTERIA #/AREA URNS HPF: ABNORMAL /HPF
BILIRUB UR QL STRIP: NEGATIVE
COLOR UR AUTO: YELLOW
GLUCOSE UR STRIP-MCNC: NEGATIVE MG/DL
HGB UR QL STRIP: NEGATIVE
KETONES UR STRIP-MCNC: NEGATIVE MG/DL
LEUKOCYTE ESTERASE UR QL STRIP: ABNORMAL
MUCOUS THREADS #/AREA URNS LPF: PRESENT /LPF
NITRATE UR QL: NEGATIVE
NON-SQ EPI CELLS #/AREA URNS LPF: ABNORMAL /LPF
PH UR STRIP: 7 PH (ref 5–7)
RBC #/AREA URNS AUTO: ABNORMAL /HPF
SOURCE: ABNORMAL
SP GR UR STRIP: 1.01 (ref 1–1.03)
UROBILINOGEN UR STRIP-ACNC: 2 EU/DL (ref 0.2–1)
WBC #/AREA URNS AUTO: ABNORMAL /HPF

## 2019-05-21 PROCEDURE — 81001 URINALYSIS AUTO W/SCOPE: CPT | Performed by: FAMILY MEDICINE

## 2019-05-21 PROCEDURE — 99213 OFFICE O/P EST LOW 20 MIN: CPT | Performed by: FAMILY MEDICINE

## 2019-05-21 RX ORDER — PHENAZOPYRIDINE HYDROCHLORIDE 200 MG/1
200 TABLET, FILM COATED ORAL 3 TIMES DAILY PRN
Qty: 12 TABLET | Refills: 3 | Status: SHIPPED | OUTPATIENT
Start: 2019-05-21 | End: 2023-08-23

## 2019-05-21 ASSESSMENT — PAIN SCALES - GENERAL: PAINLEVEL: MILD PAIN (3)

## 2019-05-21 ASSESSMENT — MIFFLIN-ST. JEOR: SCORE: 1575.97

## 2019-05-21 NOTE — PATIENT INSTRUCTIONS
At [facility], we strive to deliver an exceptional experience to you, every time we see you.  If you receive a survey in the mail, please send us back your thoughts. We really do value your feedback.    Based on your medical history, these are the current health maintenance/preventive care services that you are due for (some may have been done at this visit.)  Health Maintenance Due   Topic Date Due     SPIROMETRY  01/01/1934     ASTHMA CONTROL TEST  01/01/1934     COPD ACTION PLAN  01/01/1934     ZOSTER IMMUNIZATION (1 of 2) 01/01/1984     MEDICARE ANNUAL WELLNESS VISIT  01/01/1999     PNEUMOCOCCAL IMMUNIZATION 65+ LOW/MEDIUM RISK (2 of 2 - PCV13) 08/03/2017         Suggested websites for health information:  Www.[facility].org : Up to date and easily searchable information on multiple topics.  Www.medlineplus.gov : medication info, interactive tutorials, watch real surgeries online  Www.familydoctor.org : good info from the Academy of Family Physicians  Www.cdc.gov : public health info, travel advisories, epidemics (H1N1)  Www.aap.org : children's health info, normal development, vaccinations  Www.health.[facility].[place].us : [place] dept of health, public health issues in [place], N1N1    Your care team:                            Family Medicine Internal Medicine   MD Hakeem Yuen MD Shantel Branch-Fleming, MD Katya Georgiev PA-C Nam Ho, MD Pediatrics   MERRILL Tse, MD Reyna Alejandro CNP, MD Deborah Mielke, MD Kim Thein, APRN CNP      Clinic hours: Monday - Thursday 7 am-7 pm; Fridays 7 am-5 pm.   Urgent care: Monday - Friday 11 am-9 pm; Saturday and Sunday 9 am-5 pm.  Pharmacy : Monday -Thursday 8 am-8 pm; Friday 8 am-6 pm; Saturday and Sunday 9 am-5 pm.     Clinic: [phone]   Pharmacy: [phone]

## 2019-05-21 NOTE — PROGRESS NOTES
Subjective     Gallito Farley is a 85 year old male who presents to clinic today for the following health issues:    HPI   Genitourinary symptoms      Duration: few days    Description:  Burning with urination    Intensity:  moderate    Accompanying signs and symptoms (fever/discharge/nausea/vomiting/back or abdominal pain):  None    History (frequent UTI's/kidney stones/prostate problems): yes, taking flomax  Sexually active: no     Precipitating or alleviating factors: None    Therapies tried and outcome: none       {  Patient Active Problem List   Diagnosis     Chronic constipation     HTN (hypertension)     Benign prostatic hyperplasia     Insomnia     GERD (gastroesophageal reflux disease)     Atrioventricular block, complete (H)     Advanced directives, counseling/discussion     Cardiac pacemaker in situ- Medtronic, dual chamber- NOT dependent     Health Care Home     Chronic obstructive pulmonary disease, unspecified COPD type (H)     Past Surgical History:   Procedure Laterality Date     CHOLECYSTECTOMY       ENT SURGERY       IRRIGATION AND DEBRIDEMENT HIP, COMBINED  4/18/2013    Procedure: COMBINED IRRIGATION AND DEBRIDEMENT HIP;  Irrigation and debridement of Right Hip with cultures;  Surgeon: Cristal Inman MD;  Location: [facility] OR     Partial pneumonectomy      done in [facility] in the 1990's     skin grafting - leg wound  6/2016       Social History     Tobacco Use     Smoking status: Former Smoker     Smokeless tobacco: Never Used   Substance Use Topics     Alcohol use: No     Family History   Problem Relation Age of Onset     Family History Negative Mother            Reviewed and updated as needed this visit by Provider         Review of Systems   ROS COMP: Constitutional, HEENT, cardiovascular, pulmonary, GI, , musculoskeletal, neuro, skin, endocrine and psych systems are negative, except as otherwise noted.      Objective    BP 90/54 (BP Location: Right arm, Patient Position: Chair, Cuff Size:  "Adult Large)   Pulse 105   Temp 97.9  F (36.6  C) (Oral)   Resp 18   Ht 1.8 m (5' 10.87\")   Wt 87.1 kg (192 lb)   SpO2 100%   BMI 26.88 kg/m    Body mass index is 26.88 kg/m .  Physical Exam   GENERAL: healthy, alert and no distress  NECK: no adenopathy, no asymmetry, masses, or scars and thyroid normal to palpation  RESP: lungs clear to auscultation - no rales, rhonchi or wheezes  CV: regular rate and rhythm, normal S1 S2, no S3 or S4, no murmur, click or rub, no peripheral edema and peripheral pulses strong  ABDOMEN: soft, nontender, no hepatosplenomegaly, no masses and bowel sounds normal  MS: no gross musculoskeletal defects noted, no edema    Diagnostic Test Results:  Labs reviewed in Epic        Assessment & Plan     1. Dysuria  Ongoing for patient. Patient has seen Urology and recommended possible botox injection or stimulator. Patient does not want this. Suggest follow up with Urology. UA ordered to r/o UTI.  - UA with Microscopic reflex to Culture  - UROLOGY ADULT REFERRAL  - phenazopyridine (PYRIDIUM) 200 MG tablet; Take 1 tablet (200 mg) by mouth 3 times daily as needed for irritation  Dispense: 12 tablet; Refill: 3     BMI:   Estimated body mass index is 26.88 kg/m  as calculated from the following:    Height as of this encounter: 1.8 m (5' 10.87\").    Weight as of this encounter: 87.1 kg (192 lb).           See Patient Instructions    No follow-ups on file.    Neftali Meyers MD, MD  [facility]    "

## 2019-05-22 ENCOUNTER — TELEPHONE (OUTPATIENT)
Dept: FAMILY MEDICINE | Facility: CLINIC | Age: 84
End: 2019-05-22

## 2019-05-22 DIAGNOSIS — J44.1 COPD EXACERBATION (H): ICD-10-CM

## 2019-05-22 DIAGNOSIS — N39.46 MIXED INCONTINENCE URGE AND STRESS (MALE)(FEMALE): ICD-10-CM

## 2019-05-22 DIAGNOSIS — N32.81 OAB (OVERACTIVE BLADDER): ICD-10-CM

## 2019-05-22 DIAGNOSIS — M79.18 CERVICAL MYOFASCIAL PAIN SYNDROME: ICD-10-CM

## 2019-05-22 DIAGNOSIS — N32.81 OVERACTIVE BLADDER: ICD-10-CM

## 2019-05-22 NOTE — TELEPHONE ENCOUNTER
Reason for Call:  Other prescription    Detailed comments: [facility] calling and has a question regarding one of Pt's medications that they would like to clarify.    Phone Number Patient can be reached at: Other phone number:  [phone]    Best Time: anytime    Can we leave a detailed message on this number? YES    Call taken on 5/22/2019 at 1:47 PM by Mal Cat

## 2019-05-22 NOTE — TELEPHONE ENCOUNTER
Called and spoke with Kate. Went over patient's medications, she states that patient does not have orders and have not been taking the following medications:    Myrbecriq  Dicofenac gel  Enablex  Detrol LA  Trolamine  Pyridium 100 mg  Albuterol Neb solution     Kate is wondering if patient is to still be taking these medications? If so, please order new prescriptions. If not, please remove from [facility] medication list.    Route to provider to advise.  Natasha Mas MA

## 2019-05-23 RX ORDER — ALBUTEROL SULFATE 0.83 MG/ML
2.5 SOLUTION RESPIRATORY (INHALATION) EVERY 6 HOURS PRN
Qty: 360 ML | Refills: 1 | Status: SHIPPED | OUTPATIENT
Start: 2019-05-23 | End: 2021-04-21

## 2019-05-23 RX ORDER — TOLTERODINE 4 MG/1
4 CAPSULE, EXTENDED RELEASE ORAL DAILY
Qty: 30 CAPSULE | Refills: 11 | Status: SHIPPED | OUTPATIENT
Start: 2019-05-23 | End: 2023-08-23

## 2019-05-23 RX ORDER — MIRABEGRON 50 MG/1
50 TABLET, EXTENDED RELEASE ORAL DAILY
Qty: 30 TABLET | Refills: 11 | Status: SHIPPED | OUTPATIENT
Start: 2019-05-23 | End: 2020-04-28

## 2019-05-23 NOTE — TELEPHONE ENCOUNTER
rxs refilled except for:    Enablex  Detrol LA  Trolamine    Med list updated.    Neftali Meyers MD

## 2019-05-24 ENCOUNTER — TELEPHONE (OUTPATIENT)
Dept: FAMILY MEDICINE | Facility: CLINIC | Age: 84
End: 2019-05-24

## 2019-05-24 DIAGNOSIS — M79.18 CERVICAL MYOFASCIAL PAIN SYNDROME: Primary | ICD-10-CM

## 2019-05-24 NOTE — TELEPHONE ENCOUNTER
Plan does not cover diclofenac sodium 3 % GEL.  Please call [email or web address] to initiate Prior Auth or change med.    Insurance type and ID number: [employer] [id number]      Additional Information:     Gabriella Lane

## 2019-05-29 NOTE — TELEPHONE ENCOUNTER
PA Initiation    Medication: diclofenac sodium 3 % GEL- INITIATED  Insurance Company: [employer] - Phone [phone] Fax [phone]  Pharmacy Filling the Rx: [facility] [place] [place] - [facility] [address]   Filling Pharmacy Phone: [phone]  Filling Pharmacy Fax:    Start Date: 5/29/2019

## 2019-06-03 NOTE — TELEPHONE ENCOUNTER
PRIOR AUTHORIZATION DENIED    Medication: diclofenac sodium 3 % GEL- DENIED    Denial Date: 5/29/2019    Denial Rational: Medication is not covered with associated diagnosis. It is only covered for actinic keratosis.        Appeal Information: If provider would like to appeal we will need a detailed letter of medical necessity to start the process. Then re-route this request back to the PA pool.

## 2019-06-10 ENCOUNTER — TELEPHONE (OUTPATIENT)
Dept: FAMILY MEDICINE | Facility: CLINIC | Age: 84
End: 2019-06-10

## 2019-06-10 NOTE — TELEPHONE ENCOUNTER
Notify pharmacist from [facility] to cancel previous Rx for Detrol LA. Patient will take Myrbetriq (as noted from 3/2/2018 Urology visit).     Mr. Farley will still take Flomax and Finasteride.

## 2019-06-10 NOTE — TELEPHONE ENCOUNTER
Reason for Call:  Other prescription    Detailed comments: Verify patient is on mirabegron (MYRBETRIQ) 50 MG 24 hr tablet,Detrol LA, Flowmax and Finasteride  Please call back and advise.    Phone Number can be reached at: [facility]- [phone]    Best Time: any    Can we leave a detailed message on this number? YES    Call taken on 6/10/2019 at 10:47 AM by Mary Miner

## 2019-06-13 ENCOUNTER — HOSPITAL ENCOUNTER (OUTPATIENT)
Dept: CARDIOLOGY | Facility: CLINIC | Age: 84
Discharge: HOME | End: 2019-06-13
Attending: INTERNAL MEDICINE | Admitting: INTERNAL MEDICINE
Payer: COMMERCIAL

## 2019-06-13 ENCOUNTER — HOSPITAL ENCOUNTER (OUTPATIENT)
Dept: NUCLEAR MEDICINE | Facility: CLINIC | Age: 84
Setting detail: NUCLEAR MEDICINE
End: 2019-06-13
Attending: INTERNAL MEDICINE
Payer: COMMERCIAL

## 2019-06-13 DIAGNOSIS — R07.9 CHEST PAIN, UNSPECIFIED TYPE: ICD-10-CM

## 2019-06-13 PROCEDURE — 78452 HT MUSCLE IMAGE SPECT MULT: CPT

## 2019-06-13 PROCEDURE — 93016 CV STRESS TEST SUPVJ ONLY: CPT | Performed by: INTERNAL MEDICINE

## 2019-06-13 PROCEDURE — 93010 ELECTROCARDIOGRAM REPORT: CPT | Performed by: INTERNAL MEDICINE

## 2019-06-13 PROCEDURE — 25000128 H RX IP 250 OP 636: Performed by: INTERNAL MEDICINE

## 2019-06-13 PROCEDURE — A9502 TC99M TETROFOSMIN: HCPCS

## 2019-06-13 PROCEDURE — 93017 CV STRESS TEST TRACING ONLY: CPT

## 2019-06-13 PROCEDURE — 34300033 ZZH RX 343

## 2019-06-13 RX ORDER — ACYCLOVIR 200 MG/1
0-1 CAPSULE ORAL
Status: DISCONTINUED | OUTPATIENT
Start: 2019-06-13 | End: 2019-06-14 | Stop reason: HOSPADM

## 2019-06-13 RX ORDER — ALBUTEROL SULFATE 90 UG/1
2 AEROSOL, METERED RESPIRATORY (INHALATION) EVERY 5 MIN PRN
Status: DISCONTINUED | OUTPATIENT
Start: 2019-06-13 | End: 2019-06-14 | Stop reason: HOSPADM

## 2019-06-13 RX ORDER — REGADENOSON 0.08 MG/ML
0.4 INJECTION, SOLUTION INTRAVENOUS ONCE
Status: COMPLETED | OUTPATIENT
Start: 2019-06-13 | End: 2019-06-13

## 2019-06-13 RX ORDER — AMINOPHYLLINE 25 MG/ML
50-100 INJECTION, SOLUTION INTRAVENOUS
Status: COMPLETED | OUTPATIENT
Start: 2019-06-13 | End: 2019-06-13

## 2019-06-13 RX ADMIN — TETROFOSMIN 10.1 MCI.: 1.38 INJECTION, POWDER, LYOPHILIZED, FOR SOLUTION INTRAVENOUS at 13:05

## 2019-06-13 RX ADMIN — REGADENOSON 0.4 MG: 0.08 INJECTION, SOLUTION INTRAVENOUS at 14:12

## 2019-06-13 RX ADMIN — TETROFOSMIN 39.6 MCI.: 1.38 INJECTION, POWDER, LYOPHILIZED, FOR SOLUTION INTRAVENOUS at 14:12

## 2019-06-13 RX ADMIN — AMINOPHYLLINE 50 MG: 25 INJECTION, SOLUTION INTRAVENOUS at 14:23

## 2019-06-13 NOTE — PROGRESS NOTES
Pt here for Lexiscan nuclear stress test.  Medication and side effects reviewed with patient. Lung sounds clear to auscultation bilaterally.  Denied caffeine use. Patient tolerated Lexiscan dose with BP dropping to 80's/50's and remained for several minutes. Given 50 mg IV Aminophylline with BP increasing up to 102/60. Pt asymptomatic throughout test. Monitored post injection and then taken back to nuclear medicine for follow up imaging.

## 2019-06-17 NOTE — TELEPHONE ENCOUNTER
"Requested Prescriptions   Pending Prescriptions Disp Refills     BREO ELLIPTA 200-25 MCG/INH Inhaler [Pharmacy Med Name: BREO ELLIPTA 200-25 INH]        Last Written Prescription Date:  04/04/18  Last Fill Quantity: 1,  # refills: 11   Last Office Visit with [facility] or [facility] prescribing provider:  11/14/18-Vocal   Future Office Visit:    Sig: INHALE 1 PUFF BY MOUTH DAILY    Inhaled Steroids Protocol Failed - 4/5/2019 12:25 PM       Failed - Asthma control assessment score within normal limits in last 6 months    Please review ACT score.          Passed - Patient is age 12 or older       Passed - Medication is active on med list       Passed - Recent (6 mo) or future (30 days) visit within the authorizing provider's specialty    Patient had office visit in the last 6 months or has a visit in the next 30 days with authorizing provider or within the authorizing provider's specialty.  See \"Patient Info\" tab in inbasket, or \"Choose Columns\" in Meds & Orders section of the refill encounter.            hydrochlorothiazide (MICROZIDE) 12.5 MG capsule [Pharmacy Med Name: HYDROCHLOROT 12.5MG CAP] 56 capsule       Last Written Prescription Date:  04/04/18  Last Fill Quantity: 180,  # refills: 3   Last Office Visit with [facility], [facility] or [facility] prescribing provider:  11/14/18-Vocal   Future Office Visit:    Sig: TAKE 2 CAPSULES (25MG) BY MOUTH DAILY    Diuretics (Including Combos) Protocol Passed - 4/5/2019 12:25 PM       Passed - Blood pressure under 140/90 in past 12 months    BP Readings from Last 3 Encounters:   11/14/18 90/59   10/02/18 101/68   09/12/18 92/63                Passed - Recent (12 mo) or future (30 days) visit within the authorizing provider's specialty    Patient had office visit in the last 12 months or has a visit in the next 30 days with authorizing provider or within the authorizing provider's specialty.  See \"Patient Info\" tab in inbasket, or \"Choose Columns\" in Meds & Orders section of the refill " "encounter.             Passed - Medication is active on med list       Passed - Patient is age 18 or older       Passed - Normal serum creatinine on file in past 12 months    Recent Labs   Lab Test 10/02/18  1709   CR 1.03             Passed - Normal serum potassium on file in past 12 months    Recent Labs   Lab Test 10/02/18  1709   POTASSIUM 3.7                   Passed - Normal serum sodium on file in past 12 months    Recent Labs   Lab Test 10/02/18  1709                 amLODIPine (NORVASC) 10 MG tablet [Pharmacy Med Name: AMLODIPINE 10MG TAB] 28 tablet       Last Written Prescription Date:  04/04/18  Last Fill Quantity: 90,  # refills: 3   Last Office Visit with [facility], [facility], [facility] or [facility] prescribing provider:  11/14/18--Vocal   Future Office Visit:    Sig: TAKE 1 TABLET BY MOUTH DAILY    Calcium Channel Blockers Protocol  Passed - 4/5/2019 12:25 PM       Passed - Blood pressure under 140/90 in past 12 months    BP Readings from Last 3 Encounters:   11/14/18 90/59   10/02/18 101/68   09/12/18 92/63                Passed - Recent (12 mo) or future (30 days) visit within the authorizing provider's specialty    Patient had office visit in the last 12 months or has a visit in the next 30 days with authorizing provider or within the authorizing provider's specialty.  See \"Patient Info\" tab in inbasket, or \"Choose Columns\" in Meds & Orders section of the refill encounter.             Passed - Medication is active on med list       Passed - Patient is age 18 or older       Passed - Normal serum creatinine on file in past 12 months    Recent Labs   Lab Test 10/02/18  1709  08/31/17  1428   CR 1.03   < >  --    CREAT  --   --  1.0    < > = values in this interval not displayed.             finasteride (PROSCAR) 5 MG tablet [Pharmacy Med Name: FINASTERIDE 5MG TAB] 28 tablet       Last Written Prescription Date:  04/04/18  Last Fill Quantity: 90,  # refills: 3   Last Office Visit with [facility], [facility], [facility] or  [facility] [facility] prescribing " "provider:  11/14/18-Vocal   Future Office Visit:    Sig: TAKE 1 TABLET BY MOUTH EVERY NIGHT AT BEDTIME    Alpha Blockers Passed - 4/5/2019 12:25 PM       Passed - Blood pressure under 140/90 in past 12 months    BP Readings from Last 3 Encounters:   11/14/18 90/59   10/02/18 101/68   09/12/18 92/63                Passed - Recent (12 mo) or future (30 days) visit within the authorizing provider's specialty    Patient had office visit in the last 12 months or has a visit in the next 30 days with authorizing provider or within the authorizing provider's specialty.  See \"Patient Info\" tab in inbasket, or \"Choose Columns\" in Meds & Orders section of the refill encounter.             Passed - Patient does not have Tadalafil, Vardenafil, or Sildenafil on their medication list       Passed - Medication is active on med list       Passed - Patient is 18 years of age or older        enalapril (VASOTEC) 10 MG tablet [Pharmacy Med Name: ENALAPRIL 10MG TAB] 28 tablet       Last Written Prescription Date:  04/04/18  Last Fill Quantity: 90,  # refills: 03   Last Office Visit with [facility], [facility] or [facility] prescribing provider:  11/14/18-Vocal   Future Office Visit:    Sig: TAKE 1 TABLET BY MOUTH DAILY    ACE Inhibitors (Including Combos) Protocol Passed - 4/5/2019 12:25 PM       Passed - Blood pressure under 140/90 in past 12 months    BP Readings from Last 3 Encounters:   11/14/18 90/59   10/02/18 101/68   09/12/18 92/63     11/14/18-Vocal           Passed - Recent (12 mo) or future (30 days) visit within the authorizing provider's specialty    Patient had office visit in the last 12 months or has a visit in the next 30 days with authorizing provider or within the authorizing provider's specialty.  See \"Patient Info\" tab in inbasket, or \"Choose Columns\" in Meds & Orders section of the refill encounter.             Passed - Medication is active on med list       Passed - Patient is age 18 or older       Passed - Normal serum creatinine " "on file in past 12 months    Recent Labs   Lab Test 10/02/18  1709  08/31/17  1428   CR 1.03   < >  --    CREAT  --   --  1.0    < > = values in this interval not displayed.            Passed - Normal serum potassium on file in past 12 months    Recent Labs   Lab Test 10/02/18  1709   POTASSIUM 3.7             tamsulosin (FLOMAX) 0.4 MG capsule [Pharmacy Med Name: TAMSULOSIN 0.4MG CAP] 56 capsule       Last Written Prescription Date:  04/04/18  Last Fill Quantity: 180,  # refills: 3   Last Office Visit with [facility], [facility] or [facility] prescribing provider:  11/14/18-Vocal   Future Office Visit:    Sig: TAKE 2 CAPSULES (0.8MG) BY MOUTH EVERY NIGHT AT BEDTIME    Alpha Blockers Passed - 4/5/2019 12:25 PM       Passed - Blood pressure under 140/90 in past 12 months    BP Readings from Last 3 Encounters:   11/14/18 90/59   10/02/18 101/68   09/12/18 92/63                Passed - Recent (12 mo) or future (30 days) visit within the authorizing provider's specialty    Patient had office visit in the last 12 months or has a visit in the next 30 days with authorizing provider or within the authorizing provider's specialty.  See \"Patient Info\" tab in inbasket, or \"Choose Columns\" in Meds & Orders section of the refill encounter.             Passed - Patient does not have Tadalafil, Vardenafil, or Sildenafil on their medication list       Passed - Medication is active on med list       Passed - Patient is 18 years of age or older          " 20

## 2019-06-26 DIAGNOSIS — M54.12 CERVICAL RADICULOPATHY: ICD-10-CM

## 2019-06-26 RX ORDER — GABAPENTIN 100 MG/1
CAPSULE ORAL
Qty: 84 CAPSULE | Refills: 1 | Status: SHIPPED | OUTPATIENT
Start: 2019-06-26 | End: 2019-08-19

## 2019-06-26 NOTE — TELEPHONE ENCOUNTER
Requested Prescriptions   Pending Prescriptions Disp Refills     gabapentin (NEURONTIN) 100 MG capsule [Pharmacy Med Name: GABAPENTIN 100MG CAP]      Last Written Prescription Date:  5/6/19  Last Fill Quantity: 84,  # refills: 1   Last Office Visit with [facility] or [facility] prescribing provider:  5/21/19   Future Office Visit:      84 capsule 1     Sig: TAKE 1 CAPSULE BY MOUTH THREE TIMES A DAY       There is no refill protocol information for this order              Maynor Faarax  [facility]

## 2019-06-26 NOTE — TELEPHONE ENCOUNTER
Routing refill request to provider for review/approval because:  Drug not on the [facility] refill protocol   Kari Martines RN

## 2019-07-30 DIAGNOSIS — J45.40 MODERATE PERSISTENT ASTHMA WITHOUT COMPLICATION: ICD-10-CM

## 2019-07-30 NOTE — TELEPHONE ENCOUNTER
"Requested Prescriptions   Pending Prescriptions Disp Refills     BREO ELLIPTA 200-25 MCG/INH Inhaler [Pharmacy Med Name: BREO ELLIPTA 200-25 INH]       Sig: INHALE 1 PUFF BY MOUTH DAILY       Last Written Prescription Date:  5/2/19  Last Fill Quantity: 60,  # refills: 1   Last Office Visit with [facility], [facility] or [facility] prescribing provider:  5/21/19   Future Office Visit:         Inhaled Steroids Protocol Failed - 7/30/2019  3:30 PM        Failed - Asthma control assessment score within normal limits in last 6 months     Please review ACT score.           Passed - Patient is age 12 or older        Passed - Medication is active on med list        Passed - Recent (6 mo) or future (30 days) visit within the authorizing provider's specialty     Patient had office visit in the last 6 months or has a visit in the next 30 days with authorizing provider or within the authorizing provider's specialty.  See \"Patient Info\" tab in inbasket, or \"Choose Columns\" in Meds & Orders section of the refill encounter.                  Maynor Jimenezx  [facility]  "

## 2019-07-31 ENCOUNTER — PATIENT OUTREACH (OUTPATIENT)
Dept: GERIATRIC MEDICINE | Facility: CLINIC | Age: 84
End: 2019-07-31

## 2019-08-01 NOTE — PROGRESS NOTES
[facility] Care Coordination Contact      [facility] Six-Month Telephone Assessment    6 month telephone assessment completed on 7/31/19 with member and Joe.    ER visits: No  Hospitalizations: No  TCU stays: No  Significant health status changes: none  Falls/Injuries: No  ADL/IADL changes: No  Changes in services: No    Caregiver Assessment follow up:  N/A    Goals: See POC in chart for goal progress documentation.      Will see member in 6 months for an annual health risk assessment.   Encouraged member to call CC with any questions or concerns in the meantime.     Jorge David RN, PHN  [facility]

## 2019-08-01 NOTE — TELEPHONE ENCOUNTER
Prescription approved per [facility] Refill Protocol.  Needs apt for further refills, pharmacy informed.    Stefany Browne RN

## 2019-08-19 ENCOUNTER — OFFICE VISIT (OUTPATIENT)
Dept: FAMILY MEDICINE | Facility: CLINIC | Age: 84
End: 2019-08-19
Payer: COMMERCIAL

## 2019-08-19 VITALS
HEART RATE: 107 BPM | BODY MASS INDEX: 26.6 KG/M2 | RESPIRATION RATE: 16 BRPM | TEMPERATURE: 97.7 F | SYSTOLIC BLOOD PRESSURE: 98 MMHG | DIASTOLIC BLOOD PRESSURE: 65 MMHG | HEIGHT: 71 IN | OXYGEN SATURATION: 98 % | WEIGHT: 190 LBS

## 2019-08-19 DIAGNOSIS — I10 ESSENTIAL HYPERTENSION: ICD-10-CM

## 2019-08-19 DIAGNOSIS — N40.0 BENIGN NODULAR PROSTATIC HYPERPLASIA WITHOUT LOWER URINARY TRACT SYMPTOMS: ICD-10-CM

## 2019-08-19 DIAGNOSIS — J44.9 COPD, MODERATE (H): Primary | ICD-10-CM

## 2019-08-19 DIAGNOSIS — M54.12 CERVICAL RADICULOPATHY: ICD-10-CM

## 2019-08-19 PROCEDURE — 99214 OFFICE O/P EST MOD 30 MIN: CPT | Performed by: INTERNAL MEDICINE

## 2019-08-19 RX ORDER — ENALAPRIL MALEATE 10 MG/1
10 TABLET ORAL DAILY
Qty: 90 TABLET | Refills: 3 | Status: SHIPPED | OUTPATIENT
Start: 2019-08-19 | End: 2020-07-24

## 2019-08-19 RX ORDER — FINASTERIDE 5 MG/1
1 TABLET, FILM COATED ORAL AT BEDTIME
Qty: 90 TABLET | Refills: 3 | Status: SHIPPED | OUTPATIENT
Start: 2019-08-19 | End: 2020-07-24

## 2019-08-19 RX ORDER — AMLODIPINE BESYLATE 10 MG/1
10 TABLET ORAL DAILY
Qty: 90 TABLET | Refills: 3 | Status: SHIPPED | OUTPATIENT
Start: 2019-08-19 | End: 2020-01-15 | Stop reason: DRUGHIGH

## 2019-08-19 RX ORDER — TAMSULOSIN HYDROCHLORIDE 0.4 MG/1
CAPSULE ORAL
Qty: 180 CAPSULE | Refills: 3 | Status: SHIPPED | OUTPATIENT
Start: 2019-08-19 | End: 2020-07-24

## 2019-08-19 RX ORDER — GABAPENTIN 100 MG/1
CAPSULE ORAL
Qty: 84 CAPSULE | Refills: 3 | Status: SHIPPED | OUTPATIENT
Start: 2019-08-19 | End: 2019-12-11

## 2019-08-19 RX ORDER — HYDROCHLOROTHIAZIDE 12.5 MG/1
CAPSULE ORAL
Qty: 180 CAPSULE | Refills: 3 | Status: SHIPPED | OUTPATIENT
Start: 2019-08-19 | End: 2020-07-24

## 2019-08-19 RX ORDER — TIOTROPIUM BROMIDE 18 UG/1
CAPSULE ORAL; RESPIRATORY (INHALATION)
Qty: 30 CAPSULE | Refills: 11 | Status: SHIPPED | OUTPATIENT
Start: 2019-08-19 | End: 2020-08-18

## 2019-08-19 ASSESSMENT — PAIN SCALES - GENERAL: PAINLEVEL: NO PAIN (0)

## 2019-08-19 ASSESSMENT — MIFFLIN-ST. JEOR: SCORE: 1566.89

## 2019-08-19 ASSESSMENT — PATIENT HEALTH QUESTIONNAIRE - PHQ9: SUM OF ALL RESPONSES TO PHQ QUESTIONS 1-9: 0

## 2019-08-19 NOTE — PROGRESS NOTES
Subjective     Gallito Farley is a 85 year old male who presents to clinic today for the following health issues:    HPI       Urinary () - Male  Duration of complaint: chronic   Description:   Dysuria (painful urination): no   Hematuria (blood in urine): no   Frequency: YES  Are you urinating at night : YES  Hesitancy (delay in urine): YES  Retention (unable to empty): no   Decrease in urinary flow: yes  Incontinence: no   Progression of Symptoms:  improving  Accompanying Signs & Symptoms:  Fever: no   Back/Flank pain: no   Urethral discharge: no   Testicle lumps/masses/pain: no   Nausea and/or vomiting: no   Abdominal pain: no   History:   History of frequent UTI's: no   History of kidney stones: no   History of hernias: no   Personal  History of Prostate problems: yes  Sexually active: no   Precipitating factors:   Advancing age  Therapies Tried and outcome: Finasteride and Tamsulosin; Outcome - effective.       Hypertension Follow-up      Outpatient blood pressures monitoring: no    Low Salt Diet: no    Adverse effects: none    Compliance: good    Secondary causes: none    Chronic kidney disease: no    Hyperthyroidism: no    Anxiety: no    Decongestants: no    Substance abuse: no    Diabetes: no    Ischemic heart disease: yes    Stroke: yes.    Hyperlipidemia: yes       COPD Follow-Up    Overall, how are your COPD symptoms since your last clinic visit?  Better    How much fatigue or shortness of breath do you have when you are walking?  Same as usual    How much shortness of breath do you have when you are resting?  Same as usual    How often do you cough? Rarely    Have you noticed any change in your sputum/phlegm?  No    Have you experienced a recent fever? No    Please describe how far you can walk without stopping to rest:  Less than 1 block    How many flights of stairs are you able to walk up without stopping?  3 or more    Have you had any Emergency Room Visits, Urgent Care Visits, or Hospital  Admissions because of your COPD since your last office visit?  No    History   Smoking Status     Former Smoker   Smokeless Tobacco     Never Used     No results found for: FEV1, YOW0MHM  Reviewed and updated as needed this visit by Provider        Patient Active Problem List   Diagnosis     Chronic constipation     HTN (hypertension)     Benign prostatic hyperplasia     Insomnia     GERD (gastroesophageal reflux disease)     Atrioventricular block, complete (H)     Advanced directives, counseling/discussion     Cardiac pacemaker in situ- Medtronic, dual chamber- NOT dependent     Health Care Home     Chronic obstructive pulmonary disease, unspecified COPD type (H)     Past Surgical History:   Procedure Laterality Date     CHOLECYSTECTOMY       ENT SURGERY       IRRIGATION AND DEBRIDEMENT HIP, COMBINED  4/18/2013    Procedure: COMBINED IRRIGATION AND DEBRIDEMENT HIP;  Irrigation and debridement of Right Hip with cultures;  Surgeon: Cristal Inman MD;  Location: [facility] OR     Partial pneumonectomy      done in [facility] in the 1990's     skin grafting - leg wound  6/2016       Social History     Tobacco Use     Smoking status: Former Smoker     Smokeless tobacco: Never Used   Substance Use Topics     Alcohol use: No     Family History   Problem Relation Age of Onset     Family History Negative Mother          No Known Allergies  Recent Labs   Lab Test 10/02/18  1709 04/04/18  1238  01/11/17  1734  07/24/16  0628 07/23/16  0530  06/13/16  1612  04/24/13  0643 04/23/13  0751   A1C  --   --   --   --   --   --  5.7  --   --   --   --  6.0   LDL  --   --   --   --   --   --   --   --   --   --  93  --    HDL  --   --   --   --   --   --   --   --   --   --  20*  --    TRIG  --   --   --   --   --   --   --   --   --   --  148  --    ALT 25 22  --  14  --   --  15   < > 57   < >  --   --    CR 1.03 1.06   < > 0.96   < >  --  0.88   < > 0.90   < > 1.11 1.12   GFRESTIMATED 69 67   < > 75   < >  --  83   < > 81   < > 64  "63   GFRESTBLACK 83 80   < > >90   GFR Calc     < >  --  >90   GFR Calc     < > >90   GFR Calc     < > 77 77   POTASSIUM 3.7 3.3*   < > 5.1   < >  --  4.1   < > 3.8   < > 3.7 3.7   TSH  --   --   --   --   --  1.04  --   --  0.20*   < >  --   --     < > = values in this interval not displayed.      BP Readings from Last 3 Encounters:   08/19/19 98/65   05/21/19 90/54   05/07/19 108/74    Wt Readings from Last 3 Encounters:   08/19/19 86.2 kg (190 lb)   05/21/19 87.1 kg (192 lb)   05/07/19 80 kg (176 lb 5.9 oz)                      Review of Systems   ROS COMP: CONSTITUTIONAL: NEGATIVE for fever, chills, change in weight  INTEGUMENTARY/SKIN: NEGATIVE for worrisome rashes, moles or lesions  EYES: NEGATIVE for vision changes or irritation  ENT/MOUTH: NEGATIVE for ear, mouth and throat problems  RESP: As above.  CV: NEGATIVE for chest pain, palpitations or peripheral edema  GI: NEGATIVE for nausea, abdominal pain, heartburn, or change in bowel habits   male :As above.  MUSCULOSKELETAL: NEGATIVE for significant arthralgias or myalgia  NEURO: POSITIVE for cervical radiculopathy. Patient is requesting refills for Gabapentin.  ENDOCRINE: NEGATIVE for temperature intolerance, skin/hair changes  HEME: NEGATIVE for bleeding problems  PSYCHIATRIC: NEGATIVE for changes in mood or affect      Objective    BP 98/65 (BP Location: Left arm, Patient Position: Chair, Cuff Size: Adult Regular)   Pulse 107   Temp 97.7  F (36.5  C) (Oral)   Resp 16   Ht 1.8 m (5' 10.87\")   Wt 86.2 kg (190 lb)   SpO2 98%   BMI 26.60 kg/m    Body mass index is 26.6 kg/m .  Physical Exam   GENERAL: alert, no distress, elderly and fatigued  EYES: Eyes grossly normal to inspection  HENT: normal cephalic/atraumatic and oral mucous membranes moist  NECK: no adenopathy  RESP: lungs clear to auscultation - no rales, rhonchi or wheezes  CV: regular rate and rhythm, normal S1 S2, no S3 or S4, no murmur, click " "or rub, no peripheral edema and peripheral pulses strong  ABDOMEN: soft, nontender, no hepatosplenomegaly, no masses and bowel sounds normal  RECTAL (male): deferred  MS: no gross musculoskeletal defects noted, no edema  SKIN: no suspicious lesions or rashes  NEURO: Normal strength and tone, mentation intact and speech normal  PSYCH: mentation appears normal, affect normal/bright    Diagnostic Test Results:  Labs reviewed in Epic        Assessment & Plan     1. COPD, moderate (H)  Stable condition without any recent acute exacerbation.  - fluticasone-vilanterol (BREO ELLIPTA) 200-25 MCG/INH inhaler; INHALE 1 PUFF BY MOUTH DAILY  Dispense: 3 Inhaler; Refill: 3  - tiotropium (SPIRIVA HANDIHALER) 18 MCG inhaled capsule; INHALE CONTENTS OF ONE CAPSULE DAILY USING HANDIHALER DEVICE  Dispense: 30 capsule; Refill: 11    2. Essential hypertension  Well-controlled. Avoid beta-blockers due to COPD.  - amLODIPine (NORVASC) 10 MG tablet; Take 1 tablet (10 mg) by mouth daily  Dispense: 90 tablet; Refill: 3  - enalapril (VASOTEC) 10 MG tablet; Take 1 tablet (10 mg) by mouth daily  Dispense: 90 tablet; Refill: 3  - hydrochlorothiazide (MICROZIDE) 12.5 MG capsule; TAKE 2 CAPSULES (25MG) BY MOUTH DAILY  Dispense: 180 capsule; Refill: 3    3. Benign nodular prostatic hyperplasia without lower urinary tract symptoms  No complaints of urinary retention.  - finasteride (PROSCAR) 5 MG tablet; Take 1 tablet (5 mg) by mouth At Bedtime  Dispense: 90 tablet; Refill: 3  - tamsulosin (FLOMAX) 0.4 MG capsule; TAKE 2 CAPSULES (0.8MG) BY MOUTH EVERY NIGHT AT BEDTIME  Dispense: 180 capsule; Refill: 3    4. Cervical radiculopathy  No acute complaints. Patient is requesting refills.  - gabapentin (NEURONTIN) 100 MG capsule; TAKE 1 CAPSULE BY MOUTH THREE TIMES A DAY  Dispense: 84 capsule; Refill: 3     BMI:   Estimated body mass index is 26.6 kg/m  as calculated from the following:    Height as of this encounter: 1.8 m (5' 10.87\").    Weight as of " this encounter: 86.2 kg (190 lb).   Weight management plan: dietary changes.        FUTURE APPOINTMENTS:       - Follow-up visit in 6 months.    Hakeem Awad MD  [facility]

## 2019-08-19 NOTE — PATIENT INSTRUCTIONS
At [facility], we strive to deliver an exceptional experience to you, every time we see you.  If you receive a survey in the mail, please send us back your thoughts. We really do value your feedback.    Based on your medical history, these are the current health maintenance/preventive care services that you are due for (some may have been done at this visit.)  Health Maintenance Due   Topic Date Due     COPD ACTION PLAN  01/01/1934     ZOSTER IMMUNIZATION (1 of 2) 01/01/1984     MEDICARE ANNUAL WELLNESS VISIT  01/01/1999     ASTHMA ACTION PLAN  08/03/2017     PNEUMOCOCCAL IMMUNIZATION 65+ LOW/MEDIUM RISK (2 of 2 - PCV13) 08/03/2017     ASTHMA CONTROL TEST  04/02/2019         Suggested websites for health information:  Www.[facility].org : Up to date and easily searchable information on multiple topics.  Www.medlineplus.gov : medication info, interactive tutorials, watch real surgeries online  Www.familydoctor.org : good info from the Academy of Family Physicians  Www.cdc.gov : public health info, travel advisories, epidemics (H1N1)  Www.aap.org : children's health info, normal development, vaccinations  Www.health.[facility].[place].us : [place] dept of health, public health issues in [place], N1N1    Your care team:                            Family Medicine Internal Medicine   MD Hakeem Yuen MD Shantel Branch-Fleming, MD Katya Georgiev PA-C Nam Ho, MD Pediatrics   MERRILL Tse, MD Reyna Alejandro CNP, MD Deborah Mielke, MD Kim Thein, APRN CNP      Clinic hours: Monday - Thursday 7 am-7 pm; Fridays 7 am-5 pm.   Urgent care: Monday - Friday 11 am-9 pm; Saturday and Sunday 9 am-5 pm.  Pharmacy : Monday -Thursday 8 am-8 pm; Friday 8 am-6 pm; Saturday and Sunday 9 am-5 pm.     Clinic: [phone]   Pharmacy: [phone]

## 2019-08-20 ASSESSMENT — ASTHMA QUESTIONNAIRES: ACT_TOTALSCORE: 10

## 2019-08-26 ENCOUNTER — ANCILLARY PROCEDURE (OUTPATIENT)
Dept: CARDIOLOGY | Facility: CLINIC | Age: 84
End: 2019-08-26
Attending: INTERNAL MEDICINE
Payer: COMMERCIAL

## 2019-08-26 DIAGNOSIS — I44.1 SECOND DEGREE ATRIOVENTRICULAR BLOCK: ICD-10-CM

## 2019-08-26 PROCEDURE — 93296 REM INTERROG EVL PM/IDS: CPT | Mod: ZF

## 2019-08-26 PROCEDURE — 93294 REM INTERROG EVL PM/LDLS PM: CPT | Performed by: INTERNAL MEDICINE

## 2019-08-28 DIAGNOSIS — J44.9 CHRONIC OBSTRUCTIVE PULMONARY DISEASE, UNSPECIFIED COPD TYPE (H): ICD-10-CM

## 2019-08-28 NOTE — TELEPHONE ENCOUNTER
Requested Prescriptions   Pending Prescriptions Disp Refills     MUCINEX 600 MG 12 hr tablet [Pharmacy Med Name: MUCINEX 600MG ER TAB]        Last Written Prescription Date:  04/09/19  Last Fill Quantity: 56,   # refills: 3  Last Office Visit: 08/19/19-[facility]  Future Office visit:       Routing refill request to provider for review/approval because:  Drug not on the G, P or  Health refill protocol or controlled substance 56 tablet 3     Sig: TAKE 1 TABLET BY MOUTH TWICE A DAY       There is no refill protocol information for this order

## 2019-08-29 LAB
MDC_IDC_LEAD_IMPLANT_DT: NORMAL
MDC_IDC_LEAD_IMPLANT_DT: NORMAL
MDC_IDC_LEAD_LOCATION: NORMAL
MDC_IDC_LEAD_LOCATION: NORMAL
MDC_IDC_LEAD_LOCATION_DETAIL_1: NORMAL
MDC_IDC_LEAD_LOCATION_DETAIL_1: NORMAL
MDC_IDC_LEAD_MFG: NORMAL
MDC_IDC_LEAD_MFG: NORMAL
MDC_IDC_LEAD_MODEL: NORMAL
MDC_IDC_LEAD_MODEL: NORMAL
MDC_IDC_LEAD_POLARITY_TYPE: NORMAL
MDC_IDC_LEAD_POLARITY_TYPE: NORMAL
MDC_IDC_LEAD_SERIAL: NORMAL
MDC_IDC_LEAD_SERIAL: NORMAL
MDC_IDC_MSMT_BATTERY_DTM: NORMAL
MDC_IDC_MSMT_BATTERY_REMAINING_LONGEVITY: 87 MO
MDC_IDC_MSMT_BATTERY_RRT_TRIGGER: 2.83
MDC_IDC_MSMT_BATTERY_STATUS: NORMAL
MDC_IDC_MSMT_BATTERY_VOLTAGE: 3.01 V
MDC_IDC_MSMT_LEADCHNL_RA_IMPEDANCE_VALUE: 399 OHM
MDC_IDC_MSMT_LEADCHNL_RA_IMPEDANCE_VALUE: 551 OHM
MDC_IDC_MSMT_LEADCHNL_RA_PACING_THRESHOLD_AMPLITUDE: 0.88 V
MDC_IDC_MSMT_LEADCHNL_RA_PACING_THRESHOLD_PULSEWIDTH: 0.4 MS
MDC_IDC_MSMT_LEADCHNL_RA_SENSING_INTR_AMPL: 3 MV
MDC_IDC_MSMT_LEADCHNL_RA_SENSING_INTR_AMPL: 3 MV
MDC_IDC_MSMT_LEADCHNL_RV_IMPEDANCE_VALUE: 551 OHM
MDC_IDC_MSMT_LEADCHNL_RV_IMPEDANCE_VALUE: 570 OHM
MDC_IDC_MSMT_LEADCHNL_RV_PACING_THRESHOLD_AMPLITUDE: 0.62 V
MDC_IDC_MSMT_LEADCHNL_RV_PACING_THRESHOLD_PULSEWIDTH: 0.4 MS
MDC_IDC_MSMT_LEADCHNL_RV_SENSING_INTR_AMPL: 27.12 MV
MDC_IDC_MSMT_LEADCHNL_RV_SENSING_INTR_AMPL: 27.12 MV
MDC_IDC_PG_IMPLANT_DTM: NORMAL
MDC_IDC_PG_MFG: NORMAL
MDC_IDC_PG_MODEL: NORMAL
MDC_IDC_PG_SERIAL: NORMAL
MDC_IDC_PG_TYPE: NORMAL
MDC_IDC_SESS_CLINIC_NAME: NORMAL
MDC_IDC_SESS_DTM: NORMAL
MDC_IDC_SESS_TYPE: NORMAL
MDC_IDC_SET_BRADY_AT_MODE_SWITCH_RATE: 171 {BEATS}/MIN
MDC_IDC_SET_BRADY_HYSTRATE: NORMAL
MDC_IDC_SET_BRADY_LOWRATE: 60 {BEATS}/MIN
MDC_IDC_SET_BRADY_MAX_SENSOR_RATE: 120 {BEATS}/MIN
MDC_IDC_SET_BRADY_MAX_TRACKING_RATE: 120 {BEATS}/MIN
MDC_IDC_SET_BRADY_MODE: NORMAL
MDC_IDC_SET_BRADY_PAV_DELAY_LOW: 180 MS
MDC_IDC_SET_BRADY_SAV_DELAY_LOW: 150 MS
MDC_IDC_SET_LEADCHNL_RA_PACING_AMPLITUDE: 1.75 V
MDC_IDC_SET_LEADCHNL_RA_PACING_ANODE_ELECTRODE_1: NORMAL
MDC_IDC_SET_LEADCHNL_RA_PACING_ANODE_LOCATION_1: NORMAL
MDC_IDC_SET_LEADCHNL_RA_PACING_CAPTURE_MODE: NORMAL
MDC_IDC_SET_LEADCHNL_RA_PACING_CATHODE_ELECTRODE_1: NORMAL
MDC_IDC_SET_LEADCHNL_RA_PACING_CATHODE_LOCATION_1: NORMAL
MDC_IDC_SET_LEADCHNL_RA_PACING_POLARITY: NORMAL
MDC_IDC_SET_LEADCHNL_RA_PACING_PULSEWIDTH: 0.4 MS
MDC_IDC_SET_LEADCHNL_RA_SENSING_ANODE_ELECTRODE_1: NORMAL
MDC_IDC_SET_LEADCHNL_RA_SENSING_ANODE_LOCATION_1: NORMAL
MDC_IDC_SET_LEADCHNL_RA_SENSING_CATHODE_ELECTRODE_1: NORMAL
MDC_IDC_SET_LEADCHNL_RA_SENSING_CATHODE_LOCATION_1: NORMAL
MDC_IDC_SET_LEADCHNL_RA_SENSING_POLARITY: NORMAL
MDC_IDC_SET_LEADCHNL_RA_SENSING_SENSITIVITY: 0.6 MV
MDC_IDC_SET_LEADCHNL_RV_PACING_AMPLITUDE: 2 V
MDC_IDC_SET_LEADCHNL_RV_PACING_ANODE_ELECTRODE_1: NORMAL
MDC_IDC_SET_LEADCHNL_RV_PACING_ANODE_LOCATION_1: NORMAL
MDC_IDC_SET_LEADCHNL_RV_PACING_CAPTURE_MODE: NORMAL
MDC_IDC_SET_LEADCHNL_RV_PACING_CATHODE_ELECTRODE_1: NORMAL
MDC_IDC_SET_LEADCHNL_RV_PACING_CATHODE_LOCATION_1: NORMAL
MDC_IDC_SET_LEADCHNL_RV_PACING_POLARITY: NORMAL
MDC_IDC_SET_LEADCHNL_RV_PACING_PULSEWIDTH: 0.4 MS
MDC_IDC_SET_LEADCHNL_RV_SENSING_ANODE_ELECTRODE_1: NORMAL
MDC_IDC_SET_LEADCHNL_RV_SENSING_ANODE_LOCATION_1: NORMAL
MDC_IDC_SET_LEADCHNL_RV_SENSING_CATHODE_ELECTRODE_1: NORMAL
MDC_IDC_SET_LEADCHNL_RV_SENSING_CATHODE_LOCATION_1: NORMAL
MDC_IDC_SET_LEADCHNL_RV_SENSING_POLARITY: NORMAL
MDC_IDC_SET_LEADCHNL_RV_SENSING_SENSITIVITY: 0.9 MV
MDC_IDC_SET_ZONE_DETECTION_INTERVAL: 350 MS
MDC_IDC_SET_ZONE_DETECTION_INTERVAL: 400 MS
MDC_IDC_SET_ZONE_TYPE: NORMAL
MDC_IDC_STAT_AT_BURDEN_PERCENT: 0 %
MDC_IDC_STAT_AT_DTM_END: NORMAL
MDC_IDC_STAT_AT_DTM_START: NORMAL
MDC_IDC_STAT_BRADY_AP_VP_PERCENT: 10.23 %
MDC_IDC_STAT_BRADY_AP_VS_PERCENT: 0.01 %
MDC_IDC_STAT_BRADY_AS_VP_PERCENT: 89.68 %
MDC_IDC_STAT_BRADY_AS_VS_PERCENT: 0.08 %
MDC_IDC_STAT_BRADY_DTM_END: NORMAL
MDC_IDC_STAT_BRADY_DTM_START: NORMAL
MDC_IDC_STAT_BRADY_RA_PERCENT_PACED: 10.23 %
MDC_IDC_STAT_BRADY_RV_PERCENT_PACED: 99.82 %
MDC_IDC_STAT_EPISODE_RECENT_COUNT: 0
MDC_IDC_STAT_EPISODE_RECENT_COUNT_DTM_END: NORMAL
MDC_IDC_STAT_EPISODE_RECENT_COUNT_DTM_START: NORMAL
MDC_IDC_STAT_EPISODE_TOTAL_COUNT: 0
MDC_IDC_STAT_EPISODE_TOTAL_COUNT: 9
MDC_IDC_STAT_EPISODE_TOTAL_COUNT_DTM_END: NORMAL
MDC_IDC_STAT_EPISODE_TOTAL_COUNT_DTM_START: NORMAL
MDC_IDC_STAT_EPISODE_TYPE: NORMAL

## 2019-09-03 ENCOUNTER — TELEPHONE (OUTPATIENT)
Dept: FAMILY MEDICINE | Facility: CLINIC | Age: 84
End: 2019-09-03

## 2019-09-03 DIAGNOSIS — J44.9 CHRONIC OBSTRUCTIVE PULMONARY DISEASE, UNSPECIFIED COPD TYPE (H): ICD-10-CM

## 2019-09-03 NOTE — TELEPHONE ENCOUNTER
Requested Prescriptions   Pending Prescriptions Disp Refills     MUCINEX 600 MG 12 hr tablet [Pharmacy Med Name: MUCINEX 600MG ER TAB] 14 tablet      Sig: TAKE 1 TABLET BY MOUTH TWICE A DAY       There is no refill protocol information for this order        Last Written Prescription Date:  8/30/19  Last Fill Quantity: 56,  # refills: 3   Last Office Visit with [facility], [facility] or [facility] prescribing provider:  8/19/19   Future Office Visit:           [facility]

## 2019-09-06 ENCOUNTER — TELEPHONE (OUTPATIENT)
Dept: FAMILY MEDICINE | Facility: CLINIC | Age: 84
End: 2019-09-06

## 2019-09-06 NOTE — TELEPHONE ENCOUNTER
This writer attempted to contact Home[facility] on 09/06/19    Reason for call med request and left detailed message.    When patient calls back, please Relay message Mucinex Rx sent 9/5/19 to [facility]. Please follow up with them regarding ., (read verbatim) .        Serafin Davison

## 2019-09-06 NOTE — TELEPHONE ENCOUNTER
Delegating to team, please return call to Amal with home care and advise that prescription for Mucinex was sent to pharmacy yesterday.  Thank you.    Stefany Browne RN         Disp Refills Start End    MUCINEX 600 MG 12 hr tablet 14 tablet 3 9/5/2019     Sig: TAKE 1 TABLET BY MOUTH TWICE A DAY   Sent to pharmacy as: MUCINEX 600 MG 12 hr tablet   Class: E-Prescribe   E-Prescribing Status: Receipt confirmed by pharmacy (9/5/2019 12:46 PM CDT)  [place]     [facility] [place] [address] [place] [address]

## 2019-09-06 NOTE — TELEPHONE ENCOUNTER
Reason for Call:  Other prescription    Detailed comments: [facility] calling to follow up on Rx request and would like a call back to confirm if Rx was sent for Pt has been waiting.    Phone Number [facility]  can be reached at: Other phone number:  [phone]    Best Time: anytime    Can we leave a detailed message on this number? YES    Call taken on 9/6/2019 at 12:55 PM by Mal Cat

## 2019-10-07 ENCOUNTER — OFFICE VISIT (OUTPATIENT)
Dept: OPTOMETRY | Facility: CLINIC | Age: 84
End: 2019-10-07
Payer: COMMERCIAL

## 2019-10-07 ENCOUNTER — APPOINTMENT (OUTPATIENT)
Dept: OPTOMETRY | Facility: CLINIC | Age: 84
End: 2019-10-07
Payer: COMMERCIAL

## 2019-10-07 DIAGNOSIS — H52.223 REGULAR ASTIGMATISM OF BOTH EYES: ICD-10-CM

## 2019-10-07 DIAGNOSIS — Z01.01 ENCOUNTER FOR EXAMINATION OF EYES AND VISION WITH ABNORMAL FINDINGS: Primary | ICD-10-CM

## 2019-10-07 DIAGNOSIS — H04.123 DRY EYES: ICD-10-CM

## 2019-10-07 DIAGNOSIS — Z96.1 PSEUDOPHAKIA OF BOTH EYES: ICD-10-CM

## 2019-10-07 DIAGNOSIS — H10.45 CHRONIC ALLERGIC CONJUNCTIVITIS: ICD-10-CM

## 2019-10-07 DIAGNOSIS — H52.13 MYOPIA OF BOTH EYES: ICD-10-CM

## 2019-10-07 DIAGNOSIS — H52.4 PRESBYOPIA: ICD-10-CM

## 2019-10-07 PROCEDURE — 92341 FIT SPECTACLES BIFOCAL: CPT | Performed by: OPTOMETRIST

## 2019-10-07 PROCEDURE — 92004 COMPRE OPH EXAM NEW PT 1/>: CPT | Performed by: OPTOMETRIST

## 2019-10-07 ASSESSMENT — REFRACTION_MANIFEST
OS_CYLINDER: +1.50
OS_CYLINDER: +1.00
METHOD_AUTOREFRACTION: 1
OD_SPHERE: -2.50
OD_AXIS: 145
OS_AXIS: 159
OS_SPHERE: -0.75
OD_CYLINDER: +1.50
OD_SPHERE: -2.50
OD_AXIS: 140
OS_AXIS: 155
OS_SPHERE: -1.25
OD_CYLINDER: +1.00

## 2019-10-07 ASSESSMENT — CUP TO DISC RATIO
OS_RATIO: 0.2
OD_RATIO: 0.2

## 2019-10-07 ASSESSMENT — SLIT LAMP EXAM - LIDS
COMMENTS: 1+ MEIBOMIAN GLAND DYSFUNCTION, 1+ BLEPHARITIS
COMMENTS: 1+ MEIBOMIAN GLAND DYSFUNCTION, 1+ BLEPHARITIS

## 2019-10-07 ASSESSMENT — TONOMETRY
OS_IOP_MMHG: 10
IOP_METHOD: APPLANATION
OD_IOP_MMHG: 12

## 2019-10-07 ASSESSMENT — EXTERNAL EXAM - RIGHT EYE: OD_EXAM: NORMAL

## 2019-10-07 ASSESSMENT — EXTERNAL EXAM - LEFT EYE: OS_EXAM: NORMAL

## 2019-10-07 ASSESSMENT — VISUAL ACUITY
OD_CC: 20/40
METHOD: SNELLEN - LINEAR
OS_CC: 20/25

## 2019-10-07 NOTE — PROGRESS NOTES
Chief Complaint   Patient presents with     Annual Eye Exam     Accompanied by   Lab Results   Component Value Date    A1C 5.7 07/23/2016    A1C 6.0 04/23/2013       Last Eye Exam: 10 years  Dilated Previously: No, side effects of dilation explained today    What are you currently using to see?  glasses    Distance Vision Acuity: Satisfied with vision    Near Vision Acuity: Satisfied with vision while reading  with glasses    Eye Comfort: watery and itchy  Do you use eye drops? : No    James Dinh, OD     Medical, surgical and family histories reviewed and updated 10/7/2019.       OBJECTIVE: See Ophthalmology exam    ASSESSMENT:    ICD-10-CM    1. Encounter for examination of eyes and vision with abnormal findings Z01.01 EYE EXAM (SIMPLE-NONBILLABLE)   2. Chronic allergic conjunctivitis H10.45 ketotifen (ZADITOR/REFRESH ANTI-ITCH) 0.025 % ophthalmic solution     EYE EXAM (SIMPLE-NONBILLABLE)   3. Dry eyes H04.123 polyethylene glycol-propylene glycol (SYSTANE) 0.4-0.3 % SOLN ophthalmic solution     EYE EXAM (SIMPLE-NONBILLABLE)   4. Pseudophakia of both eyes Z96.1 EYE EXAM (SIMPLE-NONBILLABLE)   5. Myopia of both eyes H52.13 EYE EXAM (SIMPLE-NONBILLABLE)     Refraction   6. Regular astigmatism of both eyes H52.223 EYE EXAM (SIMPLE-NONBILLABLE)     Refraction   7. Presbyopia H52.4 EYE EXAM (SIMPLE-NONBILLABLE)     Refraction      PLAN:    Gallito Farley aware  eye exam results will be sent to Hakeem Awad.  Patient Instructions    DRY EYE TREATMENT    I recommend using artificial tears for your dry eye. There are over the counter drops that work well and may be used up to 4x daily. ( systane balance, refresh optive, soothe xp)   If you need more than 4 drops daily, use a preservative free product which come in individual vials which may be used for 24 hours and discarded.     Artificial tears work best as a preventative and not as well after your eyes are starting to bother you.  It may take 4-  6 weeks of using the drops before you notice improvement.  If after that time you are still having problems schedule an appointment for an evaluation and discussion of different treatments.  Dry eyes are a chronic condition and you may have more symptoms at certain times of the year.      Additional recommended treatment:  Warm compresses once to twice daily for 5-10 minutes    Directions for warm soaks  There are few methods for hot compresses. Moisten a washcloth with hot water, or microwave for 10 seconds, being careful to not get the cloth too hot.   Then put the washcloth onto your eyelids for 5 minutes. It will cool quickly so a rice pack or eyemask that can be heated and laid on top of the washcloth will help retain the heat.        You have allergies that are affecting your eyes.  This can cause itching and tearing of the eyes.  I recommend ketotifen drops (Alaway or Zaditor) to be used 2x daily in each eye. These drops are available over the counter. Cold compresses can also make things more comfortable.  Try not to rub the eyes.      Gallito was advised of today's exam findings.  Fill glasses prescription  Wear glasses full time  Copy of glasses Rx provided today.    Return in 1 year for eye exam, or sooner if needed.    The effects of the dilating drops last for 4- 6 hours.  You will be more sensitive to light and vision will be blurry up close.  Mydriatic sunglasses were given if needed.      James Dinh O.D.  [address] [place]  [address]    [phone]

## 2019-10-07 NOTE — LETTER
10/7/2019         RE: Gallito Farley  [address] [address]        Dear Colleague,    Thank you for referring your patient, Gallito Farley, to the [facility]. Please see a copy of my visit note below.    Chief Complaint   Patient presents with     Annual Eye Exam     Accompanied by   Lab Results   Component Value Date    A1C 5.7 07/23/2016    A1C 6.0 04/23/2013       Last Eye Exam: 10 years  Dilated Previously: No, side effects of dilation explained today    What are you currently using to see?  glasses    Distance Vision Acuity: Satisfied with vision    Near Vision Acuity: Satisfied with vision while reading  with glasses    Eye Comfort: watery and itchy  Do you use eye drops? : No    James Dinh, OD     Medical, surgical and family histories reviewed and updated 10/7/2019.       OBJECTIVE: See Ophthalmology exam    ASSESSMENT:    ICD-10-CM    1. Encounter for examination of eyes and vision with abnormal findings Z01.01 EYE EXAM (SIMPLE-NONBILLABLE)   2. Chronic allergic conjunctivitis H10.45 ketotifen (ZADITOR/REFRESH ANTI-ITCH) 0.025 % ophthalmic solution     EYE EXAM (SIMPLE-NONBILLABLE)   3. Dry eyes H04.123 polyethylene glycol-propylene glycol (SYSTANE) 0.4-0.3 % SOLN ophthalmic solution     EYE EXAM (SIMPLE-NONBILLABLE)   4. Pseudophakia of both eyes Z96.1 EYE EXAM (SIMPLE-NONBILLABLE)   5. Myopia of both eyes H52.13 EYE EXAM (SIMPLE-NONBILLABLE)     Refraction   6. Regular astigmatism of both eyes H52.223 EYE EXAM (SIMPLE-NONBILLABLE)     Refraction   7. Presbyopia H52.4 EYE EXAM (SIMPLE-NONBILLABLE)     Refraction      PLAN:    Gallito Farley aware  eye exam results will be sent to Hakeem Awad.  Patient Instructions    DRY EYE TREATMENT    I recommend using artificial tears for your dry eye. There are over the counter drops that work well and may be used up to 4x daily. ( systane balance, refresh optive, soothe xp)   If you need more than 4  drops daily, use a preservative free product which come in individual vials which may be used for 24 hours and discarded.     Artificial tears work best as a preventative and not as well after your eyes are starting to bother you.  It may take 4- 6 weeks of using the drops before you notice improvement.  If after that time you are still having problems schedule an appointment for an evaluation and discussion of different treatments.  Dry eyes are a chronic condition and you may have more symptoms at certain times of the year.      Additional recommended treatment:  Warm compresses once to twice daily for 5-10 minutes    Directions for warm soaks  There are few methods for hot compresses. Moisten a washcloth with hot water, or microwave for 10 seconds, being careful to not get the cloth too hot.   Then put the washcloth onto your eyelids for 5 minutes. It will cool quickly so a rice pack or eyemask that can be heated and laid on top of the washcloth will help retain the heat.        You have allergies that are affecting your eyes.  This can cause itching and tearing of the eyes.  I recommend ketotifen drops (Alaway or Zaditor) to be used 2x daily in each eye. These drops are available over the counter. Cold compresses can also make things more comfortable.  Try not to rub the eyes.      Gallito was advised of today's exam findings.  Fill glasses prescription  Wear glasses full time  Copy of glasses Rx provided today.    Return in 1 year for eye exam, or sooner if needed.    The effects of the dilating drops last for 4- 6 hours.  You will be more sensitive to light and vision will be blurry up close.  Mydriatic sunglasses were given if needed.      James Dinh O.D.  [address]  [address]    [phone]             Again, thank you for allowing me to participate in the care of your patient.        Sincerely,        James Dinh, TAYLOR     Xenograft Text: The defect edges were debeveled with a #15 scalpel blade.  Given the location of the defect, shape of the defect and the proximity to free margins a xenograft was deemed most appropriate.  The graft was then trimmed to fit the size of the defect.  The graft was then placed in the primary defect and oriented appropriately.

## 2019-10-07 NOTE — PATIENT INSTRUCTIONS
DRY EYE TREATMENT    I recommend using artificial tears for your dry eye. There are over the counter drops that work well and may be used up to 4x daily. ( systane balance, refresh optive, soothe xp)   If you need more than 4 drops daily, use a preservative free product which come in individual vials which may be used for 24 hours and discarded.     Artificial tears work best as a preventative and not as well after your eyes are starting to bother you.  It may take 4- 6 weeks of using the drops before you notice improvement.  If after that time you are still having problems schedule an appointment for an evaluation and discussion of different treatments.  Dry eyes are a chronic condition and you may have more symptoms at certain times of the year.      Additional recommended treatment:  Warm compresses once to twice daily for 5-10 minutes    Directions for warm soaks  There are few methods for hot compresses. Moisten a washcloth with hot water, or microwave for 10 seconds, being careful to not get the cloth too hot.   Then put the washcloth onto your eyelids for 5 minutes. It will cool quickly so a rice pack or eyemask that can be heated and laid on top of the washcloth will help retain the heat.        You have allergies that are affecting your eyes.  This can cause itching and tearing of the eyes.  I recommend ketotifen drops (Alaway or Zaditor) to be used 2x daily in each eye. These drops are available over the counter. Cold compresses can also make things more comfortable.  Try not to rub the eyes.      Gallito was advised of today's exam findings.  Fill glasses prescription  Wear glasses full time  Copy of glasses Rx provided today.    Return in 1 year for eye exam, or sooner if needed.    The effects of the dilating drops last for 4- 6 hours.  You will be more sensitive to light and vision will be blurry up close.  Mydriatic sunglasses were given if needed.      James Dinh O.D.  [facility] -  [place]  [address]  [place]  [address]    [phone]

## 2019-10-08 ASSESSMENT — CONF VISUAL FIELD
OD_NORMAL: 1
OS_NORMAL: 1

## 2019-10-08 ASSESSMENT — REFRACTION_MANIFEST
OD_ADD: +2.25
OS_ADD: +2.25

## 2019-10-17 ENCOUNTER — TELEPHONE (OUTPATIENT)
Dept: FAMILY MEDICINE | Facility: CLINIC | Age: 84
End: 2019-10-17

## 2019-10-17 NOTE — TELEPHONE ENCOUNTER
Reason for Call:  Home Health Care    Refaxing home care orders seen 6/10 than discontinued tolcerodine and needs order for this dicontinue.    Pt Provider: [facility]    Phone Number Homecare Nurse can be reached at: Kate [facility]  [phone]    Can we leave a detailed message on this number? YES    Best Time: any    Call taken on 10/17/2019 at 10:53 AM by Mary Miner

## 2019-10-21 ENCOUNTER — MEDICAL CORRESPONDENCE (OUTPATIENT)
Dept: HEALTH INFORMATION MANAGEMENT | Facility: CLINIC | Age: 84
End: 2019-10-21

## 2019-10-22 NOTE — TELEPHONE ENCOUNTER
Reason for Call:  Other form    Detailed comments: Kate did not receive the information she was asking for and asking to fax it again and if it does not go through, to mail it to,  Kate [facility] [place]  [place], [address]    Phone Number Patient can be reached at: Other phone number:  [phone]    Best Time: any    Can we leave a detailed message on this number? YES    Call taken on 10/22/2019 at 10:42 AM by Adalgisa Cisneros

## 2019-11-13 ENCOUNTER — TELEPHONE (OUTPATIENT)
Dept: FAMILY MEDICINE | Facility: CLINIC | Age: 84
End: 2019-11-13

## 2019-11-13 NOTE — TELEPHONE ENCOUNTER
.Reason for Call:    FYI // update    Detailed comments: Patient did not receive eye drops-    ketotifen (ZADITOR/REFRESH ANTI-ITCH) 0.025 % ophthalmic solution/ polyethylene     glycol-propylene glycol (SYSTANE) 0.4-0.3 % SOLN ophthalmic solution    * These will be delivered today as they were out of stock;       Phone Number Patient can be reached at:  phone number:  [phone]    Best Time: anytime    Can we leave a detailed message on this number? YES    Call taken on 11/13/2019 at 1:34 PM by Aimee MARTINEZ

## 2019-11-18 ENCOUNTER — PATIENT OUTREACH (OUTPATIENT)
Dept: GERIATRIC MEDICINE | Facility: CLINIC | Age: 84
End: 2019-11-18

## 2019-11-19 NOTE — PROGRESS NOTES
TRANSITIONS OF CARE (OLIMPIA) LOG   OLIMPIA tasks should be completed by the CC within one (1) business day of notification of each transition. Follow up contact with member is required after return to their usual care setting.  Note:  If CC finds out about the transitions fifteen (15) days or more after the member has returned to their usual care setting, no OLIMPIA log is needed. However, the CC should check in with the member to discuss the transition process, any changes needed to the care plan and document it in a case note.    Member Name:  Gallito Farley MCO Name:  skinny MCO/Health Plan Member ID#:    Product: [employer] Care Coordinator Contact:  Jorge David RN, N Agency/[facility]Care System: [facility]   Transition Communication Actions from Care Management Contact   Transition #1   Notification Date: 11/18/19 Transition Date:   11/15/19 Transition From: Home     Is this the member s usual care setting?               yes [facility]: [facility]    Transition Type:  Unplanned  Reason for Admission/Comments:  11/18/19: Care Coordinator received notification of hospitalization 11/15 - 11/17/19 for COPD exacerbation, pneumonia.   Care Coordinator placed call to Joe SERRATO.  Member doing well - no needs.  Member has f/u with PCP on 11/25/19.      Shared CC contact info, care plan/services with receiving setting--Date completed: 11/18/19    Notified PCP of transition--Date completed:  11/18/19     via  EMR   Transition #2   Transition #3  (if applicable)   Notification Date: 11/18/19         Transition To:  Home  Transition Date: 11/17/19     Transition Type:    Planned  Notified PCP -- Date completed: EMR              Shared CC contact info, care plan/services with receiving setting or, if applicable, home care agency--Date completed:  11/18/19  *Complete additional tasks below, if this transition is a return to usual care setting.      Comments:  See above    Notification Date:          Transition To:      Transition Date:              Transition Type:    Planned  Notified PCP--Date completed:          Shared CC contact info, care plan/services with receiving setting or, if applicable, home care agency--Date completed:       *Complete additional tasks below, if this transition is a return to usual care setting.      Comments:       *Complete tasks below when the member is discharging TO their usual care setting within one (1) business day of notification.  For situations where the Care Coordinator is notified of the discharge prior to the date of discharge, the Care Coordinator must follow up with the member or designated representative to confirm that discharge actually occurred and discuss required OLIMPIA tasks as outlined in the OLIMPIA Instructions.  (This includes situations where it may be a  new  usual care setting for the member. (i.e., a community member who decides upon permanent nursing home placement following hospitalization and rehab).    Date completed: 11/18/19  Communicated with member or their designated representative about the following:  care transition process; about changes to the member s health status; plan of care updates; education about transitions and how to prevent unplanned transitions/readmissions  Four Pillars for Optimal Transition:    Check  Yes  - if the member, family member and/or SNF/facility staff manages the following:    If  No  provide explanation in the comments section.          [x]  Yes     []  No     Does the member have a follow-up appointment scheduled with primary care or specialist? (Mental health hospitalizations--the appt. should be w/in 7 days)   [x]  Yes     []  No     Can the member manage their medications or is there a system in place to manage medications (e.g. home care set-up)?         [x]  Yes     []  No     Can the member verbalize warning signs and symptoms to watch for and how to respond?         []  Yes     [x]  No     Does the member use a Personal Health  Care Record?  Check  Yes  if visit summary, discharge summary, and/or healthcare summary are being used as a PHR.                                                                                                                                                                                    [x] Yes      [] No      Have you updated the member s care plan?  If  No  provide explanation in comments.   Comments:

## 2019-11-25 ENCOUNTER — OFFICE VISIT (OUTPATIENT)
Dept: FAMILY MEDICINE | Facility: CLINIC | Age: 84
End: 2019-11-25
Payer: COMMERCIAL

## 2019-11-25 VITALS
DIASTOLIC BLOOD PRESSURE: 62 MMHG | HEART RATE: 105 BPM | TEMPERATURE: 99.2 F | SYSTOLIC BLOOD PRESSURE: 103 MMHG | RESPIRATION RATE: 16 BRPM | HEIGHT: 71 IN | BODY MASS INDEX: 26.6 KG/M2 | OXYGEN SATURATION: 96 %

## 2019-11-25 DIAGNOSIS — J18.9 PNEUMONIA OF LEFT LOWER LOBE DUE TO INFECTIOUS ORGANISM: Primary | ICD-10-CM

## 2019-11-25 PROCEDURE — 99214 OFFICE O/P EST MOD 30 MIN: CPT | Performed by: INTERNAL MEDICINE

## 2019-11-25 RX ORDER — LEVOFLOXACIN 250 MG/1
250 TABLET, FILM COATED ORAL DAILY
Qty: 10 TABLET | Refills: 0 | Status: SHIPPED | OUTPATIENT
Start: 2019-11-25 | End: 2019-12-17

## 2019-11-25 RX ORDER — LEVOFLOXACIN 500 MG/1
500 TABLET, FILM COATED ORAL DAILY
Qty: 10 TABLET | Refills: 0 | Status: SHIPPED | OUTPATIENT
Start: 2019-11-25 | End: 2019-11-25 | Stop reason: DRUGHIGH

## 2019-11-25 RX ORDER — LANOLIN ALCOHOL/MO/W.PET/CERES
3 CREAM (GRAM) TOPICAL
COMMUNITY
Start: 2019-11-17 | End: 2023-08-23

## 2019-11-25 ASSESSMENT — PAIN SCALES - GENERAL: PAINLEVEL: SEVERE PAIN (7)

## 2019-11-25 NOTE — PATIENT INSTRUCTIONS
At [facility], we strive to deliver an exceptional experience to you, every time we see you.  If you receive a survey in the mail, please send us back your thoughts. We really do value your feedback.    Based on your medical history, these are the current health maintenance/preventive care services that you are due for (some may have been done at this visit.)  Health Maintenance Due   Topic Date Due     COPD ACTION PLAN  01/01/1934     ZOSTER IMMUNIZATION (1 of 2) 01/01/1984     MEDICARE ANNUAL WELLNESS VISIT  01/01/1999     ASTHMA ACTION PLAN  08/03/2017     PNEUMOCOCCAL IMMUNIZATION 65+ LOW/MEDIUM RISK (2 of 2 - PCV13) 08/03/2017     INFLUENZA VACCINE (1) 09/01/2019         Suggested websites for health information:  Www.FSI.org : Up to date and easily searchable information on multiple topics.  Www.[employer].gov : medication info, interactive tutorials, watch real surgeries online  Www.familydoctor.org : good info from the Academy of Family Physicians  Www.cdc.gov : public health info, travel advisories, epidemics (H1N1)  Www.aap.org : children's health info, normal development, vaccinations  Www.health.[facility].[place].us : [place] dept of health, public health issues in [place], N1N1    Your care team:                            Family Medicine Internal Medicine   MD Hakeem Yuen MD Shantel Branch-Fleming, MD Katya Georgiev PA-C Nam Ho, MD Pediatrics   MERRILL Tse, MD Reyna Alejandro CNP, MD Deborah Mielke, MD Kim Thein, APRNORMA KING      Clinic hours: Monday - Thursday 7 am-7 pm; Fridays 7 am-5 pm.   Urgent care: Monday - Friday 11 am-9 pm; Saturday and Sunday 9 am-5 pm.  Pharmacy : Monday -Thursday 8 am-8 pm; Friday 8 am-6 pm; Saturday and Sunday 9 am-5 pm.     Clinic: [phone]   Pharmacy: [phone]

## 2019-11-25 NOTE — PROGRESS NOTES
Subjective     Gallito Farley is a 85 year old male who presents to clinic today for the following health issues:    HPI       Hospital Follow-up Visit:    Hospital/Nursing Home/IP Rehab Facility: [facility]  Date of Admission: 11/15/19  Date of Discharge: 11/17/2019  Reason(s) for Admission: Breathing Problems            Problems taking medications regularly:  None       Medication changes since discharge: None       Problems adhering to non-medication therapy:  None    Summary of hospitalization:  CareEverywhere information obtained and reviewed  Diagnostic Tests/Treatments reviewed.  Follow up needed: yes.  Other Healthcare Providers Involved in Patient s Care:         None  Update since discharge: fluctuating course.     Post Discharge Medication Reconciliation: discharge medications reconciled and changed, per note/orders (see AVS).  Plan of care communicated with patient and family     Coding guidelines for this visit:  Type of Medical   Decision Making Face-to-Face Visit       within 7 Days of discharge Face-to-Face Visit        within 14 days of discharge   Moderate Complexity [id number]   High Complexity [id number]                Patient Active Problem List   Diagnosis     Chronic constipation     HTN (hypertension)     Benign prostatic hyperplasia     Insomnia     GERD (gastroesophageal reflux disease)     Atrioventricular block, complete (H)     Advanced directives, counseling/discussion     Cardiac pacemaker in situ- Medtronic, dual chamber- NOT dependent     Health Care Home     Chronic obstructive pulmonary disease, unspecified COPD type (H)     Past Surgical History:   Procedure Laterality Date     CHOLECYSTECTOMY       ENT SURGERY       IRRIGATION AND DEBRIDEMENT HIP, COMBINED  4/18/2013    Procedure: COMBINED IRRIGATION AND DEBRIDEMENT HIP;  Irrigation and debridement of Right Hip with cultures;  Surgeon: Cristal Inman MD;  Location: [facility] OR     Partial pneumonectomy      done  in [facility] in the 1990's     skin grafting - leg wound  6/2016       Social History     Tobacco Use     Smoking status: Former Smoker     Smokeless tobacco: Never Used   Substance Use Topics     Alcohol use: No     Family History   Problem Relation Age of Onset     Family History Negative Mother          No Known Allergies  Recent Labs   Lab Test 10/02/18  1709 04/04/18  1238  01/11/17  1734  07/24/16  0628 07/23/16  0530  06/13/16  1612  04/24/13  0643 04/23/13  0751   A1C  --   --   --   --   --   --  5.7  --   --   --   --  6.0   LDL  --   --   --   --   --   --   --   --   --   --  93  --    HDL  --   --   --   --   --   --   --   --   --   --  20*  --    TRIG  --   --   --   --   --   --   --   --   --   --  148  --    ALT 25 22  --  14  --   --  15   < > 57   < >  --   --    CR 1.03 1.06   < > 0.96   < >  --  0.88   < > 0.90   < > 1.11 1.12   GFRESTIMATED 69 67   < > 75   < >  --  83   < > 81   < > 64 63   GFRESTBLACK 83 80   < > >90   GFR Calc     < >  --  >90   GFR Calc     < > >90   GFR Calc     < > 77 77   POTASSIUM 3.7 3.3*   < > 5.1   < >  --  4.1   < > 3.8   < > 3.7 3.7   TSH  --   --   --   --   --  1.04  --   --  0.20*   < >  --   --     < > = values in this interval not displayed.      BP Readings from Last 3 Encounters:   11/25/19 103/62   08/19/19 98/65   05/21/19 90/54    Wt Readings from Last 3 Encounters:   08/19/19 86.2 kg (190 lb)   05/21/19 87.1 kg (192 lb)   05/07/19 80 kg (176 lb 5.9 oz)                      Reviewed and updated as needed this visit by Provider         Review of Systems   ROS COMP: CONSTITUTIONAL: NEGATIVE for fever, chills, change in weight  INTEGUMENTARY/SKIN: NEGATIVE for worrisome rashes, moles or lesions  EYES: NEGATIVE for vision changes or irritation  ENT/MOUTH: NEGATIVE for ear, mouth and throat problems  RESP: NEGATIVE for significant cough or SOB  CV: NEGATIVE for chest pain, palpitations or peripheral edema  GI:  "NEGATIVE for nausea, abdominal pain, heartburn, or change in bowel habits  : NEGATIVE for frequency, dysuria, or hematuria  MUSCULOSKELETAL: NEGATIVE for significant arthralgias or myalgia  NEURO: NEGATIVE for weakness, dizziness or paresthesias  ENDOCRINE: NEGATIVE for temperature intolerance, skin/hair changes  HEME: NEGATIVE for bleeding problems  PSYCHIATRIC: NEGATIVE for changes in mood or affect      Objective    /62 (BP Location: Left arm, Patient Position: Chair, Cuff Size: Adult Regular)   Pulse 105   Temp 99.2  F (37.3  C) (Oral)   Resp 16   Ht 1.8 m (5' 10.87\")   SpO2 96%   BMI 26.60 kg/m    Body mass index is 26.6 kg/m .  Physical Exam   GENERAL: healthy, alert and no distress  EYES: Eyes grossly normal to inspection, PERRL and conjunctivae and sclerae normal  HENT: ear canals and TM's normal, nose and mouth without ulcers or lesions  NECK: no adenopathy, no asymmetry, masses, or scars and thyroid normal to palpation  RESP: lungs clear to auscultation - no rales, rhonchi or wheezes  CV: regular rate and rhythm, normal S1 S2, no S3 or S4, no murmur, click or rub, no peripheral edema and peripheral pulses strong  ABDOMEN: soft, nontender, no hepatosplenomegaly, no masses and bowel sounds normal  MS: no gross musculoskeletal defects noted, no edema  SKIN: no suspicious lesions or rashes  NEURO: Normal strength and tone, mentation intact and speech normal  PSYCH: mentation appears normal, affect normal/bright    Diagnostic Test Results:  Labs reviewed in Epic        Assessment & Plan     1. Pneumonia of left lower lobe due to infectious organism (H)    - levofloxacin (LEVAQUIN) 250 MG tablet; Take 1 tablet (250 mg) by mouth daily for 10 days  Dispense: 10 tablet; Refill: 0     BMI:   Estimated body mass index is 26.6 kg/m  as calculated from the following:    Height as of this encounter: 1.8 m (5' 10.87\").    Weight as of 8/19/19: 86.2 kg (190 lb).   Weight management plan: diet and " exercise.           Return in about 10 days (around 12/5/2019).    Hakeem Awad MD  [facility]

## 2019-11-26 ASSESSMENT — PATIENT HEALTH QUESTIONNAIRE - PHQ9: SUM OF ALL RESPONSES TO PHQ QUESTIONS 1-9: 3

## 2019-11-27 ASSESSMENT — ASTHMA QUESTIONNAIRES: ACT_TOTALSCORE: 14

## 2019-11-29 ENCOUNTER — ANCILLARY PROCEDURE (OUTPATIENT)
Dept: CARDIOLOGY | Facility: CLINIC | Age: 84
End: 2019-11-29
Attending: INTERNAL MEDICINE
Payer: COMMERCIAL

## 2019-11-29 DIAGNOSIS — I44.1 SECOND DEGREE ATRIOVENTRICULAR BLOCK: ICD-10-CM

## 2019-11-29 PROCEDURE — 93296 REM INTERROG EVL PM/IDS: CPT | Mod: ZF

## 2019-11-29 PROCEDURE — 93294 REM INTERROG EVL PM/LDLS PM: CPT | Mod: ZP | Performed by: INTERNAL MEDICINE

## 2019-12-03 ENCOUNTER — TELEPHONE (OUTPATIENT)
Dept: FAMILY MEDICINE | Facility: CLINIC | Age: 84
End: 2019-12-03

## 2019-12-03 NOTE — TELEPHONE ENCOUNTER
Reason for Call:  Other prescription    Detailed comments: Patient had not been given the medication levofloxacin for the last 4 days and will give today and continue unless Doctor wants to stop or do the whole course, please call to let them know what doctor wants to do.    Phone Number Patient can be reached at: Other phone number:  [phone]    Best Time: any    Can we leave a detailed message on this number? YES    Call taken on 12/3/2019 at 4:51 PM by Adalgisa Cisneros

## 2019-12-03 NOTE — TELEPHONE ENCOUNTER
Huddled with Dr. Awad continue medication & follow up to recheck.     This writer attempted to contact Christy on 12/03/19      Reason for call informed message above and left detailed message.      If patient calls back:   1st floor [facility] Care Team (MA/TC) called. Inform patient that someone from the team will contact them, document that pt called and route to care team.         Kalpana Cardenas MA

## 2019-12-09 LAB
MDC_IDC_LEAD_IMPLANT_DT: NORMAL
MDC_IDC_LEAD_IMPLANT_DT: NORMAL
MDC_IDC_LEAD_LOCATION: NORMAL
MDC_IDC_LEAD_LOCATION: NORMAL
MDC_IDC_LEAD_LOCATION_DETAIL_1: NORMAL
MDC_IDC_LEAD_LOCATION_DETAIL_1: NORMAL
MDC_IDC_LEAD_MFG: NORMAL
MDC_IDC_LEAD_MFG: NORMAL
MDC_IDC_LEAD_MODEL: NORMAL
MDC_IDC_LEAD_MODEL: NORMAL
MDC_IDC_LEAD_POLARITY_TYPE: NORMAL
MDC_IDC_LEAD_POLARITY_TYPE: NORMAL
MDC_IDC_LEAD_SERIAL: NORMAL
MDC_IDC_LEAD_SERIAL: NORMAL
MDC_IDC_MSMT_BATTERY_DTM: NORMAL
MDC_IDC_MSMT_BATTERY_REMAINING_LONGEVITY: 83 MO
MDC_IDC_MSMT_BATTERY_RRT_TRIGGER: 2.83
MDC_IDC_MSMT_BATTERY_STATUS: NORMAL
MDC_IDC_MSMT_BATTERY_VOLTAGE: 3.01 V
MDC_IDC_MSMT_LEADCHNL_RA_IMPEDANCE_VALUE: 399 OHM
MDC_IDC_MSMT_LEADCHNL_RA_IMPEDANCE_VALUE: 513 OHM
MDC_IDC_MSMT_LEADCHNL_RA_PACING_THRESHOLD_AMPLITUDE: 0.75 V
MDC_IDC_MSMT_LEADCHNL_RA_PACING_THRESHOLD_PULSEWIDTH: 0.4 MS
MDC_IDC_MSMT_LEADCHNL_RA_SENSING_INTR_AMPL: 2.25 MV
MDC_IDC_MSMT_LEADCHNL_RA_SENSING_INTR_AMPL: 2.25 MV
MDC_IDC_MSMT_LEADCHNL_RV_IMPEDANCE_VALUE: 551 OHM
MDC_IDC_MSMT_LEADCHNL_RV_IMPEDANCE_VALUE: 570 OHM
MDC_IDC_MSMT_LEADCHNL_RV_PACING_THRESHOLD_AMPLITUDE: 0.62 V
MDC_IDC_MSMT_LEADCHNL_RV_PACING_THRESHOLD_PULSEWIDTH: 0.4 MS
MDC_IDC_MSMT_LEADCHNL_RV_SENSING_INTR_AMPL: 8.5 MV
MDC_IDC_MSMT_LEADCHNL_RV_SENSING_INTR_AMPL: 8.5 MV
MDC_IDC_PG_IMPLANT_DTM: NORMAL
MDC_IDC_PG_MFG: NORMAL
MDC_IDC_PG_MODEL: NORMAL
MDC_IDC_PG_SERIAL: NORMAL
MDC_IDC_PG_TYPE: NORMAL
MDC_IDC_SESS_CLINIC_NAME: NORMAL
MDC_IDC_SESS_DTM: NORMAL
MDC_IDC_SESS_TYPE: NORMAL
MDC_IDC_SET_BRADY_AT_MODE_SWITCH_RATE: 171 {BEATS}/MIN
MDC_IDC_SET_BRADY_HYSTRATE: NORMAL
MDC_IDC_SET_BRADY_LOWRATE: 60 {BEATS}/MIN
MDC_IDC_SET_BRADY_MAX_SENSOR_RATE: 120 {BEATS}/MIN
MDC_IDC_SET_BRADY_MAX_TRACKING_RATE: 120 {BEATS}/MIN
MDC_IDC_SET_BRADY_MODE: NORMAL
MDC_IDC_SET_BRADY_PAV_DELAY_LOW: 180 MS
MDC_IDC_SET_BRADY_SAV_DELAY_LOW: 150 MS
MDC_IDC_SET_LEADCHNL_RA_PACING_AMPLITUDE: 1.5 V
MDC_IDC_SET_LEADCHNL_RA_PACING_ANODE_ELECTRODE_1: NORMAL
MDC_IDC_SET_LEADCHNL_RA_PACING_ANODE_LOCATION_1: NORMAL
MDC_IDC_SET_LEADCHNL_RA_PACING_CAPTURE_MODE: NORMAL
MDC_IDC_SET_LEADCHNL_RA_PACING_CATHODE_ELECTRODE_1: NORMAL
MDC_IDC_SET_LEADCHNL_RA_PACING_CATHODE_LOCATION_1: NORMAL
MDC_IDC_SET_LEADCHNL_RA_PACING_POLARITY: NORMAL
MDC_IDC_SET_LEADCHNL_RA_PACING_PULSEWIDTH: 0.4 MS
MDC_IDC_SET_LEADCHNL_RA_SENSING_ANODE_ELECTRODE_1: NORMAL
MDC_IDC_SET_LEADCHNL_RA_SENSING_ANODE_LOCATION_1: NORMAL
MDC_IDC_SET_LEADCHNL_RA_SENSING_CATHODE_ELECTRODE_1: NORMAL
MDC_IDC_SET_LEADCHNL_RA_SENSING_CATHODE_LOCATION_1: NORMAL
MDC_IDC_SET_LEADCHNL_RA_SENSING_POLARITY: NORMAL
MDC_IDC_SET_LEADCHNL_RA_SENSING_SENSITIVITY: 0.6 MV
MDC_IDC_SET_LEADCHNL_RV_PACING_AMPLITUDE: 2 V
MDC_IDC_SET_LEADCHNL_RV_PACING_ANODE_ELECTRODE_1: NORMAL
MDC_IDC_SET_LEADCHNL_RV_PACING_ANODE_LOCATION_1: NORMAL
MDC_IDC_SET_LEADCHNL_RV_PACING_CAPTURE_MODE: NORMAL
MDC_IDC_SET_LEADCHNL_RV_PACING_CATHODE_ELECTRODE_1: NORMAL
MDC_IDC_SET_LEADCHNL_RV_PACING_CATHODE_LOCATION_1: NORMAL
MDC_IDC_SET_LEADCHNL_RV_PACING_POLARITY: NORMAL
MDC_IDC_SET_LEADCHNL_RV_PACING_PULSEWIDTH: 0.4 MS
MDC_IDC_SET_LEADCHNL_RV_SENSING_ANODE_ELECTRODE_1: NORMAL
MDC_IDC_SET_LEADCHNL_RV_SENSING_ANODE_LOCATION_1: NORMAL
MDC_IDC_SET_LEADCHNL_RV_SENSING_CATHODE_ELECTRODE_1: NORMAL
MDC_IDC_SET_LEADCHNL_RV_SENSING_CATHODE_LOCATION_1: NORMAL
MDC_IDC_SET_LEADCHNL_RV_SENSING_POLARITY: NORMAL
MDC_IDC_SET_LEADCHNL_RV_SENSING_SENSITIVITY: 0.9 MV
MDC_IDC_SET_ZONE_DETECTION_INTERVAL: 350 MS
MDC_IDC_SET_ZONE_DETECTION_INTERVAL: 400 MS
MDC_IDC_SET_ZONE_TYPE: NORMAL
MDC_IDC_STAT_AT_BURDEN_PERCENT: 0.1 %
MDC_IDC_STAT_AT_DTM_END: NORMAL
MDC_IDC_STAT_AT_DTM_START: NORMAL
MDC_IDC_STAT_BRADY_AP_VP_PERCENT: 20.89 %
MDC_IDC_STAT_BRADY_AP_VS_PERCENT: 0.09 %
MDC_IDC_STAT_BRADY_AS_VP_PERCENT: 77.43 %
MDC_IDC_STAT_BRADY_AS_VS_PERCENT: 1.59 %
MDC_IDC_STAT_BRADY_DTM_END: NORMAL
MDC_IDC_STAT_BRADY_DTM_START: NORMAL
MDC_IDC_STAT_BRADY_RA_PERCENT_PACED: 20.44 %
MDC_IDC_STAT_BRADY_RV_PERCENT_PACED: 96.88 %
MDC_IDC_STAT_EPISODE_RECENT_COUNT: 0
MDC_IDC_STAT_EPISODE_RECENT_COUNT: 2
MDC_IDC_STAT_EPISODE_RECENT_COUNT_DTM_END: NORMAL
MDC_IDC_STAT_EPISODE_RECENT_COUNT_DTM_START: NORMAL
MDC_IDC_STAT_EPISODE_TOTAL_COUNT: 0
MDC_IDC_STAT_EPISODE_TOTAL_COUNT: 9
MDC_IDC_STAT_EPISODE_TOTAL_COUNT_DTM_END: NORMAL
MDC_IDC_STAT_EPISODE_TOTAL_COUNT_DTM_START: NORMAL
MDC_IDC_STAT_EPISODE_TYPE: NORMAL

## 2019-12-13 DIAGNOSIS — K21.9 GASTROESOPHAGEAL REFLUX DISEASE, ESOPHAGITIS PRESENCE NOT SPECIFIED: ICD-10-CM

## 2019-12-13 DIAGNOSIS — J44.9 CHRONIC OBSTRUCTIVE PULMONARY DISEASE, UNSPECIFIED COPD TYPE (H): ICD-10-CM

## 2019-12-13 NOTE — TELEPHONE ENCOUNTER
"Requested Prescriptions   Pending Prescriptions Disp Refills     MUCINEX 600 MG 12 hr tablet [Pharmacy Med Name: MUCINEX 600MG ER TAB] 56 tablet      Sig: TAKE 1 TABLET BY MOUTH TWICE A DAY       There is no refill protocol information for this order        ranitidine (ZANTAC) 300 MG tablet [Pharmacy Med Name: RANITIDINE 300MG TAB] 28 tablet      Sig: TAKE 1/2 TABLET (150MG) BY MOUTH TWICE A DAY       H2 Blockers Protocol Passed - 12/13/2019 10:40 AM        Passed - Patient is age 12 or older        Passed - Recent (12 mo) or future (30 days) visit within the authorizing provider's specialty     Patient has had an office visit with the authorizing provider or a provider within the authorizing providers department within the previous 12 mos or has a future within next 30 days. See \"Patient Info\" tab in inbasket, or \"Choose Columns\" in Meds & Orders section of the refill encounter.              Passed - Medication is active on med list        Last Written Prescription Date:  9/5/19  Last Fill Quantity: 14,  # refills: 3   Last office visit: 11/25/2019 with prescribing provider:  Hakeem Awad     Future Office Visit:   Next 5 appointments (look out 90 days)    Dec 17, 2019 11:15 AM CST  SHORT with Hakeem Awad MD, [employer]  [facility] ([facility]) [address] [place] [id number] [phone]           "

## 2019-12-13 NOTE — TELEPHONE ENCOUNTER
Routing refill request to provider for review/approval because:  Drug not on the [facility] refill protocol   Please see the pharmacy note on request.    Jessica Woods RN, [facility]

## 2019-12-17 ENCOUNTER — ANCILLARY PROCEDURE (OUTPATIENT)
Dept: GENERAL RADIOLOGY | Facility: CLINIC | Age: 84
End: 2019-12-17
Attending: INTERNAL MEDICINE
Payer: COMMERCIAL

## 2019-12-17 ENCOUNTER — TELEPHONE (OUTPATIENT)
Dept: FAMILY MEDICINE | Facility: CLINIC | Age: 84
End: 2019-12-17

## 2019-12-17 ENCOUNTER — OFFICE VISIT (OUTPATIENT)
Dept: FAMILY MEDICINE | Facility: CLINIC | Age: 84
End: 2019-12-17
Payer: COMMERCIAL

## 2019-12-17 VITALS
TEMPERATURE: 98.3 F | BODY MASS INDEX: 25.9 KG/M2 | OXYGEN SATURATION: 93 % | SYSTOLIC BLOOD PRESSURE: 98 MMHG | HEIGHT: 71 IN | DIASTOLIC BLOOD PRESSURE: 66 MMHG | RESPIRATION RATE: 24 BRPM | WEIGHT: 185 LBS | HEART RATE: 114 BPM

## 2019-12-17 DIAGNOSIS — J20.9 ACUTE BRONCHITIS, UNSPECIFIED ORGANISM: Primary | ICD-10-CM

## 2019-12-17 DIAGNOSIS — J44.9 MODERATE COPD (CHRONIC OBSTRUCTIVE PULMONARY DISEASE) (H): ICD-10-CM

## 2019-12-17 DIAGNOSIS — G47.09 OTHER INSOMNIA: ICD-10-CM

## 2019-12-17 DIAGNOSIS — K21.9 GASTROESOPHAGEAL REFLUX DISEASE, ESOPHAGITIS PRESENCE NOT SPECIFIED: ICD-10-CM

## 2019-12-17 PROCEDURE — 99214 OFFICE O/P EST MOD 30 MIN: CPT | Performed by: INTERNAL MEDICINE

## 2019-12-17 PROCEDURE — 71046 X-RAY EXAM CHEST 2 VIEWS: CPT

## 2019-12-17 RX ORDER — DOXYCYCLINE 100 MG/1
100 CAPSULE ORAL 2 TIMES DAILY
Qty: 30 CAPSULE | Refills: 0 | Status: SHIPPED | OUTPATIENT
Start: 2019-12-17 | End: 2020-05-27

## 2019-12-17 ASSESSMENT — PAIN SCALES - GENERAL: PAINLEVEL: SEVERE PAIN (7)

## 2019-12-17 ASSESSMENT — MIFFLIN-ST. JEOR: SCORE: 1544.21

## 2019-12-17 NOTE — TELEPHONE ENCOUNTER
Reason for Call:  Other call back    Detailed comments: Home care states that at pt's appointment with provider today they were told that a prescription for melatonin would be sent over to the pharmacy but it wasn't there when they checked. Also stated that there was a change to the zantac medication but they are unclear exactly what the change was and would like a call back to confirm. Thank you.    [facility] [place], [place] - [address]  [phone]    Phone Number Patient can be reached at: Other [phone]    Best Time: ANy    Can we leave a detailed message on this number? YES    Call taken on 12/17/2019 at 5:08 PM by Cristal Parker

## 2019-12-17 NOTE — PROGRESS NOTES
ASSESSMENT AND PLAN     1. Acute bronchitis, unspecified organism  Patient continues to complain of productive cough with yellowish phlegm. Chest x-ray is normal.  Previous CT of chest last 8/31/2018 shows bibasilar reticular changes that is suspicious for interstitial lung disease (evident from PFTs last 12/5/2015.  - doxycycline hyclate (VIBRAMYCIN) 100 MG capsule; Take 1 capsule (100 mg) by mouth 2 times daily for 15 days  Dispense: 30 capsule; Refill: 0    2. Moderate COPD (chronic obstructive pulmonary disease) (H)  Lung auscultation is normal. PFTs last 2005 confirms moderate obstructive lung disease. Continue Breo and Spiriva. Low threshold for empirical treatment with antibiotic due to suspected bronchiectasis from previous CT of chest.  - XR Chest 2 Views    3. Other insomnia  Avoid benzodiazepines due to advanced age.  - melatonin 5 MG CAPS; Take 1 capsule by mouth At Bedtime  Dispense: 30 capsule; Refill: 11    4. Gastroesophageal reflux disease, esophagitis presence not specified   Substitute for Ranitidine.  - omeprazole (PRILOSEC) 20 MG DR capsule; Take 1 capsule (20 mg) by mouth daily  Dispense: 30 capsule; Refill: 11       HISTORY OF PRESENT ILLNESS    Gallito Farley is a 85 year old male who presents to clinic today for the following health issues:    HPI       Patient Active Problem List   Diagnosis     Chronic constipation     GERD (gastroesophageal reflux disease)     Atrioventricular block, complete (H)     Advanced directives, counseling/discussion     Cardiac pacemaker in situ- Medtronic, dual chamber- NOT dependent     Health Care Home     Moderate COPD (chronic obstructive pulmonary disease) (H)     B12 deficiency     OA (osteoarthritis) of knee     Insomnia, unspecified     HTN (hypertension)     Hypertrophy of prostate without urinary obstruction and other lower urinary tract symptoms (LUTS)     Past Surgical History:   Procedure Laterality Date     CHOLECYSTECTOMY       ENT SURGERY        IRRIGATION AND DEBRIDEMENT HIP, COMBINED  4/18/2013    Procedure: COMBINED IRRIGATION AND DEBRIDEMENT HIP;  Irrigation and debridement of Right Hip with cultures;  Surgeon: Cristal Inman MD;  Location: [facility] OR     Partial pneumonectomy      done in [place] in the 1990's     skin grafting - leg wound  6/2016       Social History     Tobacco Use     Smoking status: Former Smoker     Smokeless tobacco: Never Used   Substance Use Topics     Alcohol use: No     Family History   Problem Relation Age of Onset     Family History Negative Mother          No Known Allergies  Recent Labs   Lab Test 10/02/18  1709 04/04/18  1238  01/11/17  1734  07/24/16  0628 07/23/16  0530  06/13/16  1612  04/24/13  0643 04/23/13  0751   A1C  --   --   --   --   --   --  5.7  --   --   --   --  6.0   LDL  --   --   --   --   --   --   --   --   --   --  93  --    HDL  --   --   --   --   --   --   --   --   --   --  20*  --    TRIG  --   --   --   --   --   --   --   --   --   --  148  --    ALT 25 22  --  14  --   --  15   < > 57   < >  --   --    CR 1.03 1.06   < > 0.96   < >  --  0.88   < > 0.90   < > 1.11 1.12   GFRESTIMATED 69 67   < > 75   < >  --  83   < > 81   < > 64 63   GFRESTBLACK 83 80   < > >90   GFR Calc     < >  --  >90   GFR Calc     < > >90   GFR Calc     < > 77 77   POTASSIUM 3.7 3.3*   < > 5.1   < >  --  4.1   < > 3.8   < > 3.7 3.7   TSH  --   --   --   --   --  1.04  --   --  0.20*   < >  --   --     < > = values in this interval not displayed.      BP Readings from Last 3 Encounters:   12/17/19 98/66   11/25/19 103/62   08/19/19 98/65    Wt Readings from Last 3 Encounters:   12/17/19 83.9 kg (185 lb)   08/19/19 86.2 kg (190 lb)   05/21/19 87.1 kg (192 lb)                    Reviewed and updated as needed this visit by Provider         Review of Systems   ROS COMP: CONSTITUTIONAL: NEGATIVE for fever, chills, change in weight  INTEGUMENTARY/SKIN: NEGATIVE for worrisome  "rashes, moles or lesions  EYES: NEGATIVE for vision changes or irritation  ENT/MOUTH: NEGATIVE for ear, mouth and throat problems  RESP: NEGATIVE for significant cough or SOB  CV: NEGATIVE for chest pain, palpitations or peripheral edema  GI: NEGATIVE for nausea, abdominal pain, heartburn, or change in bowel habits  : NEGATIVE for frequency, dysuria, or hematuria  MUSCULOSKELETAL: NEGATIVE for significant arthralgias or myalgia  NEURO: NEGATIVE for weakness, dizziness or paresthesias  ENDOCRINE: NEGATIVE for temperature intolerance, skin/hair changes  HEME: NEGATIVE for bleeding problems  PSYCHIATRIC: NEGATIVE for changes in mood or affect      Objective    BP 98/66 (BP Location: Left arm, Patient Position: Chair, Cuff Size: Adult Large)   Pulse 114   Temp 98.3  F (36.8  C) (Oral)   Resp 24   Ht 1.8 m (5' 10.87\")   Wt 83.9 kg (185 lb)   SpO2 93%   BMI 25.90 kg/m    Body mass index is 25.9 kg/m .  Physical Exam   GENERAL: healthy, alert and no distress  EYES: Eyes grossly normal to inspection, PERRL and conjunctivae and sclerae normal  HENT: ear canals and TM's normal, nose and mouth without ulcers or lesions  NECK: no adenopathy, no asymmetry, masses, or scars and thyroid normal to palpation  RESP: lungs clear to auscultation - no rales, rhonchi or wheezes  CV: regular rate and rhythm, normal S1 S2, no S3 or S4, no murmur, click or rub, no peripheral edema and peripheral pulses strong  ABDOMEN: soft, nontender, no hepatosplenomegaly, no masses and bowel sounds normal  MS: no gross musculoskeletal defects noted, no edema  SKIN: no suspicious lesions or rashes  NEURO: Normal strength and tone, mentation intact and speech normal  PSYCH: mentation appears normal, affect normal/bright    Diagnostic Test Results:  Results for orders placed or performed in visit on 12/17/19 (from the past 24 hour(s))   XR Chest 2 Views    Narrative    CHEST TWO VIEWS 12/17/2019 11:58 AM     HISTORY: Chronic obstructive pulmonary " disease with acute lower  respiratory infection (H).    COMPARISON: September 12, 2018       Impression    IMPRESSION: Volume loss on the right again evident. There are no acute  infiltrates. The cardiac silhouette is not enlarged. Pulmonary  vasculature is unremarkable. Stable cardiac device.    DAKOTA LAWSON MD         FUTURE APPOINTMENTS:       - Follow-up visit in 4 weeks.      Hakeem Awad MD  [facility]

## 2019-12-20 NOTE — TELEPHONE ENCOUNTER
GUNJAN Ho was calling to let you know the Ranitidine is back in stock from back order     So patient is getting this and not omeprazole

## 2020-01-02 ENCOUNTER — OFFICE VISIT (OUTPATIENT)
Dept: FAMILY MEDICINE | Facility: CLINIC | Age: 85
End: 2020-01-02
Payer: COMMERCIAL

## 2020-01-02 VITALS
BODY MASS INDEX: 26.18 KG/M2 | RESPIRATION RATE: 24 BRPM | HEIGHT: 71 IN | DIASTOLIC BLOOD PRESSURE: 65 MMHG | SYSTOLIC BLOOD PRESSURE: 99 MMHG | HEART RATE: 103 BPM | OXYGEN SATURATION: 92 % | TEMPERATURE: 98.6 F | WEIGHT: 187 LBS

## 2020-01-02 DIAGNOSIS — I87.303 CHRONIC VENOUS HYPERTENSION (IDIOPATHIC) WITHOUT COMPLICATIONS OF BILATERAL LOWER EXTREMITY: Primary | ICD-10-CM

## 2020-01-02 DIAGNOSIS — I10 HYPERTENSION GOAL BP (BLOOD PRESSURE) < 130/80: ICD-10-CM

## 2020-01-02 DIAGNOSIS — K21.9 GASTROESOPHAGEAL REFLUX DISEASE, ESOPHAGITIS PRESENCE NOT SPECIFIED: ICD-10-CM

## 2020-01-02 PROCEDURE — 99214 OFFICE O/P EST MOD 30 MIN: CPT | Performed by: INTERNAL MEDICINE

## 2020-01-02 ASSESSMENT — PAIN SCALES - GENERAL: PAINLEVEL: SEVERE PAIN (6)

## 2020-01-02 ASSESSMENT — MIFFLIN-ST. JEOR: SCORE: 1548.29

## 2020-01-02 NOTE — PROGRESS NOTES
ASSESSMENT AND PLAN    1. Chronic venous hypertension (idiopathic) without complications of bilateral lower extremity   Suspected etiology of swelling of both legs. If test is negative, then peripheral edema is most likely due to Amlodipine (and reduce dose of Amlodipine from 10 mg/day to 5 mg/day).  - US Venous Competency Bilateral; Future    2. Gastroesophageal reflux disease, esophagitis presence not specified  Restart Ranitidine, as preferred by patient's family despite recalls (due to presence of NDMA).  -Discontinue Omeprazole.      HISTORY OF PRESENT ILLNESS    Gallito Farley is a 86 year old male who presents to clinic today for the following health issues:      Swelling of both legs.      Duration: subacute    Description (location/character/radiation): Non-pitting edema of both lower extremities.    Intensity:  Mild-moderate    Accompanying signs and symptoms: Denies any orthopnea, paroxysmal nocturnal dyspnea, chest pain nor ascites    History (similar episodes/previous evaluation): Echocardiogram last 1/11/2017 shows normal LVEF, normal right ventricular function, normal pulmonary artery function and no significant valvular heart disease.    Precipitating or alleviating factors: Unknown, but patient takes Amlodipine.    Therapies tried and outcome: None.         Gerd/Heartburn  Duration of complaint: chronic   Description of symptoms:    food getting stuck: no   nausea/vomiting: no   abdominal pain: no   black/tarry or bloody stools: no :  worse with spicy foods.  current NSAID/Aspirin use: no   Therapies tried and outcome: Zantac (Ranitidine) is effective, but recalled, hence, alternative is requested.      Patient Active Problem List   Diagnosis     Chronic constipation     GERD (gastroesophageal reflux disease)     Atrioventricular block, complete (H)     Advanced directives, counseling/discussion     Cardiac pacemaker in situ- [employer]tronic, dual chamber- NOT dependent     Health Care Home     Moderate  COPD (chronic obstructive pulmonary disease) (H)     B12 deficiency     OA (osteoarthritis) of knee     Insomnia, unspecified     HTN (hypertension)     Hypertrophy of prostate without urinary obstruction and other lower urinary tract symptoms (LUTS)     Past Surgical History:   Procedure Laterality Date     CHOLECYSTECTOMY       ENT SURGERY       IRRIGATION AND DEBRIDEMENT HIP, COMBINED  4/18/2013    Procedure: COMBINED IRRIGATION AND DEBRIDEMENT HIP;  Irrigation and debridement of Right Hip with cultures;  Surgeon: Cristal Inman MD;  Location: [facility] OR     Partial pneumonectomy      done in [place] in the 1990's     skin grafting - leg wound  6/2016       Social History     Tobacco Use     Smoking status: Former Smoker     Smokeless tobacco: Never Used   Substance Use Topics     Alcohol use: No     Family History   Problem Relation Age of Onset     Family History Negative Mother          No Known Allergies  Recent Labs   Lab Test 10/02/18  1709 04/04/18  1238  01/11/17  1734  07/24/16  0628 07/23/16  0530  06/13/16  1612  04/24/13  0643 04/23/13  0751   A1C  --   --   --   --   --   --  5.7  --   --   --   --  6.0   LDL  --   --   --   --   --   --   --   --   --   --  93  --    HDL  --   --   --   --   --   --   --   --   --   --  20*  --    TRIG  --   --   --   --   --   --   --   --   --   --  148  --    ALT 25 22  --  14  --   --  15   < > 57   < >  --   --    CR 1.03 1.06   < > 0.96   < >  --  0.88   < > 0.90   < > 1.11 1.12   GFRESTIMATED 69 67   < > 75   < >  --  83   < > 81   < > 64 63   GFRESTBLACK 83 80   < > >90   GFR Calc     < >  --  >90   GFR Calc     < > >90   GFR Calc     < > 77 77   POTASSIUM 3.7 3.3*   < > 5.1   < >  --  4.1   < > 3.8   < > 3.7 3.7   TSH  --   --   --   --   --  1.04  --   --  0.20*   < >  --   --     < > = values in this interval not displayed.      BP Readings from Last 3 Encounters:   01/02/20 99/65   12/17/19 98/66  "  11/25/19 103/62    Wt Readings from Last 3 Encounters:   01/02/20 84.8 kg (187 lb)   12/17/19 83.9 kg (185 lb)   08/19/19 86.2 kg (190 lb)                    Reviewed and updated as needed this visit by Provider         Review of Systems   ROS COMP: CONSTITUTIONAL: NEGATIVE for fever, chills, change in weight  INTEGUMENTARY/SKIN: NEGATIVE for worrisome rashes, moles or lesions  EYES: NEGATIVE for vision changes or irritation  ENT/MOUTH: NEGATIVE for ear, mouth and throat problems  RESP:POSITIVE for history of pneumonia, hospitalized last 11/15/2019 at [facility], treated with Doxycycline last 12/17/2019 due to mild COPD exacerbation  and NEGATIVE for hemoptysis, pleurisy and wheezing  CV: NEGATIVE for chest pain, palpitations or peripheral edema  GI: NEGATIVE for nausea, abdominal pain, heartburn, or change in bowel habits  : NEGATIVE for frequency, dysuria, or hematuria  MUSCULOSKELETAL:NEGATIVE for paresthesias and radicular pain.  NEURO: NEGATIVE for weakness, dizziness or paresthesias  ENDOCRINE: NEGATIVE for temperature intolerance, skin/hair changes  HEME: NEGATIVE for bleeding problems  PSYCHIATRIC: NEGATIVE for changes in mood or affect      Objective    BP 99/65 (BP Location: Right arm, Patient Position: Chair, Cuff Size: Adult Large)   Pulse 103   Temp 98.6  F (37  C) (Oral)   Resp 24   Ht 1.8 m (5' 10.87\")   Wt 84.8 kg (187 lb)   SpO2 92%   BMI 26.18 kg/m    Body mass index is 26.18 kg/m .  Physical Exam   GENERAL: healthy, alert and no distress  EYES: Eyes grossly normal to inspection and conjunctivae and sclerae normal  HENT: normal cephalic/atraumatic and oral mucous membranes moist  NECK: no adenopathy  RESP: lungs clear to auscultation - no rales, rhonchi or wheezes  CV: regular rate and rhythm, normal S1 S2, no S3 or S4, no murmur, click or rub, no peripheral edema and peripheral pulses strong  ABDOMEN: soft, nontender, without hepatosplenomegaly or masses and bowel sounds " normal  MS: Non-pitting edema of both ankles and distal lower extremities.  SKIN: no suspicious lesions or rashes  NEURO: Normal strength and tone, mentation intact and speech normal  PSYCH: mentation appears normal, affect normal/bright    Diagnostic Test Results:  Pending.        FUTURE APPOINTMENTS:       - Follow-up visit in 1 - 2 weeks.        Hakeem Awad MD  [facility]

## 2020-01-02 NOTE — PATIENT INSTRUCTIONS
At [facility], we strive to deliver an exceptional experience to you, every time we see you.  If you receive a survey in the mail, please send us back your thoughts. We really do value your feedback.    Based on your medical history, these are the current health maintenance/preventive care services that you are due for (some may have been done at this visit.)  Health Maintenance Due   Topic Date Due     COPD ACTION PLAN  01/01/1934     ZOSTER IMMUNIZATION (1 of 2) 01/01/1984     MEDICARE ANNUAL WELLNESS VISIT  01/01/1999     ASTHMA ACTION PLAN  08/03/2017     PNEUMOCOCCAL IMMUNIZATION 65+ LOW/MEDIUM RISK (2 of 2 - PCV13) 08/03/2017     INFLUENZA VACCINE (1) 09/01/2019     PHQ-2  01/01/2020     FALL RISK ASSESSMENT  01/28/2020         Suggested websites for health information:  Www.[employer] : Up to date and easily searchable information on multiple topics.  Www.[employer].gov : medication info, interactive tutorials, watch real surgeries online  Www.familydoctor.org : good info from the Academy of Family Physicians  Www.cdc.gov : public health info, travel advisories, epidemics (H1N1)  Www.aap.org : children's health info, normal development, vaccinations  Www.health.[facility] : [place] dept of health, public health issues in [place], N1N1    Your care team:                            Family Medicine Internal Medicine   MD Hakeem Yuen MD Shantel Branch-Fleming, MD Katya Georgiev PA-C Nam Ho, MD Pediatrics   MERRILL Tse, CHRISTINE Chapman APRN CNP   MD Reyna Fuentes MD Deborah Mielke, MD Kim Thein, APRN CNP      Clinic hours: Monday - Thursday 7 am-7 pm; Fridays 7 am-5 pm.   Urgent care: Monday - Friday 11 am-9 pm; Saturday and Sunday 9 am-5 pm.  Pharmacy : Monday -Thursday 8 am-8 pm; Friday 8 am-6 pm; Saturday and Sunday 9 am-5 pm.     Clinic: [phone]   Pharmacy: [phone]

## 2020-01-02 NOTE — LETTER
Gallito AVRIL Farley  [address] [address]      January 15, 2020      Dear Mr. Farley,                                    Despite swelling of both of your lower legs, your recent test called venous competency  ultrasound shows no evidence of incompetent veins and no evidence of venous thrombosis (blood clots). Therefore, most probable cause of swelling is drug-induced (Amlodipine). Let's reduce Amlodipine from 10 mg once a day to Amlodipine 5 mg once a day.   I sent new prescription for Amlodipine 5 mgto [facility]. Please see enclosed copy of your test result. I also spoke with Bel from [facility] [email or web address] about your test result. For any questions, you may call my office at [phone].      Sincerely,         Hakeem Awad MD  Internal Medicin

## 2020-01-02 NOTE — LETTER
Major Farley  [address] [address]      January 15, 2020      Dear Mr. Farley,                                    Despite swelling of both of your lower legs, your recent test called venous competency  ultrasound shows no evidence of incompetent veins and no evidence of venous thrombosis (blood clots). Therefore, most probable cause of swelling is drug-induced (Amlodipine). Let's reduce Amlodipine from 10 mg once a day to Amlodipine 5 mg once a day.   I sent new prescription for Amlodipine 5 mgto [facility]. Please see enclosed copy of your test result. I also spoke with Bel from [employer] about your test result. For any questions, you may call my office at [phone].      Sincerely,         Hakeem Awad MD  Internal Medicine

## 2020-01-07 ENCOUNTER — TELEPHONE (OUTPATIENT)
Dept: FAMILY MEDICINE | Facility: CLINIC | Age: 85
End: 2020-01-07

## 2020-01-07 ENCOUNTER — ANCILLARY PROCEDURE (OUTPATIENT)
Dept: ULTRASOUND IMAGING | Facility: CLINIC | Age: 85
End: 2020-01-07
Attending: INTERNAL MEDICINE
Payer: COMMERCIAL

## 2020-01-07 DIAGNOSIS — I87.313 CHRONIC VENOUS HYPERTENSION (IDIOPATHIC) WITH ULCER OF BILATERAL LOWER EXTREMITY (CODE) (H): ICD-10-CM

## 2020-01-07 DIAGNOSIS — I87.2 EDEMA OF BOTH LOWER EXTREMITIES DUE TO PERIPHERAL VENOUS INSUFFICIENCY: ICD-10-CM

## 2020-01-07 PROCEDURE — 93970 EXTREMITY STUDY: CPT | Performed by: RADIOLOGY

## 2020-01-07 NOTE — LETTER
[facility]   [address]  [place] [address]  Fax: [phone]      January 7, 2020      To Pharmacy staff:                                    This is regarding the case of Mr. Gallito Farley. Based on the telephone encounter last January 7, 2020, wherein the patient's family prefers Ranitidine over Omeprazole, please disregard Rx for Omeprazole (that was sent electronically on January 6, 2020). Continue Ranitidine, as originally prescribed last December 13, 2019.  For any questions, you may call my office at [phone].      Sincerely,         Hakeem Awad MD  Internal Medicine

## 2020-01-07 NOTE — TELEPHONE ENCOUNTER
Notify [facility] to cancel Rx for Omeprazole and keep Rx for Ranitidine (sent electronically last 12/13/2019).    Letter printed and signed and placed in TC basket for faxing to [facility] to keep Ranitidine and Rx for Omeprazole cancelled.

## 2020-01-07 NOTE — LETTER
[facility].  Fax: [phone]  Re: Gallito Farley  :       To Whom It May Concern:                                                  Due to patient's family's preference to continue Ranitidine, Rx for Omeprazole (sent to [facility] last 2020) was cancelled. Mr. Farley still has a prescription for Ranitidine that was originally sent on 2019.For any questions, you may call my office at [phone].      Sincerely,         Hakeem Awad MD  Internal Medicine        Patient Name: Gallito Farley                    : 1934                    PAGE:

## 2020-01-07 NOTE — TELEPHONE ENCOUNTER
Reason for Call:  Other prescription    Detailed comments: a medication was sent for omeprazole for patient and patient has been taking ranitidine (ZANTAC) 150 MG tablet and the family wants patient to stay on this medication.    Phone Number Patient can be reached at: Other phone number:  [phone]    Best Time: any    Can we leave a detailed message on this number? YES    Call taken on 1/7/2020 at 3:41 PM by Adalgisa Cisneros

## 2020-01-10 ENCOUNTER — PATIENT OUTREACH (OUTPATIENT)
Dept: GERIATRIC MEDICINE | Facility: CLINIC | Age: 85
End: 2020-01-10

## 2020-01-10 NOTE — PROGRESS NOTES
[facility] Care Coordination Contact    Called member and Ilham cindy SERRATO to schedule annual HRA home visit. HRA has been scheduled for 1/21/20 @ 11 a.m. .  Called Grazyna Haq and scheduled an  for the home visit.     Jorge David RN, PHN  [facility]

## 2020-01-15 RX ORDER — AMLODIPINE BESYLATE 5 MG/1
5 TABLET ORAL DAILY
Qty: 30 TABLET | Refills: 11 | Status: SHIPPED | OUTPATIENT
Start: 2020-01-15 | End: 2020-12-10

## 2020-01-21 ENCOUNTER — PATIENT OUTREACH (OUTPATIENT)
Dept: GERIATRIC MEDICINE | Facility: CLINIC | Age: 85
End: 2020-01-21

## 2020-01-21 DIAGNOSIS — Z76.89 HEALTH CARE HOME: Chronic | ICD-10-CM

## 2020-01-21 NOTE — PROGRESS NOTES
[facility] Care Coordination Contact    [facility] Home Visit Assessment     Home visit for Health Risk Assessment with Gallito Farley completed on January 21, 2020    Type of residence:: Assisted living  Current living arrangement:: I live in assisted living     Assessment completed with:: Patient, Fatimah SERRATO; Ann - Jovanny  w/ KTJENNY.     Current Care Plan  Member currently receiving the following home care services:  N/A  Member currently receiving the following community resources: CL services    Medication Review  Medication reconciliation completed in Epic: Yes  Medication set-up & administration: RN set up daily.  Assisting Living staff administers medications.  Medication Risk Assessment Medication (1 or more, place referral to MTM): N/A: No risk factors identified  MTM Referral Placed: No: No risk factors idenified    Mental/Behavioral Health   Depression Screening: See PHQ assessment flowsheet.   Mental health DX:: No      Mental Health Diagnosis: No  Mental Health Services: None: No further intervention needed at this time.    Falls Assessment:   Fallen 2 or more times in the past year?: No   Any fall with injury in the past year?: No    ADL/IADL Dependencies:   Dependent ADLs:: Ambulation-walker, Bathing, Dressing, Grooming, Incontinence, Positioning, Transfers, Toileting  Dependent IADLs:: Cleaning, Cooking, Laundry, Shopping, Meal Preparation, Medication Management, Money Management, Transportation, Incontinence    [facility] Plan sponsored benefits: Shared information re: Silver Sneakers/gym memberships, ASA, Calcium +D.    PCA Assessment completed at visit: Not applicable     Elderly Waiver Eligibility: Yes-will continue on EW    Care Plan & Recommendations: CL tool.     See LTCC for detailed assessment information.    Follow-Up Plan: Member informed of future contact, plan to f/u with member with a 6 month telephone assessment.  Contact information shared with member  and family, encouraged member to call with any questions or concerns at any time.    [facility] care continuum providers: Please refer to Health Care Home on the Epic Problem List to view this patient's [facility] Care Plan Summary.    Jorge David RN, PHN  [facility]

## 2020-01-27 ENCOUNTER — PATIENT OUTREACH (OUTPATIENT)
Dept: GERIATRIC MEDICINE | Facility: CLINIC | Age: 85
End: 2020-01-27

## 2020-01-27 NOTE — LETTER
January 27, 2020      GALLITO ALBRIGHT  [address] [address]      Dear Gallito:    At [facility], we are dedicated to improving your health and well-being. Enclosed is the Comprehensive Care Plan that we developed with you on 1/21/2020. Please review the Care Plan carefully.    As a reminder, some of the things we discussed at your visit include:    Your physical and mental health    Ways to reduce falls    Health care needs you may have    Don t forget to contact your care coordinator if you:    Have been hospitalized or plan to be hospitalized     Have had a fall     Have experienced a change in physical health    Are experiencing emotional problems     If you do not agree with your Care Plan, have questions about it, or have experienced a change in your needs, please call me at [phone]. If you are hearing impaired, please call the [place] [employer] at 086 or [phone] (hiaiyv-el-lqhlfo relay service).    Sincerely,    Jorge David RN, PHN    E-mail: [email or web address]  Phone: [phone]      [facility]) is a health plan that contracts with both Medicare and the [place] Medical Assistance (Medicaid) program to provide benefits of both programs to enrollees. Enrollment in [employer] depends on contract renewal.    [id number])     [id number] (11/18)

## 2020-01-27 NOTE — PROGRESS NOTES
[facility] Care Coordination Contact    Received after visit chart from care coordinator.  Completed following tasks: Mailed copy of care plan to client, Updated services in access and Submitted referrals/auths for customzied living, gloves and wipes  Chart was returned to CC.   , Mailed copy of CL tool to member, faxed copy to AL facility, uploaded into [employer] and submitted authorization to health plan.   and Provider Signature - Summary:  Member indicates that they would like a summary of their POC shared with the following EW providers:  [facility].  Letter faxed to providers for signature.     Merari Nelson  Care Management Specialist  [facility]  [phone]

## 2020-01-30 ENCOUNTER — MEDICAL CORRESPONDENCE (OUTPATIENT)
Dept: HEALTH INFORMATION MANAGEMENT | Facility: CLINIC | Age: 85
End: 2020-01-30

## 2020-03-03 NOTE — PROGRESS NOTES
[facility] Care Coordination Contact    2nd Attempt: Signed Letter not received from [facility], resent per process.   Cheryle Burt  Case Management Specialist  [facility]  [phone]

## 2020-03-04 ENCOUNTER — ANCILLARY PROCEDURE (OUTPATIENT)
Dept: CARDIOLOGY | Facility: CLINIC | Age: 85
End: 2020-03-04
Attending: INTERNAL MEDICINE
Payer: COMMERCIAL

## 2020-03-04 DIAGNOSIS — I44.1 SECOND DEGREE ATRIOVENTRICULAR BLOCK: ICD-10-CM

## 2020-03-04 PROCEDURE — 93294 REM INTERROG EVL PM/LDLS PM: CPT | Mod: ZP | Performed by: INTERNAL MEDICINE

## 2020-03-04 PROCEDURE — 93296 REM INTERROG EVL PM/IDS: CPT | Mod: ZF

## 2020-03-17 LAB
MDC_IDC_LEAD_IMPLANT_DT: NORMAL
MDC_IDC_LEAD_IMPLANT_DT: NORMAL
MDC_IDC_LEAD_LOCATION: NORMAL
MDC_IDC_LEAD_LOCATION: NORMAL
MDC_IDC_LEAD_LOCATION_DETAIL_1: NORMAL
MDC_IDC_LEAD_LOCATION_DETAIL_1: NORMAL
MDC_IDC_LEAD_MFG: NORMAL
MDC_IDC_LEAD_MFG: NORMAL
MDC_IDC_LEAD_MODEL: NORMAL
MDC_IDC_LEAD_MODEL: NORMAL
MDC_IDC_LEAD_POLARITY_TYPE: NORMAL
MDC_IDC_LEAD_POLARITY_TYPE: NORMAL
MDC_IDC_LEAD_SERIAL: NORMAL
MDC_IDC_LEAD_SERIAL: NORMAL
MDC_IDC_MSMT_BATTERY_DTM: NORMAL
MDC_IDC_MSMT_BATTERY_REMAINING_LONGEVITY: 78 MO
MDC_IDC_MSMT_BATTERY_RRT_TRIGGER: 2.83
MDC_IDC_MSMT_BATTERY_STATUS: NORMAL
MDC_IDC_MSMT_BATTERY_VOLTAGE: 3.01 V
MDC_IDC_MSMT_LEADCHNL_RA_IMPEDANCE_VALUE: 361 OHM
MDC_IDC_MSMT_LEADCHNL_RA_IMPEDANCE_VALUE: 475 OHM
MDC_IDC_MSMT_LEADCHNL_RA_PACING_THRESHOLD_AMPLITUDE: 0.75 V
MDC_IDC_MSMT_LEADCHNL_RA_PACING_THRESHOLD_PULSEWIDTH: 0.4 MS
MDC_IDC_MSMT_LEADCHNL_RA_SENSING_INTR_AMPL: 2.5 MV
MDC_IDC_MSMT_LEADCHNL_RA_SENSING_INTR_AMPL: 2.5 MV
MDC_IDC_MSMT_LEADCHNL_RV_IMPEDANCE_VALUE: 532 OHM
MDC_IDC_MSMT_LEADCHNL_RV_IMPEDANCE_VALUE: 570 OHM
MDC_IDC_MSMT_LEADCHNL_RV_PACING_THRESHOLD_AMPLITUDE: 0.62 V
MDC_IDC_MSMT_LEADCHNL_RV_PACING_THRESHOLD_PULSEWIDTH: 0.4 MS
MDC_IDC_MSMT_LEADCHNL_RV_SENSING_INTR_AMPL: 6.88 MV
MDC_IDC_MSMT_LEADCHNL_RV_SENSING_INTR_AMPL: 6.88 MV
MDC_IDC_PG_IMPLANT_DTM: NORMAL
MDC_IDC_PG_MFG: NORMAL
MDC_IDC_PG_MODEL: NORMAL
MDC_IDC_PG_SERIAL: NORMAL
MDC_IDC_PG_TYPE: NORMAL
MDC_IDC_SESS_CLINIC_NAME: NORMAL
MDC_IDC_SESS_DTM: NORMAL
MDC_IDC_SESS_TYPE: NORMAL
MDC_IDC_SET_BRADY_AT_MODE_SWITCH_RATE: 171 {BEATS}/MIN
MDC_IDC_SET_BRADY_HYSTRATE: NORMAL
MDC_IDC_SET_BRADY_LOWRATE: 60 {BEATS}/MIN
MDC_IDC_SET_BRADY_MAX_SENSOR_RATE: 120 {BEATS}/MIN
MDC_IDC_SET_BRADY_MAX_TRACKING_RATE: 120 {BEATS}/MIN
MDC_IDC_SET_BRADY_MODE: NORMAL
MDC_IDC_SET_BRADY_PAV_DELAY_LOW: 180 MS
MDC_IDC_SET_BRADY_SAV_DELAY_LOW: 150 MS
MDC_IDC_SET_LEADCHNL_RA_PACING_AMPLITUDE: 1.5 V
MDC_IDC_SET_LEADCHNL_RA_PACING_ANODE_ELECTRODE_1: NORMAL
MDC_IDC_SET_LEADCHNL_RA_PACING_ANODE_LOCATION_1: NORMAL
MDC_IDC_SET_LEADCHNL_RA_PACING_CAPTURE_MODE: NORMAL
MDC_IDC_SET_LEADCHNL_RA_PACING_CATHODE_ELECTRODE_1: NORMAL
MDC_IDC_SET_LEADCHNL_RA_PACING_CATHODE_LOCATION_1: NORMAL
MDC_IDC_SET_LEADCHNL_RA_PACING_POLARITY: NORMAL
MDC_IDC_SET_LEADCHNL_RA_PACING_PULSEWIDTH: 0.4 MS
MDC_IDC_SET_LEADCHNL_RA_SENSING_ANODE_ELECTRODE_1: NORMAL
MDC_IDC_SET_LEADCHNL_RA_SENSING_ANODE_LOCATION_1: NORMAL
MDC_IDC_SET_LEADCHNL_RA_SENSING_CATHODE_ELECTRODE_1: NORMAL
MDC_IDC_SET_LEADCHNL_RA_SENSING_CATHODE_LOCATION_1: NORMAL
MDC_IDC_SET_LEADCHNL_RA_SENSING_POLARITY: NORMAL
MDC_IDC_SET_LEADCHNL_RA_SENSING_SENSITIVITY: 0.6 MV
MDC_IDC_SET_LEADCHNL_RV_PACING_AMPLITUDE: 2 V
MDC_IDC_SET_LEADCHNL_RV_PACING_ANODE_ELECTRODE_1: NORMAL
MDC_IDC_SET_LEADCHNL_RV_PACING_ANODE_LOCATION_1: NORMAL
MDC_IDC_SET_LEADCHNL_RV_PACING_CAPTURE_MODE: NORMAL
MDC_IDC_SET_LEADCHNL_RV_PACING_CATHODE_ELECTRODE_1: NORMAL
MDC_IDC_SET_LEADCHNL_RV_PACING_CATHODE_LOCATION_1: NORMAL
MDC_IDC_SET_LEADCHNL_RV_PACING_POLARITY: NORMAL
MDC_IDC_SET_LEADCHNL_RV_PACING_PULSEWIDTH: 0.4 MS
MDC_IDC_SET_LEADCHNL_RV_SENSING_ANODE_ELECTRODE_1: NORMAL
MDC_IDC_SET_LEADCHNL_RV_SENSING_ANODE_LOCATION_1: NORMAL
MDC_IDC_SET_LEADCHNL_RV_SENSING_CATHODE_ELECTRODE_1: NORMAL
MDC_IDC_SET_LEADCHNL_RV_SENSING_CATHODE_LOCATION_1: NORMAL
MDC_IDC_SET_LEADCHNL_RV_SENSING_POLARITY: NORMAL
MDC_IDC_SET_LEADCHNL_RV_SENSING_SENSITIVITY: 0.9 MV
MDC_IDC_SET_ZONE_DETECTION_INTERVAL: 350 MS
MDC_IDC_SET_ZONE_DETECTION_INTERVAL: 400 MS
MDC_IDC_SET_ZONE_TYPE: NORMAL
MDC_IDC_STAT_AT_BURDEN_PERCENT: 0 %
MDC_IDC_STAT_AT_DTM_END: NORMAL
MDC_IDC_STAT_AT_DTM_START: NORMAL
MDC_IDC_STAT_BRADY_AP_VP_PERCENT: 23.25 %
MDC_IDC_STAT_BRADY_AP_VS_PERCENT: 0.16 %
MDC_IDC_STAT_BRADY_AS_VP_PERCENT: 74.82 %
MDC_IDC_STAT_BRADY_AS_VS_PERCENT: 1.76 %
MDC_IDC_STAT_BRADY_DTM_END: NORMAL
MDC_IDC_STAT_BRADY_DTM_START: NORMAL
MDC_IDC_STAT_BRADY_RA_PERCENT_PACED: 22.91 %
MDC_IDC_STAT_BRADY_RV_PERCENT_PACED: 96.59 %
MDC_IDC_STAT_EPISODE_RECENT_COUNT: 0
MDC_IDC_STAT_EPISODE_RECENT_COUNT_DTM_END: NORMAL
MDC_IDC_STAT_EPISODE_RECENT_COUNT_DTM_START: NORMAL
MDC_IDC_STAT_EPISODE_TOTAL_COUNT: 0
MDC_IDC_STAT_EPISODE_TOTAL_COUNT: 9
MDC_IDC_STAT_EPISODE_TOTAL_COUNT_DTM_END: NORMAL
MDC_IDC_STAT_EPISODE_TOTAL_COUNT_DTM_START: NORMAL
MDC_IDC_STAT_EPISODE_TYPE: NORMAL

## 2020-04-03 NOTE — PROGRESS NOTES
[facility] Care Coordination Contact    No Letter Received: 60 day tracking of letter complete, no letter received from [facility]. Tracking discontinued.     Alexa Wray  Care Management Specialist  [facility]  [phone]

## 2020-04-24 DIAGNOSIS — N32.81 OAB (OVERACTIVE BLADDER): ICD-10-CM

## 2020-04-24 DIAGNOSIS — K21.9 GASTRO-ESOPHAGEAL REFLUX DISEASE WITHOUT ESOPHAGITIS: ICD-10-CM

## 2020-04-28 RX ORDER — MIRABEGRON 50 MG/1
TABLET, FILM COATED, EXTENDED RELEASE ORAL
Qty: 90 TABLET | Refills: 3 | Status: SHIPPED | OUTPATIENT
Start: 2020-04-28 | End: 2021-04-02

## 2020-04-29 RX ORDER — FAMOTIDINE 20 MG/1
20 TABLET, FILM COATED ORAL 2 TIMES DAILY
Qty: 60 TABLET | Refills: 5 | Status: SHIPPED | OUTPATIENT
Start: 2020-04-29 | End: 2020-10-13

## 2020-05-06 ENCOUNTER — APPOINTMENT (OUTPATIENT)
Dept: INTERPRETER SERVICES | Facility: CLINIC | Age: 85
End: 2020-05-06
Payer: COMMERCIAL

## 2020-05-06 ENCOUNTER — APPOINTMENT (OUTPATIENT)
Dept: GENERAL RADIOLOGY | Facility: CLINIC | Age: 85
End: 2020-05-06
Attending: EMERGENCY MEDICINE
Payer: COMMERCIAL

## 2020-05-06 ENCOUNTER — HOSPITAL ENCOUNTER (EMERGENCY)
Facility: CLINIC | Age: 85
Discharge: HOME | End: 2020-05-06
Attending: EMERGENCY MEDICINE | Admitting: EMERGENCY MEDICINE
Payer: COMMERCIAL

## 2020-05-06 VITALS
BODY MASS INDEX: 27.65 KG/M2 | SYSTOLIC BLOOD PRESSURE: 144 MMHG | HEIGHT: 71 IN | HEART RATE: 79 BPM | WEIGHT: 197.5 LBS | RESPIRATION RATE: 20 BRPM | DIASTOLIC BLOOD PRESSURE: 71 MMHG | TEMPERATURE: 98.2 F | OXYGEN SATURATION: 100 %

## 2020-05-06 DIAGNOSIS — R07.9 CHEST PAIN, UNSPECIFIED TYPE: ICD-10-CM

## 2020-05-06 DIAGNOSIS — G47.00 INSOMNIA, UNSPECIFIED TYPE: ICD-10-CM

## 2020-05-06 LAB
ANION GAP SERPL CALCULATED.3IONS-SCNC: 6 MMOL/L (ref 3–14)
BASOPHILS # BLD AUTO: 0 10E9/L (ref 0–0.2)
BASOPHILS NFR BLD AUTO: 0.4 %
BUN SERPL-MCNC: 13 MG/DL (ref 7–30)
CALCIUM SERPL-MCNC: 8.9 MG/DL (ref 8.5–10.1)
CHLORIDE SERPL-SCNC: 100 MMOL/L (ref 94–109)
CO2 SERPL-SCNC: 29 MMOL/L (ref 20–32)
CREAT SERPL-MCNC: 1.04 MG/DL (ref 0.66–1.25)
DIFFERENTIAL METHOD BLD: NORMAL
EOSINOPHIL # BLD AUTO: 0 10E9/L (ref 0–0.7)
EOSINOPHIL NFR BLD AUTO: 0.5 %
ERYTHROCYTE [DISTWIDTH] IN BLOOD BY AUTOMATED COUNT: 14.7 % (ref 10–15)
GFR SERPL CREATININE-BSD FRML MDRD: 65 ML/MIN/{1.73_M2}
GLUCOSE SERPL-MCNC: 116 MG/DL (ref 70–99)
HCT VFR BLD AUTO: 52.8 % (ref 40–53)
HGB BLD-MCNC: 16.8 G/DL (ref 13.3–17.7)
IMM GRANULOCYTES # BLD: 0 10E9/L (ref 0–0.4)
IMM GRANULOCYTES NFR BLD: 0.3 %
INTERPRETATION ECG - MUSE: NORMAL
LYMPHOCYTES # BLD AUTO: 1.7 10E9/L (ref 0.8–5.3)
LYMPHOCYTES NFR BLD AUTO: 22.7 %
MCH RBC QN AUTO: 29 PG (ref 26.5–33)
MCHC RBC AUTO-ENTMCNC: 31.8 G/DL (ref 31.5–36.5)
MCV RBC AUTO: 91 FL (ref 78–100)
MONOCYTES # BLD AUTO: 0.6 10E9/L (ref 0–1.3)
MONOCYTES NFR BLD AUTO: 8.4 %
NEUTROPHILS # BLD AUTO: 5.1 10E9/L (ref 1.6–8.3)
NEUTROPHILS NFR BLD AUTO: 67.7 %
NRBC # BLD AUTO: 0 10*3/UL
NRBC BLD AUTO-RTO: 0 /100
PLATELET # BLD AUTO: 213 10E9/L (ref 150–450)
POTASSIUM SERPL-SCNC: 3.7 MMOL/L (ref 3.4–5.3)
RBC # BLD AUTO: 5.79 10E12/L (ref 4.4–5.9)
SODIUM SERPL-SCNC: 135 MMOL/L (ref 133–144)
TROPONIN I SERPL-MCNC: <0.015 UG/L (ref 0–0.04)
WBC # BLD AUTO: 7.5 10E9/L (ref 4–11)

## 2020-05-06 PROCEDURE — 93010 ELECTROCARDIOGRAM REPORT: CPT | Mod: Z6 | Performed by: EMERGENCY MEDICINE

## 2020-05-06 PROCEDURE — 85025 COMPLETE CBC W/AUTO DIFF WBC: CPT | Performed by: EMERGENCY MEDICINE

## 2020-05-06 PROCEDURE — 99285 EMERGENCY DEPT VISIT HI MDM: CPT | Mod: 25 | Performed by: EMERGENCY MEDICINE

## 2020-05-06 PROCEDURE — 71046 X-RAY EXAM CHEST 2 VIEWS: CPT

## 2020-05-06 PROCEDURE — 93005 ELECTROCARDIOGRAM TRACING: CPT | Performed by: EMERGENCY MEDICINE

## 2020-05-06 PROCEDURE — 84484 ASSAY OF TROPONIN QUANT: CPT | Performed by: EMERGENCY MEDICINE

## 2020-05-06 PROCEDURE — 80048 BASIC METABOLIC PNL TOTAL CA: CPT | Performed by: EMERGENCY MEDICINE

## 2020-05-06 RX ORDER — TRAZODONE HYDROCHLORIDE 50 MG/1
50 TABLET, FILM COATED ORAL AT BEDTIME
Qty: 20 TABLET | Refills: 0 | Status: SHIPPED | OUTPATIENT
Start: 2020-05-06 | End: 2020-05-27

## 2020-05-06 ASSESSMENT — ENCOUNTER SYMPTOMS
SHORTNESS OF BREATH: 0
FEVER: 0
HEADACHES: 1
GASTROINTESTINAL NEGATIVE: 1
SLEEP DISTURBANCE: 1

## 2020-05-06 ASSESSMENT — MIFFLIN-ST. JEOR: SCORE: 1595.85

## 2020-05-06 NOTE — ED PROVIDER NOTES
"      [place] EMERGENCY DEPARTMENT ([facility])  May 6, 2020    ED Provider Note  [facility]      History     Chief Complaint   Patient presents with     Chest Pain     The history is provided by the patient and medical records. The history is limited by a language barrier. A  was used (official [employer] logo  used per patient's request).     Gallito Farley is a 86 year old male with a history of diastolic dysfunction of the left ventricle, s/p pacemaker placement ([employer] dual chamber), COPD, asthma, HTN, tuberculosis, s/p partial pneumonectomy (done in [place] in 1990's), BPH who presents to the ED for evaluation of chest pain. Patient reports his chest pain started 1 day ago. He describes the pain as a \"squeezing\" sensation in his chest. He denies shortness of breath. He states he had similar chest pain one other time in the past. Patient denies previous history of heart attack, bypass surgery, or stent placement. He thinks his current chest pain is being caused by his lack of sleep over the past 2 days. He reports he has not been sleeping, because he doesn't \"feel well.\" Patient states he has a headache. He denies fever. He denies ill contacts. He reports he does not take a medication at night to help him sleep. He notes he has not been to a clinic in a long time.    Past Medical History  Past Medical History:   Diagnosis Date     Asthma      BPH (benign prostatic hyperplasia)      Chronic obstructive pulmonary disease, unspecified COPD type (H) 10/2/2018     COPD (chronic obstructive pulmonary disease) (H)      Diastolic dysfunction, left ventricle 4/16/2013     H/O tuberculosis     s/p partial pneumonectomy in [place] in the 1990's     Hypertension      LBBB (left bundle branch block)      Leg wound, right     chronic, s/p grafting     Osteoarthritis      Past Surgical History:   Procedure Laterality Date     CHOLECYSTECTOMY       ENT SURGERY  "      IRRIGATION AND DEBRIDEMENT HIP, COMBINED  4/18/2013    Procedure: COMBINED IRRIGATION AND DEBRIDEMENT HIP;  Irrigation and debridement of Right Hip with cultures;  Surgeon: Cristal Inman MD;  Location: [facility] OR     Partial pneumonectomy      done in [place] in the 1990's     skin grafting - leg wound  6/2016     traZODone (DESYREL) 50 MG tablet  acetaminophen (TYLENOL) 325 MG tablet  albuterol (PROVENTIL) (2.5 MG/3ML) 0.083% neb solution  amLODIPine (NORVASC) 5 MG tablet  diclofenac (VOLTAREN) 1 % topical gel  diclofenac sodium 3 % GEL  enalapril (VASOTEC) 10 MG tablet  famotidine (PEPCID) 20 MG tablet  finasteride (PROSCAR) 5 MG tablet  fluticasone-vilanterol (BREO ELLIPTA) 200-25 MCG/INH inhaler  gabapentin (NEURONTIN) 100 MG capsule  hydrochlorothiazide (MICROZIDE) 12.5 MG capsule  ketotifen (ZADITOR/REFRESH ANTI-ITCH) 0.025 % ophthalmic solution  melatonin 3 MG tablet  melatonin 5 MG CAPS  MILK OF MAGNESIA 7.75 % suspension  MUCINEX 600 MG 12 hr tablet  MYRBETRIQ 50 MG 24 hr tablet  order for DME  order for DME  phenazopyridine (PYRIDIUM) 200 MG tablet  polyethylene glycol (MIRALAX) powder  polyethylene glycol-propylene glycol (SYSTANE) 0.4-0.3 % SOLN ophthalmic solution  ranitidine (ZANTAC) 150 MG tablet  tamsulosin (FLOMAX) 0.4 MG capsule  tiotropium (SPIRIVA HANDIHALER) 18 MCG inhaled capsule  tolterodine ER (DETROL LA) 4 MG 24 hr capsule      No Known Allergies  Past medical history, past surgical history, medications, and allergies were reviewed with the patient. Additional pertinent items: None    Family History  Family History   Problem Relation Age of Onset     Family History Negative Mother      Family history was reviewed with the patient. Additional pertinent items: None    Social History  Social History     Tobacco Use     Smoking status: Former Smoker     Smokeless tobacco: Never Used   Substance Use Topics     Alcohol use: No     Drug use: No      Social history was reviewed with the  "patient. Additional pertinent items: None    Review of Systems   Constitutional: Negative for fever.   HENT: Negative.    Respiratory: Negative for shortness of breath.    Cardiovascular: Positive for chest pain.   Gastrointestinal: Negative.    Genitourinary: Negative.    Neurological: Positive for headaches.   Psychiatric/Behavioral: Positive for sleep disturbance.   All other systems reviewed and are negative.        Physical Exam   BP: 137/72  Pulse: 86  Heart Rate: 76  Temp: 98.2  F (36.8  C)  Resp: 20  Height: 180 cm (5' 10.87\")  Weight: 89.6 kg (197 lb 8 oz)  SpO2: 99 %  Physical Exam  Vitals signs and nursing note reviewed.   Constitutional:       General: He is not in acute distress.     Appearance: He is well-developed.   HENT:      Head: Normocephalic and atraumatic.   Eyes:      General: No scleral icterus.     Conjunctiva/sclera: Conjunctivae normal.      Pupils: Pupils are equal, round, and reactive to light.   Neck:      Musculoskeletal: Neck supple.   Cardiovascular:      Rate and Rhythm: Normal rate and regular rhythm.      Heart sounds: Normal heart sounds.   Pulmonary:      Effort: Pulmonary effort is normal. No respiratory distress.      Breath sounds: Normal breath sounds. No wheezing.   Abdominal:      Palpations: Abdomen is soft.   Skin:     General: Skin is warm and dry.   Neurological:      General: No focal deficit present.      Mental Status: He is alert and oriented to person, place, and time.      Cranial Nerves: No cranial nerve deficit.   Psychiatric:         Mood and Affect: Mood normal.         Behavior: Behavior normal.         ED Course      Procedures             EKG Interpretation:      Interpreted by Gabo Bonner MD  Time reviewed: 9:04 AM   Symptoms at time of EKG: CP   Rhythm: V-paced  Rate: normal  Axis: normal  Ectopy: none  Conduction: normal  ST Segments/ T Waves: No ST-T wave changes  Q Waves: none  Comparison to prior: Unchanged from 5/2019    Clinical " Impression: normal EKG        XR Chest 2 Views   Final Result   IMPRESSION: Unchanged right hemithorax deformity. Pacemaker. Aortic   atherosclerosis.      RIKY VICENTE MD             Results for orders placed or performed during the hospital encounter of 05/06/20   XR Chest 2 Views     Status: None    Narrative    Chest 2 views    INDICATION: Chest pain    COMPARISON: 12/17/2019    FINDINGS: Chest deformity again noted, especially with volume loss on  the right. Cardiac pacemaker appears essentially unchanged. There is  atherosclerotic calcification of the aortic arch. No new suspicious  pulmonary opacities.      Impression    IMPRESSION: Unchanged right hemithorax deformity. Pacemaker. Aortic  atherosclerosis.    RIKY VICENTE MD   Basic metabolic panel     Status: Abnormal   Result Value Ref Range    Sodium 135 133 - 144 mmol/L    Potassium 3.7 3.4 - 5.3 mmol/L    Chloride 100 94 - 109 mmol/L    Carbon Dioxide 29 20 - 32 mmol/L    Anion Gap 6 3 - 14 mmol/L    Glucose 116 (H) 70 - 99 mg/dL    Urea Nitrogen 13 7 - 30 mg/dL    Creatinine 1.04 0.66 - 1.25 mg/dL    GFR Estimate 65 >60 mL/min/[1.73_m2]    GFR Estimate If Black 75 >60 mL/min/[1.73_m2]    Calcium 8.9 8.5 - 10.1 mg/dL   CBC with platelets differential     Status: None   Result Value Ref Range    WBC 7.5 4.0 - 11.0 10e9/L    RBC Count 5.79 4.4 - 5.9 10e12/L    Hemoglobin 16.8 13.3 - 17.7 g/dL    Hematocrit 52.8 40.0 - 53.0 %    MCV 91 78 - 100 fl    MCH 29.0 26.5 - 33.0 pg    MCHC 31.8 31.5 - 36.5 g/dL    RDW 14.7 10.0 - 15.0 %    Platelet Count 213 150 - 450 10e9/L    Diff Method Automated Method     % Neutrophils 67.7 %    % Lymphocytes 22.7 %    % Monocytes 8.4 %    % Eosinophils 0.5 %    % Basophils 0.4 %    % Immature Granulocytes 0.3 %    Nucleated RBCs 0 0 /100    Absolute Neutrophil 5.1 1.6 - 8.3 10e9/L    Absolute Lymphocytes 1.7 0.8 - 5.3 10e9/L    Absolute Monocytes 0.6 0.0 - 1.3 10e9/L    Absolute Eosinophils 0.0 0.0 - 0.7 10e9/L     Absolute Basophils 0.0 0.0 - 0.2 10e9/L    Abs Immature Granulocytes 0.0 0 - 0.4 10e9/L    Absolute Nucleated RBC 0.0    Troponin I     Status: None   Result Value Ref Range    Troponin I ES <0.015 0.000 - 0.045 ug/L   EKG 12-lead, tracing only     Status: None (Preliminary result)   Result Value Ref Range    Interpretation ECG Click View Image link to view waveform and result      Medications - No data to display     Assessments & Plan (with Medical Decision Making)   Patient presents with about 48 hours of chest pain.  It is atypical for ACS he has no history of ACS stent or bypass.  His EKG is ventricular paced this is chronic his chest x-ray is unchanged from previous and his troponin is negative.  Given the duration of his symptoms with a negative troponin I do not think his symptoms represent ACS.  He expresses his biggest concern is difficulty sleeping.  He would like to go home and try to get a good night sleep as he thinks this is making his symptoms worse.  I would not do a stress test on him, I do not think a second troponin would be valuable given the duration of his unrelenting symptoms.  He is not hypoxic or tachycardic I do not think he needs a work-up for pulmonary embolism.  We will discharge him home with a prescription for trazodone and recommend cardiology clinic follow-up.    I have reviewed the nursing notes. I have reviewed the findings, diagnosis, plan and need for follow up with the patient.    New Prescriptions    TRAZODONE (DESYREL) 50 MG TABLET    Take 1 tablet (50 mg) by mouth At Bedtime       Final diagnoses:   Chest pain, unspecified type   Insomnia, unspecified type     I, Milena Kern, am serving as a trained medical scribe to document services personally performed by Gabo Bonner MD, based on the provider's statements to me.      I, Gabo Bonner MD, was physically present and have reviewed and verified the accuracy of this note documented by Milena Kern.      --  Gabo Bonner MD  Emergency Medicine   [facility], [place], EMERGENCY DEPARTMENT  5/6/2020     Gabo Bonner MD  05/06/20 0908

## 2020-05-06 NOTE — ED NOTES
Unable to get a hold of pt assisted living facility. Pt is to discharge back to them. RN to keep trying to get a hold of facility.

## 2020-05-06 NOTE — ED TRIAGE NOTES
"Pt arrives by EMS for chest pain that he states began \"yesterday\" and describes it as \"sharp\". Pt lives in a group home and is [place] speaking. EMS reports no paperwork was available to send with pt.  line used for triage and assessment. Denies symptoms of covid-19 or exposure.   "

## 2020-05-06 NOTE — DISCHARGE INSTRUCTIONS
All of your tests for heart injury are negative  Use trazodone for sleep  Follow up in the Cardiology Clinic  Please make an appointment to follow up with Cardiology Clinic (phone: [phone]) as soon as possible.

## 2020-05-06 NOTE — ED AVS SNAPSHOT
[facility], [place], Emergency Department  [address] [facility] [address]  Phone:  [phone]                                    Gallito Farley   MRN: [id number]    Department:  [facility], Emergency Department   Date of Visit:  5/6/2020           After Visit Summary Signature Page    I have received my discharge instructions, and my questions have been answered. I have discussed any challenges I see with this plan with the nurse or doctor.    ..........................................................................................................................................  Patient/Patient Representative Signature      ..........................................................................................................................................  Patient Representative Print Name and Relationship to Patient    ..................................................               ................................................  Date                                   Time    ..........................................................................................................................................  Reviewed by Signature/Title    ...................................................              ..............................................  Date                                               Time          22EPIC Rev 08/18

## 2020-05-06 NOTE — ED NOTES
Joe, caregiver at [facility] notified of pt discharge. She doesn't have any questions and is aware of pt status.

## 2020-05-07 ENCOUNTER — TELEPHONE (OUTPATIENT)
Dept: FAMILY MEDICINE | Facility: CLINIC | Age: 85
End: 2020-05-07

## 2020-05-07 ENCOUNTER — PATIENT OUTREACH (OUTPATIENT)
Dept: GERIATRIC MEDICINE | Facility: CLINIC | Age: 85
End: 2020-05-07

## 2020-05-07 NOTE — TELEPHONE ENCOUNTER
RECORDS RECEIVED FROM: Internal   DATE RECEIVED: 5-8-20   NOTES STATUS DETAILS   OFFICE NOTE from referring provider    Internal Yearly f/u with cardio. ED f/u   OFFICE NOTE from other cardiologist    Internal 5-7-19 Dr. Garcia   DISCHARGE SUMMARY from hospital    N/A    DISCHARGE REPORT from the ER   Internal 5-6-20 [facility]   OPERATIVE REPORT    N/A    MEDICATION LIST   Internal    LABS     BMP   Internal 5-6-20   CBC   Internal 5-6-20   CMP   Internal 10-2-18   Lipids   N/A    TSH   N/A    DIAGNOSTIC PROCEDURES     EKG   Internal 5-6-20   Monitor Reports   N/A    IMAGING (DISC & REPORT)      Echo   N/A    Stress Tests   Internal 6-13-19   Cath   N/A    MRI/MRA   N/A    CT/CTA   N/A

## 2020-05-07 NOTE — TELEPHONE ENCOUNTER
Reason for Call:  Other Update    Detailed comments: Home care nurse wanted to update provider on the current situation of pt. States pt refused to go to the ER with the paramedics as advised. Thank you.    Phone Number Patient can be reached at: Other phone number:  [phone]    Best Time: Any    Can we leave a detailed message on this number? YES    Call taken on 5/7/2020 at 6:16 PM by Cristal Parker

## 2020-05-07 NOTE — PROGRESS NOTES
[facility] Care Coordination Contact  CC received notification of Emergency Room visit.  ER visit occurred on 5/6/20 at [facility] with Dx of chest pain.    CC contacted [facility] and left a message requesting a return call.  Member has a follow-up appointment with PCP: Yes: scheduled on cardiology 5/8  Member has had a change in condition: No  New referrals placed: No  Home Visit Needed: No  Care plan reviewed and updated.  PCP notified of ED visit via EMR.    Jorge David RN, PHN  [facility]

## 2020-05-07 NOTE — TELEPHONE ENCOUNTER
Called to discuss with Kate. States that there is head twitching and jerking, also noticed this in his chest and bilateral arms.     Patient told Kate that these sx started last night and he was unable to sleep.     No known life style changes.    Patient went to ED for chest pain yesterday and he was provided Trazadone and sent home. Told to follow up with his cardiologist.     No SOB, No fever, chest pain is gone.     Vitals are as follows:    /87  P 87  Temp 98.0  RR16  O2 95% RA    RN discussed with Kate that the patient needs to head back to ED for another assessment for the twitching and jerking that has been going on since last night.     aKte states that she is going to call the manager as she is currently there and patient will be taken to ED.     Routing to provider as GUNJAN Chavira RN  [facility]

## 2020-05-07 NOTE — TELEPHONE ENCOUNTER
Reason for Call:  Other call back    Detailed comments: Home care would like to request a call back regarding some symptoms that the pt is having. States that he is having some twitching of the head and upper body that he can not control and would like a call back as soon as possible to advise. Thank you.    Phone Number Patient can be reached at: Other phone number:  [phone]    Best Time: Any    Can we leave a detailed message on this number? YES    Call taken on 5/7/2020 at 12:22 PM by Cristal Parker

## 2020-05-08 ENCOUNTER — VIRTUAL VISIT (OUTPATIENT)
Dept: CARDIOLOGY | Facility: CLINIC | Age: 85
End: 2020-05-08
Attending: INTERNAL MEDICINE
Payer: COMMERCIAL

## 2020-05-08 ENCOUNTER — PRE VISIT (OUTPATIENT)
Dept: CARDIOLOGY | Facility: CLINIC | Age: 85
End: 2020-05-08

## 2020-05-08 DIAGNOSIS — I25.118 CORONARY ARTERY DISEASE OF NATIVE ARTERY OF NATIVE HEART WITH STABLE ANGINA PECTORIS (H): Primary | ICD-10-CM

## 2020-05-08 PROCEDURE — 99203 OFFICE O/P NEW LOW 30 MIN: CPT | Mod: 95 | Performed by: INTERNAL MEDICINE

## 2020-05-08 RX ORDER — ISOSORBIDE MONONITRATE 30 MG/1
30 TABLET, EXTENDED RELEASE ORAL DAILY
Qty: 30 TABLET | Refills: 11 | Status: SHIPPED | OUTPATIENT
Start: 2020-05-08 | End: 2021-04-05

## 2020-05-08 RX ORDER — ASPIRIN 81 MG/1
81 TABLET ORAL DAILY
Qty: 90 TABLET | Refills: 3 | Status: SHIPPED | OUTPATIENT
Start: 2020-05-08 | End: 2021-04-05

## 2020-05-08 NOTE — PATIENT INSTRUCTIONS
It was a pleasure to see you in the cardiology clinic today.    If you have any questions, you can reach my nurse, Elza Gil, at [phone].  Press Option #1 for the [facility], and then press Option #f 4 or nursing.    Note the new medications:     Asprin 81 mg every day    Imdur 30 mg every day    Stop the following medications: None    The results from today include: None    Tests ordered today: None    I would like you to follow up with Genevieve Chang NP in six months    Patient Eron, non English speaking    RN: Carolyn  [facility]  [phone]    Sincerely,      Abilio Frank MD     [facility]

## 2020-05-08 NOTE — PROGRESS NOTES
"[facility] CARDIOLOGY CONSULTATION VIDEO ENCOUNTER    Referring Provider:  Referred Self  Primary Care Provider:   Hakeem Awad  Indication for Consultation:      Gallito Farley is a 86 year old male who is being evaluated via a billable video visit.      The patient has been notified of following:   \"This video visit will be conducted via a call between you and your physician/provider. We have found that certain health care needs can be provided without the need for an in-person physical exam.  This service lets us provide the care you need with a video conversation.  If a prescription is necessary we can send it directly to your pharmacy.  If lab work is needed we can place an order for that and you can then stop by our lab to have the test done at a later time. Video visits are billed at different rates depending on your insurance coverage.  Please reach out to your insurance provider with any questions. If during the course of the call the physician/provider feels a video visit is not appropriate, you will not be charged for this service.\"    Patient has given verbal consent for Video visit? Yes    Video-Visit Details  Type of service:  Video Visit  Video start time: 1:08 PM  Video end time: 1:37 PM  Total video time: 29 min  Originating Location (pt. Location): Home  Distant Location (provider location):  Provider's home  Mode of Communication: Video Conference via [email or web address]    NON-ENGLISH SPEAKING, Libyan   present    CC: Chest discomfort    HPI: Gallito Farley is a 86 year old male being seen today for evaluation of chest discomfort.   The patient's risk factor profile is: (+) HTN, (-) DM, (-) hypercholesterolemia, remote tobacco use, (?) fam Hx premature CAD.  The patient has no history of CAD, CHF, or valvular heart disease).  He has a Hx of complete heart block and had a dual chamber Medtronic permanent pacemaker implanted in Jan 2017.    He had an ECHO (2017) that showed LVEF " "55-60% with mild diffuse hypokinesis.  He has documented diastolic dysfunction  He reported chest discomfort to Dr. Garcia in May 2019 and underwent a Lexiscan that was negative.   The patient has no Hx of PAD or cerebrovascular disease.    PMHx remarkable for TB and partial pneumonectomy in 1990s.    He presented to [facility] a couple of days ago with chest discomfort.  His ECG shows a paced rhythm.  His troponin was negative.  At that time he had no other cardiopulmonary symptoms and reported his greatest symptom was difficulty with sleep.  He was discharged from the ER with follow up by cardiology.    The patient has not had recurrent chest discomfort since his ER visit. Prior to going to the ER he experienced a squeezing sensation in his chest that lasted many hours (\"all night\").  He had SOB at that time. That was the only episode of chest discomfort the patient experienced.  Other than that episode, he denies SOB, PND, orthopnea, pedal edema, palpitations, lightheadedness, or syncope.    The patient lives in a group home.    PAST MEDICAL HISTORY:  Past Medical History:   Diagnosis Date     Asthma      BPH (benign prostatic hyperplasia)      Chronic obstructive pulmonary disease, unspecified COPD type (H) 10/2/2018     COPD (chronic obstructive pulmonary disease) (H)      Diastolic dysfunction, left ventricle 4/16/2013     H/O tuberculosis     s/[place] pneum[place] the [place]     Hypertension      LBBB (left bundle branch block)      Leg wound, right     chronic, s/p grafting     Osteoarthritis        CURRENT MEDICATIONS:  Current Outpatient Medications   Medication Sig     acetaminophen (TYLENOL) 325 MG tablet Take 2 tablets (650 mg) by mouth 3 times daily as needed for pain     albuterol (PROVENTIL) (2.5 MG/3ML) 0.083% neb solution Take 1 vial (2.5 mg) by nebulization every 6 hours as needed     amLODIPine (NORVASC) 5 MG tablet Take 1 tablet (5 mg) by mouth daily     diclofenac (VOLTAREN) 1 % " topical gel Place 2 g onto the skin 4 times daily     diclofenac sodium 3 % GEL Apply 4 gm four times a day as needed to right trapezius muscle.     enalapril (VASOTEC) 10 MG tablet Take 1 tablet (10 mg) by mouth daily     famotidine (PEPCID) 20 MG tablet Take 1 tablet (20 mg) by mouth 2 times daily     finasteride (PROSCAR) 5 MG tablet Take 1 tablet (5 mg) by mouth At Bedtime     fluticasone-vilanterol (BREO ELLIPTA) 200-25 MCG/INH inhaler INHALE 1 PUFF BY MOUTH DAILY     gabapentin (NEURONTIN) 100 MG capsule TAKE 1 CAPSULE BY MOUTH THREE TIMES A DAY     hydrochlorothiazide (MICROZIDE) 12.5 MG capsule TAKE 2 CAPSULES (25MG) BY MOUTH DAILY     ketotifen (ZADITOR/REFRESH ANTI-ITCH) 0.025 % ophthalmic solution Place 1 drop into both eyes 2 times daily     melatonin 3 MG tablet Take 3 mg by mouth     melatonin 5 MG CAPS Take 1 capsule by mouth At Bedtime     MILK OF MAGNESIA 7.75 % suspension TAKE TAKE 30ML BY MOUTH DAILY AS NEEDED FOR CONSTIPATION     MUCINEX 600 MG 12 hr tablet TAKE 1 TABLET BY MOUTH TWICE A DAY     MYRBETRIQ 50 MG 24 hr tablet TAKE 1 TABLET BY MOUTH DAILY     order for DME Equipment being ordered: Nebulizer machine and adult tubing     order for DME Equipment being ordered: Dispense Coban wrap.     phenazopyridine (PYRIDIUM) 200 MG tablet Take 1 tablet (200 mg) by mouth 3 times daily as needed for irritation     polyethylene glycol (MIRALAX) powder TAKE 17 GRAMS MIXED IN 4-6OZ LIQUID BY MOUTH ONCE DAILY     polyethylene glycol-propylene glycol (SYSTANE) 0.4-0.3 % SOLN ophthalmic solution Place 1 drop into both eyes 4 times daily     ranitidine (ZANTAC) 150 MG tablet Take 150 mg by mouth 2 times daily     tamsulosin (FLOMAX) 0.4 MG capsule TAKE 2 CAPSULES (0.8MG) BY MOUTH EVERY NIGHT AT BEDTIME     tiotropium (SPIRIVA HANDIHALER) 18 MCG inhaled capsule INHALE CONTENTS OF ONE CAPSULE DAILY USING HANDIHALER DEVICE     tolterodine ER (DETROL LA) 4 MG 24 hr capsule Take 1 capsule (4 mg) by mouth daily      traZODone (DESYREL) 50 MG tablet Take 1 tablet (50 mg) by mouth At Bedtime     No current facility-administered medications for this visit.      Facility-Administered Medications Ordered in Other Visits   Medication     iopamidol (ISOVUE-370) solution 111 mL       PAST SURGICAL HISTORY:  Past Surgical History:   Procedure Laterality Date     CHOLECYSTECTOMY       ENT SURGERY       IRRIGATION AND DEBRIDEMENT HIP, COMBINED  4/18/2013    Procedure: COMBINED IRRIGATION AND DEBRIDEMENT HIP;  Irrigation and debridement of Right Hip with cultures;  Surgeon: Cristal Inman MD;  Location: [facility] OR     Partial pneumonectomy      done in [facility] in the 1990's     skin grafting - leg wound  6/2016       ALLERGIES  Patient has no known allergies.    FAMILY HX:  Family History   Problem Relation Age of Onset     Family History Negative Mother        SOCIAL HX:  Social History     Socioeconomic History     Marital status:      Spouse name: Not on file     Number of children: Not on file     Years of education: Not on file     Highest education level: Not on file   Occupational History     Not on file   Social Needs     Financial resource strain: Not on file     Food insecurity     Worry: Not on file     Inability: Not on file     Transportation needs     Medical: Not on file     Non-medical: Not on file   Tobacco Use     Smoking status: Former Smoker     Smokeless tobacco: Never Used   Substance and Sexual Activity     Alcohol use: No     Drug use: No     Sexual activity: Yes     Partners: Female     Birth control/protection: None   Lifestyle     Physical activity     Days per week: Not on file     Minutes per session: Not on file     Stress: Not on file   Relationships     Social connections     Talks on phone: Not on file     Gets together: Not on file     Attends Jainism service: Not on file     Active member of club or organization: Not on file     Attends meetings of clubs or organizations: Not on file      Relationship status: Not on file     Intimate partner violence     Fear of current or ex partner: Not on file     Emotionally abused: Not on file     Physically abused: Not on file     Forced sexual activity: Not on file   Other Topics Concern     Parent/sibling w/ CABG, MI or angioplasty before 65F 55M? Not Asked   Social History Narrative     Not on file       ROS:  Constitutional: No fever, chills, or sweats. No weight gain/loss.   HEENT: No visual disturbance, ear ache, epistaxis, sore throat.   Allergies/Immunologic: Negative.   Respiratory: No cough, hemoptysis.   Cardiovascular: As per HPI.   GI: No nausea, vomiting, hematemesis, melena, or hematochezia.   : No urinary frequency, dysuria, or hematuria.   Integument: No rash.   Psychiatric: No anxiety / depression.   Neuro: No speech disturbance, focal sensory or motor deficit.   Endocrinology: No polyuria / polyphagia.   Musculoskeletal: No myalgia.    VITAL SIGNS:  There were no vitals taken for this visit.  There is no height or weight on file to calculate BMI.  Wt Readings from Last 2 Encounters:   05/06/20 89.6 kg (197 lb 8 oz)   01/02/20 84.8 kg (187 lb)       PHYSICAL EXAM  GENERAL: Healthy, alert and no distress  EYES: Eyes grossly normal to inspection.  No discharge or erythema, or obvious scleral/conjunctival abnormalities.  RESP: No audible wheeze, cough, or visible cyanosis.  No visible retractions or increased work of breathing.    SKIN: Visible skin clear. No significant rash, abnormal pigmentation or lesions.  NEURO: Cranial nerves grossly intact.  Mentation and speech appropriate for age.  PSYCH: Mentation appears normal, affect normal/bright, judgement and insight intact, normal speech and appearance well-groomed.      LABS  Recent Labs   Lab Test 05/06/20  0756 10/02/18  1709   WBC 7.5 7.6   HGB 16.8 16.4   HCT 52.8 48.0    218     Recent Labs   Lab Test 05/06/20  0756 10/02/18  1709    135   POTASSIUM 3.7 3.7   CHLORIDE 100 98    CO2 29 26   * 101*   BUN 13 16   CR 1.04 1.03   PAMELA 8.9 9.0     Recent Labs   Lab Test 13  0643   CHOL 143   HDL 20*   LDL 93   TRIG 148   CHOLHDLRATIO 7.1*        EK2020  Sinus rhythm with occasional ventricular-paced complexes  Left axis deviation  Non-specific intra-ventricular conduction block  Possible Lateral infarct , age undetermined  Inferior infarct, age undetermined    ECHO: 2017  Interpretation Summary  Global and regional left ventricular function is normal with an EF of 55-60%.  Mild diffuse hypokinesis is present.  Right ventricular function, chamber size, wall motion, and thickness are normal.  Pulmonary artery systolic pressure is normal.  Dilation of the inferior vena cava is present with normal respiratory  variation in diameter. Estimated mean right atrial pressure is 3-8 mmHg.  No pericardial effusion is present.    CARDIAC MRI: None    CORONARY CTA: None    LEXISCAN STRESS TEST:  2019  Findings:  1. Overall quality of the study: Good.   2. Left ventricular cavity is not dilated on the rest and stress studies.  3. SPECT images demonstrate a moderate perfusion defect in the apical inferior segment and mild perfusion defects in the apical and mid inferior segments. There is reversibility of the apical and mid  inferior segments. These results suggest a mild post-scan likelihood  significant coronary artery disease. The summed stress score is 4.   4.  No appreciable wall motion abnormality of the left ventricle. Left  ventricular ejection fraction is 76%. Left ventricular end-diastolic  volume is 71 mL. End-systolic volume is 17 mL.  5. Baseline EKG and Stress findings reported separately in CVIS.  6. Right apical scarring and right upper lobectomy, presumably from  tuberculosis. No acute airspace opacities. Heart size normal. No  pericardial effusion. No pneumothorax or pleural effusion. The upper  abdomen is unremarkable. No worrisome bony or soft tissue  abnormality.                                                                   Impression:  1. Mildly abnormal myocardial SPECT study with a summed stress score of 4. Question mild reversibility in the mid and apical inferior wall.  A summed stress score of 4 is associated with an annual event rate of 2.7 and 0.8 for myocardial infarction and cardiac death, respectively  (Alejandro. Circulation 1998;98:535-43).  2. Normal left ventricular ejection fraction..   3. Postoperative changes from right upper lobectomy..      CARDIAC CATH: None      VENOUS US, BILATERAL:  1/7/2020  IMPRESSION:  1. RIGHT LEG:       A. No superficial or deep venous thrombosis demonstrated.       B. No deep or superficial venous incompetency demonstrated.       C. No  or varicose veins demonstrated.   2. LEFT LEG:       A. No superficial or deep venous thrombosis demonstrated.       B. No deep or superficial venous incompetency demonstrated.       C. No  or varicose veins demonstrated.       ASSESSMENT AND PLAN:   1. Chest Discomfort.  Patient has had history of chest discomfort dating back at least a year.  His stress nuclear from 2019 showed a question of mild reversibility in the mid and apical inferior wall.  I spoke to the patient as it is unclear this episode was related to CAD.  He is not enthusiastic about undergoing additional testing and given his age, the single episode, and the prior Lexiscan, I think that is quite reasonable.   Treat for presumed CAD.    -- Start ASA 81 mg every day  -- Start Imdur 30 mg every day.    2. CHB, s/p dual chamber pacemaker  -- F/U with EP, pacemaker checks.      FOLLOW UP:  Sisi Chang, 6 months      Abilio Frank MD    Divisions of Cardiology  [place], [place]    CC  Patient Care Team:  Hakeem Awad MD as PCP - General (Internal Medicine)  Lenin Farley MD Schuster, Joseph, DPM (Podiatry)  Hakeem Awad MD as  Assigned PCP  Jorge David, RN as Lead Care Coordinator  Santosh Matthew MD as MD (Urology)  Kristal Delgado RN as Registered Nurse  SELF, REFERRED

## 2020-05-26 ENCOUNTER — TELEPHONE (OUTPATIENT)
Dept: FAMILY MEDICINE | Facility: CLINIC | Age: 85
End: 2020-05-26

## 2020-05-26 NOTE — TELEPHONE ENCOUNTER
Reason for Call:  prescription    Detailed comments: [facility] home health nurse called in stating Gallito was seen in the ER and was prescribed traZODone (DESYREL) 50 MG tablet he ran out over the weekend and she would like to know if Dr Awad would like him to continue the medication? If so Gallito needs a refill to be sent over to [id number] - [place], [place] - [address]  Please call to advise Thank you     Phone Number Patient can be reached at: Rosy @ [phone]    Best Time: Any    Can we leave a detailed message on this number? YES    Call taken on 5/26/2020 at 11:41 AM by Radha Jennings

## 2020-05-26 NOTE — TELEPHONE ENCOUNTER
Spoke with home care nurse . Her and patient are wondering if patient is supposed to continue taking the trazodone that was prescribed at recent ED visit. Reviewed ED AVS with her. Patient did see cardiology. Told her that patient needs apt to discuss medication prescribed elsewhere and assisted her in scheduling apt for patient.    Kari Martines RN

## 2020-05-27 ENCOUNTER — APPOINTMENT (OUTPATIENT)
Dept: INTERPRETER SERVICES | Facility: CLINIC | Age: 85
End: 2020-05-27
Payer: COMMERCIAL

## 2020-05-27 ENCOUNTER — VIRTUAL VISIT (OUTPATIENT)
Dept: FAMILY MEDICINE | Facility: CLINIC | Age: 85
End: 2020-05-27
Payer: COMMERCIAL

## 2020-05-27 DIAGNOSIS — F51.01 PRIMARY INSOMNIA: Primary | ICD-10-CM

## 2020-05-27 PROCEDURE — 99213 OFFICE O/P EST LOW 20 MIN: CPT | Mod: 95 | Performed by: PREVENTIVE MEDICINE

## 2020-05-27 RX ORDER — TRAZODONE HYDROCHLORIDE 50 MG/1
50 TABLET, FILM COATED ORAL
Qty: 90 TABLET | Refills: 1 | Status: SHIPPED | OUTPATIENT
Start: 2020-05-27 | End: 2021-12-03

## 2020-05-27 NOTE — PATIENT INSTRUCTIONS
At [facility], we strive to deliver an exceptional experience to you, every time we see you. If you receive a survey, please complete it as we do value your feedback.  If you have MyChart, you can expect to receive results automatically within 24 hours of their completion.  Your provider will send a note interpreting your results as well.   If you do not have MyChart, you should receive your results in about a week by mail.    Your care team:                            Family Medicine Internal Medicine   MD Hakeem Yuen MD Shantel Branch-Fleming, MD Katya Georgiev PA-C Megan Hill, APRNORMA Meyers, MD Pediatrics   Colton Hector, PAFLORA Coon, MD Devi Cloud APRN CNP   MD Reyna Fuentes MD Deborah Mielke, MD Kim Thein, APRN [facility]      Clinic hours: Monday - Thursday 7 am-7 pm; Fridays 7 am-5 pm.   Urgent care: Monday - Friday 11 am-9 pm; Saturday and Sunday 9 am-5 pm.    Clinic: [phone]       [facility]: Monday - Thursday 8 am - 7 pm; Friday 8 am - 6 pm  [facility]: [phone]     Use [email or web address] for 24/7 diagnosis and treatment of dozens of conditions.

## 2020-05-27 NOTE — PROGRESS NOTES
"Gallito Farley is a 86 year old male who is being evaluated via a billable telephone visit.      The patient has been notified of following:     \"This telephone visit will be conducted via a call between you and your physician/provider. We have found that certain health care needs can be provided without the need for a physical exam.  This service lets us provide the care you need with a short phone conversation.  If a prescription is necessary we can send it directly to your pharmacy.  If lab work is needed we can place an order for that and you can then stop by our lab to have the test done at a later time.    Telephone visits are billed at different rates depending on your insurance coverage. During this emergency period, for some insurers they may be billed the same as an in-person visit.  Please reach out to your insurance provider with any questions.    If during the course of the call the physician/provider feels a telephone visit is not appropriate, you will not be charged for this service.\"    Patient has given verbal consent for Telephone visit?  Yes    What phone number would you like to be contacted at? [phone]    How would you like to obtain your AVS? Mail a copy    Subjective     Gallito Farley is a 86 year old male who presents via phone visit today for the following health issues:    HPI  ED/UC Followup:    Facility:  [facility], Emergency Department  Date of visit: 05/06/2020  Reason for visit: Chest Pain  Current Status: Patient states that he is doing good       Medication Followup of TraZODone    Taking Medication as prescribed: yes    Side Effects:  None    Medication Helping Symptoms:  yes     Sleep is better with the medication   Would like to continue as helping him sleep  No dizziness  No side effects  Was seen in the ER for chest pain and insomnia  Visit with cardiology also done     Patient Active Problem List   Diagnosis     Chronic constipation     " GERD (gastroesophageal reflux disease)     Atrioventricular block, complete (H)     Advanced directives, counseling/discussion     Cardiac pacemaker in situ- Medtronic, dual chamber- NOT dependent     Health Care Home     Moderate COPD (chronic obstructive pulmonary disease) (H)     B12 deficiency     OA (osteoarthritis) of knee     Insomnia, unspecified     HTN (hypertension)     Hypertrophy of prostate without urinary obstruction and other lower urinary tract symptoms (LUTS)     Past Surgical History:   Procedure Laterality Date     CHOLECYSTECTOMY       ENT SURGERY       IRRIGATION AND DEBRIDEMENT HIP, COMBINED  4/18/2013    Procedure: COMBINED IRRIGATION AND DEBRIDEMENT HIP;  Irrigation and debridement of Right Hip with cultures;  Surgeon: Cirstal Inman MD;  Location: [facility] OR     Partial pneumonectomy      done in [facility] in the 1990's     skin grafting - leg wound  6/2016       Social History     Tobacco Use     Smoking status: Former Smoker     Smokeless tobacco: Never Used   Substance Use Topics     Alcohol use: No     Family History   Problem Relation Age of Onset     Family History Negative Mother          Current Outpatient Medications   Medication Sig Dispense Refill     acetaminophen (TYLENOL) 325 MG tablet Take 2 tablets (650 mg) by mouth 3 times daily as needed for pain 180 tablet 5     albuterol (PROVENTIL) (2.5 MG/3ML) 0.083% neb solution Take 1 vial (2.5 mg) by nebulization every 6 hours as needed 360 mL 1     amLODIPine (NORVASC) 5 MG tablet Take 1 tablet (5 mg) by mouth daily 30 tablet 11     aspirin 81 MG EC tablet Take 1 tablet (81 mg) by mouth daily 90 tablet 3     diclofenac (VOLTAREN) 1 % topical gel Place 2 g onto the skin 4 times daily 100 g 11     diclofenac sodium 3 % GEL Apply 4 gm four times a day as needed to right trapezius muscle. 100 g 11     enalapril (VASOTEC) 10 MG tablet Take 1 tablet (10 mg) by mouth daily 90 tablet 3     famotidine (PEPCID) 20 MG tablet Take 1 tablet  (20 mg) by mouth 2 times daily 60 tablet 5     finasteride (PROSCAR) 5 MG tablet Take 1 tablet (5 mg) by mouth At Bedtime 90 tablet 3     fluticasone-vilanterol (BREO ELLIPTA) 200-25 MCG/INH inhaler INHALE 1 PUFF BY MOUTH DAILY 3 Inhaler 3     gabapentin (NEURONTIN) 100 MG capsule TAKE 1 CAPSULE BY MOUTH THREE TIMES A DAY 84 capsule 5     hydrochlorothiazide (MICROZIDE) 12.5 MG capsule TAKE 2 CAPSULES (25MG) BY MOUTH DAILY 180 capsule 3     isosorbide mononitrate (IMDUR) 30 MG 24 hr tablet Take 1 tablet (30 mg) by mouth daily 30 tablet 11     ketotifen (ZADITOR/REFRESH ANTI-ITCH) 0.025 % ophthalmic solution Place 1 drop into both eyes 2 times daily 1 Bottle 11     melatonin 3 MG tablet Take 3 mg by mouth       melatonin 5 MG CAPS Take 1 capsule by mouth At Bedtime 30 capsule 11     MILK OF MAGNESIA 7.75 % suspension TAKE TAKE 30ML BY MOUTH DAILY AS NEEDED FOR CONSTIPATION 473 mL 11     MUCINEX 600 MG 12 hr tablet TAKE 1 TABLET BY MOUTH TWICE A DAY 56 tablet 11     MYRBETRIQ 50 MG 24 hr tablet TAKE 1 TABLET BY MOUTH DAILY 90 tablet 3     order for DME Equipment being ordered: Nebulizer machine and adult tubing 1 each 0     order for DME Equipment being ordered: Dispense Coban wrap. 5 each 5     phenazopyridine (PYRIDIUM) 200 MG tablet Take 1 tablet (200 mg) by mouth 3 times daily as needed for irritation 12 tablet 3     polyethylene glycol (MIRALAX) powder TAKE 17 GRAMS MIXED IN 4-6OZ LIQUID BY MOUTH ONCE DAILY 510 g 11     polyethylene glycol-propylene glycol (SYSTANE) 0.4-0.3 % SOLN ophthalmic solution Place 1 drop into both eyes 4 times daily 1 Bottle 11     ranitidine (ZANTAC) 150 MG tablet Take 150 mg by mouth 2 times daily       tamsulosin (FLOMAX) 0.4 MG capsule TAKE 2 CAPSULES (0.8MG) BY MOUTH EVERY NIGHT AT BEDTIME 180 capsule 3     tiotropium (SPIRIVA HANDIHALER) 18 MCG inhaled capsule INHALE CONTENTS OF ONE CAPSULE DAILY USING HANDIHALER DEVICE 30 capsule 11     tolterodine ER (DETROL LA) 4 MG 24 hr  capsule Take 1 capsule (4 mg) by mouth daily 30 capsule 11     traZODone (DESYREL) 50 MG tablet Take 1 tablet (50 mg) by mouth nightly as needed for sleep 90 tablet 1     No Known Allergies  BP Readings from Last 3 Encounters:   05/06/20 (!) 144/71   01/02/20 99/65   12/17/19 98/66    Wt Readings from Last 3 Encounters:   05/06/20 89.6 kg (197 lb 8 oz)   01/02/20 84.8 kg (187 lb)   12/17/19 83.9 kg (185 lb)            Reviewed and updated as needed this visit by Provider  Tobacco  Allergies  Meds  Problems  Med Hx  Surg Hx  Fam Hx         Review of Systems   Constitutional, HEENT, cardiovascular, pulmonary, gi and gu systems are negative, except as otherwise noted.       Objective   Reported vitals:  There were no vitals taken for this visit.   alert and no distress  PSYCH: Alert and oriented times 3; coherent speech, normal   rate and volume, able to articulate logical thoughts, able   to abstract reason, no tangential thoughts, no hallucinations   or delusions  His affect is normal  RESP: No cough, no audible wheezing, able to talk in full sentences  Remainder of exam unable to be completed due to telephone visits    Diagnostic Test Results:  Labs reviewed in Epic  No results found for this or any previous visit (from the past 24 hour(s)).        Assessment/Plan:  1. Primary insomnia  -Refills on medication provided, tolerating well without side effects   - traZODone (DESYREL) 50 MG tablet; Take 1 tablet (50 mg) by mouth nightly as needed for sleep  Dispense: 90 tablet; Refill: 1    Return in about 4 weeks (around 6/24/2020), or if symptoms worsen or fail to improve.      Phone call duration:  8 minutes    Lizzy Elise MD MPH

## 2020-06-09 ENCOUNTER — ANCILLARY PROCEDURE (OUTPATIENT)
Dept: CARDIOLOGY | Facility: CLINIC | Age: 85
End: 2020-06-09
Attending: INTERNAL MEDICINE
Payer: COMMERCIAL

## 2020-06-09 DIAGNOSIS — I44.1 ATRIOVENTRICULAR BLOCK, SECOND DEGREE: ICD-10-CM

## 2020-06-09 PROCEDURE — 93296 REM INTERROG EVL PM/IDS: CPT | Mod: ZF

## 2020-06-09 PROCEDURE — 93294 REM INTERROG EVL PM/LDLS PM: CPT | Performed by: INTERNAL MEDICINE

## 2020-06-10 LAB
MDC_IDC_LEAD_IMPLANT_DT: NORMAL
MDC_IDC_LEAD_IMPLANT_DT: NORMAL
MDC_IDC_LEAD_LOCATION: NORMAL
MDC_IDC_LEAD_LOCATION: NORMAL
MDC_IDC_LEAD_LOCATION_DETAIL_1: NORMAL
MDC_IDC_LEAD_LOCATION_DETAIL_1: NORMAL
MDC_IDC_LEAD_MFG: NORMAL
MDC_IDC_LEAD_MFG: NORMAL
MDC_IDC_LEAD_MODEL: NORMAL
MDC_IDC_LEAD_MODEL: NORMAL
MDC_IDC_LEAD_POLARITY_TYPE: NORMAL
MDC_IDC_LEAD_POLARITY_TYPE: NORMAL
MDC_IDC_LEAD_SERIAL: NORMAL
MDC_IDC_LEAD_SERIAL: NORMAL
MDC_IDC_MSMT_BATTERY_DTM: NORMAL
MDC_IDC_MSMT_BATTERY_REMAINING_LONGEVITY: 75 MO
MDC_IDC_MSMT_BATTERY_RRT_TRIGGER: 2.83
MDC_IDC_MSMT_BATTERY_STATUS: NORMAL
MDC_IDC_MSMT_BATTERY_VOLTAGE: 3.01 V
MDC_IDC_MSMT_LEADCHNL_RA_IMPEDANCE_VALUE: 380 OHM
MDC_IDC_MSMT_LEADCHNL_RA_IMPEDANCE_VALUE: 494 OHM
MDC_IDC_MSMT_LEADCHNL_RA_PACING_THRESHOLD_AMPLITUDE: 0.88 V
MDC_IDC_MSMT_LEADCHNL_RA_PACING_THRESHOLD_PULSEWIDTH: 0.4 MS
MDC_IDC_MSMT_LEADCHNL_RA_SENSING_INTR_AMPL: 2.38 MV
MDC_IDC_MSMT_LEADCHNL_RA_SENSING_INTR_AMPL: 2.38 MV
MDC_IDC_MSMT_LEADCHNL_RV_IMPEDANCE_VALUE: 532 OHM
MDC_IDC_MSMT_LEADCHNL_RV_IMPEDANCE_VALUE: 551 OHM
MDC_IDC_MSMT_LEADCHNL_RV_PACING_THRESHOLD_AMPLITUDE: 0.75 V
MDC_IDC_MSMT_LEADCHNL_RV_PACING_THRESHOLD_PULSEWIDTH: 0.4 MS
MDC_IDC_MSMT_LEADCHNL_RV_SENSING_INTR_AMPL: 7.75 MV
MDC_IDC_MSMT_LEADCHNL_RV_SENSING_INTR_AMPL: 7.75 MV
MDC_IDC_PG_IMPLANT_DTM: NORMAL
MDC_IDC_PG_MFG: NORMAL
MDC_IDC_PG_MODEL: NORMAL
MDC_IDC_PG_SERIAL: NORMAL
MDC_IDC_PG_TYPE: NORMAL
MDC_IDC_SESS_CLINIC_NAME: NORMAL
MDC_IDC_SESS_DTM: NORMAL
MDC_IDC_SESS_TYPE: NORMAL
MDC_IDC_SET_BRADY_AT_MODE_SWITCH_RATE: 171 {BEATS}/MIN
MDC_IDC_SET_BRADY_HYSTRATE: NORMAL
MDC_IDC_SET_BRADY_LOWRATE: 60 {BEATS}/MIN
MDC_IDC_SET_BRADY_MAX_SENSOR_RATE: 120 {BEATS}/MIN
MDC_IDC_SET_BRADY_MAX_TRACKING_RATE: 120 {BEATS}/MIN
MDC_IDC_SET_BRADY_MODE: NORMAL
MDC_IDC_SET_BRADY_PAV_DELAY_LOW: 180 MS
MDC_IDC_SET_BRADY_SAV_DELAY_LOW: 150 MS
MDC_IDC_SET_LEADCHNL_RA_PACING_AMPLITUDE: 1.75 V
MDC_IDC_SET_LEADCHNL_RA_PACING_ANODE_ELECTRODE_1: NORMAL
MDC_IDC_SET_LEADCHNL_RA_PACING_ANODE_LOCATION_1: NORMAL
MDC_IDC_SET_LEADCHNL_RA_PACING_CAPTURE_MODE: NORMAL
MDC_IDC_SET_LEADCHNL_RA_PACING_CATHODE_ELECTRODE_1: NORMAL
MDC_IDC_SET_LEADCHNL_RA_PACING_CATHODE_LOCATION_1: NORMAL
MDC_IDC_SET_LEADCHNL_RA_PACING_POLARITY: NORMAL
MDC_IDC_SET_LEADCHNL_RA_PACING_PULSEWIDTH: 0.4 MS
MDC_IDC_SET_LEADCHNL_RA_SENSING_ANODE_ELECTRODE_1: NORMAL
MDC_IDC_SET_LEADCHNL_RA_SENSING_ANODE_LOCATION_1: NORMAL
MDC_IDC_SET_LEADCHNL_RA_SENSING_CATHODE_ELECTRODE_1: NORMAL
MDC_IDC_SET_LEADCHNL_RA_SENSING_CATHODE_LOCATION_1: NORMAL
MDC_IDC_SET_LEADCHNL_RA_SENSING_POLARITY: NORMAL
MDC_IDC_SET_LEADCHNL_RA_SENSING_SENSITIVITY: 0.6 MV
MDC_IDC_SET_LEADCHNL_RV_PACING_AMPLITUDE: 2 V
MDC_IDC_SET_LEADCHNL_RV_PACING_ANODE_ELECTRODE_1: NORMAL
MDC_IDC_SET_LEADCHNL_RV_PACING_ANODE_LOCATION_1: NORMAL
MDC_IDC_SET_LEADCHNL_RV_PACING_CAPTURE_MODE: NORMAL
MDC_IDC_SET_LEADCHNL_RV_PACING_CATHODE_ELECTRODE_1: NORMAL
MDC_IDC_SET_LEADCHNL_RV_PACING_CATHODE_LOCATION_1: NORMAL
MDC_IDC_SET_LEADCHNL_RV_PACING_POLARITY: NORMAL
MDC_IDC_SET_LEADCHNL_RV_PACING_PULSEWIDTH: 0.4 MS
MDC_IDC_SET_LEADCHNL_RV_SENSING_ANODE_ELECTRODE_1: NORMAL
MDC_IDC_SET_LEADCHNL_RV_SENSING_ANODE_LOCATION_1: NORMAL
MDC_IDC_SET_LEADCHNL_RV_SENSING_CATHODE_ELECTRODE_1: NORMAL
MDC_IDC_SET_LEADCHNL_RV_SENSING_CATHODE_LOCATION_1: NORMAL
MDC_IDC_SET_LEADCHNL_RV_SENSING_POLARITY: NORMAL
MDC_IDC_SET_LEADCHNL_RV_SENSING_SENSITIVITY: 0.9 MV
MDC_IDC_SET_ZONE_DETECTION_INTERVAL: 350 MS
MDC_IDC_SET_ZONE_DETECTION_INTERVAL: 400 MS
MDC_IDC_SET_ZONE_TYPE: NORMAL
MDC_IDC_STAT_AT_BURDEN_PERCENT: 0.1 %
MDC_IDC_STAT_AT_DTM_END: NORMAL
MDC_IDC_STAT_AT_DTM_START: NORMAL
MDC_IDC_STAT_BRADY_AP_VP_PERCENT: 21.55 %
MDC_IDC_STAT_BRADY_AP_VS_PERCENT: 0.12 %
MDC_IDC_STAT_BRADY_AS_VP_PERCENT: 76.12 %
MDC_IDC_STAT_BRADY_AS_VS_PERCENT: 2.21 %
MDC_IDC_STAT_BRADY_DTM_END: NORMAL
MDC_IDC_STAT_BRADY_DTM_START: NORMAL
MDC_IDC_STAT_BRADY_RA_PERCENT_PACED: 21.04 %
MDC_IDC_STAT_BRADY_RV_PERCENT_PACED: 95.47 %
MDC_IDC_STAT_EPISODE_RECENT_COUNT: 0
MDC_IDC_STAT_EPISODE_RECENT_COUNT: 1
MDC_IDC_STAT_EPISODE_RECENT_COUNT_DTM_END: NORMAL
MDC_IDC_STAT_EPISODE_RECENT_COUNT_DTM_START: NORMAL
MDC_IDC_STAT_EPISODE_TOTAL_COUNT: 0
MDC_IDC_STAT_EPISODE_TOTAL_COUNT: 9
MDC_IDC_STAT_EPISODE_TOTAL_COUNT_DTM_END: NORMAL
MDC_IDC_STAT_EPISODE_TOTAL_COUNT_DTM_START: NORMAL
MDC_IDC_STAT_EPISODE_TYPE: NORMAL

## 2020-06-16 ENCOUNTER — TELEPHONE (OUTPATIENT)
Dept: FAMILY MEDICINE | Facility: CLINIC | Age: 85
End: 2020-06-16

## 2020-06-16 NOTE — TELEPHONE ENCOUNTER
.Reason for call:  Other   Patient called regarding (reason for call): symptoms  Additional comments: home care calling for a call back due to pt experiencing burning when he urinates.     Phone number to reach patient:  Home number on file [phone] (home)    Best Time:  anytime    Can we leave a detailed message on this number?  YES    Travel screening: Negative

## 2020-06-16 NOTE — TELEPHONE ENCOUNTER
This writer attempted to contact [facility] on 06/16/20      Reason for call triage patient and left message.      If patient calls back:   Registered Nurse called. Follow Triage Call workflow        Kari Martines RN

## 2020-06-16 NOTE — TELEPHONE ENCOUNTER
Patient's nurse calling back reporting patient started having burning with urination yesterday evening. He does not have any other symptoms or a fever.     Assisted in scheduling virtual visit with provider. Requested appointment tomorrow morning. Appointment scheduled.     Amber Santiago RN  [facility]

## 2020-06-17 ENCOUNTER — VIRTUAL VISIT (OUTPATIENT)
Dept: FAMILY MEDICINE | Facility: CLINIC | Age: 85
End: 2020-06-17
Payer: COMMERCIAL

## 2020-06-17 ENCOUNTER — APPOINTMENT (OUTPATIENT)
Dept: INTERPRETER SERVICES | Facility: CLINIC | Age: 85
End: 2020-06-17
Payer: COMMERCIAL

## 2020-06-17 DIAGNOSIS — I44.2 ATRIOVENTRICULAR BLOCK, COMPLETE (H): ICD-10-CM

## 2020-06-17 DIAGNOSIS — J44.9 MODERATE COPD (CHRONIC OBSTRUCTIVE PULMONARY DISEASE) (H): ICD-10-CM

## 2020-06-17 DIAGNOSIS — R30.0 DYSURIA: ICD-10-CM

## 2020-06-17 DIAGNOSIS — R30.0 DYSURIA: Primary | ICD-10-CM

## 2020-06-17 LAB
ALBUMIN UR-MCNC: NEGATIVE MG/DL
APPEARANCE UR: CLEAR
BACTERIA #/AREA URNS HPF: ABNORMAL /HPF
BILIRUB UR QL STRIP: NEGATIVE
COLOR UR AUTO: YELLOW
GLUCOSE UR STRIP-MCNC: NEGATIVE MG/DL
HGB UR QL STRIP: NEGATIVE
KETONES UR STRIP-MCNC: NEGATIVE MG/DL
LEUKOCYTE ESTERASE UR QL STRIP: ABNORMAL
NITRATE UR QL: NEGATIVE
NON-SQ EPI CELLS #/AREA URNS LPF: ABNORMAL /LPF
PH UR STRIP: 7 PH (ref 5–7)
RBC #/AREA URNS AUTO: ABNORMAL /HPF
SOURCE: ABNORMAL
SP GR UR STRIP: 1.01 (ref 1–1.03)
UROBILINOGEN UR STRIP-ACNC: 0.2 EU/DL (ref 0.2–1)
WBC #/AREA URNS AUTO: ABNORMAL /HPF

## 2020-06-17 PROCEDURE — 87088 URINE BACTERIA CULTURE: CPT | Performed by: PHYSICIAN ASSISTANT

## 2020-06-17 PROCEDURE — 87086 URINE CULTURE/COLONY COUNT: CPT | Performed by: PHYSICIAN ASSISTANT

## 2020-06-17 PROCEDURE — 81001 URINALYSIS AUTO W/SCOPE: CPT | Performed by: PHYSICIAN ASSISTANT

## 2020-06-17 PROCEDURE — 87186 SC STD MICRODIL/AGAR DIL: CPT | Performed by: PHYSICIAN ASSISTANT

## 2020-06-17 PROCEDURE — 99214 OFFICE O/P EST MOD 30 MIN: CPT | Mod: 95 | Performed by: PHYSICIAN ASSISTANT

## 2020-06-17 RX ORDER — SULFAMETHOXAZOLE/TRIMETHOPRIM 800-160 MG
1 TABLET ORAL 2 TIMES DAILY
Qty: 14 TABLET | Refills: 0 | Status: SHIPPED | OUTPATIENT
Start: 2020-06-17 | End: 2020-06-24

## 2020-06-17 NOTE — PROGRESS NOTES
"Gallito Farley is a 86 year old male who is being evaluated via a billable telephone visit.      The patient has been notified of following:     \"This telephone visit will be conducted via a call between you and your physician/provider. We have found that certain health care needs can be provided without the need for a physical exam.  This service lets us provide the care you need with a short phone conversation.  If a prescription is necessary we can send it directly to your pharmacy.  If lab work is needed we can place an order for that and you can then stop by our lab to have the test done at a later time.    Telephone visits are billed at different rates depending on your insurance coverage. During this emergency period, for some insurers they may be billed the same as an in-person visit.  Please reach out to your insurance provider with any questions.    If during the course of the call the physician/provider feels a telephone visit is not appropriate, you will not be charged for this service.\"    Patient has given verbal consent for Telephone visit?  Yes    What phone number would you like to be contacted at? [phone]    How would you like to obtain your AVS? Caitlinhart    Subjective     Gallito Farley is a 86 year old male who presents via phone visit today for the following health issues:    HPI  Genitourinary - Male  Onset: a few days    Description:   Dysuria (painful urination): YES  Hematuria (blood in urine): no   Frequency: YES  Are you urinating at night : YES  Hesitancy (delay in urine): YES  Retention (unable to empty): YES  Decrease in urinary flow: YES  Incontinence: no     Progression of Symptoms:  worsening    Accompanying Signs & Symptoms:  Fever: no   Back/Flank pain: no   Urethral discharge: no   Testicle lumps/masses/pain: no   Nausea and/or vomiting: no   Abdominal pain: no     History:   History of frequent UTI's: YES  History of kidney stones: no   History of hernias: no   Personal or " Family history of Prostate problems: no  Sexually active: no     Precipitating factors:   none    Alleviating factors:  None     patient hx BPH    COPD is stable and hx AV block on ACE, amlodipine, imdur    Interpretor used over the phone. Patient's daughter also on the call.    Reviewed and updated as needed this visit by Provider         Review of Systems   CONSTITUTIONAL: NEGATIVE for fever, chills, change in weight  RESP: NEGATIVE for significant cough or SOB  CV: hx AV block- stable  : positive for  Dysuria  NEURO mild headache but denies any weakness or confuions       Objective   Reported vitals:  There were no vitals taken for this visit.   healthy, alert and no distress  PSYCH: Alert and oriented times 3; coherent speech, normal   rate and volume, able to articulate logical thoughts, able   to abstract reason, no tangential thoughts, no hallucinations   or delusions  His affect is normal  RESP: No cough, no audible wheezing, able to talk in full sentences  Remainder of exam unable to be completed due to telephone visits    Diagnostic Test Results:  Labs reviewed in Epic        Assessment/Plan:  1. Dysuria     - UA reflex to Microscopic and Culture; Future  - sulfamethoxazole-trimethoprim (BACTRIM DS) 800-160 MG tablet; Take 1 tablet by mouth 2 times daily for 7 days  Dispense: 14 tablet; Refill: 0    2. Moderate COPD (chronic obstructive pulmonary disease) (H)  Stable     3. Atrioventricular block, complete (H)  stable      Return today (on 6/17/2020) for Lab Work.      Phone call duration:  10 minutes    KATLYN Wade

## 2020-06-17 NOTE — PATIENT INSTRUCTIONS
At [facility], we strive to deliver an exceptional experience to you, every time we see you. If you receive a survey, please complete it as we do value your feedback.  If you have MyChart, you can expect to receive results automatically within 24 hours of their completion.  Your provider will send a note interpreting your results as well.   If you do not have MyChart, you should receive your results in about a week by mail.    Your care team:                            Family Medicine Internal Medicine   MD Hakeem Yuen MD Shantel Branch-Fleming, MD Katya Georgiev PA-C Megan Hill, APRN CNP    Neftali Meyers, MD Pediatrics   Colton Hector, PAFLORA Coon, MD Devi Cloud APRN CNP   MD Reyna Fuentes MD Deborah Mielke, MD Kim Thein, APRN [facility]      Clinic hours: Monday - Thursday 7 am-7 pm; Fridays 7 am-5 pm.   Urgent care: Monday - Friday 11 am-9 pm; Saturday and Sunday 9 am-5 pm.    Clinic: [phone]       [facility]: Monday - Thursday 8 am - 7 pm; Friday 8 am - 6 pm  [facility]: [phone]     Use www.[email or web address].org for 24/7 diagnosis and treatment of dozens of conditions.    Patient Education     Dysuria with Uncertain Cause (Adult)    The urethra is the tube that allows urine to pass out of the body. In a woman, the urethra is the opening above the vagina. In men, the urethra is the opening on the tip of the penis. Dysuria is the feeling of pain or burning in the urethra when passing urine.  Dysuria can be caused by anything that irritates or inflames the urethra. An infection or chemical irritation can cause this reaction. A bladder infection is the most common cause of dysuria in adults. A urine test can diagnose this. A bladder infection needs antibiotic treatment.  Soaps, lotions, colognes and feminine hygiene products can cause dysuria. So can birth control jellies, creams,  and foams. It will go away 1 to 3 days after using these irritants.  Sexually transmitted diseases (STDs) such as chlamydia or gonorrhea can cause dysuria. Your healthcare provider may take a culture sample. Your provider may start you on antibiotic medicine before the culture test returns.  In women who have gone through menopause, dysuria can be from dryness in the lining of the urethra. This can be treated with hormones. Dysuria becomes long-term (chronic) when it lasts for weeks or months. You may need to see a specialist (urologist) to diagnose and treat chronic dysuria.  Home care  These home care tips may help:    Don't use any chemicals or products that you think may be causing your symptoms.    If you were given a prescription medicine, take as directed. Be sure to take it until it is all used up.    If a culture was taken, don't have sex until you have been told that it is negative. This means you don't have an infection. Then follow your healthcare provider's advice to treat your condition.  If a culture was done and it is positive:    Both you and your sexual partner may need to be treated. This is true even if your partner has no symptoms.    Contact your healthcare provider or go to an urgent care clinic or the public health department to be looked at and treated.    Don't have sex until both you and your partner(s) have finished all antibiotics and your healthcare provider says you are no longer contagious.    Learn about and use safe sex practices. The safest sex is with a partner who has tested negative and only has sex with you. Condoms can prevent STDs from spreading, but they aren't a guarantee.  Follow-up care  Follow up with your healthcare provider, or as advised. If a culture was taken, you may call as directed for the results. If you have an STD, follow up with your provider or the public health department for a complete STD screening, including HIV testing. For more information, contact  CDC-INFO at 932-804-7170.  When to seek medical advice  Call your healthcare provider right away if any of these occur:    You aren't better after 3 days of treatment    Fever of 100.4 F (38 C) or higher, or as directed by your healthcare provider    Back or belly pain that gets worse    You can't urinate because of pain    New discharge from the urethra, vagina, or penis    Painful sores on the penis    Rash or joint pain    Painful lumps (lymph nodes) in the groin    Testicle pain or swelling of the scrotum  Date Last Reviewed: 11/1/2016 2000-2019 [employer]. [address] [place], [place] [address]. All rights reserved. This information is not intended as a substitute for professional medical care. Always follow your healthcare professional's instructions.

## 2020-06-20 LAB
BACTERIA SPEC CULT: ABNORMAL
SPECIMEN SOURCE: ABNORMAL

## 2020-07-07 ENCOUNTER — MYC MEDICAL ADVICE (OUTPATIENT)
Dept: UROLOGY | Facility: CLINIC | Age: 85
End: 2020-07-07

## 2020-07-10 ENCOUNTER — VIRTUAL VISIT (OUTPATIENT)
Dept: UROLOGY | Facility: CLINIC | Age: 85
End: 2020-07-10
Payer: COMMERCIAL

## 2020-07-10 DIAGNOSIS — R39.15 URINARY URGENCY: Primary | ICD-10-CM

## 2020-07-10 PROCEDURE — 99212 OFFICE O/P EST SF 10 MIN: CPT | Mod: 95 | Performed by: PHYSICIAN ASSISTANT

## 2020-07-10 NOTE — PATIENT INSTRUCTIONS
UROLOGY CLINIC VISIT PATIENT INSTRUCTIONS    Continue taking Myrbetriq (mirabegron) 50 mg daily.    Follow up with urology as needed.     If you have any issues, questions or concerns in the meantime, do not hesitate to contact us at [phone] or via [employer].     It was a pleasure meeting with you today.  Thank you for allowing me and my team the privilege of caring for you today.  YOU are the reason we are here, and I truly hope we provided you with the excellent service you deserve.  Please let us know if there is anything else we can do for you so that we can be sure you are leaving completely satisfied with your care experience.

## 2020-07-10 NOTE — PROGRESS NOTES
"Gallito Farley is a 86 year old male who is being evaluated via a billable telephone visit.      The patient has been notified of following:     \"This telephone visit will be conducted via a call between you and your physician/provider. We have found that certain health care needs can be provided without the need for a physical exam.  This service lets us provide the care you need with a short phone conversation.  If a prescription is necessary we can send it directly to your pharmacy.  If lab work is needed we can place an order for that and you can then stop by our lab to have the test done at a later time.    Telephone visits are billed at different rates depending on your insurance coverage. During this emergency period, for some insurers they may be billed the same as an in-person visit.  Please reach out to your insurance provider with any questions.    If during the course of the call the physician/provider feels a telephone visit is not appropriate, you will not be charged for this service.\"    Patient has given verbal consent for Telephone visit?  Yes    What phone number would you like to be contacted at? [phone]    How would you like to obtain your AVS? Missy Zuluaga LPN on 7/10/2020 at 1:27 PM    July 10, 2020    Virtual Visit - Follow-up    Patient is being evaluated via billable telephone visit for follow up. He is [place] speaking and the entire visit conducted with the assistance of an . Patient gave consent via  for me to speak with Mathew, his .    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 Of note, previous AFB urine cultures were negative. Saw Dr. Gimenez on 1/9/18 at which time patient endorsed pain and difficulty passing both urine and stool. PVR was elevated to 134 mL. He was recommended to stop Detrol, continue Flomax and finasteride, and start Miralax. I then saw him in follow up on 3/2/18 at " which time he reported that his symptoms were unchanged. PVR was improved at 19 mL. Additional management options were discussed including bladder Botox, SNM, PTNS, or even urinary diversion; though patient wanted to exhaust medication options and did not want any invasive treatments. Therefore, he was recommended to start Myrbetriq and follow up in 4-6 weeks for repeat PVR and reassessment. He was then lost to follow up.    Today, he reports that he is doing well. Was having some pain with urination and had urgent care visit on 6/16/2020 where UA was negative and UC grew 10-50K coag neg Staph. He was treated with Bactrim and symptoms improved. He is still taking Myrbetriq which seems to be helping his urinary urgency. No longer taking Miralax but denies issues with constipation.    A/P: 86 year old male with small capacity bladder, high pressure detrusor overactivity. Doing well on Myrbetriq 50 mg daily. Had some dysuria recently which improved after a course of Bactrim.   -Continue Myrbetriq 50 mg daily (filled by PCP).    Follow up with urology as needed.     Total call duration: 11 minutes    Maria G Friedman PA-C  Department of Urology

## 2020-08-28 ENCOUNTER — PATIENT OUTREACH (OUTPATIENT)
Dept: GERIATRIC MEDICINE | Facility: CLINIC | Age: 85
End: 2020-08-28

## 2020-08-28 NOTE — PROGRESS NOTES
[facility] Care Coordination Contact      [facility] Six-Month Telephone Assessment    6 month telephone assessment completed on 8/28/20 with AL staff.    ER visits: Yes -  [facility]  Hospitalizations: No  TCU stays: No  Significant health status changes: none  Falls/Injuries: No  ADL/IADL changes: No  Changes in services: No    Caregiver Assessment follow up:  N/A    Goals: See POC in chart for goal progress documentation.      Will see member in 6 months for an annual health risk assessment.   Encouraged member to call CC with any questions or concerns in the meantime.     Jorge David RN, PHN  [facility]

## 2020-10-09 ENCOUNTER — TELEPHONE (OUTPATIENT)
Dept: FAMILY MEDICINE | Facility: CLINIC | Age: 85
End: 2020-10-09

## 2020-10-09 DIAGNOSIS — K21.9 GASTRO-ESOPHAGEAL REFLUX DISEASE WITHOUT ESOPHAGITIS: ICD-10-CM

## 2020-10-09 DIAGNOSIS — J44.9 COPD, MODERATE (H): ICD-10-CM

## 2020-10-13 DIAGNOSIS — K21.9 GASTRO-ESOPHAGEAL REFLUX DISEASE WITHOUT ESOPHAGITIS: ICD-10-CM

## 2020-10-13 RX ORDER — FAMOTIDINE 20 MG/1
TABLET ORAL
Qty: 38 TABLET | Refills: 3 | Status: SHIPPED | OUTPATIENT
Start: 2020-10-13 | End: 2020-10-15

## 2020-10-13 NOTE — TELEPHONE ENCOUNTER
Reason for Call:  Other prescription    Detailed comments: Pharmacy requesting quantity increase to 60 tablets of Famotidine.     Phone Number Pharmacy  can be reached at: Other phone number:  [phone]    Best Time: anytime    Can we leave a detailed message on this number? YES    Call taken on 10/13/2020 at 1:22 PM by Mal Cat

## 2020-10-13 NOTE — TELEPHONE ENCOUNTER
Prescriptions approved per [facility] Refill Protocol.  Breo inhaler prescribed for COPD    Stefany Browne RN  [facility]

## 2020-10-15 RX ORDER — FAMOTIDINE 20 MG/1
TABLET ORAL
Qty: 38 TABLET | Refills: 3 | Status: SHIPPED | OUTPATIENT
Start: 2020-10-15 | End: 2020-10-16

## 2020-10-16 DIAGNOSIS — K21.9 GASTRO-ESOPHAGEAL REFLUX DISEASE WITHOUT ESOPHAGITIS: ICD-10-CM

## 2020-10-16 RX ORDER — FAMOTIDINE 20 MG/1
TABLET, FILM COATED ORAL
Qty: 60 TABLET | Refills: 3 | Status: SHIPPED | OUTPATIENT
Start: 2020-10-16 | End: 2021-02-02

## 2020-10-16 NOTE — TELEPHONE ENCOUNTER
Prescription approved per [facility] Refill Protocol for 30 day supply with refills per pharmacy request.       Ghada Perez RN, BSN, PHN

## 2020-10-16 NOTE — TELEPHONE ENCOUNTER
.Reason for Call:  Other prescription    Detailed comments: Pharmacy called and they are still waiting on the quantity increase of Famotidine, please call back or resend script to the pharmacy.     Phone Number Patient can be reached at: Other phone number:  [phone]    Best Time: any    Can we leave a detailed message on this number? YES    Call taken on 10/16/2020 at 8:15 AM by Hilaria Cerrato

## 2020-10-27 NOTE — PROGRESS NOTES
[facility] Cardiology - [facility]  Cardiovascular Clinic Note      Referring Provider: Referred Self   Primary Care Provider: Hakeem Awad     Patient Name: Gallito Farley   MRN: [id number]       History of Present Illness:  Gallito Farley is a 86 year old male with PMH notable for HTN, remote tobacco use, hx of CHB s/p dual chamber pacemaker, COPD, GERD and BPH.    The patient was last seen 5/2020 with Dr. Frank. At that visit, he complained of chest discomfort.  He has had a previous Lexiscan stress test that showed question of mild reversibility in the mid and apical inferior wall.  An angiogram was discussed; the patient deferred due to his advanced age and desire to forgo additional/invasive testing.  He was started on 81mg ASA and imdur. Since that point in time, the patient notes that his chest pain has entirely resolved.  He otherwise denies shortness of breath, paroxysmal nocturnal dyspnea, new lower extremity swelling, palpitations, and periods of lightheadedness/dizziness.  The patient states he is unsure why he is in clinic today and was told to follow-up for a pacemaker check.    Pertinent Cardiac Medications:  10mg enalapril daily  25mg HCTZ daily  81mg ASA daily  30mg imdur daily  5mg amlodipine daily      Past Medical History:  Pertinent past medical history as above.      Past Surgical History:  Past Surgical History:   Procedure Laterality Date     CHOLECYSTECTOMY       ENT SURGERY       IRRIGATION AND DEBRIDEMENT HIP, COMBINED  4/18/2013    Procedure: COMBINED IRRIGATION AND DEBRIDEMENT HIP;  Irrigation and debridement of Right Hip with cultures;  Surgeon: Cristal Inman MD;  Location: [facility] OR     Partial pneumonectomy      done in [facility] in the 1990's     skin grafting - leg wound  6/2016         Family History:  Family History   Problem Relation Age of Onset     Family History Negative Mother          Current Medications:  Current Outpatient Medications   Medication Sig Dispense Refill      acetaminophen (TYLENOL) 325 MG tablet Take 2 tablets (650 mg) by mouth 3 times daily as needed for pain 180 tablet 5     ACETAMINOPHEN EXTRA STRENGTH 500 MG tablet TAKE 1 TABLET BY MOUTH EVERY SIX HOURS AS NEEDED FOR PAIN [ALTERNATE W/IBUPROFEN EVERY 3 HOURS] 28 tablet 5     albuterol (PROVENTIL) (2.5 MG/3ML) 0.083% neb solution Take 1 vial (2.5 mg) by nebulization every 6 hours as needed 360 mL 1     amLODIPine (NORVASC) 5 MG tablet Take 1 tablet (5 mg) by mouth daily 30 tablet 11     aspirin 81 MG EC tablet Take 1 tablet (81 mg) by mouth daily 90 tablet 3     BREO ELLIPTA 200-25 MCG/INH Inhaler INHALE 1 PUFF BY MOUTH DAILY 1 Inhaler 3     diclofenac (VOLTAREN) 1 % topical gel Place 2 g onto the skin 4 times daily 100 g 11     diclofenac sodium 3 % GEL Apply 4 gm four times a day as needed to right trapezius muscle. 100 g 11     enalapril (VASOTEC) 10 MG tablet TAKE 1 TABLET BY MOUTH DAILY 28 tablet 5     famotidine (HEARTBURN RELIEF MAX ST) 20 MG tablet TAKE 1 TABLET BY MOUTH TWICE A DAY 60 tablet 3     finasteride (PROSCAR) 5 MG tablet TAKE 1 TABLET BY MOUTH EVERY NIGHT AT BEDTIME 28 tablet 5     gabapentin (NEURONTIN) 100 MG capsule TAKE 1 CAPSULE BY MOUTH THREE TIMES A DAY 84 capsule 5     hydrochlorothiazide (MICROZIDE) 12.5 MG capsule TAKE 2 CAPSULES (25MG) BY MOUTH DAILY 56 capsule 5     isosorbide mononitrate (IMDUR) 30 MG 24 hr tablet Take 1 tablet (30 mg) by mouth daily 30 tablet 11     ketotifen (ZADITOR/REFRESH ANTI-ITCH) 0.025 % ophthalmic solution Place 1 drop into both eyes 2 times daily 1 Bottle 11     melatonin 3 MG tablet Take 3 mg by mouth       melatonin 5 MG CAPS Take 1 capsule by mouth At Bedtime 30 capsule 11     MILK OF MAGNESIA 7.75 % suspension TAKE TAKE 30ML BY MOUTH DAILY AS NEEDED FOR CONSTIPATION 473 mL 11     MUCINEX 600 MG 12 hr tablet TAKE 1 TABLET BY MOUTH TWICE A DAY 56 tablet 11     MYRBETRIQ 50 MG 24 hr tablet TAKE 1 TABLET BY MOUTH DAILY 90 tablet 3     order for DME  "Equipment being ordered: Nebulizer machine and adult tubing 1 each 0     order for DME Equipment being ordered: Dispense Coban wrap. 5 each 5     phenazopyridine (PYRIDIUM) 200 MG tablet Take 1 tablet (200 mg) by mouth 3 times daily as needed for irritation 12 tablet 3     polyethylene glycol (MIRALAX) powder TAKE 17 GRAMS MIXED IN 4-6OZ LIQUID BY MOUTH ONCE DAILY 510 g 11     polyethylene glycol-propylene glycol (SYSTANE) 0.4-0.3 % SOLN ophthalmic solution Place 1 drop into both eyes 4 times daily 1 Bottle 11     ranitidine (ZANTAC) 150 MG tablet Take 150 mg by mouth 2 times daily       tamsulosin (FLOMAX) 0.4 MG capsule TAKE 2 CAPSULES (0.8MG) BY MOUTH EVERY NIGHT AT BEDTIME 56 capsule 5     tiotropium (SPIRIVA HANDIHALER) 18 MCG inhaled capsule INHALE CONTENTS OF ONE CAPSULE DAILY USING HANDIHALER DEVICE 30 capsule 5     tolterodine ER (DETROL LA) 4 MG 24 hr capsule Take 1 capsule (4 mg) by mouth daily 30 capsule 11     traZODone (DESYREL) 50 MG tablet Take 1 tablet (50 mg) by mouth nightly as needed for sleep 90 tablet 1         Social History:  Currently lives in a group home.  His assisted by his son at his visit today.      Review of Systems:  A comprehensive review of systems was performed and negative unless otherwise noted in the HPI above.      Physical Exam:  /67 (BP Location: Right arm, Patient Position: Chair, Cuff Size: Adult Regular)   Pulse 89   Ht 1.887 m (6' 2.3\")   Wt 85.8 kg (189 lb 1.6 oz)   SpO2 97%   BMI 24.08 kg/m    Body mass index is 24.08 kg/m .  Wt Readings from Last 2 Encounters:   10/29/20 85.8 kg (189 lb 1.6 oz)   05/06/20 89.6 kg (197 lb 8 oz)     Constitutional: no acute distress, pleasant and cooperative, appears overall well.  Eyes: Sclera white  Cardiovascular: RRR. Normal S1/S2. JVP not elevated. Extremities with no edema or cyanosis  Respiratory: Diffuse inspiratory/expiratory wheeze.  No appreciable rales/rhonchi.  Gastrointestinal: soft, nontender, non " distended  Musculoskeletal: normal muscle bulk and tone, joints   Skin: normal skin appearance without worrisome lesions.   Neurologic: AOx3, gait smooth and symmetric  Psychiatric: appropriate affect, eye contact, intact thought and speech      Laboratory Data:  LIPID RESULTS:  Recent Labs   Lab Test 04/24/13  0643   CHOL 143   HDL 20*   LDL 93   TRIG 148   CHOLHDLRATIO 7.1*        LIVER ENZYME RESULTS:  Recent Labs   Lab Test 10/02/18  1709 04/04/18  1238   AST 24 22   ALT 25 22       CBC RESULTS:  Recent Labs   Lab Test 05/06/20  0756 10/02/18  1709   WBC 7.5 7.6   HGB 16.8 16.4   HCT 52.8 48.0    218       BMP RESULTS:  Recent Labs   Lab Test 05/06/20  0756 10/02/18  1709    135   POTASSIUM 3.7 3.7   CHLORIDE 100 98   CO2 29 26   ANIONGAP 6 11   * 101*   BUN 13 16   CR 1.04 1.03   PAMELA 8.9 9.0       A1C RESULTS:  Lab Results   Component Value Date    A1C 5.7 07/23/2016       INR RESULTS:  Recent Labs   Lab Test 01/12/17  0815 01/11/17  1734   INR 1.24* 1.14         Pertinent Procedures/Imaging:  NM Lexiscan Stress Test 6/13/2019:  1. Mildly abnormal myocardial SPECT study with a summed stress score  of 4. Question mild reversibility in the mid and apical inferior wall.  A summed stress score of 4 is associated with an annual event rate of  2.7 and 0.8 for myocardial infarction and cardiac death, respectively  (Alejandro. Circulation 1998;98:535-43).  2. Normal left ventricular ejection fraction..   3. Postoperative changes from right upper lobectomy..       Assessment:  Gallito Farley is a 86 year old male with PMH notable for HTN, remote tobacco use, hx of CHB s/p dual chamber pacemaker, COPD, GERD and BPH.    From a general cardiology perspective, the patient is doing well.  He has previously refused invasive testing.  He has no current angina necessitating further investigation.  Bilateral inspiratory wheezing likely secondary to COPD.  No other evidence of volume overload on physical  exam.      Plan:  # History of chest discomfort  Previous history of possible abnormal nuclear medicine Lexiscan stress test.  Patient has declined angiogram in the past.  Was initiated on Imdur approximately 6 months ago with complete resolution of chest pain.  He is tolerating a baby aspirin without bleeding difficulties  - Continue 81 mg aspirin  - Continue 30 mg Imdur once daily    # CHB, s/p dual chamber pacemaker  - Device check with device RN in clinic today  - We will continue remote device check; if abnormal patient will follow-up with EP    # Hypertension  BP well controlled in clinic today  - Continue enalapril, Imdur, hydrochlorothiazide      Follow-up: Follow-up in 6 months with cardiology MD, Dr. Frank.  Continue remote device checks    A total of 30 minutes spent face to face with patient and > 50% of the time spent counseling and coordinating care.     Carine Heath PA-C  Interventional/Critical Care Cardiology  [phone]        CC  Patient Care Team:  Hakeem Awad MD as PCP - General (Internal Medicine)  Lenin Farley MD Schuster, Joseph, DPM (Podiatry)  Hakeem Awad MD as Assigned PCP  Jorge David RN as Lead Care Coordinator  Santosh Matthew MD as MD (Urology)  Kristal Delgado, SHWETA as Registered Nurse  Genevieve Chang NP as Nurse Practitioner (Nurse Practitioner - Adult Health)  Abilio Frank MD as MD (Interventional Cardiology)  Abilio Frank MD as Assigned Heart and Vascular Provider  SELF, REFERRED

## 2020-10-29 ENCOUNTER — ANCILLARY PROCEDURE (OUTPATIENT)
Dept: CARDIOLOGY | Facility: CLINIC | Age: 85
End: 2020-10-29
Attending: PHYSICIAN ASSISTANT
Payer: COMMERCIAL

## 2020-10-29 VITALS
SYSTOLIC BLOOD PRESSURE: 109 MMHG | OXYGEN SATURATION: 97 % | DIASTOLIC BLOOD PRESSURE: 67 MMHG | WEIGHT: 189.1 LBS | BODY MASS INDEX: 24.27 KG/M2 | HEIGHT: 74 IN | HEART RATE: 89 BPM

## 2020-10-29 DIAGNOSIS — Z95.0 CARDIAC PACEMAKER IN SITU: ICD-10-CM

## 2020-10-29 DIAGNOSIS — R07.9 CHEST PAIN, UNSPECIFIED TYPE: Primary | ICD-10-CM

## 2020-10-29 DIAGNOSIS — I25.118 CORONARY ARTERY DISEASE OF NATIVE ARTERY OF NATIVE HEART WITH STABLE ANGINA PECTORIS (H): ICD-10-CM

## 2020-10-29 DIAGNOSIS — I10 ESSENTIAL HYPERTENSION: ICD-10-CM

## 2020-10-29 PROCEDURE — 93280 PM DEVICE PROGR EVAL DUAL: CPT | Mod: 26 | Performed by: INTERNAL MEDICINE

## 2020-10-29 PROCEDURE — 93280 PM DEVICE PROGR EVAL DUAL: CPT

## 2020-10-29 PROCEDURE — G0463 HOSPITAL OUTPT CLINIC VISIT: HCPCS | Mod: 25

## 2020-10-29 PROCEDURE — 93005 ELECTROCARDIOGRAM TRACING: CPT | Mod: XU

## 2020-10-29 PROCEDURE — 93010 ELECTROCARDIOGRAM REPORT: CPT | Performed by: INTERNAL MEDICINE

## 2020-10-29 PROCEDURE — 99214 OFFICE O/P EST MOD 30 MIN: CPT | Mod: 25 | Performed by: PHYSICIAN ASSISTANT

## 2020-10-29 ASSESSMENT — PAIN SCALES - GENERAL: PAINLEVEL: NO PAIN (0)

## 2020-10-29 ASSESSMENT — MIFFLIN-ST. JEOR: SCORE: 1612.26

## 2020-10-29 NOTE — LETTER
10/29/2020      RE: Gallito Farley  [address] [address]       Dear Colleague,    Thank you for the opportunity to participate in the care of your patient, Gallito Farley, at the [facility]. Please see a copy of my visit note below.      [facility] [place] [facility] [place]  Cardiovascular Clinic Note      Referring Provider: Referred Self   Primary Care Provider: Hakeem Awad     Patient Name: Gallito Farley   MRN: [id number]       History of Present Illness:  Gallito Farley is a 86 year old male with PMH notable for HTN, remote tobacco use, hx of CHB s/p dual chamber pacemaker, COPD, GERD and BPH.    The patient was last seen 5/2020 with Dr. Frank. At that visit, he complained of chest discomfort.  He has had a previous Lexiscan stress test that showed question of mild reversibility in the mid and apical inferior wall.  An angiogram was discussed; the patient deferred due to his advanced age and desire to forgo additional/invasive testing.  He was started on 81mg ASA and imdur. Since that point in time, the patient notes that his chest pain has entirely resolved.  He otherwise denies shortness of breath, paroxysmal nocturnal dyspnea, new lower extremity swelling, palpitations, and periods of lightheadedness/dizziness.  The patient states he is unsure why he is in clinic today and was told to follow-up for a pacemaker check.    Pertinent Cardiac Medications:  10mg enalapril daily  25mg HCTZ daily  81mg ASA daily  30mg imdur daily  5mg amlodipine daily      Past Medical History:  Pertinent past medical history as above.      Past Surgical History:  Past Surgical History:   Procedure Laterality Date     CHOLECYSTECTOMY       ENT SURGERY       IRRIGATION AND DEBRIDEMENT HIP, COMBINED  4/18/2013    Procedure: COMBINED IRRIGATION AND DEBRIDEMENT HIP;  Irrigation and debridement of Right Hip with cultures;  Surgeon:  Cristal Inman MD;  Location: [facility] OR     Partial pneumonectomy      done in [place] in the 1990's     skin grafting - leg wound  6/2016         Family History:  Family History   Problem Relation Age of Onset     Family History Negative Mother          Current Medications:  Current Outpatient Medications   Medication Sig Dispense Refill     acetaminophen (TYLENOL) 325 MG tablet Take 2 tablets (650 mg) by mouth 3 times daily as needed for pain 180 tablet 5     ACETAMINOPHEN EXTRA STRENGTH 500 MG tablet TAKE 1 TABLET BY MOUTH EVERY SIX HOURS AS NEEDED FOR PAIN [ALTERNATE W/IBUPROFEN EVERY 3 HOURS] 28 tablet 5     albuterol (PROVENTIL) (2.5 MG/3ML) 0.083% neb solution Take 1 vial (2.5 mg) by nebulization every 6 hours as needed 360 mL 1     amLODIPine (NORVASC) 5 MG tablet Take 1 tablet (5 mg) by mouth daily 30 tablet 11     aspirin 81 MG EC tablet Take 1 tablet (81 mg) by mouth daily 90 tablet 3     BREO ELLIPTA 200-25 MCG/INH Inhaler INHALE 1 PUFF BY MOUTH DAILY 1 Inhaler 3     diclofenac (VOLTAREN) 1 % topical gel Place 2 g onto the skin 4 times daily 100 g 11     diclofenac sodium 3 % GEL Apply 4 gm four times a day as needed to right trapezius muscle. 100 g 11     enalapril (VASOTEC) 10 MG tablet TAKE 1 TABLET BY MOUTH DAILY 28 tablet 5     famotidine (HEARTBURN RELIEF MAX ST) 20 MG tablet TAKE 1 TABLET BY MOUTH TWICE A DAY 60 tablet 3     finasteride (PROSCAR) 5 MG tablet TAKE 1 TABLET BY MOUTH EVERY NIGHT AT BEDTIME 28 tablet 5     gabapentin (NEURONTIN) 100 MG capsule TAKE 1 CAPSULE BY MOUTH THREE TIMES A DAY 84 capsule 5     hydrochlorothiazide (MICROZIDE) 12.5 MG capsule TAKE 2 CAPSULES (25MG) BY MOUTH DAILY 56 capsule 5     isosorbide mononitrate (IMDUR) 30 MG 24 hr tablet Take 1 tablet (30 mg) by mouth daily 30 tablet 11     ketotifen (ZADITOR/REFRESH ANTI-ITCH) 0.025 % ophthalmic solution Place 1 drop into both eyes 2 times daily 1 Bottle 11     melatonin 3 MG tablet Take 3 mg by mouth        "melatonin 5 MG CAPS Take 1 capsule by mouth At Bedtime 30 capsule 11     MILK OF MAGNESIA 7.75 % suspension TAKE TAKE 30ML BY MOUTH DAILY AS NEEDED FOR CONSTIPATION 473 mL 11     MUCINEX 600 MG 12 hr tablet TAKE 1 TABLET BY MOUTH TWICE A DAY 56 tablet 11     MYRBETRIQ 50 MG 24 hr tablet TAKE 1 TABLET BY MOUTH DAILY 90 tablet 3     order for DME Equipment being ordered: Nebulizer machine and adult tubing 1 each 0     order for DME Equipment being ordered: Dispense Coban wrap. 5 each 5     phenazopyridine (PYRIDIUM) 200 MG tablet Take 1 tablet (200 mg) by mouth 3 times daily as needed for irritation 12 tablet 3     polyethylene glycol (MIRALAX) powder TAKE 17 GRAMS MIXED IN 4-6OZ LIQUID BY MOUTH ONCE DAILY 510 g 11     polyethylene glycol-propylene glycol (SYSTANE) 0.4-0.3 % SOLN ophthalmic solution Place 1 drop into both eyes 4 times daily 1 Bottle 11     ranitidine (ZANTAC) 150 MG tablet Take 150 mg by mouth 2 times daily       tamsulosin (FLOMAX) 0.4 MG capsule TAKE 2 CAPSULES (0.8MG) BY MOUTH EVERY NIGHT AT BEDTIME 56 capsule 5     tiotropium (SPIRIVA HANDIHALER) 18 MCG inhaled capsule INHALE CONTENTS OF ONE CAPSULE DAILY USING HANDIHALER DEVICE 30 capsule 5     tolterodine ER (DETROL LA) 4 MG 24 hr capsule Take 1 capsule (4 mg) by mouth daily 30 capsule 11     traZODone (DESYREL) 50 MG tablet Take 1 tablet (50 mg) by mouth nightly as needed for sleep 90 tablet 1         Social History:  Currently lives in a group home.  His assisted by his son at his visit today.      Review of Systems:  A comprehensive review of systems was performed and negative unless otherwise noted in the HPI above.      Physical Exam:  /67 (BP Location: Right arm, Patient Position: Chair, Cuff Size: Adult Regular)   Pulse 89   Ht 1.887 m (6' 2.3\")   Wt 85.8 kg (189 lb 1.6 oz)   SpO2 97%   BMI 24.08 kg/m    Body mass index is 24.08 kg/m .  Wt Readings from Last 2 Encounters:   10/29/20 85.8 kg (189 lb 1.6 oz)   05/06/20 89.6 kg " (197 lb 8 oz)     Constitutional: no acute distress, pleasant and cooperative, appears overall well.  Eyes: Sclera white  Cardiovascular: RRR. Normal S1/S2. JVP not elevated. Extremities with no edema or cyanosis  Respiratory: Diffuse inspiratory/expiratory wheeze.  No appreciable rales/rhonchi.  Gastrointestinal: soft, nontender, non distended  Musculoskeletal: normal muscle bulk and tone, joints   Skin: normal skin appearance without worrisome lesions.   Neurologic: AOx3, gait smooth and symmetric  Psychiatric: appropriate affect, eye contact, intact thought and speech      Laboratory Data:  LIPID RESULTS:  Recent Labs   Lab Test 04/24/13  0643   CHOL 143   HDL 20*   LDL 93   TRIG 148   CHOLHDLRATIO 7.1*        LIVER ENZYME RESULTS:  Recent Labs   Lab Test 10/02/18  1709 04/04/18  1238   AST 24 22   ALT 25 22       CBC RESULTS:  Recent Labs   Lab Test 05/06/20  0756 10/02/18  1709   WBC 7.5 7.6   HGB 16.8 16.4   HCT 52.8 48.0    218       BMP RESULTS:  Recent Labs   Lab Test 05/06/20  0756 10/02/18  1709    135   POTASSIUM 3.7 3.7   CHLORIDE 100 98   CO2 29 26   ANIONGAP 6 11   * 101*   BUN 13 16   CR 1.04 1.03   PAMELA 8.9 9.0       A1C RESULTS:  Lab Results   Component Value Date    A1C 5.7 07/23/2016       INR RESULTS:  Recent Labs   Lab Test 01/12/17  0815 01/11/17  1734   INR 1.24* 1.14         Pertinent Procedures/Imaging:  NM Lexiscan Stress Test 6/13/2019:  1. Mildly abnormal myocardial SPECT study with a summed stress score  of 4. Question mild reversibility in the mid and apical inferior wall.  A summed stress score of 4 is associated with an annual event rate of  2.7 and 0.8 for myocardial infarction and cardiac death, respectively  ([place]. Circulation 1998;98:535-43).  2. Normal left ventricular ejection fraction..   3. Postoperative changes from right upper lobectomy..       Assessment:  Gallito Farley is a 86 year old male with PMH notable for HTN, remote tobacco use, hx of  CHB s/p dual chamber pacemaker, COPD, GERD and BPH.    From a general cardiology perspective, the patient is doing well.  He has previously refused invasive testing.  He has no current angina necessitating further investigation.  Bilateral inspiratory wheezing likely secondary to COPD.  No other evidence of volume overload on physical exam.      Plan:  # History of chest discomfort  Previous history of possible abnormal nuclear medicine Lexiscan stress test.  Patient has declined angiogram in the past.  Was initiated on Imdur approximately 6 months ago with complete resolution of chest pain.  He is tolerating a baby aspirin without bleeding difficulties  - Continue 81 mg aspirin  - Continue 30 mg Imdur once daily    # CHB, s/p dual chamber pacemaker  - Device check with device RN in clinic today  - We will continue remote device check; if abnormal patient will follow-up with EP    # Hypertension  BP well controlled in clinic today  - Continue enalapril, Imdur, hydrochlorothiazide      Follow-up: Follow-up in 6 months with cardiology MD, Dr. Frank.  Continue remote device checks    A total of 30 minutes spent face to face with patient and > 50% of the time spent counseling and coordinating care.     Carine Heath PA-C  Interventional/Critical Care Cardiology  [phone]        CC  Patient Care Team:  Hakeem Awad MD as PCP - General (Internal Medicine)  Lenin Farley MD Schuster, Joseph, DPM (Podiatry)  Hakeem Awad MD as Assigned PCP  Jorge David RN as Lead Care Coordinator  Santosh Matthew MD as MD (Urology)  Kristal Delgado RN as Registered Nurse  Genevieve Chang NP as Nurse Practitioner (Nurse Practitioner - Adult Health)  Abilio Frank MD as MD (Interventional Cardiology)  Abilio Frank MD as Assigned Heart and Vascular Provider  SELF, REFERRED      Please do not hesitate to contact me if you have any questions/concerns.     Sincerely,     Carine Heath,  MERRILL

## 2020-10-29 NOTE — NURSING NOTE
Chief Complaint   Patient presents with     Follow Up     Follow up visit. Pt c/o chest pain.      Vitals were taken and medications were reconciled. EKG was performed.    Maryellen Mercado  3:05 PM

## 2020-10-29 NOTE — PATIENT INSTRUCTIONS
You were seen today in the Cardiovascular Clinic at the [facility].  Today, you were seen by Carine Heath PA-C for your cardiovascular care.     Changes Made Today:  No changes in medication today    Follow Up Appointment/Tests:  1. Follow up with cardiology MD in 6 months      Questions and [phone].   First press #1 for the [employer] and then press #3 for Medical Questions to reach the Cardiology triage nurse.     On Call Cardiologist for after hours or on weekends: [phone], press option #4 and ask to speak to the on-call Cardiologist.

## 2020-10-30 LAB — INTERPRETATION ECG - MUSE: NORMAL

## 2020-11-12 NOTE — TELEPHONE ENCOUNTER
"Return to the Emergency Room if the following occurs:     Worsened breathing, dehydration, severely worsened pain, or for any concern at anytime.    Or, follow-up with the following provider as we discussed:     Your Covid swab is pending at the time of discharge.  This typically returns as positive within the next 24 to 48 hours.  You will receive a phone call if this is the case.  Negative tests will not be communicated to you.  You should look up the results on your My Chart to clarify.  If your test is negative, follow-up with your primary doctor next week for reevaluation.  See instructions below for a positive Covid test.    Medications discussed:    Tylenol 650 mg every four hours for pain, as needed.    If you received pain-relieving or sedating medication during your time in the ER, avoid alcohol, driving automobiles, or working with machinery.  Also, a responsible adult must stay with you.        Call the Nurse Advice Line at [phone] or [phone] for any concern at anytime.      Discharge Instructions for COVID-19 Patients  You have--or may have--COVID-19. Please follow the instructions listed below.   If you have a weakened immune system, discuss with your doctor any other actions you need to take.  How can I protect others?  If you have symptoms (fever, cough, body aches or trouble breathing):  Stay home and away from others (self-isolate) until:  At least 10 days have passed since your symptoms started, And   You've had no fever--and no medicine that reduces fever--for 1 full day (24 hours), And    Your other symptoms have resolved (gotten better).  If you don't show symptoms, but testing showed that you have COVID-19:  Stay home and away from others (self-isolate). Follow the tips under \"How do I self-isolate?\" below for 10 days (20 days if you have a weak immune system).  You don't need to be retested for COVID-19 before going back to school or work. As long as you're fever-free and " Left second message to call back.   GILDARDO Hoover   "feeling better, you can go back to school, work and other activities after waiting the 10 or 20 days.   How do I self-isolate?  Stay in your own room, even for meals. Use your own bathroom if you can.  Stay away from others in your home. No hugging, kissing or shaking hands. No visitors.  Don't go to work, school or anywhere else.  Clean \"high touch\" surfaces often (doorknobs, counters, handles). Use household cleaning spray or wipes. You'll find a full list of  on the EPA website: www.epa.gov/pesticide-registration/list-n-disinfectants-use-against-sars-cov-2.  Cover your mouth and nose with a mask or other face covering to avoid spreading germs.  Wash your hands and face often. Use soap and water.  Caregivers in these groups are at risk for severe illness due to COVID-19:  People 65 years and older  People who live in a nursing home or long-term care facility  People with chronic disease (lung, heart, cancer, diabetes, kidney, liver, immunologic)  People who have a weakened immune system, including those who:  Are in cancer treatment  Take medicine that weakens the immune system, such as corticosteroids  Had a bone marrow or organ transplant  Have an immune deficiency  Have poorly controlled HIV or AIDS  Are obese (body mass index of 40 or higher)  Smoke regularly  Caregivers should wear gloves while washing dishes, handling laundry and cleaning bedrooms and bathrooms.  Use caution when washing and drying laundry: Don't shake dirty laundry and use the warmest water setting that you can.  For more tips on managing your health at home, go to www.cdc.gov/coronavirus/2019-ncov/downloads/10Things.pdf.  How can I take care of myself at home?  Get lots of rest. Drink extra fluids (unless a doctor has told you not to).    Take Tylenol (acetaminophen) for fever or pain. If you have liver or kidney problems, ask your family doctor if it's okay to take Tylenol.     Adults can take either:  650 mg (two 325 mg pills) " every 4 to 6 hours, or   1,000 mg (two 500 mg pills) every 8 hours as needed.  Note: Don't take more than 3,000 mg in one day. Acetaminophen is found in many medicines (both prescribed and over-the-counter medicines). Read all labels to be sure you don't take too much.   For children, check the Tylenol bottle for the right dose. The dose is based on the child's age or weight.  If you have other health problems (like cancer, heart failure, an organ transplant or severe kidney disease): Call your specialty clinic if you don't feel better in the next 2 days.    Know when to call 911. Emergency warning signs include:  Trouble breathing or shortness of breath  Pain or pressure in the chest that doesn't go away  Feeling confused like you haven't felt before, or not being able to wake up  Bluish-colored lips or face    Your doctor may have prescribed a blood thinner medicine. Follow their instructions.  Where can I get more information?  [facility] - About COVID-19: [email or web address]  CDC - What to Do If You're Sick: www.cdc.gov/coronavirus/2019-ncov/about/steps-when-sick.html  CDC - Ending Home Isolation: www.cdc.gov/coronavirus/2019-ncov/hcp/disposition-in-home-patients.html  CDC - Caring for Someone: www.cdc.gov/coronavirus/2019-ncov/if-you-are-sick/care-for-someone.html  [facility] - Interim Guidance for Hospital Discharge to Home: www.health.[facility].mn.us/diseases/coronavirus/hcp/hospdischarge.pdf  [facility] clinical trials (COVID-19 research studies): clinicalaffairs.[facility]-clinical-trials  Below are the COVID-19 hotlines at the [facility] of Health ([facility]). Interpreters are available.  For health questions: Call 634-960-6221 or 1-956.363.5055 (7 a.m. to 7 p.m.)  For questions about schools and childcare: Call 877-556-6121 or 1-929.922.4306 (7 a.m. to 7 p.m.)    For informational purposes only. Not to replace the advice of your health care provider. Clinically reviewed by the  Infection Prevention Team. Copyright   2020 [facility]. All rights reserved. vivit [id number] - REV 08/04/20.

## 2020-11-29 ENCOUNTER — HEALTH MAINTENANCE LETTER (OUTPATIENT)
Age: 85
End: 2020-11-29

## 2020-12-07 DIAGNOSIS — I10 HYPERTENSION GOAL BP (BLOOD PRESSURE) < 130/80: ICD-10-CM

## 2020-12-10 RX ORDER — AMLODIPINE BESYLATE 5 MG/1
TABLET ORAL
Qty: 28 TABLET | Refills: 5 | Status: SHIPPED | OUTPATIENT
Start: 2020-12-10 | End: 2021-05-21

## 2020-12-10 NOTE — TELEPHONE ENCOUNTER
"Prescription approved per [facility] Refill Protocol.    Requested Prescriptions   Pending Prescriptions Disp Refills     amLODIPine (NORVASC) 5 MG tablet [Pharmacy Med Name: AMLODIPINE 5MG TAB] 28 tablet      Sig: TAKE 1 TABLET BY MOUTH DAILY       Calcium Channel Blockers Protocol  Passed - 12/10/2020  1:00 PM        Passed - Blood pressure under 140/90 in past 12 months     BP Readings from Last 3 Encounters:   10/29/20 109/67   05/06/20 (!) 144/71   01/02/20 99/65                 Passed - Recent (12 mo) or future (30 days) visit within the authorizing provider's specialty     Patient has had an office visit with the authorizing provider or a provider within the authorizing providers department within the previous 12 mos or has a future within next 30 days. See \"Patient Info\" tab in inbasket, or \"Choose Columns\" in Meds & Orders section of the refill encounter.              Passed - Medication is active on med list        Passed - Patient is age 18 or older        Passed - Normal serum creatinine on file in past 12 months     Recent Labs   Lab Test 05/06/20  0756 08/31/17  1428 08/31/17  1428   CR 1.04   < >  --    CREAT  --   --  1.0    < > = values in this interval not displayed.       Ok to refill medication if creatinine is low               Ghada Perez RN, BSN, PHN    "

## 2020-12-16 LAB
MDC_IDC_LEAD_IMPLANT_DT: NORMAL
MDC_IDC_LEAD_IMPLANT_DT: NORMAL
MDC_IDC_LEAD_LOCATION: NORMAL
MDC_IDC_LEAD_LOCATION: NORMAL
MDC_IDC_LEAD_LOCATION_DETAIL_1: NORMAL
MDC_IDC_LEAD_LOCATION_DETAIL_1: NORMAL
MDC_IDC_LEAD_MFG: NORMAL
MDC_IDC_LEAD_MFG: NORMAL
MDC_IDC_LEAD_MODEL: NORMAL
MDC_IDC_LEAD_MODEL: NORMAL
MDC_IDC_LEAD_POLARITY_TYPE: NORMAL
MDC_IDC_LEAD_POLARITY_TYPE: NORMAL
MDC_IDC_LEAD_SERIAL: NORMAL
MDC_IDC_LEAD_SERIAL: NORMAL
MDC_IDC_MSMT_BATTERY_DTM: NORMAL
MDC_IDC_MSMT_BATTERY_REMAINING_LONGEVITY: 75 MO
MDC_IDC_MSMT_BATTERY_RRT_TRIGGER: 2.83
MDC_IDC_MSMT_BATTERY_STATUS: NORMAL
MDC_IDC_MSMT_BATTERY_VOLTAGE: 3.01 V
MDC_IDC_MSMT_LEADCHNL_RA_IMPEDANCE_VALUE: 418 OHM
MDC_IDC_MSMT_LEADCHNL_RA_IMPEDANCE_VALUE: 532 OHM
MDC_IDC_MSMT_LEADCHNL_RA_PACING_THRESHOLD_AMPLITUDE: 0.75 V
MDC_IDC_MSMT_LEADCHNL_RA_PACING_THRESHOLD_PULSEWIDTH: 0.4 MS
MDC_IDC_MSMT_LEADCHNL_RA_SENSING_INTR_AMPL: 2.38 MV
MDC_IDC_MSMT_LEADCHNL_RA_SENSING_INTR_AMPL: 2.62 MV
MDC_IDC_MSMT_LEADCHNL_RV_IMPEDANCE_VALUE: 646 OHM
MDC_IDC_MSMT_LEADCHNL_RV_IMPEDANCE_VALUE: 665 OHM
MDC_IDC_MSMT_LEADCHNL_RV_PACING_THRESHOLD_AMPLITUDE: 0.75 V
MDC_IDC_MSMT_LEADCHNL_RV_PACING_THRESHOLD_PULSEWIDTH: 0.4 MS
MDC_IDC_PG_IMPLANT_DTM: NORMAL
MDC_IDC_PG_MFG: NORMAL
MDC_IDC_PG_MODEL: NORMAL
MDC_IDC_PG_SERIAL: NORMAL
MDC_IDC_PG_TYPE: NORMAL
MDC_IDC_SESS_CLINIC_NAME: NORMAL
MDC_IDC_SESS_DTM: NORMAL
MDC_IDC_SESS_TYPE: NORMAL
MDC_IDC_SET_BRADY_AT_MODE_SWITCH_RATE: 171 {BEATS}/MIN
MDC_IDC_SET_BRADY_HYSTRATE: NORMAL
MDC_IDC_SET_BRADY_LOWRATE: 60 {BEATS}/MIN
MDC_IDC_SET_BRADY_MAX_SENSOR_RATE: 120 {BEATS}/MIN
MDC_IDC_SET_BRADY_MAX_TRACKING_RATE: 120 {BEATS}/MIN
MDC_IDC_SET_BRADY_MODE: NORMAL
MDC_IDC_SET_BRADY_PAV_DELAY_LOW: 180 MS
MDC_IDC_SET_BRADY_SAV_DELAY_LOW: 150 MS
MDC_IDC_SET_LEADCHNL_RA_PACING_AMPLITUDE: 1.5 V
MDC_IDC_SET_LEADCHNL_RA_PACING_ANODE_ELECTRODE_1: NORMAL
MDC_IDC_SET_LEADCHNL_RA_PACING_ANODE_LOCATION_1: NORMAL
MDC_IDC_SET_LEADCHNL_RA_PACING_CAPTURE_MODE: NORMAL
MDC_IDC_SET_LEADCHNL_RA_PACING_CATHODE_ELECTRODE_1: NORMAL
MDC_IDC_SET_LEADCHNL_RA_PACING_CATHODE_LOCATION_1: NORMAL
MDC_IDC_SET_LEADCHNL_RA_PACING_POLARITY: NORMAL
MDC_IDC_SET_LEADCHNL_RA_PACING_PULSEWIDTH: 0.4 MS
MDC_IDC_SET_LEADCHNL_RA_SENSING_ANODE_ELECTRODE_1: NORMAL
MDC_IDC_SET_LEADCHNL_RA_SENSING_ANODE_LOCATION_1: NORMAL
MDC_IDC_SET_LEADCHNL_RA_SENSING_CATHODE_ELECTRODE_1: NORMAL
MDC_IDC_SET_LEADCHNL_RA_SENSING_CATHODE_LOCATION_1: NORMAL
MDC_IDC_SET_LEADCHNL_RA_SENSING_POLARITY: NORMAL
MDC_IDC_SET_LEADCHNL_RA_SENSING_SENSITIVITY: 0.6 MV
MDC_IDC_SET_LEADCHNL_RV_PACING_AMPLITUDE: 2 V
MDC_IDC_SET_LEADCHNL_RV_PACING_ANODE_ELECTRODE_1: NORMAL
MDC_IDC_SET_LEADCHNL_RV_PACING_ANODE_LOCATION_1: NORMAL
MDC_IDC_SET_LEADCHNL_RV_PACING_CAPTURE_MODE: NORMAL
MDC_IDC_SET_LEADCHNL_RV_PACING_CATHODE_ELECTRODE_1: NORMAL
MDC_IDC_SET_LEADCHNL_RV_PACING_CATHODE_LOCATION_1: NORMAL
MDC_IDC_SET_LEADCHNL_RV_PACING_POLARITY: NORMAL
MDC_IDC_SET_LEADCHNL_RV_PACING_PULSEWIDTH: 0.4 MS
MDC_IDC_SET_LEADCHNL_RV_SENSING_ANODE_ELECTRODE_1: NORMAL
MDC_IDC_SET_LEADCHNL_RV_SENSING_ANODE_LOCATION_1: NORMAL
MDC_IDC_SET_LEADCHNL_RV_SENSING_CATHODE_ELECTRODE_1: NORMAL
MDC_IDC_SET_LEADCHNL_RV_SENSING_CATHODE_LOCATION_1: NORMAL
MDC_IDC_SET_LEADCHNL_RV_SENSING_POLARITY: NORMAL
MDC_IDC_SET_LEADCHNL_RV_SENSING_SENSITIVITY: 0.9 MV
MDC_IDC_SET_ZONE_DETECTION_INTERVAL: 350 MS
MDC_IDC_SET_ZONE_DETECTION_INTERVAL: 400 MS
MDC_IDC_SET_ZONE_TYPE: NORMAL
MDC_IDC_STAT_AT_BURDEN_PERCENT: 0 %
MDC_IDC_STAT_AT_DTM_END: NORMAL
MDC_IDC_STAT_AT_DTM_START: NORMAL
MDC_IDC_STAT_BRADY_AP_VP_PERCENT: 17.89 %
MDC_IDC_STAT_BRADY_AP_VS_PERCENT: 0.07 %
MDC_IDC_STAT_BRADY_AS_VP_PERCENT: 80.99 %
MDC_IDC_STAT_BRADY_AS_VS_PERCENT: 1.05 %
MDC_IDC_STAT_BRADY_DTM_END: NORMAL
MDC_IDC_STAT_BRADY_DTM_START: NORMAL
MDC_IDC_STAT_BRADY_RA_PERCENT_PACED: 17.7 %
MDC_IDC_STAT_BRADY_RV_PERCENT_PACED: 97.92 %
MDC_IDC_STAT_EPISODE_RECENT_COUNT: 0
MDC_IDC_STAT_EPISODE_RECENT_COUNT_DTM_END: NORMAL
MDC_IDC_STAT_EPISODE_RECENT_COUNT_DTM_START: NORMAL
MDC_IDC_STAT_EPISODE_TOTAL_COUNT: 0
MDC_IDC_STAT_EPISODE_TOTAL_COUNT: 9
MDC_IDC_STAT_EPISODE_TOTAL_COUNT_DTM_END: NORMAL
MDC_IDC_STAT_EPISODE_TOTAL_COUNT_DTM_START: NORMAL
MDC_IDC_STAT_EPISODE_TYPE: NORMAL

## 2020-12-31 DIAGNOSIS — N40.0 BENIGN NODULAR PROSTATIC HYPERPLASIA WITHOUT LOWER URINARY TRACT SYMPTOMS: ICD-10-CM

## 2021-01-04 RX ORDER — FINASTERIDE 5 MG/1
TABLET, FILM COATED ORAL
Qty: 28 TABLET | Refills: 4 | Status: SHIPPED | OUTPATIENT
Start: 2021-01-04 | End: 2021-05-21

## 2021-01-04 RX ORDER — TAMSULOSIN HYDROCHLORIDE 0.4 MG/1
CAPSULE ORAL
Qty: 56 CAPSULE | Refills: 4 | Status: SHIPPED | OUTPATIENT
Start: 2021-01-04 | End: 2021-05-21

## 2021-01-04 NOTE — TELEPHONE ENCOUNTER
Prescription approved per [facility] Refill Protocol.  Amber Santiago RN  [facility]

## 2021-01-07 ENCOUNTER — PATIENT OUTREACH (OUTPATIENT)
Dept: GERIATRIC MEDICINE | Facility: CLINIC | Age: 86
End: 2021-01-07

## 2021-01-07 PROBLEM — Z76.89 HEALTH CARE HOME: Chronic | Status: RESOLVED | Noted: 2017-08-07 | Resolved: 2021-01-07

## 2021-01-07 NOTE — PROGRESS NOTES
[facility] Care Coordination Contact    [facility] Home Visit Assessment     Home visit for Health Risk Assessment with Gallito Farley completed on January 7, 2021    Type of residence:: Assisted living  Current living arrangement:: I live in assisted living     Assessment completed with:: Patient, Caregiver - Joe,     Current Care Plan  Member currently receiving the following home care services:  N/A  Member currently receiving the following community resources: Other (see comment)(Assisted Living), monthly incontinence supplies    Medication Review  Medication reconciliation completed in Epic: Yes  Medication set-up & administration: RN set up weekly.  Assisting Living staff administers medications.  Medication Risk Assessment Medication (1 or more, place referral to MTM): N/A: No risk factors identified  MTM Referral Placed: No: No risk factors idenified    Mental/Behavioral Health   Depression Screening:           Mental health DX:: No        Falls Assessment:   Fallen 2 or more times in the past year?: No   Any fall with injury in the past year?: No    ADL/IADL Dependencies:   Dependent ADLs:: Ambulation-walker, Bathing, Dressing, Grooming, Incontinence, Positioning, Transfers, Toileting  Dependent IADLs:: Cleaning, Cooking, Laundry, Shopping, Meal Preparation, Medication Management, Money Management, Transportation, Incontinence    [facility] Health Plan sponsored benefits: Shared information re: Silver Sneakers/gym memberships, ASA, Calcium +D.    PCA Assessment completed at visit: Not Applicable     Elderly Waiver Eligibility: Yes-will continue on EW    Care Plan & Recommendations: CL Tool, Monthly Incontinence supplies.     See LTCC for detailed assessment information.    Follow-Up Plan: Member informed of future contact, plan to f/u with member with a 6 month telephone assessment.  Contact information shared with member and family, encouraged member to call with any questions or  concerns at any time.    [place] care continuum providers: Please refer to Health Care Home on the Epic Problem List to view this patient's [facility] Care Plan Summary.    Jorge David RN, PHN  [facility]

## 2021-01-08 ENCOUNTER — TELEPHONE (OUTPATIENT)
Dept: FAMILY MEDICINE | Facility: CLINIC | Age: 86
End: 2021-01-08

## 2021-01-08 NOTE — TELEPHONE ENCOUNTER
.Reason for call:  Form   Our goal is to have forms completed within 72 hours, however some forms may require a visit or additional information.     Who is the form from? Patient  Where did the form come from? Patient or family brought in     What clinic location was the form placed at? [facility]  [facility]  What number is listed as a contact on the form? [phone]    Phone call message - patient request for a letter, form or note:     Date needed: as soon as possible  Patient will  at the clinic when completed  Has the patient signed a consent form for release of information? YES    Additional comments: Theo Ballesteros will pick this up. Please call her @ [phone]    Type of letter, form or note: medical    Phone number to reach patient:  Home number on file [phone] (home)    Best Time:  anytime    Can we leave a detailed message on this number?  YES    Travel screening: Negative

## 2021-01-12 NOTE — TELEPHONE ENCOUNTER
Form is received and put in red folder for provider to address.  [place],  For 1st Floor Primary Care

## 2021-01-14 ENCOUNTER — MYC MEDICAL ADVICE (OUTPATIENT)
Dept: FAMILY MEDICINE | Facility: CLINIC | Age: 86
End: 2021-01-14

## 2021-01-14 DIAGNOSIS — L85.3 XEROSIS CUTIS: Primary | ICD-10-CM

## 2021-01-18 RX ORDER — AMMONIUM LACTATE 12 G/100G
LOTION TOPICAL 2 TIMES DAILY
Qty: 567 G | Refills: 11 | Status: SHIPPED | OUTPATIENT
Start: 2021-01-18 | End: 2022-08-16

## 2021-01-18 NOTE — PROGRESS NOTES
Received ok from [facility] for RS Tool.   Completed.     Jorge David RN, N  [employer]

## 2021-01-21 ENCOUNTER — PATIENT OUTREACH (OUTPATIENT)
Dept: GERIATRIC MEDICINE | Facility: CLINIC | Age: 86
End: 2021-01-21

## 2021-01-21 NOTE — LETTER
January 21, 2021       GALLITO ALBRIGHT  [address] [address]      Dear Gallito:    At [facility], we are dedicated to improving your health and well-being. Enclosed is the Comprehensive Care Plan that we developed with you on 1/7/2021. Please review the Care Plan carefully.    As a reminder, some of the things we discussed at your visit include:    Your physical and mental health    Ways to reduce falls    Health care needs you may have    Don t forget to contact your care coordinator if you:    Have been hospitalized or plan to be hospitalized     Have had a fall     Have experienced a change in physical health    Are experiencing emotional problems     If you do not agree with your Care Plan, have questions about it, or have experienced a change in your needs, please call me at [phone]. If you are hearing impaired, please call the [place] Relay at 170 or [phone] (ugltja-qj-rytisg relay service).    Sincerely,    Jorge David RN, PHN    E-mail: [email or web address]  Phone: [phone]      [facility] ([facility]) is a health plan that contracts with both Medicare and the [place] Medical Assistance (Medicaid) program to provide benefits of both programs to enrollees. Enrollment in [facility] depends on contract renewal.    [id number])     [id number] (11/18)

## 2021-01-21 NOTE — PROGRESS NOTES
[facility] Care Coordination Contact    Received after visit chart from care coordinator.  Completed following tasks: Mailed copy of care plan to client, Updated services in access and Submitted referrals/auths for AL & gloves/ wipes thru [facility].   , Mailed copy of CL tool to member, faxed copy to AL facility, uploaded into [employer] and submitted authorization to health plan.   and Provider Signature - Full Care Plan:  Member indicates that they would like their POC shared with the following EW providers:  [facility]..  Letter and full POC faxed to providers for signature.     Mailed POC signature page to member to sign and return.    Nancy Pham  Care Management Specialist   [facility]   [phone]

## 2021-01-25 ENCOUNTER — TELEPHONE (OUTPATIENT)
Dept: FAMILY MEDICINE | Facility: CLINIC | Age: 86
End: 2021-01-25

## 2021-01-25 NOTE — TELEPHONE ENCOUNTER
"Reason for Call:  Other - looking for completed forms    Detailed comments: Patient's care-giver, Joe called asking for the status on 2 forms she dropped off at the [facility]  for Dr. Awad's care team. She states this was about 2 weeks ago. She states she did fill out the required cover sheet when I asked. I am not seeing an encounter started or anything related scanned into the patient's chart. She states the forms are an \"Annual Review Orders\" for the Patient. Please contact her to discuss.            Phone Number Patient can be reached at: Other phone number:  Joe @ [phone].    Best Time: anytime    Can we leave a detailed message on this number? YES    Call taken on 1/25/2021 at 12:23 PM by Manda Andrea    "

## 2021-01-25 NOTE — TELEPHONE ENCOUNTER
These forms were already signed and placed in TC basket.    Refer to encounter below:    January 24, 2021           3:19 PM  You routed this conversation to  1st Floor Primary Care   Me         3:19 PM  Note     Forms completed. Placed in TC basket

## 2021-01-27 NOTE — TELEPHONE ENCOUNTER
This form will be ready for pickup today after 5p today.  [place],  For 1st Floor Primary Care

## 2021-02-03 ENCOUNTER — ANCILLARY PROCEDURE (OUTPATIENT)
Dept: CARDIOLOGY | Facility: CLINIC | Age: 86
End: 2021-02-03
Attending: INTERNAL MEDICINE
Payer: COMMERCIAL

## 2021-02-03 DIAGNOSIS — I44.1 ATRIOVENTRICULAR BLOCK, SECOND DEGREE: ICD-10-CM

## 2021-02-03 PROCEDURE — 93294 REM INTERROG EVL PM/LDLS PM: CPT | Performed by: INTERNAL MEDICINE

## 2021-02-03 PROCEDURE — 93296 REM INTERROG EVL PM/IDS: CPT

## 2021-02-09 NOTE — TELEPHONE ENCOUNTER
"Iljovani called in regards to the \"completed\" forms as stated below. She has not been called to . TC does not see any documentation that she was called to say they were ready nor scanned into the patient's chart as of yet. Please call Linh @ [phone] as son as possible to let her know when she can  the completed forms.  Thank you,  JERRELL Bianchi    "

## 2021-02-10 NOTE — TELEPHONE ENCOUNTER
Form is at the  to be pickup, verified with our  staff.  [place],  For 1st Floor Primary Care    Called and left msg for [facility] regarding this writer's note above.  [place],  For 1st Floor Primary Care

## 2021-02-18 LAB
MDC_IDC_LEAD_IMPLANT_DT: NORMAL
MDC_IDC_LEAD_IMPLANT_DT: NORMAL
MDC_IDC_LEAD_LOCATION: NORMAL
MDC_IDC_LEAD_LOCATION: NORMAL
MDC_IDC_LEAD_LOCATION_DETAIL_1: NORMAL
MDC_IDC_LEAD_LOCATION_DETAIL_1: NORMAL
MDC_IDC_LEAD_MFG: NORMAL
MDC_IDC_LEAD_MFG: NORMAL
MDC_IDC_LEAD_MODEL: NORMAL
MDC_IDC_LEAD_MODEL: NORMAL
MDC_IDC_LEAD_POLARITY_TYPE: NORMAL
MDC_IDC_LEAD_POLARITY_TYPE: NORMAL
MDC_IDC_LEAD_SERIAL: NORMAL
MDC_IDC_LEAD_SERIAL: NORMAL
MDC_IDC_MSMT_BATTERY_DTM: NORMAL
MDC_IDC_MSMT_BATTERY_REMAINING_LONGEVITY: 68 MO
MDC_IDC_MSMT_BATTERY_RRT_TRIGGER: 2.83
MDC_IDC_MSMT_BATTERY_STATUS: NORMAL
MDC_IDC_MSMT_BATTERY_VOLTAGE: 3 V
MDC_IDC_MSMT_LEADCHNL_RA_IMPEDANCE_VALUE: 380 OHM
MDC_IDC_MSMT_LEADCHNL_RA_IMPEDANCE_VALUE: 494 OHM
MDC_IDC_MSMT_LEADCHNL_RA_PACING_THRESHOLD_AMPLITUDE: 0.62 V
MDC_IDC_MSMT_LEADCHNL_RA_PACING_THRESHOLD_PULSEWIDTH: 0.4 MS
MDC_IDC_MSMT_LEADCHNL_RA_SENSING_INTR_AMPL: 2.38 MV
MDC_IDC_MSMT_LEADCHNL_RA_SENSING_INTR_AMPL: 2.38 MV
MDC_IDC_MSMT_LEADCHNL_RV_IMPEDANCE_VALUE: 532 OHM
MDC_IDC_MSMT_LEADCHNL_RV_IMPEDANCE_VALUE: 551 OHM
MDC_IDC_MSMT_LEADCHNL_RV_PACING_THRESHOLD_AMPLITUDE: 0.75 V
MDC_IDC_MSMT_LEADCHNL_RV_PACING_THRESHOLD_PULSEWIDTH: 0.4 MS
MDC_IDC_MSMT_LEADCHNL_RV_SENSING_INTR_AMPL: 20.12 MV
MDC_IDC_MSMT_LEADCHNL_RV_SENSING_INTR_AMPL: 20.12 MV
MDC_IDC_PG_IMPLANT_DTM: NORMAL
MDC_IDC_PG_MFG: NORMAL
MDC_IDC_PG_MODEL: NORMAL
MDC_IDC_PG_SERIAL: NORMAL
MDC_IDC_PG_TYPE: NORMAL
MDC_IDC_SESS_CLINIC_NAME: NORMAL
MDC_IDC_SESS_DTM: NORMAL
MDC_IDC_SESS_TYPE: NORMAL
MDC_IDC_SET_BRADY_AT_MODE_SWITCH_RATE: 171 {BEATS}/MIN
MDC_IDC_SET_BRADY_HYSTRATE: NORMAL
MDC_IDC_SET_BRADY_LOWRATE: 60 {BEATS}/MIN
MDC_IDC_SET_BRADY_MAX_SENSOR_RATE: 120 {BEATS}/MIN
MDC_IDC_SET_BRADY_MAX_TRACKING_RATE: 120 {BEATS}/MIN
MDC_IDC_SET_BRADY_MODE: NORMAL
MDC_IDC_SET_BRADY_PAV_DELAY_LOW: 180 MS
MDC_IDC_SET_BRADY_SAV_DELAY_LOW: 150 MS
MDC_IDC_SET_LEADCHNL_RA_PACING_AMPLITUDE: 1.5 V
MDC_IDC_SET_LEADCHNL_RA_PACING_ANODE_ELECTRODE_1: NORMAL
MDC_IDC_SET_LEADCHNL_RA_PACING_ANODE_LOCATION_1: NORMAL
MDC_IDC_SET_LEADCHNL_RA_PACING_CAPTURE_MODE: NORMAL
MDC_IDC_SET_LEADCHNL_RA_PACING_CATHODE_ELECTRODE_1: NORMAL
MDC_IDC_SET_LEADCHNL_RA_PACING_CATHODE_LOCATION_1: NORMAL
MDC_IDC_SET_LEADCHNL_RA_PACING_POLARITY: NORMAL
MDC_IDC_SET_LEADCHNL_RA_PACING_PULSEWIDTH: 0.4 MS
MDC_IDC_SET_LEADCHNL_RA_SENSING_ANODE_ELECTRODE_1: NORMAL
MDC_IDC_SET_LEADCHNL_RA_SENSING_ANODE_LOCATION_1: NORMAL
MDC_IDC_SET_LEADCHNL_RA_SENSING_CATHODE_ELECTRODE_1: NORMAL
MDC_IDC_SET_LEADCHNL_RA_SENSING_CATHODE_LOCATION_1: NORMAL
MDC_IDC_SET_LEADCHNL_RA_SENSING_POLARITY: NORMAL
MDC_IDC_SET_LEADCHNL_RA_SENSING_SENSITIVITY: 0.6 MV
MDC_IDC_SET_LEADCHNL_RV_PACING_AMPLITUDE: 2 V
MDC_IDC_SET_LEADCHNL_RV_PACING_ANODE_ELECTRODE_1: NORMAL
MDC_IDC_SET_LEADCHNL_RV_PACING_ANODE_LOCATION_1: NORMAL
MDC_IDC_SET_LEADCHNL_RV_PACING_CAPTURE_MODE: NORMAL
MDC_IDC_SET_LEADCHNL_RV_PACING_CATHODE_ELECTRODE_1: NORMAL
MDC_IDC_SET_LEADCHNL_RV_PACING_CATHODE_LOCATION_1: NORMAL
MDC_IDC_SET_LEADCHNL_RV_PACING_POLARITY: NORMAL
MDC_IDC_SET_LEADCHNL_RV_PACING_PULSEWIDTH: 0.4 MS
MDC_IDC_SET_LEADCHNL_RV_SENSING_ANODE_ELECTRODE_1: NORMAL
MDC_IDC_SET_LEADCHNL_RV_SENSING_ANODE_LOCATION_1: NORMAL
MDC_IDC_SET_LEADCHNL_RV_SENSING_CATHODE_ELECTRODE_1: NORMAL
MDC_IDC_SET_LEADCHNL_RV_SENSING_CATHODE_LOCATION_1: NORMAL
MDC_IDC_SET_LEADCHNL_RV_SENSING_POLARITY: NORMAL
MDC_IDC_SET_LEADCHNL_RV_SENSING_SENSITIVITY: 0.9 MV
MDC_IDC_SET_ZONE_DETECTION_INTERVAL: 350 MS
MDC_IDC_SET_ZONE_DETECTION_INTERVAL: 400 MS
MDC_IDC_SET_ZONE_TYPE: NORMAL
MDC_IDC_STAT_AT_BURDEN_PERCENT: 0 %
MDC_IDC_STAT_AT_DTM_END: NORMAL
MDC_IDC_STAT_AT_DTM_START: NORMAL
MDC_IDC_STAT_BRADY_AP_VP_PERCENT: 17.01 %
MDC_IDC_STAT_BRADY_AP_VS_PERCENT: 0 %
MDC_IDC_STAT_BRADY_AS_VP_PERCENT: 82.98 %
MDC_IDC_STAT_BRADY_AS_VS_PERCENT: 0.01 %
MDC_IDC_STAT_BRADY_DTM_END: NORMAL
MDC_IDC_STAT_BRADY_DTM_START: NORMAL
MDC_IDC_STAT_BRADY_RA_PERCENT_PACED: 17.01 %
MDC_IDC_STAT_BRADY_RV_PERCENT_PACED: 99.99 %
MDC_IDC_STAT_EPISODE_RECENT_COUNT: 0
MDC_IDC_STAT_EPISODE_RECENT_COUNT_DTM_END: NORMAL
MDC_IDC_STAT_EPISODE_RECENT_COUNT_DTM_START: NORMAL
MDC_IDC_STAT_EPISODE_TOTAL_COUNT: 0
MDC_IDC_STAT_EPISODE_TOTAL_COUNT: 9
MDC_IDC_STAT_EPISODE_TOTAL_COUNT_DTM_END: NORMAL
MDC_IDC_STAT_EPISODE_TOTAL_COUNT_DTM_START: NORMAL
MDC_IDC_STAT_EPISODE_TYPE: NORMAL

## 2021-02-25 NOTE — PROGRESS NOTES
[facility] Care Coordination Contact    2nd Attempt: Signed Letter not received from [facility], resent per process.     Nancy Pham  Care Management Specialist   [facility]   [phone]

## 2021-02-26 DIAGNOSIS — I10 ESSENTIAL HYPERTENSION: ICD-10-CM

## 2021-02-26 RX ORDER — ENALAPRIL MALEATE 10 MG/1
TABLET ORAL
Qty: 28 TABLET | Refills: 2 | Status: SHIPPED | OUTPATIENT
Start: 2021-02-26 | End: 2021-05-21

## 2021-02-26 RX ORDER — HYDROCHLOROTHIAZIDE 12.5 MG/1
CAPSULE ORAL
Qty: 56 CAPSULE | Refills: 2 | Status: SHIPPED | OUTPATIENT
Start: 2021-02-26 | End: 2021-05-21

## 2021-02-26 NOTE — TELEPHONE ENCOUNTER
Will be due for labs in May 2021, for further refills.  Refilled till May.    Stefany Browne RN  [facility]

## 2021-03-22 ENCOUNTER — PATIENT OUTREACH (OUTPATIENT)
Dept: GERIATRIC MEDICINE | Facility: CLINIC | Age: 86
End: 2021-03-22

## 2021-03-22 NOTE — PROGRESS NOTES
Care Coordinator received email from Savi at [facility] re: gloves.   Care Coordinator sent to CMS to update auth for change in price.    Good morning,  We are reaching out today because your member is currently receiving gloves from us on a reoccurring automatic order.  Due to the shortage of this item manufacturers have raised prices. We will need an updated service agreement to continue providing the member s gloves which are now $15 per pack.  Please let us know if you would like us to discontinue the gloves or if you will be entering an updated auth for the supply.         Currently receivin packs of gloves per month        Thanks!  Savi David RN, PHN  [facility]

## 2021-03-30 NOTE — PROGRESS NOTES
[facility] Care Coordination Contact    No Letter Received: 60 day tracking of letter complete, no letter received from [facility]. Tracking discontinued.     Merari Nelson  Care Management Specialist  [facility]  [phone]

## 2021-04-02 DIAGNOSIS — N32.81 OAB (OVERACTIVE BLADDER): ICD-10-CM

## 2021-04-02 DIAGNOSIS — I25.118 CORONARY ARTERY DISEASE OF NATIVE ARTERY OF NATIVE HEART WITH STABLE ANGINA PECTORIS (H): ICD-10-CM

## 2021-04-02 RX ORDER — MIRABEGRON 50 MG/1
TABLET, FILM COATED, EXTENDED RELEASE ORAL
Qty: 28 TABLET | Refills: 0 | Status: SHIPPED | OUTPATIENT
Start: 2021-04-02 | End: 2021-05-21

## 2021-04-05 RX ORDER — ISOSORBIDE MONONITRATE 30 MG/1
30 TABLET, EXTENDED RELEASE ORAL DAILY
Qty: 28 TABLET | Refills: 6 | Status: SHIPPED | OUTPATIENT
Start: 2021-04-05 | End: 2021-10-12

## 2021-04-05 NOTE — TELEPHONE ENCOUNTER
Last Clinic Visit: 10/29/2020  [facility]

## 2021-04-20 ENCOUNTER — MYC MEDICAL ADVICE (OUTPATIENT)
Dept: FAMILY MEDICINE | Facility: CLINIC | Age: 86
End: 2021-04-20

## 2021-04-21 ENCOUNTER — VIRTUAL VISIT (OUTPATIENT)
Dept: FAMILY MEDICINE | Facility: CLINIC | Age: 86
End: 2021-04-21
Payer: COMMERCIAL

## 2021-04-21 DIAGNOSIS — R73.01 IMPAIRED FASTING GLUCOSE: ICD-10-CM

## 2021-04-21 DIAGNOSIS — J20.8 ACUTE BRONCHITIS DUE TO OTHER SPECIFIED ORGANISMS: Primary | ICD-10-CM

## 2021-04-21 DIAGNOSIS — J44.1 COPD EXACERBATION (H): ICD-10-CM

## 2021-04-21 PROCEDURE — 99214 OFFICE O/P EST MOD 30 MIN: CPT | Mod: 95 | Performed by: INTERNAL MEDICINE

## 2021-04-21 RX ORDER — METFORMIN HCL 500 MG
500 TABLET, EXTENDED RELEASE 24 HR ORAL DAILY
Qty: 90 TABLET | Refills: 1 | Status: SHIPPED | OUTPATIENT
Start: 2021-04-21 | End: 2021-09-13

## 2021-04-21 RX ORDER — ALBUTEROL SULFATE 0.83 MG/ML
2.5 SOLUTION RESPIRATORY (INHALATION) EVERY 4 HOURS PRN
Qty: 540 ML | Refills: 11 | Status: SHIPPED | OUTPATIENT
Start: 2021-04-21 | End: 2023-05-16

## 2021-04-21 RX ORDER — CEFDINIR 300 MG/1
300 CAPSULE ORAL 2 TIMES DAILY
Qty: 20 CAPSULE | Refills: 2 | Status: SHIPPED | OUTPATIENT
Start: 2021-04-21 | End: 2021-05-01

## 2021-04-21 NOTE — PROGRESS NOTES
Gallito is a 87 year old who is being evaluated via a billable video visit.      How would you like to obtain your AVS? MyChart  If the video visit is dropped, the invitation should be resent by: Text to cell phone: [phone]  Will anyone else be joining your video visit? Yes: Joe. How would they like to receive their invitation? Text to cell phone: [phone]     HISTORY OF PRESENT ILLNESS    Gallito is a 87 year old who presents for the following health issues     Acute illness concerns: Cough  Onset/Duration: 2-3 days ago  Symptoms:  Fever: no  Chills/Sweats: no  Headache (location?): no  Sinus Pressure: no  Conjunctivitis:  no  Ear Pain: no  Rhinorrhea: no  Congestion: no  Sore Throat: no  Cough: YES  Wheeze: YES  Decreased Appetite: no  Nausea: no  Vomiting: no  Diarrhea: no  Dysuria/Freq.: no  Dysuria or Hematuria: no  Fatigue/Achiness: no  Sick/Strep Exposure: no  Therapies tried and outcome: None      Review of Systems   CONSTITUTIONAL: NEGATIVE for fever, chills, change in weight  INTEGUMENTARY/SKIN: NEGATIVE for worrisome rashes, moles or lesions  EYES: NEGATIVE for vision changes or irritation  ENT/MOUTH: NEGATIVE for epistaxis and vertigo  RESP:NEGATIVE for hemoptysis, pleurisy and SOB/dyspnea  CV: NEGATIVE for chest pain, palpitations or peripheral edema  GI: NEGATIVE for nausea, abdominal pain, heartburn, or change in bowel habits  : NEGATIVE for frequency, dysuria, or hematuria  MUSCULOSKELETAL: NEGATIVE for significant arthralgias or myalgia  NEURO: NEGATIVE for weakness, dizziness or paresthesias  ENDOCRINE: NEGATIVE for temperature intolerance, skin/hair changes  HEME: NEGATIVE for bleeding problems  PSYCHIATRIC: NEGATIVE for changes in mood or affect      Objective           Vitals:  No vitals were obtained today due to virtual visit.    Physical Exam   GENERAL: alert, elderly and fatigued  EYES: Eyes grossly normal to inspection  RESP: No audible wheezes.  NEURO: mentation intact  PSYCH:  Mentation appears normal, affect normal/bright, judgement and insight intact, normal speech and appearance well-groomed.      DIAGNOSTICS  Epic reviewed.      1. Acute bronchitis due to other specified organisms    - cefdinir (OMNICEF) 300 MG capsule; Take 1 capsule (300 mg) by mouth 2 times daily for 10 days  Dispense: 20 capsule; Refill: 2    2. Impaired fasting glucose    - metFORMIN (GLUCOPHAGE-XR) 500 MG 24 hr tablet; Take 1 tablet (500 mg) by mouth daily Take with largest meal of the day.  Dispense: 90 tablet; Refill: 1    3. COPD exacerbation (H)    - albuterol (PROVENTIL) (2.5 MG/3ML) 0.083% neb solution; Take 1 vial (2.5 mg) by nebulization every 4 hours as needed for shortness of breath / dyspnea or wheezing  Dispense: 540 mL; Refill: 11        Disposition: Follow-up in 4 weeks.      Video-Visit Details    Type of service:  Video Visit    Video Start: 10:15 AM  Video End Time:10:27 AM    Originating Location (pt. Location): Home    Distant Location (provider location):  [facility]     Platform used for Video Visit: Rafa Awad MD  Internal Medicine

## 2021-04-21 NOTE — PATIENT INSTRUCTIONS
At [facility], we strive to deliver an exceptional experience to you, every time we see you. If you receive a survey, please complete it as we do value your feedback.  If you have MyChart, you can expect to receive results automatically within 24 hours of their completion.  Your provider will send a note interpreting your results as well.   If you do not have MyChart, you should receive your results in about a week by mail.    Your care team:                            Family Medicine Internal Medicine   MD Hakeem Yuen MD Shantel Branch-Fleming, MD Srinivasa Vaka, MD Katya Belousova, PAFLORA Canas, APRN CNP    Neftali Meyers, MD Pediatrics   Colton Hector, PAFLORA Coon, CNP MD Devi Smiley APRN CNP   MD Reyna Fuentes MD Deborah Mielke, MD Grazyna Rees, APRN [facility]      Clinic hours: Monday - Thursday 7 am-6 pm; Fridays 7 am-5 pm.   Urgent care: Monday - Friday 10 am- 8 pm; Saturday and Sunday 9 am-5 pm.    Clinic: [phone]       [facility]: Monday - Thursday 8 am - 7 pm; Friday 8 am - 6 pm  [facility]: [phone]     Use [email or web address] for 24/7 diagnosis and treatment of dozens of conditions.

## 2021-05-02 NOTE — PROGRESS NOTES
[facility] CARDIOLOGY FOLLOW UP VISIT    Referring Provider:  Referred Self  Primary Care Provider:   Hakeem Awad  HPI: Gallito Farley is a 87 year old male being seen today for evaluation of chest discomfort.   The patient's risk factor profile is: (+) HTN, (-) DM, (-) hypercholesterolemia, remote tobacco use, (?) fam Hx premature CAD.  The patient has no history of CAD, CHF, or valvular heart disease).  He has a Hx of complete heart block and had a dual chamber Medtronic permanent pacemaker implanted in Jan 2017.    He had an ECHO (2017) that showed LVEF 55-60% with mild diffuse hypokinesis.  He has documented diastolic dysfunction.    He reported chest discomfort to Dr. Garcia in May 2019 and underwent a Lexiscan that was negative. It showed a summed stress score 4, with questionable mild reversibility in the mid and apical inferior wall.  He presented to ER in May 2020 with 48 hours of chest discomfort, normal troponins, and negative ECG.  He was discharged and followed up with me several days later and reported no recurrent chest discomfort.    He denies CP, SOB, PND, orthopnea, pedal edema, palpitations, lightheadedness, or syncope.    The patient has no Hx of PAD or cerebrovascular disease.      PMHx remarkable for TB and partial pneumonectomy in 1990s.    The patient lives in a group home. He reports his BPs have been well controlled.    PAST MEDICAL HISTORY:  Past Medical History:   Diagnosis Date     Asthma      BPH (benign prostatic hyperplasia)      Chronic obstructive pulmonary disease, unspecified COPD type (H) 10/2/2018     COPD (chronic obstructive pulmonary disease) (H)      Diastolic dysfunction, left ventricle 4/16/2013     H/O tuberculosis     s/p partial pneumonectomy [place] in the 1990's     Hypertension      LBBB (left bundle branch block)      Leg wound, right     chronic, s/p grafting     Osteoarthritis        CURRENT MEDICATIONS:  Current Outpatient Medications   Medication Sig      acetaminophen (TYLENOL) 325 MG tablet Take 2 tablets (650 mg) by mouth 3 times daily as needed for pain     ACETAMINOPHEN EXTRA STRENGTH 500 MG tablet TAKE 1 TABLET BY MOUTH EVERY SIX HOURS AS NEEDED FOR PAIN [ALTERNATE W/IBUPROFEN EVERY 3 HOURS]     albuterol (PROVENTIL) (2.5 MG/3ML) 0.083% neb solution Take 1 vial (2.5 mg) by nebulization every 4 hours as needed for shortness of breath / dyspnea or wheezing     amLODIPine (NORVASC) 5 MG tablet TAKE 1 TABLET BY MOUTH DAILY     ammonium lactate (LAC-HYDRIN) 12 % external lotion Apply topically 2 times daily     aspirin (ASPIRIN ADULT LOW STRENGTH) 81 MG EC tablet Take 1 tablet (81 mg) by mouth daily     BREO ELLIPTA 200-25 MCG/INH Inhaler INHALE 1 PUFF BY MOUTH DAILY     diclofenac (VOLTAREN) 1 % topical gel Place 2 g onto the skin 4 times daily     diclofenac sodium 3 % GEL Apply 4 gm four times a day as needed to right trapezius muscle.     enalapril (VASOTEC) 10 MG tablet TAKE 1 TABLET BY MOUTH DAILY     famotidine (HEARTBURN RELIEF MAX ST) 20 MG tablet TAKE 1 TABLET BY MOUTH TWICE A DAY     finasteride (PROSCAR) 5 MG tablet TAKE 1 TABLET BY MOUTH EVERY NIGHT AT BEDTIME     gabapentin (NEURONTIN) 100 MG capsule TAKE 1 CAPSULE BY MOUTH THREE TIMES A DAY     hydrochlorothiazide (MICROZIDE) 12.5 MG capsule TAKE 2 CAPSULES (25MG) BY MOUTH DAILY     ibuprofen (ADVIL/MOTRIN) 600 MG tablet TAKE 1 TABLET BY MOUTH EVERY SIX HOURS AS NEEDED FOR PAIN     isosorbide mononitrate (IMDUR) 30 MG 24 hr tablet Take 1 tablet (30 mg) by mouth daily     ketotifen (ZADITOR/REFRESH ANTI-ITCH) 0.025 % ophthalmic solution Place 1 drop into both eyes 2 times daily     melatonin 3 MG tablet Take 3 mg by mouth     melatonin 5 MG CAPS Take 1 capsule by mouth At Bedtime     metFORMIN (GLUCOPHAGE-XR) 500 MG 24 hr tablet Take 1 tablet (500 mg) by mouth daily Take with largest meal of the day.     MILK OF MAGNESIA 7.75 % suspension TAKE 30ML BY MOUTH DAILY AS NEEDED FOR CONSTIPATION     MUCINEX  600 MG 12 hr tablet TAKE 1 TABLET BY MOUTH TWICE A DAY     MYRBETRIQ 50 MG 24 hr tablet TAKE 1 TABLET BY MOUTH DAILY     order for DME Equipment being ordered: Nebulizer machine and adult tubing     order for DME Equipment being ordered: Dispense Coban wrap.     phenazopyridine (PYRIDIUM) 200 MG tablet Take 1 tablet (200 mg) by mouth 3 times daily as needed for irritation     polyethylene glycol (MIRALAX) 17 GM/Dose powder MIX 17 GRAMS IN 4-6OZ LIQUID AND DRINK BY MOUTH ONCE DAILY     polyethylene glycol-propylene glycol (SYSTANE) 0.4-0.3 % SOLN ophthalmic solution Place 1 drop into both eyes 4 times daily     ranitidine (ZANTAC) 150 MG tablet Take 150 mg by mouth 2 times daily     SPIRIVA HANDIHALER 18 MCG inhaled capsule INHALE CONTENTS OF ONE CAPSULE DAILY USING HANDIHALER DEVICE     tamsulosin (FLOMAX) 0.4 MG capsule TAKE 2 CAPSULES (0.8MG) BY MOUTH EVERY NIGHT AT BEDTIME     tolterodine ER (DETROL LA) 4 MG 24 hr capsule Take 1 capsule (4 mg) by mouth daily     traZODone (DESYREL) 50 MG tablet Take 1 tablet (50 mg) by mouth nightly as needed for sleep     No current facility-administered medications for this visit.      Facility-Administered Medications Ordered in Other Visits   Medication     iopamidol (ISOVUE-370) solution 111 mL       PAST SURGICAL HISTORY:  Past Surgical History:   Procedure Laterality Date     CHOLECYSTECTOMY       ENT SURGERY       IRRIGATION AND DEBRIDEMENT HIP, COMBINED  4/18/2013    Procedure: COMBINED IRRIGATION AND DEBRIDEMENT HIP;  Irrigation and debridement of Right Hip with cultures;  Surgeon: Cristal Inman MD;  Location: [facility] OR     Partial pneumonectomy      done in [facility] in the 1990's     skin grafting - leg wound  6/2016       ALLERGIES  Patient has no known allergies.    FAMILY HX:  Family History   Problem Relation Age of Onset     Family History Negative Mother        SOCIAL HX:  Social History     Socioeconomic History     Marital status:      Spouse name:  Not on file     Number of children: Not on file     Years of education: Not on file     Highest education level: Not on file   Occupational History     Not on file   Social Needs     Financial resource strain: Not on file     Food insecurity     Worry: Not on file     Inability: Not on file     Transportation needs     Medical: Not on file     Non-medical: Not on file   Tobacco Use     Smoking status: Former Smoker     Smokeless tobacco: Never Used   Substance and Sexual Activity     Alcohol use: No     Drug use: No     Sexual activity: Yes     Partners: Female     Birth control/protection: None   Lifestyle     Physical activity     Days per week: Not on file     Minutes per session: Not on file     Stress: Not on file   Relationships     Social connections     Talks on phone: Not on file     Gets together: Not on file     Attends Worship service: Not on file     Active member of club or organization: Not on file     Attends meetings of clubs or organizations: Not on file     Relationship status: Not on file     Intimate partner violence     Fear of current or ex partner: Not on file     Emotionally abused: Not on file     Physically abused: Not on file     Forced sexual activity: Not on file   Other Topics Concern     Parent/sibling w/ CABG, MI or angioplasty before 65F 55M? Not Asked   Social History Narrative     Not on file       ROS:  Constitutional: No fever, chills, or sweats. No weight gain/loss.   HEENT: No visual disturbance, ear ache, epistaxis, sore throat.   Allergies/Immunologic: Negative.   Respiratory: No cough, hemoptysis.   Cardiovascular: As per HPI.   GI: No nausea, vomiting, hematemesis, melena, or hematochezia.   : No urinary frequency, dysuria, or hematuria.   Integument: No rash.   Psychiatric: No anxiety / depression.   Neuro: No speech disturbance, focal sensory or motor deficit.   Endocrinology: No polyuria / polyphagia.   Musculoskeletal: No myalgia.    VITAL SIGNS:  /79 (BP  "Location: Right arm, Patient Position: Chair, Cuff Size: Adult Regular)   Pulse 80   Ht 1.8 m (5' 10.87\")   Wt 79.2 kg (174 lb 9.6 oz)   SpO2 99%   BMI 24.44 kg/m    Body mass index is 24.44 kg/m .  Wt Readings from Last 2 Encounters:   10/29/20 85.8 kg (189 lb 1.6 oz)   20 89.6 kg (197 lb 8 oz)       PHYSICAL EXAM  Gallito Farley is a 87 year old male.in no acute distress.  HEENT: Eyes Nonicteric.  Neck: JVP 2cm H20.  Carotids +2 bilaterally without bruits.  Lungs: diminished right breath sounds.  Cor: RRR. Normal S1 and S2.  No murmur, rub, or gallop.  PMI in Lf 5th ICS.  Abd: Soft, nontender, nondistended.  NABS.  No pulsatile mass.  Extremities: No C/C/E.  Pulses +3/3 symmetric in upper and lower extremities.  Neuro: Grossly intact.  Psych: A&O x 3.  Skin: No rash.    LABS  Recent Labs   Lab Test 20  0756 10/02/18  1709   WBC 7.5 7.6   HGB 16.8 16.4   HCT 52.8 48.0    218     Recent Labs   Lab Test 20  0756 10/02/18  1709    135   POTASSIUM 3.7 3.7   CHLORIDE 100 98   CO2 29 26   BUN 13 16   CR 1.04 1.03   GFRESTIMATED 65 69     Recent Labs   Lab Test 13  0643   CHOL 143   HDL 20*   LDL 93   TRIG 148   CHOLHDLRATIO 7.1*       EK2020  Sinus rhythm with occasional ventricular-paced complexes  Left axis deviation  Non-specific intra-ventricular conduction block  Possible Lateral infarct , age undetermined  Inferior infarct, age undetermined    ECHO: 2017  Interpretation Summary  Global and regional left ventricular function is normal with an EF of 55-60%.  Mild diffuse hypokinesis is present.  Right ventricular function, chamber size, wall motion, and thickness are normal.  Pulmonary artery systolic pressure is normal.  Dilation of the inferior vena cava is present with normal respiratory  variation in diameter. Estimated mean right atrial pressure is 3-8 mmHg.  No pericardial effusion is present.    CARDIAC MRI: None    CORONARY CTA: None    LEXISCAN STRESS " TEST:  6/13/2019  Findings:  1. Overall quality of the study: Good.   2. Left ventricular cavity is not dilated on the rest and stress studies.  3. SPECT images demonstrate a moderate perfusion defect in the apical inferior segment and mild perfusion defects in the apical and mid inferior segments. There is reversibility of the apical and mid  inferior segments. These results suggest a mild post-scan likelihood  significant coronary artery disease. The summed stress score is 4.   4.  No appreciable wall motion abnormality of the left ventricle. Left  ventricular ejection fraction is 76%. Left ventricular end-diastolic  volume is 71 mL. End-systolic volume is 17 mL.  5. Baseline EKG and Stress findings reported separately in CVIS.  6. Right apical scarring and right upper lobectomy, presumably from  tuberculosis. No acute airspace opacities. Heart size normal. No  pericardial effusion. No pneumothorax or pleural effusion. The upper  abdomen is unremarkable. No worrisome bony or soft tissue abnormality.                                                                   Impression:  1. Mildly abnormal myocardial SPECT study with a summed stress score of 4. Question mild reversibility in the mid and apical inferior wall.  A summed stress score of 4 is associated with an annual event rate of 2.7 and 0.8 for myocardial infarction and cardiac death, respectively  (Alejandro. Circulation 1998;98:535-43).  2. Normal left ventricular ejection fraction..   3. Postoperative changes from right upper lobectomy..      CARDIAC CATH: None      VENOUS US, BILATERAL:  1/7/2020  IMPRESSION:  1. RIGHT LEG:       A. No superficial or deep venous thrombosis demonstrated.       B. No deep or superficial venous incompetency demonstrated.       C. No  or varicose veins demonstrated.   2. LEFT LEG:       A. No superficial or deep venous thrombosis demonstrated.       B. No deep or superficial venous incompetency demonstrated.        C. No  or varicose veins demonstrated.       ASSESSMENT AND PLAN:   1. Chest Discomfort, Resolved.  Patient had single episode of chest pain a year ago without recurrence.  His stress nuclear from 2019 showed a question of mild reversibility in the mid and apical inferior wall.  I spoke to the patient as it is unclear this episode was related to CAD.  He is not enthusiastic about undergoing additional testing and given his age, the single episode, and the prior Lexiscan, I think that is quite reasonable.   Treat for presumed CAD.    -- ASA 81 mg every day  -- Imdur 30 mg every day.  -- Norvasc 5 mg every day  -- Start Lipitor 10 mg every day.    2. CHB, s/p dual chamber pacemaker  -- F/U with EP, pacemaker checks.    3. HTN  -- Vasotec 10 mg every day  -- Norvasc 10 mg every day  -- Hydrochlorothiazide 12.5 mg every day    FOLLOW UP:  Primary care provider    Abilio Frank MD    Divisions of Cardiology  [place], [place]    CC  Patient Care Team:  Hakeem Awad MD as PCP - General (Internal Medicine)  Lenin Farley MD Schuster, Joseph, DPM (Podiatry)  Jorge David, RN as Lead Care Coordinator  Santosh Matthew MD as MD (Urology)  Kristal Delgado, RN as Registered Nurse  Genevieve Chang NP as Nurse Practitioner (Nurse Practitioner - Adult Health)  Abliio Frank MD as MD (Interventional Cardiology)  Abilio Frank MD as Assigned Heart and Vascular Provider  Shane Hector PA as Assigned PCP  Maria G Friedman PA-C as Assigned Surgical Provider  SELF, REFERRED

## 2021-05-04 ENCOUNTER — ANCILLARY PROCEDURE (OUTPATIENT)
Dept: CARDIOLOGY | Facility: CLINIC | Age: 86
End: 2021-05-04
Attending: INTERNAL MEDICINE
Payer: COMMERCIAL

## 2021-05-04 VITALS
SYSTOLIC BLOOD PRESSURE: 125 MMHG | HEIGHT: 71 IN | WEIGHT: 174.6 LBS | HEART RATE: 80 BPM | OXYGEN SATURATION: 99 % | BODY MASS INDEX: 24.44 KG/M2 | DIASTOLIC BLOOD PRESSURE: 79 MMHG

## 2021-05-04 DIAGNOSIS — I10 ESSENTIAL HYPERTENSION: ICD-10-CM

## 2021-05-04 DIAGNOSIS — I44.1 ATRIOVENTRICULAR BLOCK, SECOND DEGREE: ICD-10-CM

## 2021-05-04 DIAGNOSIS — I25.118 CORONARY ARTERY DISEASE OF NATIVE ARTERY OF NATIVE HEART WITH STABLE ANGINA PECTORIS (H): Primary | ICD-10-CM

## 2021-05-04 DIAGNOSIS — Z95.0 CARDIAC PACEMAKER IN SITU: Primary | ICD-10-CM

## 2021-05-04 PROCEDURE — 99214 OFFICE O/P EST MOD 30 MIN: CPT | Mod: 25 | Performed by: INTERNAL MEDICINE

## 2021-05-04 PROCEDURE — 93280 PM DEVICE PROGR EVAL DUAL: CPT | Performed by: INTERNAL MEDICINE

## 2021-05-04 PROCEDURE — G0463 HOSPITAL OUTPT CLINIC VISIT: HCPCS

## 2021-05-04 RX ORDER — ATORVASTATIN CALCIUM 10 MG/1
10 TABLET, FILM COATED ORAL DAILY
Qty: 90 TABLET | Refills: 3 | Status: SHIPPED | OUTPATIENT
Start: 2021-05-04 | End: 2022-04-07

## 2021-05-04 ASSESSMENT — MIFFLIN-ST. JEOR: SCORE: 1486.98

## 2021-05-04 ASSESSMENT — PAIN SCALES - GENERAL: PAINLEVEL: NO PAIN (0)

## 2021-05-04 NOTE — NURSING NOTE
Chief Complaint   Patient presents with     Follow Up     6 month follow up       Vitals were taken and medications where reconciled.    Al Lorenzana, EMT  2:41 PM

## 2021-05-04 NOTE — LETTER
5/4/2021      RE: Gallito Farley  [address] [facility] [address]       Dear Colleague,    Thank you for the opportunity to participate in the care of your patient, Gallito Farley, at the [facility] [place] [facility]. Please see a copy of my visit note below.    [facility]    Referring Provider:  Referred Self  Primary Care Provider:   Hakeem Awad  HPI: Gallito Farley is a 87 year old male being seen today for evaluation of chest discomfort.   The patient's risk factor profile is: (+) HTN, (-) DM, (-) hypercholesterolemia, remote tobacco use, (?) fam Hx premature CAD.  The patient has no history of CAD, CHF, or valvular heart disease).  He has a Hx of complete heart block and had a dual chamber Medtronic permanent pacemaker implanted in Jan 2017.    He had an ECHO (2017) that showed LVEF 55-60% with mild diffuse hypokinesis.  He has documented diastolic dysfunction.    He reported chest discomfort to Dr. Garcia in May 2019 and underwent a Lexiscan that was negative. It showed a summed stress score 4, with questionable mild reversibility in the mid and apical inferior wall.  He presented to ER in May 2020 with 48 hours of chest discomfort, normal troponins, and negative ECG.  He was discharged and followed up with me several days later and reported no recurrent chest discomfort.    He denies CP, SOB, PND, orthopnea, pedal edema, palpitations, lightheadedness, or syncope.    The patient has no Hx of PAD or cerebrovascular disease.      PMHx remarkable for TB and partial pneumonectomy in 1990s.    The patient lives in a group home. He reports his BPs have been well controlled.    PAST MEDICAL HISTORY:  Past Medical History:   Diagnosis Date     Asthma      BPH (benign prostatic hyperplasia)      Chronic obstructive pulmonary disease, unspecified COPD type (H) 10/2/2018     COPD (chronic obstructive  pulmonary disease) (H)      Diastolic dysfunction, left ventricle 4/16/2013     H/O tuberculosis     s/p partial pneumonectomy in [place] in the [place]     Hypertension      LBBB (left bundle branch block)      Leg wound, right     chronic, s/p grafting     Osteoarthritis        CURRENT MEDICATIONS:  Current Outpatient Medications   Medication Sig     acetaminophen (TYLENOL) 325 MG tablet Take 2 tablets (650 mg) by mouth 3 times daily as needed for pain     ACETAMINOPHEN EXTRA STRENGTH 500 MG tablet TAKE 1 TABLET BY MOUTH EVERY SIX HOURS AS NEEDED FOR PAIN [ALTERNATE W/IBUPROFEN EVERY 3 HOURS]     albuterol (PROVENTIL) (2.5 MG/3ML) 0.083% neb solution Take 1 vial (2.5 mg) by nebulization every 4 hours as needed for shortness of breath / dyspnea or wheezing     amLODIPine (NORVASC) 5 MG tablet TAKE 1 TABLET BY MOUTH DAILY     ammonium lactate (LAC-HYDRIN) 12 % external lotion Apply topically 2 times daily     aspirin (ASPIRIN ADULT LOW STRENGTH) 81 MG EC tablet Take 1 tablet (81 mg) by mouth daily     BREO ELLIPTA 200-25 MCG/INH Inhaler INHALE 1 PUFF BY MOUTH DAILY     diclofenac (VOLTAREN) 1 % topical gel Place 2 g onto the skin 4 times daily     diclofenac sodium 3 % GEL Apply 4 gm four times a day as needed to right trapezius muscle.     enalapril (VASOTEC) 10 MG tablet TAKE 1 TABLET BY MOUTH DAILY     famotidine (HEARTBURN RELIEF MAX ST) 20 MG tablet TAKE 1 TABLET BY MOUTH TWICE A DAY     finasteride (PROSCAR) 5 MG tablet TAKE 1 TABLET BY MOUTH EVERY NIGHT AT BEDTIME     gabapentin (NEURONTIN) 100 MG capsule TAKE 1 CAPSULE BY MOUTH THREE TIMES A DAY     hydrochlorothiazide (MICROZIDE) 12.5 MG capsule TAKE 2 CAPSULES (25MG) BY MOUTH DAILY     ibuprofen (ADVIL/MOTRIN) 600 MG tablet TAKE 1 TABLET BY MOUTH EVERY SIX HOURS AS NEEDED FOR PAIN     isosorbide mononitrate (IMDUR) 30 MG 24 hr tablet Take 1 tablet (30 mg) by mouth daily     ketotifen (ZADITOR/REFRESH ANTI-ITCH) 0.025 % ophthalmic solution Place 1 drop into  both eyes 2 times daily     melatonin 3 MG tablet Take 3 mg by mouth     melatonin 5 MG CAPS Take 1 capsule by mouth At Bedtime     metFORMIN (GLUCOPHAGE-XR) 500 MG 24 hr tablet Take 1 tablet (500 mg) by mouth daily Take with largest meal of the day.     MILK OF MAGNESIA 7.75 % suspension TAKE 30ML BY MOUTH DAILY AS NEEDED FOR CONSTIPATION     MUCINEX 600 MG 12 hr tablet TAKE 1 TABLET BY MOUTH TWICE A DAY     MYRBETRIQ 50 MG 24 hr tablet TAKE 1 TABLET BY MOUTH DAILY     order for DME Equipment being ordered: Nebulizer machine and adult tubing     order for DME Equipment being ordered: Dispense Coban wrap.     phenazopyridine (PYRIDIUM) 200 MG tablet Take 1 tablet (200 mg) by mouth 3 times daily as needed for irritation     polyethylene glycol (MIRALAX) 17 GM/Dose powder MIX 17 GRAMS IN 4-6OZ LIQUID AND DRINK BY MOUTH ONCE DAILY     polyethylene glycol-propylene glycol (SYSTANE) 0.4-0.3 % SOLN ophthalmic solution Place 1 drop into both eyes 4 times daily     ranitidine (ZANTAC) 150 MG tablet Take 150 mg by mouth 2 times daily     SPIRIVA HANDIHALER 18 MCG inhaled capsule INHALE CONTENTS OF ONE CAPSULE DAILY USING HANDIHALER DEVICE     tamsulosin (FLOMAX) 0.4 MG capsule TAKE 2 CAPSULES (0.8MG) BY MOUTH EVERY NIGHT AT BEDTIME     tolterodine ER (DETROL LA) 4 MG 24 hr capsule Take 1 capsule (4 mg) by mouth daily     traZODone (DESYREL) 50 MG tablet Take 1 tablet (50 mg) by mouth nightly as needed for sleep     No current facility-administered medications for this visit.      Facility-Administered Medications Ordered in Other Visits   Medication     iopamidol (ISOVUE-370) solution 111 mL       PAST SURGICAL HISTORY:  Past Surgical History:   Procedure Laterality Date     CHOLECYSTECTOMY       ENT SURGERY       IRRIGATION AND DEBRIDEMENT HIP, COMBINED  4/18/2013    Procedure: COMBINED IRRIGATION AND DEBRIDEMENT HIP;  Irrigation and debridement of Right Hip with cultures;  Surgeon: Cristal Inman MD;  Location:   OR     Partial pneumonectomy      done in [place] in the 1990's     skin grafting - leg wound  6/2016       ALLERGIES  Patient has no known allergies.    FAMILY HX:  Family History   Problem Relation Age of Onset     Family History Negative Mother        SOCIAL HX:  Social History     Socioeconomic History     Marital status:      Spouse name: Not on file     Number of children: Not on file     Years of education: Not on file     Highest education level: Not on file   Occupational History     Not on file   Social Needs     Financial resource strain: Not on file     Food insecurity     Worry: Not on file     Inability: Not on file     Transportation needs     Medical: Not on file     Non-medical: Not on file   Tobacco Use     Smoking status: Former Smoker     Smokeless tobacco: Never Used   Substance and Sexual Activity     Alcohol use: No     Drug use: No     Sexual activity: Yes     Partners: Female     Birth control/protection: None   Lifestyle     Physical activity     Days per week: Not on file     Minutes per session: Not on file     Stress: Not on file   Relationships     Social connections     Talks on phone: Not on file     Gets together: Not on file     Attends [employer]al service: Not on file     Active member of club or organization: Not on file     Attends meetings of clubs or organizations: Not on file     Relationship status: Not on file     Intimate partner violence     Fear of current or ex partner: Not on file     Emotionally abused: Not on file     Physically abused: Not on file     Forced sexual activity: Not on file   Other Topics Concern     Parent/sibling w/ CABG, MI or angioplasty before 65F 55M? Not Asked   Social History Narrative     Not on file       ROS:  Constitutional: No fever, chills, or sweats. No weight gain/loss.   HEENT: No visual disturbance, ear ache, epistaxis, sore throat.   Allergies/Immunologic: Negative.   Respiratory: No cough, hemoptysis.   Cardiovascular: As per  "HPI.   GI: No nausea, vomiting, hematemesis, melena, or hematochezia.   : No urinary frequency, dysuria, or hematuria.   Integument: No rash.   Psychiatric: No anxiety / depression.   Neuro: No speech disturbance, focal sensory or motor deficit.   Endocrinology: No polyuria / polyphagia.   Musculoskeletal: No myalgia.    VITAL SIGNS:  /79 (BP Location: Right arm, Patient Position: Chair, Cuff Size: Adult Regular)   Pulse 80   Ht 1.8 m (5' 10.87\")   Wt 79.2 kg (174 lb 9.6 oz)   SpO2 99%   BMI 24.44 kg/m    Body mass index is 24.44 kg/m .  Wt Readings from Last 2 Encounters:   10/29/20 85.8 kg (189 lb 1.6 oz)   20 89.6 kg (197 lb 8 oz)       PHYSICAL EXAM  Gallito Farley is a 87 year old male.in no acute distress.  HEENT: Eyes Nonicteric.  Neck: JVP 2cm H20.  Carotids +2 bilaterally without bruits.  Lungs: diminished right breath sounds.  Cor: RRR. Normal S1 and S2.  No murmur, rub, or gallop.  PMI in Lf 5th ICS.  Abd: Soft, nontender, nondistended.  NABS.  No pulsatile mass.  Extremities: No C/C/E.  Pulses +3/3 symmetric in upper and lower extremities.  Neuro: Grossly intact.  Psych: A&O x 3.  Skin: No rash.    LABS  Recent Labs   Lab Test 20  0756 10/02/18  1709   WBC 7.5 7.6   HGB 16.8 16.4   HCT 52.8 48.0    218     Recent Labs   Lab Test 20  0756 10/02/18  1709    135   POTASSIUM 3.7 3.7   CHLORIDE 100 98   CO2 29 26   BUN 13 16   CR 1.04 1.03   GFRESTIMATED 65 69     Recent Labs   Lab Test 13  0643   CHOL 143   HDL 20*   LDL 93   TRIG 148   CHOLHDLRATIO 7.1*       EK2020  Sinus rhythm with occasional ventricular-paced complexes  Left axis deviation  Non-specific intra-ventricular conduction block  Possible Lateral infarct , age undetermined  Inferior infarct, age undetermined    ECHO: 2017  Interpretation Summary  Global and regional left ventricular function is normal with an EF of 55-60%.  Mild diffuse hypokinesis is present.  Right ventricular " function, chamber size, wall motion, and thickness are normal.  Pulmonary artery systolic pressure is normal.  Dilation of the inferior vena cava is present with normal respiratory  variation in diameter. Estimated mean right atrial pressure is 3-8 mmHg.  No pericardial effusion is present.    CARDIAC MRI: None    CORONARY CTA: None    LEXISCAN STRESS TEST:  6/13/2019  Findings:  1. Overall quality of the study: Good.   2. Left ventricular cavity is not dilated on the rest and stress studies.  3. SPECT images demonstrate a moderate perfusion defect in the apical inferior segment and mild perfusion defects in the apical and mid inferior segments. There is reversibility of the apical and mid  inferior segments. These results suggest a mild post-scan likelihood  significant coronary artery disease. The summed stress score is 4.   4.  No appreciable wall motion abnormality of the left ventricle. Left  ventricular ejection fraction is 76%. Left ventricular end-diastolic  volume is 71 mL. End-systolic volume is 17 mL.  5. Baseline EKG and Stress findings reported separately in CVIS.  6. Right apical scarring and right upper lobectomy, presumably from  tuberculosis. No acute airspace opacities. Heart size normal. No  pericardial effusion. No pneumothorax or pleural effusion. The upper  abdomen is unremarkable. No worrisome bony or soft tissue abnormality.                                                                   Impression:  1. Mildly abnormal myocardial SPECT study with a summed stress score of 4. Question mild reversibility in the mid and apical inferior wall.  A summed stress score of 4 is associated with an annual event rate of 2.7 and 0.8 for myocardial infarction and cardiac death, respectively  (Alejandro. Circulation 1998;98:535-43).  2. Normal left ventricular ejection fraction..   3. Postoperative changes from right upper lobectomy..      CARDIAC CATH: None      VENOUS US, BILATERAL:   1/7/2020  IMPRESSION:  1. RIGHT LEG:       A. No superficial or deep venous thrombosis demonstrated.       B. No deep or superficial venous incompetency demonstrated.       C. No  or varicose veins demonstrated.   2. LEFT LEG:       A. No superficial or deep venous thrombosis demonstrated.       B. No deep or superficial venous incompetency demonstrated.       C. No  or varicose veins demonstrated.       ASSESSMENT AND PLAN:   1. Chest Discomfort, Resolved.  Patient had single episode of chest pain a year ago without recurrence.  His stress nuclear from 2019 showed a question of mild reversibility in the mid and apical inferior wall.  I spoke to the patient as it is unclear this episode was related to CAD.  He is not enthusiastic about undergoing additional testing and given his age, the single episode, and the prior Lexiscan, I think that is quite reasonable.   Treat for presumed CAD.    -- ASA 81 mg every day  -- Imdur 30 mg every day.  -- Norvasc 5 mg every day  -- Start Lipitor 10 mg every day.    2. CHB, s/p dual chamber pacemaker  -- F/U with EP, pacemaker checks.    3. HTN  -- Vasotec 10 mg every day  -- Norvasc 10 mg every day  -- Hydrochlorothiazide 12.5 mg every day    FOLLOW UP:  Primary care provider    Abilio Frank MD    Divisions of Cardiology  [facility]  Patient Care Team:  Hakeem Awad MD as PCP - General (Internal Medicine)  Lenin Farley MD Schuster, Joseph, DPM (Podiatry)  Jorge David RN as Lead Care Coordinator  Santosh Matthew MD as MD (Urology)  Kristal Delgado RN as Registered Nurse  Genevieve Chang NP as Nurse Practitioner (Nurse Practitioner - Adult Health)  Shane Hector PA as Assigned PCP  Maria G Friedman PA-C as Assigned Surgical Provider

## 2021-05-04 NOTE — PATIENT INSTRUCTIONS
It was a pleasure to see you in clinic today.  Please do not hesitate to call with any questions or concerns.  You are scheduled for a remote transmission on 8/5/21.  We look forward to seeing you in clinic at your next device check in 12 months.    Valentino Jaffe, RN  Electrophysiology Nurse Clinician  [facility]    During Business Hours Please Call:  [phone]  After Hours Please Call:  [phone] - select option #4 and ask for job code [phone]

## 2021-05-04 NOTE — PATIENT INSTRUCTIONS
It was a pleasure to see you in the cardiology clinic today.    If you have any questions, you can reach my nurse, Elza Gil, at [phone].  Press Option #1 for the [facility], and then press Option #f 4 or nursing.    Note the new medications: Lipitor 10 mg every day      Stop the following medications: None    The results from today include: None    Tests ordered today: None    I would like you to follow up with your primary care provider as needed.      Sincerely,      Abilio Frank MD     [facility]

## 2021-05-10 LAB
MDC_IDC_LEAD_IMPLANT_DT: NORMAL
MDC_IDC_LEAD_IMPLANT_DT: NORMAL
MDC_IDC_LEAD_LOCATION: NORMAL
MDC_IDC_LEAD_LOCATION: NORMAL
MDC_IDC_LEAD_LOCATION_DETAIL_1: NORMAL
MDC_IDC_LEAD_LOCATION_DETAIL_1: NORMAL
MDC_IDC_LEAD_MFG: NORMAL
MDC_IDC_LEAD_MFG: NORMAL
MDC_IDC_LEAD_MODEL: NORMAL
MDC_IDC_LEAD_MODEL: NORMAL
MDC_IDC_LEAD_POLARITY_TYPE: NORMAL
MDC_IDC_LEAD_POLARITY_TYPE: NORMAL
MDC_IDC_LEAD_SERIAL: NORMAL
MDC_IDC_LEAD_SERIAL: NORMAL
MDC_IDC_MSMT_BATTERY_DTM: NORMAL
MDC_IDC_MSMT_BATTERY_REMAINING_LONGEVITY: 65 MO
MDC_IDC_MSMT_BATTERY_RRT_TRIGGER: 2.83
MDC_IDC_MSMT_BATTERY_STATUS: NORMAL
MDC_IDC_MSMT_BATTERY_VOLTAGE: 3 V
MDC_IDC_MSMT_LEADCHNL_RA_IMPEDANCE_VALUE: 437 OHM
MDC_IDC_MSMT_LEADCHNL_RA_IMPEDANCE_VALUE: 551 OHM
MDC_IDC_MSMT_LEADCHNL_RA_PACING_THRESHOLD_AMPLITUDE: 0.5 V
MDC_IDC_MSMT_LEADCHNL_RA_PACING_THRESHOLD_PULSEWIDTH: 0.4 MS
MDC_IDC_MSMT_LEADCHNL_RA_SENSING_INTR_AMPL: 2.8 MV
MDC_IDC_MSMT_LEADCHNL_RV_IMPEDANCE_VALUE: 627 OHM
MDC_IDC_MSMT_LEADCHNL_RV_IMPEDANCE_VALUE: 646 OHM
MDC_IDC_MSMT_LEADCHNL_RV_PACING_THRESHOLD_AMPLITUDE: 0.75 V
MDC_IDC_MSMT_LEADCHNL_RV_PACING_THRESHOLD_PULSEWIDTH: 0.4 MS
MDC_IDC_PG_IMPLANT_DTM: NORMAL
MDC_IDC_PG_MFG: NORMAL
MDC_IDC_PG_MODEL: NORMAL
MDC_IDC_PG_SERIAL: NORMAL
MDC_IDC_PG_TYPE: NORMAL
MDC_IDC_SESS_CLINIC_NAME: NORMAL
MDC_IDC_SESS_DTM: NORMAL
MDC_IDC_SESS_TYPE: NORMAL
MDC_IDC_SET_BRADY_AT_MODE_SWITCH_RATE: 171 {BEATS}/MIN
MDC_IDC_SET_BRADY_HYSTRATE: NORMAL
MDC_IDC_SET_BRADY_LOWRATE: 60 {BEATS}/MIN
MDC_IDC_SET_BRADY_MAX_SENSOR_RATE: 120 {BEATS}/MIN
MDC_IDC_SET_BRADY_MAX_TRACKING_RATE: 120 {BEATS}/MIN
MDC_IDC_SET_BRADY_MODE: NORMAL
MDC_IDC_SET_BRADY_PAV_DELAY_LOW: 180 MS
MDC_IDC_SET_BRADY_SAV_DELAY_LOW: 150 MS
MDC_IDC_SET_LEADCHNL_RA_PACING_AMPLITUDE: 1.5 V
MDC_IDC_SET_LEADCHNL_RA_PACING_ANODE_ELECTRODE_1: NORMAL
MDC_IDC_SET_LEADCHNL_RA_PACING_ANODE_LOCATION_1: NORMAL
MDC_IDC_SET_LEADCHNL_RA_PACING_CAPTURE_MODE: NORMAL
MDC_IDC_SET_LEADCHNL_RA_PACING_CATHODE_ELECTRODE_1: NORMAL
MDC_IDC_SET_LEADCHNL_RA_PACING_CATHODE_LOCATION_1: NORMAL
MDC_IDC_SET_LEADCHNL_RA_PACING_POLARITY: NORMAL
MDC_IDC_SET_LEADCHNL_RA_PACING_PULSEWIDTH: 0.4 MS
MDC_IDC_SET_LEADCHNL_RA_SENSING_ANODE_ELECTRODE_1: NORMAL
MDC_IDC_SET_LEADCHNL_RA_SENSING_ANODE_LOCATION_1: NORMAL
MDC_IDC_SET_LEADCHNL_RA_SENSING_CATHODE_ELECTRODE_1: NORMAL
MDC_IDC_SET_LEADCHNL_RA_SENSING_CATHODE_LOCATION_1: NORMAL
MDC_IDC_SET_LEADCHNL_RA_SENSING_POLARITY: NORMAL
MDC_IDC_SET_LEADCHNL_RA_SENSING_SENSITIVITY: 0.6 MV
MDC_IDC_SET_LEADCHNL_RV_PACING_AMPLITUDE: 2 V
MDC_IDC_SET_LEADCHNL_RV_PACING_ANODE_ELECTRODE_1: NORMAL
MDC_IDC_SET_LEADCHNL_RV_PACING_ANODE_LOCATION_1: NORMAL
MDC_IDC_SET_LEADCHNL_RV_PACING_CAPTURE_MODE: NORMAL
MDC_IDC_SET_LEADCHNL_RV_PACING_CATHODE_ELECTRODE_1: NORMAL
MDC_IDC_SET_LEADCHNL_RV_PACING_CATHODE_LOCATION_1: NORMAL
MDC_IDC_SET_LEADCHNL_RV_PACING_POLARITY: NORMAL
MDC_IDC_SET_LEADCHNL_RV_PACING_PULSEWIDTH: 0.4 MS
MDC_IDC_SET_LEADCHNL_RV_SENSING_ANODE_ELECTRODE_1: NORMAL
MDC_IDC_SET_LEADCHNL_RV_SENSING_ANODE_LOCATION_1: NORMAL
MDC_IDC_SET_LEADCHNL_RV_SENSING_CATHODE_ELECTRODE_1: NORMAL
MDC_IDC_SET_LEADCHNL_RV_SENSING_CATHODE_LOCATION_1: NORMAL
MDC_IDC_SET_LEADCHNL_RV_SENSING_POLARITY: NORMAL
MDC_IDC_SET_LEADCHNL_RV_SENSING_SENSITIVITY: 0.9 MV
MDC_IDC_SET_ZONE_DETECTION_INTERVAL: 350 MS
MDC_IDC_SET_ZONE_DETECTION_INTERVAL: 400 MS
MDC_IDC_SET_ZONE_TYPE: NORMAL
MDC_IDC_STAT_AT_BURDEN_PERCENT: 0 %
MDC_IDC_STAT_AT_DTM_END: NORMAL
MDC_IDC_STAT_AT_DTM_START: NORMAL
MDC_IDC_STAT_BRADY_AP_VP_PERCENT: 14.6 %
MDC_IDC_STAT_BRADY_AP_VS_PERCENT: 0 %
MDC_IDC_STAT_BRADY_AS_VP_PERCENT: 85.38 %
MDC_IDC_STAT_BRADY_AS_VS_PERCENT: 0.01 %
MDC_IDC_STAT_BRADY_DTM_END: NORMAL
MDC_IDC_STAT_BRADY_DTM_START: NORMAL
MDC_IDC_STAT_BRADY_RA_PERCENT_PACED: 14.6 %
MDC_IDC_STAT_BRADY_RV_PERCENT_PACED: 99.98 %
MDC_IDC_STAT_EPISODE_RECENT_COUNT: 0
MDC_IDC_STAT_EPISODE_RECENT_COUNT_DTM_END: NORMAL
MDC_IDC_STAT_EPISODE_RECENT_COUNT_DTM_START: NORMAL
MDC_IDC_STAT_EPISODE_TOTAL_COUNT: 0
MDC_IDC_STAT_EPISODE_TOTAL_COUNT: 9
MDC_IDC_STAT_EPISODE_TOTAL_COUNT_DTM_END: NORMAL
MDC_IDC_STAT_EPISODE_TOTAL_COUNT_DTM_START: NORMAL
MDC_IDC_STAT_EPISODE_TYPE: NORMAL

## 2021-07-29 ENCOUNTER — MYC MEDICAL ADVICE (OUTPATIENT)
Dept: FAMILY MEDICINE | Facility: CLINIC | Age: 86
End: 2021-07-29

## 2021-08-09 ENCOUNTER — ANCILLARY PROCEDURE (OUTPATIENT)
Dept: CARDIOLOGY | Facility: CLINIC | Age: 86
End: 2021-08-09
Attending: INTERNAL MEDICINE
Payer: COMMERCIAL

## 2021-08-09 DIAGNOSIS — I44.1 ATRIOVENTRICULAR BLOCK, SECOND DEGREE: ICD-10-CM

## 2021-08-09 PROCEDURE — 93294 REM INTERROG EVL PM/LDLS PM: CPT | Performed by: INTERNAL MEDICINE

## 2021-08-09 PROCEDURE — 93296 REM INTERROG EVL PM/IDS: CPT

## 2021-08-13 ENCOUNTER — PATIENT OUTREACH (OUTPATIENT)
Dept: GERIATRIC MEDICINE | Facility: CLINIC | Age: 86
End: 2021-08-13

## 2021-08-13 NOTE — PROGRESS NOTES
[facility] Care Coordination Contact      [facility] Six-Month Telephone Assessment    6 month telephone assessment completed on 8/13/21.    ER visits: No  Hospitalizations: No  TCU stays: No  Significant health status changes: none  Falls/Injuries: No  ADL/IADL changes: No  Changes in services: No    Caregiver Assessment follow up:  N/A    Goals: See POC in chart for goal progress documentation.      Will see member in 6 months for an annual health risk assessment.   Encouraged member to call CC with any questions or concerns in the meantime.     Jorge David RN, PHN  [facility]

## 2021-08-31 LAB
MDC_IDC_LEAD_IMPLANT_DT: NORMAL
MDC_IDC_LEAD_IMPLANT_DT: NORMAL
MDC_IDC_LEAD_LOCATION: NORMAL
MDC_IDC_LEAD_LOCATION: NORMAL
MDC_IDC_LEAD_LOCATION_DETAIL_1: NORMAL
MDC_IDC_LEAD_LOCATION_DETAIL_1: NORMAL
MDC_IDC_LEAD_MFG: NORMAL
MDC_IDC_LEAD_MFG: NORMAL
MDC_IDC_LEAD_MODEL: NORMAL
MDC_IDC_LEAD_MODEL: NORMAL
MDC_IDC_LEAD_POLARITY_TYPE: NORMAL
MDC_IDC_LEAD_POLARITY_TYPE: NORMAL
MDC_IDC_LEAD_SERIAL: NORMAL
MDC_IDC_LEAD_SERIAL: NORMAL
MDC_IDC_MSMT_BATTERY_DTM: NORMAL
MDC_IDC_MSMT_BATTERY_REMAINING_LONGEVITY: 66 MO
MDC_IDC_MSMT_BATTERY_RRT_TRIGGER: 2.83
MDC_IDC_MSMT_BATTERY_STATUS: NORMAL
MDC_IDC_MSMT_BATTERY_VOLTAGE: 3 V
MDC_IDC_MSMT_LEADCHNL_RA_IMPEDANCE_VALUE: 399 OHM
MDC_IDC_MSMT_LEADCHNL_RA_IMPEDANCE_VALUE: 494 OHM
MDC_IDC_MSMT_LEADCHNL_RA_PACING_THRESHOLD_AMPLITUDE: 0.62 V
MDC_IDC_MSMT_LEADCHNL_RA_PACING_THRESHOLD_PULSEWIDTH: 0.4 MS
MDC_IDC_MSMT_LEADCHNL_RA_SENSING_INTR_AMPL: 2.5 MV
MDC_IDC_MSMT_LEADCHNL_RA_SENSING_INTR_AMPL: 2.5 MV
MDC_IDC_MSMT_LEADCHNL_RV_IMPEDANCE_VALUE: 570 OHM
MDC_IDC_MSMT_LEADCHNL_RV_IMPEDANCE_VALUE: 589 OHM
MDC_IDC_MSMT_LEADCHNL_RV_PACING_THRESHOLD_AMPLITUDE: 0.75 V
MDC_IDC_MSMT_LEADCHNL_RV_PACING_THRESHOLD_PULSEWIDTH: 0.4 MS
MDC_IDC_MSMT_LEADCHNL_RV_SENSING_INTR_AMPL: 18.88 MV
MDC_IDC_MSMT_LEADCHNL_RV_SENSING_INTR_AMPL: 18.88 MV
MDC_IDC_PG_IMPLANT_DTM: NORMAL
MDC_IDC_PG_MFG: NORMAL
MDC_IDC_PG_MODEL: NORMAL
MDC_IDC_PG_SERIAL: NORMAL
MDC_IDC_PG_TYPE: NORMAL
MDC_IDC_SESS_CLINIC_NAME: NORMAL
MDC_IDC_SESS_DTM: NORMAL
MDC_IDC_SESS_TYPE: NORMAL
MDC_IDC_SET_BRADY_AT_MODE_SWITCH_RATE: 171 {BEATS}/MIN
MDC_IDC_SET_BRADY_HYSTRATE: NORMAL
MDC_IDC_SET_BRADY_LOWRATE: 60 {BEATS}/MIN
MDC_IDC_SET_BRADY_MAX_SENSOR_RATE: 120 {BEATS}/MIN
MDC_IDC_SET_BRADY_MAX_TRACKING_RATE: 120 {BEATS}/MIN
MDC_IDC_SET_BRADY_MODE: NORMAL
MDC_IDC_SET_BRADY_PAV_DELAY_LOW: 180 MS
MDC_IDC_SET_BRADY_SAV_DELAY_LOW: 150 MS
MDC_IDC_SET_LEADCHNL_RA_PACING_AMPLITUDE: 1.5 V
MDC_IDC_SET_LEADCHNL_RA_PACING_ANODE_ELECTRODE_1: NORMAL
MDC_IDC_SET_LEADCHNL_RA_PACING_ANODE_LOCATION_1: NORMAL
MDC_IDC_SET_LEADCHNL_RA_PACING_CAPTURE_MODE: NORMAL
MDC_IDC_SET_LEADCHNL_RA_PACING_CATHODE_ELECTRODE_1: NORMAL
MDC_IDC_SET_LEADCHNL_RA_PACING_CATHODE_LOCATION_1: NORMAL
MDC_IDC_SET_LEADCHNL_RA_PACING_POLARITY: NORMAL
MDC_IDC_SET_LEADCHNL_RA_PACING_PULSEWIDTH: 0.4 MS
MDC_IDC_SET_LEADCHNL_RA_SENSING_ANODE_ELECTRODE_1: NORMAL
MDC_IDC_SET_LEADCHNL_RA_SENSING_ANODE_LOCATION_1: NORMAL
MDC_IDC_SET_LEADCHNL_RA_SENSING_CATHODE_ELECTRODE_1: NORMAL
MDC_IDC_SET_LEADCHNL_RA_SENSING_CATHODE_LOCATION_1: NORMAL
MDC_IDC_SET_LEADCHNL_RA_SENSING_POLARITY: NORMAL
MDC_IDC_SET_LEADCHNL_RA_SENSING_SENSITIVITY: 0.6 MV
MDC_IDC_SET_LEADCHNL_RV_PACING_AMPLITUDE: 2 V
MDC_IDC_SET_LEADCHNL_RV_PACING_ANODE_ELECTRODE_1: NORMAL
MDC_IDC_SET_LEADCHNL_RV_PACING_ANODE_LOCATION_1: NORMAL
MDC_IDC_SET_LEADCHNL_RV_PACING_CAPTURE_MODE: NORMAL
MDC_IDC_SET_LEADCHNL_RV_PACING_CATHODE_ELECTRODE_1: NORMAL
MDC_IDC_SET_LEADCHNL_RV_PACING_CATHODE_LOCATION_1: NORMAL
MDC_IDC_SET_LEADCHNL_RV_PACING_POLARITY: NORMAL
MDC_IDC_SET_LEADCHNL_RV_PACING_PULSEWIDTH: 0.4 MS
MDC_IDC_SET_LEADCHNL_RV_SENSING_ANODE_ELECTRODE_1: NORMAL
MDC_IDC_SET_LEADCHNL_RV_SENSING_ANODE_LOCATION_1: NORMAL
MDC_IDC_SET_LEADCHNL_RV_SENSING_CATHODE_ELECTRODE_1: NORMAL
MDC_IDC_SET_LEADCHNL_RV_SENSING_CATHODE_LOCATION_1: NORMAL
MDC_IDC_SET_LEADCHNL_RV_SENSING_POLARITY: NORMAL
MDC_IDC_SET_LEADCHNL_RV_SENSING_SENSITIVITY: 0.9 MV
MDC_IDC_SET_ZONE_DETECTION_INTERVAL: 350 MS
MDC_IDC_SET_ZONE_DETECTION_INTERVAL: 400 MS
MDC_IDC_SET_ZONE_TYPE: NORMAL
MDC_IDC_STAT_AT_BURDEN_PERCENT: 0 %
MDC_IDC_STAT_AT_DTM_END: NORMAL
MDC_IDC_STAT_AT_DTM_START: NORMAL
MDC_IDC_STAT_BRADY_AP_VP_PERCENT: 18.05 %
MDC_IDC_STAT_BRADY_AP_VS_PERCENT: 0 %
MDC_IDC_STAT_BRADY_AS_VP_PERCENT: 81.95 %
MDC_IDC_STAT_BRADY_AS_VS_PERCENT: 0 %
MDC_IDC_STAT_BRADY_DTM_END: NORMAL
MDC_IDC_STAT_BRADY_DTM_START: NORMAL
MDC_IDC_STAT_BRADY_RA_PERCENT_PACED: 18.04 %
MDC_IDC_STAT_BRADY_RV_PERCENT_PACED: 99.99 %
MDC_IDC_STAT_EPISODE_RECENT_COUNT: 0
MDC_IDC_STAT_EPISODE_RECENT_COUNT_DTM_END: NORMAL
MDC_IDC_STAT_EPISODE_RECENT_COUNT_DTM_START: NORMAL
MDC_IDC_STAT_EPISODE_TOTAL_COUNT: 0
MDC_IDC_STAT_EPISODE_TOTAL_COUNT: 9
MDC_IDC_STAT_EPISODE_TOTAL_COUNT_DTM_END: NORMAL
MDC_IDC_STAT_EPISODE_TOTAL_COUNT_DTM_START: NORMAL
MDC_IDC_STAT_EPISODE_TYPE: NORMAL

## 2021-09-19 ENCOUNTER — HEALTH MAINTENANCE LETTER (OUTPATIENT)
Age: 86
End: 2021-09-19

## 2021-10-08 DIAGNOSIS — I25.118 CORONARY ARTERY DISEASE OF NATIVE ARTERY OF NATIVE HEART WITH STABLE ANGINA PECTORIS (H): ICD-10-CM

## 2021-10-12 RX ORDER — ISOSORBIDE MONONITRATE 30 MG/1
30 TABLET, EXTENDED RELEASE ORAL DAILY
Qty: 30 TABLET | Refills: 11 | Status: SHIPPED | OUTPATIENT
Start: 2021-10-12 | End: 2022-10-05

## 2021-10-12 NOTE — TELEPHONE ENCOUNTER
aspirin  81 MG    IMDUR 30 MG  Last Written Prescription Date:  4/5/21  Last Fill Quantity: 28,   # refills: 6  Last Office Visit : 5/4/21  Future Office visit:  NONE  Drug not on the refill protocol       IMDUR 30 MG  Last Written Prescription Date:  4/5/21  Last Fill Quantity: 28,   # refills: 6

## 2021-11-12 ENCOUNTER — ANCILLARY PROCEDURE (OUTPATIENT)
Dept: CARDIOLOGY | Facility: CLINIC | Age: 86
End: 2021-11-12
Attending: INTERNAL MEDICINE
Payer: COMMERCIAL

## 2021-11-12 DIAGNOSIS — Z95.0 CARDIAC PACEMAKER IN SITU: ICD-10-CM

## 2021-11-12 DIAGNOSIS — I44.1 ATRIOVENTRICULAR BLOCK, SECOND DEGREE: ICD-10-CM

## 2021-11-12 PROCEDURE — 93294 REM INTERROG EVL PM/LDLS PM: CPT | Performed by: INTERNAL MEDICINE

## 2021-11-12 PROCEDURE — 93296 REM INTERROG EVL PM/IDS: CPT

## 2021-11-30 LAB
MDC_IDC_LEAD_IMPLANT_DT: NORMAL
MDC_IDC_LEAD_IMPLANT_DT: NORMAL
MDC_IDC_LEAD_LOCATION: NORMAL
MDC_IDC_LEAD_LOCATION: NORMAL
MDC_IDC_LEAD_LOCATION_DETAIL_1: NORMAL
MDC_IDC_LEAD_LOCATION_DETAIL_1: NORMAL
MDC_IDC_LEAD_MFG: NORMAL
MDC_IDC_LEAD_MFG: NORMAL
MDC_IDC_LEAD_MODEL: NORMAL
MDC_IDC_LEAD_MODEL: NORMAL
MDC_IDC_LEAD_POLARITY_TYPE: NORMAL
MDC_IDC_LEAD_POLARITY_TYPE: NORMAL
MDC_IDC_LEAD_SERIAL: NORMAL
MDC_IDC_LEAD_SERIAL: NORMAL
MDC_IDC_MSMT_BATTERY_DTM: NORMAL
MDC_IDC_MSMT_BATTERY_REMAINING_LONGEVITY: 57 MO
MDC_IDC_MSMT_BATTERY_RRT_TRIGGER: 2.83
MDC_IDC_MSMT_BATTERY_STATUS: NORMAL
MDC_IDC_MSMT_BATTERY_VOLTAGE: 2.99 V
MDC_IDC_MSMT_LEADCHNL_RA_IMPEDANCE_VALUE: 361 OHM
MDC_IDC_MSMT_LEADCHNL_RA_IMPEDANCE_VALUE: 456 OHM
MDC_IDC_MSMT_LEADCHNL_RA_PACING_THRESHOLD_AMPLITUDE: 0.62 V
MDC_IDC_MSMT_LEADCHNL_RA_PACING_THRESHOLD_PULSEWIDTH: 0.4 MS
MDC_IDC_MSMT_LEADCHNL_RA_SENSING_INTR_AMPL: 2.62 MV
MDC_IDC_MSMT_LEADCHNL_RA_SENSING_INTR_AMPL: 2.62 MV
MDC_IDC_MSMT_LEADCHNL_RV_IMPEDANCE_VALUE: 532 OHM
MDC_IDC_MSMT_LEADCHNL_RV_IMPEDANCE_VALUE: 570 OHM
MDC_IDC_MSMT_LEADCHNL_RV_PACING_THRESHOLD_AMPLITUDE: 0.62 V
MDC_IDC_MSMT_LEADCHNL_RV_PACING_THRESHOLD_PULSEWIDTH: 0.4 MS
MDC_IDC_MSMT_LEADCHNL_RV_SENSING_INTR_AMPL: 19.5 MV
MDC_IDC_MSMT_LEADCHNL_RV_SENSING_INTR_AMPL: 19.5 MV
MDC_IDC_PG_IMPLANT_DTM: NORMAL
MDC_IDC_PG_MFG: NORMAL
MDC_IDC_PG_MODEL: NORMAL
MDC_IDC_PG_SERIAL: NORMAL
MDC_IDC_PG_TYPE: NORMAL
MDC_IDC_SESS_CLINIC_NAME: NORMAL
MDC_IDC_SESS_DTM: NORMAL
MDC_IDC_SESS_TYPE: NORMAL
MDC_IDC_SET_BRADY_AT_MODE_SWITCH_RATE: 171 {BEATS}/MIN
MDC_IDC_SET_BRADY_HYSTRATE: NORMAL
MDC_IDC_SET_BRADY_LOWRATE: 60 {BEATS}/MIN
MDC_IDC_SET_BRADY_MAX_SENSOR_RATE: 120 {BEATS}/MIN
MDC_IDC_SET_BRADY_MAX_TRACKING_RATE: 120 {BEATS}/MIN
MDC_IDC_SET_BRADY_MODE: NORMAL
MDC_IDC_SET_BRADY_PAV_DELAY_LOW: 180 MS
MDC_IDC_SET_BRADY_SAV_DELAY_LOW: 150 MS
MDC_IDC_SET_LEADCHNL_RA_PACING_AMPLITUDE: 1.5 V
MDC_IDC_SET_LEADCHNL_RA_PACING_ANODE_ELECTRODE_1: NORMAL
MDC_IDC_SET_LEADCHNL_RA_PACING_ANODE_LOCATION_1: NORMAL
MDC_IDC_SET_LEADCHNL_RA_PACING_CAPTURE_MODE: NORMAL
MDC_IDC_SET_LEADCHNL_RA_PACING_CATHODE_ELECTRODE_1: NORMAL
MDC_IDC_SET_LEADCHNL_RA_PACING_CATHODE_LOCATION_1: NORMAL
MDC_IDC_SET_LEADCHNL_RA_PACING_POLARITY: NORMAL
MDC_IDC_SET_LEADCHNL_RA_PACING_PULSEWIDTH: 0.4 MS
MDC_IDC_SET_LEADCHNL_RA_SENSING_ANODE_ELECTRODE_1: NORMAL
MDC_IDC_SET_LEADCHNL_RA_SENSING_ANODE_LOCATION_1: NORMAL
MDC_IDC_SET_LEADCHNL_RA_SENSING_CATHODE_ELECTRODE_1: NORMAL
MDC_IDC_SET_LEADCHNL_RA_SENSING_CATHODE_LOCATION_1: NORMAL
MDC_IDC_SET_LEADCHNL_RA_SENSING_POLARITY: NORMAL
MDC_IDC_SET_LEADCHNL_RA_SENSING_SENSITIVITY: 0.6 MV
MDC_IDC_SET_LEADCHNL_RV_PACING_AMPLITUDE: 2 V
MDC_IDC_SET_LEADCHNL_RV_PACING_ANODE_ELECTRODE_1: NORMAL
MDC_IDC_SET_LEADCHNL_RV_PACING_ANODE_LOCATION_1: NORMAL
MDC_IDC_SET_LEADCHNL_RV_PACING_CAPTURE_MODE: NORMAL
MDC_IDC_SET_LEADCHNL_RV_PACING_CATHODE_ELECTRODE_1: NORMAL
MDC_IDC_SET_LEADCHNL_RV_PACING_CATHODE_LOCATION_1: NORMAL
MDC_IDC_SET_LEADCHNL_RV_PACING_POLARITY: NORMAL
MDC_IDC_SET_LEADCHNL_RV_PACING_PULSEWIDTH: 0.4 MS
MDC_IDC_SET_LEADCHNL_RV_SENSING_ANODE_ELECTRODE_1: NORMAL
MDC_IDC_SET_LEADCHNL_RV_SENSING_ANODE_LOCATION_1: NORMAL
MDC_IDC_SET_LEADCHNL_RV_SENSING_CATHODE_ELECTRODE_1: NORMAL
MDC_IDC_SET_LEADCHNL_RV_SENSING_CATHODE_LOCATION_1: NORMAL
MDC_IDC_SET_LEADCHNL_RV_SENSING_POLARITY: NORMAL
MDC_IDC_SET_LEADCHNL_RV_SENSING_SENSITIVITY: 0.9 MV
MDC_IDC_SET_ZONE_DETECTION_INTERVAL: 350 MS
MDC_IDC_SET_ZONE_DETECTION_INTERVAL: 400 MS
MDC_IDC_SET_ZONE_TYPE: NORMAL
MDC_IDC_STAT_AT_BURDEN_PERCENT: 0 %
MDC_IDC_STAT_AT_DTM_END: NORMAL
MDC_IDC_STAT_AT_DTM_START: NORMAL
MDC_IDC_STAT_BRADY_AP_VP_PERCENT: 19.76 %
MDC_IDC_STAT_BRADY_AP_VS_PERCENT: 0 %
MDC_IDC_STAT_BRADY_AS_VP_PERCENT: 80.21 %
MDC_IDC_STAT_BRADY_AS_VS_PERCENT: 0.04 %
MDC_IDC_STAT_BRADY_DTM_END: NORMAL
MDC_IDC_STAT_BRADY_DTM_START: NORMAL
MDC_IDC_STAT_BRADY_RA_PERCENT_PACED: 19.73 %
MDC_IDC_STAT_BRADY_RV_PERCENT_PACED: 99.89 %
MDC_IDC_STAT_EPISODE_RECENT_COUNT: 0
MDC_IDC_STAT_EPISODE_RECENT_COUNT_DTM_END: NORMAL
MDC_IDC_STAT_EPISODE_RECENT_COUNT_DTM_START: NORMAL
MDC_IDC_STAT_EPISODE_TOTAL_COUNT: 0
MDC_IDC_STAT_EPISODE_TOTAL_COUNT: 9
MDC_IDC_STAT_EPISODE_TOTAL_COUNT_DTM_END: NORMAL
MDC_IDC_STAT_EPISODE_TOTAL_COUNT_DTM_START: NORMAL
MDC_IDC_STAT_EPISODE_TYPE: NORMAL

## 2021-12-03 DIAGNOSIS — F51.01 PRIMARY INSOMNIA: ICD-10-CM

## 2021-12-03 RX ORDER — TRAZODONE HYDROCHLORIDE 50 MG/1
TABLET, FILM COATED ORAL
Qty: 30 TABLET | Refills: 1 | Status: SHIPPED | OUTPATIENT
Start: 2021-12-03 | End: 2023-08-23

## 2021-12-03 NOTE — TELEPHONE ENCOUNTER
Prescription approved per [facility] Refill Protocol.  Marisela Simmons RN, BSN   [facility] [place]

## 2021-12-23 ENCOUNTER — PATIENT OUTREACH (OUTPATIENT)
Dept: GERIATRIC MEDICINE | Facility: CLINIC | Age: 86
End: 2021-12-23
Payer: COMMERCIAL

## 2021-12-23 NOTE — PROGRESS NOTES
[facility] Care Coordination Contact    [facility] Home Visit Assessment     Home visit for Health Risk Assessment with Gallito GERARDO Norriselie completed on December 23, 2021    Type of residence:: Assisted living  Current living arrangement:: I live in assisted living     Assessment completed with:: Patient,Caregiver - Joe    Current Care Plan  Member currently receiving the following home care services:     Member currently receiving the following community resources: Other (see comment) (Assisted Living), monthly incontenence.    Medication Review  Medication reconciliation completed in Epic: Yes  Medication set-up & administration: RN set up weekly.  Assisting Living staff administers medications.  Medication Risk Assessment Medication (1 or more, place referral to MTM): N/A: No risk factors identified  MTM Referral Placed: No: No risk factors idenified    Mental/Behavioral Health   Depression Screening:           Mental health DX:: No        Falls Assessment:            ADL/IADL Dependencies:   Dependent ADLs:: Ambulation-walker,Bathing,Dressing,Grooming,Incontinence,Positioning,Transfers,Toileting  Dependent IADLs:: Cleaning,Cooking,Laundry,Shopping,Meal Preparation,Medication Management,Money Management,Transportation,Incontinence    [facility] Plan sponsored benefits: Shared information re: Silver Sneakers/gym memberships, ASA, Calcium +D.    PCA Assessment completed at visit: Not Applicable     Elderly Waiver Eligibility: Yes-will continue on EW    Care Plan & Recommendations: CL Tool, monthly incontinence supplies - updated to received 5 packs/mo of wipes.     See [facility] for detailed assessment information.    Follow-Up Plan: Member informed of future contact, plan to f/u with member with a 6 month telephone assessment.  Contact information shared with member and family, encouraged member to call with any questions or concerns at any time.    [place] care continuum providers: Please refer to [facility]  Care Home on the [facility] Problem List to view this patient's [facility] Care Plan Summary.    Jorge David RN, PHN  [facility]

## 2021-12-27 ENCOUNTER — TELEPHONE (OUTPATIENT)
Dept: FAMILY MEDICINE | Facility: CLINIC | Age: 86
End: 2021-12-27
Payer: COMMERCIAL

## 2021-12-27 NOTE — TELEPHONE ENCOUNTER
Home care RNDenisse calling to report symptoms from her visit today.     Patient has had a productive cough x 2 days. Phlegm is white- yellowish in color. Patient was short of breath 2 days ago after coughing but has not been since. Patient is refusing food today. Crackles were heard in lungs but cleared after coughing. BP today: 82/57 which is low for him. Temp: 96.6. Denies chills, chest pain, numbness. Patient is having some weakness. Patient has COPD.    Routing to provider to advise.  Norma STAHLN, RN

## 2021-12-30 ENCOUNTER — TELEPHONE (OUTPATIENT)
Dept: FAMILY MEDICINE | Facility: CLINIC | Age: 86
End: 2021-12-30
Payer: COMMERCIAL

## 2021-12-30 NOTE — PROGRESS NOTES
12/30/21: Care Coordinator received approval from Joe of CL tool.  Will complete and submit.     Jorge David RN, PHN  [facility]

## 2021-12-30 NOTE — TELEPHONE ENCOUNTER
GUNJAN Love is calling with an update. She called on Monday reporting a cough and low BP.  They never received a call back and so they called 911. EMS did not see a need to bring him in. Today his BP went up and the cough is better as of today. Thank you. Ara Alcala R.N.

## 2022-01-03 ENCOUNTER — PATIENT OUTREACH (OUTPATIENT)
Dept: GERIATRIC MEDICINE | Facility: CLINIC | Age: 87
End: 2022-01-03
Payer: COMMERCIAL

## 2022-01-03 NOTE — LETTER
January 3, 2022      GALLITO ALBRIGHT  [address] [place] [address]      Dear Gallito:    At [facility], we are dedicated to improving your health and well-being. Enclosed is the Comprehensive Care Plan that we developed with you on 12/21/2021. Please review the Care Plan carefully.    As a reminder, some of the things we discussed at your visit include:    Your physical and mental health    Ways to reduce falls    Health care needs you may have    Don t forget to contact your care coordinator if you:    Have been hospitalized or plan to be hospitalized     Have had a fall     Have experienced a change in physical health    Are experiencing emotional problems     If you do not agree with your Care Plan, have questions about it, or have experienced a change in your needs, please call me at [phone]. If you are hearing impaired, please call the [place] [employer] at 824 or [phone] ([email or web address] relay service).    Sincerely,    Jorge David RN, PHN    E-mail: [email or web address]  Phone: [phone]      [facility] ([facility]) is a health plan that contracts with both Medicare and the [place] Medical Assistance (Medicaid) program to provide benefits of both programs to enrollees. Enrollment in [facility] depends on contract renewal.    [id number])     [id number] (11/18)

## 2022-01-03 NOTE — PROGRESS NOTES
[facility] Care Coordination Contact    Received after visit chart from care coordinator.  Completed following tasks: Mailed copy of care plan to client, Updated services in access and Submitted referrals/auths for AL, wipes & gloves  , Mailed copy of CL tool to member, faxed copy to AL facility, and submitted authorization to health plan.   and Provider Signature - Full Care Plan:  Member indicates that they would like their POC shared with the following EW providers:  [facility]..  Letter and full POC faxed to providers for signature.     Mailed POC sig page to member to sign and return in SASE.     Nancy Pham  Care Management Specialist   [facility]   [phone]

## 2022-01-09 ENCOUNTER — HEALTH MAINTENANCE LETTER (OUTPATIENT)
Age: 87
End: 2022-01-09

## 2022-01-11 ENCOUNTER — TELEPHONE (OUTPATIENT)
Dept: FAMILY MEDICINE | Facility: CLINIC | Age: 87
End: 2022-01-11
Payer: COMMERCIAL

## 2022-01-11 NOTE — TELEPHONE ENCOUNTER
.Reason for Call:  Form, our goal is to have forms completed with 72 hours, however, some forms may require a visit or additional information.    Type of letter, form or note:  home health form    Who is the form from?: Patient    Where did the form come from: Patient or family brought in       What clinic location was the form placed at?: [facility]    [facility]    What number is listed as a contact on the form?: [phone]       Additional comments: Will  form.    Call taken on 1/11/2022 at 4:29 PM by Claudia Barajas

## 2022-01-17 ENCOUNTER — MEDICAL CORRESPONDENCE (OUTPATIENT)
Dept: HEALTH INFORMATION MANAGEMENT | Facility: CLINIC | Age: 87
End: 2022-01-17
Payer: COMMERCIAL

## 2022-01-17 NOTE — TELEPHONE ENCOUNTER
"Faxed form(s) to the provider's fax machine, right fax confirmed at 4:19 pm today, 1/17/2022. Placed originals in \"waiting to hear back\" basket.  Bianka Her MA  [facility]  2nd Floor  Primary Care  "

## 2022-01-18 NOTE — TELEPHONE ENCOUNTER
Received signed forms. Bringing to the  by 11:30 today, 1/18/2022. Copy to TC and abstracting. Called and left a voicemail message for  person regarding form .  Bianka Her [facility]  2nd Floor  Primary Care

## 2022-01-26 NOTE — LETTER
12/7/2017       RE: Gallito Farley  [address]  [place] [address]     Dear Colleague,    Thank you for referring your patient, Gallito Farley, to the [facility] UROLOGY AND INST FOR PROSTATE AND UROLOGIC CANCERS at [facility]. Please see a copy of my visit note below.    PREPROCEDURE DIAGNOSES:    1. LUTS, prostate pain, dysuria of unclear etiology    POSTPROCEDURE DIAGNOSES:  1. 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 UDS shows:  -Multiple uninhibited detrusor contractions starting at a volume of 30 mL instilled with Pdet up to 95 cm H2O without leakage. Patient reported severe pain with the third UDC which occurred at volume of 55 mL and stated he was unable to suppress the urge to void any longer. Therefore, filling was stopped at this low volume and permission to void was given. He states this is the type of pain he has been experiencing at home and with voiding.  -Otherwise good bladder compliance between episodes of DO (difficult to assess true compliance given limited bladder filling).  -Could not accurately assess true bladder capacity due to needing to terminate study early due to significant DO with severe pain reported by patient. Only 55 mL instilled (he was catheterized for ~100 mL at the beginning of the study).  -Incontinence: he does not leak to define DLPP.  -Detrusor activity during voiding: ~95 cm of H2O pressure during voiding phase, although suspect this high pressure was more so related to a bladder spasm which was occurring at the time perm to void was given.   -Detrusor sphincter dyssenergia: none  -Outlet obstruction: BOOI is 63.2 which is suggestive of obstruction  -Fluoroscopy reveals a moderately trabeculated bladder wall (from what could be seen due to limited filling) with numerous diverticulae and cellules.  No vesicoureteral reflux was observed.  The bladder neck was closed during filling and open  Per [employer] message patient sent to Dr Paula Cotter office regarding this PA.   It seems like Dr Paula Cotter office will be working on this during voiding.     PROCEDURE:    1. Uroflowmetry.  2. Sterile urethral catheterization for measurement of postvoid residual urine volume.  3. Complex filling cystometrogram with measurement of bladder and rectal pressures.  4. Complex voiding cystometrogram with measurement of bladder and rectal pressures.  5. Electromyography of the pelvic floor during urodynamics.  6. Fluoroscopic imaging of the bladder during urodynamics, at least 3 views.    7. Interpretation of urodynamics and flouroscopic imaging.      INDICATIONS FOR PROCEDURE:  Mr. Gallito Farley is a pleasant 83 year old male with LUTS, prostate pain, and dysuria of unclear etiology. Baseline video urodynamic assessment is requested today by Dr. Matthew to better characterize Mr. Gallito Farley's voiding dysfunction.       VOIDING DIARY:  Patient did not complete.    DESCRIPTION OF PROCEDURE:  Risks, benefits, and alternatives to urodynamics were discussed with the patient and he wished to proceed.  Urodynamics are planned to better assess the primary etiology for Mr. Hopper urologic dysfunction.  The patient does not take anticholinergic medications but he is on Flomax.  After informed consent was obtained, the patient was taken to the procedure room where uroflowmetry was performed. Findings below.     PRE-STUDY UROFLOWMETRY:  Voided volume: 24 mL.  Maximum flow rate: 3.4 mL/sec.  Average flow rate: 1.9 mL/sec.  Character of the curve: voided volume too low to reliably interpret.  Postvoid residual by catheter: 100 mL.  Pretest urine dipstick was negative for leukocytes and nitrites.    Next a 7F double-lumen urodynamics catheter was inserted into the bladder under sterile technique via urethra.  A 7F abdominal manometry catheter was placed in the rectum.  EMG pads were placed on both sides of the anal verge.  The bladder was filled with 200 mL of Cystografin at 25 mL/minute and serial pressures were recorded.  With coughing there was an appropriate  rise in vesical and abdominal pressures with no change in detrusor pressure, confirming good study catheter placement.    DURING THE FILLING PHASE:  First sensation: 38 mL.  First Desire: 38 mL.  Strong Desire: 38 mL.  Maximum Capacity: filling was terminated at 55 mL instilled due to severe pain reported by patient    Uninhibited detrusor contractions: multiple UDC's starting at a volume of 30 mL instilled with Pdet up to 95 cm H2O without leakage. Patient reported severe pain with the third UDC which occurred at volume of 55 mL and stated he was unable to hold it any longer. Therefore, filling was stopped and permission to void was given.  Compliance: otherwise good between UDC's.  Continence: he does not leak to define DLPP  EMG: concordant during filling.    DURING THE VOIDING PHASE:  Maximum detrusor contraction with void: ~95 cm of H2O pressure, although suspect this high pressure was more so related to a bladder spasm which was occurring at the time perm to void was given.   Voided volume: 20 mL.  Maximum flow rate: 4.1 mL/sec.  Average flow rate: 2.2 mL/sec.  Postvoid Residual: 35 mL.  Detrusor sphincter dyssynergia: none.  Character of voiding curve: very low amplitude curve.  BOOI: 63.2 (suggesting obstruction - see key below)  [obstructed (HOLLINS index [BOOI] ? 40); equivocal (no definite   obstruction; BOOI 20-40); and no obstruction (BOOI ? 20)]    FLUOROSCOPIC IMAGING OF THE BLADDER DURING URODYNAMICS:  Fluoroscopy during today's procedure demonstrated a moderately trabeculated bladder wall with numerous diverticulae and cellules.  No vesicoureteral reflux was observed.  The bladder neck was closed during filling and open during voiding.  After voiding to completion, all catheters were removed and the patient was brought back into the consultation room to further discuss today's study results.      ASSESSMENT/PLAN:  Mr. Gallito Farley is a pleasant 83 year old male with LUTS, prostate pain, and dysuria of  unclear etiology who demonstrated the following findings today on urodynamic evaluation:    -Multiple uninhibited detrusor contractions starting at a volume of 30 mL instilled with Pdet up to 95 cm H2O without leakage. Patient reported severe pain with the third UDC which occurred at volume of 55 mL and stated he was unable to suppress the urge to void any longer. Therefore, filling was stopped at this low volume and permission to void was given. He states this is the type of pain he has been experiencing at home and with voiding.  -Otherwise good bladder compliance between episodes of DO (difficult to assess true compliance given limited bladder filling).  -Could not accurately assess true bladder capacity due to needing to terminate study early due to significant DO with severe pain reported by patient. Only 55 mL instilled (he was catheterized for ~100 mL at the beginning of the study).  -Incontinence: he does not leak to define DLPP.  -Detrusor activity during voiding: ~95 cm of H2O pressure during voiding phase, although suspect this high pressure was more so related to a bladder spasm which was occurring at the time perm to void was given.   -Detrusor sphincter dyssenergia: none  -Outlet obstruction: BOOI is 63.2 which is suggestive of obstruction  -Fluoroscopy reveals a moderately trabeculated bladder wall (from what could be seen due to limited filling) with numerous diverticulae and cellules.  No vesicoureteral reflux was observed.  The bladder neck was closed during filling and open during voiding.     We discussed his study results in detail today with the aid of a professional Finnish .  -Given significant DO, will start patient on a trial of an anticholinergic medication.   -Rx for Detrol LA 4 mg daily. Side effects discussed.    The patient will follow up as scheduled with Dr. Matthew to further discuss today's study results and make plans for how best to proceed.      - A single Cipro  antibiotic was provided for UTI prophylaxis following completion of today's study per department protocol.  The risk of UTI with VUDS is low at ~2.5-3%.      Thank you for allowing me to participate in the care of . Gallito Farley and please don't hesitate to contact me with any questions or concerns.        Again, thank you for allowing me to participate in the care of your patient.      Sincerely,    Maria G Friedman PA-C

## 2022-02-03 NOTE — PROGRESS NOTES
[facility] Care Coordination Contact    2nd Attempt: Signed Letter not received from [facility], resent per process.   Cheryle Burt  Case Management Specialist  [facility]  [phone]

## 2022-03-11 NOTE — PROGRESS NOTES
[facility] Care Coordination Contact    No Letter Received: 60 day tracking of letter complete, no letter received from [facility]. Tracking discontinued.   Cheryle Burt  Case Management Specialist  [facility]  [phone]

## 2022-03-23 DIAGNOSIS — I25.118 CORONARY ARTERY DISEASE OF NATIVE ARTERY OF NATIVE HEART WITH STABLE ANGINA PECTORIS (H): ICD-10-CM

## 2022-03-26 RX ORDER — ATORVASTATIN CALCIUM 10 MG/1
TABLET, FILM COATED ORAL
Qty: 28 TABLET | OUTPATIENT
Start: 2022-03-26

## 2022-03-30 DIAGNOSIS — I25.118 CORONARY ARTERY DISEASE OF NATIVE ARTERY OF NATIVE HEART WITH STABLE ANGINA PECTORIS (H): ICD-10-CM

## 2022-04-01 RX ORDER — ATORVASTATIN CALCIUM 10 MG/1
TABLET, FILM COATED ORAL
Qty: 28 TABLET | OUTPATIENT
Start: 2022-04-01

## 2022-04-06 DIAGNOSIS — I25.118 CORONARY ARTERY DISEASE OF NATIVE ARTERY OF NATIVE HEART WITH STABLE ANGINA PECTORIS (H): ICD-10-CM

## 2022-04-07 RX ORDER — ATORVASTATIN CALCIUM 10 MG/1
10 TABLET, FILM COATED ORAL DAILY
Qty: 28 TABLET | Refills: 0 | Status: SHIPPED | OUTPATIENT
Start: 2022-04-07 | End: 2022-06-03

## 2022-04-07 NOTE — TELEPHONE ENCOUNTER
Last Clinic Visit: 5/4/2021  [facility]  LDL overdue

## 2022-04-26 ENCOUNTER — ANCILLARY PROCEDURE (OUTPATIENT)
Dept: CARDIOLOGY | Facility: CLINIC | Age: 87
End: 2022-04-26
Attending: INTERNAL MEDICINE
Payer: COMMERCIAL

## 2022-04-26 DIAGNOSIS — Z95.0 CARDIAC PACEMAKER IN SITU: ICD-10-CM

## 2022-04-26 DIAGNOSIS — I44.1 ATRIOVENTRICULAR BLOCK, SECOND DEGREE: ICD-10-CM

## 2022-04-26 PROCEDURE — 93296 REM INTERROG EVL PM/IDS: CPT

## 2022-04-26 PROCEDURE — 93294 REM INTERROG EVL PM/LDLS PM: CPT | Performed by: INTERNAL MEDICINE

## 2022-05-18 ENCOUNTER — PATIENT OUTREACH (OUTPATIENT)
Dept: GERIATRIC MEDICINE | Facility: CLINIC | Age: 87
End: 2022-05-18
Payer: COMMERCIAL

## 2022-05-18 NOTE — PROGRESS NOTES
Care Coordinator received email from Joe at [facility] stating that they received denial coverage letter of supplies from [facility] and said they haven't received any supplies lately.   Care Coordinator spoke with steffanie at [facility] - she is not sure what letter is about but states that all supplies - chux, gloves, and wipes were delivered - they came in 2 packages - One package delivered 5/5 and another package delivered on 5/6.     Jorge David RN, PHN  [facility]

## 2022-05-19 DIAGNOSIS — I25.118 CORONARY ARTERY DISEASE OF NATIVE ARTERY OF NATIVE HEART WITH STABLE ANGINA PECTORIS (H): ICD-10-CM

## 2022-05-23 RX ORDER — ATORVASTATIN CALCIUM 10 MG/1
10 TABLET, FILM COATED ORAL DAILY
Qty: 28 TABLET | Refills: 0 | OUTPATIENT
Start: 2022-05-23

## 2022-06-03 LAB
MDC_IDC_LEAD_IMPLANT_DT: NORMAL
MDC_IDC_LEAD_IMPLANT_DT: NORMAL
MDC_IDC_LEAD_LOCATION: NORMAL
MDC_IDC_LEAD_LOCATION: NORMAL
MDC_IDC_LEAD_LOCATION_DETAIL_1: NORMAL
MDC_IDC_LEAD_LOCATION_DETAIL_1: NORMAL
MDC_IDC_LEAD_MFG: NORMAL
MDC_IDC_LEAD_MFG: NORMAL
MDC_IDC_LEAD_MODEL: NORMAL
MDC_IDC_LEAD_MODEL: NORMAL
MDC_IDC_LEAD_POLARITY_TYPE: NORMAL
MDC_IDC_LEAD_POLARITY_TYPE: NORMAL
MDC_IDC_LEAD_SERIAL: NORMAL
MDC_IDC_LEAD_SERIAL: NORMAL
MDC_IDC_MSMT_BATTERY_DTM: NORMAL
MDC_IDC_MSMT_BATTERY_REMAINING_LONGEVITY: 50 MO
MDC_IDC_MSMT_BATTERY_RRT_TRIGGER: 2.83
MDC_IDC_MSMT_BATTERY_STATUS: NORMAL
MDC_IDC_MSMT_BATTERY_VOLTAGE: 2.99 V
MDC_IDC_MSMT_LEADCHNL_RA_IMPEDANCE_VALUE: 361 OHM
MDC_IDC_MSMT_LEADCHNL_RA_IMPEDANCE_VALUE: 456 OHM
MDC_IDC_MSMT_LEADCHNL_RA_PACING_THRESHOLD_AMPLITUDE: 0.62 V
MDC_IDC_MSMT_LEADCHNL_RA_PACING_THRESHOLD_PULSEWIDTH: 0.4 MS
MDC_IDC_MSMT_LEADCHNL_RA_SENSING_INTR_AMPL: 2.38 MV
MDC_IDC_MSMT_LEADCHNL_RA_SENSING_INTR_AMPL: 2.38 MV
MDC_IDC_MSMT_LEADCHNL_RV_IMPEDANCE_VALUE: 513 OHM
MDC_IDC_MSMT_LEADCHNL_RV_IMPEDANCE_VALUE: 551 OHM
MDC_IDC_MSMT_LEADCHNL_RV_PACING_THRESHOLD_AMPLITUDE: 0.75 V
MDC_IDC_MSMT_LEADCHNL_RV_PACING_THRESHOLD_PULSEWIDTH: 0.4 MS
MDC_IDC_MSMT_LEADCHNL_RV_SENSING_INTR_AMPL: 20.38 MV
MDC_IDC_MSMT_LEADCHNL_RV_SENSING_INTR_AMPL: 20.38 MV
MDC_IDC_PG_IMPLANT_DTM: NORMAL
MDC_IDC_PG_MFG: NORMAL
MDC_IDC_PG_MODEL: NORMAL
MDC_IDC_PG_SERIAL: NORMAL
MDC_IDC_PG_TYPE: NORMAL
MDC_IDC_SESS_CLINIC_NAME: NORMAL
MDC_IDC_SESS_DTM: NORMAL
MDC_IDC_SESS_TYPE: NORMAL
MDC_IDC_SET_BRADY_AT_MODE_SWITCH_RATE: 171 {BEATS}/MIN
MDC_IDC_SET_BRADY_HYSTRATE: NORMAL
MDC_IDC_SET_BRADY_LOWRATE: 60 {BEATS}/MIN
MDC_IDC_SET_BRADY_MAX_SENSOR_RATE: 120 {BEATS}/MIN
MDC_IDC_SET_BRADY_MAX_TRACKING_RATE: 120 {BEATS}/MIN
MDC_IDC_SET_BRADY_MODE: NORMAL
MDC_IDC_SET_BRADY_PAV_DELAY_LOW: 180 MS
MDC_IDC_SET_BRADY_SAV_DELAY_LOW: 150 MS
MDC_IDC_SET_LEADCHNL_RA_PACING_AMPLITUDE: 1.5 V
MDC_IDC_SET_LEADCHNL_RA_PACING_ANODE_ELECTRODE_1: NORMAL
MDC_IDC_SET_LEADCHNL_RA_PACING_ANODE_LOCATION_1: NORMAL
MDC_IDC_SET_LEADCHNL_RA_PACING_CAPTURE_MODE: NORMAL
MDC_IDC_SET_LEADCHNL_RA_PACING_CATHODE_ELECTRODE_1: NORMAL
MDC_IDC_SET_LEADCHNL_RA_PACING_CATHODE_LOCATION_1: NORMAL
MDC_IDC_SET_LEADCHNL_RA_PACING_POLARITY: NORMAL
MDC_IDC_SET_LEADCHNL_RA_PACING_PULSEWIDTH: 0.4 MS
MDC_IDC_SET_LEADCHNL_RA_SENSING_ANODE_ELECTRODE_1: NORMAL
MDC_IDC_SET_LEADCHNL_RA_SENSING_ANODE_LOCATION_1: NORMAL
MDC_IDC_SET_LEADCHNL_RA_SENSING_CATHODE_ELECTRODE_1: NORMAL
MDC_IDC_SET_LEADCHNL_RA_SENSING_CATHODE_LOCATION_1: NORMAL
MDC_IDC_SET_LEADCHNL_RA_SENSING_POLARITY: NORMAL
MDC_IDC_SET_LEADCHNL_RA_SENSING_SENSITIVITY: 0.6 MV
MDC_IDC_SET_LEADCHNL_RV_PACING_AMPLITUDE: 2 V
MDC_IDC_SET_LEADCHNL_RV_PACING_ANODE_ELECTRODE_1: NORMAL
MDC_IDC_SET_LEADCHNL_RV_PACING_ANODE_LOCATION_1: NORMAL
MDC_IDC_SET_LEADCHNL_RV_PACING_CAPTURE_MODE: NORMAL
MDC_IDC_SET_LEADCHNL_RV_PACING_CATHODE_ELECTRODE_1: NORMAL
MDC_IDC_SET_LEADCHNL_RV_PACING_CATHODE_LOCATION_1: NORMAL
MDC_IDC_SET_LEADCHNL_RV_PACING_POLARITY: NORMAL
MDC_IDC_SET_LEADCHNL_RV_PACING_PULSEWIDTH: 0.4 MS
MDC_IDC_SET_LEADCHNL_RV_SENSING_ANODE_ELECTRODE_1: NORMAL
MDC_IDC_SET_LEADCHNL_RV_SENSING_ANODE_LOCATION_1: NORMAL
MDC_IDC_SET_LEADCHNL_RV_SENSING_CATHODE_ELECTRODE_1: NORMAL
MDC_IDC_SET_LEADCHNL_RV_SENSING_CATHODE_LOCATION_1: NORMAL
MDC_IDC_SET_LEADCHNL_RV_SENSING_POLARITY: NORMAL
MDC_IDC_SET_LEADCHNL_RV_SENSING_SENSITIVITY: 0.9 MV
MDC_IDC_SET_ZONE_DETECTION_INTERVAL: 350 MS
MDC_IDC_SET_ZONE_DETECTION_INTERVAL: 400 MS
MDC_IDC_SET_ZONE_TYPE: NORMAL
MDC_IDC_STAT_AT_BURDEN_PERCENT: 0.1 %
MDC_IDC_STAT_AT_DTM_END: NORMAL
MDC_IDC_STAT_AT_DTM_START: NORMAL
MDC_IDC_STAT_BRADY_AP_VP_PERCENT: 19.43 %
MDC_IDC_STAT_BRADY_AP_VS_PERCENT: 0 %
MDC_IDC_STAT_BRADY_AS_VP_PERCENT: 80.57 %
MDC_IDC_STAT_BRADY_AS_VS_PERCENT: 0 %
MDC_IDC_STAT_BRADY_DTM_END: NORMAL
MDC_IDC_STAT_BRADY_DTM_START: NORMAL
MDC_IDC_STAT_BRADY_RA_PERCENT_PACED: 19.43 %
MDC_IDC_STAT_BRADY_RV_PERCENT_PACED: 99.99 %
MDC_IDC_STAT_EPISODE_RECENT_COUNT: 0
MDC_IDC_STAT_EPISODE_RECENT_COUNT: 3
MDC_IDC_STAT_EPISODE_RECENT_COUNT_DTM_END: NORMAL
MDC_IDC_STAT_EPISODE_RECENT_COUNT_DTM_START: NORMAL
MDC_IDC_STAT_EPISODE_TOTAL_COUNT: 0
MDC_IDC_STAT_EPISODE_TOTAL_COUNT: 9
MDC_IDC_STAT_EPISODE_TOTAL_COUNT_DTM_END: NORMAL
MDC_IDC_STAT_EPISODE_TOTAL_COUNT_DTM_START: NORMAL
MDC_IDC_STAT_EPISODE_TYPE: NORMAL

## 2022-06-03 RX ORDER — ATORVASTATIN CALCIUM 10 MG/1
10 TABLET, FILM COATED ORAL DAILY
Qty: 90 TABLET | Refills: 1 | Status: SHIPPED | OUTPATIENT
Start: 2022-06-03 | End: 2023-05-16

## 2022-06-03 RX ORDER — ATORVASTATIN CALCIUM 10 MG/1
10 TABLET, FILM COATED ORAL DAILY
Qty: 90 TABLET | Refills: 3 | Status: SHIPPED | OUTPATIENT
Start: 2022-06-03 | End: 2022-06-03

## 2022-06-16 DIAGNOSIS — M54.12 CERVICAL RADICULOPATHY: ICD-10-CM

## 2022-06-16 DIAGNOSIS — J44.9 COPD, MODERATE (H): ICD-10-CM

## 2022-06-16 NOTE — TELEPHONE ENCOUNTER
Routing refill request to provider for review/approval because:  Drug not on the [facility] or  Health refill protocol or controlled substance  Asthma control assessment score not within normal limits in last 6 months   No recent or future visit within authorizing provider's specialty

## 2022-06-20 RX ORDER — GABAPENTIN 100 MG/1
CAPSULE ORAL
Qty: 84 CAPSULE | Refills: 5 | Status: SHIPPED | OUTPATIENT
Start: 2022-06-20 | End: 2022-11-23

## 2022-06-20 RX ORDER — TIOTROPIUM BROMIDE 18 UG/1
CAPSULE ORAL; RESPIRATORY (INHALATION)
Qty: 30 CAPSULE | Refills: 5 | Status: SHIPPED | OUTPATIENT
Start: 2022-06-20 | End: 2022-11-23

## 2022-07-15 DIAGNOSIS — R73.01 IMPAIRED FASTING GLUCOSE: ICD-10-CM

## 2022-07-15 RX ORDER — METFORMIN HCL 500 MG
TABLET, EXTENDED RELEASE 24 HR ORAL
Qty: 28 TABLET | Refills: 5 | Status: SHIPPED | OUTPATIENT
Start: 2022-07-15 | End: 2022-12-21

## 2022-07-25 ENCOUNTER — PATIENT OUTREACH (OUTPATIENT)
Dept: GERIATRIC MEDICINE | Facility: CLINIC | Age: 87
End: 2022-07-25

## 2022-07-25 NOTE — PROGRESS NOTES
[facility] Care Coordination Contact      [facility] Six-Month Telephone Assessment    6 month telephone assessment completed on 7/25/22 with member and Joe, staff at AL.    ER visits: No  Hospitalizations: No  TCU stays: No  Significant health status changes: N/A  Falls/Injuries: No  ADL/IADL changes: No  Changes in services: No    Caregiver Assessment follow up:  N/A    Goals: See POC in chart for goal progress documentation.  Joe states that everything is fine except his dementia is worsening and his incontinence per usual.  States that his blood sugar is good overall his health is good.    Will see member in 6 months for an annual health risk assessment.   Encouraged member to call CC with any questions or concerns in the meantime.     Jorge David RN, PHN  [facility]

## 2022-08-15 DIAGNOSIS — L85.3 XEROSIS CUTIS: ICD-10-CM

## 2022-08-15 NOTE — TELEPHONE ENCOUNTER
[facility] is calling for an updated refill request for prescription below.     ammonium lactate (LAC-HYDRIN) 12 % external lotion 567 g 11 1/18/2021  --   Sig - Route: Apply topically 2 times daily - Topical   Sent to pharmacy as: Ammonium Lactate 12 % External Lotion (LAC-HYDRIN)   Class: E-Prescribe   Order: [id number]   E-Prescribing Status: Receipt confirmed by pharmacy (1/18/2021  4:53 PM CST)     Pharmacy stated that patient recently switched [facility] and they are requesting that a new prescription be sent over.     Kelsie Montes RN, BSN  [facility]

## 2022-08-16 RX ORDER — AMMONIUM LACTATE 12 G/100G
LOTION TOPICAL 2 TIMES DAILY
Qty: 567 G | Refills: 1 | Status: SHIPPED | OUTPATIENT
Start: 2022-08-16

## 2022-08-17 ENCOUNTER — PATIENT OUTREACH (OUTPATIENT)
Dept: GERIATRIC MEDICINE | Facility: CLINIC | Age: 87
End: 2022-08-17

## 2022-08-17 NOTE — PROGRESS NOTES
CC updated program tasks and targets for [employer] launch.     Jorge David RN, N  [facility]

## 2022-09-14 DIAGNOSIS — I25.118 CORONARY ARTERY DISEASE OF NATIVE ARTERY OF NATIVE HEART WITH STABLE ANGINA PECTORIS (H): ICD-10-CM

## 2022-09-16 RX ORDER — ISOSORBIDE MONONITRATE 30 MG/1
TABLET, EXTENDED RELEASE ORAL
Qty: 28 TABLET | OUTPATIENT
Start: 2022-09-16

## 2022-09-16 RX ORDER — ASPIRIN 81 MG/1
TABLET, COATED ORAL
Qty: 28 TABLET | OUTPATIENT
Start: 2022-09-16

## 2022-09-16 NOTE — TELEPHONE ENCOUNTER
Last Clinic Visit: 5/4/2021  [facility]  Last dictation from Dr Frank  FOLLOW UP:  Primary care provider  Refills deferred

## 2022-09-29 DIAGNOSIS — I25.118 CORONARY ARTERY DISEASE OF NATIVE ARTERY OF NATIVE HEART WITH STABLE ANGINA PECTORIS (H): ICD-10-CM

## 2022-09-29 RX ORDER — ASPIRIN 81 MG/1
TABLET, COATED ORAL
Qty: 28 TABLET | OUTPATIENT
Start: 2022-09-29

## 2022-09-29 RX ORDER — ISOSORBIDE MONONITRATE 30 MG/1
TABLET, EXTENDED RELEASE ORAL
Qty: 28 TABLET | OUTPATIENT
Start: 2022-09-29

## 2022-10-06 ENCOUNTER — TELEPHONE (OUTPATIENT)
Dept: CARE COORDINATION | Facility: CLINIC | Age: 87
End: 2022-10-06

## 2022-10-06 NOTE — TELEPHONE ENCOUNTER
Telephone Reminder per Remote Device Check.    LVM reminder via       Adolfo Neville MA, Virtual Facilitator, 10/6/2022

## 2022-10-07 ENCOUNTER — ANCILLARY PROCEDURE (OUTPATIENT)
Dept: CARDIOLOGY | Facility: CLINIC | Age: 87
End: 2022-10-07
Attending: INTERNAL MEDICINE
Payer: COMMERCIAL

## 2022-10-07 DIAGNOSIS — I44.1 ATRIOVENTRICULAR BLOCK, SECOND DEGREE: ICD-10-CM

## 2022-10-07 DIAGNOSIS — Z95.0 CARDIAC PACEMAKER IN SITU: ICD-10-CM

## 2022-10-07 PROCEDURE — 93294 REM INTERROG EVL PM/LDLS PM: CPT | Performed by: INTERNAL MEDICINE

## 2022-10-07 PROCEDURE — 93296 REM INTERROG EVL PM/IDS: CPT

## 2022-10-28 LAB
MDC_IDC_LEAD_IMPLANT_DT: NORMAL
MDC_IDC_LEAD_IMPLANT_DT: NORMAL
MDC_IDC_LEAD_LOCATION: NORMAL
MDC_IDC_LEAD_LOCATION: NORMAL
MDC_IDC_LEAD_LOCATION_DETAIL_1: NORMAL
MDC_IDC_LEAD_LOCATION_DETAIL_1: NORMAL
MDC_IDC_LEAD_MFG: NORMAL
MDC_IDC_LEAD_MFG: NORMAL
MDC_IDC_LEAD_MODEL: NORMAL
MDC_IDC_LEAD_MODEL: NORMAL
MDC_IDC_LEAD_POLARITY_TYPE: NORMAL
MDC_IDC_LEAD_POLARITY_TYPE: NORMAL
MDC_IDC_LEAD_SERIAL: NORMAL
MDC_IDC_LEAD_SERIAL: NORMAL
MDC_IDC_MSMT_BATTERY_DTM: NORMAL
MDC_IDC_MSMT_BATTERY_REMAINING_LONGEVITY: 45 MO
MDC_IDC_MSMT_BATTERY_RRT_TRIGGER: 2.83
MDC_IDC_MSMT_BATTERY_STATUS: NORMAL
MDC_IDC_MSMT_BATTERY_VOLTAGE: 2.98 V
MDC_IDC_MSMT_LEADCHNL_RA_IMPEDANCE_VALUE: 399 OHM
MDC_IDC_MSMT_LEADCHNL_RA_IMPEDANCE_VALUE: 494 OHM
MDC_IDC_MSMT_LEADCHNL_RA_PACING_THRESHOLD_AMPLITUDE: 0.62 V
MDC_IDC_MSMT_LEADCHNL_RA_PACING_THRESHOLD_PULSEWIDTH: 0.4 MS
MDC_IDC_MSMT_LEADCHNL_RA_SENSING_INTR_AMPL: 2.8 MV
MDC_IDC_MSMT_LEADCHNL_RV_IMPEDANCE_VALUE: 551 OHM
MDC_IDC_MSMT_LEADCHNL_RV_IMPEDANCE_VALUE: 570 OHM
MDC_IDC_MSMT_LEADCHNL_RV_PACING_THRESHOLD_AMPLITUDE: 0.62 V
MDC_IDC_MSMT_LEADCHNL_RV_PACING_THRESHOLD_PULSEWIDTH: 0.4 MS
MDC_IDC_MSMT_LEADCHNL_RV_SENSING_INTR_AMPL: 13.4 MV
MDC_IDC_PG_IMPLANT_DTM: NORMAL
MDC_IDC_PG_MFG: NORMAL
MDC_IDC_PG_MODEL: NORMAL
MDC_IDC_PG_SERIAL: NORMAL
MDC_IDC_PG_TYPE: NORMAL
MDC_IDC_SESS_CLINIC_NAME: NORMAL
MDC_IDC_SESS_DTM: NORMAL
MDC_IDC_SESS_TYPE: NORMAL
MDC_IDC_SET_BRADY_AT_MODE_SWITCH_RATE: 171 {BEATS}/MIN
MDC_IDC_SET_BRADY_HYSTRATE: NORMAL
MDC_IDC_SET_BRADY_LOWRATE: 60 {BEATS}/MIN
MDC_IDC_SET_BRADY_MAX_SENSOR_RATE: 120 {BEATS}/MIN
MDC_IDC_SET_BRADY_MAX_TRACKING_RATE: 120 {BEATS}/MIN
MDC_IDC_SET_BRADY_MODE: NORMAL
MDC_IDC_SET_BRADY_PAV_DELAY_LOW: 180 MS
MDC_IDC_SET_BRADY_SAV_DELAY_LOW: 150 MS
MDC_IDC_SET_LEADCHNL_RA_PACING_AMPLITUDE: 1.5 V
MDC_IDC_SET_LEADCHNL_RA_PACING_ANODE_ELECTRODE_1: NORMAL
MDC_IDC_SET_LEADCHNL_RA_PACING_ANODE_LOCATION_1: NORMAL
MDC_IDC_SET_LEADCHNL_RA_PACING_CAPTURE_MODE: NORMAL
MDC_IDC_SET_LEADCHNL_RA_PACING_CATHODE_ELECTRODE_1: NORMAL
MDC_IDC_SET_LEADCHNL_RA_PACING_CATHODE_LOCATION_1: NORMAL
MDC_IDC_SET_LEADCHNL_RA_PACING_POLARITY: NORMAL
MDC_IDC_SET_LEADCHNL_RA_PACING_PULSEWIDTH: 0.4 MS
MDC_IDC_SET_LEADCHNL_RA_SENSING_ANODE_ELECTRODE_1: NORMAL
MDC_IDC_SET_LEADCHNL_RA_SENSING_ANODE_LOCATION_1: NORMAL
MDC_IDC_SET_LEADCHNL_RA_SENSING_CATHODE_ELECTRODE_1: NORMAL
MDC_IDC_SET_LEADCHNL_RA_SENSING_CATHODE_LOCATION_1: NORMAL
MDC_IDC_SET_LEADCHNL_RA_SENSING_POLARITY: NORMAL
MDC_IDC_SET_LEADCHNL_RA_SENSING_SENSITIVITY: 0.6 MV
MDC_IDC_SET_LEADCHNL_RV_PACING_AMPLITUDE: 2 V
MDC_IDC_SET_LEADCHNL_RV_PACING_ANODE_ELECTRODE_1: NORMAL
MDC_IDC_SET_LEADCHNL_RV_PACING_ANODE_LOCATION_1: NORMAL
MDC_IDC_SET_LEADCHNL_RV_PACING_CAPTURE_MODE: NORMAL
MDC_IDC_SET_LEADCHNL_RV_PACING_CATHODE_ELECTRODE_1: NORMAL
MDC_IDC_SET_LEADCHNL_RV_PACING_CATHODE_LOCATION_1: NORMAL
MDC_IDC_SET_LEADCHNL_RV_PACING_POLARITY: NORMAL
MDC_IDC_SET_LEADCHNL_RV_PACING_PULSEWIDTH: 0.4 MS
MDC_IDC_SET_LEADCHNL_RV_SENSING_ANODE_ELECTRODE_1: NORMAL
MDC_IDC_SET_LEADCHNL_RV_SENSING_ANODE_LOCATION_1: NORMAL
MDC_IDC_SET_LEADCHNL_RV_SENSING_CATHODE_ELECTRODE_1: NORMAL
MDC_IDC_SET_LEADCHNL_RV_SENSING_CATHODE_LOCATION_1: NORMAL
MDC_IDC_SET_LEADCHNL_RV_SENSING_POLARITY: NORMAL
MDC_IDC_SET_LEADCHNL_RV_SENSING_SENSITIVITY: 0.9 MV
MDC_IDC_SET_ZONE_DETECTION_INTERVAL: 350 MS
MDC_IDC_SET_ZONE_DETECTION_INTERVAL: 400 MS
MDC_IDC_SET_ZONE_TYPE: NORMAL
MDC_IDC_STAT_AT_BURDEN_PERCENT: 0.1 %
MDC_IDC_STAT_AT_DTM_END: NORMAL
MDC_IDC_STAT_AT_DTM_START: NORMAL
MDC_IDC_STAT_BRADY_AP_VP_PERCENT: 18.43 %
MDC_IDC_STAT_BRADY_AP_VS_PERCENT: 0 %
MDC_IDC_STAT_BRADY_AS_VP_PERCENT: 81.54 %
MDC_IDC_STAT_BRADY_AS_VS_PERCENT: 0.03 %
MDC_IDC_STAT_BRADY_DTM_END: NORMAL
MDC_IDC_STAT_BRADY_DTM_START: NORMAL
MDC_IDC_STAT_BRADY_RA_PERCENT_PACED: 18.4 %
MDC_IDC_STAT_BRADY_RV_PERCENT_PACED: 99.86 %
MDC_IDC_STAT_EPISODE_RECENT_COUNT: 0
MDC_IDC_STAT_EPISODE_RECENT_COUNT: 2
MDC_IDC_STAT_EPISODE_RECENT_COUNT_DTM_END: NORMAL
MDC_IDC_STAT_EPISODE_RECENT_COUNT_DTM_START: NORMAL
MDC_IDC_STAT_EPISODE_TOTAL_COUNT: 0
MDC_IDC_STAT_EPISODE_TOTAL_COUNT: 9
MDC_IDC_STAT_EPISODE_TOTAL_COUNT_DTM_END: NORMAL
MDC_IDC_STAT_EPISODE_TOTAL_COUNT_DTM_START: NORMAL
MDC_IDC_STAT_EPISODE_TYPE: NORMAL

## 2022-10-31 ENCOUNTER — PATIENT OUTREACH (OUTPATIENT)
Dept: GERIATRIC MEDICINE | Facility: CLINIC | Age: 87
End: 2022-10-31

## 2022-10-31 NOTE — PROGRESS NOTES
Encounter opened due to Regulatory [employer] Update to open FVP Program.    Roro Delgado  Care Management Specialist  [facility]  [phone]

## 2022-11-07 ENCOUNTER — PATIENT OUTREACH (OUTPATIENT)
Dept: GERIATRIC MEDICINE | Facility: CLINIC | Age: 87
End: 2022-11-07

## 2022-11-07 NOTE — PROGRESS NOTES
Care Coordinator spoke with , at Assisted Living - annual scheduled for 11/8/22 @ 2:30 p.m.     Jorge David RN, PHN  [facility]

## 2022-11-08 ENCOUNTER — PATIENT OUTREACH (OUTPATIENT)
Dept: GERIATRIC MEDICINE | Facility: CLINIC | Age: 87
End: 2022-11-08

## 2022-11-08 NOTE — Clinical Note
Dr. Awad,  I am Gallito Farley's community Care Coordinator with [facility].  I completed the annual home visit.   This is for FYI only.   Thank you,  Jorge David RN, PHN [facility]

## 2022-11-08 NOTE — PROGRESS NOTES
[facility] Care Coordination Contact    [facility] Home Visit Assessment     Home visit for Health Risk Assessment with Gallito GERARDO Farley completed on November 8, 2022    Type of residence:: Assisted living  Current living arrangement:: I live in assisted living     Assessment completed with:: Patient, Caregiver - [place] at Assisted Living; [employer] language line    Current Care Plan  Member currently receiving the following home care services:     Member currently receiving the following community resources: Other (see comment) (Assisted Living), monthly supplies.     Medication Review  Medication reconciliation completed in Epic: Yes  Medication set-up & administration: RN set up weekly.  Assisting Living staff administers medications.  Medication Risk Assessment Medication (1 or more, place referral to MTM): N/A: No risk factors identified  MTM Referral Placed: No: No risk factors idenified    Mental/Behavioral Health   Depression Screening:   PHQ-2 Total Score (Adult) - Positive if 3 or more points; Administer PHQ-9 if positive: 0       Mental health DX:: No        Falls Assessment:   Fallen 2 or more times in the past year?: No   Any fall with injury in the past year?: No    ADL/IADL Dependencies:   Dependent ADLs:: Ambulation-walker, Bathing, Dressing, Grooming, Incontinence, Positioning, Transfers, Toileting, Eating  Dependent IADLs:: Cleaning, Cooking, Laundry, Shopping, Meal Preparation, Medication Management, Money Management, Transportation, Incontinence    [facility] Plan sponsored benefits: Shared information re: Silver Sneakers/gym memberships, ASA, Calcium +D.    PCA Assessment completed at visit: Not Applicable     Elderly Waiver Eligibility: Yes-will continue on EW    Care Plan & Recommendations: CL tool; monthly incontinence supplies.    See CC for detailed assessment information.    Follow-Up Plan: Member informed of future contact, plan to f/u with member with a 6 month telephone  assessment.  Contact information shared with member and family, encouraged member to call with any questions or concerns at any time.    [place] care continuum providers: Please see Snapshot and Care Management Flowsheets for Specific details of care plan.    This CC note routed to PCP.    Jorge David RN, PHN  [facility]

## 2022-11-10 SDOH — ECONOMIC STABILITY: FOOD INSECURITY: WITHIN THE PAST 12 MONTHS, THE FOOD YOU BOUGHT JUST DIDN'T LAST AND YOU DIDN'T HAVE MONEY TO GET MORE.: NEVER TRUE

## 2022-11-10 SDOH — HEALTH STABILITY: MENTAL HEALTH: HOW MANY DRINKS CONTAINING ALCOHOL DO YOU HAVE ON A TYPICAL DAY WHEN YOU ARE DRINKING?: PATIENT DECLINED

## 2022-11-10 SDOH — ECONOMIC STABILITY: FOOD INSECURITY: HOW HARD IS IT FOR YOU TO PAY FOR THE VERY BASICS LIKE FOOD, HOUSING, MEDICAL CARE, AND HEATING?: PATIENT DECLINED

## 2022-11-10 SDOH — SOCIAL STABILITY: SOCIAL NETWORK
DO YOU BELONG TO ANY CLUBS OR ORGANIZATIONS SUCH AS CHURCH GROUPS, UNIONS, FRATERNAL OR ATHLETIC GROUPS, OR SCHOOL GROUPS?: PATIENT DECLINED

## 2022-11-10 SDOH — ECONOMIC STABILITY: HOUSING INSECURITY: IN THE LAST 12 MONTHS, WAS THERE A TIME WHEN YOU WERE NOT ABLE TO PAY THE MORTGAGE OR RENT ON TIME?: NO

## 2022-11-10 SDOH — ECONOMIC STABILITY: HOUSING INSECURITY
IN THE LAST 12 MONTHS, WAS THERE A TIME WHEN YOU DID NOT HAVE A STEADY PLACE TO SLEEP OR SLEPT IN A SHELTER (INCLUDING NOW)?: NO

## 2022-11-10 SDOH — SOCIAL STABILITY: SOCIAL NETWORK: HOW OFTEN DO YOU ATTEND MEETINGS OF THE CLUBS OR ORGANIZATIONS YOU BELONG TO?: PATIENT DECLINED

## 2022-11-10 SDOH — HEALTH STABILITY: PHYSICAL HEALTH
ON AVERAGE, HOW MANY DAYS PER WEEK DO YOU ENGAGE IN MODERATE TO STRENUOUS EXERCISE (LIKE A BRISK WALK)?: PATIENT DECLINED

## 2022-11-10 SDOH — SOCIAL STABILITY: SOCIAL INSECURITY: ARE YOU MARRIED, WIDOWED, DIVORCED, SEPARATED, NEVER MARRIED, OR LIVING WITH A PARTNER?: PATIENT DECLINED

## 2022-11-10 SDOH — SOCIAL STABILITY: SOCIAL NETWORK: HOW OFTEN DO YOU GET TOGETHER WITH FRIENDS OR RELATIVES?: PATIENT DECLINED

## 2022-11-10 SDOH — HEALTH STABILITY: MENTAL HEALTH: HOW OFTEN DO YOU HAVE A DRINK CONTAINING ALCOHOL?: PATIENT DECLINED

## 2022-11-10 SDOH — SOCIAL STABILITY: SOCIAL NETWORK: IN A TYPICAL WEEK, HOW MANY TIMES DO YOU TALK ON THE PHONE WITH FAMILY, FRIENDS, OR NEIGHBORS?: PATIENT DECLINED

## 2022-11-10 SDOH — SOCIAL STABILITY: SOCIAL INSECURITY
WITHIN THE LAST YEAR, HAVE YOU BEEN RAPED OR FORCED TO HAVE ANY KIND OF SEXUAL ACTIVITY BY YOUR PARTNER OR EX-PARTNER?: PATIENT DECLINED

## 2022-11-10 SDOH — ECONOMIC STABILITY: TRANSPORTATION INSECURITY: IN THE PAST 12 MONTHS, HAS LACK OF TRANSPORTATION KEPT YOU FROM MEDICAL APPOINTMENTS OR FROM GETTING MEDICATIONS?: NO

## 2022-11-10 SDOH — HEALTH STABILITY: MENTAL HEALTH
DO YOU FEEL STRESS - TENSE, RESTLESS, NERVOUS, OR ANXIOUS, OR UNABLE TO SLEEP AT NIGHT BECAUSE YOUR MIND IS TROUBLED ALL THE TIME - THESE DAYS?: PATIENT DECLINED

## 2022-11-10 SDOH — ECONOMIC STABILITY: HOUSING INSECURITY: IN THE LAST 12 MONTHS, HOW MANY PLACES HAVE YOU LIVED?: 1

## 2022-11-10 SDOH — SOCIAL STABILITY: SOCIAL INSECURITY: WITHIN THE LAST YEAR, HAVE YOU BEEN AFRAID OF YOUR PARTNER OR EX-PARTNER?: PATIENT DECLINED

## 2022-11-10 SDOH — SOCIAL STABILITY: SOCIAL INSECURITY
WITHIN THE LAST YEAR, HAVE YOU BEEN HUMILIATED OR EMOTIONALLY ABUSED IN OTHER WAYS BY YOUR PARTNER OR EX-PARTNER?: PATIENT DECLINED

## 2022-11-10 SDOH — SOCIAL STABILITY: SOCIAL NETWORK: HOW OFTEN DO YOU ATTEND CHURCH OR RELIGIOUS SERVICES?: PATIENT DECLINED

## 2022-11-10 SDOH — HEALTH STABILITY: PHYSICAL HEALTH: ON AVERAGE, HOW MANY MINUTES DO YOU ENGAGE IN EXERCISE AT THIS LEVEL?: PATIENT DECLINED

## 2022-11-10 SDOH — HEALTH STABILITY: MENTAL HEALTH: HOW OFTEN DO YOU HAVE SIX OR MORE DRINKS ON ONE OCCASION?: PATIENT DECLINED

## 2022-11-10 SDOH — ECONOMIC STABILITY: FOOD INSECURITY: WITHIN THE PAST 12 MONTHS, YOU WORRIED THAT YOUR FOOD WOULD RUN OUT BEFORE YOU GOT THE MONEY TO BUY MORE.: NEVER TRUE

## 2022-11-10 SDOH — SOCIAL STABILITY: SOCIAL INSECURITY
WITHIN THE LAST YEAR, HAVE YOU BEEN KICKED, HIT, SLAPPED, OR OTHERWISE PHYSICALLY HURT BY YOUR PARTNER OR EX-PARTNER?: PATIENT DECLINED

## 2022-11-10 ASSESSMENT — LIFESTYLE VARIABLES
AUDIT-C TOTAL SCORE: -1
SKIP TO QUESTIONS 9-10: 0

## 2022-11-10 ASSESSMENT — ACTIVITIES OF DAILY LIVING (ADL): LACK_OF_TRANSPORTATION: NO

## 2022-11-10 NOTE — PROGRESS NOTES
11/8/22: Care Coordinator sent CL Tool to [facility] for approval.     Jorge David RN, PHN  [facility]

## 2022-11-15 NOTE — PROGRESS NOTES
11/15/22: Care Coordinator received approval from [facility] for RS Tool - will complete at this time.     Jorge David RN, PHN  [facility]

## 2022-11-18 ENCOUNTER — PATIENT OUTREACH (OUTPATIENT)
Dept: GERIATRIC MEDICINE | Facility: CLINIC | Age: 87
End: 2022-11-18

## 2022-11-18 NOTE — LETTER
November 18, 2022      GALLITO ALBRIGHT  [address] [address]      Dear Gallito:    At [facility], we re dedicated to improving your health and wellness. Enclosed is the Care Plan developed with you on 11/8/2022. Please review the Care Plan carefully.    As a reminder, during your visit we talked about:    Ways to manage your physical and mental health    Using health care to maintain and improve your health     Your preventive care needs     Remember to contact your care coordinator if you:    Are hospitalized, or plan to be hospitalized     Have a fall      Have a change in your physical or mental health    Need help finding support or services    If you have questions, or don t agree with your Care Plan, call me at [phone]. You can also call me if your needs change. TTY users, call the [place] [employer] at (529) or 1-591.104.8956 (iwhjjj-ii-jutqsm relay service).    Sincerely,    Jorge David RN, PHN    E-mail: [email or web address]  Phone: [phone]      [facility] ([facility] [place]) is a health plan that contracts with both Medicare and the [place] Medical Assistance (Medicaid) program to provide benefits of both programs to enrollees. Enrollment in [facility] depends on contract renewal.    [id number] accepted    [id number] (07/2022)

## 2022-11-18 NOTE — PROGRESS NOTES
[facility] Care Coordination Contact    Received after visit chart from care coordinator.  Completed following tasks: Mailed copy of care plan to client, Updated services in Database, Mailed copy of POC signature sheet for member to sign and return in SASE  and Mailed [employer] Safe Medication Disposal   , Mailed copy of CL tool to member, faxed copy to AL facility, and submitted authorization to health plan.   and Provider Signature - Summary:  Member indicates that they would like a summary of their POC shared with the following EW providers:  Yang FLETCHER.  Letter faxed to providers for signature.     Cheryle Burt  Case Management Specialist  [facility]  [phone]

## 2022-11-21 ENCOUNTER — HEALTH MAINTENANCE LETTER (OUTPATIENT)
Age: 87
End: 2022-11-21

## 2022-12-21 NOTE — PROGRESS NOTES
[facility] Care Coordination Contact    2nd Attempt: Signed Letter not received from [facility], resent per process.     Merari Nelson  Care Management Specialist  [facility]  [phone]

## 2023-01-16 ENCOUNTER — ANCILLARY PROCEDURE (OUTPATIENT)
Dept: CARDIOLOGY | Facility: CLINIC | Age: 88
End: 2023-01-16
Attending: INTERNAL MEDICINE
Payer: COMMERCIAL

## 2023-01-16 DIAGNOSIS — I44.1 ATRIOVENTRICULAR BLOCK, SECOND DEGREE: ICD-10-CM

## 2023-01-16 DIAGNOSIS — Z95.0 CARDIAC PACEMAKER IN SITU: ICD-10-CM

## 2023-01-16 PROCEDURE — 93294 REM INTERROG EVL PM/LDLS PM: CPT | Performed by: INTERNAL MEDICINE

## 2023-01-16 PROCEDURE — 93296 REM INTERROG EVL PM/IDS: CPT

## 2023-01-18 LAB
MDC_IDC_LEAD_IMPLANT_DT: NORMAL
MDC_IDC_LEAD_IMPLANT_DT: NORMAL
MDC_IDC_LEAD_LOCATION: NORMAL
MDC_IDC_LEAD_LOCATION: NORMAL
MDC_IDC_LEAD_LOCATION_DETAIL_1: NORMAL
MDC_IDC_LEAD_LOCATION_DETAIL_1: NORMAL
MDC_IDC_LEAD_MFG: NORMAL
MDC_IDC_LEAD_MFG: NORMAL
MDC_IDC_LEAD_MODEL: NORMAL
MDC_IDC_LEAD_MODEL: NORMAL
MDC_IDC_LEAD_POLARITY_TYPE: NORMAL
MDC_IDC_LEAD_POLARITY_TYPE: NORMAL
MDC_IDC_LEAD_SERIAL: NORMAL
MDC_IDC_LEAD_SERIAL: NORMAL
MDC_IDC_MSMT_BATTERY_DTM: NORMAL
MDC_IDC_MSMT_BATTERY_REMAINING_LONGEVITY: 48 MO
MDC_IDC_MSMT_BATTERY_RRT_TRIGGER: 2.83
MDC_IDC_MSMT_BATTERY_STATUS: NORMAL
MDC_IDC_MSMT_BATTERY_VOLTAGE: 2.98 V
MDC_IDC_MSMT_LEADCHNL_RA_IMPEDANCE_VALUE: 399 OHM
MDC_IDC_MSMT_LEADCHNL_RA_IMPEDANCE_VALUE: 456 OHM
MDC_IDC_MSMT_LEADCHNL_RA_PACING_THRESHOLD_AMPLITUDE: 0.62 V
MDC_IDC_MSMT_LEADCHNL_RA_PACING_THRESHOLD_PULSEWIDTH: 0.4 MS
MDC_IDC_MSMT_LEADCHNL_RA_SENSING_INTR_AMPL: 3.3 MV
MDC_IDC_MSMT_LEADCHNL_RV_IMPEDANCE_VALUE: 627 OHM
MDC_IDC_MSMT_LEADCHNL_RV_IMPEDANCE_VALUE: 646 OHM
MDC_IDC_MSMT_LEADCHNL_RV_PACING_THRESHOLD_AMPLITUDE: 0.62 V
MDC_IDC_MSMT_LEADCHNL_RV_PACING_THRESHOLD_PULSEWIDTH: 0.4 MS
MDC_IDC_MSMT_LEADCHNL_RV_SENSING_INTR_AMPL: 23.6 MV
MDC_IDC_PG_IMPLANT_DTM: NORMAL
MDC_IDC_PG_MFG: NORMAL
MDC_IDC_PG_MODEL: NORMAL
MDC_IDC_PG_SERIAL: NORMAL
MDC_IDC_PG_TYPE: NORMAL
MDC_IDC_SESS_CLINIC_NAME: NORMAL
MDC_IDC_SESS_DTM: NORMAL
MDC_IDC_SESS_TYPE: NORMAL
MDC_IDC_SET_BRADY_AT_MODE_SWITCH_RATE: 171 {BEATS}/MIN
MDC_IDC_SET_BRADY_HYSTRATE: NORMAL
MDC_IDC_SET_BRADY_LOWRATE: 60 {BEATS}/MIN
MDC_IDC_SET_BRADY_MAX_SENSOR_RATE: 120 {BEATS}/MIN
MDC_IDC_SET_BRADY_MAX_TRACKING_RATE: 120 {BEATS}/MIN
MDC_IDC_SET_BRADY_MODE: NORMAL
MDC_IDC_SET_BRADY_PAV_DELAY_LOW: 180 MS
MDC_IDC_SET_BRADY_SAV_DELAY_LOW: 150 MS
MDC_IDC_SET_LEADCHNL_RA_PACING_AMPLITUDE: 1.5 V
MDC_IDC_SET_LEADCHNL_RA_PACING_ANODE_ELECTRODE_1: NORMAL
MDC_IDC_SET_LEADCHNL_RA_PACING_ANODE_LOCATION_1: NORMAL
MDC_IDC_SET_LEADCHNL_RA_PACING_CAPTURE_MODE: NORMAL
MDC_IDC_SET_LEADCHNL_RA_PACING_CATHODE_ELECTRODE_1: NORMAL
MDC_IDC_SET_LEADCHNL_RA_PACING_CATHODE_LOCATION_1: NORMAL
MDC_IDC_SET_LEADCHNL_RA_PACING_POLARITY: NORMAL
MDC_IDC_SET_LEADCHNL_RA_PACING_PULSEWIDTH: 0.4 MS
MDC_IDC_SET_LEADCHNL_RA_SENSING_ANODE_ELECTRODE_1: NORMAL
MDC_IDC_SET_LEADCHNL_RA_SENSING_ANODE_LOCATION_1: NORMAL
MDC_IDC_SET_LEADCHNL_RA_SENSING_CATHODE_ELECTRODE_1: NORMAL
MDC_IDC_SET_LEADCHNL_RA_SENSING_CATHODE_LOCATION_1: NORMAL
MDC_IDC_SET_LEADCHNL_RA_SENSING_POLARITY: NORMAL
MDC_IDC_SET_LEADCHNL_RA_SENSING_SENSITIVITY: 0.6 MV
MDC_IDC_SET_LEADCHNL_RV_PACING_AMPLITUDE: 2 V
MDC_IDC_SET_LEADCHNL_RV_PACING_ANODE_ELECTRODE_1: NORMAL
MDC_IDC_SET_LEADCHNL_RV_PACING_ANODE_LOCATION_1: NORMAL
MDC_IDC_SET_LEADCHNL_RV_PACING_CAPTURE_MODE: NORMAL
MDC_IDC_SET_LEADCHNL_RV_PACING_CATHODE_ELECTRODE_1: NORMAL
MDC_IDC_SET_LEADCHNL_RV_PACING_CATHODE_LOCATION_1: NORMAL
MDC_IDC_SET_LEADCHNL_RV_PACING_POLARITY: NORMAL
MDC_IDC_SET_LEADCHNL_RV_PACING_PULSEWIDTH: 0.4 MS
MDC_IDC_SET_LEADCHNL_RV_SENSING_ANODE_ELECTRODE_1: NORMAL
MDC_IDC_SET_LEADCHNL_RV_SENSING_ANODE_LOCATION_1: NORMAL
MDC_IDC_SET_LEADCHNL_RV_SENSING_CATHODE_ELECTRODE_1: NORMAL
MDC_IDC_SET_LEADCHNL_RV_SENSING_CATHODE_LOCATION_1: NORMAL
MDC_IDC_SET_LEADCHNL_RV_SENSING_POLARITY: NORMAL
MDC_IDC_SET_LEADCHNL_RV_SENSING_SENSITIVITY: 0.9 MV
MDC_IDC_SET_ZONE_DETECTION_INTERVAL: 350 MS
MDC_IDC_SET_ZONE_DETECTION_INTERVAL: 400 MS
MDC_IDC_SET_ZONE_TYPE: NORMAL
MDC_IDC_STAT_AT_BURDEN_PERCENT: 0.1 %
MDC_IDC_STAT_AT_DTM_END: NORMAL
MDC_IDC_STAT_AT_DTM_START: NORMAL
MDC_IDC_STAT_BRADY_AP_VP_PERCENT: 21.34 %
MDC_IDC_STAT_BRADY_AP_VS_PERCENT: 0.06 %
MDC_IDC_STAT_BRADY_AS_VP_PERCENT: 77.67 %
MDC_IDC_STAT_BRADY_AS_VS_PERCENT: 0.93 %
MDC_IDC_STAT_BRADY_DTM_END: NORMAL
MDC_IDC_STAT_BRADY_DTM_START: NORMAL
MDC_IDC_STAT_BRADY_RA_PERCENT_PACED: 20.78 %
MDC_IDC_STAT_BRADY_RV_PERCENT_PACED: 96.8 %
MDC_IDC_STAT_EPISODE_RECENT_COUNT: 0
MDC_IDC_STAT_EPISODE_RECENT_COUNT_DTM_END: NORMAL
MDC_IDC_STAT_EPISODE_RECENT_COUNT_DTM_START: NORMAL
MDC_IDC_STAT_EPISODE_TOTAL_COUNT: 0
MDC_IDC_STAT_EPISODE_TOTAL_COUNT: 9
MDC_IDC_STAT_EPISODE_TOTAL_COUNT_DTM_END: NORMAL
MDC_IDC_STAT_EPISODE_TOTAL_COUNT_DTM_START: NORMAL
MDC_IDC_STAT_EPISODE_TYPE: NORMAL

## 2023-01-25 NOTE — PROGRESS NOTES
[facility] Care Coordination Contact    No Letter Received: 60 day tracking of letter complete, no letter received from [facility]. Tracking discontinued.     Merari Nelson  Care Management Specialist  [facility]  [phone]

## 2023-03-22 ENCOUNTER — TELEPHONE (OUTPATIENT)
Dept: FAMILY MEDICINE | Facility: CLINIC | Age: 88
End: 2023-03-22

## 2023-03-22 NOTE — TELEPHONE ENCOUNTER
Received Written MD orders from [facility] via the dumbwaiter placed in provider bin 3 yellow folder

## 2023-03-22 NOTE — TELEPHONE ENCOUNTER
What type of form? medical  What day did you drop off your forms? 3/22/23  Is there a due date?  (7-10 business day to compete forms)   How would you like to receive these forms? Patient will  at the clinic when completed  Which clinic was the form dropped off at? [facility]    What is the best number to contact you? Cell [phone]    What time works best to contact you with in 4 hrs? ANYTIME  Is it okay to leave a message? YES    Fernando Ferrari

## 2023-03-23 ENCOUNTER — TELEPHONE (OUTPATIENT)
Dept: FAMILY MEDICINE | Facility: CLINIC | Age: 88
End: 2023-03-23

## 2023-03-23 NOTE — TELEPHONE ENCOUNTER
Pt was not able to make appt today. Pt is scheduled for 4/6 and wants to make sure that is his able to fill medication till then. Please advise.     Ethan ZHOU [facility]    Primary Care

## 2023-04-11 DIAGNOSIS — R73.01 IMPAIRED FASTING GLUCOSE: ICD-10-CM

## 2023-04-11 DIAGNOSIS — K21.9 GASTRO-ESOPHAGEAL REFLUX DISEASE WITHOUT ESOPHAGITIS: ICD-10-CM

## 2023-04-11 DIAGNOSIS — I25.118 CORONARY ARTERY DISEASE OF NATIVE ARTERY OF NATIVE HEART WITH STABLE ANGINA PECTORIS (H): ICD-10-CM

## 2023-04-11 DIAGNOSIS — N40.0 BENIGN NODULAR PROSTATIC HYPERPLASIA WITHOUT LOWER URINARY TRACT SYMPTOMS: ICD-10-CM

## 2023-04-12 RX ORDER — FINASTERIDE 5 MG/1
TABLET, FILM COATED ORAL
Qty: 28 TABLET | Refills: 0 | OUTPATIENT
Start: 2023-04-12

## 2023-04-12 RX ORDER — FAMOTIDINE 20 MG/1
TABLET, FILM COATED ORAL
Qty: 56 TABLET | Refills: 0 | OUTPATIENT
Start: 2023-04-12

## 2023-04-12 RX ORDER — METFORMIN HCL 500 MG
TABLET, EXTENDED RELEASE 24 HR ORAL
Qty: 28 TABLET | Refills: 0 | OUTPATIENT
Start: 2023-04-12

## 2023-04-12 RX ORDER — ASPIRIN 81 MG/1
TABLET, COATED ORAL
Qty: 14 TABLET | Refills: 0 | Status: SHIPPED | OUTPATIENT
Start: 2023-04-12 | End: 2023-05-16

## 2023-04-12 RX ORDER — ISOSORBIDE MONONITRATE 30 MG/1
TABLET, EXTENDED RELEASE ORAL
Qty: 14 TABLET | Refills: 0 | Status: SHIPPED | OUTPATIENT
Start: 2023-04-12 | End: 2023-05-16

## 2023-04-16 ENCOUNTER — HEALTH MAINTENANCE LETTER (OUTPATIENT)
Age: 88
End: 2023-04-16

## 2023-04-25 ENCOUNTER — VIRTUAL VISIT (OUTPATIENT)
Dept: FAMILY MEDICINE | Facility: CLINIC | Age: 88
End: 2023-04-25
Payer: COMMERCIAL

## 2023-04-25 DIAGNOSIS — R73.01 IMPAIRED FASTING GLUCOSE: ICD-10-CM

## 2023-04-25 DIAGNOSIS — K21.9 GASTRO-ESOPHAGEAL REFLUX DISEASE WITHOUT ESOPHAGITIS: ICD-10-CM

## 2023-04-25 DIAGNOSIS — J44.1 CHRONIC OBSTRUCTIVE PULMONARY DISEASE WITH ACUTE EXACERBATION (H): Primary | ICD-10-CM

## 2023-04-25 DIAGNOSIS — J06.9 UPPER RESPIRATORY TRACT INFECTION, UNSPECIFIED TYPE: ICD-10-CM

## 2023-04-25 DIAGNOSIS — N40.0 BENIGN NODULAR PROSTATIC HYPERPLASIA WITHOUT LOWER URINARY TRACT SYMPTOMS: ICD-10-CM

## 2023-04-25 DIAGNOSIS — I10 HYPERTENSION GOAL BP (BLOOD PRESSURE) < 130/80: ICD-10-CM

## 2023-04-25 DIAGNOSIS — I25.118 CORONARY ARTERY DISEASE OF NATIVE ARTERY OF NATIVE HEART WITH STABLE ANGINA PECTORIS (H): ICD-10-CM

## 2023-04-25 DIAGNOSIS — I10 ESSENTIAL HYPERTENSION: ICD-10-CM

## 2023-04-25 PROCEDURE — 99213 OFFICE O/P EST LOW 20 MIN: CPT | Mod: VID | Performed by: PHYSICIAN ASSISTANT

## 2023-04-25 RX ORDER — AMLODIPINE BESYLATE 5 MG/1
5 TABLET ORAL DAILY
Qty: 28 TABLET | Refills: 0 | Status: SHIPPED | OUTPATIENT
Start: 2023-04-25 | End: 2023-05-16

## 2023-04-25 RX ORDER — GUAIFENESIN AND DEXTROMETHORPHAN HYDROBROMIDE 1200; 60 MG/1; MG/1
1 TABLET, EXTENDED RELEASE ORAL 2 TIMES DAILY
Qty: 28 TABLET | Refills: 0 | Status: SHIPPED | OUTPATIENT
Start: 2023-04-25 | End: 2023-05-16

## 2023-04-25 RX ORDER — HYDROCHLOROTHIAZIDE 12.5 MG/1
2 CAPSULE ORAL DAILY
Qty: 56 CAPSULE | Refills: 0 | Status: SHIPPED | OUTPATIENT
Start: 2023-04-25 | End: 2023-05-16

## 2023-04-25 RX ORDER — AZITHROMYCIN 250 MG/1
TABLET, FILM COATED ORAL
Qty: 6 TABLET | Refills: 0 | Status: SHIPPED | OUTPATIENT
Start: 2023-04-25 | End: 2023-04-30

## 2023-04-25 RX ORDER — TAMSULOSIN HYDROCHLORIDE 0.4 MG/1
0.8 CAPSULE ORAL AT BEDTIME
Qty: 56 CAPSULE | Refills: 0 | Status: SHIPPED | OUTPATIENT
Start: 2023-04-25 | End: 2023-05-16

## 2023-04-25 RX ORDER — PREDNISONE 20 MG/1
40 TABLET ORAL DAILY
Qty: 10 TABLET | Refills: 0 | Status: SHIPPED | OUTPATIENT
Start: 2023-04-25 | End: 2023-04-30

## 2023-04-25 RX ORDER — METFORMIN HCL 500 MG
TABLET, EXTENDED RELEASE 24 HR ORAL
Qty: 28 TABLET | Refills: 0 | Status: SHIPPED | OUTPATIENT
Start: 2023-04-25 | End: 2023-05-16

## 2023-04-25 RX ORDER — ENALAPRIL MALEATE 10 MG/1
10 TABLET ORAL DAILY
Qty: 28 TABLET | Refills: 0 | Status: SHIPPED | OUTPATIENT
Start: 2023-04-25 | End: 2023-05-16

## 2023-04-25 RX ORDER — FINASTERIDE 5 MG/1
1 TABLET, FILM COATED ORAL AT BEDTIME
Qty: 28 TABLET | Refills: 0 | Status: SHIPPED | OUTPATIENT
Start: 2023-04-25 | End: 2023-05-16

## 2023-04-25 RX ORDER — FAMOTIDINE 20 MG/1
20 TABLET, FILM COATED ORAL 2 TIMES DAILY
Qty: 56 TABLET | Refills: 0 | Status: SHIPPED | OUTPATIENT
Start: 2023-04-25 | End: 2023-05-16

## 2023-04-25 RX ORDER — ALBUTEROL SULFATE 90 UG/1
2 AEROSOL, METERED RESPIRATORY (INHALATION) EVERY 4 HOURS PRN
Qty: 18 G | Refills: 3 | Status: SHIPPED | OUTPATIENT
Start: 2023-04-25 | End: 2023-05-16

## 2023-04-25 NOTE — PROGRESS NOTES
Gallito is a 89 year old who is being evaluated via a phone video visit.      How would you like to obtain your AVS? Caitlinhart  If the video visit is dropped, the invitation should be resent by: Send to e-mail at: [email or web address] or [phone]  Will anyone else be joining your video visit? No        Assessment & Plan   Problem List Items Addressed This Visit    None  Visit Diagnoses     Chronic obstructive pulmonary disease with acute exacerbation (H)    -  Primary    Relevant Medications    predniSONE (DELTASONE) 20 MG tablet    azithromycin (ZITHROMAX) 250 MG tablet    albuterol (PROAIR HFA/PROVENTIL HFA/VENTOLIN HFA) 108 (90 Base) MCG/ACT inhaler    Dextromethorphan-Guaifenesin  MG TB12    Upper respiratory tract infection, unspecified type        Hypertension goal BP (blood pressure) < 130/80        Relevant Medications    enalapril (VASOTEC) 10 MG tablet    amLODIPine (NORVASC) 5 MG tablet    Coronary artery disease of native artery of native heart with stable angina pectoris (H)        Essential hypertension        Relevant Medications    hydrochlorothiazide (MICROZIDE) 12.5 MG capsule    enalapril (VASOTEC) 10 MG tablet    Impaired fasting glucose        Relevant Medications    metFORMIN (GLUCOPHAGE XR) 500 MG 24 hr tablet    Benign nodular prostatic hyperplasia without lower urinary tract symptoms        Relevant Medications    tamsulosin (FLOMAX) 0.4 MG capsule    finasteride (PROSCAR) 5 MG tablet    Gastro-esophageal reflux disease without esophagitis        Relevant Medications    famotidine (PEPCID) 20 MG tablet         Zpack  Prednisone 40 mg daily for 5 days  Mucinex Dm 1 tablet twice a day for 7-`14 days   Albuterol inhaler 2 puffs every 4 hours as needed  Continue Breo ellipta and Spiriva inhalers daily   If breathing worsens, please go to ED-patient and his relatives understood.     30 days refills were provided. Patient is due to a physical with labs with his primary care provider,  which is scheduled for 5/16/23    20 minutes spent by me on the date of the encounter doing chart review, history and exam, documentation and further activities per the note           MERRILL Yuan [facility] EDDIE Conti is a 89 year old, presenting for the following health issues:  Refill Request and Cough        4/25/2023    10:19 AM   Additional Questions   Roomed by Maria A     History of Present Illness       Reason for visit:  Cough  Symptom onset:  1-2 weeks ago  Symptoms include:  Dry cough, feels sick  Symptom intensity:  Severe  Symptom progression:  Staying the same  What makes it worse:  None, laying down  What makes it better:  Sitting up    He eats 2-3 servings of fruits and vegetables daily.He consumes 0 sweetened beverage(s) daily.He exercises with enough effort to increase his heart rate 10 to 19 minutes per day.  He exercises with enough effort to increase his heart rate 3 or less days per week.   He is taking medications regularly.       Acute Illness  Acute illness concerns: cough  Onset/Duration: 10 days   Symptoms:  Fever: No  Chills/Sweats: No  Headache (location?): No  Sinus Pressure: No  Conjunctivitis:  No  Ear Pain: no  Rhinorrhea: YES  Congestion: YES  Sore Throat: No  Cough: YES-productive of yellow sputum, productive of green sputum  Wheeze: No  Decreased Appetite: No  Nausea: No  Vomiting: No  Diarrhea: No  Dysuria/Freq.: No  Dysuria or Hematuria: No  Fatigue/Achiness: YES  Sick/Strep Exposure: No  Therapies tried and outcome: None    COPD Follow-Up    Overall, how are your COPD symptoms since your last clinic visit?  Slightly worse    How much fatigue or shortness of breath do you have when you are walking?  Same as usual    How much shortness of breath do you have when you are resting?  Same as usual    How often do you cough? Often due to acute URI    Have you noticed any change in your sputum/phlegm?  Yes- more than normal    Have  you experienced a recent fever? No    Please describe how far you can walk without stopping to rest:  The length of 1-2 rooms    How many flights of stairs are you able to walk up without stopping?      Have you had any Emergency Room Visits, Urgent Care Visits, or Hospital Admissions because of your COPD since your last office visit?  No    History   Smoking Status     Former   Smokeless Tobacco     Never     No results found for: FEV1, IRQ6AKF              Patient's has appointment with Dr Awad on 5/16/23 for physical and medication follow up, but he wants refill for all his medications until he sees him. Patient needs BP , GERD and prostate medications refilled. Patient reports that he will get labs done when he sees Dr Awad.         Review of Systems   Constitutional, HEENT, cardiovascular, pulmonary, gi and gu systems are negative, except as otherwise noted.      Objective           Vitals:  No vitals were obtained today due to virtual visit.    Physical Exam   Physical Exam   alert and no distress  PSYCH: Alert and oriented times 3; coherent speech, normal   rate and volume, able to articulate logical thoughts, able   to abstract reason, no tangential thoughts, no hallucinations   or delusions  His affect is normal  RESP: Audible cough, no audible wheezing, able to talk in full sentences  Remainder of exam unable to be completed due to telephone visits                  Phone-Visit Details    Type of service:  Phone  Visit

## 2023-05-09 DIAGNOSIS — J44.9 COPD, MODERATE (H): ICD-10-CM

## 2023-05-09 DIAGNOSIS — M54.12 CERVICAL RADICULOPATHY: ICD-10-CM

## 2023-05-12 DIAGNOSIS — I25.118 CORONARY ARTERY DISEASE OF NATIVE ARTERY OF NATIVE HEART WITH STABLE ANGINA PECTORIS (H): ICD-10-CM

## 2023-05-12 DIAGNOSIS — N32.81 OAB (OVERACTIVE BLADDER): ICD-10-CM

## 2023-05-15 RX ORDER — TIOTROPIUM BROMIDE 18 UG/1
CAPSULE ORAL; RESPIRATORY (INHALATION)
Qty: 30 CAPSULE | Refills: 0 | Status: SHIPPED | OUTPATIENT
Start: 2023-05-15 | End: 2023-05-16

## 2023-05-15 RX ORDER — GABAPENTIN 100 MG/1
CAPSULE ORAL
Qty: 63 CAPSULE | Refills: 0 | Status: SHIPPED | OUTPATIENT
Start: 2023-05-15 | End: 2023-05-16

## 2023-05-16 ENCOUNTER — OFFICE VISIT (OUTPATIENT)
Dept: FAMILY MEDICINE | Facility: CLINIC | Age: 88
End: 2023-05-16
Payer: COMMERCIAL

## 2023-05-16 VITALS
RESPIRATION RATE: 22 BRPM | TEMPERATURE: 97.1 F | BODY MASS INDEX: 24.36 KG/M2 | HEART RATE: 97 BPM | SYSTOLIC BLOOD PRESSURE: 116 MMHG | OXYGEN SATURATION: 96 % | DIASTOLIC BLOOD PRESSURE: 80 MMHG | WEIGHT: 174 LBS

## 2023-05-16 DIAGNOSIS — K21.9 GASTRO-ESOPHAGEAL REFLUX DISEASE WITHOUT ESOPHAGITIS: ICD-10-CM

## 2023-05-16 DIAGNOSIS — I10 HYPERTENSION GOAL BP (BLOOD PRESSURE) < 130/80: ICD-10-CM

## 2023-05-16 DIAGNOSIS — Z00.00 ENCOUNTER FOR ANNUAL WELLNESS EXAM IN MEDICARE PATIENT: Primary | ICD-10-CM

## 2023-05-16 DIAGNOSIS — I25.118 CORONARY ARTERY DISEASE OF NATIVE ARTERY OF NATIVE HEART WITH STABLE ANGINA PECTORIS (H): ICD-10-CM

## 2023-05-16 DIAGNOSIS — M54.12 CERVICAL RADICULOPATHY: ICD-10-CM

## 2023-05-16 DIAGNOSIS — M25.50 POLYARTHRALGIA: ICD-10-CM

## 2023-05-16 DIAGNOSIS — N32.81 OAB (OVERACTIVE BLADDER): ICD-10-CM

## 2023-05-16 DIAGNOSIS — N40.0 BENIGN NODULAR PROSTATIC HYPERPLASIA WITHOUT LOWER URINARY TRACT SYMPTOMS: ICD-10-CM

## 2023-05-16 DIAGNOSIS — R73.01 IMPAIRED FASTING GLUCOSE: ICD-10-CM

## 2023-05-16 DIAGNOSIS — J44.9 COPD, MODERATE (H): ICD-10-CM

## 2023-05-16 LAB — HBA1C MFR BLD: 6.3 % (ref 0–5.6)

## 2023-05-16 PROCEDURE — 99397 PER PM REEVAL EST PAT 65+ YR: CPT | Performed by: INTERNAL MEDICINE

## 2023-05-16 PROCEDURE — 80053 COMPREHEN METABOLIC PANEL: CPT | Performed by: INTERNAL MEDICINE

## 2023-05-16 PROCEDURE — 83036 HEMOGLOBIN GLYCOSYLATED A1C: CPT | Performed by: INTERNAL MEDICINE

## 2023-05-16 PROCEDURE — 36415 COLL VENOUS BLD VENIPUNCTURE: CPT | Performed by: INTERNAL MEDICINE

## 2023-05-16 RX ORDER — AMLODIPINE BESYLATE 5 MG/1
5 TABLET ORAL DAILY
Qty: 28 TABLET | Refills: 11 | Status: SHIPPED | OUTPATIENT
Start: 2023-05-16 | End: 2024-05-06

## 2023-05-16 RX ORDER — MIRABEGRON 50 MG/1
50 TABLET, EXTENDED RELEASE ORAL DAILY
Qty: 28 TABLET | Refills: 11 | Status: SHIPPED | OUTPATIENT
Start: 2023-05-16 | End: 2024-04-16

## 2023-05-16 RX ORDER — FLUTICASONE FUROATE AND VILANTEROL 200; 25 UG/1; UG/1
POWDER RESPIRATORY (INHALATION)
Qty: 60 EACH | Refills: 11 | Status: SHIPPED | OUTPATIENT
Start: 2023-05-16 | End: 2024-05-06

## 2023-05-16 RX ORDER — ASPIRIN 81 MG/1
81 TABLET ORAL DAILY
Qty: 30 TABLET | Refills: 11 | Status: SHIPPED | OUTPATIENT
Start: 2023-05-16 | End: 2024-04-16

## 2023-05-16 RX ORDER — GABAPENTIN 100 MG/1
100 CAPSULE ORAL 3 TIMES DAILY
Qty: 90 CAPSULE | Refills: 0 | Status: SHIPPED | OUTPATIENT
Start: 2023-05-16 | End: 2023-06-07

## 2023-05-16 RX ORDER — FINASTERIDE 5 MG/1
1 TABLET, FILM COATED ORAL AT BEDTIME
Qty: 28 TABLET | Refills: 11 | Status: SHIPPED | OUTPATIENT
Start: 2023-05-16 | End: 2024-04-16

## 2023-05-16 RX ORDER — ALBUTEROL SULFATE 0.83 MG/ML
2.5 SOLUTION RESPIRATORY (INHALATION) EVERY 4 HOURS PRN
Qty: 540 ML | Refills: 11 | Status: SHIPPED | OUTPATIENT
Start: 2023-05-16

## 2023-05-16 RX ORDER — ENALAPRIL MALEATE 10 MG/1
10 TABLET ORAL DAILY
Qty: 28 TABLET | Refills: 11 | Status: SHIPPED | OUTPATIENT
Start: 2023-05-16 | End: 2024-05-06

## 2023-05-16 RX ORDER — ACETAMINOPHEN 500 MG
TABLET ORAL
Qty: 112 TABLET | Refills: 11 | Status: SHIPPED | OUTPATIENT
Start: 2023-05-16 | End: 2024-02-20

## 2023-05-16 RX ORDER — TIOTROPIUM BROMIDE 18 UG/1
18 CAPSULE ORAL; RESPIRATORY (INHALATION) DAILY
Qty: 30 CAPSULE | Refills: 11 | Status: SHIPPED | OUTPATIENT
Start: 2023-05-16 | End: 2024-05-06

## 2023-05-16 RX ORDER — ISOSORBIDE MONONITRATE 30 MG/1
30 TABLET, EXTENDED RELEASE ORAL DAILY
Qty: 30 TABLET | Refills: 11 | Status: SHIPPED | OUTPATIENT
Start: 2023-05-16 | End: 2024-04-18

## 2023-05-16 RX ORDER — HYDROCHLOROTHIAZIDE 12.5 MG/1
2 CAPSULE ORAL DAILY
Qty: 56 CAPSULE | Refills: 11 | Status: SHIPPED | OUTPATIENT
Start: 2023-05-16 | End: 2023-08-23

## 2023-05-16 RX ORDER — ALBUTEROL SULFATE 90 UG/1
2 AEROSOL, METERED RESPIRATORY (INHALATION) EVERY 4 HOURS PRN
Qty: 18 G | Refills: 3 | Status: SHIPPED | OUTPATIENT
Start: 2023-05-16

## 2023-05-16 RX ORDER — METFORMIN HCL 500 MG
TABLET, EXTENDED RELEASE 24 HR ORAL
Qty: 28 TABLET | Refills: 11 | Status: SHIPPED | OUTPATIENT
Start: 2023-05-16 | End: 2024-04-16

## 2023-05-16 RX ORDER — GUAIFENESIN AND DEXTROMETHORPHAN HYDROBROMIDE 1200; 60 MG/1; MG/1
1 TABLET, EXTENDED RELEASE ORAL 2 TIMES DAILY
Qty: 28 TABLET | Refills: 0 | Status: SHIPPED | OUTPATIENT
Start: 2023-05-16 | End: 2023-08-23

## 2023-05-16 RX ORDER — GUAIFENESIN 600 MG/1
1 TABLET, EXTENDED RELEASE ORAL 2 TIMES DAILY
Qty: 56 TABLET | Refills: 11 | Status: SHIPPED | OUTPATIENT
Start: 2023-05-16 | End: 2024-05-06

## 2023-05-16 RX ORDER — TAMSULOSIN HYDROCHLORIDE 0.4 MG/1
0.8 CAPSULE ORAL AT BEDTIME
Qty: 56 CAPSULE | Refills: 0 | Status: SHIPPED | OUTPATIENT
Start: 2023-05-16 | End: 2023-06-30

## 2023-05-16 RX ORDER — ATORVASTATIN CALCIUM 10 MG/1
10 TABLET, FILM COATED ORAL DAILY
Qty: 90 TABLET | Refills: 1 | Status: SHIPPED | OUTPATIENT
Start: 2023-05-16 | End: 2023-11-08

## 2023-05-16 RX ORDER — FAMOTIDINE 20 MG/1
20 TABLET, FILM COATED ORAL 2 TIMES DAILY
Qty: 56 TABLET | Refills: 11 | Status: SHIPPED | OUTPATIENT
Start: 2023-05-16 | End: 2024-04-16

## 2023-05-16 NOTE — PROGRESS NOTES
SUBJECTIVE:   Gallito is a 89 year old who presents for Preventive Visit.      5/16/2023     3:44 PM   Additional Questions   Roomed by mac   Accompanied by stevo oropeza     Are you in the first 12 months of your Medicare coverage?  No    Have you ever done Advance Care Planning? (For example, a Health Directive, POLST, or a discussion with a medical provider or your loved ones about your wishes): No.      Fall risk  Fallen 2 or more times in the past year?: No  Any fall with injury in the past year?: No    Cognitive Screening   1) Repeat 3 items (Leader, Season, Table)    2) Clock draw: NORMAL  3) 3 item recall: Recalls 2 objects   Results: NORMAL clock, 1-2 items recalled: COGNITIVE IMPAIRMENT LESS LIKELY    Mini-CogTM Copyright S Nicci. Licensed by the author for use in [employer]; reprinted with permission (edis@[facility]). All rights reserved.      Do you have sleep apnea, excessive snoring or daytime drowsiness?: no    Reviewed and updated as needed this visit by clinical staff   Tobacco  Allergies  Meds              Reviewed and updated as needed this visit by Provider                 Social History     Tobacco Use     Smoking status: Former     Passive exposure: Never     Smokeless tobacco: Never   Vaping Use     Vaping status: Never Used   Substance Use Topics     Alcohol use: No       Do you have a current opioid prescription? No  Do you use any other controlled substances or medications that are not prescribed by a provider? None    Current providers sharing in care for this patient include:   Patient Care Team:  Hakeem Awad MD as PCP - General (Internal Medicine)  Lenin Farley MD Schuster, Joseph, DPM (Podiatry)  Jorge David RN as Lead Care Coordinator  Santosh Matthew MD as MD (Urology)  Kristal Delgado RN as Registered Nurse  Genevieve Chang NP as Nurse Practitioner (Nurse Practitioner - Adult Health)  Abilio Frank MD as MD (Interventional  Cardiology)  Elma Lynn PA-C as Assigned PCP    The following health maintenance items are reviewed in Epic and correct as of today:  Health Maintenance   Topic Date Due     COPD ACTION PLAN  Never done     COVID-19 Vaccine (1) Never done     ZOSTER IMMUNIZATION (1 of 2) Never done     MEDICARE ANNUAL WELLNESS VISIT  Never done     Pneumococcal Vaccine: 65+ Years (2 - PCV) 08/03/2017     INFLUENZA VACCINE (Season Ended) 09/01/2023     ANNUAL REVIEW OF HM ORDERS  04/25/2024     FALL RISK ASSESSMENT  05/16/2024     DTAP/TDAP/TD IMMUNIZATION (2 - Td or Tdap) 08/03/2026     ADVANCE CARE PLANNING  05/16/2028     SPIROMETRY  Completed     PHQ-2 (once per calendar year)  Completed     IPV IMMUNIZATION  Aged Out     MENINGITIS IMMUNIZATION  Aged Out     Labs reviewed in EPIC  BP Readings from Last 3 Encounters:   05/16/23 116/80   05/04/21 125/79   10/29/20 109/67    Wt Readings from Last 3 Encounters:   05/16/23 78.9 kg (174 lb)   05/04/21 79.2 kg (174 lb 9.6 oz)   10/29/20 85.8 kg (189 lb 1.6 oz)                  Patient Active Problem List   Diagnosis     Chronic constipation     GERD (gastroesophageal reflux disease)     Atrioventricular block, complete (H)     Advanced directives, counseling/discussion     Cardiac pacemaker in situ- Medtronic, dual chamber- NOT dependent     Moderate COPD (chronic obstructive pulmonary disease) (H)     B12 deficiency     OA (osteoarthritis) of knee     Insomnia, unspecified     HTN (hypertension)     Hypertrophy of prostate without urinary obstruction and other lower urinary tract symptoms (LUTS)     Past Surgical History:   Procedure Laterality Date     CHOLECYSTECTOMY       ENT SURGERY       IRRIGATION AND DEBRIDEMENT HIP, COMBINED  4/18/2013    Procedure: COMBINED IRRIGATION AND DEBRIDEMENT HIP;  Irrigation and debridement of Right Hip with cultures;  Surgeon: Cristal Inman MD;  Location: [facility] OR     Partial pneumonectomy      done in [facility] in the 1990's      skin grafting - leg wound  6/2016       Social History     Tobacco Use     Smoking status: Former     Passive exposure: Never     Smokeless tobacco: Never   Vaping Use     Vaping status: Never Used   Substance Use Topics     Alcohol use: No     Family History   Problem Relation Age of Onset     Family History Negative Mother          Current Outpatient Medications   Medication Sig Dispense Refill     acetaminophen (ACETAMINOPHEN EXTRA STRENGTH) 500 MG tablet TAKE 1 TABLET BY MOUTH EVERY SIX HOURS AS NEEDED FOR PAIN, ALTERNATE WITH IBUPROFEN EVERY 3 HOURS 112 tablet 11     acetaminophen (TYLENOL) 325 MG tablet Take 2 tablets (650 mg) by mouth 3 times daily as needed for pain 180 tablet 5     albuterol (PROAIR HFA/PROVENTIL HFA/VENTOLIN HFA) 108 (90 Base) MCG/ACT inhaler Inhale 2 puffs into the lungs every 4 hours as needed for shortness of breath, wheezing or cough 18 g 3     albuterol (PROVENTIL) (2.5 MG/3ML) 0.083% neb solution Take 1 vial (2.5 mg) by nebulization every 4 hours as needed for shortness of breath or wheezing 540 mL 11     amLODIPine (NORVASC) 5 MG tablet Take 1 tablet (5 mg) by mouth daily 28 tablet 11     ammonium lactate (LAC-HYDRIN) 12 % external lotion Apply topically 2 times daily 567 g 1     aspirin (ASPIRIN LOW DOSE) 81 MG EC tablet Take 1 tablet (81 mg) by mouth daily 30 tablet 11     atorvastatin (LIPITOR) 10 MG tablet Take 1 tablet (10 mg) by mouth daily 90 tablet 1     Dextromethorphan-Guaifenesin  MG TB12 Take 1 tablet by mouth 2 times daily 28 tablet 0     diclofenac (VOLTAREN) 1 % topical gel Place 2 g onto the skin 4 times daily 100 g 11     diclofenac sodium 3 % GEL Apply 4 gm four times a day as needed to right trapezius muscle. 100 g 11     enalapril (VASOTEC) 10 MG tablet Take 1 tablet (10 mg) by mouth daily 28 tablet 11     famotidine (PEPCID) 20 MG tablet Take 1 tablet (20 mg) by mouth 2 times daily 56 tablet 11     finasteride (PROSCAR) 5 MG tablet Take 1 tablet (5 mg) by  mouth At Bedtime 28 tablet 11     fluticasone-vilanterol (BREO ELLIPTA) 200-25 MCG/ACT inhaler INHALE 1 PUFF BY MOUTH DAILY *RINSE MOUTH OUT AFTER EACH USE* 60 each 11     guaiFENesin (MUCINEX) 600 MG 12 hr tablet Take 1 tablet (600 mg) by mouth 2 times daily 56 tablet 11     hydrochlorothiazide (MICROZIDE) 12.5 MG capsule Take 2 capsules (25 mg) by mouth daily 56 capsule 11     ibuprofen (ADVIL/MOTRIN) 600 MG tablet TAKE 1 TABLET BY MOUTH EVERY SIX HOURS AS NEEDED FOR PAIN 30 tablet 5     isosorbide mononitrate (IMDUR) 30 MG 24 hr tablet Take 1 tablet (30 mg) by mouth daily 30 tablet 11     ketotifen (ZADITOR/REFRESH ANTI-ITCH) 0.025 % ophthalmic solution Place 1 drop into both eyes 2 times daily 1 Bottle 11     melatonin 3 MG tablet Take 3 mg by mouth       melatonin 5 MG CAPS Take 1 capsule by mouth At Bedtime 30 capsule 11     metFORMIN (GLUCOPHAGE XR) 500 MG 24 hr tablet TAKE 1 TABLET BY MOUTH DAILY WITH LARGEST MEAL OF DAY 28 tablet 11     MILK OF MAGNESIA 7.75 % suspension TAKE 30ML BY MOUTH DAILY AS NEEDED FOR CONSTIPATION 473 mL 11     mirabegron (MYRBETRIQ) 50 MG 24 hr tablet Take 1 tablet (50 mg) by mouth daily 28 tablet 11     order for DME Equipment being ordered: Nebulizer machine and adult tubing 1 each 0     order for DME Equipment being ordered: Dispense Coban wrap. 5 each 5     phenazopyridine (PYRIDIUM) 200 MG tablet Take 1 tablet (200 mg) by mouth 3 times daily as needed for irritation 12 tablet 3     polyethylene glycol (MIRALAX) 17 GM/Dose powder MIX 17 GRAMS IN 4-6OZ LIQUID AND DRINK BY MOUTH ONCE DAILY 510 g 11     polyethylene glycol-propylene glycol (SYSTANE) 0.4-0.3 % SOLN ophthalmic solution Place 1 drop into both eyes 4 times daily 1 Bottle 11     ranitidine (ZANTAC) 150 MG tablet Take 150 mg by mouth 2 times daily       tamsulosin (FLOMAX) 0.4 MG capsule Take 2 capsules (0.8 mg) by mouth At Bedtime 56 capsule 0     tiotropium (SPIRIVA HANDIHALER) 18 MCG inhaled capsule Inhale 1 capsule  (18 mcg) into the lungs daily 30 capsule 11     tolterodine ER (DETROL LA) 4 MG 24 hr capsule Take 1 capsule (4 mg) by mouth daily 30 capsule 11     traZODone (DESYREL) 50 MG tablet TAKE 1 TABLET BY MOUTH AT BEDTIME AS NEEDED FOR SLEEP 30 tablet 1     gabapentin (NEURONTIN) 100 MG capsule TAKE 1 CAPSULE BY MOUTH THREE TIMES A DAY 84 capsule 11     No Known Allergies  Recent Labs   Lab Test 05/16/23  1701 05/06/20  0756 10/02/18  1709 04/04/18  1238 08/03/16  1255 07/24/16  0628 07/23/16  0530 06/14/16  0700 06/13/16  1612   A1C 6.3*  --   --   --   --   --  5.7  --   --    ALT 19  --  25 22   < >  --  15   < > 57   CR 0.85 1.04 1.03 1.06   < >  --  0.88   < > 0.90   GFRESTIMATED 83 65 69 67   < >  --  83   < > 81   GFRESTBLACK  --  75 83 80   < >  --  >90   GFR Calc     < > >90   GFR Calc     POTASSIUM 3.6 3.7 3.7 3.3*   < >  --  4.1   < > 3.8   TSH  --   --   --   --   --  1.04  --   --  0.20*    < > = values in this interval not displayed.          Review of Systems  CONSTITUTIONAL: NEGATIVE for fever, chills, change in weight  INTEGUMENTARY/SKIN: NEGATIVE for worrisome rashes, moles or lesions  EYES: NEGATIVE for vision changes or irritation  ENT/MOUTH: NEGATIVE for ear, mouth and throat problems  RESP: NEGATIVE for significant cough or SOB  CV: NEGATIVE for chest pain, palpitations or peripheral edema  GI: NEGATIVE for nausea, abdominal pain, heartburn, or change in bowel habits  : NEGATIVE for frequency, dysuria, or hematuria  MUSCULOSKELETAL: NEGATIVE for significant arthralgias or myalgia  NEURO: NEGATIVE for weakness, dizziness or paresthesias  ENDOCRINE: NEGATIVE for temperature intolerance, skin/hair changes  HEME: NEGATIVE for bleeding problems  PSYCHIATRIC: NEGATIVE for changes in mood or affect    OBJECTIVE:   /80 (BP Location: Right arm, Patient Position: Sitting, Cuff Size: Adult Regular)   Pulse 97   Temp 97.1  F (36.2  C)   Resp 22   Wt 78.9 kg (174 lb)    "SpO2 96%   BMI 24.36 kg/m   Estimated body mass index is 24.36 kg/m  as calculated from the following:    Height as of 5/4/21: 1.8 m (5' 10.87\").    Weight as of this encounter: 78.9 kg (174 lb).  Physical Exam  GENERAL: healthy, alert and no distress  EYES: Eyes grossly normal to inspection and conjunctivae and sclerae normal  HENT: normal cephalic/atraumatic  NECK: no adenopathy and thyroid normal to palpation  RESP: lungs clear to auscultation - no rales, rhonchi or wheezes  CV: regular rate and rhythm, normal S1 S2, no S3 or S4, no murmur, click or rub, no peripheral edema and peripheral pulses strong  ABDOMEN: soft, nontender and bowel sounds normal  MS: no gross musculoskeletal defects noted, no edema  NEURO: Normal strength and tone, mentation intact and speech normal  PSYCH: mentation appears normal, affect normal/bright    Diagnostic Test Results:  Labs reviewed in Epic    ASSESSMENT / PLAN:     Encounter for annual wellness exam in Medicare patient  - CBC with platelets and differential; Future  - Comprehensive metabolic panel  - Hemoglobin A1c    Hypertension goal BP (blood pressure) < 130/80  - amLODIPine (NORVASC) 5 MG tablet; Take 1 tablet (5 mg) by mouth daily  - isosorbide mononitrate (IMDUR) 30 MG 24 hr tablet; Take 1 tablet (30 mg) by mouth daily    COPD, moderate (H)  - fluticasone-vilanterol (BREO ELLIPTA) 200-25 MCG/ACT inhaler; INHALE 1 PUFF BY MOUTH DAILY *RINSE MOUTH OUT AFTER EACH USE*  - tiotropium (SPIRIVA HANDIHALER) 18 MCG inhaled capsule; Inhale 1 capsule (18 mcg) into the lungs daily    Coronary artery disease of native artery of native heart with stable angina pectoris (H)  - atorvastatin (LIPITOR) 10 MG tablet; Take 1 tablet (10 mg) by mouth daily  - aspirin (ASPIRIN LOW DOSE) 81 MG EC tablet; Take 1 tablet (81 mg) by mouth daily  - isosorbide mononitrate (IMDUR) 30 MG 24 hr tablet; Take 1 tablet (30 mg) by mouth daily    Polyarthralgia  - acetaminophen (ACETAMINOPHEN EXTRA " STRENGTH) 500 MG tablet; TAKE 1 TABLET BY MOUTH EVERY SIX HOURS AS NEEDED FOR PAIN, ALTERNATE WITH IBUPROFEN EVERY 3 HOURS    Benign nodular prostatic hyperplasia without lower urinary tract symptoms  - finasteride (PROSCAR) 5 MG tablet; Take 1 tablet (5 mg) by mouth At Bedtime  - tamsulosin (FLOMAX) 0.4 MG capsule; Take 2 capsules (0.8 mg) by mouth At Bedtime    Gastro-esophageal reflux disease without esophagitis  - famotidine (PEPCID) 20 MG tablet; Take 1 tablet (20 mg) by mouth 2 times daily    Impaired fasting glucose  - metFORMIN (GLUCOPHAGE XR) 500 MG 24 hr tablet; TAKE 1 TABLET BY MOUTH DAILY WITH LARGEST MEAL OF DAY    OAB (overactive bladder)  - mirabegron (MYRBETRIQ) 50 MG 24 hr tablet; Take 1 tablet (50 mg) by mouth daily      Patient has been advised of split billing requirements and indicates understanding: Yes      COUNSELING:  Special attention given to:       Regular exercise       Healthy diet/nutrition       The ASCVD Risk score ([place] DK, et al., 2019) failed to calculate for the following reasons:    The 2019 ASCVD risk score is only valid for ages 40 to 79        He reports that he has quit smoking. He has never been exposed to tobacco smoke. He has never used smokeless tobacco.      Appropriate preventive services were discussed with this patient, including applicable screening as appropriate for cardiovascular disease, diabetes, osteopenia/osteoporosis, and glaucoma.  As appropriate for age/gender, discussed screening for colorectal cancer, prostate cancer, breast cancer, and cervical cancer. Checklist reviewing preventive services available has been given to the patient.    Reviewed patients plan of care and provided an AVS. The Basic Care Plan (routine screening as documented in Health Maintenance) for Gallito meets the Care Plan requirement. This Care Plan has been established and reviewed with the Patient and son.        Hakeem Awad MD  [facility]  [facility]    Identified Health Risks:  None.

## 2023-05-16 NOTE — TELEPHONE ENCOUNTER
atorvastatin (LIPITOR) 10 MG  Last Written Prescription Date:  6/3/22  Last Fill Quantity: 90,   # refills: 1  Last Office Visit : 5/4/21  Future Office visit:  NONE  RTC  6 MOS   Routing refill request to provider for review/approval because:  OVER DUE APPT.  < 18 mos   REFILL GAP Is pt taking med? / DOSE?

## 2023-05-16 NOTE — PATIENT INSTRUCTIONS
At [facility], we strive to deliver an exceptional experience to you, every time we see you. If you receive a survey, please complete it as we do value your feedback.  If you have MyChart, you can expect to receive results automatically within 24 hours of their completion.  Your provider will send a note interpreting your results as well.   If you do not have MyChart, you should receive your results in about a week by mail.    Your care team:                            Family Medicine Internal Medicine   MD Hakeem Yuen MD Shantel Branch-Fleming, MD Srinivasa Vaka, MD Katya Belousova, PAKIRSTEN Nava CNP, MD ([facility]   Colton Hector, MD Annie Sosa MD Amelia Massimini APRN CHRISTINE Rees APRN MD Courtney Zavala MD          Clinic hours: Monday - Thursday 7 am-6 pm; Fridays 7 am-5 pm.   Urgent care: Monday - Friday 10 am- 8 pm; Saturday and Sunday 9 am-5 pm.    Clinic: [phone]       [facility]: Monday - Thursday 8 am - 7 pm; Friday 8 am - 6 pm  [facility]: [phone]

## 2023-05-17 LAB
ALBUMIN SERPL-MCNC: 3.1 G/DL (ref 3.4–5)
ALP SERPL-CCNC: 111 U/L (ref 40–150)
ALT SERPL W P-5'-P-CCNC: 19 U/L (ref 0–70)
ANION GAP SERPL CALCULATED.3IONS-SCNC: 5 MMOL/L (ref 3–14)
AST SERPL W P-5'-P-CCNC: 15 U/L (ref 0–45)
BILIRUB SERPL-MCNC: 0.4 MG/DL (ref 0.2–1.3)
BUN SERPL-MCNC: 6 MG/DL (ref 7–30)
CALCIUM SERPL-MCNC: 9.7 MG/DL (ref 8.5–10.1)
CHLORIDE BLD-SCNC: 97 MMOL/L (ref 94–109)
CO2 SERPL-SCNC: 34 MMOL/L (ref 20–32)
CREAT SERPL-MCNC: 0.85 MG/DL (ref 0.66–1.25)
GFR SERPL CREATININE-BSD FRML MDRD: 83 ML/MIN/1.73M2
GLUCOSE BLD-MCNC: 111 MG/DL (ref 70–99)
POTASSIUM BLD-SCNC: 3.6 MMOL/L (ref 3.4–5.3)
PROT SERPL-MCNC: 7.9 G/DL (ref 6.8–8.8)
SODIUM SERPL-SCNC: 136 MMOL/L (ref 133–144)

## 2023-05-17 RX ORDER — ASPIRIN 81 MG/1
TABLET, COATED ORAL
Qty: 14 TABLET | Refills: 0 | OUTPATIENT
Start: 2023-05-17

## 2023-05-17 RX ORDER — ISOSORBIDE MONONITRATE 30 MG/1
TABLET, EXTENDED RELEASE ORAL
Qty: 14 TABLET | Refills: 0 | OUTPATIENT
Start: 2023-05-17

## 2023-05-17 RX ORDER — MIRABEGRON 50 MG/1
TABLET, FILM COATED, EXTENDED RELEASE ORAL
Qty: 21 TABLET | OUTPATIENT
Start: 2023-05-17

## 2023-05-18 RX ORDER — ATORVASTATIN CALCIUM 10 MG/1
TABLET, FILM COATED ORAL
Qty: 30 TABLET | Refills: 0 | OUTPATIENT
Start: 2023-05-18

## 2023-05-25 ENCOUNTER — ANCILLARY PROCEDURE (OUTPATIENT)
Dept: CARDIOLOGY | Facility: CLINIC | Age: 88
End: 2023-05-25
Attending: INTERNAL MEDICINE
Payer: COMMERCIAL

## 2023-05-25 DIAGNOSIS — Z95.0 CARDIAC PACEMAKER IN SITU: ICD-10-CM

## 2023-05-25 DIAGNOSIS — I44.1 ATRIOVENTRICULAR BLOCK, SECOND DEGREE: ICD-10-CM

## 2023-05-25 PROCEDURE — 93296 REM INTERROG EVL PM/IDS: CPT

## 2023-05-25 PROCEDURE — 93294 REM INTERROG EVL PM/LDLS PM: CPT | Performed by: INTERNAL MEDICINE

## 2023-05-31 ENCOUNTER — PATIENT OUTREACH (OUTPATIENT)
Dept: GERIATRIC MEDICINE | Facility: CLINIC | Age: 88
End: 2023-05-31
Payer: COMMERCIAL

## 2023-05-31 NOTE — PROGRESS NOTES
[facility] Care Coordination Contact      [facility] Six-Month Telephone Assessment    6 month telephone assessment completed on 5/31/23 with Joe at [facility].    ER visits: No  Hospitalizations: No  TCU stays: No  Significant health status changes: N/A  Falls/Injuries: No  ADL/IADL changes: No  Changes in services: No    Caregiver Assessment follow up:  N/A    Goals: See POC in chart for goal progress documentation.  Joe states they need double the amt of Chux and Gloves per joe.  Care Coordinator spoke with Alyssia at [facility] - increased to 200/mo Chux and 4 boxes Gloves - gloves are under EW and will require new auth.  Alyssia will send a quote.  Care Coordinator notified Joe.     Will see member in 6 months for an annual health risk assessment.   Encouraged member to call CC with any questions or concerns in the meantime.     Jorge David RN, PHN  [facility]

## 2023-06-01 ENCOUNTER — MEDICAL CORRESPONDENCE (OUTPATIENT)
Dept: FAMILY MEDICINE | Facility: CLINIC | Age: 88
End: 2023-06-01

## 2023-06-01 LAB
MDC_IDC_LEAD_IMPLANT_DT: NORMAL
MDC_IDC_LEAD_IMPLANT_DT: NORMAL
MDC_IDC_LEAD_LOCATION: NORMAL
MDC_IDC_LEAD_LOCATION: NORMAL
MDC_IDC_LEAD_LOCATION_DETAIL_1: NORMAL
MDC_IDC_LEAD_LOCATION_DETAIL_1: NORMAL
MDC_IDC_LEAD_MFG: NORMAL
MDC_IDC_LEAD_MFG: NORMAL
MDC_IDC_LEAD_MODEL: NORMAL
MDC_IDC_LEAD_MODEL: NORMAL
MDC_IDC_LEAD_POLARITY_TYPE: NORMAL
MDC_IDC_LEAD_POLARITY_TYPE: NORMAL
MDC_IDC_LEAD_SERIAL: NORMAL
MDC_IDC_LEAD_SERIAL: NORMAL
MDC_IDC_MSMT_BATTERY_DTM: NORMAL
MDC_IDC_MSMT_BATTERY_REMAINING_LONGEVITY: 40 MO
MDC_IDC_MSMT_BATTERY_RRT_TRIGGER: 2.83
MDC_IDC_MSMT_BATTERY_STATUS: NORMAL
MDC_IDC_MSMT_BATTERY_VOLTAGE: 2.97 V
MDC_IDC_MSMT_LEADCHNL_RA_IMPEDANCE_VALUE: 380 OHM
MDC_IDC_MSMT_LEADCHNL_RA_IMPEDANCE_VALUE: 456 OHM
MDC_IDC_MSMT_LEADCHNL_RA_PACING_THRESHOLD_AMPLITUDE: 0.62 V
MDC_IDC_MSMT_LEADCHNL_RA_PACING_THRESHOLD_PULSEWIDTH: 0.4 MS
MDC_IDC_MSMT_LEADCHNL_RA_SENSING_INTR_AMPL: 2.25 MV
MDC_IDC_MSMT_LEADCHNL_RV_IMPEDANCE_VALUE: 513 OHM
MDC_IDC_MSMT_LEADCHNL_RV_IMPEDANCE_VALUE: 551 OHM
MDC_IDC_MSMT_LEADCHNL_RV_PACING_THRESHOLD_AMPLITUDE: 0.62 V
MDC_IDC_MSMT_LEADCHNL_RV_PACING_THRESHOLD_PULSEWIDTH: 0.4 MS
MDC_IDC_MSMT_LEADCHNL_RV_SENSING_INTR_AMPL: 12.1 MV
MDC_IDC_PG_IMPLANT_DTM: NORMAL
MDC_IDC_PG_MFG: NORMAL
MDC_IDC_PG_MODEL: NORMAL
MDC_IDC_PG_SERIAL: NORMAL
MDC_IDC_PG_TYPE: NORMAL
MDC_IDC_SESS_CLINIC_NAME: NORMAL
MDC_IDC_SESS_DTM: NORMAL
MDC_IDC_SESS_TYPE: NORMAL
MDC_IDC_SET_BRADY_AT_MODE_SWITCH_RATE: 171 {BEATS}/MIN
MDC_IDC_SET_BRADY_HYSTRATE: NORMAL
MDC_IDC_SET_BRADY_LOWRATE: 60 {BEATS}/MIN
MDC_IDC_SET_BRADY_MAX_SENSOR_RATE: 120 {BEATS}/MIN
MDC_IDC_SET_BRADY_MAX_TRACKING_RATE: 120 {BEATS}/MIN
MDC_IDC_SET_BRADY_MODE: NORMAL
MDC_IDC_SET_BRADY_PAV_DELAY_LOW: 180 MS
MDC_IDC_SET_BRADY_SAV_DELAY_LOW: 150 MS
MDC_IDC_SET_LEADCHNL_RA_PACING_AMPLITUDE: 1.5 V
MDC_IDC_SET_LEADCHNL_RA_PACING_ANODE_ELECTRODE_1: NORMAL
MDC_IDC_SET_LEADCHNL_RA_PACING_ANODE_LOCATION_1: NORMAL
MDC_IDC_SET_LEADCHNL_RA_PACING_CAPTURE_MODE: NORMAL
MDC_IDC_SET_LEADCHNL_RA_PACING_CATHODE_ELECTRODE_1: NORMAL
MDC_IDC_SET_LEADCHNL_RA_PACING_CATHODE_LOCATION_1: NORMAL
MDC_IDC_SET_LEADCHNL_RA_PACING_POLARITY: NORMAL
MDC_IDC_SET_LEADCHNL_RA_PACING_PULSEWIDTH: 0.4 MS
MDC_IDC_SET_LEADCHNL_RA_SENSING_ANODE_ELECTRODE_1: NORMAL
MDC_IDC_SET_LEADCHNL_RA_SENSING_ANODE_LOCATION_1: NORMAL
MDC_IDC_SET_LEADCHNL_RA_SENSING_CATHODE_ELECTRODE_1: NORMAL
MDC_IDC_SET_LEADCHNL_RA_SENSING_CATHODE_LOCATION_1: NORMAL
MDC_IDC_SET_LEADCHNL_RA_SENSING_POLARITY: NORMAL
MDC_IDC_SET_LEADCHNL_RA_SENSING_SENSITIVITY: 0.6 MV
MDC_IDC_SET_LEADCHNL_RV_PACING_AMPLITUDE: 2 V
MDC_IDC_SET_LEADCHNL_RV_PACING_ANODE_ELECTRODE_1: NORMAL
MDC_IDC_SET_LEADCHNL_RV_PACING_ANODE_LOCATION_1: NORMAL
MDC_IDC_SET_LEADCHNL_RV_PACING_CAPTURE_MODE: NORMAL
MDC_IDC_SET_LEADCHNL_RV_PACING_CATHODE_ELECTRODE_1: NORMAL
MDC_IDC_SET_LEADCHNL_RV_PACING_CATHODE_LOCATION_1: NORMAL
MDC_IDC_SET_LEADCHNL_RV_PACING_POLARITY: NORMAL
MDC_IDC_SET_LEADCHNL_RV_PACING_PULSEWIDTH: 0.4 MS
MDC_IDC_SET_LEADCHNL_RV_SENSING_ANODE_ELECTRODE_1: NORMAL
MDC_IDC_SET_LEADCHNL_RV_SENSING_ANODE_LOCATION_1: NORMAL
MDC_IDC_SET_LEADCHNL_RV_SENSING_CATHODE_ELECTRODE_1: NORMAL
MDC_IDC_SET_LEADCHNL_RV_SENSING_CATHODE_LOCATION_1: NORMAL
MDC_IDC_SET_LEADCHNL_RV_SENSING_POLARITY: NORMAL
MDC_IDC_SET_LEADCHNL_RV_SENSING_SENSITIVITY: 0.9 MV
MDC_IDC_SET_ZONE_DETECTION_INTERVAL: 350 MS
MDC_IDC_SET_ZONE_DETECTION_INTERVAL: 400 MS
MDC_IDC_SET_ZONE_TYPE: NORMAL
MDC_IDC_STAT_AT_BURDEN_PERCENT: 0.1 %
MDC_IDC_STAT_AT_DTM_END: NORMAL
MDC_IDC_STAT_AT_DTM_START: NORMAL
MDC_IDC_STAT_BRADY_AP_VP_PERCENT: 15.42 %
MDC_IDC_STAT_BRADY_AP_VS_PERCENT: 0.01 %
MDC_IDC_STAT_BRADY_AS_VP_PERCENT: 84.29 %
MDC_IDC_STAT_BRADY_AS_VS_PERCENT: 0.27 %
MDC_IDC_STAT_BRADY_DTM_END: NORMAL
MDC_IDC_STAT_BRADY_DTM_START: NORMAL
MDC_IDC_STAT_BRADY_RA_PERCENT_PACED: 15.34 %
MDC_IDC_STAT_BRADY_RV_PERCENT_PACED: 99.24 %
MDC_IDC_STAT_EPISODE_RECENT_COUNT: 0
MDC_IDC_STAT_EPISODE_RECENT_COUNT_DTM_END: NORMAL
MDC_IDC_STAT_EPISODE_RECENT_COUNT_DTM_START: NORMAL
MDC_IDC_STAT_EPISODE_TOTAL_COUNT: 0
MDC_IDC_STAT_EPISODE_TOTAL_COUNT: 9
MDC_IDC_STAT_EPISODE_TOTAL_COUNT_DTM_END: NORMAL
MDC_IDC_STAT_EPISODE_TOTAL_COUNT_DTM_START: NORMAL
MDC_IDC_STAT_EPISODE_TYPE: NORMAL

## 2023-06-01 NOTE — TELEPHONE ENCOUNTER
Form completed by the provider. Copy placed in abstract and tc bin and original placed at the  for . Called pt and let him know this is ready for .

## 2023-06-01 NOTE — TELEPHONE ENCOUNTER
Pt has come into the clinic looking for this form. Form has not been completed yet. Please complete form ASAP!!  Forms placed in provider's red folder to address asap.

## 2023-06-06 DIAGNOSIS — M54.12 CERVICAL RADICULOPATHY: ICD-10-CM

## 2023-06-07 RX ORDER — GABAPENTIN 100 MG/1
CAPSULE ORAL
Qty: 84 CAPSULE | Refills: 11 | Status: SHIPPED | OUTPATIENT
Start: 2023-06-07 | End: 2024-05-06

## 2023-06-16 ENCOUNTER — PATIENT OUTREACH (OUTPATIENT)
Dept: GERIATRIC MEDICINE | Facility: CLINIC | Age: 88
End: 2023-06-16
Payer: COMMERCIAL

## 2023-06-16 NOTE — PROGRESS NOTES
[facility] Care Coordination Contact    Per CC,  Completed following tasks: Submitted referrals/auths for shira Dukes  Case Management Specialist   [facility]  [phone]

## 2023-06-30 DIAGNOSIS — N40.0 BENIGN NODULAR PROSTATIC HYPERPLASIA WITHOUT LOWER URINARY TRACT SYMPTOMS: ICD-10-CM

## 2023-06-30 RX ORDER — TAMSULOSIN HYDROCHLORIDE 0.4 MG/1
CAPSULE ORAL
Qty: 56 CAPSULE | Refills: 5 | Status: SHIPPED | OUTPATIENT
Start: 2023-06-30 | End: 2023-12-20

## 2023-07-06 ENCOUNTER — MYC MEDICAL ADVICE (OUTPATIENT)
Dept: FAMILY MEDICINE | Facility: CLINIC | Age: 88
End: 2023-07-06
Payer: COMMERCIAL

## 2023-07-14 ENCOUNTER — MYC MEDICAL ADVICE (OUTPATIENT)
Dept: FAMILY MEDICINE | Facility: CLINIC | Age: 88
End: 2023-07-14
Payer: COMMERCIAL

## 2023-07-14 DIAGNOSIS — G40.909 SEIZURE DISORDER (H): Primary | ICD-10-CM

## 2023-07-14 RX ORDER — LEVETIRACETAM 250 MG/1
250 TABLET ORAL 2 TIMES DAILY
Qty: 60 TABLET | Refills: 11 | Status: SHIPPED | OUTPATIENT
Start: 2023-07-14 | End: 2024-06-05

## 2023-07-14 NOTE — TELEPHONE ENCOUNTER
Outpatient Medication Detail     Disp Refills Start End PHILLIP   levETIRAcetam (KEPPRA) 250 MG tablet 60 tablet 11 7/14/2023  --   Sig - Route: Take 1 tablet (250 mg) by mouth 2 times daily - Oral   Sent to pharmacy as: levETIRAcetam 250 MG Oral Tablet (KEPPRA)   Class: E-Prescribe   Order: [id number]   E-Prescribing Status: Sent to pharmacy (7/14/2023 10:36 AM CDT)

## 2023-07-14 NOTE — TELEPHONE ENCOUNTER
Please see mychart message from patient. Patient's family would like Keppra prescription sent to [facility] in [place]. Pharmacy is ozzie'd up. This is a new medication. RN unsure dose provider will prescribe. Routing to provider to please order as appropriate. Thank you.     Nancy Bernal, FAVION, RN  [facility]  Nurse Triage, [facility]

## 2023-07-24 ENCOUNTER — PATIENT OUTREACH (OUTPATIENT)
Dept: GERIATRIC MEDICINE | Facility: CLINIC | Age: 88
End: 2023-07-24
Payer: COMMERCIAL

## 2023-08-01 ENCOUNTER — PATIENT OUTREACH (OUTPATIENT)
Dept: GERIATRIC MEDICINE | Facility: CLINIC | Age: 88
End: 2023-08-01
Payer: COMMERCIAL

## 2023-08-01 NOTE — PROGRESS NOTES
"7/24/23: Care Coordinator received call from Joe, director at [facility] stating that member's family wants member to move out - one of member's nieces opened a group home/AL and the family wants member to move in there.   Member wife from [place] is calling him as well and urging him to move - saying she is going to move to [place] to be with him as well. Joe does not believe that to be true as she does not believe the wife can come to [place].   Care Coordinator let Joe know that CC will need to speak with [facility] with .    Care Coordinator placed call to [facility] with [employer] language line  - Somalian  ID#[id number].  Per [facility] he states that he would like to go where his family wants him to go - he will move where his family is. He stated that he doesn't know where that is - he believes in [place] but he wants to go if that is where his family wants him.  He states that his wife and kids will be living with him.   Care Coordinator explained to him that a process needs to happen of making sure where he is going is safe and if it is a group home or AL then Care Coordinator needs to confirm that they are licensed and able to bill [employer].   He understands and states that it's ok if its a few months.  He states his wife's name is Hebert.  Member states that Care Coordinator can \"talk to anyone that calls\".  He is giving permission for Care Coordinator to talk with his family.   Care Coordinator asked member that if family comes or calls again to give them Care Coordinator phone # and they can reach out to this Care Coordinator.  Member also states that he knows he is getting good care where he is living now but his family will also take good care of him.     Care Coordinator will wait for family to contact if they are choosing to move member.  Care Coordinator also informed Joe of this - she will let them know they need to follow the process and she will give family Care Coordinator contact " #.    Jorge David RN, PHN  [facility]

## 2023-08-01 NOTE — PROGRESS NOTES
"8/1/23: Care Coordinator received notification form Joe at member current AL that member son/nephew Miguelito came and last elif and moved member out of home.   He left a note stating \"I will be transitioning him to Assisted Living.   [facility]  [address] [place] [address]    [phone]    Care Coordinator placed call to above [phone] # - spoke with a woman who stated she was a relative of member but referred Care Coordinator to contact SHWETA Russo at [facility] at [phone].  Care Coordinator also spoke with Miguelito at [phone] who is member son/nephew.  He states he was raised by member but member is actually his uncle.  He has been helping member with moving.  He states moving member has nothing to do with the facility - they know they cared for him well but wanted to move him closer to them.  They all live in [place] and want member closer.  [facility] is a new facility and member kerri works there.   Miguelito was not aware of the process and CC needing to authorize services, etc.   Miguelito states that he had contacted the county but that did not tell him of Care Coordinator involvement - only that paperwork needed to be filed with the [facility] letting them know if member was moving.   Explained the process.  He verablized understanding.    Per member conversation with Care Coordinator he wants to move where his family wants him to.    Care Coordinator spoke with SHWETA Russo at [facility] at [phone] - she stated that they do not have their provider # yet at this time.  They are licensed with [facility] but waiting for their provider # which can take up to 30 days.   She was not aware of being contracted with [facility] to be able to bill  - she will contact [facility] and follow the process.    At this time per johnna, Miguelito, and member he is in a safe place being cared for. Care Coordinator will follow up with nephew, member, and Karan next week.     Jorge David, " RN, PHN  [facility]

## 2023-08-04 ENCOUNTER — TELEPHONE (OUTPATIENT)
Dept: PHARMACY | Facility: OTHER | Age: 88
End: 2023-08-04
Payer: COMMERCIAL

## 2023-08-04 NOTE — TELEPHONE ENCOUNTER
MTM Recruitment: [facility] insurance     Referral outreach attempt #1 on August 4, 2023      Outcome: left voicemail for patient to call MTM scheduling line at [phone]    Daron SteinbergD, [facility]  Medication Therapy Management Provider

## 2023-08-07 ENCOUNTER — PATIENT OUTREACH (OUTPATIENT)
Dept: GERIATRIC MEDICINE | Facility: CLINIC | Age: 88
End: 2023-08-07
Payer: COMMERCIAL

## 2023-08-09 ENCOUNTER — PATIENT OUTREACH (OUTPATIENT)
Dept: GERIATRIC MEDICINE | Facility: CLINIC | Age: 88
End: 2023-08-09
Payer: COMMERCIAL

## 2023-08-10 ENCOUNTER — TELEPHONE (OUTPATIENT)
Dept: PHARMACY | Facility: CLINIC | Age: 88
End: 2023-08-10
Payer: COMMERCIAL

## 2023-08-10 NOTE — PROGRESS NOTES
Care Coordinator spoke with SHWETA Lafleur at [facility] at [phone].  She states that member is doing very well and happy.  Care Coordinator spoke with member - he agreed he is doing well.   Miquel states she has follow up with [facility] regarding provider # and billing - she has been filling out paperwork and as soon as they have provider # with DHS she will continue with [facility] paperwork.  She states again that they want to continue with caring for member and they are family as well.  Care Coordinator scheduled time to come out to see member to discuss with him the move, etc.  Care Coordinator will visit on 8/9/23 at 1:30 p.m.   Care Coordinator also spoke with Miguelito oropeza.  He states member is very happy and this has worked out well - they are able to visit member more often as it is near to their home.   He wants to continue member living there.  He also states he will work with Miquel on establishing with new PCP at [facility] or [place].   He will work on this in next few days.     Jorge David RN, PHN  [facility]

## 2023-08-10 NOTE — PROGRESS NOTES
Care Coordinator made home visit to  at his new home at [facility] at [address] [place] [address].   Main contact at home is Lanre Lafleur at [phone].   is very happy and says he is enjoying his new home.   Care Coordinator reviewed care and services with  and Miquel.  Care Coordinator will update CL tool as soon as Miquel has provider #.    Care Coordinator called [facility] to update address for delivery of supplies.     Will have CMS updated address, phone # and upload updated CAS.     Jorge David RN, N  [facility]

## 2023-08-10 NOTE — TELEPHONE ENCOUNTER
MTM referral from: Patient's insurance     MTM referral outreach attempt #2 on 08/10/23       Outcome: MONICA Edmond PharmD, BCACP  Medication Therapy Management Provider  Phone: [phone]  [email or web address]

## 2023-08-23 ENCOUNTER — VIRTUAL VISIT (OUTPATIENT)
Dept: PHARMACY | Facility: CLINIC | Age: 88
End: 2023-08-23
Payer: COMMERCIAL

## 2023-08-23 ENCOUNTER — APPOINTMENT (OUTPATIENT)
Dept: INTERPRETER SERVICES | Facility: CLINIC | Age: 88
End: 2023-08-23
Payer: COMMERCIAL

## 2023-08-23 ENCOUNTER — PATIENT OUTREACH (OUTPATIENT)
Dept: GERIATRIC MEDICINE | Facility: CLINIC | Age: 88
End: 2023-08-23
Payer: COMMERCIAL

## 2023-08-23 DIAGNOSIS — E11.42 TYPE 2 DIABETES MELLITUS WITH DIABETIC POLYNEUROPATHY, WITHOUT LONG-TERM CURRENT USE OF INSULIN (H): ICD-10-CM

## 2023-08-23 DIAGNOSIS — L29.9 ITCHING: ICD-10-CM

## 2023-08-23 DIAGNOSIS — J30.89 SEASONAL ALLERGIC RHINITIS DUE TO OTHER ALLERGIC TRIGGER: ICD-10-CM

## 2023-08-23 DIAGNOSIS — I10 PRIMARY HYPERTENSION: Primary | ICD-10-CM

## 2023-08-23 DIAGNOSIS — M17.9 OSTEOARTHRITIS OF KNEE, UNSPECIFIED LATERALITY, UNSPECIFIED OSTEOARTHRITIS TYPE: ICD-10-CM

## 2023-08-23 DIAGNOSIS — J44.9 MODERATE COPD (CHRONIC OBSTRUCTIVE PULMONARY DISEASE) (H): ICD-10-CM

## 2023-08-23 DIAGNOSIS — I25.118 CORONARY ARTERY DISEASE OF NATIVE HEART WITH STABLE ANGINA PECTORIS, UNSPECIFIED VESSEL OR LESION TYPE (H): ICD-10-CM

## 2023-08-23 DIAGNOSIS — R35.0 BENIGN PROSTATIC HYPERPLASIA WITH URINARY FREQUENCY: ICD-10-CM

## 2023-08-23 DIAGNOSIS — G40.909 SEIZURE DISORDER (H): ICD-10-CM

## 2023-08-23 DIAGNOSIS — K21.9 GASTROESOPHAGEAL REFLUX DISEASE, UNSPECIFIED WHETHER ESOPHAGITIS PRESENT: ICD-10-CM

## 2023-08-23 DIAGNOSIS — N40.1 BENIGN PROSTATIC HYPERPLASIA WITH URINARY FREQUENCY: ICD-10-CM

## 2023-08-23 PROCEDURE — 99605 MTMS BY PHARM NP 15 MIN: CPT | Performed by: PHARMACIST

## 2023-08-23 PROCEDURE — 99607 MTMS BY PHARM ADDL 15 MIN: CPT | Performed by: PHARMACIST

## 2023-08-23 NOTE — LETTER
August 24, 2023  Gallito GERARDO Miguelito  [address] [facility] [place] [address]    Dear Mr. Norriselie, ABELARDO [employer] EDDIE     Thank you for talking with me on Aug 23, 2023 about your health and medications. As a follow-up to our conversation, I have included two documents:      Your Recommended To-Do List has steps you should take to get the best results from your medications.  Your Medication List will help you keep track of your medications and how to take them.    If you want to talk about these documents, please call Fam Mao RPH at phone: [phone], Monday-Friday 8-4:30pm.    I look forward to working with you and your doctors to make sure your medications work well for you.    Sincerely,  Fam Mao RPH  [facility] Pharmacist, [facility]

## 2023-08-23 NOTE — PROGRESS NOTES
Care Coordinator placed call to SHWETA Castillo at [facility]isted [facility] to touch base on their CL provider number - she states that they called to follow up and they were originally told by 8/20 but when they called they were told by [facility] that they will let them know and will notify them.  She states  is doing very well - they have a MTM visit today with  pharmacist.   She also states that  has PCP appt in Sept to establish care - Care Coordinator does not see appt scheduled.  She states johnna Farley made the appt so she will follow up with him as they would like member to stay with [place].   Care Coordinator made  and Jonathan aware of a cardiac device check up appt for 8/30.   She is going to call the dept to get details on appt.     Jorge David RN, PHN  [facility]

## 2023-08-23 NOTE — LETTER
_  Medication List        Prepared on: 08/24/2023     Bring your Medication List when you go to the doctor, hospital, or   emergency room. And, share it with your family or caregivers.     Note any changes to how you take your medications.  Cross out medications when you no longer use them.    Medication How I take it Why I use it Prescriber   acetaminophen (ACETAMINOPHEN EXTRA STRENGTH) 500 MG tablet TAKE 1 TABLET BY MOUTH EVERY SIX HOURS AS NEEDED FOR PAIN, ALTERNATE WITH IBUPROFEN EVERY 3 HOURS Polyarthralgia Hakeem Awad MD   albuterol (PROAIR HFA/PROVENTIL HFA/VENTOLIN HFA) 108 (90 Base) MCG/ACT inhaler Inhale 2 puffs into the lungs every 4 hours as needed for shortness of breath, wheezing or cough  COPD Hakeem Awad MD   albuterol (PROVENTIL) (2.5 MG/3ML) 0.083% neb solution Take 1 vial (2.5 mg) by nebulization every 4 hours as needed for shortness of breath or wheezing  COPD Hakeem Awad MD   amLODIPine (NORVASC) 5 MG tablet Take 1 tablet (5 mg) by mouth daily Hypertension Goal BP (Blood Pressure) < 130/80 Hakeem Awad MD   ammonium lactate (LAC-HYDRIN) 12 % external lotion Apply topically 2 times daily Xerosis Cutis Hakeem Awad MD   aspirin (ASPIRIN LOW DOSE) 81 MG EC tablet Take 1 tablet (81 mg) by mouth daily Coronary artery disease of native artery of native heart with stable angina pectoris (H) Hakeem Awad MD   atorvastatin (LIPITOR) 10 MG tablet Take 1 tablet (10 mg) by mouth daily Coronary artery disease of native artery of native heart with stable angina pectoris (H) Hakeem Awad MD   enalapril (VASOTEC) 10 MG tablet Take 1 tablet (10 mg) by mouth daily  Hypertension Hakeem Awad MD   famotidine (PEPCID) 20 MG tablet Take 1 tablet (20 mg) by mouth 2 times daily Gastro-Esophageal Reflux Disease Without Esophagitis Hakeem Awad MD   finasteride (PROSCAR) 5 MG tablet Take 1 tablet (5 mg) by mouth At Bedtime Benign nodular prostatic  hyperplasia without lower urinary tract symptoms Hakeem Awad MD   fluticasone-vilanterol (BREO ELLIPTA) 200-25 MCG/ACT inhaler INHALE 1 PUFF BY MOUTH DAILY *RINSE MOUTH OUT AFTER EACH USE* COPD, moderate (H) Hakeem Awad MD   gabapentin (NEURONTIN) 100 MG capsule TAKE 1 CAPSULE BY MOUTH THREE TIMES A DAY Cervical Radiculopathy Hakeem Awad MD   guaiFENesin (MUCINEX) 600 MG 12 hr tablet Take 1 tablet (600 mg) by mouth 2 times daily  COPD Hakeem Awad MD   hydrochlorothiazide (HYDRODIURIL) 25 MG tablet Take 1 tablet (25 mg) by mouth daily Primary Hypertension Hakeem Awad MD   iopamidol (ISOVUE-370) solution 111 mL   Gross Hematuria Maria G Friedman PA-C   isosorbide mononitrate (IMDUR) 30 MG 24 hr tablet Take 1 tablet (30 mg) by mouth daily Coronary artery disease of native artery of native heart with stable angina pectoris (H); Hypertension Goal BP (Blood Pressure) < 130/80 Hakeem Awad MD   ketotifen (ZADITOR) 0.025 % ophthalmic solution Place 1 drop into both eyes 2 times daily  Allergies, watery eyes Patient Reported   levETIRAcetam (KEPPRA) 250 MG tablet Take 1 tablet (250 mg) by mouth 2 times daily Seizure Disorder (H) Hakeem Awad MD   metFORMIN (GLUCOPHAGE XR) 500 MG 24 hr tablet TAKE 1 TABLET BY MOUTH DAILY WITH LARGEST MEAL OF DAY Impaired Fasting Glucose Hakeem Awad MD   mirabegron (MYRBETRIQ) 50 MG 24 hr tablet Take 1 tablet (50 mg) by mouth daily OAB (overactive bladder) Hakeem Awad MD   tamsulosin (FLOMAX) 0.4 MG capsule TAKE 2 CAPSULES BY MOUTH EVERY NIGHT AT BEDTIME Benign nodular prostatic hyperplasia without lower urinary tract symptoms Hakeem Awad MD   tiotropium (SPIRIVA HANDIHALER) 18 MCG inhaled capsule Inhale 1 capsule (18 mcg) into the lungs daily COPD, moderate (H) Hakeem Awad MD         Add new medications, over-the-counter drugs, herbals, vitamins, or  minerals in the blank rows  below.    Medication How I take it Why I use it Prescriber                                      Allergies:      No Known Allergies        Side effects I have had:               Other Information:              My notes and questions:

## 2023-08-23 NOTE — PROGRESS NOTES
Medication Therapy Management (MTM) Encounter    ASSESSMENT:                            Medication Adherence/Access: gets meds prepacked from [facility] and administered by nursing staff    Type 2 Diabetes: Patient is meeting A1c goal of < 7%. No changes recommended at this time.     Immunizations: are not up to date. Due for Prevnar 20.  Microalbumin: due.   Annual Eye Exam: up to date.   Aspirin: is indicated in this patient at dose of 81mg daily for CAD per cardiology  Statin: Patient is on moderate intensity statin which is appropriate per 2019 ACC/AHA guidelines for lipid management due to the presence of diabetes.    COPD: stable on current regimen. No recent hospitalizations for COPD exacerbation. Only requiring infrequent use of albuterol.    Hypertension: Stable. Patient is meeting blood pressure goal of < 140/90mmHg.    CAD: stable. Following with cardiology.     Urinary incontinence/BPH: improved after addition of Myrbetriq    GERD: stable    Seizure Disorder: stable. No seizures since addition of levetiracetam    Pain/neuropathy: stable    Itching: stable    Allergic Rhinitis: stable    PLAN:                            1. Continue medications as prescribed    2. Due for Prevnar 20     Follow-up: with MTM for annual review once yearly    SUBJECTIVE/OBJECTIVE:                          Gallito Farley is a 89 year old male called for an initial visit. He was referred to me from his insurance for annual medication review. Visit was conducted with elaine Lafleur nurse.      Reason for visit: annual med review    Allergies/ADRs: None  Past Medical History: Reviewed in chart  Tobacco: He reports that he has quit smoking. He has never been exposed to tobacco smoke. He has never used smokeless tobacco.  Alcohol: not currently using  Other: Patient [facility] at [facility] in [place]     Medication Adherence/Access: medications are pill packaged by [facility]    Type 2 Diabetes:   Metformin  mg  once daily  Statin: atorvastatin 10 mg daily    Diet: on diabetes diet at [facility]    Current diabetes symptoms: none  Blood sugar monitoring: glucometer testing 1 times per day - usually around 120 mg/dL    COPD:   Breo Ellipta 200/25mcg - one puff once daily  Spiriva HandiHaler 18 mcg - inhale one capsule once daily  Mucinex  mg twice daily   Albuterol (ProAir/Ventolin/Proventil) inhaler as needed  Albuterol Nebs as needed    Patient rinses their mouth after using steroid inhaler.    Feels coughing has improved on current regimen  No concerns at this time    Hypertension:   Amlodipine 5 mg daily  Enalapril 10 mg daily  Hydrochlorothiazide 25 mg daily    Denies dizziness, edema  Patient has blood pressure monitored at nursing home  Home BP readings typically 140s/70s.      CAD:  Isosorbide 30 mg daily  Aspirin 81mg daily    Started by interventional cardiology in 2020  Pt has medically managed CAD  Denies abnormal bleeding/bruising    Urinary incontinence/BPH:   Finasteride 5 mg at bedtime  Tamsulosin 0.4 mg daily  Myrbetriq 50 mg daily    Urinary incontinence has improved recently  Feels current medications are working well    GERD:  Famotidine 20 mg twice daily  Pt not complaining of reflux symptoms    Seizure Disorder:  Levetiracetam 250 mg twice daily   Denies grogginess  Has hard time getting up in the morning    Pain/neuropathy:  Gabapentin 100 mg three times daily    Acetaminophen 500 mg every 6 hours    Pt not complaining of pain  Does endorse drowsiness early in the morning  This is not new for him    Itching:  Ammonium lactate 12% lotion if needed for itching  Works well when needed  Doesn't use often    Allergic Rhinitis:  Ketotifen eye drops  Works well to manage watery eyes    BP Readings from Last 3 Encounters:   05/16/23 116/80   05/04/21 125/79   10/29/20 109/67     Pulse Readings from Last 3 Encounters:   05/16/23 97   05/04/21 80   10/29/20 89       Lab Results   Component Value Date     A1C 6.3 (H) 05/16/2023     (H) 05/16/2023     05/16/2023    POTASSIUM 3.6 05/16/2023    PAMELA 9.7 05/16/2023    CHLORIDE 97 05/16/2023    CO2 34 (H) 05/16/2023    BUN 6 (L) 05/16/2023    CR 0.85 05/16/2023    GFRESTIMATED 83 05/16/2023    CHOL 143 04/24/2013    LDL 93 04/24/2013    HDL 20 (L) 04/24/2013    TRIG 148 04/24/2013    TSH 1.04 07/24/2016     Current Outpatient Medications   Medication Sig    hydrochlorothiazide (HYDRODIURIL) 12.5 MG tablet Take 1 tablet (12.5 mg) by mouth daily    ketotifen (ZADITOR) 0.025 % ophthalmic solution Place 1 drop into both eyes 2 times daily    acetaminophen (ACETAMINOPHEN EXTRA STRENGTH) 500 MG tablet TAKE 1 TABLET BY MOUTH EVERY SIX HOURS AS NEEDED FOR PAIN, ALTERNATE WITH IBUPROFEN EVERY 3 HOURS    albuterol (PROAIR HFA/PROVENTIL HFA/VENTOLIN HFA) 108 (90 Base) MCG/ACT inhaler Inhale 2 puffs into the lungs every 4 hours as needed for shortness of breath, wheezing or cough    albuterol (PROVENTIL) (2.5 MG/3ML) 0.083% neb solution Take 1 vial (2.5 mg) by nebulization every 4 hours as needed for shortness of breath or wheezing    amLODIPine (NORVASC) 5 MG tablet Take 1 tablet (5 mg) by mouth daily    ammonium lactate (LAC-HYDRIN) 12 % external lotion Apply topically 2 times daily    aspirin (ASPIRIN LOW DOSE) 81 MG EC tablet Take 1 tablet (81 mg) by mouth daily    atorvastatin (LIPITOR) 10 MG tablet Take 1 tablet (10 mg) by mouth daily    enalapril (VASOTEC) 10 MG tablet Take 1 tablet (10 mg) by mouth daily    famotidine (PEPCID) 20 MG tablet Take 1 tablet (20 mg) by mouth 2 times daily    finasteride (PROSCAR) 5 MG tablet Take 1 tablet (5 mg) by mouth At Bedtime    fluticasone-vilanterol (BREO ELLIPTA) 200-25 MCG/ACT inhaler INHALE 1 PUFF BY MOUTH DAILY *RINSE MOUTH OUT AFTER EACH USE*    gabapentin (NEURONTIN) 100 MG capsule TAKE 1 CAPSULE BY MOUTH THREE TIMES A DAY    guaiFENesin (MUCINEX) 600 MG 12 hr tablet Take 1 tablet (600 mg) by mouth 2 times daily     isosorbide mononitrate (IMDUR) 30 MG 24 hr tablet Take 1 tablet (30 mg) by mouth daily    levETIRAcetam (KEPPRA) 250 MG tablet Take 1 tablet (250 mg) by mouth 2 times daily    metFORMIN (GLUCOPHAGE XR) 500 MG 24 hr tablet TAKE 1 TABLET BY MOUTH DAILY WITH LARGEST MEAL OF DAY    mirabegron (MYRBETRIQ) 50 MG 24 hr tablet Take 1 tablet (50 mg) by mouth daily    order for DME Equipment being ordered: Nebulizer machine and adult tubing    order for DME Equipment being ordered: Dispense Coban wrap.    tamsulosin (FLOMAX) 0.4 MG capsule TAKE 2 CAPSULES BY MOUTH EVERY NIGHT AT BEDTIME    tiotropium (SPIRIVA HANDIHALER) 18 MCG inhaled capsule Inhale 1 capsule (18 mcg) into the lungs daily     No current facility-administered medications for this visit.     Facility-Administered Medications Ordered in Other Visits   Medication    iopamidol (ISOVUE-370) solution 111 mL     ----------------  I spent 22 minutes with this patient today. All changes were made via collaborative practice agreement with Hakeem Awad MD. A copy of the visit note was provided to the patient's provider(s).    A summary of these recommendations was declined by the patient.    Fam Mao, PharmD, [facility]  Medication Therapy Management Provider  [phone]    Telemedicine Visit Details  Type of service:  Telephone visit  Start Time:  2:36 PM  End Time: 2:58 PM     Medication Therapy Recommendations  Type 2 diabetes mellitus with diabetic polyneuropathy, without long-term current use of insulin (H)    Rationale: Preventive therapy - Needs additional medication therapy - Indication   Recommendation: Start Medication - Prevnar 20 0.5 ML Cynthia   Status: Accepted per CPA

## 2023-08-23 NOTE — LETTER
"Recommended To-Do List      Prepared on: 08/24/2023       You can get the best results from your medications by completing the items on this \"To-Do List.\"      Bring your To-Do List when you go to your doctor. And, share it with your family or caregivers.    My To-Do List:  What we talked about: What I should do:   Vaccines    You are due for the pneumonia vaccine (Prevnar 20)          What we talked about: What I should do:                     "

## 2023-08-24 RX ORDER — HYDROCHLOROTHIAZIDE 25 MG/1
25 TABLET ORAL DAILY
Qty: 28 TABLET | Refills: 11 | Status: SHIPPED | OUTPATIENT
Start: 2023-08-24 | End: 2024-08-01

## 2023-09-12 ENCOUNTER — PATIENT OUTREACH (OUTPATIENT)
Dept: GERIATRIC MEDICINE | Facility: CLINIC | Age: 88
End: 2023-09-12
Payer: COMMERCIAL

## 2023-09-15 ENCOUNTER — ANCILLARY PROCEDURE (OUTPATIENT)
Dept: CARDIOLOGY | Facility: CLINIC | Age: 88
End: 2023-09-15
Attending: INTERNAL MEDICINE
Payer: COMMERCIAL

## 2023-09-15 DIAGNOSIS — I44.1 ATRIOVENTRICULAR BLOCK, SECOND DEGREE: ICD-10-CM

## 2023-09-15 DIAGNOSIS — Z95.0 CARDIAC PACEMAKER IN SITU: ICD-10-CM

## 2023-09-15 LAB
MDC_IDC_EPISODE_DTM: NORMAL
MDC_IDC_EPISODE_DURATION: 7 S
MDC_IDC_EPISODE_ID: 10
MDC_IDC_EPISODE_TYPE: NORMAL
MDC_IDC_LEAD_IMPLANT_DT: NORMAL
MDC_IDC_LEAD_IMPLANT_DT: NORMAL
MDC_IDC_LEAD_LOCATION: NORMAL
MDC_IDC_LEAD_LOCATION: NORMAL
MDC_IDC_LEAD_LOCATION_DETAIL_1: NORMAL
MDC_IDC_LEAD_LOCATION_DETAIL_1: NORMAL
MDC_IDC_LEAD_MFG: NORMAL
MDC_IDC_LEAD_MFG: NORMAL
MDC_IDC_LEAD_MODEL: NORMAL
MDC_IDC_LEAD_MODEL: NORMAL
MDC_IDC_LEAD_POLARITY_TYPE: NORMAL
MDC_IDC_LEAD_POLARITY_TYPE: NORMAL
MDC_IDC_LEAD_SERIAL: NORMAL
MDC_IDC_LEAD_SERIAL: NORMAL
MDC_IDC_MSMT_BATTERY_DTM: NORMAL
MDC_IDC_MSMT_BATTERY_REMAINING_LONGEVITY: 40 MO
MDC_IDC_MSMT_BATTERY_RRT_TRIGGER: 2.83
MDC_IDC_MSMT_BATTERY_STATUS: NORMAL
MDC_IDC_MSMT_BATTERY_VOLTAGE: 2.97 V
MDC_IDC_MSMT_LEADCHNL_RA_IMPEDANCE_VALUE: 361 OHM
MDC_IDC_MSMT_LEADCHNL_RA_IMPEDANCE_VALUE: 437 OHM
MDC_IDC_MSMT_LEADCHNL_RA_PACING_THRESHOLD_AMPLITUDE: 0.62 V
MDC_IDC_MSMT_LEADCHNL_RA_PACING_THRESHOLD_PULSEWIDTH: 0.4 MS
MDC_IDC_MSMT_LEADCHNL_RA_SENSING_INTR_AMPL: 2.38 MV
MDC_IDC_MSMT_LEADCHNL_RA_SENSING_INTR_AMPL: 2.38 MV
MDC_IDC_MSMT_LEADCHNL_RV_IMPEDANCE_VALUE: 513 OHM
MDC_IDC_MSMT_LEADCHNL_RV_IMPEDANCE_VALUE: 532 OHM
MDC_IDC_MSMT_LEADCHNL_RV_PACING_THRESHOLD_AMPLITUDE: 0.75 V
MDC_IDC_MSMT_LEADCHNL_RV_PACING_THRESHOLD_PULSEWIDTH: 0.4 MS
MDC_IDC_MSMT_LEADCHNL_RV_SENSING_INTR_AMPL: 23.88 MV
MDC_IDC_MSMT_LEADCHNL_RV_SENSING_INTR_AMPL: 23.88 MV
MDC_IDC_PG_IMPLANT_DTM: NORMAL
MDC_IDC_PG_MFG: NORMAL
MDC_IDC_PG_MODEL: NORMAL
MDC_IDC_PG_SERIAL: NORMAL
MDC_IDC_PG_TYPE: NORMAL
MDC_IDC_SESS_CLINIC_NAME: NORMAL
MDC_IDC_SESS_DTM: NORMAL
MDC_IDC_SESS_TYPE: NORMAL
MDC_IDC_SET_BRADY_AT_MODE_SWITCH_RATE: 171 {BEATS}/MIN
MDC_IDC_SET_BRADY_HYSTRATE: NORMAL
MDC_IDC_SET_BRADY_LOWRATE: 60 {BEATS}/MIN
MDC_IDC_SET_BRADY_MAX_SENSOR_RATE: 120 {BEATS}/MIN
MDC_IDC_SET_BRADY_MAX_TRACKING_RATE: 120 {BEATS}/MIN
MDC_IDC_SET_BRADY_MODE: NORMAL
MDC_IDC_SET_BRADY_PAV_DELAY_LOW: 180 MS
MDC_IDC_SET_BRADY_SAV_DELAY_LOW: 150 MS
MDC_IDC_SET_LEADCHNL_RA_PACING_AMPLITUDE: 1.5 V
MDC_IDC_SET_LEADCHNL_RA_PACING_ANODE_ELECTRODE_1: NORMAL
MDC_IDC_SET_LEADCHNL_RA_PACING_ANODE_LOCATION_1: NORMAL
MDC_IDC_SET_LEADCHNL_RA_PACING_CAPTURE_MODE: NORMAL
MDC_IDC_SET_LEADCHNL_RA_PACING_CATHODE_ELECTRODE_1: NORMAL
MDC_IDC_SET_LEADCHNL_RA_PACING_CATHODE_LOCATION_1: NORMAL
MDC_IDC_SET_LEADCHNL_RA_PACING_POLARITY: NORMAL
MDC_IDC_SET_LEADCHNL_RA_PACING_PULSEWIDTH: 0.4 MS
MDC_IDC_SET_LEADCHNL_RA_SENSING_ANODE_ELECTRODE_1: NORMAL
MDC_IDC_SET_LEADCHNL_RA_SENSING_ANODE_LOCATION_1: NORMAL
MDC_IDC_SET_LEADCHNL_RA_SENSING_CATHODE_ELECTRODE_1: NORMAL
MDC_IDC_SET_LEADCHNL_RA_SENSING_CATHODE_LOCATION_1: NORMAL
MDC_IDC_SET_LEADCHNL_RA_SENSING_POLARITY: NORMAL
MDC_IDC_SET_LEADCHNL_RA_SENSING_SENSITIVITY: 0.6 MV
MDC_IDC_SET_LEADCHNL_RV_PACING_AMPLITUDE: 2 V
MDC_IDC_SET_LEADCHNL_RV_PACING_ANODE_ELECTRODE_1: NORMAL
MDC_IDC_SET_LEADCHNL_RV_PACING_ANODE_LOCATION_1: NORMAL
MDC_IDC_SET_LEADCHNL_RV_PACING_CAPTURE_MODE: NORMAL
MDC_IDC_SET_LEADCHNL_RV_PACING_CATHODE_ELECTRODE_1: NORMAL
MDC_IDC_SET_LEADCHNL_RV_PACING_CATHODE_LOCATION_1: NORMAL
MDC_IDC_SET_LEADCHNL_RV_PACING_POLARITY: NORMAL
MDC_IDC_SET_LEADCHNL_RV_PACING_PULSEWIDTH: 0.4 MS
MDC_IDC_SET_LEADCHNL_RV_SENSING_ANODE_ELECTRODE_1: NORMAL
MDC_IDC_SET_LEADCHNL_RV_SENSING_ANODE_LOCATION_1: NORMAL
MDC_IDC_SET_LEADCHNL_RV_SENSING_CATHODE_ELECTRODE_1: NORMAL
MDC_IDC_SET_LEADCHNL_RV_SENSING_CATHODE_LOCATION_1: NORMAL
MDC_IDC_SET_LEADCHNL_RV_SENSING_POLARITY: NORMAL
MDC_IDC_SET_LEADCHNL_RV_SENSING_SENSITIVITY: 0.9 MV
MDC_IDC_SET_ZONE_DETECTION_INTERVAL: 350 MS
MDC_IDC_SET_ZONE_DETECTION_INTERVAL: 400 MS
MDC_IDC_SET_ZONE_TYPE: NORMAL
MDC_IDC_STAT_AT_BURDEN_PERCENT: 0.1 %
MDC_IDC_STAT_AT_DTM_END: NORMAL
MDC_IDC_STAT_AT_DTM_START: NORMAL
MDC_IDC_STAT_BRADY_AP_VP_PERCENT: 15.26 %
MDC_IDC_STAT_BRADY_AP_VS_PERCENT: 0.02 %
MDC_IDC_STAT_BRADY_AS_VP_PERCENT: 84.45 %
MDC_IDC_STAT_BRADY_AS_VS_PERCENT: 0.27 %
MDC_IDC_STAT_BRADY_DTM_END: NORMAL
MDC_IDC_STAT_BRADY_DTM_START: NORMAL
MDC_IDC_STAT_BRADY_RA_PERCENT_PACED: 15.12 %
MDC_IDC_STAT_BRADY_RV_PERCENT_PACED: 98.95 %
MDC_IDC_STAT_EPISODE_RECENT_COUNT: 0
MDC_IDC_STAT_EPISODE_RECENT_COUNT: 0
MDC_IDC_STAT_EPISODE_RECENT_COUNT: 1
MDC_IDC_STAT_EPISODE_RECENT_COUNT: 3
MDC_IDC_STAT_EPISODE_RECENT_COUNT_DTM_END: NORMAL
MDC_IDC_STAT_EPISODE_RECENT_COUNT_DTM_START: NORMAL
MDC_IDC_STAT_EPISODE_TOTAL_COUNT: 0
MDC_IDC_STAT_EPISODE_TOTAL_COUNT: 0
MDC_IDC_STAT_EPISODE_TOTAL_COUNT: 1
MDC_IDC_STAT_EPISODE_TOTAL_COUNT: 9
MDC_IDC_STAT_EPISODE_TOTAL_COUNT_DTM_END: NORMAL
MDC_IDC_STAT_EPISODE_TOTAL_COUNT_DTM_START: NORMAL
MDC_IDC_STAT_EPISODE_TYPE: NORMAL

## 2023-09-15 PROCEDURE — 93296 REM INTERROG EVL PM/IDS: CPT

## 2023-09-15 PROCEDURE — 93294 REM INTERROG EVL PM/LDLS PM: CPT | Performed by: INTERNAL MEDICINE

## 2023-09-17 ENCOUNTER — HEALTH MAINTENANCE LETTER (OUTPATIENT)
Age: 88
End: 2023-09-17

## 2023-09-18 NOTE — PROGRESS NOTES
Care Coordinator left voice message for Miquel at [facility] update on provider number.     Jorge David RN, PHN  [facility]

## 2023-09-18 NOTE — PROGRESS NOTES
9/18/23: Care Coordinator received call back from Miquel at [facility] - she called [facility] last Thursday to follow up and they stated that there application got missed in the pile and they will expedite and will have provider # within 2 weeks.   She states member is doing great and happy - they are establishing care with his PCP in October.  Per nephew they are happy with him in his home and want to continue this plan.     Jorge David RN, PHN  [facility]

## 2023-09-19 ENCOUNTER — MEDICAL CORRESPONDENCE (OUTPATIENT)
Dept: HEALTH INFORMATION MANAGEMENT | Facility: CLINIC | Age: 88
End: 2023-09-19
Payer: COMMERCIAL

## 2023-09-19 ENCOUNTER — TELEPHONE (OUTPATIENT)
Dept: FAMILY MEDICINE | Facility: CLINIC | Age: 88
End: 2023-09-19
Payer: COMMERCIAL

## 2023-09-19 NOTE — TELEPHONE ENCOUNTER
Forms signed by provider. Orders/ medication orders and polst placed in abstract and tc bin. Original handed back to home care directly.

## 2023-09-19 NOTE — TELEPHONE ENCOUNTER
What type of form? medical  What day did you drop off your forms? 09/19/2023  Is there a due date? ASAP (7-10 business day to compete forms)   How would you like to receive these forms? Patient will  at the clinic when completed  Which clinic was the form dropped off at? [facility]    What is the best number to contact you?  [phone]  What time works best to contact you with in 4 hrs? Asap  Is it okay to leave a message? Yes  Director of [facility]: Miquel Sheth is here to get approval to admin medications & have forms filled out. She called yesterday, came into clinic today. She is willing to wait.    Forms in [facility]  Eliza Thompson

## 2023-09-28 ENCOUNTER — PATIENT OUTREACH (OUTPATIENT)
Dept: GERIATRIC MEDICINE | Facility: CLINIC | Age: 88
End: 2023-09-28
Payer: COMMERCIAL

## 2023-10-04 ENCOUNTER — OFFICE VISIT (OUTPATIENT)
Dept: INTERNAL MEDICINE | Facility: CLINIC | Age: 88
End: 2023-10-04
Payer: COMMERCIAL

## 2023-10-04 VITALS
TEMPERATURE: 97.6 F | OXYGEN SATURATION: 96 % | HEIGHT: 70 IN | HEART RATE: 84 BPM | BODY MASS INDEX: 25.35 KG/M2 | WEIGHT: 177.1 LBS | SYSTOLIC BLOOD PRESSURE: 115 MMHG | DIASTOLIC BLOOD PRESSURE: 47 MMHG

## 2023-10-04 DIAGNOSIS — I10 PRIMARY HYPERTENSION: ICD-10-CM

## 2023-10-04 DIAGNOSIS — K21.9 GASTROESOPHAGEAL REFLUX DISEASE, UNSPECIFIED WHETHER ESOPHAGITIS PRESENT: ICD-10-CM

## 2023-10-04 DIAGNOSIS — Z95.0 CARDIAC PACEMAKER IN SITU: ICD-10-CM

## 2023-10-04 DIAGNOSIS — J44.9 MODERATE COPD (CHRONIC OBSTRUCTIVE PULMONARY DISEASE) (H): ICD-10-CM

## 2023-10-04 DIAGNOSIS — E11.9 TYPE 2 DIABETES MELLITUS WITHOUT COMPLICATION, WITHOUT LONG-TERM CURRENT USE OF INSULIN (H): ICD-10-CM

## 2023-10-04 DIAGNOSIS — M17.9 OSTEOARTHRITIS OF KNEE, UNSPECIFIED LATERALITY, UNSPECIFIED OSTEOARTHRITIS TYPE: ICD-10-CM

## 2023-10-04 DIAGNOSIS — R56.9 SEIZURES (H): ICD-10-CM

## 2023-10-04 DIAGNOSIS — Z00.00 ENCOUNTER FOR MEDICARE ANNUAL WELLNESS EXAM: Primary | ICD-10-CM

## 2023-10-04 PROBLEM — I44.2 ATRIOVENTRICULAR BLOCK, COMPLETE (H): Status: RESOLVED | Noted: 2017-01-12 | Resolved: 2023-10-04

## 2023-10-04 PROCEDURE — 99207 PR ANNUAL WELLNESS VISIT, PPS, SUBSEQUENT STAT: CPT | Performed by: INTERNAL MEDICINE

## 2023-10-04 PROCEDURE — 99214 OFFICE O/P EST MOD 30 MIN: CPT | Mod: 25 | Performed by: INTERNAL MEDICINE

## 2023-10-04 NOTE — PROGRESS NOTES
SUBJECTIVE:   Gallito is a 89 year old who presents for Preventive Visit.  Possible neb for his breathing and cough   Needs a glucose monitor  maybe free style jackie, pt is not a fan of needles   Pt has pace maker   Are you in the first 12 months of your Medicare coverage?  No    HPI    Patient is here with his daughter in law who is translating.    He denies any complaints today.  Lives in assisted living facility at [facility].    Has been having some wheezing and needs a nebulizer.  Has albuterol medication for the nebulizer.  He is using the other inhalers regularly.  Quit smoking a long time ago.    Was recently diagnosed with diabetes, he is on metformin.  Wondering if he needs freestyle jackie monitor.    Has hypertension and is compliant with medications.  Has a history of AV block s/p pacemaker long time ago.    Has a history of seizures and is on Keppra.  Unable to remember when he last had a seizure.    Have you ever done Advance Care Planning? (For example, a Health Directive, POLST, or a discussion with a medical provider or your loved ones about your wishes): No, advance care planning information given to patient to review.  Patient plans to discuss their wishes with loved ones or provider.         Fall risk  Fall Risk Assessment not completed.    Cognitive Screening Unable to complete due to virtual visit; need for additional assessment in future face-to-face visit    Do you have sleep apnea, excessive snoring or daytime drowsiness? : no    Reviewed and updated as needed this visit by clinical staff    Allergies  Meds              Reviewed and updated as needed this visit by Provider                 Social History     Tobacco Use     Smoking status: Former     Passive exposure: Never     Smokeless tobacco: Never   Substance Use Topics     Alcohol use: No             10/4/2023     2:15 PM   Alcohol Use   Prescreen: >3 drinks/day or >7 drinks/week? Not Applicable     Do you have a current opioid  prescription? No  Do you use any other controlled substances or medications that are not prescribed by a provider? None              Current providers sharing in care for this patient include:   Patient Care Team:  Hakeem Awad MD as PCP - General (Internal Medicine)  Lenin Farley MD Schuster, Joseph, DPM (Podiatry)  Jorge David, RN as Lead Care Coordinator  Santosh Matthew MD as MD (Urology)  Kristal Delgado RN as Registered Nurse  Genevieve Chang NP as Nurse Practitioner (Nurse Practitioner - Adult Health)  Abilio Frank MD as MD (Interventional Cardiology)  Hakeem Awad MD as Assigned PCP  Sary Edmond RPH as Pharmacist (Pharmacist)  Fam Mao RPH as Pharmacist  Fam Mao RPH as Assigned MTM Pharmacist    The following health maintenance items are reviewed in Epic and correct as of today:  Health Maintenance   Topic Date Due     MICROALBUMIN  Never done     DIABETIC FOOT EXAM  Never done     COPD ACTION PLAN  Never done     COVID-19 Vaccine (1) Never done     ZOSTER IMMUNIZATION (1 of 2) Never done     LIPID  04/24/2014     Pneumococcal Vaccine: 65+ Years (2 - PCV) 08/03/2017     EYE EXAM  10/07/2020     ADVANCE CARE PLANNING  08/14/2022     A1C  08/16/2023     INFLUENZA VACCINE (1) 09/01/2023     ANNUAL REVIEW OF HM ORDERS  04/25/2024     MEDICARE ANNUAL WELLNESS VISIT  05/16/2024     BMP  05/16/2024     FALL RISK ASSESSMENT  05/16/2024     DTAP/TDAP/TD IMMUNIZATION (2 - Td or Tdap) 08/03/2026     SPIROMETRY  Completed     PHQ-2 (once per calendar year)  Completed     IPV IMMUNIZATION  Aged Out     HPV IMMUNIZATION  Aged Out     MENINGITIS IMMUNIZATION  Aged Out     Recent Labs   Lab Test 05/16/23  1701 05/06/20  0756 10/02/18  1709 04/04/18  1238 08/03/16  1255 07/24/16  0628 07/23/16  0530 06/14/16  0700 06/13/16  1612   A1C 6.3*  --   --   --   --   --  5.7  --   --    ALT 19  --  25 22   < >  --  15   < > 57   CR 0.85 1.04 1.03 1.06   < >   "--  0.88   < > 0.90   GFRESTIMATED 83 65 69 67   < >  --  83   < > 81   GFRESTBLACK  --  75 83 80   < >  --  >90   GFR Calc     < > >90   GFR Calc     POTASSIUM 3.6 3.7 3.7 3.3*   < >  --  4.1   < > 3.8   TSH  --   --   --   --   --  1.04  --   --  0.20*    < > = values in this interval not displayed.                Review of Systems  Constitutional, HEENT, cardiovascular, pulmonary, GI, , musculoskeletal, neuro, skin, endocrine and psych systems are negative, except as otherwise noted.    OBJECTIVE:   /47   Pulse 84   Temp 97.6  F (36.4  C) (Temporal)   Ht 1.778 m (5' 10\")   Wt 80.3 kg (177 lb 1.6 oz)   SpO2 96%   BMI 25.41 kg/m   Estimated body mass index is 25.41 kg/m  as calculated from the following:    Height as of this encounter: 1.778 m (5' 10\").    Weight as of this encounter: 80.3 kg (177 lb 1.6 oz).  Physical Exam  GENERAL: healthy, alert and no distress  EYES: Eyes grossly normal to inspection, PERRL and conjunctivae and sclerae normal  HENT: ear canals and TM's normal, nose and mouth without ulcers or lesions  NECK: no adenopathy, no asymmetry, masses, or scars and thyroid normal to palpation  RESP: Bilateral wheezing  CV: regular rate and rhythm, normal S1 S2, no S3 or S4, no murmur, click or rub, no peripheral edema and peripheral pulses strong  ABDOMEN: soft, nontender, no hepatosplenomegaly, no masses and bowel sounds normal  MS: no gross musculoskeletal defects noted, no edema  SKIN: no suspicious lesions or rashes  NEURO: Normal strength and tone, mentation intact and speech normal  PSYCH: mentation appears normal, affect normal/bright    Diagnostic Test Results:  Labs reviewed in Epic    ASSESSMENT / PLAN:   Gallito was seen today for physical.    Diagnoses and all orders for this visit:    Encounter for Medicare annual wellness exam    Moderate COPD (chronic obstructive pulmonary disease) (H)  -Nebulizer ordered  Continue other " inhalers    Gastroesophageal reflux disease, unspecified whether esophagitis present  Stable    Cardiac pacemaker in situ- [employer], dual chamber- NOT dependent  Stable    Primary hypertension  Controlled    Osteoarthritis of knee, unspecified laterality, unspecified osteoarthritis type    Seizures (H)  Stable, on Keppra    Type 2 diabetes mellitus without complication, without long-term current use of insulin (H)  -     Repeat A1c in 2 to 3 months  Does not need freestyle jackie at this time    Other orders    -     PRIMARY CARE FOLLOW-UP SCHEDULING; Future        Patient has been advised of split billing requirements and indicates understanding: Yes      COUNSELING:  Reviewed preventive health counseling, as reflected in patient instructions        He reports that he has quit smoking. He has never been exposed to tobacco smoke. He has never used smokeless tobacco.      Appropriate preventive services were discussed with this patient, including applicable screening as appropriate for fall prevention, nutrition, physical activity, Tobacco-use cessation, weight loss and cognition.  Checklist reviewing preventive services available has been given to the patient.    Reviewed patients plan of care and provided an AVS. The Basic Care Plan (routine screening as documented in Health Maintenance) for Gallito meets the Care Plan requirement. This Care Plan has been established and reviewed with the Patient and caregiver.          Kranthi Reza MD  [facility]    Identified Health Risks:

## 2023-10-04 NOTE — PATIENT INSTRUCTIONS
Patient Education   Personalized Prevention Plan  You are due for the preventive services outlined below.  Your care team is available to assist you in scheduling these services.  If you have already completed any of these items, please share that information with your care team to update in your medical record.  Health Maintenance Due   Topic Date Due     Kidney Microalbumin Urine Test  Never done     Diabetic Foot Exam  Never done     COPD Action Plan  Never done     COVID-19 Vaccine (1) Never done     Zoster (Shingles) Vaccine (1 of 2) Never done     Cholesterol Lab  04/24/2014     Pneumococcal Vaccine (2 - PCV) 08/03/2017     Eye Exam  10/07/2020     A1C Lab  08/16/2023     Flu Vaccine (1) 09/01/2023

## 2023-10-09 NOTE — PROGRESS NOTES
10/9/23: Care Coordinator follow up call with Miquel - she states they haven't received letter yet in mail for provider number but they had said 2 weeks so should be receiving.  She will follow up and let Care Coordinator know.   Confirmed visit still for this Wed 10/11.     Jorge David RN, PHN  [facility]

## 2023-10-09 NOTE — PROGRESS NOTES
9/28/23: Care Coordinator received call from SHWETA Lafleur at [facility] stating that she spoke with [facility] and they have their provider number.  She states that they need to wait for the official welcome letter to come and that will show the start date - she will let Care Coordinator know when the start date is.  Provider # is: [id number].    Scheduled annul visit for 10/11/23 @ 3:30   Care Coordinator also let Miguelito oropeza, aware of visit - he will be working and states Care Coordinator can complete visit on own with Care Coordinator.     Jorge David RN, PHN  [facility]

## 2023-10-11 ENCOUNTER — PATIENT OUTREACH (OUTPATIENT)
Dept: GERIATRIC MEDICINE | Facility: CLINIC | Age: 88
End: 2023-10-11
Payer: COMMERCIAL

## 2023-10-11 ASSESSMENT — LIFESTYLE VARIABLES
AUDIT-C TOTAL SCORE: 0
SKIP TO QUESTIONS 9-10: 1

## 2023-10-11 NOTE — PROGRESS NOTES
[facility] Care Coordination Contact    [facility] Home Visit Assessment     Home visit for Health Risk Assessment with Galilto Farley completed on October 11, 2023    Type of residence:: Assisted living  Current living arrangement:: I live in assisted living     Assessment completed with:: Patient, Caregiver - Gallito and SHWETA Lafleur;  - Fadia ID#[id number] with [employer]    Current Care Plan  Member currently receiving the following home care services:     Member currently receiving the following community resources: Other (see comment) (Assisted Living) and Incontinence supplies - monthly    Medication Review  Medication reconciliation completed in Epic: Yes  Medication set-up & administration: RN set up weekly.  Assisting Living staff administers medications.  Medication Risk Assessment Medication (1 or more, place referral to MTM): Taking 1 or more high-risk medications for adults >65 years  MTM Referral Placed: No: Member is already followed by MTM. Will follow up with current MTM.    Mental/Behavioral Health   Depression Screening:   PHQ-2 Total Score (Adult) - Positive if 3 or more points; Administer PHQ-9 if positive: 0       Mental health DX:: No        Falls Assessment:            ADL/IADL Dependencies:   Dependent ADLs:: Ambulation-walker, Bathing, Dressing, Grooming, Incontinence, Positioning, Transfers, Toileting, Eating  Dependent IADLs:: Cleaning, Cooking, Laundry, Shopping, Meal Preparation, Medication Management, Money Management, Transportation, Incontinence    [facility] benefits: Shared information re: Silver Sneakers/gym memberships, ASA, Calcium +D.    PCA Assessment completed at visit: Not Applicable     Elderly Waiver Eligibility: Yes-will continue on EW    Care Plan & Recommendations: CL tool, monthly supplies    See LTCC for detailed assessment information.    Follow-Up Plan: Member informed of future contact, plan to f/u with member with a 6  month telephone assessment.  Contact information shared with member and family, encouraged member to call with any questions or concerns at any time.    Mark care continuum providers: Please see Snapshot and Care Management Flowsheets for Specific details of care plan.    This CC note routed to PCP,   Kranthi Reza MD Kriste Cheeley RN, PHN  [facility]

## 2023-10-11 NOTE — Clinical Note
This is FYI only for member's annual home visit.   Jorge David RN, N [facility]

## 2023-10-19 NOTE — PROGRESS NOTES
Care Coordinator emailed RS Tool to Miquel for review.     Jorge David RN, N  [facility]

## 2023-10-23 NOTE — PROGRESS NOTES
Care Coordinator received approval from [facility] of RS Tool - POC updated and completed.     Jorge David RN, PHN  [facility]

## 2023-10-25 ENCOUNTER — PATIENT OUTREACH (OUTPATIENT)
Dept: GERIATRIC MEDICINE | Facility: CLINIC | Age: 88
End: 2023-10-25
Payer: COMMERCIAL

## 2023-10-25 NOTE — PROGRESS NOTES
[facility] Care Coordination Contact    Received after visit chart from care coordinator.  Completed following tasks: Mailed copy of care plan to client, Mailed Safe Medication Disposal , Mailed [employer] Leave Behind Letter, Submitted referrals/auths for AL & supplies (gloves), Uploaded consent to communicate form(s) to Epic, and Updated services in Database  , Mailed copy of CL tool to member, faxed copy to AL facility ([facility]) including Provider Signature Summary letter, and submitted authorization to health plan.      Brenda Dukes  Case Management Specialist   [facility]  [phone]

## 2023-10-25 NOTE — PROGRESS NOTES
[facility] Care Coordination Contact    Internal CC change effective 11/1/2023.  Mailed member CC Change letter.  Additional tasks to be completed by CMS include: update database & EPIC, enter CC Change in MMIS, and move member file.    Cheryle Burt  Case Management Specialist  [facility]  [phone]

## 2023-10-25 NOTE — LETTER
October 25, 2023    GALLITO ALBRIGHT  [address] [address]      Dear Gallito:    As a member of [facility] ([facility] ([facility]), you are provided a care coordinator. I will be your new care coordinator as of 11/1/2023. I will be calling you soon to see how you are doing and determine your needs.    If you have any questions, please feel free to call me at [phone]. If you reach my voice mail, please leave a message and your phone number. If you are hearing impaired, please call the [place] Relay at 253 or [phone] (yhfrou-sd-desnta relay service).    I look forward to speaking with you soon.    Sincerely,    Nargis Neville RN, BSN  [phone]  [email or web address] is a health plan that contracts with both Medicare and the [place] Medical Assistance (Medicaid) program to provide benefits of both programs to enrollees. Enrollment in [facility] depends on contract renewal.      [facility]  [id number] DHS Approved ([id number])  [id number] (11/18)

## 2023-10-25 NOTE — LETTER
October 25, 2023      GALLITO ALBRIGHT  [address] [address]      Dear Gallito:    At [facility], we re dedicated to improving your health and wellness. Enclosed is the Care Plan developed with you on 10/11/2023. Please review the Care Plan carefully.    As a reminder, during your visit we talked about:  Ways to manage your physical and mental health  Using health care to maintain and improve your health   Your preventive care needs     Remember to contact your care coordinator if you:  Are hospitalized, or plan to be hospitalized   Have a fall    Have a change in your physical or mental health  Need help finding support or services    If you have questions, or don t agree with your Care Plan, call me at [phone]. You can also call me if your needs change. TTY users, call the [place] Relay at (893) or 1-567.318.8078 (bckwby-mn-sjgwvn relay service).    Sincerely,    Jorge David RN, PHN    E-mail: [email or web address]  Phone: [phone]      [facility] ([facility] [place]) is a health plan that contracts with both Medicare and the [place] Medical Assistance (Medicaid) program to provide benefits of both programs to enrollees. Enrollment in [facility] depends on contract renewal.    [id number] accepted    [id number] (07/2022)

## 2023-11-07 DIAGNOSIS — I25.118 CORONARY ARTERY DISEASE OF NATIVE ARTERY OF NATIVE HEART WITH STABLE ANGINA PECTORIS (H): ICD-10-CM

## 2023-11-07 NOTE — TELEPHONE ENCOUNTER
Assisted living member calling for pt.  States that pt is out of medication and needs a refill as soon as possible.    Routing refill request to correct provider who is prescribing medication.      Kristal Nava RN  [facility]

## 2023-11-08 RX ORDER — ATORVASTATIN CALCIUM 10 MG/1
10 TABLET, FILM COATED ORAL DAILY
Qty: 90 TABLET | Refills: 1 | Status: SHIPPED | OUTPATIENT
Start: 2023-11-08 | End: 2024-04-18

## 2023-11-27 ENCOUNTER — TELEPHONE (OUTPATIENT)
Dept: INTERNAL MEDICINE | Facility: CLINIC | Age: 88
End: 2023-11-27
Payer: COMMERCIAL

## 2023-11-27 NOTE — TELEPHONE ENCOUNTER
Per 11/28/23 Appt Notes:   appt made with pt relative - unable to articulate pain but pt is moaning, no fever.     Only number in chart is for Daughter, Miquel.     Daughter/RN, Miquel (C2C), is not with patient during the call, unable to complete Triage. Farus states patient is having fatigue, body aches, and coughing that started 3 days ago. Patient moans after coughing. Daughter states patient walks normally, does not have any other symptoms that she has noticed.     Patient remains afebrile and had negative covid test yesterday.     Miquel recommends Triage to contact Gallito, Manager at [employer] if needing to get more information regarding patient's symptoms. She is unable to provide further information.       Gallito can be reached at: [phone]    Patient Contact    Attempt # 1 to Gallito    Was call answered?  No.  Left message on voicemail with information to call me back.

## 2023-11-29 NOTE — PROGRESS NOTES
[facility] Care Coordination Contact    2nd Attempt: Signed Letter not received from [facility], resent per process.    Merari Nelson  Care Management Specialist  [facility]  [phone]

## 2023-11-30 ENCOUNTER — PATIENT OUTREACH (OUTPATIENT)
Dept: GERIATRIC MEDICINE | Facility: CLINIC | Age: 88
End: 2023-11-30
Payer: COMMERCIAL

## 2023-11-30 NOTE — PROGRESS NOTES
[facility] Care Coordination Contact    Opened in error.    Nargis Neville RN  [facility]  [phone]

## 2023-12-20 ENCOUNTER — ANCILLARY PROCEDURE (OUTPATIENT)
Dept: CARDIOLOGY | Facility: CLINIC | Age: 88
End: 2023-12-20
Attending: INTERNAL MEDICINE
Payer: COMMERCIAL

## 2023-12-20 DIAGNOSIS — Z95.0 CARDIAC PACEMAKER IN SITU: ICD-10-CM

## 2023-12-20 DIAGNOSIS — N40.0 BENIGN NODULAR PROSTATIC HYPERPLASIA WITHOUT LOWER URINARY TRACT SYMPTOMS: ICD-10-CM

## 2023-12-20 DIAGNOSIS — I44.1 ATRIOVENTRICULAR BLOCK, SECOND DEGREE: ICD-10-CM

## 2023-12-20 PROCEDURE — 93296 REM INTERROG EVL PM/IDS: CPT

## 2023-12-20 PROCEDURE — 93294 REM INTERROG EVL PM/LDLS PM: CPT | Performed by: INTERNAL MEDICINE

## 2023-12-20 RX ORDER — TAMSULOSIN HYDROCHLORIDE 0.4 MG/1
CAPSULE ORAL
Qty: 56 CAPSULE | Refills: 5 | Status: SHIPPED | OUTPATIENT
Start: 2023-12-20 | End: 2024-06-05

## 2024-01-02 NOTE — PROGRESS NOTES
No Letter Received: 60 day tracking of letter complete, no letter received from [facility]. Tracking discontinued.    Cherlye Burt  Case Management Specialist  [facility]  [phone]

## 2024-01-03 ENCOUNTER — PATIENT OUTREACH (OUTPATIENT)
Dept: GERIATRIC MEDICINE | Facility: CLINIC | Age: 89
End: 2024-01-03
Payer: COMMERCIAL

## 2024-01-03 NOTE — PROGRESS NOTES
[facility] Care Coordination Contact    This care coordinator received email from customized living staff stating gloves ordered through [facility] [employer] have not been received for this member. Per plan of care member should be receiving 4 boxes of gloves per month covered under elderly waiver.  Staff also had questions pertaining to wipes, underwear, laundry detergent and the new CL rate for 2024. Please see this care coordinator's response to email below:       Manolo Lafleur, I am [facility]'s new care coordinator, the case was transferred to me from Stacie.  Stacie forwarded me your email with questions and I have attempted to answer them below.     The new 2024 monthly rate for customized living did increase on 1/1/24. [facility] takes care of this automatically.  If you would like to stop the pullups, I can request that [facility] no longer sends these out for [facility].  Please let me know if you would like to discontinue these and I can make that request.  Wipes are a product which should be covered and provided by the assisted living.    Per [facility]'s plan of care gloves were ordered through [facility] at 4 boxes per month. I will check with [facility] about this and have the gloves sent out for [facility].  Unfortunately, laundry detergent is not a covered service.        I hope this is helpful, please email me back to me know if you wish to discontinue the pullups for [facility].    Nargis Neville RN  [facility]  [phone]    1/4/24:    Manolo Barillas,  Thank you for answering my questions.  I would like to stop the pull-ups. JM would not use it. I increased the laundry service to now 4 times and we would like to use the $2000 that was use food Pull-up's and wipes for that? Stacie has mentioned to me, she was using $2000 of the budget for those items?      Stacie and I talked, there is not technically $2000 in the budget that is extra. The money is to be used for incontinence supplies or equipment only not household needs  like laundry, detergent, or food.  Stacie has already given you extra time per day and week for laundry which is above and beyond what we normally give. Also, just so you are aware even though the rates increased for Jan 1st 2024 the rate does not increase until his next annual assessment per [facility] regulations.     As for the pullups, I wanted to verify again with you as I believe he is getting chux pads (200 per month). I misspoke when I said pullups as I misunderstood what you/Stacie had told me.  So, I can stop the chux from being delivered, but perhaps you want to keep them coming as that would decrease laundry needs if they were used as a barrier between him and say his bed or a chair for example? Let me know if you would like to discontinue the chux or keep them coming.     Again, I will check with [facility] on the gloves and ask they rush the next order if at all possible. I am sorry you have not been receiving them I was not aware of this!     I hope this better explains your question.    Nargis Neville RN  [facility]  [phone]

## 2024-01-04 ENCOUNTER — PATIENT OUTREACH (OUTPATIENT)
Dept: GERIATRIC MEDICINE | Facility: CLINIC | Age: 89
End: 2024-01-04
Payer: COMMERCIAL

## 2024-01-04 DIAGNOSIS — K59.09 CHRONIC CONSTIPATION: Primary | ICD-10-CM

## 2024-01-04 NOTE — PROGRESS NOTES
[facility] Care Coordination Contact    DME supply(s) have been requested by the patient's group home. DME orders(s) have been queued up and pended for the provider to review. Per assessment completed by previous care coordinator patient's [facility] staff are in need of gloves (4 boxes per month). Once completed, please route the encounter to care coordinator (Nargis Neville) to fax completed documentation and order requisition to [facility].      Nargis Neville RN  [facility]  [phone]     1/4/24: signed order for gloves received from provider.  This care coordinator will email to [facility] to complete order process and send monthly to member's residence at [facility].    Nargis Neville RN  [facility]  [phone]    1/5/24: Per email from [facility] member has been receiving 4 boxes of gloves per month since 2018 without interruption. The gloves are sent in the same box as the chux pads and last proof of delivery date was 12/9/23. This care coordinator is checking with [facility] staff to determine if this is an on-going issue or if gloves were not received in December.  Will followup as needed.    Nargis Neville RN  [facility]  [phone]    1/5/24: Per [facility] staff gloves were not received for the months of Nov and Dec 2023. Chux have been received. [facility] is requesting a replacement order of gloves for Nov and Dec be sent out. This care coordinator requested [facility] mail out a replacement for the missing supply.      Nargis Neville RN  [facility]  [phone]

## 2024-01-12 NOTE — PROGRESS NOTES
Order delivered from [facility] on 1/10/2024 for gloves. DME spreadsheet updated and CC notified.    Cheryle Burt  Case Management Specialist  [facility]  [phone]

## 2024-01-13 LAB
MDC_IDC_LEAD_CONNECTION_STATUS: NORMAL
MDC_IDC_LEAD_CONNECTION_STATUS: NORMAL
MDC_IDC_LEAD_IMPLANT_DT: NORMAL
MDC_IDC_LEAD_IMPLANT_DT: NORMAL
MDC_IDC_LEAD_LOCATION: NORMAL
MDC_IDC_LEAD_LOCATION: NORMAL
MDC_IDC_LEAD_LOCATION_DETAIL_1: NORMAL
MDC_IDC_LEAD_LOCATION_DETAIL_1: NORMAL
MDC_IDC_LEAD_MFG: NORMAL
MDC_IDC_LEAD_MFG: NORMAL
MDC_IDC_LEAD_MODEL: NORMAL
MDC_IDC_LEAD_MODEL: NORMAL
MDC_IDC_LEAD_POLARITY_TYPE: NORMAL
MDC_IDC_LEAD_POLARITY_TYPE: NORMAL
MDC_IDC_LEAD_SERIAL: NORMAL
MDC_IDC_LEAD_SERIAL: NORMAL
MDC_IDC_MSMT_BATTERY_DTM: NORMAL
MDC_IDC_MSMT_BATTERY_REMAINING_LONGEVITY: 40 MO
MDC_IDC_MSMT_BATTERY_RRT_TRIGGER: 2.83
MDC_IDC_MSMT_BATTERY_STATUS: NORMAL
MDC_IDC_MSMT_BATTERY_VOLTAGE: 2.96 V
MDC_IDC_MSMT_LEADCHNL_RA_IMPEDANCE_VALUE: 380 OHM
MDC_IDC_MSMT_LEADCHNL_RA_IMPEDANCE_VALUE: 456 OHM
MDC_IDC_MSMT_LEADCHNL_RA_PACING_THRESHOLD_AMPLITUDE: 0.62 V
MDC_IDC_MSMT_LEADCHNL_RA_PACING_THRESHOLD_PULSEWIDTH: 0.4 MS
MDC_IDC_MSMT_LEADCHNL_RA_SENSING_INTR_AMPL: 2.62 MV
MDC_IDC_MSMT_LEADCHNL_RV_IMPEDANCE_VALUE: 551 OHM
MDC_IDC_MSMT_LEADCHNL_RV_IMPEDANCE_VALUE: 551 OHM
MDC_IDC_MSMT_LEADCHNL_RV_PACING_THRESHOLD_AMPLITUDE: 0.75 V
MDC_IDC_MSMT_LEADCHNL_RV_PACING_THRESHOLD_PULSEWIDTH: 0.4 MS
MDC_IDC_MSMT_LEADCHNL_RV_SENSING_INTR_AMPL: 12.62 MV
MDC_IDC_PG_IMPLANT_DTM: NORMAL
MDC_IDC_PG_MFG: NORMAL
MDC_IDC_PG_MODEL: NORMAL
MDC_IDC_PG_SERIAL: NORMAL
MDC_IDC_PG_TYPE: NORMAL
MDC_IDC_SESS_CLINIC_NAME: NORMAL
MDC_IDC_SESS_DTM: NORMAL
MDC_IDC_SESS_TYPE: NORMAL
MDC_IDC_SET_BRADY_AT_MODE_SWITCH_RATE: 171 {BEATS}/MIN
MDC_IDC_SET_BRADY_HYSTRATE: NORMAL
MDC_IDC_SET_BRADY_LOWRATE: 60 {BEATS}/MIN
MDC_IDC_SET_BRADY_MAX_SENSOR_RATE: 120 {BEATS}/MIN
MDC_IDC_SET_BRADY_MAX_TRACKING_RATE: 120 {BEATS}/MIN
MDC_IDC_SET_BRADY_MODE: NORMAL
MDC_IDC_SET_BRADY_PAV_DELAY_LOW: 180 MS
MDC_IDC_SET_BRADY_SAV_DELAY_LOW: 150 MS
MDC_IDC_SET_LEADCHNL_RA_PACING_AMPLITUDE: 1.5 V
MDC_IDC_SET_LEADCHNL_RA_PACING_ANODE_ELECTRODE_1: NORMAL
MDC_IDC_SET_LEADCHNL_RA_PACING_ANODE_LOCATION_1: NORMAL
MDC_IDC_SET_LEADCHNL_RA_PACING_CAPTURE_MODE: NORMAL
MDC_IDC_SET_LEADCHNL_RA_PACING_CATHODE_ELECTRODE_1: NORMAL
MDC_IDC_SET_LEADCHNL_RA_PACING_CATHODE_LOCATION_1: NORMAL
MDC_IDC_SET_LEADCHNL_RA_PACING_POLARITY: NORMAL
MDC_IDC_SET_LEADCHNL_RA_PACING_PULSEWIDTH: 0.4 MS
MDC_IDC_SET_LEADCHNL_RA_SENSING_ANODE_ELECTRODE_1: NORMAL
MDC_IDC_SET_LEADCHNL_RA_SENSING_ANODE_LOCATION_1: NORMAL
MDC_IDC_SET_LEADCHNL_RA_SENSING_CATHODE_ELECTRODE_1: NORMAL
MDC_IDC_SET_LEADCHNL_RA_SENSING_CATHODE_LOCATION_1: NORMAL
MDC_IDC_SET_LEADCHNL_RA_SENSING_POLARITY: NORMAL
MDC_IDC_SET_LEADCHNL_RA_SENSING_SENSITIVITY: 0.6 MV
MDC_IDC_SET_LEADCHNL_RV_PACING_AMPLITUDE: 2 V
MDC_IDC_SET_LEADCHNL_RV_PACING_ANODE_ELECTRODE_1: NORMAL
MDC_IDC_SET_LEADCHNL_RV_PACING_ANODE_LOCATION_1: NORMAL
MDC_IDC_SET_LEADCHNL_RV_PACING_CAPTURE_MODE: NORMAL
MDC_IDC_SET_LEADCHNL_RV_PACING_CATHODE_ELECTRODE_1: NORMAL
MDC_IDC_SET_LEADCHNL_RV_PACING_CATHODE_LOCATION_1: NORMAL
MDC_IDC_SET_LEADCHNL_RV_PACING_POLARITY: NORMAL
MDC_IDC_SET_LEADCHNL_RV_PACING_PULSEWIDTH: 0.4 MS
MDC_IDC_SET_LEADCHNL_RV_SENSING_ANODE_ELECTRODE_1: NORMAL
MDC_IDC_SET_LEADCHNL_RV_SENSING_ANODE_LOCATION_1: NORMAL
MDC_IDC_SET_LEADCHNL_RV_SENSING_CATHODE_ELECTRODE_1: NORMAL
MDC_IDC_SET_LEADCHNL_RV_SENSING_CATHODE_LOCATION_1: NORMAL
MDC_IDC_SET_LEADCHNL_RV_SENSING_POLARITY: NORMAL
MDC_IDC_SET_LEADCHNL_RV_SENSING_SENSITIVITY: 0.9 MV
MDC_IDC_SET_ZONE_DETECTION_INTERVAL: 350 MS
MDC_IDC_SET_ZONE_DETECTION_INTERVAL: 400 MS
MDC_IDC_SET_ZONE_STATUS: NORMAL
MDC_IDC_SET_ZONE_STATUS: NORMAL
MDC_IDC_SET_ZONE_TYPE: NORMAL
MDC_IDC_SET_ZONE_VENDOR_TYPE: NORMAL
MDC_IDC_STAT_AT_BURDEN_PERCENT: 0.1 %
MDC_IDC_STAT_AT_DTM_END: NORMAL
MDC_IDC_STAT_AT_DTM_START: NORMAL
MDC_IDC_STAT_BRADY_AP_VP_PERCENT: 12.06 %
MDC_IDC_STAT_BRADY_AP_VS_PERCENT: 0.02 %
MDC_IDC_STAT_BRADY_AS_VP_PERCENT: 87.56 %
MDC_IDC_STAT_BRADY_AS_VS_PERCENT: 0.36 %
MDC_IDC_STAT_BRADY_DTM_END: NORMAL
MDC_IDC_STAT_BRADY_DTM_START: NORMAL
MDC_IDC_STAT_BRADY_RA_PERCENT_PACED: 12 %
MDC_IDC_STAT_BRADY_RV_PERCENT_PACED: 99.1 %
MDC_IDC_STAT_EPISODE_RECENT_COUNT: 0
MDC_IDC_STAT_EPISODE_RECENT_COUNT: 4
MDC_IDC_STAT_EPISODE_RECENT_COUNT_DTM_END: NORMAL
MDC_IDC_STAT_EPISODE_RECENT_COUNT_DTM_START: NORMAL
MDC_IDC_STAT_EPISODE_TOTAL_COUNT: 0
MDC_IDC_STAT_EPISODE_TOTAL_COUNT: 0
MDC_IDC_STAT_EPISODE_TOTAL_COUNT: 1
MDC_IDC_STAT_EPISODE_TOTAL_COUNT: 9
MDC_IDC_STAT_EPISODE_TOTAL_COUNT_DTM_END: NORMAL
MDC_IDC_STAT_EPISODE_TOTAL_COUNT_DTM_START: NORMAL
MDC_IDC_STAT_EPISODE_TYPE: NORMAL
MDC_IDC_STAT_TACHYTHERAPY_RECENT_DTM_END: NORMAL
MDC_IDC_STAT_TACHYTHERAPY_RECENT_DTM_START: NORMAL
MDC_IDC_STAT_TACHYTHERAPY_TOTAL_DTM_END: NORMAL
MDC_IDC_STAT_TACHYTHERAPY_TOTAL_DTM_START: NORMAL

## 2024-02-04 ENCOUNTER — HEALTH MAINTENANCE LETTER (OUTPATIENT)
Age: 89
End: 2024-02-04

## 2024-02-20 DIAGNOSIS — M25.50 POLYARTHRALGIA: ICD-10-CM

## 2024-02-20 RX ORDER — ACETAMINOPHEN 500 MG
TABLET ORAL
Qty: 112 TABLET | Refills: 11 | Status: SHIPPED | OUTPATIENT
Start: 2024-02-20

## 2024-03-04 ENCOUNTER — TELEPHONE (OUTPATIENT)
Dept: PHARMACY | Facility: OTHER | Age: 89
End: 2024-03-04
Payer: COMMERCIAL

## 2024-03-04 NOTE — TELEPHONE ENCOUNTER
MTM referral from: Patient's insurance ([place] pay[employer] products)    MTM referral outreach attempt #1 on March 4, 2024 at 9:37 AM      Outcome: Left Message with     Daron TafoyaD  Medication Therapy Management (MTM) Pharmacist  [facility]

## 2024-03-21 ENCOUNTER — TELEPHONE (OUTPATIENT)
Dept: PHARMACY | Facility: OTHER | Age: 89
End: 2024-03-21
Payer: COMMERCIAL

## 2024-03-21 NOTE — TELEPHONE ENCOUNTER
MTM Recruitment: [facility] insurance     Referral outreach attempt #2 on March 21, 2024      Outcome: left voicemail    Gee Friedman, DaronD, BCACP  Medication Therapy Management Pharmacist  Voicemail: [phone]

## 2024-03-26 ENCOUNTER — TELEPHONE (OUTPATIENT)
Dept: PHARMACY | Facility: OTHER | Age: 89
End: 2024-03-26
Payer: COMMERCIAL

## 2024-03-26 NOTE — TELEPHONE ENCOUNTER
MTM Recruitment: [facility] insurance     Referral outreach attempt #3 on March 26, 2024      Outcome: left voicemail with Fardous, patients nurse who has consent to communicate on file and the one who did the medication review last year, call back number [phone]    Sheela Lacey, PharmD BCPS  Medication Therapy Management Practitioner  Pharmacist

## 2024-03-29 ENCOUNTER — ANCILLARY PROCEDURE (OUTPATIENT)
Dept: CARDIOLOGY | Facility: CLINIC | Age: 89
End: 2024-03-29
Attending: INTERNAL MEDICINE
Payer: COMMERCIAL

## 2024-03-29 DIAGNOSIS — Z95.0 CARDIAC PACEMAKER IN SITU: ICD-10-CM

## 2024-03-29 DIAGNOSIS — I44.1 ATRIOVENTRICULAR BLOCK, SECOND DEGREE: ICD-10-CM

## 2024-03-29 PROCEDURE — 93294 REM INTERROG EVL PM/LDLS PM: CPT | Performed by: INTERNAL MEDICINE

## 2024-03-29 PROCEDURE — 93296 REM INTERROG EVL PM/IDS: CPT

## 2024-04-04 ENCOUNTER — PATIENT OUTREACH (OUTPATIENT)
Dept: GERIATRIC MEDICINE | Facility: CLINIC | Age: 89
End: 2024-04-04
Payer: COMMERCIAL

## 2024-04-04 NOTE — PROGRESS NOTES
[facility] Care Coordination Contact      [facility] Six-Month Telephone Assessment    6 month telephone assessment completed on 4/4/24.    ER visits: No  Hospitalizations: No  TCU stays: No  Significant health status changes: Non reported at this time. Assisted living staff/Gallito report no significant changes.  Falls/Injuries: No  ADL/IADL changes: No  Changes in services: No, resides in assisted living in [place] and receives 24 H customized living.    Goals: See POC in chart for goal progress documentation.   Will see member in 6 months for an annual health risk assessment.  Encouraged member/assisted living to call care coordinator with any questions or concerns in the meantime.     Nargis Neville RN  [facility]  [phone]

## 2024-04-09 DIAGNOSIS — I25.118 CORONARY ARTERY DISEASE OF NATIVE ARTERY OF NATIVE HEART WITH STABLE ANGINA PECTORIS (H): ICD-10-CM

## 2024-04-09 DIAGNOSIS — K21.9 GASTRO-ESOPHAGEAL REFLUX DISEASE WITHOUT ESOPHAGITIS: ICD-10-CM

## 2024-04-09 DIAGNOSIS — I10 HYPERTENSION GOAL BP (BLOOD PRESSURE) < 130/80: ICD-10-CM

## 2024-04-09 DIAGNOSIS — N32.81 OAB (OVERACTIVE BLADDER): ICD-10-CM

## 2024-04-09 DIAGNOSIS — N40.0 BENIGN NODULAR PROSTATIC HYPERPLASIA WITHOUT LOWER URINARY TRACT SYMPTOMS: ICD-10-CM

## 2024-04-09 DIAGNOSIS — R73.01 IMPAIRED FASTING GLUCOSE: ICD-10-CM

## 2024-04-09 RX ORDER — ASPIRIN 81 MG/1
81 TABLET, COATED ORAL DAILY
Qty: 28 TABLET | OUTPATIENT
Start: 2024-04-09

## 2024-04-09 RX ORDER — MIRABEGRON 50 MG/1
50 TABLET, FILM COATED, EXTENDED RELEASE ORAL DAILY
Qty: 28 TABLET | Refills: 11 | OUTPATIENT
Start: 2024-04-09

## 2024-04-09 RX ORDER — METFORMIN HCL 500 MG
TABLET, EXTENDED RELEASE 24 HR ORAL
Qty: 28 TABLET | Refills: 11 | OUTPATIENT
Start: 2024-04-09

## 2024-04-09 RX ORDER — ISOSORBIDE MONONITRATE 30 MG/1
30 TABLET, EXTENDED RELEASE ORAL DAILY
Qty: 28 TABLET | OUTPATIENT
Start: 2024-04-09

## 2024-04-09 RX ORDER — FINASTERIDE 5 MG/1
1 TABLET, FILM COATED ORAL AT BEDTIME
Qty: 28 TABLET | Refills: 11 | OUTPATIENT
Start: 2024-04-09

## 2024-04-09 RX ORDER — FAMOTIDINE 20 MG/1
20 TABLET, FILM COATED ORAL 2 TIMES DAILY
Qty: 56 TABLET | Refills: 11 | OUTPATIENT
Start: 2024-04-09

## 2024-04-15 DIAGNOSIS — R73.01 IMPAIRED FASTING GLUCOSE: ICD-10-CM

## 2024-04-15 DIAGNOSIS — K21.9 GASTRO-ESOPHAGEAL REFLUX DISEASE WITHOUT ESOPHAGITIS: ICD-10-CM

## 2024-04-15 DIAGNOSIS — N32.81 OAB (OVERACTIVE BLADDER): ICD-10-CM

## 2024-04-15 DIAGNOSIS — I10 HYPERTENSION GOAL BP (BLOOD PRESSURE) < 130/80: ICD-10-CM

## 2024-04-15 DIAGNOSIS — N40.0 BENIGN NODULAR PROSTATIC HYPERPLASIA WITHOUT LOWER URINARY TRACT SYMPTOMS: ICD-10-CM

## 2024-04-15 DIAGNOSIS — I25.118 CORONARY ARTERY DISEASE OF NATIVE ARTERY OF NATIVE HEART WITH STABLE ANGINA PECTORIS (H): ICD-10-CM

## 2024-04-15 LAB
MDC_IDC_LEAD_CONNECTION_STATUS: NORMAL
MDC_IDC_LEAD_CONNECTION_STATUS: NORMAL
MDC_IDC_LEAD_IMPLANT_DT: NORMAL
MDC_IDC_LEAD_IMPLANT_DT: NORMAL
MDC_IDC_LEAD_LOCATION: NORMAL
MDC_IDC_LEAD_LOCATION: NORMAL
MDC_IDC_LEAD_LOCATION_DETAIL_1: NORMAL
MDC_IDC_LEAD_LOCATION_DETAIL_1: NORMAL
MDC_IDC_LEAD_MFG: NORMAL
MDC_IDC_LEAD_MFG: NORMAL
MDC_IDC_LEAD_MODEL: NORMAL
MDC_IDC_LEAD_MODEL: NORMAL
MDC_IDC_LEAD_POLARITY_TYPE: NORMAL
MDC_IDC_LEAD_POLARITY_TYPE: NORMAL
MDC_IDC_LEAD_SERIAL: NORMAL
MDC_IDC_LEAD_SERIAL: NORMAL
MDC_IDC_MSMT_BATTERY_DTM: NORMAL
MDC_IDC_MSMT_BATTERY_REMAINING_LONGEVITY: 34 MO
MDC_IDC_MSMT_BATTERY_RRT_TRIGGER: 2.83
MDC_IDC_MSMT_BATTERY_STATUS: NORMAL
MDC_IDC_MSMT_BATTERY_VOLTAGE: 2.95 V
MDC_IDC_MSMT_LEADCHNL_RA_IMPEDANCE_VALUE: 361 OHM
MDC_IDC_MSMT_LEADCHNL_RA_IMPEDANCE_VALUE: 418 OHM
MDC_IDC_MSMT_LEADCHNL_RA_PACING_THRESHOLD_AMPLITUDE: 0.62 V
MDC_IDC_MSMT_LEADCHNL_RA_PACING_THRESHOLD_PULSEWIDTH: 0.4 MS
MDC_IDC_MSMT_LEADCHNL_RA_SENSING_INTR_AMPL: 2.12 MV
MDC_IDC_MSMT_LEADCHNL_RV_IMPEDANCE_VALUE: 532 OHM
MDC_IDC_MSMT_LEADCHNL_RV_IMPEDANCE_VALUE: 532 OHM
MDC_IDC_MSMT_LEADCHNL_RV_PACING_THRESHOLD_AMPLITUDE: 0.62 V
MDC_IDC_MSMT_LEADCHNL_RV_PACING_THRESHOLD_PULSEWIDTH: 0.4 MS
MDC_IDC_MSMT_LEADCHNL_RV_SENSING_INTR_AMPL: 25.88 MV
MDC_IDC_PG_IMPLANT_DTM: NORMAL
MDC_IDC_PG_MFG: NORMAL
MDC_IDC_PG_MODEL: NORMAL
MDC_IDC_PG_SERIAL: NORMAL
MDC_IDC_PG_TYPE: NORMAL
MDC_IDC_SESS_CLINIC_NAME: NORMAL
MDC_IDC_SESS_DTM: NORMAL
MDC_IDC_SESS_TYPE: NORMAL
MDC_IDC_SET_BRADY_AT_MODE_SWITCH_RATE: 171 {BEATS}/MIN
MDC_IDC_SET_BRADY_HYSTRATE: NORMAL
MDC_IDC_SET_BRADY_LOWRATE: 60 {BEATS}/MIN
MDC_IDC_SET_BRADY_MAX_SENSOR_RATE: 120 {BEATS}/MIN
MDC_IDC_SET_BRADY_MAX_TRACKING_RATE: 120 {BEATS}/MIN
MDC_IDC_SET_BRADY_MODE: NORMAL
MDC_IDC_SET_BRADY_PAV_DELAY_LOW: 180 MS
MDC_IDC_SET_BRADY_SAV_DELAY_LOW: 150 MS
MDC_IDC_SET_LEADCHNL_RA_PACING_AMPLITUDE: 1.5 V
MDC_IDC_SET_LEADCHNL_RA_PACING_ANODE_ELECTRODE_1: NORMAL
MDC_IDC_SET_LEADCHNL_RA_PACING_ANODE_LOCATION_1: NORMAL
MDC_IDC_SET_LEADCHNL_RA_PACING_CAPTURE_MODE: NORMAL
MDC_IDC_SET_LEADCHNL_RA_PACING_CATHODE_ELECTRODE_1: NORMAL
MDC_IDC_SET_LEADCHNL_RA_PACING_CATHODE_LOCATION_1: NORMAL
MDC_IDC_SET_LEADCHNL_RA_PACING_POLARITY: NORMAL
MDC_IDC_SET_LEADCHNL_RA_PACING_PULSEWIDTH: 0.4 MS
MDC_IDC_SET_LEADCHNL_RA_SENSING_ANODE_ELECTRODE_1: NORMAL
MDC_IDC_SET_LEADCHNL_RA_SENSING_ANODE_LOCATION_1: NORMAL
MDC_IDC_SET_LEADCHNL_RA_SENSING_CATHODE_ELECTRODE_1: NORMAL
MDC_IDC_SET_LEADCHNL_RA_SENSING_CATHODE_LOCATION_1: NORMAL
MDC_IDC_SET_LEADCHNL_RA_SENSING_POLARITY: NORMAL
MDC_IDC_SET_LEADCHNL_RA_SENSING_SENSITIVITY: 0.6 MV
MDC_IDC_SET_LEADCHNL_RV_PACING_AMPLITUDE: 2 V
MDC_IDC_SET_LEADCHNL_RV_PACING_ANODE_ELECTRODE_1: NORMAL
MDC_IDC_SET_LEADCHNL_RV_PACING_ANODE_LOCATION_1: NORMAL
MDC_IDC_SET_LEADCHNL_RV_PACING_CAPTURE_MODE: NORMAL
MDC_IDC_SET_LEADCHNL_RV_PACING_CATHODE_ELECTRODE_1: NORMAL
MDC_IDC_SET_LEADCHNL_RV_PACING_CATHODE_LOCATION_1: NORMAL
MDC_IDC_SET_LEADCHNL_RV_PACING_POLARITY: NORMAL
MDC_IDC_SET_LEADCHNL_RV_PACING_PULSEWIDTH: 0.4 MS
MDC_IDC_SET_LEADCHNL_RV_SENSING_ANODE_ELECTRODE_1: NORMAL
MDC_IDC_SET_LEADCHNL_RV_SENSING_ANODE_LOCATION_1: NORMAL
MDC_IDC_SET_LEADCHNL_RV_SENSING_CATHODE_ELECTRODE_1: NORMAL
MDC_IDC_SET_LEADCHNL_RV_SENSING_CATHODE_LOCATION_1: NORMAL
MDC_IDC_SET_LEADCHNL_RV_SENSING_POLARITY: NORMAL
MDC_IDC_SET_LEADCHNL_RV_SENSING_SENSITIVITY: 0.9 MV
MDC_IDC_SET_ZONE_DETECTION_INTERVAL: 350 MS
MDC_IDC_SET_ZONE_DETECTION_INTERVAL: 400 MS
MDC_IDC_SET_ZONE_STATUS: NORMAL
MDC_IDC_SET_ZONE_STATUS: NORMAL
MDC_IDC_SET_ZONE_TYPE: NORMAL
MDC_IDC_SET_ZONE_VENDOR_TYPE: NORMAL
MDC_IDC_STAT_AT_BURDEN_PERCENT: 0 %
MDC_IDC_STAT_AT_DTM_END: NORMAL
MDC_IDC_STAT_AT_DTM_START: NORMAL
MDC_IDC_STAT_BRADY_AP_VP_PERCENT: 12.54 %
MDC_IDC_STAT_BRADY_AP_VS_PERCENT: 0.01 %
MDC_IDC_STAT_BRADY_AS_VP_PERCENT: 87.1 %
MDC_IDC_STAT_BRADY_AS_VS_PERCENT: 0.35 %
MDC_IDC_STAT_BRADY_DTM_END: NORMAL
MDC_IDC_STAT_BRADY_DTM_START: NORMAL
MDC_IDC_STAT_BRADY_RA_PERCENT_PACED: 12.37 %
MDC_IDC_STAT_BRADY_RV_PERCENT_PACED: 98.6 %
MDC_IDC_STAT_EPISODE_RECENT_COUNT: 0
MDC_IDC_STAT_EPISODE_RECENT_COUNT_DTM_END: NORMAL
MDC_IDC_STAT_EPISODE_RECENT_COUNT_DTM_START: NORMAL
MDC_IDC_STAT_EPISODE_TOTAL_COUNT: 0
MDC_IDC_STAT_EPISODE_TOTAL_COUNT: 0
MDC_IDC_STAT_EPISODE_TOTAL_COUNT: 1
MDC_IDC_STAT_EPISODE_TOTAL_COUNT: 9
MDC_IDC_STAT_EPISODE_TOTAL_COUNT_DTM_END: NORMAL
MDC_IDC_STAT_EPISODE_TOTAL_COUNT_DTM_START: NORMAL
MDC_IDC_STAT_EPISODE_TYPE: NORMAL
MDC_IDC_STAT_TACHYTHERAPY_RECENT_DTM_END: NORMAL
MDC_IDC_STAT_TACHYTHERAPY_RECENT_DTM_START: NORMAL
MDC_IDC_STAT_TACHYTHERAPY_TOTAL_DTM_END: NORMAL
MDC_IDC_STAT_TACHYTHERAPY_TOTAL_DTM_START: NORMAL

## 2024-04-16 RX ORDER — FAMOTIDINE 20 MG/1
20 TABLET, FILM COATED ORAL 2 TIMES DAILY
Qty: 56 TABLET | Refills: 4 | Status: SHIPPED | OUTPATIENT
Start: 2024-04-16 | End: 2024-08-28

## 2024-04-16 RX ORDER — ISOSORBIDE MONONITRATE 30 MG/1
30 TABLET, EXTENDED RELEASE ORAL DAILY
Qty: 28 TABLET | Refills: 4 | OUTPATIENT
Start: 2024-04-16

## 2024-04-16 RX ORDER — FINASTERIDE 5 MG/1
1 TABLET, FILM COATED ORAL AT BEDTIME
Qty: 28 TABLET | Refills: 4 | Status: SHIPPED | OUTPATIENT
Start: 2024-04-16 | End: 2024-08-28

## 2024-04-16 RX ORDER — MIRABEGRON 50 MG/1
50 TABLET, FILM COATED, EXTENDED RELEASE ORAL DAILY
Qty: 28 TABLET | Refills: 4 | Status: SHIPPED | OUTPATIENT
Start: 2024-04-16 | End: 2024-08-28

## 2024-04-16 RX ORDER — ASPIRIN 81 MG/1
81 TABLET, COATED ORAL DAILY
Qty: 28 TABLET | Refills: 4 | Status: SHIPPED | OUTPATIENT
Start: 2024-04-16 | End: 2024-08-28

## 2024-04-16 RX ORDER — METFORMIN HCL 500 MG
TABLET, EXTENDED RELEASE 24 HR ORAL
Qty: 28 TABLET | Refills: 4 | Status: SHIPPED | OUTPATIENT
Start: 2024-04-16 | End: 2024-08-28

## 2024-04-18 DIAGNOSIS — I10 HYPERTENSION GOAL BP (BLOOD PRESSURE) < 130/80: ICD-10-CM

## 2024-04-18 DIAGNOSIS — I25.118 CORONARY ARTERY DISEASE OF NATIVE ARTERY OF NATIVE HEART WITH STABLE ANGINA PECTORIS (H): ICD-10-CM

## 2024-04-18 RX ORDER — ATORVASTATIN CALCIUM 10 MG/1
10 TABLET, FILM COATED ORAL DAILY
Qty: 90 TABLET | Refills: 1 | Status: SHIPPED | OUTPATIENT
Start: 2024-04-18 | End: 2024-09-27

## 2024-04-18 RX ORDER — ISOSORBIDE MONONITRATE 30 MG/1
30 TABLET, EXTENDED RELEASE ORAL DAILY
Qty: 90 TABLET | Refills: 3 | Status: SHIPPED | OUTPATIENT
Start: 2024-04-18

## 2024-04-18 NOTE — TELEPHONE ENCOUNTER
[facility] calling back to see if provider is also able to refill patient's Atorvastatin prescription. Medication and pharmacy pended.     Routing to refill pool to review and advise.     Shereen Deng, FAVION, RN   [facility]

## 2024-04-18 NOTE — TELEPHONE ENCOUNTER
[facility] pharmacy calling to see if patient can have a refill of his isosorbide mononitrate.     Routing to ordering provider to review and advise. Med and pharmacy pended    James Farias RN  [facility]

## 2024-04-19 ENCOUNTER — APPOINTMENT (OUTPATIENT)
Dept: INTERPRETER SERVICES | Facility: CLINIC | Age: 89
End: 2024-04-19
Payer: COMMERCIAL

## 2024-05-06 DIAGNOSIS — M54.12 CERVICAL RADICULOPATHY: ICD-10-CM

## 2024-05-06 DIAGNOSIS — J44.9 COPD, MODERATE (H): ICD-10-CM

## 2024-05-06 DIAGNOSIS — I10 HYPERTENSION GOAL BP (BLOOD PRESSURE) < 130/80: ICD-10-CM

## 2024-05-06 RX ORDER — AMLODIPINE BESYLATE 5 MG/1
5 TABLET ORAL DAILY
Qty: 28 TABLET | Refills: 3 | Status: SHIPPED | OUTPATIENT
Start: 2024-05-06 | End: 2024-08-28

## 2024-05-06 RX ORDER — FLUTICASONE FUROATE AND VILANTEROL TRIFENATATE 200; 25 UG/1; UG/1
POWDER RESPIRATORY (INHALATION)
Qty: 60 EACH | Refills: 1 | Status: SHIPPED | OUTPATIENT
Start: 2024-05-06 | End: 2024-07-01

## 2024-05-06 RX ORDER — TIOTROPIUM BROMIDE 18 UG/1
CAPSULE ORAL; RESPIRATORY (INHALATION)
Qty: 30 CAPSULE | Refills: 0 | Status: SHIPPED | OUTPATIENT
Start: 2024-05-06 | End: 2024-06-05

## 2024-05-06 RX ORDER — ENALAPRIL MALEATE 10 MG/1
10 TABLET ORAL DAILY
Qty: 28 TABLET | Refills: 3 | Status: SHIPPED | OUTPATIENT
Start: 2024-05-06 | End: 2024-08-28

## 2024-05-06 RX ORDER — GABAPENTIN 100 MG/1
CAPSULE ORAL
Qty: 84 CAPSULE | Refills: 11 | Status: SHIPPED | OUTPATIENT
Start: 2024-05-06

## 2024-06-04 ENCOUNTER — APPOINTMENT (OUTPATIENT)
Dept: GENERAL RADIOLOGY | Facility: CLINIC | Age: 89
End: 2024-06-04
Attending: EMERGENCY MEDICINE
Payer: COMMERCIAL

## 2024-06-04 ENCOUNTER — PATIENT OUTREACH (OUTPATIENT)
Dept: GERIATRIC MEDICINE | Facility: CLINIC | Age: 89
End: 2024-06-04

## 2024-06-04 ENCOUNTER — HOSPITAL ENCOUNTER (EMERGENCY)
Facility: CLINIC | Age: 89
Discharge: HOME | End: 2024-06-04
Attending: EMERGENCY MEDICINE | Admitting: EMERGENCY MEDICINE
Payer: COMMERCIAL

## 2024-06-04 VITALS
TEMPERATURE: 97.5 F | RESPIRATION RATE: 16 BRPM | SYSTOLIC BLOOD PRESSURE: 132 MMHG | DIASTOLIC BLOOD PRESSURE: 72 MMHG | HEART RATE: 73 BPM | OXYGEN SATURATION: 98 %

## 2024-06-04 DIAGNOSIS — J44.1 COPD EXACERBATION (H): ICD-10-CM

## 2024-06-04 LAB
ACANTHOCYTES BLD QL SMEAR: ABNORMAL
ANION GAP SERPL CALCULATED.3IONS-SCNC: 9 MMOL/L (ref 7–15)
AUER BODIES BLD QL SMEAR: ABNORMAL
BASO STIPL BLD QL SMEAR: ABNORMAL
BASOPHILS # BLD AUTO: 0.1 10E3/UL (ref 0–0.2)
BASOPHILS NFR BLD AUTO: 1 %
BITE CELLS BLD QL SMEAR: ABNORMAL
BLISTER CELLS BLD QL SMEAR: ABNORMAL
BUN SERPL-MCNC: 11.3 MG/DL (ref 8–23)
BURR CELLS BLD QL SMEAR: ABNORMAL
CALCIUM SERPL-MCNC: 8.8 MG/DL (ref 8.2–9.6)
CHLORIDE SERPL-SCNC: 99 MMOL/L (ref 98–107)
CREAT SERPL-MCNC: 0.99 MG/DL (ref 0.67–1.17)
DACRYOCYTES BLD QL SMEAR: ABNORMAL
DEPRECATED HCO3 PLAS-SCNC: 28 MMOL/L (ref 22–29)
EGFRCR SERPLBLD CKD-EPI 2021: 72 ML/MIN/1.73M2
ELLIPTOCYTES BLD QL SMEAR: ABNORMAL
EOSINOPHIL # BLD AUTO: 0.2 10E3/UL (ref 0–0.7)
EOSINOPHIL NFR BLD AUTO: 3 %
ERYTHROCYTE [DISTWIDTH] IN BLOOD BY AUTOMATED COUNT: 12.9 % (ref 10–15)
FLUAV RNA SPEC QL NAA+PROBE: NEGATIVE
FLUBV RNA RESP QL NAA+PROBE: NEGATIVE
FRAGMENTS BLD QL SMEAR: ABNORMAL
GLUCOSE SERPL-MCNC: 114 MG/DL (ref 70–99)
HCT VFR BLD AUTO: 48.2 % (ref 40–53)
HGB BLD-MCNC: 15.6 G/DL (ref 13.3–17.7)
HGB C CRYSTALS: ABNORMAL
HOLD SPECIMEN: NORMAL
HOWELL-JOLLY BOD BLD QL SMEAR: ABNORMAL
IMM GRANULOCYTES # BLD: 0 10E3/UL
IMM GRANULOCYTES NFR BLD: 0 %
LYMPHOCYTES # BLD AUTO: 2.2 10E3/UL (ref 0.8–5.3)
LYMPHOCYTES NFR BLD AUTO: 31 %
MCH RBC QN AUTO: 30.8 PG (ref 26.5–33)
MCHC RBC AUTO-ENTMCNC: 32.4 G/DL (ref 31.5–36.5)
MCV RBC AUTO: 95 FL (ref 78–100)
MONOCYTES # BLD AUTO: 0.7 10E3/UL (ref 0–1.3)
MONOCYTES NFR BLD AUTO: 10 %
NEUTROPHILS # BLD AUTO: 3.8 10E3/UL (ref 1.6–8.3)
NEUTROPHILS NFR BLD AUTO: 55 %
NEUTS HYPERSEG BLD QL SMEAR: ABNORMAL
NRBC # BLD AUTO: 0 10E3/UL
NRBC BLD AUTO-RTO: 0 /100
PLAT MORPH BLD: ABNORMAL
PLATELET # BLD AUTO: 220 10E3/UL (ref 150–450)
POLYCHROMASIA BLD QL SMEAR: ABNORMAL
POTASSIUM SERPL-SCNC: 4.5 MMOL/L (ref 3.4–5.3)
RBC # BLD AUTO: 5.06 10E6/UL (ref 4.4–5.9)
RBC AGGLUT BLD QL: ABNORMAL
RBC MORPH BLD: ABNORMAL
ROULEAUX BLD QL SMEAR: ABNORMAL
RSV RNA SPEC NAA+PROBE: NEGATIVE
SARS-COV-2 RNA RESP QL NAA+PROBE: NEGATIVE
SICKLE CELLS BLD QL SMEAR: ABNORMAL
SMUDGE CELLS BLD QL SMEAR: ABNORMAL
SODIUM SERPL-SCNC: 136 MMOL/L (ref 135–145)
SPHEROCYTES BLD QL SMEAR: ABNORMAL
STOMATOCYTES BLD QL SMEAR: ABNORMAL
TARGETS BLD QL SMEAR: ABNORMAL
TOXIC GRANULES BLD QL SMEAR: ABNORMAL
VARIANT LYMPHS BLD QL SMEAR: ABNORMAL
WBC # BLD AUTO: 7.1 10E3/UL (ref 4–11)

## 2024-06-04 PROCEDURE — 80048 BASIC METABOLIC PNL TOTAL CA: CPT | Performed by: EMERGENCY MEDICINE

## 2024-06-04 PROCEDURE — 36415 COLL VENOUS BLD VENIPUNCTURE: CPT | Performed by: EMERGENCY MEDICINE

## 2024-06-04 PROCEDURE — 250N000012 HC RX MED GY IP 250 OP 636 PS 637: Performed by: EMERGENCY MEDICINE

## 2024-06-04 PROCEDURE — 87637 SARSCOV2&INF A&B&RSV AMP PRB: CPT | Performed by: EMERGENCY MEDICINE

## 2024-06-04 PROCEDURE — 99284 EMERGENCY DEPT VISIT MOD MDM: CPT | Mod: 25

## 2024-06-04 PROCEDURE — 85049 AUTOMATED PLATELET COUNT: CPT | Performed by: EMERGENCY MEDICINE

## 2024-06-04 PROCEDURE — 250N000009 HC RX 250: Performed by: EMERGENCY MEDICINE

## 2024-06-04 PROCEDURE — 94640 AIRWAY INHALATION TREATMENT: CPT

## 2024-06-04 PROCEDURE — 71046 X-RAY EXAM CHEST 2 VIEWS: CPT

## 2024-06-04 RX ORDER — PREDNISONE 20 MG/1
40 TABLET ORAL DAILY
Qty: 14 TABLET | Refills: 0 | Status: SHIPPED | OUTPATIENT
Start: 2024-06-05 | End: 2024-06-12

## 2024-06-04 RX ORDER — DOXYCYCLINE 100 MG/1
100 CAPSULE ORAL 2 TIMES DAILY
Qty: 14 CAPSULE | Refills: 0 | Status: SHIPPED | OUTPATIENT
Start: 2024-06-04 | End: 2024-06-11

## 2024-06-04 RX ORDER — IPRATROPIUM BROMIDE AND ALBUTEROL SULFATE 2.5; .5 MG/3ML; MG/3ML
3 SOLUTION RESPIRATORY (INHALATION) ONCE
Status: COMPLETED | OUTPATIENT
Start: 2024-06-04 | End: 2024-06-04

## 2024-06-04 RX ORDER — PREDNISONE 20 MG/1
40 TABLET ORAL ONCE
Status: COMPLETED | OUTPATIENT
Start: 2024-06-04 | End: 2024-06-04

## 2024-06-04 RX ADMIN — IPRATROPIUM BROMIDE AND ALBUTEROL SULFATE 3 ML: .5; 3 SOLUTION RESPIRATORY (INHALATION) at 19:01

## 2024-06-04 RX ADMIN — PREDNISONE 40 MG: 20 TABLET ORAL at 18:59

## 2024-06-04 ASSESSMENT — ACTIVITIES OF DAILY LIVING (ADL)
ADLS_ACUITY_SCORE: 41

## 2024-06-04 NOTE — ED PROVIDER NOTES
Emergency Department Note      History of Present Illness     Chief Complaint  Cough and Shortness of Breath    HPI  Haitian  was used for this history and physical  Gallito GERARDO Farley is a 89 year old male with a history of COPD who presents to the ED for a productive cough and shortness of breath, which onset a week ago (5/28/24). The patient also feels hot, but did not take his temperature. It is difficult for him to breathe when coughing.  He does not use oxygen at home, but uses an inhaler for COPD he uses 3 times a day he denies any chest pain or abdominal pain.  No recent travel the patient has had no recent travel or ill contacts.    Independent Historian  Female  present at bedside.     Review of External Notes  None  Past Medical History   Medical History and Problem List  Asthma  BPH   COPD   Diastolic dysfunction, left ventricle  H/O tuberculosis  Hypertension  LBBB   Leg wound, right  Osteoarthritis  GERD  Respiratory failure     Medications  Albuterol  Amlodipine   Atorvastatin  Enalapril  Famotidine  Finasteride  Gabapentin  Hydrochlorothiazide  Isosorbide mononitrate   Levetiracetam  Metformin  Mirabegron  Mucinex  Tamsulosin  Nitroglycerin  Ranitidine   Lisinopril-hydrochlorothiazide  Docusate sodium   Sennosides  Ciprofloxacin   Tiotropium   Pantoprazole   Prednisone     Surgical History   Cholecystectomy  ENT surgery  Partial pneumonectomy  Skin grafting   Pacemaker placement    Physical Exam   Patient Vitals for the past 24 hrs:   BP Temp Temp src Pulse Resp SpO2   06/04/24 2017 -- -- -- -- -- 98 %   06/04/24 2015 132/72 -- -- 73 -- --   06/04/24 1900 122/65 -- -- 81 -- 93 %   06/04/24 1318 93/47 -- -- 82 16 95 %   06/04/24 1317 -- 97.5  F (36.4  C) Temporal -- -- --     Physical Exam    Physical Exam   Constitutional:  They appear well-developed and well-nourished. HENT:   Mouth/Throat:   Oropharynx is clear and moist.   Eyes:    Conjunctivae normal and EOM are normal. Pupils  are equal, round, and reactive to light.   Neck:    Normal range of motion.   Cardiovascular: Normal rate, regular rhythm and normal heart sounds.  Exam reveals no gallop and no friction rub.  No murmur heard.  Pulmonary/Chest:  Effort normal Patient has no rales.  Expiratory wheezes .  Abdominal:   Soft. Bowel sounds are normal. Patient exhibits no mass. There is no tenderness. There is no rebound and no guarding.   Musculoskeletal:  Normal range of motion. Patient exhibits no edema.   Neurological:   Patient is alert and oriented to person, place, and time. Patient has normal strength. No cranial nerve deficit or sensory deficit. GCS 15  Skin:   Skin is warm and dry. No rash noted. No erythema.   Psychiatric:   Patient has a normal mood  Diagnostics   Lab Results   Labs Ordered and Resulted from Time of ED Arrival to Time of ED Departure   BASIC METABOLIC PANEL - Abnormal       Result Value    Sodium 136      Potassium 4.5      Chloride 99      Carbon Dioxide (CO2) 28      Anion Gap 9      Urea Nitrogen 11.3      Creatinine 0.99      GFR Estimate 72      Calcium 8.8      Glucose 114 (*)    RBC AND PLATELET MORPHOLOGY - Abnormal    Platelet Assessment   (*)     Value: Automated Count Confirmed. Giant platelets are present.    Acanthocytes        Mari Rods        Basophilic Stippling        Bite Cells        Blister Cells        [place] Cells        Elliptocytes        Hgb C Crystals        [place] Bodies        Hypersegmented Neutrophils        Polychromasia        RBC agglutination        RBC Fragments        Reactive Lymphocytes        Rouleaux        Sickle Cells        Smudge Cells        Spherocytes        Stomatocytes        Target Cells        Teardrop Cells        Toxic Neutrophils        RBC Morphology Confirmed RBC Indices     INFLUENZA A/B, RSV, & SARS-COV2 PCR - Normal    Influenza A PCR Negative      Influenza B PCR Negative      RSV PCR Negative      SARS CoV2 PCR Negative     CBC WITH PLATELETS AND  DIFFERENTIAL    WBC Count 7.1      RBC Count 5.06      Hemoglobin 15.6      Hematocrit 48.2      MCV 95      MCH 30.8      MCHC 32.4      RDW 12.9      Platelet Count 220      % Neutrophils 55      % Lymphocytes 31      % Monocytes 10      % Eosinophils 3      % Basophils 1      % Immature Granulocytes 0      NRBCs per 100 WBC 0      Absolute Neutrophils 3.8      Absolute Lymphocytes 2.2      Absolute Monocytes 0.7      Absolute Eosinophils 0.2      Absolute Basophils 0.1      Absolute Immature Granulocytes 0.0      Absolute NRBCs 0.0         Imaging  Chest XR,  PA & LAT   Final Result   IMPRESSION: Dysplastic changes are stable. There are no acute   infiltrates. The cardiac silhouette is not enlarged. Pulmonary   vasculature is unremarkable. A cardiac implantable electronic device   is in place with lead(s) grossly intact. No abandoned leads/wires or   other unexpected metallic foreign bodies demonstrated on the film.      DAKOTA LAWSON MD            SYSTEM ID:  [id number]        Independent Interpretation  I independently interpreted the chest XR, no pneumothorax  ED Course    Medications Administered  Medications   ipratropium - albuterol 0.5 mg/2.5 mg/3 mL (DUONEB) neb solution 3 mL (3 mLs Nebulization $Given 6/4/24 1901)   predniSONE (DELTASONE) tablet 40 mg (40 mg Oral $Given 6/4/24 1859)       Procedures  Procedures     Discussion of Management  None    Social Determinants of Health adding to complexity of care  None    ED Course  ED Course as of 06/04/24 2025 Tue Jun 04, 2024 1845 I obtained the history and examined the patient as above.    1920 I rechecked and updated the patient as above. The patient is feeling much better.    1946 I rechecked and updated the patient as above. Portable pulse ox 91-96%. The patient felt that he was improved and could go home.   1953 I rechecked and updated the patient as above      Medical Decision Making / Diagnosis   CMS Diagnoses: None    MIPS     None    [facility]  Miguelito is a 89 year old male who presents to the emergency department with shortness of breath and productive cough.  His respiratory exam did show expiratory wheezes he was not in any acute respiratory distress his oxygen saturations were normal.  His initial blood pressure was low but when he came back to her room this was noted to be normal.  Chest x-ray does not show any acute infiltrate effusion or pneumothorax mass abnormal mediastinum.  He did not have any chest pain so doubt this is ACS.  Clinical exam is consistent with COPD exacerbation.  His white count is normal his electrolytes are normal.  He was given a nebulizer as well as steroids here.  We then ambulated him with a portable pulse ox and he was between 91 to 96% he does get fatigued easily with short distances but that is his baseline.  At this point he does feel safe being discharged home.  Given that he is not hypoxic he is taking oral medications he is not in any acute respiratory distress I feel that he is safe for discharge and he has primary care to follow-up with.  Given his COPD and productive cough I will cover him for atypical pneumonia with doxycycline.  He will be placed on prednisone daily for 7 days.    Disposition  The patient was discharged.     ICD-10 Codes:    ICD-10-CM    1. COPD exacerbation (H)  J44.1            Discharge Medications  Discharge Medication List as of 6/4/2024  8:13 PM        START taking these medications    Details   doxycycline hyclate (VIBRAMYCIN) 100 MG capsule Take 1 capsule (100 mg) by mouth 2 times daily for 7 days, Disp-14 capsule, R-0, E-Prescribe      predniSONE (DELTASONE) 20 MG tablet Take 2 tablets (40 mg) by mouth daily for 7 days, Disp-14 tablet, R-0, E-Prescribe               Scribe Disclosure:  I, Sharon Mas, am serving as a scribe at 6:42 PM on 6/4/2024 to document services personally performed by Sangeetha Montenegro MD based on my observations and the provider's statements to me.         Sangeetha Montenegro MD  06/04/24 2026

## 2024-06-04 NOTE — PROGRESS NOTES
[facility] Care Coordination Contact  CC received notification of Emergency Room visit.  ER visit occurred on 6/4/24 at [facility] with Dx of shortness of breat, cough, COPD exacerbation.    CC contacted  Miquel (group home contact)  and left a message requesting a return call.  Member has a follow-up appointment with PCP: Jun18th @09:15.   Member has had a change in condition: No  New referrals placed: No  Home Visit Needed: No  Care plan reviewed and updated.  PCP notified of ED visit via EMR.    Nargis Neville RN  [facility]  [phone]    6/5/24 10:20: This is Miquel Sheth, the nurse of LTAC, located within [facility] in [place]. Just want to let you know that, JM went to the ER yesterday for few hours. The resident was experiencing short of breathing, wheezing and insisting coughing. I've personally drove him to the ER and Gallito, the manager brought him back home. They got some antibiotics and steroids. Still coughing but doing much better this morning.     Nargis Neville RN  [facility]  [phone]

## 2024-06-04 NOTE — ED TRIAGE NOTES
Pt reports coughing wheezing with SOB that has been going on the past 3 nights    PMH pace maker placement     Fever last night of 101.3

## 2024-06-05 DIAGNOSIS — G40.909 SEIZURE DISORDER (H): ICD-10-CM

## 2024-06-05 DIAGNOSIS — J44.9 COPD, MODERATE (H): ICD-10-CM

## 2024-06-05 DIAGNOSIS — N40.0 BENIGN NODULAR PROSTATIC HYPERPLASIA WITHOUT LOWER URINARY TRACT SYMPTOMS: ICD-10-CM

## 2024-06-05 RX ORDER — TIOTROPIUM BROMIDE 18 UG/1
CAPSULE ORAL; RESPIRATORY (INHALATION)
Qty: 30 CAPSULE | Refills: 0 | Status: SHIPPED | OUTPATIENT
Start: 2024-06-05 | End: 2024-07-16

## 2024-06-05 RX ORDER — LEVETIRACETAM 250 MG/1
250 TABLET ORAL 2 TIMES DAILY
Qty: 56 TABLET | Refills: 0 | Status: SHIPPED | OUTPATIENT
Start: 2024-06-05 | End: 2024-09-27

## 2024-06-05 RX ORDER — TAMSULOSIN HYDROCHLORIDE 0.4 MG/1
CAPSULE ORAL
Qty: 60 CAPSULE | Refills: 0 | Status: SHIPPED | OUTPATIENT
Start: 2024-06-05 | End: 2024-07-01

## 2024-06-05 NOTE — ED NOTES
Patient walked 20 feet total, family says he does not walk much at home. SpO2 91%-96%. HR 89-99. Coughing w/ minimal sputum production.

## 2024-06-05 NOTE — DISCHARGE INSTRUCTIONS
Take your inhaler 2 puffs every 4 hours while you have a cough and feels short of breath.  Follow-up with your doctor in 2 to 3 days.  Return to the emergency department if any worsening symptoms.

## 2024-06-17 PROBLEM — Z71.89 ADVANCED DIRECTIVES, COUNSELING/DISCUSSION: Status: RESOLVED | Noted: 2017-01-18 | Resolved: 2024-06-17

## 2024-06-22 ENCOUNTER — HEALTH MAINTENANCE LETTER (OUTPATIENT)
Age: 89
End: 2024-06-22

## 2024-07-01 DIAGNOSIS — J44.9 COPD, MODERATE (H): ICD-10-CM

## 2024-07-01 DIAGNOSIS — N40.0 BENIGN NODULAR PROSTATIC HYPERPLASIA WITHOUT LOWER URINARY TRACT SYMPTOMS: ICD-10-CM

## 2024-07-01 RX ORDER — TAMSULOSIN HYDROCHLORIDE 0.4 MG/1
CAPSULE ORAL
Qty: 56 CAPSULE | Refills: 0 | Status: SHIPPED | OUTPATIENT
Start: 2024-07-01 | End: 2024-08-01

## 2024-07-01 RX ORDER — FLUTICASONE FUROATE AND VILANTEROL TRIFENATATE 200; 25 UG/1; UG/1
POWDER RESPIRATORY (INHALATION)
Qty: 60 EACH | Refills: 0 | Status: SHIPPED | OUTPATIENT
Start: 2024-07-01 | End: 2024-07-16

## 2024-07-02 ENCOUNTER — ANCILLARY PROCEDURE (OUTPATIENT)
Dept: CARDIOLOGY | Facility: CLINIC | Age: 89
End: 2024-07-02
Attending: INTERNAL MEDICINE
Payer: COMMERCIAL

## 2024-07-02 DIAGNOSIS — I44.1 ATRIOVENTRICULAR BLOCK, SECOND DEGREE: ICD-10-CM

## 2024-07-02 DIAGNOSIS — Z95.0 CARDIAC PACEMAKER IN SITU: ICD-10-CM

## 2024-07-02 PROCEDURE — 93296 REM INTERROG EVL PM/IDS: CPT

## 2024-07-02 PROCEDURE — 93294 REM INTERROG EVL PM/LDLS PM: CPT | Performed by: INTERNAL MEDICINE

## 2024-07-16 ENCOUNTER — OFFICE VISIT (OUTPATIENT)
Dept: INTERNAL MEDICINE | Facility: CLINIC | Age: 89
End: 2024-07-16
Payer: COMMERCIAL

## 2024-07-16 VITALS
SYSTOLIC BLOOD PRESSURE: 100 MMHG | OXYGEN SATURATION: 97 % | HEART RATE: 76 BPM | DIASTOLIC BLOOD PRESSURE: 60 MMHG | RESPIRATION RATE: 16 BRPM

## 2024-07-16 DIAGNOSIS — R56.9 SEIZURES (H): ICD-10-CM

## 2024-07-16 DIAGNOSIS — R60.0 BILATERAL LEG EDEMA: ICD-10-CM

## 2024-07-16 DIAGNOSIS — I25.118 CORONARY ARTERY DISEASE OF NATIVE HEART WITH STABLE ANGINA PECTORIS, UNSPECIFIED VESSEL OR LESION TYPE (H): ICD-10-CM

## 2024-07-16 DIAGNOSIS — H53.9 VISION CHANGES: ICD-10-CM

## 2024-07-16 DIAGNOSIS — J44.9 MODERATE COPD (CHRONIC OBSTRUCTIVE PULMONARY DISEASE) (H): Primary | ICD-10-CM

## 2024-07-16 DIAGNOSIS — Q15.9 EYE ABNORMALITY: ICD-10-CM

## 2024-07-16 DIAGNOSIS — E11.42 TYPE 2 DIABETES MELLITUS WITH DIABETIC POLYNEUROPATHY, WITHOUT LONG-TERM CURRENT USE OF INSULIN (H): ICD-10-CM

## 2024-07-16 PROCEDURE — 99214 OFFICE O/P EST MOD 30 MIN: CPT | Performed by: INTERNAL MEDICINE

## 2024-07-16 RX ORDER — TIOTROPIUM BROMIDE 18 UG/1
18 CAPSULE ORAL; RESPIRATORY (INHALATION) DAILY
Qty: 90 CAPSULE | Refills: 4 | Status: SHIPPED | OUTPATIENT
Start: 2024-07-16

## 2024-07-16 RX ORDER — FLUTICASONE FUROATE AND VILANTEROL 200; 25 UG/1; UG/1
1 POWDER RESPIRATORY (INHALATION) DAILY
Qty: 180 EACH | Refills: 4 | Status: SHIPPED | OUTPATIENT
Start: 2024-07-16

## 2024-07-16 NOTE — PROGRESS NOTES
Assessment & Plan   Moderate COPD (chronic obstructive pulmonary disease) (H)  Improved from recent ER visit. Refilled chronic inhalers per request.  - tiotropium (SPIRIVA HANDIHALER) 18 MCG inhaled capsule; Inhale 1 capsule (18 mcg) into the lungs daily  - fluticasone-vilanterol (BREO ELLIPTA) 200-25 MCG/ACT inhaler; Inhale 1 puff into the lungs daily *RINSE MOUTH OUT WITH WATER AFTER USE*    Eye abnormality  Vision changes  Patient unsure why he's on eye drops. Recommended formal eye exam, referral place.  - Adult Eye  Referral; Future    Bilateral leg edema  Improves with ambulation and elevation. Interested in compression stockings, DME order placed.  - Compression Sleeve/Stocking Order for DME - ONLY FOR DME    Type 2 diabetes mellitus with diabetic polyneuropathy, without long-term current use of insulin (H)  Known issue that I take into account for their medical decisions, no current exacerbations or new concerns. Defer to patient's PCP.    Coronary artery disease of native heart with stable angina pectoris, unspecified vessel or lesion type (H24)  Known issue that I take into account for their medical decisions, no current exacerbations or new concerns. Defer to patient's PCP.    Seizures (H)  Known issue that I take into account for their medical decisions, no current exacerbations or new concerns. Defer to patient's PCP/neuro team.    Signed Electronically by:  Herman Corcoran MD, MPH  [facility]  Internal Medicine    Subjective   Gallito is a 89 year old who presents today to follow-up on an ER visit last month. This is the first time I have met Gallito. Has COPD, treated for exacerbation. He completed treatment. No symptoms since. Feeling well today. Notes some minor foot swelling. Improves when he walks around. Wondering about refills of eye drops. He's not sure why he's on them.      Objective    /60   Pulse 76   Resp 16   SpO2 97%   There is no height or  weight on file to calculate BMI.    Physical Exam   GENERAL: alert and in no distress.  EYES: conjunctivae/corneas clear. EOMs grossly intact  HENT: Facies symmetric.  RESP: CTAB, no w/r/r. Distant sounds throughout posterior lung fields.  CV: RRR, no m/r/g. 1+ pitting edema in BLE up to ankles.  MSK: Seated in wheelchair.  SKIN: No significant ulcers, lesions, or rashes on the visualized portions of the skin  NEURO: CN II-XII grossly intact.

## 2024-07-17 LAB
MDC_IDC_LEAD_CONNECTION_STATUS: NORMAL
MDC_IDC_LEAD_CONNECTION_STATUS: NORMAL
MDC_IDC_LEAD_IMPLANT_DT: NORMAL
MDC_IDC_LEAD_IMPLANT_DT: NORMAL
MDC_IDC_LEAD_LOCATION: NORMAL
MDC_IDC_LEAD_LOCATION: NORMAL
MDC_IDC_LEAD_LOCATION_DETAIL_1: NORMAL
MDC_IDC_LEAD_LOCATION_DETAIL_1: NORMAL
MDC_IDC_LEAD_MFG: NORMAL
MDC_IDC_LEAD_MFG: NORMAL
MDC_IDC_LEAD_MODEL: NORMAL
MDC_IDC_LEAD_MODEL: NORMAL
MDC_IDC_LEAD_POLARITY_TYPE: NORMAL
MDC_IDC_LEAD_POLARITY_TYPE: NORMAL
MDC_IDC_LEAD_SERIAL: NORMAL
MDC_IDC_LEAD_SERIAL: NORMAL
MDC_IDC_MSMT_BATTERY_DTM: NORMAL
MDC_IDC_MSMT_BATTERY_REMAINING_LONGEVITY: 31 MO
MDC_IDC_MSMT_BATTERY_RRT_TRIGGER: 2.83
MDC_IDC_MSMT_BATTERY_STATUS: NORMAL
MDC_IDC_MSMT_BATTERY_VOLTAGE: 2.95 V
MDC_IDC_MSMT_LEADCHNL_RA_IMPEDANCE_VALUE: 399 OHM
MDC_IDC_MSMT_LEADCHNL_RA_IMPEDANCE_VALUE: 456 OHM
MDC_IDC_MSMT_LEADCHNL_RA_PACING_THRESHOLD_AMPLITUDE: 0.62 V
MDC_IDC_MSMT_LEADCHNL_RA_PACING_THRESHOLD_PULSEWIDTH: 0.4 MS
MDC_IDC_MSMT_LEADCHNL_RA_SENSING_INTR_AMPL: 2.38 MV
MDC_IDC_MSMT_LEADCHNL_RV_IMPEDANCE_VALUE: 513 OHM
MDC_IDC_MSMT_LEADCHNL_RV_IMPEDANCE_VALUE: 532 OHM
MDC_IDC_MSMT_LEADCHNL_RV_PACING_THRESHOLD_AMPLITUDE: 0.62 V
MDC_IDC_MSMT_LEADCHNL_RV_PACING_THRESHOLD_PULSEWIDTH: 0.4 MS
MDC_IDC_PG_IMPLANT_DTM: NORMAL
MDC_IDC_PG_MFG: NORMAL
MDC_IDC_PG_MODEL: NORMAL
MDC_IDC_PG_SERIAL: NORMAL
MDC_IDC_PG_TYPE: NORMAL
MDC_IDC_SESS_CLINIC_NAME: NORMAL
MDC_IDC_SESS_DTM: NORMAL
MDC_IDC_SESS_TYPE: NORMAL
MDC_IDC_SET_BRADY_AT_MODE_SWITCH_RATE: 171 {BEATS}/MIN
MDC_IDC_SET_BRADY_HYSTRATE: NORMAL
MDC_IDC_SET_BRADY_LOWRATE: 60 {BEATS}/MIN
MDC_IDC_SET_BRADY_MAX_SENSOR_RATE: 120 {BEATS}/MIN
MDC_IDC_SET_BRADY_MAX_TRACKING_RATE: 120 {BEATS}/MIN
MDC_IDC_SET_BRADY_MODE: NORMAL
MDC_IDC_SET_BRADY_PAV_DELAY_LOW: 180 MS
MDC_IDC_SET_BRADY_SAV_DELAY_LOW: 150 MS
MDC_IDC_SET_LEADCHNL_RA_PACING_AMPLITUDE: 1.5 V
MDC_IDC_SET_LEADCHNL_RA_PACING_ANODE_ELECTRODE_1: NORMAL
MDC_IDC_SET_LEADCHNL_RA_PACING_ANODE_LOCATION_1: NORMAL
MDC_IDC_SET_LEADCHNL_RA_PACING_CAPTURE_MODE: NORMAL
MDC_IDC_SET_LEADCHNL_RA_PACING_CATHODE_ELECTRODE_1: NORMAL
MDC_IDC_SET_LEADCHNL_RA_PACING_CATHODE_LOCATION_1: NORMAL
MDC_IDC_SET_LEADCHNL_RA_PACING_POLARITY: NORMAL
MDC_IDC_SET_LEADCHNL_RA_PACING_PULSEWIDTH: 0.4 MS
MDC_IDC_SET_LEADCHNL_RA_SENSING_ANODE_ELECTRODE_1: NORMAL
MDC_IDC_SET_LEADCHNL_RA_SENSING_ANODE_LOCATION_1: NORMAL
MDC_IDC_SET_LEADCHNL_RA_SENSING_CATHODE_ELECTRODE_1: NORMAL
MDC_IDC_SET_LEADCHNL_RA_SENSING_CATHODE_LOCATION_1: NORMAL
MDC_IDC_SET_LEADCHNL_RA_SENSING_POLARITY: NORMAL
MDC_IDC_SET_LEADCHNL_RA_SENSING_SENSITIVITY: 0.6 MV
MDC_IDC_SET_LEADCHNL_RV_PACING_AMPLITUDE: 2 V
MDC_IDC_SET_LEADCHNL_RV_PACING_ANODE_ELECTRODE_1: NORMAL
MDC_IDC_SET_LEADCHNL_RV_PACING_ANODE_LOCATION_1: NORMAL
MDC_IDC_SET_LEADCHNL_RV_PACING_CAPTURE_MODE: NORMAL
MDC_IDC_SET_LEADCHNL_RV_PACING_CATHODE_ELECTRODE_1: NORMAL
MDC_IDC_SET_LEADCHNL_RV_PACING_CATHODE_LOCATION_1: NORMAL
MDC_IDC_SET_LEADCHNL_RV_PACING_POLARITY: NORMAL
MDC_IDC_SET_LEADCHNL_RV_PACING_PULSEWIDTH: 0.4 MS
MDC_IDC_SET_LEADCHNL_RV_SENSING_ANODE_ELECTRODE_1: NORMAL
MDC_IDC_SET_LEADCHNL_RV_SENSING_ANODE_LOCATION_1: NORMAL
MDC_IDC_SET_LEADCHNL_RV_SENSING_CATHODE_ELECTRODE_1: NORMAL
MDC_IDC_SET_LEADCHNL_RV_SENSING_CATHODE_LOCATION_1: NORMAL
MDC_IDC_SET_LEADCHNL_RV_SENSING_POLARITY: NORMAL
MDC_IDC_SET_LEADCHNL_RV_SENSING_SENSITIVITY: 0.9 MV
MDC_IDC_SET_ZONE_DETECTION_INTERVAL: 350 MS
MDC_IDC_SET_ZONE_DETECTION_INTERVAL: 400 MS
MDC_IDC_SET_ZONE_STATUS: NORMAL
MDC_IDC_SET_ZONE_STATUS: NORMAL
MDC_IDC_SET_ZONE_TYPE: NORMAL
MDC_IDC_SET_ZONE_VENDOR_TYPE: NORMAL
MDC_IDC_STAT_AT_BURDEN_PERCENT: 0 %
MDC_IDC_STAT_AT_DTM_END: NORMAL
MDC_IDC_STAT_AT_DTM_START: NORMAL
MDC_IDC_STAT_BRADY_AP_VP_PERCENT: 12.1 %
MDC_IDC_STAT_BRADY_AP_VS_PERCENT: 0 %
MDC_IDC_STAT_BRADY_AS_VP_PERCENT: 87.9 %
MDC_IDC_STAT_BRADY_AS_VS_PERCENT: 0 %
MDC_IDC_STAT_BRADY_DTM_END: NORMAL
MDC_IDC_STAT_BRADY_DTM_START: NORMAL
MDC_IDC_STAT_BRADY_RA_PERCENT_PACED: 12.09 %
MDC_IDC_STAT_BRADY_RV_PERCENT_PACED: 100 %
MDC_IDC_STAT_EPISODE_RECENT_COUNT: 0
MDC_IDC_STAT_EPISODE_RECENT_COUNT_DTM_END: NORMAL
MDC_IDC_STAT_EPISODE_RECENT_COUNT_DTM_START: NORMAL
MDC_IDC_STAT_EPISODE_TOTAL_COUNT: 0
MDC_IDC_STAT_EPISODE_TOTAL_COUNT: 0
MDC_IDC_STAT_EPISODE_TOTAL_COUNT: 1
MDC_IDC_STAT_EPISODE_TOTAL_COUNT: 9
MDC_IDC_STAT_EPISODE_TOTAL_COUNT_DTM_END: NORMAL
MDC_IDC_STAT_EPISODE_TOTAL_COUNT_DTM_START: NORMAL
MDC_IDC_STAT_EPISODE_TYPE: NORMAL
MDC_IDC_STAT_TACHYTHERAPY_RECENT_DTM_END: NORMAL
MDC_IDC_STAT_TACHYTHERAPY_RECENT_DTM_START: NORMAL
MDC_IDC_STAT_TACHYTHERAPY_TOTAL_DTM_END: NORMAL
MDC_IDC_STAT_TACHYTHERAPY_TOTAL_DTM_START: NORMAL

## 2024-07-31 DIAGNOSIS — N40.0 BENIGN NODULAR PROSTATIC HYPERPLASIA WITHOUT LOWER URINARY TRACT SYMPTOMS: ICD-10-CM

## 2024-07-31 DIAGNOSIS — I10 PRIMARY HYPERTENSION: ICD-10-CM

## 2024-08-01 RX ORDER — HYDROCHLOROTHIAZIDE 25 MG/1
25 TABLET ORAL DAILY
Qty: 28 TABLET | Refills: 2 | Status: SHIPPED | OUTPATIENT
Start: 2024-08-01

## 2024-08-01 RX ORDER — TAMSULOSIN HYDROCHLORIDE 0.4 MG/1
CAPSULE ORAL
Qty: 56 CAPSULE | Refills: 1 | Status: SHIPPED | OUTPATIENT
Start: 2024-08-01 | End: 2024-09-27

## 2024-08-27 DIAGNOSIS — I10 HYPERTENSION GOAL BP (BLOOD PRESSURE) < 130/80: ICD-10-CM

## 2024-08-27 DIAGNOSIS — N32.81 OAB (OVERACTIVE BLADDER): ICD-10-CM

## 2024-08-27 DIAGNOSIS — R73.01 IMPAIRED FASTING GLUCOSE: ICD-10-CM

## 2024-08-27 DIAGNOSIS — I25.118 CORONARY ARTERY DISEASE OF NATIVE ARTERY OF NATIVE HEART WITH STABLE ANGINA PECTORIS (H): ICD-10-CM

## 2024-08-27 DIAGNOSIS — K21.9 GASTRO-ESOPHAGEAL REFLUX DISEASE WITHOUT ESOPHAGITIS: ICD-10-CM

## 2024-08-27 DIAGNOSIS — N40.0 BENIGN NODULAR PROSTATIC HYPERPLASIA WITHOUT LOWER URINARY TRACT SYMPTOMS: ICD-10-CM

## 2024-08-27 NOTE — LETTER
ABELARDO [facility]  [address] [place] [address]  [phone]  September 4, 2024  Gallito CARRILLO [facility]  [address]  [facility] [address]    Dear Gallito,    I am contacting you regarding the refill request we received for you. After reviewing your chart it looks like you are overdue for your annual and for a med check. Please call [phone] or schedule this through my chart to continue to receive refills. If you anticipate running out before your appointment let us know and we can send in a andrea refill.       Thank you,     ABELARDO [facility] nursing staff

## 2024-08-28 RX ORDER — FINASTERIDE 5 MG/1
1 TABLET, FILM COATED ORAL AT BEDTIME
Qty: 28 TABLET | Refills: 0 | Status: SHIPPED | OUTPATIENT
Start: 2024-08-28 | End: 2024-09-27

## 2024-08-28 RX ORDER — MIRABEGRON 50 MG/1
50 TABLET, FILM COATED, EXTENDED RELEASE ORAL DAILY
Qty: 28 TABLET | Refills: 0 | Status: SHIPPED | OUTPATIENT
Start: 2024-08-28 | End: 2024-09-27

## 2024-08-28 RX ORDER — FAMOTIDINE 20 MG/1
20 TABLET, FILM COATED ORAL 2 TIMES DAILY
Qty: 56 TABLET | Refills: 0 | Status: SHIPPED | OUTPATIENT
Start: 2024-08-28 | End: 2024-09-27

## 2024-08-28 RX ORDER — ENALAPRIL MALEATE 10 MG/1
10 TABLET ORAL DAILY
Qty: 28 TABLET | Refills: 0 | Status: SHIPPED | OUTPATIENT
Start: 2024-08-28 | End: 2024-09-27

## 2024-08-28 RX ORDER — METFORMIN HCL 500 MG
TABLET, EXTENDED RELEASE 24 HR ORAL
Qty: 28 TABLET | Refills: 0 | Status: SHIPPED | OUTPATIENT
Start: 2024-08-28 | End: 2024-09-27

## 2024-08-28 RX ORDER — ASPIRIN 81 MG/1
81 TABLET, COATED ORAL DAILY
Qty: 28 TABLET | Refills: 0 | Status: SHIPPED | OUTPATIENT
Start: 2024-08-28 | End: 2024-09-27

## 2024-08-28 RX ORDER — AMLODIPINE BESYLATE 5 MG/1
5 TABLET ORAL DAILY
Qty: 28 TABLET | Refills: 0 | Status: SHIPPED | OUTPATIENT
Start: 2024-08-28 | End: 2024-09-27

## 2024-09-19 ENCOUNTER — PATIENT OUTREACH (OUTPATIENT)
Dept: GERIATRIC MEDICINE | Facility: CLINIC | Age: 89
End: 2024-09-19
Payer: COMMERCIAL

## 2024-09-19 NOTE — PROGRESS NOTES
[facility] Care Coordination Contact    Called  amy Lafleur RN  to schedule annual HRA home visit. HRA has been scheduled for 9/23/24.    Leobardo Koch RN BSN PHN  [facility] Care Coordinator  [phone]  Fax:[phone]

## 2024-09-20 RX ORDER — ALBUTEROL SULFATE 0.83 MG/ML
SOLUTION RESPIRATORY (INHALATION)
Qty: 75 ML | OUTPATIENT
Start: 2024-09-20

## 2024-09-23 ENCOUNTER — PATIENT OUTREACH (OUTPATIENT)
Dept: GERIATRIC MEDICINE | Facility: CLINIC | Age: 89
End: 2024-09-23
Payer: COMMERCIAL

## 2024-09-23 NOTE — PROGRESS NOTES
[facility] Care Coordination Contact    [facility] Home Visit Assessment     Home visit for Health Risk Assessment with Gallito Farley completed on September 23, 2024    Type of residence:: Assisted living  Current living arrangement:: I live in assisted living     Assessment completed with:: Patient, Caregiver, RN    Current Care Plan  Member currently receiving the following home care services:     Member currently receiving the following community resources: Other (see comment) (Assisted Living)      Medication Review  Medication reconciliation completed in Epic: Yes  Medication set-up & administration: RN set up weekly.  Assisting Living staff administers medications.  Medication Risk Assessment Medication (1 or more, place referral to MTM): Taking 1 or more high-risk medications for adults >65 years  MTM Referral Placed: No: No risk factors idenified    Mental/Behavioral Health   Depression Screening:   PHQ-2 Total Score (Adult) - Positive if 3 or more points; Administer PHQ-9 if positive: 0       Mental health DX:: No        Falls Assessment:   Fallen 2 or more times in the past year?: No   Any fall with injury in the past year?: No    ADL/IADL Dependencies:   Dependent ADLs:: Ambulation-walker, Bathing, Dressing, Grooming, Incontinence, Positioning, Transfers, Toileting, Eating  Dependent IADLs:: Cleaning, Cooking, Laundry, Shopping, Meal Preparation, Medication Management, Money Management, Transportation, Incontinence    Health Plan sponsored benefits: [facility]: Shared information regarding [employer]. Reviewed preventative health screening and health plan supplemental benefits/incentives. Reviewed medication disposal form.    PCA Assessment completed at visit: No     Elderly Waiver Eligibility: Yes-will continue on EW    Care Plan & Recommendations: Will continue to receive 24-hour Customized Living.     [place] detailed [place]  information.    Follow-Up Plan: Member informed of future contact, plan to f/u with member with a 6 month telephone assessment.  Contact information shared with member and family, encouraged member to call with any questions or concerns at any time.    [facility] care continuum providers: Please see Snapshot and Care Management Flowsheets for Specific details of care plan.    This CC note routed to PCP, Kranthi Reza.    Leobardo Koch RN BSN PHN  [facility] Care Coordinator  [phone]  Fax:[phone]

## 2024-09-27 DIAGNOSIS — N32.81 OAB (OVERACTIVE BLADDER): ICD-10-CM

## 2024-09-27 DIAGNOSIS — R73.01 IMPAIRED FASTING GLUCOSE: ICD-10-CM

## 2024-09-27 DIAGNOSIS — N40.0 BENIGN NODULAR PROSTATIC HYPERPLASIA WITHOUT LOWER URINARY TRACT SYMPTOMS: ICD-10-CM

## 2024-09-27 DIAGNOSIS — K21.9 GASTRO-ESOPHAGEAL REFLUX DISEASE WITHOUT ESOPHAGITIS: ICD-10-CM

## 2024-09-27 DIAGNOSIS — I25.118 CORONARY ARTERY DISEASE OF NATIVE ARTERY OF NATIVE HEART WITH STABLE ANGINA PECTORIS (H): ICD-10-CM

## 2024-09-27 DIAGNOSIS — G40.909 SEIZURE DISORDER (H): ICD-10-CM

## 2024-09-27 DIAGNOSIS — I10 HYPERTENSION GOAL BP (BLOOD PRESSURE) < 130/80: ICD-10-CM

## 2024-09-27 RX ORDER — ATORVASTATIN CALCIUM 10 MG/1
10 TABLET, FILM COATED ORAL DAILY
Qty: 28 TABLET | Refills: 2 | Status: SHIPPED | OUTPATIENT
Start: 2024-09-27

## 2024-09-27 RX ORDER — AMLODIPINE BESYLATE 5 MG/1
5 TABLET ORAL DAILY
Qty: 28 TABLET | Refills: 2 | Status: SHIPPED | OUTPATIENT
Start: 2024-09-27

## 2024-09-27 RX ORDER — ASPIRIN 81 MG/1
81 TABLET, COATED ORAL DAILY
Qty: 28 TABLET | Refills: 2 | Status: SHIPPED | OUTPATIENT
Start: 2024-09-27

## 2024-09-27 RX ORDER — FAMOTIDINE 20 MG/1
20 TABLET, FILM COATED ORAL 2 TIMES DAILY
Qty: 56 TABLET | Refills: 2 | Status: SHIPPED | OUTPATIENT
Start: 2024-09-27

## 2024-09-27 RX ORDER — MIRABEGRON 50 MG/1
50 TABLET, FILM COATED, EXTENDED RELEASE ORAL DAILY
Qty: 28 TABLET | Refills: 2 | Status: SHIPPED | OUTPATIENT
Start: 2024-09-27

## 2024-09-27 RX ORDER — METFORMIN HCL 500 MG
TABLET, EXTENDED RELEASE 24 HR ORAL
Qty: 28 TABLET | Refills: 2 | Status: SHIPPED | OUTPATIENT
Start: 2024-09-27

## 2024-09-27 RX ORDER — ENALAPRIL MALEATE 10 MG/1
10 TABLET ORAL DAILY
Qty: 28 TABLET | Refills: 2 | Status: SHIPPED | OUTPATIENT
Start: 2024-09-27

## 2024-09-27 RX ORDER — FINASTERIDE 5 MG/1
1 TABLET, FILM COATED ORAL AT BEDTIME
Qty: 28 TABLET | Refills: 2 | Status: SHIPPED | OUTPATIENT
Start: 2024-09-27

## 2024-09-27 RX ORDER — TAMSULOSIN HYDROCHLORIDE 0.4 MG/1
CAPSULE ORAL
Qty: 56 CAPSULE | Refills: 2 | Status: SHIPPED | OUTPATIENT
Start: 2024-09-27

## 2024-09-27 RX ORDER — LEVETIRACETAM 250 MG/1
250 TABLET ORAL 2 TIMES DAILY
Qty: 56 TABLET | Refills: 2 | Status: SHIPPED | OUTPATIENT
Start: 2024-09-27

## 2024-10-03 ENCOUNTER — PATIENT OUTREACH (OUTPATIENT)
Dept: GERIATRIC MEDICINE | Facility: CLINIC | Age: 89
End: 2024-10-03
Payer: COMMERCIAL

## 2024-10-03 NOTE — PROGRESS NOTES
[facility] Care Coordination Contact    Received after visit chart from care coordinator.  Completed following tasks: Mailed copy of support plan to member, Mailed [place] Cho[place] signature sheet pages 3-4, Mailed Safe Medication Disposal , Mailed [employer] Leave Behind Letter, and Updated services in Database   and Mailed copy of CL tool to member and submitted authorization to health plan.  Care Coordinator shared copy with facility ([facility]) and no signed provider signature summary letter -  Uploaded into OnBase  CC 1st attempt 10/3/24, CMS 2nd attempt COLLEEND    Cheryle Burt  Case Management Specialist  [facility]  [phone]

## 2024-10-03 NOTE — LETTER
October 3, 2024       Gallito Farley  [address]  [facility] [address]      Dear Gallito,    At [facility], we re dedicated to improving your health and wellness. Enclosed is the Support Plan developed with you on 9/23/2024. Please review the Support Plan carefully.    As a reminder, during your visit we talked about:   Ways to manage your physical and mental health   Using health care to maintain and improve your health    Your preventive care needs      Remember to contact your care coordinator if you:   Are hospitalized or plan to be hospitalized    Have a fall     Have a change in your physical or mental health   Need help finding support or services    If you have questions or don t agree with your Support Plan, call me at [phone]. You can also call me if your needs change. TTY users call the [place] Relay at 666 or 1-401.739.6910 (zvkqtq-jo-ahqbhm relay service).    Sincerely,       Leobardo Koch RN, PHN    E-mail: [email or web address]  Phone: [phone]      [facility]                [id number] accepted     (06/2024)                [address], [place] [address]  [phone]  fax [phone]  [facility]

## 2024-10-16 ENCOUNTER — ANCILLARY PROCEDURE (OUTPATIENT)
Dept: CARDIOLOGY | Facility: CLINIC | Age: 89
End: 2024-10-16
Attending: INTERNAL MEDICINE
Payer: COMMERCIAL

## 2024-10-16 DIAGNOSIS — I44.1 ATRIOVENTRICULAR BLOCK, SECOND DEGREE: ICD-10-CM

## 2024-10-16 DIAGNOSIS — Z95.0 CARDIAC PACEMAKER IN SITU: ICD-10-CM

## 2024-10-16 LAB
MDC_IDC_LEAD_CONNECTION_STATUS: NORMAL
MDC_IDC_LEAD_CONNECTION_STATUS: NORMAL
MDC_IDC_LEAD_IMPLANT_DT: NORMAL
MDC_IDC_LEAD_IMPLANT_DT: NORMAL
MDC_IDC_LEAD_LOCATION: NORMAL
MDC_IDC_LEAD_LOCATION: NORMAL
MDC_IDC_LEAD_LOCATION_DETAIL_1: NORMAL
MDC_IDC_LEAD_LOCATION_DETAIL_1: NORMAL
MDC_IDC_LEAD_MFG: NORMAL
MDC_IDC_LEAD_MFG: NORMAL
MDC_IDC_LEAD_MODEL: NORMAL
MDC_IDC_LEAD_MODEL: NORMAL
MDC_IDC_LEAD_POLARITY_TYPE: NORMAL
MDC_IDC_LEAD_POLARITY_TYPE: NORMAL
MDC_IDC_LEAD_SERIAL: NORMAL
MDC_IDC_LEAD_SERIAL: NORMAL
MDC_IDC_MSMT_BATTERY_DTM: NORMAL
MDC_IDC_MSMT_BATTERY_REMAINING_LONGEVITY: 29 MO
MDC_IDC_MSMT_BATTERY_RRT_TRIGGER: 2.83
MDC_IDC_MSMT_BATTERY_STATUS: NORMAL
MDC_IDC_MSMT_BATTERY_VOLTAGE: 2.94 V
MDC_IDC_MSMT_LEADCHNL_RA_IMPEDANCE_VALUE: 380 OHM
MDC_IDC_MSMT_LEADCHNL_RA_IMPEDANCE_VALUE: 456 OHM
MDC_IDC_MSMT_LEADCHNL_RA_PACING_THRESHOLD_AMPLITUDE: 0.62 V
MDC_IDC_MSMT_LEADCHNL_RA_PACING_THRESHOLD_PULSEWIDTH: 0.4 MS
MDC_IDC_MSMT_LEADCHNL_RA_SENSING_INTR_AMPL: 2.38 MV
MDC_IDC_MSMT_LEADCHNL_RV_IMPEDANCE_VALUE: 513 OHM
MDC_IDC_MSMT_LEADCHNL_RV_IMPEDANCE_VALUE: 532 OHM
MDC_IDC_MSMT_LEADCHNL_RV_PACING_THRESHOLD_AMPLITUDE: 0.75 V
MDC_IDC_MSMT_LEADCHNL_RV_PACING_THRESHOLD_PULSEWIDTH: 0.4 MS
MDC_IDC_MSMT_LEADCHNL_RV_SENSING_INTR_AMPL: 2.88 MV
MDC_IDC_PG_IMPLANT_DTM: NORMAL
MDC_IDC_PG_MFG: NORMAL
MDC_IDC_PG_MODEL: NORMAL
MDC_IDC_PG_SERIAL: NORMAL
MDC_IDC_PG_TYPE: NORMAL
MDC_IDC_SESS_CLINIC_NAME: NORMAL
MDC_IDC_SESS_DTM: NORMAL
MDC_IDC_SESS_TYPE: NORMAL
MDC_IDC_SET_BRADY_AT_MODE_SWITCH_RATE: 171 {BEATS}/MIN
MDC_IDC_SET_BRADY_HYSTRATE: NORMAL
MDC_IDC_SET_BRADY_LOWRATE: 60 {BEATS}/MIN
MDC_IDC_SET_BRADY_MAX_SENSOR_RATE: 120 {BEATS}/MIN
MDC_IDC_SET_BRADY_MAX_TRACKING_RATE: 120 {BEATS}/MIN
MDC_IDC_SET_BRADY_MODE: NORMAL
MDC_IDC_SET_BRADY_PAV_DELAY_LOW: 180 MS
MDC_IDC_SET_BRADY_SAV_DELAY_LOW: 150 MS
MDC_IDC_SET_LEADCHNL_RA_PACING_AMPLITUDE: 1.5 V
MDC_IDC_SET_LEADCHNL_RA_PACING_ANODE_ELECTRODE_1: NORMAL
MDC_IDC_SET_LEADCHNL_RA_PACING_ANODE_LOCATION_1: NORMAL
MDC_IDC_SET_LEADCHNL_RA_PACING_CAPTURE_MODE: NORMAL
MDC_IDC_SET_LEADCHNL_RA_PACING_CATHODE_ELECTRODE_1: NORMAL
MDC_IDC_SET_LEADCHNL_RA_PACING_CATHODE_LOCATION_1: NORMAL
MDC_IDC_SET_LEADCHNL_RA_PACING_POLARITY: NORMAL
MDC_IDC_SET_LEADCHNL_RA_PACING_PULSEWIDTH: 0.4 MS
MDC_IDC_SET_LEADCHNL_RA_SENSING_ANODE_ELECTRODE_1: NORMAL
MDC_IDC_SET_LEADCHNL_RA_SENSING_ANODE_LOCATION_1: NORMAL
MDC_IDC_SET_LEADCHNL_RA_SENSING_CATHODE_ELECTRODE_1: NORMAL
MDC_IDC_SET_LEADCHNL_RA_SENSING_CATHODE_LOCATION_1: NORMAL
MDC_IDC_SET_LEADCHNL_RA_SENSING_POLARITY: NORMAL
MDC_IDC_SET_LEADCHNL_RA_SENSING_SENSITIVITY: 0.6 MV
MDC_IDC_SET_LEADCHNL_RV_PACING_AMPLITUDE: 2 V
MDC_IDC_SET_LEADCHNL_RV_PACING_ANODE_ELECTRODE_1: NORMAL
MDC_IDC_SET_LEADCHNL_RV_PACING_ANODE_LOCATION_1: NORMAL
MDC_IDC_SET_LEADCHNL_RV_PACING_CAPTURE_MODE: NORMAL
MDC_IDC_SET_LEADCHNL_RV_PACING_CATHODE_ELECTRODE_1: NORMAL
MDC_IDC_SET_LEADCHNL_RV_PACING_CATHODE_LOCATION_1: NORMAL
MDC_IDC_SET_LEADCHNL_RV_PACING_POLARITY: NORMAL
MDC_IDC_SET_LEADCHNL_RV_PACING_PULSEWIDTH: 0.4 MS
MDC_IDC_SET_LEADCHNL_RV_SENSING_ANODE_ELECTRODE_1: NORMAL
MDC_IDC_SET_LEADCHNL_RV_SENSING_ANODE_LOCATION_1: NORMAL
MDC_IDC_SET_LEADCHNL_RV_SENSING_CATHODE_ELECTRODE_1: NORMAL
MDC_IDC_SET_LEADCHNL_RV_SENSING_CATHODE_LOCATION_1: NORMAL
MDC_IDC_SET_LEADCHNL_RV_SENSING_POLARITY: NORMAL
MDC_IDC_SET_LEADCHNL_RV_SENSING_SENSITIVITY: 0.9 MV
MDC_IDC_SET_ZONE_DETECTION_INTERVAL: 350 MS
MDC_IDC_SET_ZONE_DETECTION_INTERVAL: 400 MS
MDC_IDC_SET_ZONE_STATUS: NORMAL
MDC_IDC_SET_ZONE_STATUS: NORMAL
MDC_IDC_SET_ZONE_TYPE: NORMAL
MDC_IDC_SET_ZONE_VENDOR_TYPE: NORMAL
MDC_IDC_STAT_AT_BURDEN_PERCENT: 0 %
MDC_IDC_STAT_AT_DTM_END: NORMAL
MDC_IDC_STAT_AT_DTM_START: NORMAL
MDC_IDC_STAT_BRADY_AP_VP_PERCENT: 21.62 %
MDC_IDC_STAT_BRADY_AP_VS_PERCENT: 0 %
MDC_IDC_STAT_BRADY_AS_VP_PERCENT: 78.37 %
MDC_IDC_STAT_BRADY_AS_VS_PERCENT: 0 %
MDC_IDC_STAT_BRADY_DTM_END: NORMAL
MDC_IDC_STAT_BRADY_DTM_START: NORMAL
MDC_IDC_STAT_BRADY_RA_PERCENT_PACED: 21.62 %
MDC_IDC_STAT_BRADY_RV_PERCENT_PACED: 100 %
MDC_IDC_STAT_EPISODE_RECENT_COUNT: 0
MDC_IDC_STAT_EPISODE_RECENT_COUNT_DTM_END: NORMAL
MDC_IDC_STAT_EPISODE_RECENT_COUNT_DTM_START: NORMAL
MDC_IDC_STAT_EPISODE_TOTAL_COUNT: 0
MDC_IDC_STAT_EPISODE_TOTAL_COUNT: 0
MDC_IDC_STAT_EPISODE_TOTAL_COUNT: 1
MDC_IDC_STAT_EPISODE_TOTAL_COUNT: 9
MDC_IDC_STAT_EPISODE_TOTAL_COUNT_DTM_END: NORMAL
MDC_IDC_STAT_EPISODE_TOTAL_COUNT_DTM_START: NORMAL
MDC_IDC_STAT_EPISODE_TYPE: NORMAL
MDC_IDC_STAT_TACHYTHERAPY_RECENT_DTM_END: NORMAL
MDC_IDC_STAT_TACHYTHERAPY_RECENT_DTM_START: NORMAL
MDC_IDC_STAT_TACHYTHERAPY_TOTAL_DTM_END: NORMAL
MDC_IDC_STAT_TACHYTHERAPY_TOTAL_DTM_START: NORMAL

## 2024-10-16 PROCEDURE — 93294 REM INTERROG EVL PM/LDLS PM: CPT | Performed by: INTERNAL MEDICINE

## 2024-10-16 PROCEDURE — 93296 REM INTERROG EVL PM/IDS: CPT

## 2024-10-25 DIAGNOSIS — I10 PRIMARY HYPERTENSION: ICD-10-CM

## 2024-10-25 RX ORDER — HYDROCHLOROTHIAZIDE 25 MG/1
25 TABLET ORAL DAILY
Qty: 28 TABLET | Refills: 2 | OUTPATIENT
Start: 2024-10-25

## 2024-11-09 ENCOUNTER — HEALTH MAINTENANCE LETTER (OUTPATIENT)
Age: 89
End: 2024-11-09

## 2024-11-20 ENCOUNTER — PATIENT OUTREACH (OUTPATIENT)
Dept: GERIATRIC MEDICINE | Facility: CLINIC | Age: 89
End: 2024-11-20
Payer: COMMERCIAL

## 2024-11-20 RX ORDER — ZINC OXIDE 216 MG/ML
1 LOTION TOPICAL DAILY PRN
Qty: 237 ML | Refills: 11 | Status: CANCELLED
Start: 2024-11-20

## 2024-11-20 NOTE — PROGRESS NOTES
[facility] Care Coordination Contact    Per Miquel Sheth at [facility] assisted [facility] more time is needed in RS tool for incontinence needs and behavior intervention/redirecting. Assisted living is reporting that up to 2 additional hours are needed for these tasks.  They are also inquiring about supplement product that was requested at time of last assessment (completed by a different care coordinator).  This care coordinator will take a look at the RS tool and update time as able as well as request MD signed order for Ensure/supplement drink.  Care coordinator is requesting quantity needed and flavor from assisted living, once received order will be pended for MD review and sent to [facility] for processing. Per facility 1 can per day (vanilla).      Nargis Neville RN  [facility]  [phone]    11/20/24 11:29:    24-hour Customized Living.  Previous: 10/01/24-11/19/24: Daily rate: 278.46. Monthly rate: $8,469.83  Updated: 11/20/24-09/30/25: New daily rate: $303.21 New monthly rate: $ 9,222.64    Nargis Neville RN  [facility]  [phone]    11/26/24: Per PCP patient will need to be seen for appointment to evaluate need for nutritional supplement as he has not been seen in clinic for over 1 year.  Assisted living staff has been notified and will assist him to make an appointment.    Nargis Neville RN  [facility]  [phone]

## 2024-11-21 DIAGNOSIS — I10 PRIMARY HYPERTENSION: ICD-10-CM

## 2024-11-21 RX ORDER — HYDROCHLOROTHIAZIDE 25 MG/1
25 TABLET ORAL DAILY
Qty: 28 TABLET | Refills: 2 | OUTPATIENT
Start: 2024-11-21

## 2024-11-26 ENCOUNTER — PATIENT OUTREACH (OUTPATIENT)
Dept: GERIATRIC MEDICINE | Facility: CLINIC | Age: 89
End: 2024-11-26
Payer: COMMERCIAL

## 2024-11-26 NOTE — PROGRESS NOTES
[facility] Care Coordination Contact    Member Signature - Support Plan Change:  Per CC, member has made a change to their support plan.  Care Plan Change Letter mailed to member for signature with a self-addressed return envelope.    Provider Signature - No Support Plan Shared:  Member indicates that they do not want their support plan shared with any EW providers.    Cheryle Burt  Case Management Specialist  [facility]  [phone]

## 2024-12-18 DIAGNOSIS — I25.118 CORONARY ARTERY DISEASE OF NATIVE ARTERY OF NATIVE HEART WITH STABLE ANGINA PECTORIS (H): ICD-10-CM

## 2024-12-18 DIAGNOSIS — G40.909 SEIZURE DISORDER (H): ICD-10-CM

## 2024-12-18 DIAGNOSIS — N40.0 BENIGN NODULAR PROSTATIC HYPERPLASIA WITHOUT LOWER URINARY TRACT SYMPTOMS: ICD-10-CM

## 2024-12-18 DIAGNOSIS — I10 HYPERTENSION GOAL BP (BLOOD PRESSURE) < 130/80: ICD-10-CM

## 2024-12-18 DIAGNOSIS — K21.9 GASTRO-ESOPHAGEAL REFLUX DISEASE WITHOUT ESOPHAGITIS: ICD-10-CM

## 2024-12-18 DIAGNOSIS — N32.81 OAB (OVERACTIVE BLADDER): ICD-10-CM

## 2024-12-18 DIAGNOSIS — R73.01 IMPAIRED FASTING GLUCOSE: ICD-10-CM

## 2024-12-18 RX ORDER — ENALAPRIL MALEATE 10 MG/1
10 TABLET ORAL DAILY
Qty: 28 TABLET | Refills: 2 | OUTPATIENT
Start: 2024-12-18

## 2024-12-18 RX ORDER — TAMSULOSIN HYDROCHLORIDE 0.4 MG/1
CAPSULE ORAL
Qty: 56 CAPSULE | Refills: 2 | OUTPATIENT
Start: 2024-12-18

## 2024-12-18 RX ORDER — ASPIRIN 81 MG/1
81 TABLET, COATED ORAL DAILY
Qty: 28 TABLET | Refills: 2 | OUTPATIENT
Start: 2024-12-18

## 2024-12-18 RX ORDER — LEVETIRACETAM 250 MG/1
250 TABLET ORAL 2 TIMES DAILY
Qty: 56 TABLET | Refills: 2 | OUTPATIENT
Start: 2024-12-18

## 2024-12-18 RX ORDER — METFORMIN HYDROCHLORIDE 500 MG/1
TABLET, EXTENDED RELEASE ORAL
Qty: 28 TABLET | Refills: 2 | OUTPATIENT
Start: 2024-12-18

## 2024-12-18 RX ORDER — ATORVASTATIN CALCIUM 10 MG/1
10 TABLET, FILM COATED ORAL DAILY
Qty: 28 TABLET | Refills: 2 | OUTPATIENT
Start: 2024-12-18

## 2024-12-18 RX ORDER — AMLODIPINE BESYLATE 5 MG/1
5 TABLET ORAL DAILY
Qty: 28 TABLET | Refills: 2 | OUTPATIENT
Start: 2024-12-18

## 2024-12-18 RX ORDER — FINASTERIDE 5 MG/1
1 TABLET, FILM COATED ORAL AT BEDTIME
Qty: 28 TABLET | Refills: 2 | OUTPATIENT
Start: 2024-12-18

## 2024-12-18 RX ORDER — FAMOTIDINE 20 MG/1
20 TABLET, FILM COATED ORAL 2 TIMES DAILY
Qty: 56 TABLET | Refills: 2 | OUTPATIENT
Start: 2024-12-18

## 2024-12-18 RX ORDER — MIRABEGRON 50 MG/1
50 TABLET, FILM COATED, EXTENDED RELEASE ORAL DAILY
Qty: 28 TABLET | Refills: 2 | OUTPATIENT
Start: 2024-12-18

## 2024-12-18 NOTE — LETTER
ABELARDO [facility]  [address] [place] [address]  [phone]  December 24, 2024  Gallito CARRILLO [facility]  [address]  [facility] [address]    Dear Gallito,    I am contacting you regarding the refill request we received for you. After reviewing your chart it looks like you are overdue for your annual and for a med check. Please call [phone] or schedule this through my chart to continue to receive refills. If you anticipate running out before your appointment let us know and we can send in a andrea refill.       Thank you,     ABELARDO [facility] nursing staff

## 2024-12-26 RX ORDER — ALBUTEROL SULFATE 0.83 MG/ML
SOLUTION RESPIRATORY (INHALATION)
Qty: 75 ML | OUTPATIENT
Start: 2024-12-26

## 2024-12-30 DIAGNOSIS — I10 HYPERTENSION GOAL BP (BLOOD PRESSURE) < 130/80: ICD-10-CM

## 2024-12-30 DIAGNOSIS — K21.9 GASTRO-ESOPHAGEAL REFLUX DISEASE WITHOUT ESOPHAGITIS: ICD-10-CM

## 2024-12-30 DIAGNOSIS — N40.0 BENIGN NODULAR PROSTATIC HYPERPLASIA WITHOUT LOWER URINARY TRACT SYMPTOMS: ICD-10-CM

## 2024-12-30 DIAGNOSIS — N32.81 OAB (OVERACTIVE BLADDER): ICD-10-CM

## 2024-12-30 DIAGNOSIS — R73.01 IMPAIRED FASTING GLUCOSE: ICD-10-CM

## 2024-12-30 DIAGNOSIS — G40.909 SEIZURE DISORDER (H): ICD-10-CM

## 2024-12-30 DIAGNOSIS — I25.118 CORONARY ARTERY DISEASE OF NATIVE ARTERY OF NATIVE HEART WITH STABLE ANGINA PECTORIS (H): ICD-10-CM

## 2025-01-02 ENCOUNTER — VIRTUAL VISIT (OUTPATIENT)
Dept: INTERNAL MEDICINE | Facility: CLINIC | Age: OVER 89
End: 2025-01-02
Payer: COMMERCIAL

## 2025-01-02 DIAGNOSIS — L85.3 XEROSIS CUTIS: ICD-10-CM

## 2025-01-02 DIAGNOSIS — R73.01 IMPAIRED FASTING GLUCOSE: ICD-10-CM

## 2025-01-02 DIAGNOSIS — M25.50 POLYARTHRALGIA: ICD-10-CM

## 2025-01-02 DIAGNOSIS — I10 HYPERTENSION GOAL BP (BLOOD PRESSURE) < 130/80: ICD-10-CM

## 2025-01-02 DIAGNOSIS — G40.909 SEIZURE DISORDER (H): ICD-10-CM

## 2025-01-02 DIAGNOSIS — N40.0 BENIGN NODULAR PROSTATIC HYPERPLASIA WITHOUT LOWER URINARY TRACT SYMPTOMS: ICD-10-CM

## 2025-01-02 DIAGNOSIS — I25.118 CORONARY ARTERY DISEASE OF NATIVE ARTERY OF NATIVE HEART WITH STABLE ANGINA PECTORIS (H): ICD-10-CM

## 2025-01-02 DIAGNOSIS — I10 PRIMARY HYPERTENSION: ICD-10-CM

## 2025-01-02 DIAGNOSIS — K21.9 GASTRO-ESOPHAGEAL REFLUX DISEASE WITHOUT ESOPHAGITIS: ICD-10-CM

## 2025-01-02 DIAGNOSIS — M54.12 CERVICAL RADICULOPATHY: ICD-10-CM

## 2025-01-02 DIAGNOSIS — J44.9 COPD, MODERATE (H): ICD-10-CM

## 2025-01-02 DIAGNOSIS — J44.9 MODERATE COPD (CHRONIC OBSTRUCTIVE PULMONARY DISEASE) (H): ICD-10-CM

## 2025-01-02 RX ORDER — ASPIRIN 81 MG/1
81 TABLET ORAL DAILY
Qty: 28 TABLET | Refills: 2 | Status: SHIPPED | OUTPATIENT
Start: 2025-01-02

## 2025-01-02 RX ORDER — AMLODIPINE BESYLATE 5 MG/1
5 TABLET ORAL DAILY
Qty: 28 TABLET | Refills: 2 | OUTPATIENT
Start: 2025-01-02

## 2025-01-02 RX ORDER — MIRABEGRON 50 MG/1
50 TABLET, FILM COATED, EXTENDED RELEASE ORAL DAILY
Qty: 28 TABLET | Refills: 2 | OUTPATIENT
Start: 2025-01-02

## 2025-01-02 RX ORDER — ATORVASTATIN CALCIUM 10 MG/1
10 TABLET, FILM COATED ORAL DAILY
Qty: 90 TABLET | Refills: 0 | Status: SHIPPED | OUTPATIENT
Start: 2025-01-02

## 2025-01-02 RX ORDER — ENALAPRIL MALEATE 10 MG/1
10 TABLET ORAL DAILY
Qty: 28 TABLET | Refills: 2 | OUTPATIENT
Start: 2025-01-02

## 2025-01-02 RX ORDER — AMLODIPINE BESYLATE 5 MG/1
5 TABLET ORAL DAILY
Qty: 90 TABLET | Refills: 0 | Status: SHIPPED | OUTPATIENT
Start: 2025-01-02

## 2025-01-02 RX ORDER — TAMSULOSIN HYDROCHLORIDE 0.4 MG/1
0.8 CAPSULE ORAL AT BEDTIME
Qty: 180 CAPSULE | Refills: 0 | Status: SHIPPED | OUTPATIENT
Start: 2025-01-02

## 2025-01-02 RX ORDER — ISOSORBIDE MONONITRATE 30 MG/1
30 TABLET, EXTENDED RELEASE ORAL DAILY
Qty: 90 TABLET | Refills: 0 | Status: SHIPPED | OUTPATIENT
Start: 2025-01-02

## 2025-01-02 RX ORDER — ATORVASTATIN CALCIUM 10 MG/1
10 TABLET, FILM COATED ORAL DAILY
Qty: 28 TABLET | Refills: 2 | OUTPATIENT
Start: 2025-01-02

## 2025-01-02 RX ORDER — TIOTROPIUM BROMIDE 18 UG/1
18 CAPSULE ORAL; RESPIRATORY (INHALATION) DAILY
Qty: 90 CAPSULE | Refills: 4 | Status: SHIPPED | OUTPATIENT
Start: 2025-01-02

## 2025-01-02 RX ORDER — ENALAPRIL MALEATE 10 MG/1
10 TABLET ORAL DAILY
Qty: 90 TABLET | Refills: 0 | Status: SHIPPED | OUTPATIENT
Start: 2025-01-02

## 2025-01-02 RX ORDER — FAMOTIDINE 20 MG/1
20 TABLET, FILM COATED ORAL 2 TIMES DAILY
Qty: 56 TABLET | Refills: 2 | OUTPATIENT
Start: 2025-01-02

## 2025-01-02 RX ORDER — ACETAMINOPHEN 500 MG
TABLET ORAL
Qty: 112 TABLET | Refills: 0 | Status: SHIPPED | OUTPATIENT
Start: 2025-01-02

## 2025-01-02 RX ORDER — METFORMIN HYDROCHLORIDE 500 MG/1
TABLET, EXTENDED RELEASE ORAL
Qty: 28 TABLET | Refills: 2 | OUTPATIENT
Start: 2025-01-02

## 2025-01-02 RX ORDER — FINASTERIDE 5 MG/1
1 TABLET, FILM COATED ORAL AT BEDTIME
Qty: 28 TABLET | Refills: 2 | Status: SHIPPED | OUTPATIENT
Start: 2025-01-02

## 2025-01-02 RX ORDER — TAMSULOSIN HYDROCHLORIDE 0.4 MG/1
CAPSULE ORAL
Qty: 56 CAPSULE | Refills: 2 | OUTPATIENT
Start: 2025-01-02

## 2025-01-02 RX ORDER — ALBUTEROL SULFATE 90 UG/1
2 INHALANT RESPIRATORY (INHALATION) EVERY 4 HOURS PRN
Qty: 18 G | Refills: 3 | Status: SHIPPED | OUTPATIENT
Start: 2025-01-02

## 2025-01-02 RX ORDER — METFORMIN HYDROCHLORIDE 500 MG/1
TABLET, EXTENDED RELEASE ORAL
Qty: 90 TABLET | Refills: 0 | Status: SHIPPED | OUTPATIENT
Start: 2025-01-02

## 2025-01-02 RX ORDER — AMMONIUM LACTATE 12 G/100G
LOTION TOPICAL 2 TIMES DAILY
Qty: 567 G | Refills: 1 | Status: SHIPPED | OUTPATIENT
Start: 2025-01-02

## 2025-01-02 RX ORDER — ASPIRIN 81 MG/1
81 TABLET, COATED ORAL DAILY
Qty: 28 TABLET | Refills: 2 | OUTPATIENT
Start: 2025-01-02

## 2025-01-02 RX ORDER — HYDROCHLOROTHIAZIDE 25 MG/1
25 TABLET ORAL DAILY
Qty: 90 TABLET | Refills: 0 | Status: SHIPPED | OUTPATIENT
Start: 2025-01-02

## 2025-01-02 RX ORDER — LEVETIRACETAM 250 MG/1
250 TABLET ORAL 2 TIMES DAILY
Qty: 56 TABLET | Refills: 2 | Status: SHIPPED | OUTPATIENT
Start: 2025-01-02

## 2025-01-02 RX ORDER — FINASTERIDE 5 MG/1
1 TABLET, FILM COATED ORAL AT BEDTIME
Qty: 28 TABLET | Refills: 2 | OUTPATIENT
Start: 2025-01-02

## 2025-01-02 RX ORDER — FAMOTIDINE 20 MG/1
20 TABLET, FILM COATED ORAL 2 TIMES DAILY
Qty: 180 TABLET | Refills: 0 | Status: SHIPPED | OUTPATIENT
Start: 2025-01-02

## 2025-01-02 RX ORDER — GUAIFENESIN 600 MG/1
1 TABLET, EXTENDED RELEASE ORAL DAILY PRN
Qty: 56 TABLET | Refills: 11 | Status: SHIPPED | OUTPATIENT
Start: 2025-01-02

## 2025-01-02 RX ORDER — ALBUTEROL SULFATE 0.83 MG/ML
2.5 SOLUTION RESPIRATORY (INHALATION) EVERY 4 HOURS PRN
Qty: 540 ML | Refills: 1 | Status: SHIPPED | OUTPATIENT
Start: 2025-01-02

## 2025-01-02 RX ORDER — LEVETIRACETAM 250 MG/1
250 TABLET ORAL 2 TIMES DAILY
Qty: 56 TABLET | Refills: 2 | OUTPATIENT
Start: 2025-01-02

## 2025-01-02 RX ORDER — GABAPENTIN 100 MG/1
100 CAPSULE ORAL 3 TIMES DAILY
Qty: 270 CAPSULE | Refills: 0 | Status: SHIPPED | OUTPATIENT
Start: 2025-01-02

## 2025-01-02 NOTE — PROGRESS NOTES
Gallito is a 89 year old who is being evaluated via a billable telephone visit.    What phone number would you like to be contacted at?   How would you like to obtain your AVS?   Originating Location (pt. Location): Home    Distant Location (provider location):  On-site  Telephone visit completed due to the patient did not consent to a video visit.    Assessment & Plan     COPD, moderate (H)  Refilled inhalers    - guaiFENesin (MUCINEX) 600 MG 12 hr tablet  Dispense: 56 tablet; Refill: 11  - albuterol (PROVENTIL) (2.5 MG/3ML) 0.083% neb solution  Dispense: 540 mL; Refill: 1  - albuterol (PROAIR HFA/PROVENTIL HFA/VENTOLIN HFA) 108 (90 Base) MCG/ACT inhaler  Dispense: 18 g; Refill: 3  - Nutritional Supplements (ENSURE COMPLETE) LIQD  Dispense: 297 mL; Refill: 2    Polyarthralgia    - acetaminophen (TYLENOL) 500 MG tablet  Dispense: 112 tablet; Refill: 0    Cervical radiculopathy    - gabapentin (NEURONTIN) 100 MG capsule  Dispense: 270 capsule; Refill: 0    Hypertension goal BP (blood pressure) < 130/80    - amLODIPine (NORVASC) 5 MG tablet  Dispense: 90 tablet; Refill: 0  - enalapril (VASOTEC) 10 MG tablet  Dispense: 90 tablet; Refill: 0  - isosorbide mononitrate (IMDUR) 30 MG 24 hr tablet  Dispense: 90 tablet; Refill: 0    Coronary artery disease of native artery of native heart with stable angina pectoris (H)    - atorvastatin (LIPITOR) 10 MG tablet  Dispense: 90 tablet; Refill: 0  - isosorbide mononitrate (IMDUR) 30 MG 24 hr tablet  Dispense: 90 tablet; Refill: 0  - aspirin (ASPIRIN ADULT LOW DOSE) 81 MG EC tablet  Dispense: 28 tablet; Refill: 2    Gastro-esophageal reflux disease without esophagitis    - famotidine (PEPCID) 20 MG tablet  Dispense: 180 tablet; Refill: 0    Benign nodular prostatic hyperplasia without lower urinary tract symptoms    - finasteride (PROSCAR) 5 MG tablet  Dispense: 28 tablet; Refill: 2  - tamsulosin (FLOMAX) 0.4 MG capsule  Dispense: 180 capsule; Refill: 0    Primary hypertension    -  "hydrochlorothiazide (HYDRODIURIL) 25 MG tablet  Dispense: 90 tablet; Refill: 0    Seizure disorder (H)    - levETIRAcetam (KEPPRA) 250 MG tablet  Dispense: 56 tablet; Refill: 2    Impaired fasting glucose    - metFORMIN (GLUCOPHAGE XR) 500 MG 24 hr tablet  Dispense: 90 tablet; Refill: 0    Moderate COPD (chronic obstructive pulmonary disease) (H)    - tiotropium (SPIRIVA HANDIHALER) 18 MCG inhaled capsule  Dispense: 90 capsule; Refill: 4    Xerosis cutis    - ammonium lactate (LAC-HYDRIN) 12 % external lotion  Dispense: 567 g; Refill: 1           All medications refilled for 3 months.    Needs to be seen  in office for further visits.    If He has trouble coming in to the clinic , can have a Visiting Physician.          Jared Conti is a 89 year old, presenting for the following health issues:  Recheck Medication      Via the Health Maintenance questionnaire, the patient has reported the following services have been completed -Eye Exam: [place] 2024-09-09, this information has not been sent to the abstraction team.  History of Present Illness       Reason for visit:  Recheck medications   He is taking medications regularly.       Telephone  used for visit today,    Patient's family member helped with the visit.     Patient stated he is doing fine.    Lives in a group home.    He has difficulty coming for in person appointment.          Review of Systems  Constitutional, HEENT, cardiovascular, pulmonary, gi and gu systems are negative, except as otherwise noted.      Objective    Vitals - Patient Reported  Height (Patient Reported): 177.8 cm (5' 10\")        Physical Exam   General: Alert and no distress //Respiratory: No audible wheeze, cough, or shortness of breath // Psychiatric:  Appropriate affect, tone, and pace of words            Phone call duration: 14 minutes  Signed Electronically by: Kranthi Reza MD    "

## 2025-01-03 ENCOUNTER — MEDICAL CORRESPONDENCE (OUTPATIENT)
Dept: HEALTH INFORMATION MANAGEMENT | Facility: CLINIC | Age: OVER 89
End: 2025-01-03
Payer: COMMERCIAL

## 2025-01-03 NOTE — TELEPHONE ENCOUNTER
Fax is received and given to Dr. Awad to address.  [place],  [facility]  
Form faxed.    Serafin Davison CMA    
Forms completed. Placed in TC basket.  
Reason for Call:  Other Fax    Detailed comments: [facility] calling for they  faxed in a document on a order that was discontinued on 06/10/19 for tolterodine and would like for Dr. Awad to find the document and fax back as soon as possible.    Phone Number CHANDLER can be reached at:     Best Time: anytime    Can we leave a detailed message on this number? YES    Call taken on 9/6/2019 at 12:53 PM by Mal Cat              
No indicators present

## 2025-01-15 DIAGNOSIS — N32.81 OAB (OVERACTIVE BLADDER): ICD-10-CM

## 2025-01-16 RX ORDER — MIRABEGRON 50 MG/1
50 TABLET, FILM COATED, EXTENDED RELEASE ORAL DAILY
Qty: 28 TABLET | Refills: 2 | OUTPATIENT
Start: 2025-01-16

## 2025-02-11 DIAGNOSIS — N32.81 OAB (OVERACTIVE BLADDER): ICD-10-CM

## 2025-02-11 RX ORDER — MIRABEGRON 50 MG/1
50 TABLET, FILM COATED, EXTENDED RELEASE ORAL DAILY
Qty: 28 TABLET | Refills: 2 | Status: SHIPPED | OUTPATIENT
Start: 2025-02-11

## 2025-02-19 ENCOUNTER — PATIENT OUTREACH (OUTPATIENT)
Dept: GERIATRIC MEDICINE | Facility: CLINIC | Age: OVER 89
End: 2025-02-19
Payer: COMMERCIAL

## 2025-02-19 NOTE — PROGRESS NOTES
[facility] Care Coordination Contact      [facility] Six-Month Telephone Assessment    6 month telephone assessment completed on 2/19/25 .    ER visits: No  Hospitalizations: No  TCU stays: No  Significant health status changes: Per Fardous no health changes and member is doing well at this time with no changes in care needs.  Falls/Injuries: No  ADL/IADL changes: No  Changes in services: No  Goals: See Support Plan for goal progress documentation.      Will see member in 6 months for an annual health risk assessment.       Nargis Neville RN  [facility]  [phone]

## 2025-03-02 ENCOUNTER — HEALTH MAINTENANCE LETTER (OUTPATIENT)
Age: OVER 89
End: 2025-03-02

## 2025-03-03 ENCOUNTER — ANCILLARY PROCEDURE (OUTPATIENT)
Dept: CARDIOLOGY | Facility: CLINIC | Age: OVER 89
End: 2025-03-03
Attending: INTERNAL MEDICINE
Payer: COMMERCIAL

## 2025-03-03 DIAGNOSIS — I44.1 ATRIOVENTRICULAR BLOCK, SECOND DEGREE: ICD-10-CM

## 2025-03-03 DIAGNOSIS — Z95.0 CARDIAC PACEMAKER IN SITU: ICD-10-CM

## 2025-03-03 PROCEDURE — 93296 REM INTERROG EVL PM/IDS: CPT

## 2025-03-03 PROCEDURE — 93294 REM INTERROG EVL PM/LDLS PM: CPT | Performed by: INTERNAL MEDICINE

## 2025-03-04 LAB
MDC_IDC_LEAD_CONNECTION_STATUS: NORMAL
MDC_IDC_LEAD_CONNECTION_STATUS: NORMAL
MDC_IDC_LEAD_IMPLANT_DT: NORMAL
MDC_IDC_LEAD_IMPLANT_DT: NORMAL
MDC_IDC_LEAD_LOCATION: NORMAL
MDC_IDC_LEAD_LOCATION: NORMAL
MDC_IDC_LEAD_LOCATION_DETAIL_1: NORMAL
MDC_IDC_LEAD_LOCATION_DETAIL_1: NORMAL
MDC_IDC_LEAD_MFG: NORMAL
MDC_IDC_LEAD_MFG: NORMAL
MDC_IDC_LEAD_MODEL: NORMAL
MDC_IDC_LEAD_MODEL: NORMAL
MDC_IDC_LEAD_POLARITY_TYPE: NORMAL
MDC_IDC_LEAD_POLARITY_TYPE: NORMAL
MDC_IDC_LEAD_SERIAL: NORMAL
MDC_IDC_LEAD_SERIAL: NORMAL
MDC_IDC_MSMT_BATTERY_DTM: NORMAL
MDC_IDC_MSMT_BATTERY_REMAINING_LONGEVITY: 23 MO
MDC_IDC_MSMT_BATTERY_RRT_TRIGGER: 2.83
MDC_IDC_MSMT_BATTERY_STATUS: NORMAL
MDC_IDC_MSMT_BATTERY_VOLTAGE: 2.92 V
MDC_IDC_MSMT_LEADCHNL_RA_IMPEDANCE_VALUE: 380 OHM
MDC_IDC_MSMT_LEADCHNL_RA_IMPEDANCE_VALUE: 456 OHM
MDC_IDC_MSMT_LEADCHNL_RA_PACING_THRESHOLD_AMPLITUDE: 0.62 V
MDC_IDC_MSMT_LEADCHNL_RA_PACING_THRESHOLD_PULSEWIDTH: 0.4 MS
MDC_IDC_MSMT_LEADCHNL_RA_SENSING_INTR_AMPL: 1.88 MV
MDC_IDC_MSMT_LEADCHNL_RV_IMPEDANCE_VALUE: 494 OHM
MDC_IDC_MSMT_LEADCHNL_RV_IMPEDANCE_VALUE: 513 OHM
MDC_IDC_MSMT_LEADCHNL_RV_PACING_THRESHOLD_AMPLITUDE: 0.75 V
MDC_IDC_MSMT_LEADCHNL_RV_PACING_THRESHOLD_PULSEWIDTH: 0.4 MS
MDC_IDC_MSMT_LEADCHNL_RV_SENSING_INTR_AMPL: 16.62 MV
MDC_IDC_MSMT_LEADCHNL_RV_SENSING_INTR_AMPL: 16.62 MV
MDC_IDC_PG_IMPLANT_DTM: NORMAL
MDC_IDC_PG_MFG: NORMAL
MDC_IDC_PG_MODEL: NORMAL
MDC_IDC_PG_SERIAL: NORMAL
MDC_IDC_PG_TYPE: NORMAL
MDC_IDC_SESS_CLINIC_NAME: NORMAL
MDC_IDC_SESS_DTM: NORMAL
MDC_IDC_SESS_TYPE: NORMAL
MDC_IDC_SET_BRADY_AT_MODE_SWITCH_RATE: 171 {BEATS}/MIN
MDC_IDC_SET_BRADY_HYSTRATE: NORMAL
MDC_IDC_SET_BRADY_LOWRATE: 60 {BEATS}/MIN
MDC_IDC_SET_BRADY_MAX_SENSOR_RATE: 120 {BEATS}/MIN
MDC_IDC_SET_BRADY_MAX_TRACKING_RATE: 120 {BEATS}/MIN
MDC_IDC_SET_BRADY_MODE: NORMAL
MDC_IDC_SET_BRADY_PAV_DELAY_LOW: 180 MS
MDC_IDC_SET_BRADY_SAV_DELAY_LOW: 150 MS
MDC_IDC_SET_LEADCHNL_RA_PACING_AMPLITUDE: 1.5 V
MDC_IDC_SET_LEADCHNL_RA_PACING_ANODE_ELECTRODE_1: NORMAL
MDC_IDC_SET_LEADCHNL_RA_PACING_ANODE_LOCATION_1: NORMAL
MDC_IDC_SET_LEADCHNL_RA_PACING_CAPTURE_MODE: NORMAL
MDC_IDC_SET_LEADCHNL_RA_PACING_CATHODE_ELECTRODE_1: NORMAL
MDC_IDC_SET_LEADCHNL_RA_PACING_CATHODE_LOCATION_1: NORMAL
MDC_IDC_SET_LEADCHNL_RA_PACING_POLARITY: NORMAL
MDC_IDC_SET_LEADCHNL_RA_PACING_PULSEWIDTH: 0.4 MS
MDC_IDC_SET_LEADCHNL_RA_SENSING_ANODE_ELECTRODE_1: NORMAL
MDC_IDC_SET_LEADCHNL_RA_SENSING_ANODE_LOCATION_1: NORMAL
MDC_IDC_SET_LEADCHNL_RA_SENSING_CATHODE_ELECTRODE_1: NORMAL
MDC_IDC_SET_LEADCHNL_RA_SENSING_CATHODE_LOCATION_1: NORMAL
MDC_IDC_SET_LEADCHNL_RA_SENSING_POLARITY: NORMAL
MDC_IDC_SET_LEADCHNL_RA_SENSING_SENSITIVITY: 0.6 MV
MDC_IDC_SET_LEADCHNL_RV_PACING_AMPLITUDE: 2 V
MDC_IDC_SET_LEADCHNL_RV_PACING_ANODE_ELECTRODE_1: NORMAL
MDC_IDC_SET_LEADCHNL_RV_PACING_ANODE_LOCATION_1: NORMAL
MDC_IDC_SET_LEADCHNL_RV_PACING_CAPTURE_MODE: NORMAL
MDC_IDC_SET_LEADCHNL_RV_PACING_CATHODE_ELECTRODE_1: NORMAL
MDC_IDC_SET_LEADCHNL_RV_PACING_CATHODE_LOCATION_1: NORMAL
MDC_IDC_SET_LEADCHNL_RV_PACING_POLARITY: NORMAL
MDC_IDC_SET_LEADCHNL_RV_PACING_PULSEWIDTH: 0.4 MS
MDC_IDC_SET_LEADCHNL_RV_SENSING_ANODE_ELECTRODE_1: NORMAL
MDC_IDC_SET_LEADCHNL_RV_SENSING_ANODE_LOCATION_1: NORMAL
MDC_IDC_SET_LEADCHNL_RV_SENSING_CATHODE_ELECTRODE_1: NORMAL
MDC_IDC_SET_LEADCHNL_RV_SENSING_CATHODE_LOCATION_1: NORMAL
MDC_IDC_SET_LEADCHNL_RV_SENSING_POLARITY: NORMAL
MDC_IDC_SET_LEADCHNL_RV_SENSING_SENSITIVITY: 0.9 MV
MDC_IDC_SET_ZONE_DETECTION_INTERVAL: 350 MS
MDC_IDC_SET_ZONE_DETECTION_INTERVAL: 400 MS
MDC_IDC_SET_ZONE_STATUS: NORMAL
MDC_IDC_SET_ZONE_STATUS: NORMAL
MDC_IDC_SET_ZONE_TYPE: NORMAL
MDC_IDC_SET_ZONE_VENDOR_TYPE: NORMAL
MDC_IDC_STAT_AT_BURDEN_PERCENT: 0.1 %
MDC_IDC_STAT_AT_DTM_END: NORMAL
MDC_IDC_STAT_AT_DTM_START: NORMAL
MDC_IDC_STAT_BRADY_AP_VP_PERCENT: 19.28 %
MDC_IDC_STAT_BRADY_AP_VS_PERCENT: 0 %
MDC_IDC_STAT_BRADY_AS_VP_PERCENT: 80.71 %
MDC_IDC_STAT_BRADY_AS_VS_PERCENT: 0 %
MDC_IDC_STAT_BRADY_DTM_END: NORMAL
MDC_IDC_STAT_BRADY_DTM_START: NORMAL
MDC_IDC_STAT_BRADY_RA_PERCENT_PACED: 19.28 %
MDC_IDC_STAT_BRADY_RV_PERCENT_PACED: 99.99 %
MDC_IDC_STAT_EPISODE_RECENT_COUNT: 0
MDC_IDC_STAT_EPISODE_RECENT_COUNT_DTM_END: NORMAL
MDC_IDC_STAT_EPISODE_RECENT_COUNT_DTM_START: NORMAL
MDC_IDC_STAT_EPISODE_TOTAL_COUNT: 0
MDC_IDC_STAT_EPISODE_TOTAL_COUNT: 0
MDC_IDC_STAT_EPISODE_TOTAL_COUNT: 1
MDC_IDC_STAT_EPISODE_TOTAL_COUNT: 9
MDC_IDC_STAT_EPISODE_TOTAL_COUNT_DTM_END: NORMAL
MDC_IDC_STAT_EPISODE_TOTAL_COUNT_DTM_START: NORMAL
MDC_IDC_STAT_EPISODE_TYPE: NORMAL
MDC_IDC_STAT_TACHYTHERAPY_RECENT_DTM_END: NORMAL
MDC_IDC_STAT_TACHYTHERAPY_RECENT_DTM_START: NORMAL
MDC_IDC_STAT_TACHYTHERAPY_TOTAL_DTM_END: NORMAL
MDC_IDC_STAT_TACHYTHERAPY_TOTAL_DTM_START: NORMAL

## 2025-04-01 DIAGNOSIS — I25.118 CORONARY ARTERY DISEASE OF NATIVE ARTERY OF NATIVE HEART WITH STABLE ANGINA PECTORIS: ICD-10-CM

## 2025-04-01 DIAGNOSIS — G40.909 SEIZURE DISORDER (H): ICD-10-CM

## 2025-04-01 DIAGNOSIS — I10 HYPERTENSION GOAL BP (BLOOD PRESSURE) < 130/80: ICD-10-CM

## 2025-04-01 RX ORDER — LEVETIRACETAM 250 MG/1
250 TABLET ORAL 2 TIMES DAILY
Qty: 56 TABLET | Refills: 2 | Status: SHIPPED | OUTPATIENT
Start: 2025-04-01

## 2025-04-01 RX ORDER — ATORVASTATIN CALCIUM 10 MG/1
10 TABLET, FILM COATED ORAL DAILY
Qty: 28 TABLET | Refills: 0 | Status: SHIPPED | OUTPATIENT
Start: 2025-04-01

## 2025-04-01 RX ORDER — ISOSORBIDE MONONITRATE 30 MG/1
30 TABLET, EXTENDED RELEASE ORAL DAILY
Qty: 28 TABLET | Refills: 0 | Status: SHIPPED | OUTPATIENT
Start: 2025-04-01

## 2025-04-08 DIAGNOSIS — I25.118 CORONARY ARTERY DISEASE OF NATIVE ARTERY OF NATIVE HEART WITH STABLE ANGINA PECTORIS: ICD-10-CM

## 2025-04-08 DIAGNOSIS — I10 HYPERTENSION GOAL BP (BLOOD PRESSURE) < 130/80: ICD-10-CM

## 2025-04-08 RX ORDER — ISOSORBIDE MONONITRATE 30 MG/1
30 TABLET, EXTENDED RELEASE ORAL DAILY
Qty: 18 TABLET | OUTPATIENT
Start: 2025-04-08

## 2025-04-08 RX ORDER — ATORVASTATIN CALCIUM 10 MG/1
10 TABLET, FILM COATED ORAL DAILY
Qty: 18 TABLET | OUTPATIENT
Start: 2025-04-08

## 2025-04-15 ENCOUNTER — OFFICE VISIT (OUTPATIENT)
Dept: INTERNAL MEDICINE | Facility: CLINIC | Age: OVER 89
End: 2025-04-15
Payer: COMMERCIAL

## 2025-04-15 VITALS
SYSTOLIC BLOOD PRESSURE: 173 MMHG | HEIGHT: 70 IN | DIASTOLIC BLOOD PRESSURE: 81 MMHG | TEMPERATURE: 97.8 F | HEART RATE: 70 BPM | WEIGHT: 175.1 LBS | OXYGEN SATURATION: 98 % | RESPIRATION RATE: 16 BRPM | BODY MASS INDEX: 25.07 KG/M2

## 2025-04-15 DIAGNOSIS — Z00.00 ENCOUNTER FOR MEDICARE ANNUAL WELLNESS EXAM: ICD-10-CM

## 2025-04-15 DIAGNOSIS — J44.9 MODERATE COPD (CHRONIC OBSTRUCTIVE PULMONARY DISEASE) (H): ICD-10-CM

## 2025-04-15 DIAGNOSIS — Z74.09 IMPAIRED MOBILITY: ICD-10-CM

## 2025-04-15 DIAGNOSIS — G40.909 SEIZURE DISORDER (H): ICD-10-CM

## 2025-04-15 DIAGNOSIS — I10 HYPERTENSION GOAL BP (BLOOD PRESSURE) < 130/80: ICD-10-CM

## 2025-04-15 DIAGNOSIS — E11.42 TYPE 2 DIABETES MELLITUS WITH DIABETIC POLYNEUROPATHY, WITHOUT LONG-TERM CURRENT USE OF INSULIN (H): ICD-10-CM

## 2025-04-15 DIAGNOSIS — R73.01 IMPAIRED FASTING GLUCOSE: ICD-10-CM

## 2025-04-15 DIAGNOSIS — Z79.899 MEDICATION MANAGEMENT: ICD-10-CM

## 2025-04-15 DIAGNOSIS — M54.12 CERVICAL RADICULOPATHY: ICD-10-CM

## 2025-04-15 DIAGNOSIS — J44.9 COPD, MODERATE (H): ICD-10-CM

## 2025-04-15 DIAGNOSIS — I10 PRIMARY HYPERTENSION: ICD-10-CM

## 2025-04-15 DIAGNOSIS — Z13.6 SCREENING FOR CARDIOVASCULAR CONDITION: Primary | ICD-10-CM

## 2025-04-15 DIAGNOSIS — I25.118 CORONARY ARTERY DISEASE OF NATIVE ARTERY OF NATIVE HEART WITH STABLE ANGINA PECTORIS: ICD-10-CM

## 2025-04-15 DIAGNOSIS — I25.118 CORONARY ARTERY DISEASE OF NATIVE HEART WITH STABLE ANGINA PECTORIS, UNSPECIFIED VESSEL OR LESION TYPE: ICD-10-CM

## 2025-04-15 LAB
ERYTHROCYTE [DISTWIDTH] IN BLOOD BY AUTOMATED COUNT: 12.8 % (ref 10–15)
EST. AVERAGE GLUCOSE BLD GHB EST-MCNC: 120 MG/DL
HBA1C MFR BLD: 5.8 % (ref 0–5.6)
HCT VFR BLD AUTO: 48.6 % (ref 40–53)
HGB BLD-MCNC: 16.4 G/DL (ref 13.3–17.7)
MCH RBC QN AUTO: 31.3 PG (ref 26.5–33)
MCHC RBC AUTO-ENTMCNC: 33.7 G/DL (ref 31.5–36.5)
MCV RBC AUTO: 93 FL (ref 78–100)
PLATELET # BLD AUTO: 181 10E3/UL (ref 150–450)
RBC # BLD AUTO: 5.24 10E6/UL (ref 4.4–5.9)
WBC # BLD AUTO: 7.4 10E3/UL (ref 4–11)

## 2025-04-15 PROCEDURE — G2211 COMPLEX E/M VISIT ADD ON: HCPCS | Performed by: INTERNAL MEDICINE

## 2025-04-15 PROCEDURE — G0439 PPPS, SUBSEQ VISIT: HCPCS | Performed by: INTERNAL MEDICINE

## 2025-04-15 PROCEDURE — G0009 ADMIN PNEUMOCOCCAL VACCINE: HCPCS | Performed by: INTERNAL MEDICINE

## 2025-04-15 PROCEDURE — 3079F DIAST BP 80-89 MM HG: CPT | Performed by: INTERNAL MEDICINE

## 2025-04-15 PROCEDURE — 82043 UR ALBUMIN QUANTITATIVE: CPT | Performed by: INTERNAL MEDICINE

## 2025-04-15 PROCEDURE — 80053 COMPREHEN METABOLIC PANEL: CPT | Performed by: INTERNAL MEDICINE

## 2025-04-15 PROCEDURE — 36415 COLL VENOUS BLD VENIPUNCTURE: CPT | Performed by: INTERNAL MEDICINE

## 2025-04-15 PROCEDURE — 90677 PCV20 VACCINE IM: CPT | Performed by: INTERNAL MEDICINE

## 2025-04-15 PROCEDURE — 80061 LIPID PANEL: CPT | Performed by: INTERNAL MEDICINE

## 2025-04-15 PROCEDURE — 99214 OFFICE O/P EST MOD 30 MIN: CPT | Mod: 25 | Performed by: INTERNAL MEDICINE

## 2025-04-15 PROCEDURE — 83036 HEMOGLOBIN GLYCOSYLATED A1C: CPT | Performed by: INTERNAL MEDICINE

## 2025-04-15 PROCEDURE — 85027 COMPLETE CBC AUTOMATED: CPT | Performed by: INTERNAL MEDICINE

## 2025-04-15 PROCEDURE — 3077F SYST BP >= 140 MM HG: CPT | Performed by: INTERNAL MEDICINE

## 2025-04-15 PROCEDURE — 82570 ASSAY OF URINE CREATININE: CPT | Performed by: INTERNAL MEDICINE

## 2025-04-15 RX ORDER — HYDROCHLOROTHIAZIDE 25 MG/1
25 TABLET ORAL DAILY
Qty: 90 TABLET | Refills: 2 | Status: SHIPPED | OUTPATIENT
Start: 2025-04-15

## 2025-04-15 RX ORDER — TIOTROPIUM BROMIDE 18 UG/1
18 CAPSULE ORAL; RESPIRATORY (INHALATION) DAILY
Qty: 90 CAPSULE | Refills: 4 | Status: SHIPPED | OUTPATIENT
Start: 2025-04-15

## 2025-04-15 RX ORDER — LEVETIRACETAM 250 MG/1
250 TABLET ORAL 2 TIMES DAILY
Qty: 56 TABLET | Refills: 2 | Status: SHIPPED | OUTPATIENT
Start: 2025-04-15

## 2025-04-15 RX ORDER — METFORMIN HYDROCHLORIDE 500 MG/1
TABLET, EXTENDED RELEASE ORAL
Qty: 90 TABLET | Refills: 0 | Status: SHIPPED | OUTPATIENT
Start: 2025-04-15

## 2025-04-15 RX ORDER — ISOSORBIDE MONONITRATE 30 MG/1
30 TABLET, EXTENDED RELEASE ORAL DAILY
Qty: 28 TABLET | Refills: 0 | Status: SHIPPED | OUTPATIENT
Start: 2025-04-15

## 2025-04-15 RX ORDER — CYCLOSPORINE 0.5 MG/ML
1 EMULSION OPHTHALMIC 2 TIMES DAILY
COMMUNITY
Start: 2024-09-10

## 2025-04-15 RX ORDER — ATORVASTATIN CALCIUM 10 MG/1
10 TABLET, FILM COATED ORAL DAILY
Qty: 28 TABLET | Refills: 0 | Status: SHIPPED | OUTPATIENT
Start: 2025-04-15

## 2025-04-15 RX ORDER — AMLODIPINE BESYLATE 5 MG/1
5 TABLET ORAL DAILY
Qty: 90 TABLET | Refills: 0 | Status: SHIPPED | OUTPATIENT
Start: 2025-04-15

## 2025-04-15 RX ORDER — FLUTICASONE FUROATE AND VILANTEROL 200; 25 UG/1; UG/1
1 POWDER RESPIRATORY (INHALATION) DAILY
Qty: 180 EACH | Refills: 4 | Status: SHIPPED | OUTPATIENT
Start: 2025-04-15

## 2025-04-15 RX ORDER — GABAPENTIN 100 MG/1
100 CAPSULE ORAL 3 TIMES DAILY
Qty: 270 CAPSULE | Refills: 0 | Status: SHIPPED | OUTPATIENT
Start: 2025-04-15

## 2025-04-15 SDOH — HEALTH STABILITY: PHYSICAL HEALTH: ON AVERAGE, HOW MANY DAYS PER WEEK DO YOU ENGAGE IN MODERATE TO STRENUOUS EXERCISE (LIKE A BRISK WALK)?: 0 DAYS

## 2025-04-15 ASSESSMENT — SOCIAL DETERMINANTS OF HEALTH (SDOH): HOW OFTEN DO YOU GET TOGETHER WITH FRIENDS OR RELATIVES?: ONCE A WEEK

## 2025-04-15 NOTE — PROGRESS NOTES
"Preventive Care Visit  [facility]  Kranthi Reza MD, Internal Medicine  Apr 15, 2025      Assessment & Plan     Encounter for Medicare annual wellness exam    Coronary artery disease of native heart with stable angina pectoris, unspecified vessel or lesion type  Labs ordered, stable    Type 2 diabetes mellitus with diabetic polyneuropathy, without long-term current use of insulin (H)  A1c ordered  Labs ordered, metformin refilled    Medication management  MTM referral placed      Primary hypertension  Medications refilled    Moderate COPD (chronic obstructive pulmonary disease) (H)  Stable, inhalers refilled      Seizure disorder (H)  Stable, Keppra refilled    Hypertension goal BP (blood pressure) < 130/80  Medications refilled      Cervical radiculopathy  Continues on gabapentin    Impaired mobility  Walker prescription given      Patient has been advised of split billing requirements and indicates understanding: Yes        BMI  Estimated body mass index is 25.12 kg/m  as calculated from the following:    Height as of this encounter: 1.778 m (5' 10\").    Weight as of this encounter: 79.4 kg (175 lb 1.6 oz).   Weight management plan: Discussed healthy diet and exercise guidelines    Counseling  Appropriate preventive services were addressed with this patient via screening, questionnaire, or discussion as appropriate for fall prevention, nutrition, physical activity, Tobacco-use cessation, social engagement, weight loss and cognition.  Checklist reviewing preventive services available has been given to the patient.  Reviewed patient's diet, addressing concerns and/or questions.   The patient was instructed to see the dentist every 6 months.   Discussed possible causes of fatigue. Updated plan of care.  Patient reported difficulty with activities of daily living were addressed today.The patient was provided with written information regarding signs of hearing loss.   Information on urinary " incontinence and treatment options given to patient.       Follow up in 3 months.    The longitudinal plan of care for the diagnosis(es)/condition(s) as documented were addressed during this visit. Due to the added complexity in care, I will continue to support Gallito in the subsequent management and with ongoing continuity of care.      Subjective   Gallito is a 89 year old, presenting for the following:  Physical          HPI       Patient presents to clinic with his wife and daughter who is translating.      Denies any acute or new problems.    Has hypertension states he is compliant with medications.    Having gait instability, needs a prescription for a walker.    Lab Results   Component Value Date    A1C 6.3 05/16/2023    A1C 5.7 07/23/2016    A1C 6.0 04/23/2013         Advance Care Planning  Patient does not have a Health Care Directive: Discussed advance care planning with patient; however, patient declined at this time.      4/15/2025   General Health   How would you rate your overall physical health? (!) FAIR   Feel stress (tense, anxious, or unable to sleep) Only a little   (!) STRESS CONCERN      4/15/2025   Nutrition   Diet: Diabetic         4/15/2025   Exercise   Days per week of moderate/strenous exercise 0 days   (!) EXERCISE CONCERN      4/15/2025   Social Factors   Frequency of gathering with friends or relatives Once a week   Worry food won't last until get money to buy more No   Food not last or not have enough money for food? No   Do you have housing? (Housing is defined as stable permanent housing and does not include staying ouside in a car, in a tent, in an abandoned building, in an overnight shelter, or couch-surfing.) Yes   Are you worried about losing your housing? No   Lack of transportation? No   Unable to get utilities (heat,electricity)? No         4/15/2025   Fall Risk   Fallen 2 or more times in the past year? No   Trouble with walking or balance? Yes   Gait Speed Test Interpretation  Greater than 5.01 seconds - ABNORMAL           4/15/2025   Activities of Daily Living- Home Safety   Needs help with the following daily activites Telephone use    Transportation    Shopping    Preparing meals    Housework    Bathing    Laundry    Medication administration    Money management    Toileting    Feeding    Dressing   Safety concerns in the home None of the above       Multiple values from one day are sorted in reverse-chronological order         4/15/2025   Dental   Dentist two times every year? (!) NO         4/15/2025   Hearing Screening   Hearing concerns? (!) I NEED TO ASK PEOPLE TO SPEAK UP OR REPEAT THEMSELVES.    (!) IT'S HARD TO FOLLOW A CONVERSATION IN A NOISY RESTAURANT OR CROWDED ROOM.    (!) TROUBLE UNDERSTANDING SOFT OR WHISPERED SPEECH.    (!) TROUBLE UNDERSTANDING SPEECH ON THE TELEPHONE       Multiple values from one day are sorted in reverse-chronological order         4/15/2025   Driving Risk Screening   Patient/family members have concerns about driving No         4/15/2025   General Alertness/Fatigue Screening   Have you been more tired than usual lately? (!) YES         4/15/2025   Urinary Incontinence Screening   Bothered by leaking urine in past 6 months Yes           Today's PHQ-2 Score:       4/15/2025     3:52 PM   PHQ-2 ( 1999 [employer])   Q1: Little interest or pleasure in doing things 1   Q2: Feeling down, depressed or hopeless 0   PHQ-2 Score 1    Q1: Little interest or pleasure in doing things Several days   Q2: Feeling down, depressed or hopeless Not at all   PHQ-2 Score 1       Patient-reported           4/15/2025   Substance Use   Alcohol more than 3/day or more than 7/wk Not Applicable   Do you have a current opioid prescription? No   How severe/bad is pain from 1 to 10? 2/10   Do you use any other substances recreationally? No     Social History     Tobacco Use    Smoking status: Former     Passive exposure: Never    Smokeless tobacco: Never   Vaping Use    Vaping  status: Never Used   Substance Use Topics    Alcohol use: No    Drug use: No             Reviewed and updated as needed this visit by Provider                    Past Medical History:   Diagnosis Date    Asthma     BPH (benign prostatic hyperplasia)     Chronic obstructive pulmonary disease, unspecified COPD type (H) 10/2/2018    COPD (chronic obstructive pulmonary disease) (H)     Diastolic dysfunction, left ventricle 4/16/2013    H/O tuberculosis     s/p partial pneumonectomy in [place] in the 1990's    Hypertension     LBBB (left bundle branch block)     Leg wound, right     chronic, s/p grafting    Osteoarthritis      Current providers sharing in care for this patient include:  Patient Care Team:  Kranthi Reza MD as PCP - General (Internal Medicine)  Lenin Farley MD Schuster, Joseph, DPM (Podiatry)  Santosh Matthew MD as MD (Urology)  Kristal Delgado RN as Registered Nurse  Genevieve Chang NP as Nurse Practitioner (Nurse Practitioner - Adult Health)  Abilio Frank MD as MD (Interventional Cardiology)  Sary Edmond [facility] as Pharmacist (Pharmacist)  Fam Mao [facility] as Pharmacist  Nargis Neville RN as Lead Care Coordinator (Primary Care - CC)  Kranthi Reza MD as Assigned PCP    The following health maintenance items are reviewed in Epic and correct as of today:  Health Maintenance   Topic Date Due    MICROALBUMIN  Never done    DIABETIC FOOT EXAM  Never done    COPD ACTION PLAN  Never done    ZOSTER IMMUNIZATION (1 of 2) Never done    RSV VACCINE (1 - 1-dose 75+ series) Never done    LIPID  04/24/2014    Pneumococcal Vaccine: 50+ Years (2 of 2 - PCV) 08/03/2017    EYE EXAM  10/07/2020    A1C  08/16/2023    INFLUENZA VACCINE (1) 09/01/2024    COVID-19 Vaccine (1 - 2024-25 season) Never done    MEDICARE ANNUAL WELLNESS VISIT  10/04/2024    Medicare Annual MTM Pharmacist Visit (once per calendar year)  01/01/2025    BMP  06/04/2025    ANNUAL REVIEW OF HM ORDERS   "01/02/2026    FALL RISK ASSESSMENT  04/15/2026    DTAP/TDAP/TD IMMUNIZATION (2 - Td or Tdap) 08/03/2026    ADVANCE CARE PLANNING  10/04/2028    SPIROMETRY  Completed    PHQ-2 (once per calendar year)  Completed    HPV IMMUNIZATION  Aged Out    MENINGITIS IMMUNIZATION  Aged Out         Review of Systems  Constitutional, HEENT, cardiovascular, pulmonary, GI, , musculoskeletal, neuro, skin, endocrine and psych systems are negative, except as otherwise noted.     Objective    Exam  BP (!) 173/81   Pulse 70   Temp 97.8  F (36.6  C) (Temporal)   Resp 16   Ht 1.778 m (5' 10\")   Wt 79.4 kg (175 lb 1.6 oz)   SpO2 98%   BMI 25.12 kg/m     Estimated body mass index is 25.12 kg/m  as calculated from the following:    Height as of this encounter: 1.778 m (5' 10\").    Weight as of this encounter: 79.4 kg (175 lb 1.6 oz).    Physical Exam  GENERAL: alert and no distress  EYES: Eyes grossly normal to inspection, PERRL and conjunctivae and sclerae normal  HENT: ear canals and TM's normal, nose and mouth without ulcers or lesions  NECK: no adenopathy, no asymmetry, masses, or scars  RESP: lungs clear to auscultation - no rales, rhonchi or wheezes  CV: regular rate and rhythm, normal S1 S2, no S3 or S4, no murmur, click or rub, no peripheral edema  ABDOMEN: soft, nontender, no hepatosplenomegaly, no masses and bowel sounds normal  MS: no gross musculoskeletal defects noted, no edema  SKIN: no suspicious lesions or rashes  NEURO: Normal strength and tone, mentation intact and speech normal  PSYCH: mentation appears normal, affect normal/bright        4/15/2025   Mini Cog   Mini-Cog Not Completed (choose reason) Patient declines    Language barrier and no  present       Multiple values from one day are sorted in reverse-chronological order       Patient declines, but there are concerns for cognitive deficits.             Signed Electronically by: Kranthi Reza MD    "

## 2025-04-15 NOTE — PATIENT INSTRUCTIONS
"Patient Education   Talada Daryeelka Ka   Hortaga ah  Chucho waa talo guud oo aan inta badan bixino si aan dadka uga caawino inay caafimaad qabaan. Kooxdaada daryeelka ayaa laga yaabaa inay kuu hayaan talo kuu gaar ah. Fadlan elisa hadal kooxdaada daryeelka baahiyahaaga daryeelka ee ka hortaga.  Hab Nololeedka  Samee jimicsi ugu yaraan 150 daqiiqo todobaad kasta (30 daqiiqo maalintii, 5 maalmood todobaadkii).  Samee hawlaha xoojiya murqaha 2 maalmood todobaadkii. Kuwani waxay kaa caawinayaan xakamaynta miisaankaaga iyo ka hortaga cudurada.  Lama ogola sigaar cabista  Xiro muraayadaha qorraxda celiya si aad uga hortagto kansarka maqaarka.  Gurigaaga ha laga opal gaaska radon 2 ilaa 5-tii sanaba mar. Radon waa gaas aan midab lahayn, oo aan ur lahayn oo wax yeeli shanika sambabadaada. Si aad wax badan uga ogaato, aad [email or web address].health.[email or web address].mn. oo raadi \"Radon in Homes.\"  Hay qoryaha iyagoo aan rasaas ku jirin oo ku xir goob badqab leh sida khasnadda badqab leh ama isticmaal khaanada qoryaha, oo qari furayaasha. Markasta si gooni ah ugu quful khaanad rasaasta. Si aad wax badan uga ogaato, booqo dps.mn.gov oo raadi \"safe gun storage.\"  Nafaqada  Cun 5 cunto ama ka badan oo khudaar iyo robin ah maalin kasta.  Isku day rootiga qamadiga ka samaysan, bariiska buniga ah iyo baastada (badalkii rootiga cad, bariiska, iyo baasto).  Hel calcium iyo vitamin D kugu filan. Hubi calaamadda cuntooyinka oo ujeedo 100% ee RDA (kaalmada maalinlaha ah ee lagu talbrannonyay).  Baaritaano joogta ah  Samee baaritaanka ilkaha iyo nadiifinta 6 bilood kasta.  Arag kooxdaada daryeelka caafimaadka sanad kasta si aad ugala hadasho:  Isbadal kasta oo ku yimaadda caafimaadkaaga.  Daawooyin kasta oo kooxdaada daryeelka kuu qoreen.  Daryeelka ka hortagga, qorshaynta dhalmada qoyska, iyo siyaabaha looga hortago cudurada daba dheeraada.  Talaatelly (talaarachid)   Ramez HPV (ilaa da'da 26), haddii aadan waligaa hore u qaadanin.  Ramez andrade B (ilaa da'da " 59),haddi aanad hore u qaadanin.  amos COVID-19: Qaado amos marka ay dhamaato.  Amos mengbrio: qaado niharikakirstierio margy sannad kasta.  Amos Leigh: Qaado 10-ki sanaba mar.  Bismarkaakarlee beachukiitada, cagaarshawa A, iyo [place] RSV: Weydii kooxdaada daryeelka haddii aad u baahan tahay kuwaas oo ku salaysan khatarta aad ku jirto.  Amos aguayo (loogu talagaly da'da 50 iyo ka weyn).  Baaritaanada [facility]  Baaritaanka sorowga:  Laga bilaabo da'da 35, Iska baar sokorowga ugu yaraan 3 sanaba mar.  Haddii aad ka xavier tahay da'da 35, waydii kooxdaada daryeelka haddii ay tahay in [facility].  Baaritaanka Kolestoroolka: Da'da 39, bilow inaad iska baMiller Cityo kolestaroolka 5 sanaba mar, ama marar badan haddii lagu taliyey.  Baaritaanka Cufnaanta Lafta (DEXA): Da'da 50,Waydii kooxdaada daryeelka haddii ay tahay in [facility] baaritaanka jilicnaanta lafaha).  Bereket C: Iska baar ugu yaraan idck mar noloshaada.  Baaritaanka god ku yaala Xididka Dhiiga ka Qaada Wadnaha: Elisa hadal dhakhtarkaaga baaritaankan haddii aad:  Weligaa Sigaar ma cabtay; oo  Aadiga ma lab tahay; oo  da'da u dhaxayso 65 iyo 75.  Cudurada galmada lagu elisa qaado (STIs)  Kahor da'da 24:  Weydii kooxdaada daryeelka haddii ay tahay in [facility] baaro Cudurada galmada [facility] el[facility] qaado (STIs).  Kadib da'da 24: Iska baar Cudurada galmada lagu elisa qaado (STIs) haddii aad halis ugu jirto. Aad halis ugu jirto CuSing[facility] Gulfporta [facility] elisa qaado (STIs) (oo ay ku jiraan HIV) haddii:  Hadii aad galmo la samaysay dick qof ka badan.  Aadan isticmaalin cinjirka galmada wakhti kasta.  Adiga ama lamaanahaaga laga helay cur [facility] elisa qaado [facility].  Hadii aad halis ugu jirto HIV, wax ka waydii daawada PrEP si aad uga hortagto HIV.  Iska baar HIV ugu yaraan dick mar noloshaada, haddii aad halis ugu jirto HIV iyo haddii kale.  Baaritaanada Kansarka  Baaritaanada [place]a ee Xubinta Taranka [facility]: Haddii aad dumar tahaybogdan  si joogto ah u hesho baaritaanka [facility] qeybta hore ee ilmo xubinta taranka da'da 21. Inta badan dadka sameeya baaritaanada caadiga ah ee leh natiijooyinka caadiga ah waxay joogsan karaan da'da 65 kadib. Midan elisa hadal [facility].  Baaritaanka [facility] naasaha (baaritaanka sawiritaanka ee [facility]): Haddii aad naaso leedahay, bilaw inaad ka baarto naasahaaga [facility] naasaha si joogto ahda'da 40. Kani waa baaritaan raajo oo lagu baaro [facility].  Baaritaanka [facility] xiid-maha waaweyn: Waa muhiim in la bilaabo [facility]ank[facility] waaweyn da'da 45.  Samee baaritaanka [facility] malawadka 10-ki sanaba mar (ama in ka badan haddii aad halis ugu jirto) [place], weydii bixiyahaaga baaritaanada saxarada sida imtixaanka FIT sanad walba ama baaritaanka Cologuard 3-dii sanaba mar.  Si aad wax badan uga barato ikhtiyaerendira picketto: [email or web address].  Lisawimaada go'aan erendira penalozao: bit.ly/yp20278.  Baaritaanka [facility] kaadi mareenka raga: Hadii aad kimberley tahay aad jirto da'da 55 ilaa 69, ka codso daryeel bixiyahaaga haddii aad ka faa'iidaysan lahayd baaritaanka [facility] kaadi mareenka ee sannadkiiba mar.  Baaritaanka [facility] Sambabada: Hadii aad hada sigaar cabto ama aad horaan u cabaysay oo aad jirto da'da 50 ilaa 80, ka codso kooxdaada daryeelka haddii baaritaanka [facility] socda uu kugu habboon yahay.    Ujeeddooyin wargelin oo kaliya. Ma aha inay beddel u noqoto talada steventarkaaga. Xuquuqda daabacaadda   2023 [facility] Centerfadi xuquuqdu [facility] Systemget dumont. Waxaa caafimaad ahaan padmini u eegay [facility] Sabirmedical [id number] - REV 04/24.  Learning About Activities of Daily Living  What are activities of daily living?     Activities of daily living (ADLs) are the basic self-care tasks you do every day. These include eating, bathing, dressing, and moving around.  As you age, and if you have health problems, you may  find that it's harder to do some of these tasks. If so, your doctor can suggest ideas that may help.  To measure what kind of help you may need, your doctor will ask how well you are able to do ADLs. Let your doctor know if there are any tasks that you are having trouble doing. This is an important first step to getting help. And when you have the help you need, you can stay as independent as possible.  How will a doctor assess your ADLs?  Asking about ADLs is part of a routine health checkup your doctor will likely do as you age. Your health check might be done in a doctor's office, in your home, or at a hospital. The goal is to find out if you are having any problems that could make it hard to care for yourself or that make it unsafe for you to be on your own.  To measure your ADLs, your doctor will ask how hard it is for you to do routine tasks. Your doctor may also want to know if you have changed the way you do a task because of a health problem. Your doctor may watch how you:  Walk back and forth.  Keep your balance while you stand or walk.  Move from sitting to standing or from a bed to a chair.  Button or unbutton a shirt or sweater.  Remove and put on your shoes.  It's common to feel a little worried or anxious if you find you can't do all the things you used to be able to do. Talking with your doctor about ADLs is a way to make sure you're as safe as possible and able to care for yourself as well as you can. You may want to bring a caregiver, friend, or family member to your checkup. They can help you talk to your doctor.  Follow-up care is a key part of your treatment and safety. Be sure to make and go to all appointments, and call your doctor if you are having problems. It's also a good idea to know your test results and keep a list of the medicines you take.  Current as of: October 24, 2024  Content Version: 14.4    5988-2596 [employer]isasa.   Care instructions adapted under license by your  healthcare professional. If you have questions about a medical condition or this instruction, always ask your healthcare professional. [employer] disclaims any warranty or liability for your use of this information.    Preventing Falls: Care Instructions  Injuries and health problems such as trouble walking or poor eyesight can increase your risk of falling. So can some medicines. But there are things you can do to help prevent falls. You can exercise to get stronger. You can also arrange your home to make it safer.    Talk to your doctor about the medicines you take. Ask if any of them increase the risk of falls and whether they can be changed or stopped.   Try to exercise regularly. It can help improve your strength and balance. This can help lower your risk of falling.         Practice fall safety and prevention.   Wear low-heeled shoes that fit well and give your feet good support. Talk to your doctor if you have foot problems that make this hard.  Carry a cellphone or wear a medical alert device that you can use to call for help.  Use stepladders instead of chairs to reach high objects. Don't climb if you're at risk for falls. Ask for help, if needed.  Wear the correct eyeglasses, if you need them.        Make your home safer.   Remove rugs, cords, clutter, and furniture from walkways.  Keep your house well lit. Use night-lights in hallways and bathrooms.  Install and use sturdy handrails on stairways.  Wear nonskid footwear, even inside. Don't walk barefoot or in socks without shoes.        Be safe outside.   Use handrails, curb cuts, and ramps whenever possible.  Keep your hands free by using a shoulder bag or backpack.  Try to walk in well-lit areas. Watch out for uneven ground, changes in pavement, and debris.  Be careful in the winter. Walk on the grass or gravel when sidewalks are slippery. Use de-icer on steps and walkways. Add non-slip devices to shoes.    Put grab bars and nonskid mats in  "your shower or tub and near the toilet. Try to use a shower chair or bath bench when bathing.   Get into a tub or shower by putting in your weaker leg first. Get out with your strong side first. Have a phone or medical alert device in the bathroom with you.   Where can you learn more?  Go to https://www.[employer].net/patiented  Enter G117 in the search box to learn more about \"Preventing Falls: Care Instructions.\"  Current as of: July 31, 2024  Content Version: 14.4    0043-7294 [employer].   Care instructions adapted under license by your healthcare professional. If you have questions about a medical condition or this instruction, always ask your healthcare professional. [employer] disclaims any warranty or liability for your use of this information.    Hearing Loss: Care Instructions  Overview     Hearing loss is a sudden or slow decrease in how well you hear. It can range from slight to profound. Permanent hearing loss can occur with aging. It also can happen when you are exposed long-term to loud noise. Examples include listening to loud music, riding motorcycles, or being around other loud machines.  Hearing loss can affect your work and home life. It can make you feel lonely or depressed. You may feel that you have lost your independence. But hearing aids and other devices can help you hear better and feel connected to others.  Follow-up care is a key part of your treatment and safety. Be sure to make and go to all appointments, and call your doctor if you are having problems. It's also a good idea to know your test results and keep a list of the medicines you take.  How can you care for yourself at home?  Avoid loud noises whenever possible. This helps keep your hearing from getting worse.  Always wear hearing protection around loud noises.  Wear a hearing aid as directed.  A professional can help you pick a hearing aid that will work best for you.  You can also get hearing aids over " "the counter for mild to moderate hearing loss.  Have hearing tests as your doctor suggests. They can show whether your hearing has changed. Your hearing aid may need to be adjusted.  Use other devices as needed. These may include:  Telephone amplifiers and hearing aids that can connect to a television, stereo, radio, or microphone.  Devices that use lights or vibrations. These alert you to the doorbell, a ringing telephone, or a baby monitor.  Television closed-captioning. This shows the words at the bottom of the screen. Most new TVs can do this.  TTY (text telephone). This lets you type messages back and forth on the telephone instead of talking or listening. These devices are also called TDD. When messages are typed on the keyboard, they are sent over the phone line to a receiving TTY. The message is shown on a monitor.  Use text messaging, social media, and email if it is hard for you to communicate by telephone.  Try to learn a listening technique called speechreading. It is not lipreading. You pay attention to people's gestures, expressions, posture, and tone of voice. These clues can help you understand what a person is saying. Face the person you are talking to, and have them face you. Make sure the lighting is good. You need to see the other person's face clearly.  Think about counseling if you need help to adjust to your hearing loss.  When should you call for help?  Watch closely for changes in your health, and be sure to contact your doctor if:    You think your hearing is getting worse.     You have new symptoms, such as dizziness or nausea.   Where can you learn more?  Go to https://www.[employer].net/patiented  Enter R798 in the search box to learn more about \"Hearing Loss: Care Instructions.\"  Current as of: October 27, 2024  Content Version: 14.4    5875-0251 [facility] ALTHIA.   Care instructions adapted under license by your healthcare professional. If you have questions about a medical " condition or this instruction, always ask your healthcare professional. [employer] disclaims any warranty or liability for your use of this information.    Learning About Sleeping Well  What does sleeping well mean?     Sleeping well means getting enough sleep to feel good and stay healthy. How much sleep is enough varies among people.  The number of hours you sleep and how you feel when you wake up are both important. If you do not feel refreshed, you probably need more sleep. Another sign of not getting enough sleep is feeling tired during the day.  Experts recommend that adults get at least 7 or more hours of sleep per day. Children and older adults need more sleep.  Why is getting enough sleep important?  Getting enough quality sleep is a basic part of good health. When your sleep suffers, your physical health, mood, and your thoughts can suffer too. You may find yourself feeling more grumpy or stressed. Not getting enough sleep also can lead to serious problems, including injury, accidents, anxiety, and depression.  What might cause poor sleeping?  Many things can cause sleep problems, including:  Changes to your sleep schedule.  Stress. Stress can be caused by fear about a single event, such as giving a speech. Or you may have ongoing stress, such as worry about work or school.  Depression, anxiety, and other mental or emotional conditions.  Changes in your sleep habits or surroundings. This includes changes that happen where you sleep, such as noise, light, or sleeping in a different bed. It also includes changes in your sleep pattern, such as having jet lag or working a late shift.  Health problems, such as pain, breathing problems, and restless legs syndrome.  Lack of regular exercise.  Using alcohol, nicotine, or caffeine before bed.  How can you help yourself?  Here are some tips that may help you sleep more soundly and wake up feeling more refreshed.  Your sleeping area   Use your bedroom  "only for sleeping and sex. A bit of light reading may help you fall asleep. But if it doesn't, do your reading elsewhere in the house. Try not to use your TV, computer, smartphone, or tablet while you are in bed.  Be sure your bed is big enough to stretch out comfortably, especially if you have a sleep partner.  Keep your bedroom quiet, dark, and cool. Use curtains, blinds, or a sleep mask to block out light. To block out noise, use earplugs, soothing music, or a \"white noise\" machine.  Your evening and bedtime routine   Create a relaxing bedtime routine. You might want to take a warm shower or bath, or listen to soothing music.  Go to bed at the same time every night. And get up at the same time every morning, even if you feel tired.  What to avoid   Limit caffeine (coffee, tea, caffeinated sodas) during the day, and don't have any for at least 6 hours before bedtime.  Avoid drinking alcohol before bedtime. Alcohol can cause you to wake up more often during the night.  Try not to smoke or use tobacco, especially in the evening. Nicotine can keep you awake.  Limit naps during the day, especially close to bedtime.  Avoid lying in bed awake for too long. If you can't fall asleep or if you wake up in the middle of the night and can't get back to sleep within about 20 minutes, get out of bed and go to another room until you feel sleepy.  Avoid taking medicine right before bed that may keep you awake or make you feel hyper or energized. Your doctor can tell you if your medicine may do this and if you can take it earlier in the day.  If you can't sleep   Imagine yourself in a peaceful, pleasant scene. Focus on the details and feelings of being in a place that is relaxing.  Get up and do a quiet or boring activity until you feel sleepy.  Avoid drinking any liquids before going to bed to help prevent waking up often to use the bathroom.  Where can you learn more?  Go to https://www.healthwise.net/patiented  Enter J942 in " "the search box to learn more about \"Learning About Sleeping Well.\"  Current as of: July 31, 2024  Content Version: 14.4    4275-6273 [employer].   Care instructions adapted under license by your healthcare professional. If you have questions about a medical condition or this instruction, always ask your healthcare professional. [employer] disclaims any warranty or liability for your use of this information.    Bladder Training: Care Instructions  Your Care Instructions     Bladder training is used to treat urge incontinence and stress incontinence. Urge incontinence means that the need to urinate comes on so fast that you can't get to a toilet in time. Stress incontinence means that you leak urine because of pressure on your bladder. For example, it may happen when you laugh, cough, or lift something heavy.  Bladder training can increase how long you can wait before you have to urinate. It can also help your bladder hold more urine. And it can give you better control over the urge to urinate.  It is important to remember that bladder training takes a few weeks to a few months to make a difference. You may not see results right away, but don't give up.  Follow-up care is a key part of your treatment and safety. Be sure to make and go to all appointments, and call your doctor if you are having problems. It's also a good idea to know your test results and keep a list of the medicines you take.  How can you care for yourself at home?  Work with your doctor to come up with a bladder training program that is right for you. You may use one or more of the following methods.  Delayed urination  In the beginning, try to keep from urinating for 5 minutes after you first feel the need to go.  While you wait, take deep, slow breaths to relax. Kegel exercises can also help you delay the need to go to the bathroom.  After some practice, when you can easily wait 5 minutes to urinate, try to wait 10 minutes " "before you urinate.  Slowly increase the waiting period until you are able to control when you have to urinate.  Scheduled urination  Empty your bladder when you first wake up in the morning.  Schedule times throughout the day when you will urinate.  Start by going to the bathroom every hour, even if you don't need to go.  Slowly increase the time between trips to the bathroom.  When you have found a schedule that works well for you, keep doing it.  If you wake up during the night and have to urinate, do it. Apply your schedule to waking hours only.  Kegel exercises  These tighten and strengthen pelvic muscles, which can help you control the flow of urine. (If doing these exercises causes pain, stop doing them and talk with your doctor.) To do Kegel exercises:  Squeeze your muscles as if you were trying not to pass gas. Or squeeze your muscles as if you were stopping the flow of urine. Your belly, legs, and buttocks shouldn't move.  Hold the squeeze for 3 seconds, then relax for 5 to 10 seconds.  Start with 3 seconds, then add 1 second each week until you are able to squeeze for 10 seconds.  Repeat the exercise 10 times a session. Do 3 to 8 sessions a day.  When should you call for help?  Watch closely for changes in your health, and be sure to contact your doctor if:    Your incontinence is getting worse.     You do not get better as expected.   Where can you learn more?  Go to https://www.daPulse.net/patiented  Enter V684 in the search box to learn more about \"Bladder Training: Care Instructions.\"  Current as of: April 30, 2024  Content Version: 14.4    4114-6297 [employer]ch.   Care instructions adapted under license by your healthcare professional. If you have questions about a medical condition or this instruction, always ask your healthcare professional. [employer] disclaims any warranty or liability for your use of this information.       "

## 2025-04-16 ENCOUNTER — TELEPHONE (OUTPATIENT)
Dept: INTERNAL MEDICINE | Facility: CLINIC | Age: OVER 89
End: 2025-04-16
Payer: COMMERCIAL

## 2025-04-16 LAB
ALBUMIN SERPL BCG-MCNC: 3.7 G/DL (ref 3.5–5.2)
ALP SERPL-CCNC: 73 U/L (ref 40–150)
ALT SERPL W P-5'-P-CCNC: 14 U/L (ref 0–70)
ANION GAP SERPL CALCULATED.3IONS-SCNC: 13 MMOL/L (ref 7–15)
AST SERPL W P-5'-P-CCNC: 30 U/L (ref 0–45)
BILIRUB SERPL-MCNC: 0.2 MG/DL
BUN SERPL-MCNC: 14.4 MG/DL (ref 8–23)
CALCIUM SERPL-MCNC: 9.2 MG/DL (ref 8.8–10.4)
CHLORIDE SERPL-SCNC: 101 MMOL/L (ref 98–107)
CHOLEST SERPL-MCNC: 123 MG/DL
CREAT SERPL-MCNC: 1 MG/DL (ref 0.67–1.17)
CREAT UR-MCNC: 102 MG/DL
EGFRCR SERPLBLD CKD-EPI 2021: 71 ML/MIN/1.73M2
FASTING STATUS PATIENT QL REPORTED: NO
FASTING STATUS PATIENT QL REPORTED: NO
GLUCOSE SERPL-MCNC: 111 MG/DL (ref 70–99)
HCO3 SERPL-SCNC: 25 MMOL/L (ref 22–29)
HDLC SERPL-MCNC: 42 MG/DL
LDLC SERPL CALC-MCNC: 53 MG/DL
MICROALBUMIN UR-MCNC: <12 MG/L
MICROALBUMIN/CREAT UR: NORMAL MG/G{CREAT}
NONHDLC SERPL-MCNC: 81 MG/DL
POTASSIUM SERPL-SCNC: 4.3 MMOL/L (ref 3.4–5.3)
PROT SERPL-MCNC: 6.8 G/DL (ref 6.4–8.3)
SODIUM SERPL-SCNC: 139 MMOL/L (ref 135–145)
TRIGL SERPL-MCNC: 142 MG/DL

## 2025-04-16 NOTE — TELEPHONE ENCOUNTER
MTM referral from: [place] clinic visit (referral by provider)    MTM referral outreach attempt #1 on April 16, 2025 at 9:50 AM      Outcome: Spoke with patient daughter patient is traveling out of country and not sure when he will back.     Use   [facility] partners MAP!     for the carrier/Plan on the flowsheet      Apliiqt Message Sent    Sheela Gomez MT

## 2025-06-18 ENCOUNTER — PATIENT OUTREACH (OUTPATIENT)
Dept: GERIATRIC MEDICINE | Facility: CLINIC | Age: OVER 89
End: 2025-06-18
Payer: COMMERCIAL

## 2025-06-18 NOTE — PROGRESS NOTES
[facility] Care Coordination Contact    Called member's son and left a voice mail to remind member to call for this CHW if member requires assistance with their medical assistance renewal. In the message notified member that the paperwork is due by (07/01/2025).  Provided this CHW contact information.     IZABELLA Nazario  [facility]  [phone]

## 2025-07-28 ENCOUNTER — PATIENT OUTREACH (OUTPATIENT)
Dept: GERIATRIC MEDICINE | Facility: CLINIC | Age: OVER 89
End: 2025-07-28
Payer: COMMERCIAL

## 2025-07-28 NOTE — PROGRESS NOTES
[facility] Care Coordination Contact    Internal CC change effective 8/1/2025.  Mailed member CC Change letter.  Additional tasks to be completed by CMS include: update database & EPIC, enter CC Change in MMIS, and move member file.    Manda Friend  Care Management Specialist  [facility]  [phone]

## 2025-07-28 NOTE — LETTER
July 28, 2025    GALLITO ALBRIGHT  [address]  [facility] [address]      Dear Gallito:    As a member of [facility] ([facility] ([facility]), you are provided a care coordinator. I will be your new care coordinator as of 8/1/2025. I will be calling you soon to see how you are doing and determine your needs.    If you have any questions, please feel free to call me at [phone]. If you reach my voice mail, please leave a message and your phone number. If you are hearing impaired, please call the [place] [employer] at [phone] or [phone] ([email or web address]).    I look forward to speaking with you soon.    Sincerely,    Shalini Ortega RN, BSN, PHN  [phone]  [email or web address] is a health plan that contracts with both Medicare and the [place] Medical Assistance (Medicaid) program to provide benefits of both programs to enrollees. Enrollment in [facility] depends on contract renewal.      [facility]  [id number] DHS Approved ([id number])  [id number] (11/18)

## 2025-08-02 ENCOUNTER — HEALTH MAINTENANCE LETTER (OUTPATIENT)
Age: OVER 89
End: 2025-08-02

## 2025-08-12 ENCOUNTER — PATIENT OUTREACH (OUTPATIENT)
Dept: GERIATRIC MEDICINE | Facility: CLINIC | Age: OVER 89
End: 2025-08-12
Payer: COMMERCIAL

## 2025-08-13 ENCOUNTER — PATIENT OUTREACH (OUTPATIENT)
Dept: GERIATRIC MEDICINE | Facility: CLINIC | Age: OVER 89
End: 2025-08-13
Payer: COMMERCIAL

## (undated) RX ORDER — CEFAZOLIN SODIUM 2 G/100ML
INJECTION, SOLUTION INTRAVENOUS
Status: DISPENSED
Start: 2017-01-12

## (undated) RX ORDER — FENTANYL CITRATE 50 UG/ML
INJECTION, SOLUTION INTRAMUSCULAR; INTRAVENOUS
Status: DISPENSED
Start: 2017-01-12

## (undated) RX ORDER — HYDRALAZINE HYDROCHLORIDE 20 MG/ML
INJECTION INTRAMUSCULAR; INTRAVENOUS
Status: DISPENSED
Start: 2017-01-12

## (undated) RX ORDER — CEFAZOLIN SODIUM 1 G/3ML
INJECTION, POWDER, FOR SOLUTION INTRAMUSCULAR; INTRAVENOUS
Status: DISPENSED
Start: 2017-01-12

## (undated) RX ORDER — CIPROFLOXACIN 500 MG/1
TABLET, FILM COATED ORAL
Status: DISPENSED
Start: 2017-12-07

## (undated) RX ORDER — REGADENOSON 0.08 MG/ML
INJECTION, SOLUTION INTRAVENOUS
Status: DISPENSED
Start: 2019-06-13

## (undated) RX ORDER — LIDOCAINE HYDROCHLORIDE 20 MG/ML
INJECTION, SOLUTION INFILTRATION; PERINEURAL
Status: DISPENSED
Start: 2017-01-12

## (undated) RX ORDER — AMINOPHYLLINE 25 MG/ML
INJECTION, SOLUTION INTRAVENOUS
Status: DISPENSED
Start: 2019-06-13

## (undated) RX ORDER — ALBUTEROL SULFATE 90 UG/1
AEROSOL, METERED RESPIRATORY (INHALATION)
Status: DISPENSED
Start: 2019-06-13